# Patient Record
Sex: FEMALE | Race: WHITE | Employment: OTHER | URBAN - METROPOLITAN AREA
[De-identification: names, ages, dates, MRNs, and addresses within clinical notes are randomized per-mention and may not be internally consistent; named-entity substitution may affect disease eponyms.]

---

## 2020-11-06 ENCOUNTER — HOSPITAL ENCOUNTER (OUTPATIENT)
Dept: GENERAL RADIOLOGY | Age: 65
Discharge: HOME | End: 2020-11-08
Payer: MEDICARE

## 2020-11-06 PROCEDURE — 77063 BREAST TOMOSYNTHESIS BI: CPT

## 2021-01-01 ENCOUNTER — APPOINTMENT (OUTPATIENT)
Dept: GENERAL RADIOLOGY | Age: 66
DRG: 870 | End: 2021-01-01
Payer: MEDICARE

## 2021-01-01 ENCOUNTER — HOSPITAL ENCOUNTER (OUTPATIENT)
Dept: GENERAL RADIOLOGY | Age: 66
Discharge: HOME | End: 2021-11-10
Payer: MEDICARE

## 2021-01-01 ENCOUNTER — CLINICAL DOCUMENTATION (OUTPATIENT)
Dept: FAMILY MEDICINE CLINIC | Age: 66
End: 2021-01-01

## 2021-01-01 ENCOUNTER — HOSPITAL ENCOUNTER (EMERGENCY)
Age: 66
Discharge: HOME | End: 2021-02-20
Payer: MEDICARE

## 2021-01-01 ENCOUNTER — ANESTHESIA (OUTPATIENT)
Dept: ICU | Age: 66
DRG: 870 | End: 2021-01-01
Payer: MEDICARE

## 2021-01-01 ENCOUNTER — TELEPHONE (OUTPATIENT)
Dept: FAMILY MEDICINE CLINIC | Age: 66
End: 2021-01-01

## 2021-01-01 ENCOUNTER — OFFICE VISIT (OUTPATIENT)
Dept: FAMILY MEDICINE CLINIC | Age: 66
End: 2021-01-01
Payer: MEDICARE

## 2021-01-01 ENCOUNTER — APPOINTMENT (OUTPATIENT)
Dept: GENERAL RADIOLOGY | Age: 66
End: 2021-01-01
Payer: MEDICARE

## 2021-01-01 ENCOUNTER — ANESTHESIA EVENT (OUTPATIENT)
Dept: ICU | Age: 66
DRG: 870 | End: 2021-01-01
Payer: MEDICARE

## 2021-01-01 ENCOUNTER — HOSPITAL ENCOUNTER (OUTPATIENT)
Dept: ULTRASOUND IMAGING | Age: 66
Discharge: HOME | End: 2021-03-07
Payer: MEDICARE

## 2021-01-01 ENCOUNTER — CARE COORDINATION (OUTPATIENT)
Dept: CARE COORDINATION | Age: 66
End: 2021-01-01

## 2021-01-01 ENCOUNTER — HOSPITAL ENCOUNTER (EMERGENCY)
Age: 66
Discharge: HOME | End: 2021-09-23
Payer: MEDICARE

## 2021-01-01 ENCOUNTER — APPOINTMENT (OUTPATIENT)
Dept: CT IMAGING | Age: 66
DRG: 870 | End: 2021-01-01
Payer: MEDICARE

## 2021-01-01 ENCOUNTER — HOSPITAL ENCOUNTER (INPATIENT)
Age: 66
LOS: 34 days | DRG: 870 | End: 2021-12-20
Attending: STUDENT IN AN ORGANIZED HEALTH CARE EDUCATION/TRAINING PROGRAM | Admitting: FAMILY MEDICINE
Payer: MEDICARE

## 2021-01-01 ENCOUNTER — APPOINTMENT (OUTPATIENT)
Dept: ULTRASOUND IMAGING | Age: 66
DRG: 870 | End: 2021-01-01
Payer: MEDICARE

## 2021-01-01 ENCOUNTER — NURSE TRIAGE (OUTPATIENT)
Dept: OTHER | Facility: CLINIC | Age: 66
End: 2021-01-01

## 2021-01-01 VITALS
OXYGEN SATURATION: 96 % | WEIGHT: 152 LBS | HEART RATE: 66 BPM | RESPIRATION RATE: 18 BRPM | HEIGHT: 61 IN | TEMPERATURE: 97.3 F | SYSTOLIC BLOOD PRESSURE: 138 MMHG | DIASTOLIC BLOOD PRESSURE: 87 MMHG | BODY MASS INDEX: 28.7 KG/M2

## 2021-01-01 VITALS
DIASTOLIC BLOOD PRESSURE: 93 MMHG | TEMPERATURE: 96.3 F | OXYGEN SATURATION: 71 % | BODY MASS INDEX: 33.51 KG/M2 | SYSTOLIC BLOOD PRESSURE: 132 MMHG | RESPIRATION RATE: 54 BRPM | WEIGHT: 177.5 LBS | HEIGHT: 61 IN

## 2021-01-01 VITALS
DIASTOLIC BLOOD PRESSURE: 73 MMHG | WEIGHT: 161 LBS | HEART RATE: 61 BPM | RESPIRATION RATE: 16 BRPM | SYSTOLIC BLOOD PRESSURE: 112 MMHG | TEMPERATURE: 97.9 F | HEIGHT: 61 IN | BODY MASS INDEX: 30.4 KG/M2 | OXYGEN SATURATION: 98 %

## 2021-01-01 VITALS
BODY MASS INDEX: 28.13 KG/M2 | WEIGHT: 149 LBS | SYSTOLIC BLOOD PRESSURE: 120 MMHG | HEART RATE: 78 BPM | TEMPERATURE: 97.5 F | HEIGHT: 61 IN | OXYGEN SATURATION: 96 % | RESPIRATION RATE: 18 BRPM | DIASTOLIC BLOOD PRESSURE: 78 MMHG

## 2021-01-01 VITALS
DIASTOLIC BLOOD PRESSURE: 75 MMHG | SYSTOLIC BLOOD PRESSURE: 143 MMHG | HEART RATE: 69 BPM | OXYGEN SATURATION: 97 % | RESPIRATION RATE: 18 BRPM | TEMPERATURE: 97.5 F

## 2021-01-01 VITALS
DIASTOLIC BLOOD PRESSURE: 83 MMHG | RESPIRATION RATE: 18 BRPM | HEART RATE: 65 BPM | OXYGEN SATURATION: 96 % | SYSTOLIC BLOOD PRESSURE: 129 MMHG | HEIGHT: 61 IN | BODY MASS INDEX: 31.15 KG/M2 | TEMPERATURE: 97.4 F | WEIGHT: 165 LBS

## 2021-01-01 VITALS
SYSTOLIC BLOOD PRESSURE: 124 MMHG | HEIGHT: 61 IN | TEMPERATURE: 98 F | HEART RATE: 76 BPM | DIASTOLIC BLOOD PRESSURE: 80 MMHG | WEIGHT: 160 LBS | RESPIRATION RATE: 20 BRPM | OXYGEN SATURATION: 95 % | BODY MASS INDEX: 30.21 KG/M2

## 2021-01-01 DIAGNOSIS — Z12.31 ENCOUNTER FOR SCREENING MAMMOGRAM FOR MALIGNANT NEOPLASM OF BREAST: ICD-10-CM

## 2021-01-01 DIAGNOSIS — E78.5 HYPERLIPIDEMIA, UNSPECIFIED HYPERLIPIDEMIA TYPE: Primary | ICD-10-CM

## 2021-01-01 DIAGNOSIS — U07.1 COVID-19: ICD-10-CM

## 2021-01-01 DIAGNOSIS — E87.6 HYPOKALEMIA: ICD-10-CM

## 2021-01-01 DIAGNOSIS — E78.00 HIGH CHOLESTEROL: ICD-10-CM

## 2021-01-01 DIAGNOSIS — R53.83 FATIGUE, UNSPECIFIED TYPE: ICD-10-CM

## 2021-01-01 DIAGNOSIS — Z11.59 ENCOUNTER FOR SCREENING FOR OTHER VIRAL DISEASES: ICD-10-CM

## 2021-01-01 DIAGNOSIS — E78.5 HYPERLIPIDEMIA, UNSPECIFIED HYPERLIPIDEMIA TYPE: ICD-10-CM

## 2021-01-01 DIAGNOSIS — N30.01 ACUTE CYSTITIS WITH HEMATURIA: ICD-10-CM

## 2021-01-01 DIAGNOSIS — R74.8 ELEVATED LIVER ENZYMES: Primary | ICD-10-CM

## 2021-01-01 DIAGNOSIS — E07.89 THYROID FULLNESS: ICD-10-CM

## 2021-01-01 DIAGNOSIS — F25.9 SCHIZOAFFECTIVE DISORDER, UNSPECIFIED TYPE (HCC): ICD-10-CM

## 2021-01-01 DIAGNOSIS — M62.82 NON-TRAUMATIC RHABDOMYOLYSIS: ICD-10-CM

## 2021-01-01 DIAGNOSIS — T79.6XXA TRAUMATIC RHABDOMYOLYSIS, INITIAL ENCOUNTER (HCC): ICD-10-CM

## 2021-01-01 DIAGNOSIS — Z00.00 HEALTHCARE MAINTENANCE: ICD-10-CM

## 2021-01-01 DIAGNOSIS — R74.8 ELEVATED LIVER ENZYMES: ICD-10-CM

## 2021-01-01 DIAGNOSIS — S86.911A STRAIN OF RIGHT KNEE, INITIAL ENCOUNTER: Primary | ICD-10-CM

## 2021-01-01 DIAGNOSIS — J18.9 PNEUMONIA DUE TO INFECTIOUS ORGANISM, UNSPECIFIED LATERALITY, UNSPECIFIED PART OF LUNG: ICD-10-CM

## 2021-01-01 DIAGNOSIS — R53.83 FATIGUE, UNSPECIFIED TYPE: Primary | ICD-10-CM

## 2021-01-01 DIAGNOSIS — J96.01 ACUTE RESPIRATORY FAILURE WITH HYPOXIA (HCC): Primary | ICD-10-CM

## 2021-01-01 DIAGNOSIS — Z00.00 HEALTHCARE MAINTENANCE: Primary | ICD-10-CM

## 2021-01-01 DIAGNOSIS — R05.9 COUGH: Primary | ICD-10-CM

## 2021-01-01 LAB
(1,3)-BETA-D-GLUCAN (FUNGITELL) INTERPRETATION: NEGATIVE
(1,3)-BETA-D-GLUCAN (FUNGITELL) INTERPRETATION: NEGATIVE
(1,3)-BETA-D-GLUCAN (FUNGITELL): 31 PG/ML
(1,3)-BETA-D-GLUCAN (FUNGITELL): <31 PG/ML
AADO2: 222.5 MMHG
AADO2: 225 MMHG
AADO2: 234.7 MMHG
AADO2: 254.2 MMHG
AADO2: 261.1 MMHG
AADO2: 269.3 MMHG
AADO2: 282.8 MMHG
AADO2: 285.2 MMHG
AADO2: 287.5 MMHG
AADO2: 308.2 MMHG
AADO2: 312.1 MMHG
AADO2: 322.4 MMHG
AADO2: 322.8 MMHG
AADO2: 327.3 MMHG
AADO2: 333 MMHG
AADO2: 334.8 MMHG
AADO2: 337.2 MMHG
AADO2: 339.3 MMHG
AADO2: 361.2 MMHG
AADO2: 361.3 MMHG
AADO2: 363.2 MMHG
AADO2: 365.7 MMHG
AADO2: 377.6 MMHG
AADO2: 389.9 MMHG
AADO2: 406.3 MMHG
AADO2: 412.8 MMHG
AADO2: 415.9 MMHG
AADO2: 417.4 MMHG
AADO2: 418 MMHG
AADO2: 422.1 MMHG
AADO2: 429.4 MMHG
AADO2: 439.5 MMHG
AADO2: 441.2 MMHG
AADO2: 466.5 MMHG
AADO2: 470.4 MMHG
AADO2: 471.6 MMHG
AADO2: 471.8 MMHG
AADO2: 493.6 MMHG
AADO2: 498.6 MMHG
AADO2: 506.8 MMHG
AADO2: 520.9 MMHG
AADO2: 525.5 MMHG
AADO2: 530 MMHG
AADO2: 537.2 MMHG
AADO2: 556.9 MMHG
AADO2: 569.7 MMHG
AADO2: 578.2 MMHG
AADO2: 578.9 MMHG
AADO2: 579.8 MMHG
AADO2: 585.7 MMHG
AADO2: 589.3 MMHG
AADO2: 594.8 MMHG
AADO2: 603.2 MMHG
ABO/RH: NORMAL
ABO/RH: NORMAL
ACANTHOCYTES: ABNORMAL
ACANTHOCYTES: ABNORMAL
ADENOVIRUS BY PCR: NOT DETECTED
ALBUMIN SERPL-MCNC: 1.9 G/DL (ref 3.5–5.2)
ALBUMIN SERPL-MCNC: 2.1 G/DL (ref 3.5–5.2)
ALBUMIN SERPL-MCNC: 2.1 G/DL (ref 3.5–5.2)
ALBUMIN SERPL-MCNC: 2.2 G/DL (ref 3.5–5.2)
ALBUMIN SERPL-MCNC: 2.3 G/DL (ref 3.5–5.2)
ALBUMIN SERPL-MCNC: 2.4 G/DL (ref 3.5–5.2)
ALBUMIN SERPL-MCNC: 2.5 G/DL (ref 3.5–5.2)
ALBUMIN SERPL-MCNC: 2.5 G/DL (ref 3.5–5.2)
ALBUMIN SERPL-MCNC: 2.6 G/DL (ref 3.5–5.2)
ALBUMIN SERPL-MCNC: 2.7 G/DL (ref 3.5–5.2)
ALBUMIN SERPL-MCNC: 2.8 G/DL (ref 3.5–5.2)
ALBUMIN SERPL-MCNC: 2.8 G/DL (ref 3.5–5.2)
ALBUMIN SERPL-MCNC: 2.9 G/DL (ref 3.5–5.2)
ALBUMIN SERPL-MCNC: 2.9 G/DL (ref 3.5–5.2)
ALBUMIN SERPL-MCNC: 3.2 G/DL (ref 3.5–5.2)
ALBUMIN SERPL-MCNC: 3.5 G/DL (ref 3.5–5.2)
ALBUMIN SERPL-MCNC: 3.8 G/DL (ref 3.5–5.2)
ALBUMIN SERPL-MCNC: 4.7 G/DL (ref 3.5–5.2)
ALP BLD-CCNC: 103 U/L (ref 35–104)
ALP BLD-CCNC: 103 U/L (ref 35–104)
ALP BLD-CCNC: 104 U/L (ref 35–104)
ALP BLD-CCNC: 115 U/L (ref 35–104)
ALP BLD-CCNC: 116 U/L (ref 35–104)
ALP BLD-CCNC: 118 U/L (ref 35–104)
ALP BLD-CCNC: 131 U/L (ref 35–104)
ALP BLD-CCNC: 137 U/L (ref 35–104)
ALP BLD-CCNC: 146 U/L (ref 35–104)
ALP BLD-CCNC: 156 U/L (ref 35–104)
ALP BLD-CCNC: 176 U/L (ref 35–104)
ALP BLD-CCNC: 179 U/L (ref 35–104)
ALP BLD-CCNC: 74 U/L (ref 35–104)
ALP BLD-CCNC: 77 U/L (ref 35–104)
ALP BLD-CCNC: 78 U/L (ref 35–104)
ALP BLD-CCNC: 79 U/L (ref 35–104)
ALP BLD-CCNC: 80 U/L (ref 35–104)
ALP BLD-CCNC: 82 U/L (ref 35–104)
ALP BLD-CCNC: 82 U/L (ref 35–104)
ALP BLD-CCNC: 83 U/L (ref 35–104)
ALP BLD-CCNC: 84 U/L (ref 35–104)
ALP BLD-CCNC: 85 U/L (ref 35–104)
ALP BLD-CCNC: 88 U/L (ref 35–104)
ALP BLD-CCNC: 89 U/L (ref 35–104)
ALP BLD-CCNC: 90 U/L (ref 35–104)
ALP BLD-CCNC: 91 U/L (ref 35–104)
ALP BLD-CCNC: 91 U/L (ref 35–104)
ALP BLD-CCNC: 92 U/L (ref 35–104)
ALP BLD-CCNC: 93 U/L (ref 35–104)
ALP BLD-CCNC: 94 U/L (ref 35–104)
ALP BLD-CCNC: 94 U/L (ref 35–104)
ALP BLD-CCNC: 95 U/L (ref 35–104)
ALP BLD-CCNC: 95 U/L (ref 35–104)
ALP BLD-CCNC: 96 U/L (ref 35–104)
ALP BLD-CCNC: 97 U/L (ref 35–104)
ALP BLD-CCNC: 97 U/L (ref 35–104)
ALT SERPL-CCNC: 101 U/L (ref 0–32)
ALT SERPL-CCNC: 105 U/L (ref 0–32)
ALT SERPL-CCNC: 107 U/L (ref 0–32)
ALT SERPL-CCNC: 112 U/L (ref 0–32)
ALT SERPL-CCNC: 28 U/L (ref 0–32)
ALT SERPL-CCNC: 31 U/L (ref 0–32)
ALT SERPL-CCNC: 34 U/L (ref 0–32)
ALT SERPL-CCNC: 38 U/L (ref 0–32)
ALT SERPL-CCNC: 38 U/L (ref 0–32)
ALT SERPL-CCNC: 39 U/L (ref 0–32)
ALT SERPL-CCNC: 39 U/L (ref 0–32)
ALT SERPL-CCNC: 40 U/L (ref 0–32)
ALT SERPL-CCNC: 42 U/L (ref 0–32)
ALT SERPL-CCNC: 44 U/L (ref 0–32)
ALT SERPL-CCNC: 44 U/L (ref 0–32)
ALT SERPL-CCNC: 46 U/L (ref 0–32)
ALT SERPL-CCNC: 47 U/L (ref 0–32)
ALT SERPL-CCNC: 47 U/L (ref 0–32)
ALT SERPL-CCNC: 49 U/L (ref 0–32)
ALT SERPL-CCNC: 50 U/L (ref 0–32)
ALT SERPL-CCNC: 51 U/L (ref 0–32)
ALT SERPL-CCNC: 53 U/L (ref 0–32)
ALT SERPL-CCNC: 53 U/L (ref 0–32)
ALT SERPL-CCNC: 58 U/L (ref 0–32)
ALT SERPL-CCNC: 58 U/L (ref 0–32)
ALT SERPL-CCNC: 63 U/L (ref 0–32)
ALT SERPL-CCNC: 65 U/L (ref 0–32)
ALT SERPL-CCNC: 66 U/L (ref 0–32)
ALT SERPL-CCNC: 68 U/L (ref 0–32)
ALT SERPL-CCNC: 69 U/L (ref 0–32)
ALT SERPL-CCNC: 73 U/L (ref 0–32)
ALT SERPL-CCNC: 75 U/L (ref 0–32)
ALT SERPL-CCNC: 75 U/L (ref 0–32)
ALT SERPL-CCNC: 77 U/L (ref 0–32)
ALT SERPL-CCNC: 78 U/L (ref 0–32)
ALT SERPL-CCNC: 83 U/L (ref 0–32)
ALT SERPL-CCNC: 85 U/L (ref 0–32)
ALT SERPL-CCNC: 92 U/L (ref 0–32)
AMMONIA: 58 UMOL/L (ref 11–51)
ANION GAP SERPL CALCULATED.3IONS-SCNC: 10 MMOL/L (ref 7–16)
ANION GAP SERPL CALCULATED.3IONS-SCNC: 11 MMOL/L (ref 7–16)
ANION GAP SERPL CALCULATED.3IONS-SCNC: 12 MMOL/L (ref 7–16)
ANION GAP SERPL CALCULATED.3IONS-SCNC: 13 MMOL/L (ref 7–16)
ANION GAP SERPL CALCULATED.3IONS-SCNC: 14 MMOL/L (ref 7–16)
ANION GAP SERPL CALCULATED.3IONS-SCNC: 15 MMOL/L (ref 7–16)
ANION GAP SERPL CALCULATED.3IONS-SCNC: 16 MMOL/L (ref 7–16)
ANION GAP SERPL CALCULATED.3IONS-SCNC: 17 MMOL/L (ref 7–16)
ANION GAP SERPL CALCULATED.3IONS-SCNC: 6 MMOL/L (ref 7–16)
ANION GAP SERPL CALCULATED.3IONS-SCNC: 7 MMOL/L (ref 7–16)
ANION GAP SERPL CALCULATED.3IONS-SCNC: 8 MMOL/L (ref 7–16)
ANION GAP SERPL CALCULATED.3IONS-SCNC: 9 MMOL/L (ref 7–16)
ANISOCYTOSIS: ABNORMAL
ANTIBODY SCREEN: NORMAL
ANTIBODY SCREEN: NORMAL
APPEARANCE FLUID: NORMAL
APTT: 20.4 SEC (ref 24.5–35.1)
APTT: 21.3 SEC (ref 24.5–35.1)
APTT: 22.4 SEC (ref 24.5–35.1)
APTT: 23 SEC (ref 24.5–35.1)
APTT: 23.2 SEC (ref 24.5–35.1)
APTT: 23.3 SEC (ref 24.5–35.1)
APTT: 23.7 SEC (ref 24.5–35.1)
APTT: 24.1 SEC (ref 24.5–35.1)
APTT: 24.2 SEC (ref 24.5–35.1)
APTT: 24.5 SEC (ref 24.5–35.1)
APTT: 25 SEC (ref 24.5–35.1)
APTT: 25.5 SEC (ref 24.5–35.1)
APTT: 26.4 SEC (ref 24.5–35.1)
APTT: 26.6 SEC (ref 24.5–35.1)
APTT: 27.2 SEC (ref 24.5–35.1)
APTT: 27.2 SEC (ref 24.5–35.1)
APTT: 27.4 SEC (ref 24.5–35.1)
APTT: 29.2 SEC (ref 24.5–35.1)
APTT: 29.4 SEC (ref 24.5–35.1)
APTT: 29.6 SEC (ref 24.5–35.1)
APTT: 29.8 SEC (ref 24.5–35.1)
APTT: 31.9 SEC (ref 24.5–35.1)
APTT: 37.9 SEC (ref 24.5–35.1)
APTT: 39.5 SEC (ref 24.5–35.1)
APTT: 40 SEC (ref 24.5–35.1)
APTT: 40.7 SEC (ref 24.5–35.1)
APTT: 47.6 SEC (ref 24.5–35.1)
APTT: 50.6 SEC (ref 24.5–35.1)
APTT: 50.9 SEC (ref 24.5–35.1)
APTT: 51.9 SEC (ref 24.5–35.1)
APTT: 53 SEC (ref 24.5–35.1)
ASPERGILLUS GALACTO AG: NEGATIVE
ASPERGILLUS GALACTO INDEX: 0.09
AST SERPL-CCNC: 101 U/L (ref 0–31)
AST SERPL-CCNC: 110 U/L (ref 0–31)
AST SERPL-CCNC: 110 U/L (ref 0–31)
AST SERPL-CCNC: 135 U/L (ref 0–31)
AST SERPL-CCNC: 139 U/L (ref 0–31)
AST SERPL-CCNC: 17 U/L (ref 0–31)
AST SERPL-CCNC: 17 U/L (ref 0–31)
AST SERPL-CCNC: 18 U/L (ref 0–31)
AST SERPL-CCNC: 183 U/L (ref 0–31)
AST SERPL-CCNC: 19 U/L (ref 0–31)
AST SERPL-CCNC: 194 U/L (ref 0–31)
AST SERPL-CCNC: 20 U/L (ref 0–31)
AST SERPL-CCNC: 21 U/L (ref 0–31)
AST SERPL-CCNC: 21 U/L (ref 0–31)
AST SERPL-CCNC: 22 U/L (ref 0–31)
AST SERPL-CCNC: 23 U/L (ref 0–31)
AST SERPL-CCNC: 23 U/L (ref 0–31)
AST SERPL-CCNC: 24 U/L (ref 0–31)
AST SERPL-CCNC: 25 U/L (ref 0–31)
AST SERPL-CCNC: 28 U/L (ref 0–31)
AST SERPL-CCNC: 34 U/L (ref 0–31)
AST SERPL-CCNC: 36 U/L (ref 0–31)
AST SERPL-CCNC: 38 U/L (ref 0–31)
AST SERPL-CCNC: 38 U/L (ref 0–31)
AST SERPL-CCNC: 39 U/L (ref 0–31)
AST SERPL-CCNC: 42 U/L (ref 0–31)
AST SERPL-CCNC: 44 U/L (ref 0–31)
AST SERPL-CCNC: 45 U/L (ref 0–31)
AST SERPL-CCNC: 48 U/L (ref 0–31)
AST SERPL-CCNC: 58 U/L (ref 0–31)
AST SERPL-CCNC: 59 U/L (ref 0–31)
AST SERPL-CCNC: 66 U/L (ref 0–31)
AST SERPL-CCNC: 66 U/L (ref 0–31)
AST SERPL-CCNC: 67 U/L (ref 0–31)
AST SERPL-CCNC: 80 U/L (ref 0–31)
AST SERPL-CCNC: 85 U/L (ref 0–31)
AST SERPL-CCNC: 89 U/L (ref 0–31)
AST SERPL-CCNC: 93 U/L (ref 0–31)
B.E.: -0.5 MMOL/L (ref -3–3)
B.E.: -0.8 MMOL/L (ref -3–3)
B.E.: -1.5 MMOL/L (ref -3–3)
B.E.: -1.8 MMOL/L (ref -3–3)
B.E.: -1.9 MMOL/L (ref -3–0)
B.E.: -2 MMOL/L (ref -3–3)
B.E.: -2.1 MMOL/L (ref -3–3)
B.E.: -2.6 MMOL/L (ref -3–3)
B.E.: -3 MMOL/L (ref -3–3)
B.E.: -3.4 MMOL/L (ref -3–3)
B.E.: -4 MMOL/L (ref -3–3)
B.E.: -4 MMOL/L (ref -3–3)
B.E.: -4.2 MMOL/L (ref -3–3)
B.E.: -4.5 MMOL/L (ref -3–3)
B.E.: -4.9 MMOL/L (ref -3–3)
B.E.: -8.6 MMOL/L (ref -3–3)
B.E.: 0 MMOL/L (ref -3–3)
B.E.: 1.8 MMOL/L (ref -3–3)
B.E.: 1.9 MMOL/L (ref -3–3)
B.E.: 12.8 MMOL/L (ref -3–3)
B.E.: 2 MMOL/L (ref -3–3)
B.E.: 2 MMOL/L (ref -3–3)
B.E.: 2.1 MMOL/L (ref -3–3)
B.E.: 2.3 MMOL/L (ref -3–3)
B.E.: 2.4 MMOL/L (ref -3–3)
B.E.: 2.6 MMOL/L (ref -3–3)
B.E.: 3.2 MMOL/L (ref -3–3)
B.E.: 3.2 MMOL/L (ref -3–3)
B.E.: 3.3 MMOL/L (ref -3–3)
B.E.: 3.3 MMOL/L (ref -3–3)
B.E.: 3.4 MMOL/L (ref -3–3)
B.E.: 3.7 MMOL/L (ref -3–3)
B.E.: 3.9 MMOL/L (ref -3–3)
B.E.: 4.1 MMOL/L (ref -3–3)
B.E.: 4.2 MMOL/L (ref -3–3)
B.E.: 4.2 MMOL/L (ref -3–3)
B.E.: 4.8 MMOL/L (ref -3–3)
B.E.: 5.3 MMOL/L (ref -3–3)
B.E.: 5.4 MMOL/L (ref -3–3)
B.E.: 5.5 MMOL/L (ref -3–3)
B.E.: 5.5 MMOL/L (ref -3–3)
B.E.: 6.3 MMOL/L (ref -3–3)
B.E.: 6.9 MMOL/L (ref -3–3)
B.E.: 7.4 MMOL/L (ref -3–3)
B.E.: 8.7 MMOL/L (ref -3–3)
B.E.: 9.6 MMOL/L (ref -3–3)
B.E.: 9.9 MMOL/L (ref -3–3)
BACTERIA: ABNORMAL /HPF
BACTERIA: ABNORMAL /HPF
BASO FLUID: 0 %
BASOPHILIC STIPPLING: ABNORMAL
BASOPHILS ABSOLUTE: 0 E9/L (ref 0–0.2)
BASOPHILS ABSOLUTE: 0.01 E9/L (ref 0–0.2)
BASOPHILS ABSOLUTE: 0.02 E9/L (ref 0–0.2)
BASOPHILS ABSOLUTE: 0.03 E9/L (ref 0–0.2)
BASOPHILS ABSOLUTE: 0.03 E9/L (ref 0–0.2)
BASOPHILS ABSOLUTE: 0.04 E9/L (ref 0–0.2)
BASOPHILS ABSOLUTE: 0.04 E9/L (ref 0–0.2)
BASOPHILS ABSOLUTE: 0.05 E9/L (ref 0–0.2)
BASOPHILS ABSOLUTE: 0.11 E9/L (ref 0–0.2)
BASOPHILS RELATIVE PERCENT: 0 % (ref 0–2)
BASOPHILS RELATIVE PERCENT: 0.1 % (ref 0–2)
BASOPHILS RELATIVE PERCENT: 0.2 % (ref 0–2)
BASOPHILS RELATIVE PERCENT: 0.3 % (ref 0–2)
BASOPHILS RELATIVE PERCENT: 0.3 % (ref 0–2)
BASOPHILS RELATIVE PERCENT: 0.4 % (ref 0–2)
BASOPHILS RELATIVE PERCENT: 0.6 % (ref 0–2)
BASOPHILS RELATIVE PERCENT: 0.9 % (ref 0–2)
BILIRUB SERPL-MCNC: 0.2 MG/DL (ref 0–1.2)
BILIRUB SERPL-MCNC: 0.2 MG/DL (ref 0–1.2)
BILIRUB SERPL-MCNC: 0.3 MG/DL (ref 0–1.2)
BILIRUB SERPL-MCNC: 0.4 MG/DL (ref 0–1.2)
BILIRUB SERPL-MCNC: 0.5 MG/DL (ref 0–1.2)
BILIRUB SERPL-MCNC: 0.6 MG/DL (ref 0–1.2)
BILIRUB SERPL-MCNC: 0.8 MG/DL (ref 0–1.2)
BILIRUB SERPL-MCNC: <0.2 MG/DL (ref 0–1.2)
BILIRUBIN DIRECT: 0.2 MG/DL (ref 0–0.3)
BILIRUBIN DIRECT: 0.3 MG/DL (ref 0–0.3)
BILIRUBIN DIRECT: 0.4 MG/DL (ref 0–0.3)
BILIRUBIN DIRECT: <0.2 MG/DL (ref 0–0.3)
BILIRUBIN URINE: NEGATIVE
BILIRUBIN URINE: NEGATIVE
BILIRUBIN, INDIRECT: 0.1 MG/DL (ref 0–1)
BILIRUBIN, INDIRECT: 0.2 MG/DL (ref 0–1)
BILIRUBIN, INDIRECT: 0.6 MG/DL (ref 0–1)
BILIRUBIN, INDIRECT: ABNORMAL MG/DL (ref 0–1)
BLOOD BANK DISPENSE STATUS: NORMAL
BLOOD BANK DISPENSE STATUS: NORMAL
BLOOD BANK PRODUCT CODE: NORMAL
BLOOD BANK PRODUCT CODE: NORMAL
BLOOD CULTURE, ROUTINE: NORMAL
BLOOD, URINE: ABNORMAL
BLOOD, URINE: ABNORMAL
BORDETELLA PARAPERTUSSIS BY PCR: NOT DETECTED
BORDETELLA PERTUSSIS BY PCR: NOT DETECTED
BPU ID: NORMAL
BPU ID: NORMAL
BUN BLDV-MCNC: 10 MG/DL (ref 6–23)
BUN BLDV-MCNC: 11 MG/DL (ref 6–23)
BUN BLDV-MCNC: 11 MG/DL (ref 6–23)
BUN BLDV-MCNC: 11 MG/DL (ref 8–23)
BUN BLDV-MCNC: 12 MG/DL (ref 6–23)
BUN BLDV-MCNC: 13 MG/DL (ref 6–23)
BUN BLDV-MCNC: 15 MG/DL (ref 6–23)
BUN BLDV-MCNC: 15 MG/DL (ref 6–23)
BUN BLDV-MCNC: 16 MG/DL (ref 6–23)
BUN BLDV-MCNC: 17 MG/DL (ref 6–23)
BUN BLDV-MCNC: 17 MG/DL (ref 6–23)
BUN BLDV-MCNC: 18 MG/DL (ref 6–23)
BUN BLDV-MCNC: 20 MG/DL (ref 6–23)
BUN BLDV-MCNC: 22 MG/DL (ref 6–23)
BUN BLDV-MCNC: 23 MG/DL (ref 6–23)
BUN BLDV-MCNC: 24 MG/DL (ref 6–23)
BUN BLDV-MCNC: 27 MG/DL (ref 6–23)
BUN BLDV-MCNC: 27 MG/DL (ref 6–23)
BUN BLDV-MCNC: 28 MG/DL (ref 6–23)
BUN BLDV-MCNC: 29 MG/DL (ref 6–23)
BUN BLDV-MCNC: 30 MG/DL (ref 6–23)
BUN BLDV-MCNC: 30 MG/DL (ref 6–23)
BUN BLDV-MCNC: 31 MG/DL (ref 6–23)
BUN BLDV-MCNC: 31 MG/DL (ref 6–23)
BUN BLDV-MCNC: 32 MG/DL (ref 6–23)
BUN BLDV-MCNC: 33 MG/DL (ref 6–23)
BUN BLDV-MCNC: 33 MG/DL (ref 6–23)
BUN BLDV-MCNC: 34 MG/DL (ref 6–23)
BUN BLDV-MCNC: 34 MG/DL (ref 6–23)
BUN BLDV-MCNC: 36 MG/DL (ref 6–23)
BUN BLDV-MCNC: 38 MG/DL (ref 6–23)
BUN BLDV-MCNC: 38 MG/DL (ref 6–23)
BUN BLDV-MCNC: 39 MG/DL (ref 6–23)
BUN BLDV-MCNC: 42 MG/DL (ref 6–23)
BUN BLDV-MCNC: 44 MG/DL (ref 6–23)
BUN BLDV-MCNC: 45 MG/DL (ref 6–23)
BUN BLDV-MCNC: 45 MG/DL (ref 6–23)
BUN BLDV-MCNC: 48 MG/DL (ref 6–23)
BUN BLDV-MCNC: 48 MG/DL (ref 6–23)
BUN BLDV-MCNC: 49 MG/DL (ref 6–23)
BUN BLDV-MCNC: 50 MG/DL (ref 6–23)
BUN BLDV-MCNC: 52 MG/DL (ref 6–23)
BUN BLDV-MCNC: 54 MG/DL (ref 6–23)
BUN BLDV-MCNC: 54 MG/DL (ref 6–23)
BUN BLDV-MCNC: 55 MG/DL (ref 6–23)
BUN BLDV-MCNC: 7 MG/DL (ref 6–23)
BUN BLDV-MCNC: 7 MG/DL (ref 6–23)
BUN BLDV-MCNC: 9 MG/DL (ref 6–23)
BURR CELLS: ABNORMAL
BURR CELLS: ABNORMAL
C-REACTIVE PROTEIN: 0.4 MG/DL (ref 0–0.4)
C-REACTIVE PROTEIN: 0.6 MG/DL (ref 0–0.4)
C-REACTIVE PROTEIN: 1.3 MG/DL (ref 0–0.4)
C-REACTIVE PROTEIN: 10.4 MG/DL (ref 0–0.4)
C-REACTIVE PROTEIN: 19 MG/DL (ref 0–0.4)
C-REACTIVE PROTEIN: 2.4 MG/DL (ref 0–0.4)
C-REACTIVE PROTEIN: 2.9 MG/DL (ref 0–0.4)
C-REACTIVE PROTEIN: 21.1 MG/DL (ref 0–0.4)
C-REACTIVE PROTEIN: 22.9 MG/DL (ref 0–0.4)
C-REACTIVE PROTEIN: 23 MG/DL (ref 0–0.4)
C-REACTIVE PROTEIN: 3.4 MG/DL (ref 0–0.4)
C-REACTIVE PROTEIN: 32.5 MG/DL (ref 0–0.4)
C-REACTIVE PROTEIN: 4.8 MG/DL (ref 0–0.4)
C-REACTIVE PROTEIN: 46.9 MG/DL (ref 0–0.4)
C-REACTIVE PROTEIN: 5.1 MG/DL (ref 0–0.4)
C-REACTIVE PROTEIN: 5.4 MG/DL (ref 0–0.4)
C-REACTIVE PROTEIN: 6.5 MG/DL (ref 0–0.4)
C-REACTIVE PROTEIN: 7 MG/DL (ref 0–0.4)
C-REACTIVE PROTEIN: 7.1 MG/DL (ref 0–0.4)
C-REACTIVE PROTEIN: 7.2 MG/DL (ref 0–0.4)
CALCIUM IONIZED: 1.05 MMOL/L (ref 1.15–1.33)
CALCIUM IONIZED: 1.07 MMOL/L (ref 1.15–1.33)
CALCIUM IONIZED: 1.07 MMOL/L (ref 1.15–1.33)
CALCIUM IONIZED: 1.08 MMOL/L (ref 1.15–1.33)
CALCIUM IONIZED: 1.09 MMOL/L (ref 1.15–1.33)
CALCIUM IONIZED: 1.09 MMOL/L (ref 1.15–1.33)
CALCIUM IONIZED: 1.11 MMOL/L (ref 1.15–1.33)
CALCIUM IONIZED: 1.12 MMOL/L (ref 1.15–1.33)
CALCIUM IONIZED: 1.13 MMOL/L (ref 1.15–1.33)
CALCIUM IONIZED: 1.14 MMOL/L (ref 1.15–1.33)
CALCIUM IONIZED: 1.15 MMOL/L (ref 1.15–1.33)
CALCIUM IONIZED: 1.16 MMOL/L (ref 1.15–1.33)
CALCIUM IONIZED: 1.18 MMOL/L (ref 1.15–1.33)
CALCIUM IONIZED: 1.19 MMOL/L (ref 1.15–1.33)
CALCIUM IONIZED: 1.2 MMOL/L (ref 1.15–1.33)
CALCIUM IONIZED: 1.21 MMOL/L (ref 1.15–1.33)
CALCIUM IONIZED: 1.21 MMOL/L (ref 1.15–1.33)
CALCIUM IONIZED: 1.22 MMOL/L (ref 1.15–1.33)
CALCIUM IONIZED: 1.22 MMOL/L (ref 1.15–1.33)
CALCIUM IONIZED: 1.23 MMOL/L (ref 1.15–1.33)
CALCIUM SERPL-MCNC: 7 MG/DL (ref 8.6–10.2)
CALCIUM SERPL-MCNC: 7.1 MG/DL (ref 8.6–10.2)
CALCIUM SERPL-MCNC: 7.2 MG/DL (ref 8.6–10.2)
CALCIUM SERPL-MCNC: 7.2 MG/DL (ref 8.6–10.2)
CALCIUM SERPL-MCNC: 7.3 MG/DL (ref 8.6–10.2)
CALCIUM SERPL-MCNC: 7.4 MG/DL (ref 8.6–10.2)
CALCIUM SERPL-MCNC: 7.5 MG/DL (ref 8.6–10.2)
CALCIUM SERPL-MCNC: 7.6 MG/DL (ref 8.6–10.2)
CALCIUM SERPL-MCNC: 7.7 MG/DL (ref 8.6–10.2)
CALCIUM SERPL-MCNC: 7.8 MG/DL (ref 8.6–10.2)
CALCIUM SERPL-MCNC: 7.9 MG/DL (ref 8.6–10.2)
CALCIUM SERPL-MCNC: 8 MG/DL (ref 8.6–10.2)
CALCIUM SERPL-MCNC: 8.1 MG/DL (ref 8.6–10.2)
CALCIUM SERPL-MCNC: 8.2 MG/DL (ref 8.6–10.2)
CALCIUM SERPL-MCNC: 8.3 MG/DL (ref 8.6–10.2)
CALCIUM SERPL-MCNC: 8.3 MG/DL (ref 8.6–10.2)
CALCIUM SERPL-MCNC: 8.4 MG/DL (ref 8.6–10.2)
CALCIUM SERPL-MCNC: 8.5 MG/DL (ref 8.6–10.2)
CALCIUM SERPL-MCNC: 8.5 MG/DL (ref 8.6–10.2)
CALCIUM SERPL-MCNC: 9.2 MG/DL (ref 8.6–10.2)
CALCIUM SERPL-MCNC: 9.8 MG/DL (ref 8.6–10.2)
CELL COUNT FLUID TYPE: NORMAL
CELLULAR CASTS: ABNORMAL /LPF
CHLAMYDOPHILIA PNEUMONIAE BY PCR: NOT DETECTED
CHLORIDE BLD-SCNC: 100 MMOL/L (ref 98–107)
CHLORIDE BLD-SCNC: 101 MMOL/L (ref 98–107)
CHLORIDE BLD-SCNC: 102 MMOL/L (ref 98–107)
CHLORIDE BLD-SCNC: 103 MMOL/L (ref 98–107)
CHLORIDE BLD-SCNC: 104 MMOL/L (ref 98–107)
CHLORIDE BLD-SCNC: 104 MMOL/L (ref 98–107)
CHLORIDE BLD-SCNC: 105 MMOL/L (ref 98–107)
CHLORIDE BLD-SCNC: 107 MMOL/L (ref 98–107)
CHLORIDE BLD-SCNC: 107 MMOL/L (ref 98–107)
CHLORIDE BLD-SCNC: 108 MMOL/L (ref 98–107)
CHLORIDE BLD-SCNC: 109 MMOL/L (ref 98–107)
CHLORIDE BLD-SCNC: 110 MMOL/L (ref 98–107)
CHLORIDE BLD-SCNC: 111 MMOL/L (ref 98–107)
CHLORIDE BLD-SCNC: 111 MMOL/L (ref 98–107)
CHLORIDE BLD-SCNC: 112 MMOL/L (ref 98–107)
CHLORIDE BLD-SCNC: 113 MMOL/L (ref 98–107)
CHLORIDE BLD-SCNC: 113 MMOL/L (ref 98–107)
CHLORIDE BLD-SCNC: 90 MMOL/L (ref 98–107)
CHLORIDE BLD-SCNC: 91 MMOL/L (ref 98–107)
CHLORIDE BLD-SCNC: 92 MMOL/L (ref 98–107)
CHLORIDE BLD-SCNC: 92 MMOL/L (ref 98–107)
CHLORIDE BLD-SCNC: 93 MMOL/L (ref 98–107)
CHLORIDE BLD-SCNC: 94 MMOL/L (ref 98–107)
CHLORIDE BLD-SCNC: 95 MMOL/L (ref 98–107)
CHLORIDE BLD-SCNC: 96 MMOL/L (ref 98–107)
CHLORIDE BLD-SCNC: 97 MMOL/L (ref 98–107)
CHLORIDE BLD-SCNC: 98 MMOL/L (ref 98–107)
CHLORIDE BLD-SCNC: 98 MMOL/L (ref 98–107)
CHLORIDE BLD-SCNC: 99 MMOL/L (ref 98–107)
CHLORIDE URINE RANDOM: 26 MMOL/L
CHOLESTEROL, TOTAL: 238 MG/DL (ref 0–199)
CLARITY: ABNORMAL
CLARITY: CLEAR
CO2: 19 MMOL/L (ref 22–29)
CO2: 20 MMOL/L (ref 22–29)
CO2: 22 MMOL/L (ref 22–29)
CO2: 22 MMOL/L (ref 22–29)
CO2: 23 MMOL/L (ref 22–29)
CO2: 24 MMOL/L (ref 22–29)
CO2: 25 MMOL/L (ref 22–29)
CO2: 26 MMOL/L (ref 22–29)
CO2: 27 MMOL/L (ref 22–29)
CO2: 28 MMOL/L (ref 22–29)
CO2: 29 MMOL/L (ref 22–29)
CO2: 30 MMOL/L (ref 22–29)
CO2: 31 MMOL/L (ref 22–29)
CO2: 32 MMOL/L (ref 22–29)
CO2: 33 MMOL/L (ref 22–29)
CO2: 35 MMOL/L (ref 22–29)
COHB: 0.1 % (ref 0–1.5)
COHB: 0.3 % (ref 0–1.5)
COHB: 0.4 % (ref 0–1.5)
COHB: 0.5 % (ref 0–1.5)
COHB: 0.6 % (ref 0–1.5)
COHB: 0.7 % (ref 0–1.5)
COHB: 0.9 % (ref 0–1.5)
COHB: 1 % (ref 0–1.5)
COHB: 1.1 % (ref 0–1.5)
COHB: 1.1 % (ref 0–1.5)
COHB: 1.2 % (ref 0–1.5)
COHB: 1.3 % (ref 0–1.5)
COHB: 1.4 % (ref 0–1.5)
COHB: 1.5 % (ref 0–1.5)
COHB: 1.5 % (ref 0–1.5)
COHB: 1.6 % (ref 0–1.5)
COHB: 2 % (ref 0–1.5)
COLOR FLUID: NORMAL
COLOR: YELLOW
COLOR: YELLOW
COMMENT: ABNORMAL
COMMENT: ABNORMAL
CORONAVIRUS 229E BY PCR: NOT DETECTED
CORONAVIRUS HKU1 BY PCR: NOT DETECTED
CORONAVIRUS NL63 BY PCR: NOT DETECTED
CORONAVIRUS OC43 BY PCR: NOT DETECTED
CORTISOL TOTAL: 24.27 MCG/DL (ref 2.68–18.4)
CORTISOL TOTAL: 28.16 MCG/DL (ref 2.68–18.4)
CREAT SERPL-MCNC: 0.3 MG/DL (ref 0.5–1)
CREAT SERPL-MCNC: 0.4 MG/DL (ref 0.5–1)
CREAT SERPL-MCNC: 0.5 MG/DL (ref 0.5–1)
CREAT SERPL-MCNC: 0.6 MG/DL (ref 0.5–1)
CREAT SERPL-MCNC: 0.7 MG/DL (ref 0.5–1)
CREAT SERPL-MCNC: 0.8 MG/DL (ref 0.5–1)
CREAT SERPL-MCNC: 0.8 MG/DL (ref 0.5–1)
CREAT SERPL-MCNC: 0.9 MG/DL (ref 0.5–1)
CREAT SERPL-MCNC: 1 MG/DL (ref 0.5–1)
CREAT SERPL-MCNC: 1.1 MG/DL (ref 0.5–1)
CREAT SERPL-MCNC: 1.2 MG/DL (ref 0.5–1)
CREAT SERPL-MCNC: 1.2 MG/DL (ref 0.5–1)
CREAT SERPL-MCNC: 1.3 MG/DL (ref 0.5–1)
CREAT SERPL-MCNC: 1.4 MG/DL (ref 0.5–1)
CREAT SERPL-MCNC: 1.5 MG/DL (ref 0.5–1)
CREAT SERPL-MCNC: 1.6 MG/DL (ref 0.5–1)
CREATININE URINE: 104 MG/DL (ref 29–226)
CREATININE URINE: 105 MG/DL (ref 29–226)
CREATININE URINE: 105 MG/DL (ref 29–226)
CRITICAL: ABNORMAL
CULTURE, BLOOD 2: NORMAL
CULTURE, RESPIRATORY: ABNORMAL
CULTURE, RESPIRATORY: NORMAL
D DIMER: 1737 NG/ML DDU
D DIMER: 2085 NG/ML DDU
D DIMER: 2283 NG/ML DDU
D DIMER: 2306 NG/ML DDU
D DIMER: 3224 NG/ML DDU
D DIMER: 3689 NG/ML DDU
D DIMER: 4025 NG/ML DDU
D DIMER: 4154 NG/ML DDU
D DIMER: 4245 NG/ML DDU
D DIMER: 4285 NG/ML DDU
D DIMER: 4518 NG/ML DDU
D DIMER: 530 NG/ML DDU
D DIMER: 559 NG/ML DDU
D DIMER: 887 NG/ML DDU
D DIMER: 973 NG/ML DDU
D DIMER: 973 NG/ML DDU
D DIMER: >5250 NG/ML DDU
DATE ANALYZED: ABNORMAL
DATE OF COLLECTION: ABNORMAL
DELIVERY SYSTEMS: ABNORMAL
DESCRIPTION BLOOD BANK: NORMAL
DESCRIPTION BLOOD BANK: NORMAL
DEVICE: ABNORMAL
DEVICE: ABNORMAL
EKG ATRIAL RATE: 154 BPM
EKG ATRIAL RATE: 50 BPM
EKG ATRIAL RATE: 64 BPM
EKG ATRIAL RATE: 81 BPM
EKG ATRIAL RATE: 82 BPM
EKG P AXIS: -11 DEGREES
EKG P AXIS: 18 DEGREES
EKG P AXIS: 4 DEGREES
EKG P AXIS: 46 DEGREES
EKG P-R INTERVAL: 136 MS
EKG P-R INTERVAL: 140 MS
EKG P-R INTERVAL: 144 MS
EKG P-R INTERVAL: 156 MS
EKG P-R INTERVAL: 166 MS
EKG Q-T INTERVAL: 256 MS
EKG Q-T INTERVAL: 332 MS
EKG Q-T INTERVAL: 376 MS
EKG Q-T INTERVAL: 430 MS
EKG Q-T INTERVAL: 458 MS
EKG QRS DURATION: 54 MS
EKG QRS DURATION: 66 MS
EKG QRS DURATION: 72 MS
EKG QRS DURATION: 74 MS
EKG QRS DURATION: 74 MS
EKG QTC CALCULATION (BAZETT): 387 MS
EKG QTC CALCULATION (BAZETT): 392 MS
EKG QTC CALCULATION (BAZETT): 410 MS
EKG QTC CALCULATION (BAZETT): 436 MS
EKG QTC CALCULATION (BAZETT): 472 MS
EKG R AXIS: 118 DEGREES
EKG R AXIS: 13 DEGREES
EKG R AXIS: 20 DEGREES
EKG R AXIS: 32 DEGREES
EKG R AXIS: 76 DEGREES
EKG T AXIS: -29 DEGREES
EKG T AXIS: -62 DEGREES
EKG T AXIS: 0 DEGREES
EKG T AXIS: 18 DEGREES
EKG T AXIS: 33 DEGREES
EKG VENTRICULAR RATE: 154 BPM
EKG VENTRICULAR RATE: 50 BPM
EKG VENTRICULAR RATE: 64 BPM
EKG VENTRICULAR RATE: 81 BPM
EKG VENTRICULAR RATE: 82 BPM
EOSINOPHIL FLUID: 0 %
EOSINOPHILS ABSOLUTE: 0 E9/L (ref 0.05–0.5)
EOSINOPHILS ABSOLUTE: 0.01 E9/L (ref 0.05–0.5)
EOSINOPHILS ABSOLUTE: 0.03 E9/L (ref 0.05–0.5)
EOSINOPHILS ABSOLUTE: 0.06 E9/L (ref 0.05–0.5)
EOSINOPHILS ABSOLUTE: 0.07 E9/L (ref 0.05–0.5)
EOSINOPHILS ABSOLUTE: 0.08 E9/L (ref 0.05–0.5)
EOSINOPHILS ABSOLUTE: 0.09 E9/L (ref 0.05–0.5)
EOSINOPHILS ABSOLUTE: 0.1 E9/L (ref 0.05–0.5)
EOSINOPHILS ABSOLUTE: 0.11 E9/L (ref 0.05–0.5)
EOSINOPHILS ABSOLUTE: 0.18 E9/L (ref 0.05–0.5)
EOSINOPHILS ABSOLUTE: 0.21 E9/L (ref 0.05–0.5)
EOSINOPHILS ABSOLUTE: 0.27 E9/L (ref 0.05–0.5)
EOSINOPHILS ABSOLUTE: 0.27 E9/L (ref 0.05–0.5)
EOSINOPHILS ABSOLUTE: 0.3 E9/L (ref 0.05–0.5)
EOSINOPHILS ABSOLUTE: 0.32 E9/L (ref 0.05–0.5)
EOSINOPHILS ABSOLUTE: 0.36 E9/L (ref 0.05–0.5)
EOSINOPHILS ABSOLUTE: 0.65 E9/L (ref 0.05–0.5)
EOSINOPHILS ABSOLUTE: 1.12 E9/L (ref 0.05–0.5)
EOSINOPHILS RELATIVE PERCENT: 0 % (ref 0–6)
EOSINOPHILS RELATIVE PERCENT: 0.1 % (ref 0–6)
EOSINOPHILS RELATIVE PERCENT: 0.2 % (ref 0–6)
EOSINOPHILS RELATIVE PERCENT: 0.3 % (ref 0–6)
EOSINOPHILS RELATIVE PERCENT: 0.4 % (ref 0–6)
EOSINOPHILS RELATIVE PERCENT: 0.5 % (ref 0–6)
EOSINOPHILS RELATIVE PERCENT: 0.5 % (ref 0–6)
EOSINOPHILS RELATIVE PERCENT: 0.6 % (ref 0–6)
EOSINOPHILS RELATIVE PERCENT: 0.7 % (ref 0–6)
EOSINOPHILS RELATIVE PERCENT: 0.9 % (ref 0–6)
EOSINOPHILS RELATIVE PERCENT: 1 % (ref 0–6)
EOSINOPHILS RELATIVE PERCENT: 1.7 % (ref 0–6)
EOSINOPHILS RELATIVE PERCENT: 1.8 % (ref 0–6)
EOSINOPHILS RELATIVE PERCENT: 2.4 % (ref 0–6)
EOSINOPHILS RELATIVE PERCENT: 2.6 % (ref 0–6)
EOSINOPHILS RELATIVE PERCENT: 3.5 % (ref 0–6)
EOSINOPHILS RELATIVE PERCENT: 3.5 % (ref 0–6)
EOSINOPHILS RELATIVE PERCENT: 6.1 % (ref 0–6)
FERRITIN: 1138 NG/ML
FERRITIN: 1150 NG/ML
FERRITIN: 1464 NG/ML
FERRITIN: 197 NG/ML
FERRITIN: 198 NG/ML
FERRITIN: 215 NG/ML
FERRITIN: 220 NG/ML
FERRITIN: 222 NG/ML
FERRITIN: 234 NG/ML
FERRITIN: 238 NG/ML
FERRITIN: 247 NG/ML
FERRITIN: 270 NG/ML
FERRITIN: 274 NG/ML
FERRITIN: 297 NG/ML
FERRITIN: 300 NG/ML
FERRITIN: 306 NG/ML
FERRITIN: 333 NG/ML
FERRITIN: 335 NG/ML
FERRITIN: 348 NG/ML
FERRITIN: 379 NG/ML
FERRITIN: 387 NG/ML
FERRITIN: 392 NG/ML
FERRITIN: 396 NG/ML
FERRITIN: 402 NG/ML
FERRITIN: 419 NG/ML
FERRITIN: 448 NG/ML
FERRITIN: 475 NG/ML
FERRITIN: 483 NG/ML
FERRITIN: 507 NG/ML
FERRITIN: 602 NG/ML
FERRITIN: 648 NG/ML
FERRITIN: 749 NG/ML
FERRITIN: 758 NG/ML
FERRITIN: 766 NG/ML
FERRITIN: 830 NG/ML
FIBRINOGEN: 374 MG/DL (ref 225–540)
FIBRINOGEN: >700 MG/DL (ref 225–540)
FIBRINOGEN: >700 MG/DL (ref 225–540)
FIO2 ARTERIAL: 100
FIO2: 100 %
FIO2: 50 %
FIO2: 50 %
FIO2: 55 %
FIO2: 60 %
FIO2: 65 %
FIO2: 70 %
FIO2: 75 %
FIO2: 80 %
FIO2: 85 %
FIO2: 90 %
FIO2: 95 %
FOLATE: >20 NG/ML (ref 4.8–24.2)
GFR AFRICAN AMERICAN: 39
GFR AFRICAN AMERICAN: 42
GFR AFRICAN AMERICAN: 46
GFR AFRICAN AMERICAN: 50
GFR AFRICAN AMERICAN: 55
GFR AFRICAN AMERICAN: 55
GFR AFRICAN AMERICAN: >60
GFR NON-AFRICAN AMERICAN: 32 ML/MIN/1.73
GFR NON-AFRICAN AMERICAN: 35 ML/MIN/1.73
GFR NON-AFRICAN AMERICAN: 38 ML/MIN/1.73
GFR NON-AFRICAN AMERICAN: 41 ML/MIN/1.73
GFR NON-AFRICAN AMERICAN: 45 ML/MIN/1.73
GFR NON-AFRICAN AMERICAN: 45 ML/MIN/1.73
GFR NON-AFRICAN AMERICAN: 50 ML/MIN/1.73
GFR NON-AFRICAN AMERICAN: 56 ML/MIN/1.73
GFR NON-AFRICAN AMERICAN: >60 ML/MIN/1.73
GLUCOSE BLD-MCNC: 100 MG/DL (ref 74–99)
GLUCOSE BLD-MCNC: 102 MG/DL (ref 74–99)
GLUCOSE BLD-MCNC: 104 MG/DL (ref 74–99)
GLUCOSE BLD-MCNC: 104 MG/DL (ref 74–99)
GLUCOSE BLD-MCNC: 105 MG/DL (ref 74–99)
GLUCOSE BLD-MCNC: 106 MG/DL (ref 74–99)
GLUCOSE BLD-MCNC: 106 MG/DL (ref 74–99)
GLUCOSE BLD-MCNC: 107 MG/DL (ref 74–99)
GLUCOSE BLD-MCNC: 108 MG/DL (ref 74–99)
GLUCOSE BLD-MCNC: 109 MG/DL (ref 74–99)
GLUCOSE BLD-MCNC: 110 MG/DL (ref 74–99)
GLUCOSE BLD-MCNC: 113 MG/DL (ref 74–99)
GLUCOSE BLD-MCNC: 114 MG/DL (ref 74–99)
GLUCOSE BLD-MCNC: 114 MG/DL (ref 74–99)
GLUCOSE BLD-MCNC: 116 MG/DL (ref 74–99)
GLUCOSE BLD-MCNC: 119 MG/DL (ref 74–99)
GLUCOSE BLD-MCNC: 121 MG/DL (ref 74–99)
GLUCOSE BLD-MCNC: 121 MG/DL (ref 74–99)
GLUCOSE BLD-MCNC: 123 MG/DL (ref 74–99)
GLUCOSE BLD-MCNC: 124 MG/DL (ref 74–99)
GLUCOSE BLD-MCNC: 124 MG/DL (ref 74–99)
GLUCOSE BLD-MCNC: 126 MG/DL (ref 74–99)
GLUCOSE BLD-MCNC: 127 MG/DL (ref 74–99)
GLUCOSE BLD-MCNC: 129 MG/DL (ref 74–99)
GLUCOSE BLD-MCNC: 130 MG/DL (ref 74–99)
GLUCOSE BLD-MCNC: 130 MG/DL (ref 74–99)
GLUCOSE BLD-MCNC: 131 MG/DL (ref 74–99)
GLUCOSE BLD-MCNC: 132 MG/DL (ref 74–99)
GLUCOSE BLD-MCNC: 133 MG/DL (ref 74–99)
GLUCOSE BLD-MCNC: 134 MG/DL (ref 74–99)
GLUCOSE BLD-MCNC: 135 MG/DL (ref 74–99)
GLUCOSE BLD-MCNC: 135 MG/DL (ref 74–99)
GLUCOSE BLD-MCNC: 137 MG/DL (ref 74–99)
GLUCOSE BLD-MCNC: 138 MG/DL (ref 74–99)
GLUCOSE BLD-MCNC: 140 MG/DL (ref 74–99)
GLUCOSE BLD-MCNC: 141 MG/DL (ref 74–99)
GLUCOSE BLD-MCNC: 144 MG/DL (ref 74–99)
GLUCOSE BLD-MCNC: 144 MG/DL (ref 74–99)
GLUCOSE BLD-MCNC: 147 MG/DL (ref 74–99)
GLUCOSE BLD-MCNC: 148 MG/DL (ref 74–99)
GLUCOSE BLD-MCNC: 148 MG/DL (ref 74–99)
GLUCOSE BLD-MCNC: 153 MG/DL (ref 74–99)
GLUCOSE BLD-MCNC: 155 MG/DL (ref 74–99)
GLUCOSE BLD-MCNC: 158 MG/DL (ref 74–99)
GLUCOSE BLD-MCNC: 159 MG/DL (ref 74–99)
GLUCOSE BLD-MCNC: 163 MG/DL (ref 74–99)
GLUCOSE BLD-MCNC: 163 MG/DL (ref 74–99)
GLUCOSE BLD-MCNC: 165 MG/DL (ref 74–99)
GLUCOSE BLD-MCNC: 167 MG/DL (ref 74–99)
GLUCOSE BLD-MCNC: 170 MG/DL (ref 74–99)
GLUCOSE BLD-MCNC: 173 MG/DL (ref 74–99)
GLUCOSE BLD-MCNC: 174 MG/DL (ref 74–99)
GLUCOSE BLD-MCNC: 176 MG/DL (ref 74–99)
GLUCOSE BLD-MCNC: 181 MG/DL (ref 74–99)
GLUCOSE BLD-MCNC: 184 MG/DL (ref 74–99)
GLUCOSE BLD-MCNC: 191 MG/DL (ref 74–99)
GLUCOSE BLD-MCNC: 192 MG/DL (ref 74–99)
GLUCOSE BLD-MCNC: 195 MG/DL (ref 74–99)
GLUCOSE BLD-MCNC: 205 MG/DL (ref 74–99)
GLUCOSE BLD-MCNC: 331 MG/DL (ref 74–99)
GLUCOSE BLD-MCNC: 368 MG/DL (ref 74–99)
GLUCOSE BLD-MCNC: 84 MG/DL (ref 74–99)
GLUCOSE BLD-MCNC: 87 MG/DL (ref 74–99)
GLUCOSE BLD-MCNC: 98 MG/DL (ref 74–99)
GLUCOSE BLD-MCNC: 99 MG/DL (ref 74–99)
GLUCOSE BLD-MCNC: 99 MG/DL (ref 74–99)
GLUCOSE URINE: NEGATIVE MG/DL
GLUCOSE URINE: NEGATIVE MG/DL
GRAM STAIN ORDERABLE: NORMAL
HAV IGM SER IA-ACNC: NORMAL
HBA1C MFR BLD: 5.2 % (ref 4–5.6)
HBV SURFACE AB TITR SER: REACTIVE {TITER}
HCO3 ARTERIAL: 21.6 MMOL/L (ref 22–26)
HCO3 ARTERIAL: 28.4 MMOL/L (ref 22–26)
HCO3: 20.1 MMOL/L (ref 22–26)
HCO3: 20.1 MMOL/L (ref 22–26)
HCO3: 21.5 MMOL/L (ref 22–26)
HCO3: 21.6 MMOL/L (ref 22–26)
HCO3: 21.9 MMOL/L (ref 22–26)
HCO3: 22.4 MMOL/L (ref 22–26)
HCO3: 22.6 MMOL/L (ref 22–26)
HCO3: 22.7 MMOL/L (ref 22–26)
HCO3: 22.9 MMOL/L (ref 22–26)
HCO3: 23.1 MMOL/L (ref 22–26)
HCO3: 23.3 MMOL/L (ref 22–26)
HCO3: 23.4 MMOL/L (ref 22–26)
HCO3: 23.5 MMOL/L (ref 22–26)
HCO3: 23.6 MMOL/L (ref 22–26)
HCO3: 23.7 MMOL/L (ref 22–26)
HCO3: 24.1 MMOL/L (ref 22–26)
HCO3: 24.2 MMOL/L (ref 22–26)
HCO3: 25.3 MMOL/L (ref 22–26)
HCO3: 26.8 MMOL/L (ref 22–26)
HCO3: 26.9 MMOL/L (ref 22–26)
HCO3: 27.5 MMOL/L (ref 22–26)
HCO3: 27.7 MMOL/L (ref 22–26)
HCO3: 27.9 MMOL/L (ref 22–26)
HCO3: 28.3 MMOL/L (ref 22–26)
HCO3: 28.7 MMOL/L (ref 22–26)
HCO3: 28.7 MMOL/L (ref 22–26)
HCO3: 28.8 MMOL/L (ref 22–26)
HCO3: 29 MMOL/L (ref 22–26)
HCO3: 29.1 MMOL/L (ref 22–26)
HCO3: 29.3 MMOL/L (ref 22–26)
HCO3: 29.7 MMOL/L (ref 22–26)
HCO3: 30.1 MMOL/L (ref 22–26)
HCO3: 30.4 MMOL/L (ref 22–26)
HCO3: 30.9 MMOL/L (ref 22–26)
HCO3: 31.1 MMOL/L (ref 22–26)
HCO3: 31.1 MMOL/L (ref 22–26)
HCO3: 31.3 MMOL/L (ref 22–26)
HCO3: 31.4 MMOL/L (ref 22–26)
HCO3: 31.4 MMOL/L (ref 22–26)
HCO3: 31.5 MMOL/L (ref 22–26)
HCO3: 31.6 MMOL/L (ref 22–26)
HCO3: 32.2 MMOL/L (ref 22–26)
HCO3: 32.6 MMOL/L (ref 22–26)
HCO3: 33.5 MMOL/L (ref 22–26)
HCO3: 33.7 MMOL/L (ref 22–26)
HCO3: 33.8 MMOL/L (ref 22–26)
HCO3: 34.1 MMOL/L (ref 22–26)
HCO3: 34.1 MMOL/L (ref 22–26)
HCO3: 34.3 MMOL/L (ref 22–26)
HCO3: 37.1 MMOL/L (ref 22–26)
HCT VFR BLD CALC: 21.3 % (ref 34–48)
HCT VFR BLD CALC: 21.9 % (ref 34–48)
HCT VFR BLD CALC: 22 % (ref 34–48)
HCT VFR BLD CALC: 24 % (ref 34–48)
HCT VFR BLD CALC: 24.1 % (ref 34–48)
HCT VFR BLD CALC: 24.3 % (ref 34–48)
HCT VFR BLD CALC: 24.4 % (ref 34–48)
HCT VFR BLD CALC: 24.7 % (ref 34–48)
HCT VFR BLD CALC: 24.9 % (ref 34–48)
HCT VFR BLD CALC: 25.5 % (ref 34–48)
HCT VFR BLD CALC: 25.6 % (ref 34–48)
HCT VFR BLD CALC: 25.6 % (ref 34–48)
HCT VFR BLD CALC: 26.1 % (ref 34–48)
HCT VFR BLD CALC: 26.2 % (ref 34–48)
HCT VFR BLD CALC: 26.3 % (ref 34–48)
HCT VFR BLD CALC: 26.4 % (ref 34–48)
HCT VFR BLD CALC: 26.6 % (ref 34–48)
HCT VFR BLD CALC: 26.7 % (ref 34–48)
HCT VFR BLD CALC: 26.8 % (ref 34–48)
HCT VFR BLD CALC: 26.9 % (ref 34–48)
HCT VFR BLD CALC: 27.2 % (ref 34–48)
HCT VFR BLD CALC: 27.3 % (ref 34–48)
HCT VFR BLD CALC: 27.4 % (ref 34–48)
HCT VFR BLD CALC: 27.4 % (ref 34–48)
HCT VFR BLD CALC: 27.5 % (ref 34–48)
HCT VFR BLD CALC: 28 % (ref 34–48)
HCT VFR BLD CALC: 28 % (ref 34–48)
HCT VFR BLD CALC: 28.1 % (ref 34–48)
HCT VFR BLD CALC: 28.2 % (ref 34–48)
HCT VFR BLD CALC: 28.5 % (ref 34–48)
HCT VFR BLD CALC: 28.6 % (ref 34–48)
HCT VFR BLD CALC: 28.6 % (ref 34–48)
HCT VFR BLD CALC: 28.7 % (ref 34–48)
HCT VFR BLD CALC: 28.7 % (ref 34–48)
HCT VFR BLD CALC: 28.8 % (ref 34–48)
HCT VFR BLD CALC: 29 % (ref 34–48)
HCT VFR BLD CALC: 29.2 % (ref 34–48)
HCT VFR BLD CALC: 29.4 % (ref 34–48)
HCT VFR BLD CALC: 29.5 % (ref 34–48)
HCT VFR BLD CALC: 29.9 % (ref 34–48)
HCT VFR BLD CALC: 29.9 % (ref 34–48)
HCT VFR BLD CALC: 30 % (ref 34–48)
HCT VFR BLD CALC: 30 % (ref 34–48)
HCT VFR BLD CALC: 30.3 % (ref 34–48)
HCT VFR BLD CALC: 30.4 % (ref 34–48)
HCT VFR BLD CALC: 30.5 % (ref 34–48)
HCT VFR BLD CALC: 31.5 % (ref 34–48)
HCT VFR BLD CALC: 31.6 % (ref 34–48)
HCT VFR BLD CALC: 31.7 % (ref 34–48)
HCT VFR BLD CALC: 32.2 % (ref 34–48)
HCT VFR BLD CALC: 32.2 % (ref 34–48)
HCT VFR BLD CALC: 34.5 % (ref 34–48)
HCT VFR BLD CALC: 34.9 % (ref 34–48)
HCT VFR BLD CALC: 35.4 % (ref 34–48)
HCT VFR BLD CALC: 35.8 % (ref 34–48)
HCT VFR BLD CALC: 36 % (ref 34–48)
HCT VFR BLD CALC: 36.9 % (ref 34–48)
HCT VFR BLD CALC: 37.1 % (ref 34–48)
HCT VFR BLD CALC: 37.5 % (ref 34–48)
HCT VFR BLD CALC: 38.1 % (ref 34–48)
HCT VFR BLD CALC: 40.6 % (ref 34–48)
HCT VFR BLD CALC: 41.2 % (ref 34–48)
HCT VFR BLD CALC: 42.4 % (ref 34–48)
HCT VFR BLD CALC: 43.3 % (ref 34–48)
HDLC SERPL-MCNC: 68 MG/DL
HEMOCCULT STL QL: ABNORMAL
HEMOCCULT STL QL: ABNORMAL
HEMOCCULT STL QL: POSITIVE
HEMOGLOBIN: 10.1 G/DL (ref 11.5–15.5)
HEMOGLOBIN: 10.2 G/DL (ref 11.5–15.5)
HEMOGLOBIN: 10.9 G/DL (ref 11.5–15.5)
HEMOGLOBIN: 11.7 G/DL (ref 11.5–15.5)
HEMOGLOBIN: 11.7 G/DL (ref 11.5–15.5)
HEMOGLOBIN: 11.9 G/DL (ref 11.5–15.5)
HEMOGLOBIN: 12 G/DL (ref 11.5–15.5)
HEMOGLOBIN: 12.1 G/DL (ref 11.5–15.5)
HEMOGLOBIN: 12.3 G/DL (ref 11.5–15.5)
HEMOGLOBIN: 12.4 G/DL (ref 11.5–15.5)
HEMOGLOBIN: 12.5 G/DL (ref 11.5–15.5)
HEMOGLOBIN: 13.4 G/DL (ref 11.5–15.5)
HEMOGLOBIN: 14.2 G/DL (ref 11.5–15.5)
HEMOGLOBIN: 14.4 G/DL (ref 11.5–15.5)
HEMOGLOBIN: 14.6 G/DL (ref 11.5–15.5)
HEMOGLOBIN: 6.5 G/DL (ref 11.5–15.5)
HEMOGLOBIN: 6.9 G/DL (ref 11.5–15.5)
HEMOGLOBIN: 7.1 G/DL (ref 11.5–15.5)
HEMOGLOBIN: 7.2 G/DL (ref 11.5–15.5)
HEMOGLOBIN: 7.5 G/DL (ref 11.5–15.5)
HEMOGLOBIN: 7.6 G/DL (ref 11.5–15.5)
HEMOGLOBIN: 7.6 G/DL (ref 11.5–15.5)
HEMOGLOBIN: 7.7 G/DL (ref 11.5–15.5)
HEMOGLOBIN: 7.8 G/DL (ref 11.5–15.5)
HEMOGLOBIN: 7.9 G/DL (ref 11.5–15.5)
HEMOGLOBIN: 7.9 G/DL (ref 11.5–15.5)
HEMOGLOBIN: 8 G/DL (ref 11.5–15.5)
HEMOGLOBIN: 8.1 G/DL (ref 11.5–15.5)
HEMOGLOBIN: 8.1 G/DL (ref 11.5–15.5)
HEMOGLOBIN: 8.2 G/DL (ref 11.5–15.5)
HEMOGLOBIN: 8.3 G/DL (ref 11.5–15.5)
HEMOGLOBIN: 8.4 G/DL (ref 11.5–15.5)
HEMOGLOBIN: 8.5 G/DL (ref 11.5–15.5)
HEMOGLOBIN: 8.6 G/DL (ref 11.5–15.5)
HEMOGLOBIN: 8.7 G/DL (ref 11.5–15.5)
HEMOGLOBIN: 8.8 G/DL (ref 11.5–15.5)
HEMOGLOBIN: 8.8 G/DL (ref 11.5–15.5)
HEMOGLOBIN: 9 G/DL (ref 11.5–15.5)
HEMOGLOBIN: 9 G/DL (ref 11.5–15.5)
HEMOGLOBIN: 9.1 G/DL (ref 11.5–15.5)
HEMOGLOBIN: 9.1 G/DL (ref 11.5–15.5)
HEMOGLOBIN: 9.2 G/DL (ref 11.5–15.5)
HEMOGLOBIN: 9.2 G/DL (ref 11.5–15.5)
HEMOGLOBIN: 9.3 G/DL (ref 11.5–15.5)
HEMOGLOBIN: 9.3 G/DL (ref 11.5–15.5)
HEMOGLOBIN: 9.5 G/DL (ref 11.5–15.5)
HEMOGLOBIN: 9.6 G/DL (ref 11.5–15.5)
HEMOGLOBIN: 9.7 G/DL (ref 11.5–15.5)
HEMOGLOBIN: 9.8 G/DL (ref 11.5–15.5)
HEMOGLOBIN: 9.8 G/DL (ref 11.5–15.5)
HEPARIN PF4 ANTIBODY: 0.09 OD
HEPATITIS B CORE IGM ANTIBODY: NORMAL
HEPATITIS B SURFACE ANTIGEN INTERPRETATION: NORMAL
HEPATITIS C ANTIBODY INTERPRETATION: NORMAL
HEPATITIS C ANTIBODY INTERPRETATION: NORMAL
HHB: 1.8 % (ref 0–5)
HHB: 1.8 % (ref 0–5)
HHB: 1.9 % (ref 0–5)
HHB: 10 % (ref 0–5)
HHB: 10.2 % (ref 0–5)
HHB: 10.4 % (ref 0–5)
HHB: 11.6 % (ref 0–5)
HHB: 16 % (ref 0–5)
HHB: 2.1 % (ref 0–5)
HHB: 2.3 % (ref 0–5)
HHB: 2.9 % (ref 0–5)
HHB: 25.2 % (ref 0–5)
HHB: 26.1 % (ref 0–5)
HHB: 26.4 % (ref 0–5)
HHB: 3.1 % (ref 0–5)
HHB: 3.1 % (ref 0–5)
HHB: 3.2 % (ref 0–5)
HHB: 3.2 % (ref 0–5)
HHB: 3.8 % (ref 0–5)
HHB: 3.8 % (ref 0–5)
HHB: 3.9 % (ref 0–5)
HHB: 3.9 % (ref 0–5)
HHB: 4.3 % (ref 0–5)
HHB: 4.5 % (ref 0–5)
HHB: 4.6 % (ref 0–5)
HHB: 4.6 % (ref 0–5)
HHB: 4.8 % (ref 0–5)
HHB: 5 % (ref 0–5)
HHB: 5.2 % (ref 0–5)
HHB: 5.2 % (ref 0–5)
HHB: 5.3 % (ref 0–5)
HHB: 5.3 % (ref 0–5)
HHB: 5.6 % (ref 0–5)
HHB: 5.8 % (ref 0–5)
HHB: 6 % (ref 0–5)
HHB: 6 % (ref 0–5)
HHB: 6.3 % (ref 0–5)
HHB: 6.4 % (ref 0–5)
HHB: 6.7 % (ref 0–5)
HHB: 61.6 % (ref 0–5)
HHB: 7.1 % (ref 0–5)
HHB: 7.2 % (ref 0–5)
HHB: 7.2 % (ref 0–5)
HHB: 7.6 % (ref 0–5)
HHB: 7.6 % (ref 0–5)
HHB: 7.7 % (ref 0–5)
HHB: 7.7 % (ref 0–5)
HHB: 8.2 % (ref 0–5)
HHB: 8.2 % (ref 0–5)
HHB: 8.4 % (ref 0–5)
HHB: 8.5 % (ref 0–5)
HHB: 8.7 % (ref 0–5)
HHB: 9.1 % (ref 0–5)
HHB: 9.5 % (ref 0–5)
HIV-1 AND HIV-2 ANTIBODIES: NORMAL
HIV-1 AND HIV-2 ANTIBODIES: NORMAL
HUMAN METAPNEUMOVIRUS BY PCR: NOT DETECTED
HUMAN RHINOVIRUS/ENTEROVIRUS BY PCR: NOT DETECTED
HYPOCHROMIA: ABNORMAL
IMMATURE GRANULOCYTES #: 0 E9/L
IMMATURE GRANULOCYTES #: 0.04 E9/L
IMMATURE GRANULOCYTES #: 0.06 E9/L
IMMATURE GRANULOCYTES #: 0.09 E9/L
IMMATURE GRANULOCYTES #: 0.1 E9/L
IMMATURE GRANULOCYTES #: 0.17 E9/L
IMMATURE GRANULOCYTES #: 0.18 E9/L
IMMATURE GRANULOCYTES #: 0.2 E9/L
IMMATURE GRANULOCYTES #: 0.26 E9/L
IMMATURE GRANULOCYTES #: 0.28 E9/L
IMMATURE GRANULOCYTES #: 0.31 E9/L
IMMATURE GRANULOCYTES #: 0.42 E9/L
IMMATURE GRANULOCYTES #: 0.58 E9/L
IMMATURE GRANULOCYTES %: 0 % (ref 0–5)
IMMATURE GRANULOCYTES %: 0.4 % (ref 0–5)
IMMATURE GRANULOCYTES %: 0.6 % (ref 0–5)
IMMATURE GRANULOCYTES %: 0.8 % (ref 0–5)
IMMATURE GRANULOCYTES %: 0.9 % (ref 0–5)
IMMATURE GRANULOCYTES %: 1.2 % (ref 0–5)
IMMATURE GRANULOCYTES %: 1.2 % (ref 0–5)
IMMATURE GRANULOCYTES %: 1.4 % (ref 0–5)
IMMATURE GRANULOCYTES %: 1.4 % (ref 0–5)
IMMATURE GRANULOCYTES %: 1.6 % (ref 0–5)
IMMATURE GRANULOCYTES %: 1.7 % (ref 0–5)
IMMATURE GRANULOCYTES %: 2.1 % (ref 0–5)
IMMATURE GRANULOCYTES %: 2.6 % (ref 0–5)
INFLUENZA A BY PCR: NOT DETECTED
INFLUENZA B BY PCR: NOT DETECTED
INR BLD: 1.1
INR BLD: 1.2
INR BLD: 1.3
INR BLD: 1.4
INR BLD: 1.6
INR BLD: 1.7
INR BLD: 1.7
INR BLD: 1.9
INR BLD: 2
INR BLD: 2.1
INR BLD: 2.2
INR BLD: 3.7
INR BLD: 4.2
KETONES, URINE: 15 MG/DL
KETONES, URINE: NEGATIVE MG/DL
L. PNEUMOPHILA SEROGP 1 UR AG: NORMAL
LAB: ABNORMAL
LACTATE DEHYDROGENASE: 1095 U/L (ref 135–214)
LACTATE DEHYDROGENASE: 1216 U/L (ref 135–214)
LACTATE DEHYDROGENASE: 1448 U/L (ref 135–214)
LACTATE DEHYDROGENASE: 263 U/L (ref 135–214)
LACTATE DEHYDROGENASE: 340 U/L (ref 135–214)
LACTATE DEHYDROGENASE: 360 U/L (ref 135–214)
LACTATE DEHYDROGENASE: 401 U/L (ref 135–214)
LACTATE DEHYDROGENASE: 471 U/L (ref 135–214)
LACTATE DEHYDROGENASE: 520 U/L (ref 135–214)
LACTATE DEHYDROGENASE: 627 U/L (ref 135–214)
LACTATE DEHYDROGENASE: 704 U/L (ref 135–214)
LACTATE DEHYDROGENASE: 719 U/L (ref 135–214)
LACTATE DEHYDROGENASE: 730 U/L (ref 135–214)
LACTATE DEHYDROGENASE: 735 U/L (ref 135–214)
LACTATE DEHYDROGENASE: 742 U/L (ref 135–214)
LACTATE DEHYDROGENASE: 744 U/L (ref 135–214)
LACTATE DEHYDROGENASE: 777 U/L (ref 135–214)
LACTATE DEHYDROGENASE: 785 U/L (ref 135–214)
LACTATE DEHYDROGENASE: 837 U/L (ref 135–214)
LACTATE DEHYDROGENASE: 878 U/L (ref 135–214)
LACTATE DEHYDROGENASE: 925 U/L (ref 135–214)
LACTIC ACID, SEPSIS: 3.7 MMOL/L (ref 0.5–1.9)
LACTIC ACID: 2 MMOL/L (ref 0.5–2.2)
LACTIC ACID: 2.4 MMOL/L (ref 0.5–2.2)
LDL CHOLESTEROL CALCULATED: 131 MG/DL (ref 0–99)
LEUKOCYTE ESTERASE, URINE: ABNORMAL
LEUKOCYTE ESTERASE, URINE: NEGATIVE
LYMPHOCYTES ABSOLUTE: 0 E9/L (ref 1.5–4)
LYMPHOCYTES ABSOLUTE: 0.09 E9/L (ref 1.5–4)
LYMPHOCYTES ABSOLUTE: 0.12 E9/L (ref 1.5–4)
LYMPHOCYTES ABSOLUTE: 0.16 E9/L (ref 1.5–4)
LYMPHOCYTES ABSOLUTE: 0.18 E9/L (ref 1.5–4)
LYMPHOCYTES ABSOLUTE: 0.21 E9/L (ref 1.5–4)
LYMPHOCYTES ABSOLUTE: 0.22 E9/L (ref 1.5–4)
LYMPHOCYTES ABSOLUTE: 0.23 E9/L (ref 1.5–4)
LYMPHOCYTES ABSOLUTE: 0.25 E9/L (ref 1.5–4)
LYMPHOCYTES ABSOLUTE: 0.27 E9/L (ref 1.5–4)
LYMPHOCYTES ABSOLUTE: 0.28 E9/L (ref 1.5–4)
LYMPHOCYTES ABSOLUTE: 0.3 E9/L (ref 1.5–4)
LYMPHOCYTES ABSOLUTE: 0.34 E9/L (ref 1.5–4)
LYMPHOCYTES ABSOLUTE: 0.37 E9/L (ref 1.5–4)
LYMPHOCYTES ABSOLUTE: 0.39 E9/L (ref 1.5–4)
LYMPHOCYTES ABSOLUTE: 0.39 E9/L (ref 1.5–4)
LYMPHOCYTES ABSOLUTE: 0.41 E9/L (ref 1.5–4)
LYMPHOCYTES ABSOLUTE: 0.43 E9/L (ref 1.5–4)
LYMPHOCYTES ABSOLUTE: 0.44 E9/L (ref 1.5–4)
LYMPHOCYTES ABSOLUTE: 0.44 E9/L (ref 1.5–4)
LYMPHOCYTES ABSOLUTE: 0.45 E9/L (ref 1.5–4)
LYMPHOCYTES ABSOLUTE: 0.46 E9/L (ref 1.5–4)
LYMPHOCYTES ABSOLUTE: 0.56 E9/L (ref 1.5–4)
LYMPHOCYTES ABSOLUTE: 0.56 E9/L (ref 1.5–4)
LYMPHOCYTES ABSOLUTE: 0.58 E9/L (ref 1.5–4)
LYMPHOCYTES ABSOLUTE: 0.6 E9/L (ref 1.5–4)
LYMPHOCYTES ABSOLUTE: 0.64 E9/L (ref 1.5–4)
LYMPHOCYTES ABSOLUTE: 0.67 E9/L (ref 1.5–4)
LYMPHOCYTES ABSOLUTE: 0.7 E9/L (ref 1.5–4)
LYMPHOCYTES ABSOLUTE: 0.74 E9/L (ref 1.5–4)
LYMPHOCYTES ABSOLUTE: 0.74 E9/L (ref 1.5–4)
LYMPHOCYTES ABSOLUTE: 0.76 E9/L (ref 1.5–4)
LYMPHOCYTES ABSOLUTE: 1.36 E9/L (ref 1.5–4)
LYMPHOCYTES ABSOLUTE: 1.45 E9/L (ref 1.5–4)
LYMPHOCYTES ABSOLUTE: 1.48 E9/L (ref 1.5–4)
LYMPHOCYTES ABSOLUTE: 1.63 E9/L (ref 1.5–4)
LYMPHOCYTES RELATIVE PERCENT: 0.9 % (ref 20–42)
LYMPHOCYTES RELATIVE PERCENT: 1.1 % (ref 20–42)
LYMPHOCYTES RELATIVE PERCENT: 1.4 % (ref 20–42)
LYMPHOCYTES RELATIVE PERCENT: 1.7 % (ref 20–42)
LYMPHOCYTES RELATIVE PERCENT: 1.8 % (ref 20–42)
LYMPHOCYTES RELATIVE PERCENT: 2.1 % (ref 20–42)
LYMPHOCYTES RELATIVE PERCENT: 2.5 % (ref 20–42)
LYMPHOCYTES RELATIVE PERCENT: 2.6 % (ref 20–42)
LYMPHOCYTES RELATIVE PERCENT: 2.8 % (ref 20–42)
LYMPHOCYTES RELATIVE PERCENT: 28.5 % (ref 20–42)
LYMPHOCYTES RELATIVE PERCENT: 3.4 % (ref 20–42)
LYMPHOCYTES RELATIVE PERCENT: 3.5 % (ref 20–42)
LYMPHOCYTES RELATIVE PERCENT: 3.5 % (ref 20–42)
LYMPHOCYTES RELATIVE PERCENT: 3.8 % (ref 20–42)
LYMPHOCYTES RELATIVE PERCENT: 4 % (ref 20–42)
LYMPHOCYTES RELATIVE PERCENT: 4.4 % (ref 20–42)
LYMPHOCYTES RELATIVE PERCENT: 4.4 % (ref 20–42)
LYMPHOCYTES RELATIVE PERCENT: 4.8 % (ref 20–42)
LYMPHOCYTES RELATIVE PERCENT: 4.9 % (ref 20–42)
LYMPHOCYTES RELATIVE PERCENT: 5.2 % (ref 20–42)
LYMPHOCYTES RELATIVE PERCENT: 5.9 % (ref 20–42)
LYMPHOCYTES RELATIVE PERCENT: 6.1 % (ref 20–42)
LYMPHOCYTES RELATIVE PERCENT: 6.6 % (ref 20–42)
LYMPHOCYTES RELATIVE PERCENT: 7.5 % (ref 20–42)
LYMPHOCYTES RELATIVE PERCENT: 7.9 % (ref 20–42)
LYMPHOCYTES RELATIVE PERCENT: 8.7 % (ref 20–42)
LYMPHOCYTES, BODY FLUID: 16 %
Lab: ABNORMAL
Lab: NORMAL
MAGNESIUM: 1.5 MG/DL (ref 1.6–2.6)
MAGNESIUM: 1.6 MG/DL (ref 1.6–2.6)
MAGNESIUM: 1.7 MG/DL (ref 1.6–2.6)
MAGNESIUM: 1.8 MG/DL (ref 1.6–2.6)
MAGNESIUM: 1.9 MG/DL (ref 1.6–2.6)
MAGNESIUM: 2 MG/DL (ref 1.6–2.6)
MAGNESIUM: 2.1 MG/DL (ref 1.6–2.6)
MAGNESIUM: 2.1 MG/DL (ref 1.6–2.6)
MAGNESIUM: 2.2 MG/DL (ref 1.6–2.6)
MAGNESIUM: 2.3 MG/DL (ref 1.6–2.6)
MAGNESIUM: 2.3 MG/DL (ref 1.6–2.6)
MAGNESIUM: 2.4 MG/DL (ref 1.6–2.6)
MAGNESIUM: 2.4 MG/DL (ref 1.6–2.6)
MAGNESIUM: 2.6 MG/DL (ref 1.6–2.6)
MCH RBC QN AUTO: 30.5 PG (ref 26–35)
MCH RBC QN AUTO: 30.6 PG (ref 26–35)
MCH RBC QN AUTO: 30.8 PG (ref 26–35)
MCH RBC QN AUTO: 30.8 PG (ref 26–35)
MCH RBC QN AUTO: 30.9 PG (ref 26–35)
MCH RBC QN AUTO: 31 PG (ref 26–35)
MCH RBC QN AUTO: 31.1 PG (ref 26–35)
MCH RBC QN AUTO: 31.3 PG (ref 26–35)
MCH RBC QN AUTO: 31.4 PG (ref 26–35)
MCH RBC QN AUTO: 31.5 PG (ref 26–35)
MCH RBC QN AUTO: 31.6 PG (ref 26–35)
MCH RBC QN AUTO: 31.6 PG (ref 26–35)
MCH RBC QN AUTO: 31.8 PG (ref 26–35)
MCH RBC QN AUTO: 32 PG (ref 26–35)
MCH RBC QN AUTO: 32.1 PG (ref 26–35)
MCH RBC QN AUTO: 32.1 PG (ref 26–35)
MCH RBC QN AUTO: 32.2 PG (ref 26–35)
MCH RBC QN AUTO: 32.4 PG (ref 26–35)
MCH RBC QN AUTO: 32.4 PG (ref 26–35)
MCH RBC QN AUTO: 32.5 PG (ref 26–35)
MCH RBC QN AUTO: 32.6 PG (ref 26–35)
MCH RBC QN AUTO: 32.7 PG (ref 26–35)
MCH RBC QN AUTO: 32.8 PG (ref 26–35)
MCH RBC QN AUTO: 32.9 PG (ref 26–35)
MCH RBC QN AUTO: 33 PG (ref 26–35)
MCHC RBC AUTO-ENTMCNC: 29.5 % (ref 32–34.5)
MCHC RBC AUTO-ENTMCNC: 29.7 % (ref 32–34.5)
MCHC RBC AUTO-ENTMCNC: 29.9 % (ref 32–34.5)
MCHC RBC AUTO-ENTMCNC: 30 % (ref 32–34.5)
MCHC RBC AUTO-ENTMCNC: 30.1 % (ref 32–34.5)
MCHC RBC AUTO-ENTMCNC: 30.3 % (ref 32–34.5)
MCHC RBC AUTO-ENTMCNC: 30.4 % (ref 32–34.5)
MCHC RBC AUTO-ENTMCNC: 30.5 % (ref 32–34.5)
MCHC RBC AUTO-ENTMCNC: 30.6 % (ref 32–34.5)
MCHC RBC AUTO-ENTMCNC: 30.7 % (ref 32–34.5)
MCHC RBC AUTO-ENTMCNC: 30.9 % (ref 32–34.5)
MCHC RBC AUTO-ENTMCNC: 30.9 % (ref 32–34.5)
MCHC RBC AUTO-ENTMCNC: 31 % (ref 32–34.5)
MCHC RBC AUTO-ENTMCNC: 31.1 % (ref 32–34.5)
MCHC RBC AUTO-ENTMCNC: 31.1 % (ref 32–34.5)
MCHC RBC AUTO-ENTMCNC: 31.3 % (ref 32–34.5)
MCHC RBC AUTO-ENTMCNC: 31.4 % (ref 32–34.5)
MCHC RBC AUTO-ENTMCNC: 31.4 % (ref 32–34.5)
MCHC RBC AUTO-ENTMCNC: 31.5 % (ref 32–34.5)
MCHC RBC AUTO-ENTMCNC: 32 % (ref 32–34.5)
MCHC RBC AUTO-ENTMCNC: 32 % (ref 32–34.5)
MCHC RBC AUTO-ENTMCNC: 32.1 % (ref 32–34.5)
MCHC RBC AUTO-ENTMCNC: 32.1 % (ref 32–34.5)
MCHC RBC AUTO-ENTMCNC: 32.2 % (ref 32–34.5)
MCHC RBC AUTO-ENTMCNC: 32.3 % (ref 32–34.5)
MCHC RBC AUTO-ENTMCNC: 32.3 % (ref 32–34.5)
MCHC RBC AUTO-ENTMCNC: 32.4 % (ref 32–34.5)
MCHC RBC AUTO-ENTMCNC: 32.5 % (ref 32–34.5)
MCHC RBC AUTO-ENTMCNC: 32.5 % (ref 32–34.5)
MCHC RBC AUTO-ENTMCNC: 32.9 % (ref 32–34.5)
MCHC RBC AUTO-ENTMCNC: 33.1 % (ref 32–34.5)
MCHC RBC AUTO-ENTMCNC: 33.2 % (ref 32–34.5)
MCHC RBC AUTO-ENTMCNC: 33.3 % (ref 32–34.5)
MCHC RBC AUTO-ENTMCNC: 33.4 % (ref 32–34.5)
MCHC RBC AUTO-ENTMCNC: 33.5 % (ref 32–34.5)
MCHC RBC AUTO-ENTMCNC: 33.7 % (ref 32–34.5)
MCHC RBC AUTO-ENTMCNC: 33.9 % (ref 32–34.5)
MCHC RBC AUTO-ENTMCNC: 34.2 % (ref 32–34.5)
MCHC RBC AUTO-ENTMCNC: 35.4 % (ref 32–34.5)
MCV RBC AUTO: 100 FL (ref 80–99.9)
MCV RBC AUTO: 100.8 FL (ref 80–99.9)
MCV RBC AUTO: 101.3 FL (ref 80–99.9)
MCV RBC AUTO: 101.3 FL (ref 80–99.9)
MCV RBC AUTO: 101.6 FL (ref 80–99.9)
MCV RBC AUTO: 102.6 FL (ref 80–99.9)
MCV RBC AUTO: 102.7 FL (ref 80–99.9)
MCV RBC AUTO: 103 FL (ref 80–99.9)
MCV RBC AUTO: 103.3 FL (ref 80–99.9)
MCV RBC AUTO: 103.6 FL (ref 80–99.9)
MCV RBC AUTO: 103.8 FL (ref 80–99.9)
MCV RBC AUTO: 103.9 FL (ref 80–99.9)
MCV RBC AUTO: 104.5 FL (ref 80–99.9)
MCV RBC AUTO: 105.1 FL (ref 80–99.9)
MCV RBC AUTO: 105.2 FL (ref 80–99.9)
MCV RBC AUTO: 105.6 FL (ref 80–99.9)
MCV RBC AUTO: 106.3 FL (ref 80–99.9)
MCV RBC AUTO: 107.7 FL (ref 80–99.9)
MCV RBC AUTO: 108.6 FL (ref 80–99.9)
MCV RBC AUTO: 109.8 FL (ref 80–99.9)
MCV RBC AUTO: 110 FL (ref 80–99.9)
MCV RBC AUTO: 87.8 FL (ref 80–99.9)
MCV RBC AUTO: 90.9 FL (ref 80–99.9)
MCV RBC AUTO: 91.5 FL (ref 80–99.9)
MCV RBC AUTO: 91.9 FL (ref 80–99.9)
MCV RBC AUTO: 92.1 FL (ref 80–99.9)
MCV RBC AUTO: 93 FL (ref 80–99.9)
MCV RBC AUTO: 93.2 FL (ref 80–99.9)
MCV RBC AUTO: 93.9 FL (ref 80–99.9)
MCV RBC AUTO: 93.9 FL (ref 80–99.9)
MCV RBC AUTO: 94.7 FL (ref 80–99.9)
MCV RBC AUTO: 95.7 FL (ref 80–99.9)
MCV RBC AUTO: 97.3 FL (ref 80–99.9)
MCV RBC AUTO: 97.4 FL (ref 80–99.9)
MCV RBC AUTO: 97.7 FL (ref 80–99.9)
MCV RBC AUTO: 98.2 FL (ref 80–99.9)
MCV RBC AUTO: 98.4 FL (ref 80–99.9)
MCV RBC AUTO: 98.6 FL (ref 80–99.9)
MCV RBC AUTO: 99.6 FL (ref 80–99.9)
METAMYELOCYTES RELATIVE PERCENT: 0.9 % (ref 0–1)
METAMYELOCYTES RELATIVE PERCENT: 0.9 % (ref 0–1)
METER GLUCOSE: 103 MG/DL (ref 74–99)
METER GLUCOSE: 104 MG/DL (ref 74–99)
METER GLUCOSE: 108 MG/DL (ref 74–99)
METER GLUCOSE: 108 MG/DL (ref 74–99)
METER GLUCOSE: 110 MG/DL (ref 74–99)
METER GLUCOSE: 110 MG/DL (ref 74–99)
METER GLUCOSE: 111 MG/DL (ref 74–99)
METER GLUCOSE: 113 MG/DL (ref 74–99)
METER GLUCOSE: 114 MG/DL (ref 74–99)
METER GLUCOSE: 115 MG/DL (ref 74–99)
METER GLUCOSE: 115 MG/DL (ref 74–99)
METER GLUCOSE: 116 MG/DL (ref 74–99)
METER GLUCOSE: 116 MG/DL (ref 74–99)
METER GLUCOSE: 117 MG/DL (ref 74–99)
METER GLUCOSE: 118 MG/DL (ref 74–99)
METER GLUCOSE: 119 MG/DL (ref 74–99)
METER GLUCOSE: 120 MG/DL (ref 74–99)
METER GLUCOSE: 120 MG/DL (ref 74–99)
METER GLUCOSE: 122 MG/DL (ref 74–99)
METER GLUCOSE: 124 MG/DL (ref 74–99)
METER GLUCOSE: 124 MG/DL (ref 74–99)
METER GLUCOSE: 126 MG/DL (ref 74–99)
METER GLUCOSE: 126 MG/DL (ref 74–99)
METER GLUCOSE: 128 MG/DL (ref 74–99)
METER GLUCOSE: 128 MG/DL (ref 74–99)
METER GLUCOSE: 129 MG/DL (ref 74–99)
METER GLUCOSE: 129 MG/DL (ref 74–99)
METER GLUCOSE: 131 MG/DL (ref 74–99)
METER GLUCOSE: 132 MG/DL (ref 74–99)
METER GLUCOSE: 133 MG/DL (ref 74–99)
METER GLUCOSE: 134 MG/DL (ref 74–99)
METER GLUCOSE: 135 MG/DL (ref 74–99)
METER GLUCOSE: 138 MG/DL (ref 74–99)
METER GLUCOSE: 139 MG/DL (ref 74–99)
METER GLUCOSE: 141 MG/DL (ref 74–99)
METER GLUCOSE: 141 MG/DL (ref 74–99)
METER GLUCOSE: 142 MG/DL (ref 74–99)
METER GLUCOSE: 146 MG/DL (ref 74–99)
METER GLUCOSE: 147 MG/DL (ref 74–99)
METER GLUCOSE: 148 MG/DL (ref 74–99)
METER GLUCOSE: 151 MG/DL (ref 74–99)
METER GLUCOSE: 154 MG/DL (ref 74–99)
METER GLUCOSE: 154 MG/DL (ref 74–99)
METER GLUCOSE: 156 MG/DL (ref 74–99)
METER GLUCOSE: 158 MG/DL (ref 74–99)
METER GLUCOSE: 158 MG/DL (ref 74–99)
METER GLUCOSE: 164 MG/DL (ref 74–99)
METER GLUCOSE: 168 MG/DL (ref 74–99)
METER GLUCOSE: 169 MG/DL (ref 74–99)
METER GLUCOSE: 174 MG/DL (ref 74–99)
METER GLUCOSE: 174 MG/DL (ref 74–99)
METER GLUCOSE: 175 MG/DL (ref 74–99)
METER GLUCOSE: 187 MG/DL (ref 74–99)
METER GLUCOSE: 189 MG/DL (ref 74–99)
METER GLUCOSE: 194 MG/DL (ref 74–99)
METER GLUCOSE: 198 MG/DL (ref 74–99)
METER GLUCOSE: 63 MG/DL (ref 74–99)
METER GLUCOSE: 70 MG/DL (ref 74–99)
METER GLUCOSE: 75 MG/DL (ref 74–99)
METER GLUCOSE: 80 MG/DL (ref 74–99)
METER GLUCOSE: 82 MG/DL (ref 74–99)
METER GLUCOSE: 84 MG/DL (ref 74–99)
METER GLUCOSE: 89 MG/DL (ref 74–99)
METER GLUCOSE: 90 MG/DL (ref 74–99)
METER GLUCOSE: 91 MG/DL (ref 74–99)
METER GLUCOSE: 92 MG/DL (ref 74–99)
METER GLUCOSE: 95 MG/DL (ref 74–99)
METER GLUCOSE: 98 MG/DL (ref 74–99)
METHB: 0 % (ref 0–1.5)
METHB: 0.1 % (ref 0–1.5)
METHB: 0.2 % (ref 0–1.5)
METHB: 0.3 % (ref 0–1.5)
METHB: 0.4 % (ref 0–1.5)
METHB: 0.5 % (ref 0–1.5)
MICROALBUMIN UR-MCNC: 88.4 MG/L
MICROALBUMIN/CREAT UR-RTO: 84.2 (ref 0–30)
MODE: ABNORMAL
MODE: AC
MONOCYTE, FLUID: 15 %
MONOCYTES ABSOLUTE: 0 E9/L (ref 0.1–0.95)
MONOCYTES ABSOLUTE: 0.11 E9/L (ref 0.1–0.95)
MONOCYTES ABSOLUTE: 0.12 E9/L (ref 0.1–0.95)
MONOCYTES ABSOLUTE: 0.14 E9/L (ref 0.1–0.95)
MONOCYTES ABSOLUTE: 0.15 E9/L (ref 0.1–0.95)
MONOCYTES ABSOLUTE: 0.16 E9/L (ref 0.1–0.95)
MONOCYTES ABSOLUTE: 0.17 E9/L (ref 0.1–0.95)
MONOCYTES ABSOLUTE: 0.18 E9/L (ref 0.1–0.95)
MONOCYTES ABSOLUTE: 0.18 E9/L (ref 0.1–0.95)
MONOCYTES ABSOLUTE: 0.19 E9/L (ref 0.1–0.95)
MONOCYTES ABSOLUTE: 0.22 E9/L (ref 0.1–0.95)
MONOCYTES ABSOLUTE: 0.24 E9/L (ref 0.1–0.95)
MONOCYTES ABSOLUTE: 0.25 E9/L (ref 0.1–0.95)
MONOCYTES ABSOLUTE: 0.26 E9/L (ref 0.1–0.95)
MONOCYTES ABSOLUTE: 0.27 E9/L (ref 0.1–0.95)
MONOCYTES ABSOLUTE: 0.28 E9/L (ref 0.1–0.95)
MONOCYTES ABSOLUTE: 0.31 E9/L (ref 0.1–0.95)
MONOCYTES ABSOLUTE: 0.32 E9/L (ref 0.1–0.95)
MONOCYTES ABSOLUTE: 0.33 E9/L (ref 0.1–0.95)
MONOCYTES ABSOLUTE: 0.33 E9/L (ref 0.1–0.95)
MONOCYTES ABSOLUTE: 0.34 E9/L (ref 0.1–0.95)
MONOCYTES ABSOLUTE: 0.35 E9/L (ref 0.1–0.95)
MONOCYTES ABSOLUTE: 0.35 E9/L (ref 0.1–0.95)
MONOCYTES ABSOLUTE: 0.36 E9/L (ref 0.1–0.95)
MONOCYTES ABSOLUTE: 0.38 E9/L (ref 0.1–0.95)
MONOCYTES ABSOLUTE: 0.39 E9/L (ref 0.1–0.95)
MONOCYTES ABSOLUTE: 0.42 E9/L (ref 0.1–0.95)
MONOCYTES ABSOLUTE: 0.43 E9/L (ref 0.1–0.95)
MONOCYTES ABSOLUTE: 0.46 E9/L (ref 0.1–0.95)
MONOCYTES ABSOLUTE: 0.47 E9/L (ref 0.1–0.95)
MONOCYTES ABSOLUTE: 0.48 E9/L (ref 0.1–0.95)
MONOCYTES ABSOLUTE: 0.6 E9/L (ref 0.1–0.95)
MONOCYTES ABSOLUTE: 0.8 E9/L (ref 0.1–0.95)
MONOCYTES ABSOLUTE: 0.81 E9/L (ref 0.1–0.95)
MONOCYTES RELATIVE PERCENT: 0.9 % (ref 2–12)
MONOCYTES RELATIVE PERCENT: 1.4 % (ref 2–12)
MONOCYTES RELATIVE PERCENT: 1.4 % (ref 2–12)
MONOCYTES RELATIVE PERCENT: 1.7 % (ref 2–12)
MONOCYTES RELATIVE PERCENT: 2 % (ref 2–12)
MONOCYTES RELATIVE PERCENT: 2 % (ref 2–12)
MONOCYTES RELATIVE PERCENT: 2.1 % (ref 2–12)
MONOCYTES RELATIVE PERCENT: 2.6 % (ref 2–12)
MONOCYTES RELATIVE PERCENT: 2.9 % (ref 2–12)
MONOCYTES RELATIVE PERCENT: 3 % (ref 2–12)
MONOCYTES RELATIVE PERCENT: 3 % (ref 2–12)
MONOCYTES RELATIVE PERCENT: 3.3 % (ref 2–12)
MONOCYTES RELATIVE PERCENT: 3.5 % (ref 2–12)
MONOCYTES RELATIVE PERCENT: 3.7 % (ref 2–12)
MONOCYTES RELATIVE PERCENT: 3.8 % (ref 2–12)
MONOCYTES RELATIVE PERCENT: 4.1 % (ref 2–12)
MONOCYTES RELATIVE PERCENT: 4.3 % (ref 2–12)
MONOCYTES RELATIVE PERCENT: 5.5 % (ref 2–12)
MRSA CULTURE ONLY: NORMAL
MV: 9
MYCOPLASMA PNEUMONIAE BY PCR: NOT DETECTED
MYELOCYTE PERCENT: 0.9 % (ref 0–0)
MYELOCYTE PERCENT: 1.7 % (ref 0–0)
MYELOCYTE PERCENT: 1.7 % (ref 0–0)
NEUTROPHIL, FLUID: 69 %
NEUTROPHILS ABSOLUTE: 10.14 E9/L (ref 1.8–7.3)
NEUTROPHILS ABSOLUTE: 10.25 E9/L (ref 1.8–7.3)
NEUTROPHILS ABSOLUTE: 10.46 E9/L (ref 1.8–7.3)
NEUTROPHILS ABSOLUTE: 11.14 E9/L (ref 1.8–7.3)
NEUTROPHILS ABSOLUTE: 11.19 E9/L (ref 1.8–7.3)
NEUTROPHILS ABSOLUTE: 11.19 E9/L (ref 1.8–7.3)
NEUTROPHILS ABSOLUTE: 11.48 E9/L (ref 1.8–7.3)
NEUTROPHILS ABSOLUTE: 11.54 E9/L (ref 1.8–7.3)
NEUTROPHILS ABSOLUTE: 11.93 E9/L (ref 1.8–7.3)
NEUTROPHILS ABSOLUTE: 13.11 E9/L (ref 1.8–7.3)
NEUTROPHILS ABSOLUTE: 13.3 E9/L (ref 1.8–7.3)
NEUTROPHILS ABSOLUTE: 13.39 E9/L (ref 1.8–7.3)
NEUTROPHILS ABSOLUTE: 14.29 E9/L (ref 1.8–7.3)
NEUTROPHILS ABSOLUTE: 14.3 E9/L (ref 1.8–7.3)
NEUTROPHILS ABSOLUTE: 15.05 E9/L (ref 1.8–7.3)
NEUTROPHILS ABSOLUTE: 15.15 E9/L (ref 1.8–7.3)
NEUTROPHILS ABSOLUTE: 15.54 E9/L (ref 1.8–7.3)
NEUTROPHILS ABSOLUTE: 16.29 E9/L (ref 1.8–7.3)
NEUTROPHILS ABSOLUTE: 16.59 E9/L (ref 1.8–7.3)
NEUTROPHILS ABSOLUTE: 16.85 E9/L (ref 1.8–7.3)
NEUTROPHILS ABSOLUTE: 17.17 E9/L (ref 1.8–7.3)
NEUTROPHILS ABSOLUTE: 17.4 E9/L (ref 1.8–7.3)
NEUTROPHILS ABSOLUTE: 19.01 E9/L (ref 1.8–7.3)
NEUTROPHILS ABSOLUTE: 19.1 E9/L (ref 1.8–7.3)
NEUTROPHILS ABSOLUTE: 21.67 E9/L (ref 1.8–7.3)
NEUTROPHILS ABSOLUTE: 22.41 E9/L (ref 1.8–7.3)
NEUTROPHILS ABSOLUTE: 22.46 E9/L (ref 1.8–7.3)
NEUTROPHILS ABSOLUTE: 3.09 E9/L (ref 1.8–7.3)
NEUTROPHILS ABSOLUTE: 30.17 E9/L (ref 1.8–7.3)
NEUTROPHILS ABSOLUTE: 5.04 E9/L (ref 1.8–7.3)
NEUTROPHILS ABSOLUTE: 7.65 E9/L (ref 1.8–7.3)
NEUTROPHILS ABSOLUTE: 7.95 E9/L (ref 1.8–7.3)
NEUTROPHILS ABSOLUTE: 8.28 E9/L (ref 1.8–7.3)
NEUTROPHILS ABSOLUTE: 8.38 E9/L (ref 1.8–7.3)
NEUTROPHILS ABSOLUTE: 8.63 E9/L (ref 1.8–7.3)
NEUTROPHILS ABSOLUTE: 9.51 E9/L (ref 1.8–7.3)
NEUTROPHILS ABSOLUTE: 9.66 E9/L (ref 1.8–7.3)
NEUTROPHILS ABSOLUTE: 9.81 E9/L (ref 1.8–7.3)
NEUTROPHILS ABSOLUTE: 9.96 E9/L (ref 1.8–7.3)
NEUTROPHILS RELATIVE PERCENT: 64.8 % (ref 43–80)
NEUTROPHILS RELATIVE PERCENT: 85.5 % (ref 43–80)
NEUTROPHILS RELATIVE PERCENT: 86.5 % (ref 43–80)
NEUTROPHILS RELATIVE PERCENT: 88 % (ref 43–80)
NEUTROPHILS RELATIVE PERCENT: 88.7 % (ref 43–80)
NEUTROPHILS RELATIVE PERCENT: 89 % (ref 43–80)
NEUTROPHILS RELATIVE PERCENT: 89.4 % (ref 43–80)
NEUTROPHILS RELATIVE PERCENT: 89.6 % (ref 43–80)
NEUTROPHILS RELATIVE PERCENT: 90.4 % (ref 43–80)
NEUTROPHILS RELATIVE PERCENT: 91.3 % (ref 43–80)
NEUTROPHILS RELATIVE PERCENT: 91.9 % (ref 43–80)
NEUTROPHILS RELATIVE PERCENT: 92.2 % (ref 43–80)
NEUTROPHILS RELATIVE PERCENT: 92.5 % (ref 43–80)
NEUTROPHILS RELATIVE PERCENT: 92.6 % (ref 43–80)
NEUTROPHILS RELATIVE PERCENT: 92.7 % (ref 43–80)
NEUTROPHILS RELATIVE PERCENT: 92.9 % (ref 43–80)
NEUTROPHILS RELATIVE PERCENT: 93 % (ref 43–80)
NEUTROPHILS RELATIVE PERCENT: 93 % (ref 43–80)
NEUTROPHILS RELATIVE PERCENT: 93.9 % (ref 43–80)
NEUTROPHILS RELATIVE PERCENT: 93.9 % (ref 43–80)
NEUTROPHILS RELATIVE PERCENT: 94.4 % (ref 43–80)
NEUTROPHILS RELATIVE PERCENT: 94.8 % (ref 43–80)
NEUTROPHILS RELATIVE PERCENT: 95.4 % (ref 43–80)
NEUTROPHILS RELATIVE PERCENT: 95.6 % (ref 43–80)
NEUTROPHILS RELATIVE PERCENT: 95.7 % (ref 43–80)
NEUTROPHILS RELATIVE PERCENT: 95.7 % (ref 43–80)
NEUTROPHILS RELATIVE PERCENT: 96.5 % (ref 43–80)
NEUTROPHILS RELATIVE PERCENT: 96.6 % (ref 43–80)
NEUTROPHILS RELATIVE PERCENT: 97.4 % (ref 43–80)
NEUTROPHILS RELATIVE PERCENT: 97.4 % (ref 43–80)
NEUTROPHILS RELATIVE PERCENT: 98.2 % (ref 43–80)
NEUTROPHILS RELATIVE PERCENT: 98.3 % (ref 43–80)
NEUTROPHILS RELATIVE PERCENT: 99.1 % (ref 43–80)
NITRITE, URINE: NEGATIVE
NITRITE, URINE: POSITIVE
NUCLEATED CELLS FLUID: 341 /UL
NUCLEATED RED BLOOD CELLS: 0.9 /100 WBC
NUCLEATED RED BLOOD CELLS: 1.7 /100 WBC
NUCLEATED RED BLOOD CELLS: 2.6 /100 WBC
NUCLEATED RED BLOOD CELLS: 2.6 /100 WBC
O2 CONTENT: 10.1 ML/DL
O2 CONTENT: 10.9 ML/DL
O2 CONTENT: 11 ML/DL
O2 CONTENT: 11 ML/DL
O2 CONTENT: 11.1 ML/DL
O2 CONTENT: 11.5 ML/DL
O2 CONTENT: 11.5 ML/DL
O2 CONTENT: 11.6 ML/DL
O2 CONTENT: 11.6 ML/DL
O2 CONTENT: 11.9 ML/DL
O2 CONTENT: 12 ML/DL
O2 CONTENT: 12.1 ML/DL
O2 CONTENT: 12.1 ML/DL
O2 CONTENT: 12.2 ML/DL
O2 CONTENT: 12.3 ML/DL
O2 CONTENT: 12.4 ML/DL
O2 CONTENT: 12.5 ML/DL
O2 CONTENT: 12.6 ML/DL
O2 CONTENT: 12.7 ML/DL
O2 CONTENT: 13 ML/DL
O2 CONTENT: 13.1 ML/DL
O2 CONTENT: 13.2 ML/DL
O2 CONTENT: 13.3 ML/DL
O2 CONTENT: 13.5 ML/DL
O2 CONTENT: 13.5 ML/DL
O2 CONTENT: 13.6 ML/DL
O2 CONTENT: 13.8 ML/DL
O2 CONTENT: 13.9 ML/DL
O2 CONTENT: 14 ML/DL
O2 CONTENT: 14.2 ML/DL
O2 CONTENT: 14.5 ML/DL
O2 CONTENT: 14.6 ML/DL
O2 CONTENT: 15.6 ML/DL
O2 CONTENT: 15.6 ML/DL
O2 CONTENT: 15.9 ML/DL
O2 CONTENT: 16 ML/DL
O2 CONTENT: 16.2 ML/DL
O2 CONTENT: 16.3 ML/DL
O2 CONTENT: 16.4 ML/DL
O2 CONTENT: 16.4 ML/DL
O2 CONTENT: 17.1 ML/DL
O2 CONTENT: 17.1 ML/DL
O2 CONTENT: 17.3 ML/DL
O2 CONTENT: 17.9 ML/DL
O2 CONTENT: 18.3 ML/DL
O2 CONTENT: 18.6 ML/DL
O2 CONTENT: 19.8 ML/DL
O2 CONTENT: 4.9 ML/DL
O2 CONTENT: 9.9 ML/DL
O2 SATURATION: 37.8 % (ref 92–98.5)
O2 SATURATION: 73.1 % (ref 92–98.5)
O2 SATURATION: 73.3 % (ref 92–98.5)
O2 SATURATION: 74.4 % (ref 92–98.5)
O2 SATURATION: 83.9 % (ref 92–98.5)
O2 SATURATION: 88.2 % (ref 92–98.5)
O2 SATURATION: 88.6 % (ref 92–98.5)
O2 SATURATION: 89.4 % (ref 92–98.5)
O2 SATURATION: 89.7 % (ref 92–98.5)
O2 SATURATION: 89.7 % (ref 92–98.5)
O2 SATURATION: 89.8 % (ref 92–98.5)
O2 SATURATION: 90.4 % (ref 92–98.5)
O2 SATURATION: 90.8 % (ref 92–98.5)
O2 SATURATION: 91.2 % (ref 92–98.5)
O2 SATURATION: 91.4 % (ref 92–98.5)
O2 SATURATION: 91.5 % (ref 92–98.5)
O2 SATURATION: 91.6 % (ref 92–98.5)
O2 SATURATION: 91.7 % (ref 92–98.5)
O2 SATURATION: 92.2 % (ref 92–98.5)
O2 SATURATION: 92.2 % (ref 92–98.5)
O2 SATURATION: 92.3 % (ref 92–98.5)
O2 SATURATION: 92.4 % (ref 92–98.5)
O2 SATURATION: 92.7 % (ref 92–98.5)
O2 SATURATION: 92.7 % (ref 92–98.5)
O2 SATURATION: 92.9 % (ref 92–98.5)
O2 SATURATION: 93.3 % (ref 92–98.5)
O2 SATURATION: 93.6 % (ref 92–98.5)
O2 SATURATION: 93.6 % (ref 92–98.5)
O2 SATURATION: 93.9 % (ref 92–98.5)
O2 SATURATION: 93.9 % (ref 92–98.5)
O2 SATURATION: 94.2 % (ref 92–98.5)
O2 SATURATION: 94.3 % (ref 92–98.5)
O2 SATURATION: 94.6 % (ref 92–98.5)
O2 SATURATION: 94.6 % (ref 92–98.5)
O2 SATURATION: 94.7 % (ref 92–98.5)
O2 SATURATION: 94.8 % (ref 92–98.5)
O2 SATURATION: 94.9 % (ref 92–98.5)
O2 SATURATION: 95.2 % (ref 92–98.5)
O2 SATURATION: 95.3 % (ref 92–98.5)
O2 SATURATION: 95.4 % (ref 92–98.5)
O2 SATURATION: 95.4 % (ref 92–98.5)
O2 SATURATION: 95.7 % (ref 92–98.5)
O2 SATURATION: 96 % (ref 92–98.5)
O2 SATURATION: 96 % (ref 92–98.5)
O2 SATURATION: 96.1 % (ref 92–98.5)
O2 SATURATION: 96.2 % (ref 92–98.5)
O2 SATURATION: 96.7 % (ref 92–98.5)
O2 SATURATION: 96.7 % (ref 92–98.5)
O2 SATURATION: 96.9 % (ref 92–98.5)
O2 SATURATION: 96.9 % (ref 92–98.5)
O2 SATURATION: 97 % (ref 92–98.5)
O2 SATURATION: 97.7 % (ref 92–98.5)
O2 SATURATION: 97.9 % (ref 92–98.5)
O2 SATURATION: 98.1 % (ref 92–98.5)
O2 SATURATION: 98.2 % (ref 92–98.5)
O2 SATURATION: 98.2 % (ref 92–98.5)
O2HB: 37.5 % (ref 94–97)
O2HB: 71.8 % (ref 94–97)
O2HB: 71.8 % (ref 94–97)
O2HB: 73.2 % (ref 94–97)
O2HB: 83.2 % (ref 94–97)
O2HB: 86.7 % (ref 94–97)
O2HB: 88 % (ref 94–97)
O2HB: 88.5 % (ref 94–97)
O2HB: 88.7 % (ref 94–97)
O2HB: 89.5 % (ref 94–97)
O2HB: 89.7 % (ref 94–97)
O2HB: 89.8 % (ref 94–97)
O2HB: 90 % (ref 94–97)
O2HB: 90.3 % (ref 94–97)
O2HB: 90.5 % (ref 94–97)
O2HB: 90.9 % (ref 94–97)
O2HB: 90.9 % (ref 94–97)
O2HB: 91 % (ref 94–97)
O2HB: 91.2 % (ref 94–97)
O2HB: 91.2 % (ref 94–97)
O2HB: 91.6 % (ref 94–97)
O2HB: 91.9 % (ref 94–97)
O2HB: 91.9 % (ref 94–97)
O2HB: 92.4 % (ref 94–97)
O2HB: 92.5 % (ref 94–97)
O2HB: 92.8 % (ref 94–97)
O2HB: 92.8 % (ref 94–97)
O2HB: 92.9 % (ref 94–97)
O2HB: 92.9 % (ref 94–97)
O2HB: 93 % (ref 94–97)
O2HB: 93.3 % (ref 94–97)
O2HB: 93.5 % (ref 94–97)
O2HB: 93.7 % (ref 94–97)
O2HB: 93.8 % (ref 94–97)
O2HB: 93.8 % (ref 94–97)
O2HB: 93.9 % (ref 94–97)
O2HB: 94.5 % (ref 94–97)
O2HB: 94.5 % (ref 94–97)
O2HB: 94.7 % (ref 94–97)
O2HB: 94.8 % (ref 94–97)
O2HB: 95.1 % (ref 94–97)
O2HB: 95.2 % (ref 94–97)
O2HB: 95.2 % (ref 94–97)
O2HB: 95.3 % (ref 94–97)
O2HB: 95.5 % (ref 94–97)
O2HB: 95.6 % (ref 94–97)
O2HB: 96.4 % (ref 94–97)
O2HB: 96.6 % (ref 94–97)
O2HB: 96.7 % (ref 94–97)
O2HB: 96.8 % (ref 94–97)
O2HB: 97.4 % (ref 94–97)
O2HB: 97.5 % (ref 94–97)
OCCULT BLOOD, OTHER: NEGATIVE
OPERATOR ID: 1119
OPERATOR ID: 1119
OPERATOR ID: 1210
OPERATOR ID: 1210
OPERATOR ID: 1741
OPERATOR ID: 1768
OPERATOR ID: 1821
OPERATOR ID: 187
OPERATOR ID: 187
OPERATOR ID: 1874
OPERATOR ID: 1893
OPERATOR ID: 1921
OPERATOR ID: 2067
OPERATOR ID: 2067
OPERATOR ID: 2577
OPERATOR ID: 2593
OPERATOR ID: 2863
OPERATOR ID: 2962
OPERATOR ID: 3342
OPERATOR ID: 366
OPERATOR ID: 421
OPERATOR ID: 7221
OPERATOR ID: 7291
OPERATOR ID: 7490
OPERATOR ID: ABNORMAL
ORGANISM: ABNORMAL
OVALOCYTES: ABNORMAL
PARAINFLUENZA VIRUS 1 BY PCR: NOT DETECTED
PARAINFLUENZA VIRUS 2 BY PCR: NOT DETECTED
PARAINFLUENZA VIRUS 3 BY PCR: NOT DETECTED
PARAINFLUENZA VIRUS 4 BY PCR: NOT DETECTED
PATHOLOGIST REVIEW: NORMAL
PATIENT TEMP: 37 C
PCO2 ARTERIAL: 32.3 MMHG (ref 35–45)
PCO2 ARTERIAL: 51.5 MMHG (ref 35–45)
PCO2: 131.2 MMHG (ref 35–45)
PCO2: 27.1 MMHG (ref 35–45)
PCO2: 27.4 MMHG (ref 35–45)
PCO2: 31.9 MMHG (ref 35–45)
PCO2: 32 MMHG (ref 35–45)
PCO2: 33.4 MMHG (ref 35–45)
PCO2: 38.1 MMHG (ref 35–45)
PCO2: 38.8 MMHG (ref 35–45)
PCO2: 40.2 MMHG (ref 35–45)
PCO2: 40.4 MMHG (ref 35–45)
PCO2: 41.3 MMHG (ref 35–45)
PCO2: 41.5 MMHG (ref 35–45)
PCO2: 43 MMHG (ref 35–45)
PCO2: 43.5 MMHG (ref 35–45)
PCO2: 44.1 MMHG (ref 35–45)
PCO2: 44.7 MMHG (ref 35–45)
PCO2: 46 MMHG (ref 35–45)
PCO2: 46.5 MMHG (ref 35–45)
PCO2: 46.6 MMHG (ref 35–45)
PCO2: 46.8 MMHG (ref 35–45)
PCO2: 47.4 MMHG (ref 35–45)
PCO2: 47.7 MMHG (ref 35–45)
PCO2: 47.7 MMHG (ref 35–45)
PCO2: 48.8 MMHG (ref 35–45)
PCO2: 49.2 MMHG (ref 35–45)
PCO2: 49.3 MMHG (ref 35–45)
PCO2: 50.3 MMHG (ref 35–45)
PCO2: 52.3 MMHG (ref 35–45)
PCO2: 52.3 MMHG (ref 35–45)
PCO2: 52.5 MMHG (ref 35–45)
PCO2: 53.5 MMHG (ref 35–45)
PCO2: 53.7 MMHG (ref 35–45)
PCO2: 53.9 MMHG (ref 35–45)
PCO2: 55.1 MMHG (ref 35–45)
PCO2: 55.2 MMHG (ref 35–45)
PCO2: 55.8 MMHG (ref 35–45)
PCO2: 56 MMHG (ref 35–45)
PCO2: 56.1 MMHG (ref 35–45)
PCO2: 56.9 MMHG (ref 35–45)
PCO2: 56.9 MMHG (ref 35–45)
PCO2: 58.1 MMHG (ref 35–45)
PCO2: 58.2 MMHG (ref 35–45)
PCO2: 59.6 MMHG (ref 35–45)
PCO2: 62 MMHG (ref 35–45)
PCO2: 63.1 MMHG (ref 35–45)
PCO2: 63.1 MMHG (ref 35–45)
PCO2: 68.3 MMHG (ref 35–45)
PCO2: 69.2 MMHG (ref 35–45)
PCO2: 69.7 MMHG (ref 35–45)
PCO2: 70.2 MMHG (ref 35–45)
PCO2: 71.9 MMHG (ref 35–45)
PCO2: 86.3 MMHG (ref 35–45)
PDW BLD-RTO: 12.9 FL (ref 11.5–15)
PDW BLD-RTO: 13 FL (ref 11.5–15)
PDW BLD-RTO: 13.2 FL (ref 11.5–15)
PDW BLD-RTO: 13.7 FL (ref 11.5–15)
PDW BLD-RTO: 13.8 FL (ref 11.5–15)
PDW BLD-RTO: 14 FL (ref 11.5–15)
PDW BLD-RTO: 14.2 FL (ref 11.5–15)
PDW BLD-RTO: 14.8 FL (ref 11.5–15)
PDW BLD-RTO: 14.8 FL (ref 11.5–15)
PDW BLD-RTO: 14.9 FL (ref 11.5–15)
PDW BLD-RTO: 15 FL (ref 11.5–15)
PDW BLD-RTO: 15.2 FL (ref 11.5–15)
PDW BLD-RTO: 15.7 FL (ref 11.5–15)
PDW BLD-RTO: 16 FL (ref 11.5–15)
PDW BLD-RTO: 17 FL (ref 11.5–15)
PDW BLD-RTO: 17.8 FL (ref 11.5–15)
PDW BLD-RTO: 18.3 FL (ref 11.5–15)
PDW BLD-RTO: 18.9 FL (ref 11.5–15)
PDW BLD-RTO: 20.1 FL (ref 11.5–15)
PDW BLD-RTO: 20.2 FL (ref 11.5–15)
PDW BLD-RTO: 20.3 FL (ref 11.5–15)
PDW BLD-RTO: 20.7 FL (ref 11.5–15)
PDW BLD-RTO: 21.8 FL (ref 11.5–15)
PDW BLD-RTO: 22.5 FL (ref 11.5–15)
PDW BLD-RTO: 22.7 FL (ref 11.5–15)
PDW BLD-RTO: 22.7 FL (ref 11.5–15)
PDW BLD-RTO: 22.9 FL (ref 11.5–15)
PDW BLD-RTO: 22.9 FL (ref 11.5–15)
PDW BLD-RTO: 23.1 FL (ref 11.5–15)
PDW BLD-RTO: 23.1 FL (ref 11.5–15)
PDW BLD-RTO: 23.3 FL (ref 11.5–15)
PDW BLD-RTO: 23.4 FL (ref 11.5–15)
PDW BLD-RTO: 23.5 FL (ref 11.5–15)
PDW BLD-RTO: 23.5 FL (ref 11.5–15)
PDW BLD-RTO: 24.3 FL (ref 11.5–15)
PDW BLD-RTO: 24.6 FL (ref 11.5–15)
PDW BLD-RTO: 24.8 FL (ref 11.5–15)
PEEP/CPAP: 10 CMH2O
PEEP/CPAP: 12 CMH2O
PEEP/CPAP: 15 CMH2O
PEEP/CPAP: 8 CMH2O
PFO2: 0.31 MMHG/%
PFO2: 0.38 MMHG/%
PFO2: 0.41 MMHG/%
PFO2: 0.41 MMHG/%
PFO2: 0.57 MMHG/%
PFO2: 0.61 MMHG/%
PFO2: 0.63 MMHG/%
PFO2: 0.67 MMHG/%
PFO2: 0.68 MMHG/%
PFO2: 0.69 MMHG/%
PFO2: 0.71 MMHG/%
PFO2: 0.72 MMHG/%
PFO2: 0.76 MMHG/%
PFO2: 0.82 MMHG/%
PFO2: 0.82 MMHG/%
PFO2: 0.85 MMHG/%
PFO2: 0.86 MMHG/%
PFO2: 0.88 MMHG/%
PFO2: 0.89 MMHG/%
PFO2: 0.9 MMHG/%
PFO2: 0.95 MMHG/%
PFO2: 0.95 MMHG/%
PFO2: 0.96 MMHG/%
PFO2: 0.97 MMHG/%
PFO2: 0.97 MMHG/%
PFO2: 0.98 MMHG/%
PFO2: 1.03 MMHG/%
PFO2: 1.03 MMHG/%
PFO2: 1.04 MMHG/%
PFO2: 1.05 MMHG/%
PFO2: 1.07 MMHG/%
PFO2: 1.08 MMHG/%
PFO2: 1.12 MMHG/%
PFO2: 1.14 MMHG/%
PFO2: 1.17 MMHG/%
PFO2: 1.18 MMHG/%
PFO2: 1.2 MMHG/%
PFO2: 1.35 MMHG/%
PFO2: 1.36 MMHG/%
PFO2: 1.36 MMHG/%
PFO2: 1.37 MMHG/%
PFO2: 1.38 MMHG/%
PFO2: 1.38 MMHG/%
PFO2: 1.39 MMHG/%
PFO2: 1.47 MMHG/%
PFO2: 1.5 MMHG/%
PFO2: 1.55 MMHG/%
PFO2: 1.72 MMHG/%
PFO2: 2.11 MMHG/%
PH BLOOD GAS: 6.96 (ref 7.35–7.45)
PH BLOOD GAS: 7.18 (ref 7.35–7.45)
PH BLOOD GAS: 7.21 (ref 7.35–7.45)
PH BLOOD GAS: 7.23 (ref 7.35–7.45)
PH BLOOD GAS: 7.24 (ref 7.35–7.45)
PH BLOOD GAS: 7.25 (ref 7.35–7.45)
PH BLOOD GAS: 7.26 (ref 7.35–7.45)
PH BLOOD GAS: 7.26 (ref 7.35–7.45)
PH BLOOD GAS: 7.28 (ref 7.35–7.45)
PH BLOOD GAS: 7.28 (ref 7.35–7.45)
PH BLOOD GAS: 7.29 (ref 7.35–7.45)
PH BLOOD GAS: 7.3 (ref 7.35–7.45)
PH BLOOD GAS: 7.31 (ref 7.35–7.45)
PH BLOOD GAS: 7.32 (ref 7.35–7.45)
PH BLOOD GAS: 7.33 (ref 7.35–7.45)
PH BLOOD GAS: 7.33 (ref 7.35–7.45)
PH BLOOD GAS: 7.34 (ref 7.35–7.45)
PH BLOOD GAS: 7.34 (ref 7.35–7.45)
PH BLOOD GAS: 7.35 (ref 7.35–7.45)
PH BLOOD GAS: 7.36 (ref 7.35–7.45)
PH BLOOD GAS: 7.37 (ref 7.35–7.45)
PH BLOOD GAS: 7.37 (ref 7.35–7.45)
PH BLOOD GAS: 7.38 (ref 7.35–7.45)
PH BLOOD GAS: 7.39 (ref 7.35–7.45)
PH BLOOD GAS: 7.39 (ref 7.35–7.45)
PH BLOOD GAS: 7.4 (ref 7.35–7.45)
PH BLOOD GAS: 7.41 (ref 7.35–7.45)
PH BLOOD GAS: 7.41 (ref 7.35–7.45)
PH BLOOD GAS: 7.43 (ref 7.35–7.45)
PH BLOOD GAS: 7.44 (ref 7.35–7.45)
PH BLOOD GAS: 7.45 (ref 7.35–7.45)
PH BLOOD GAS: 7.46 (ref 7.35–7.45)
PH BLOOD GAS: 7.47 (ref 7.35–7.45)
PH BLOOD GAS: 7.48 (ref 7.35–7.45)
PH BLOOD GAS: 7.48 (ref 7.35–7.45)
PH BLOOD GAS: 7.5 (ref 7.35–7.45)
PH BLOOD GAS: 7.5 (ref 7.35–7.45)
PH BLOOD GAS: 7.51 (ref 7.35–7.45)
PH BLOOD GAS: 7.51 (ref 7.35–7.45)
PH BLOOD GAS: 7.55 (ref 7.35–7.45)
PH UA: 6 (ref 5–9)
PH UA: 6 (ref 5–9)
PHOSPHORUS: 2.2 MG/DL (ref 2.5–4.5)
PHOSPHORUS: 2.3 MG/DL (ref 2.5–4.5)
PHOSPHORUS: 2.4 MG/DL (ref 2.5–4.5)
PHOSPHORUS: 2.5 MG/DL (ref 2.5–4.5)
PHOSPHORUS: 2.6 MG/DL (ref 2.5–4.5)
PHOSPHORUS: 2.9 MG/DL (ref 2.5–4.5)
PHOSPHORUS: 2.9 MG/DL (ref 2.5–4.5)
PHOSPHORUS: 3.1 MG/DL (ref 2.5–4.5)
PHOSPHORUS: 3.2 MG/DL (ref 2.5–4.5)
PHOSPHORUS: 3.3 MG/DL (ref 2.5–4.5)
PHOSPHORUS: 3.4 MG/DL (ref 2.5–4.5)
PHOSPHORUS: 3.5 MG/DL (ref 2.5–4.5)
PHOSPHORUS: 3.5 MG/DL (ref 2.5–4.5)
PHOSPHORUS: 3.6 MG/DL (ref 2.5–4.5)
PHOSPHORUS: 4 MG/DL (ref 2.5–4.5)
PHOSPHORUS: 4 MG/DL (ref 2.5–4.5)
PHOSPHORUS: 4.2 MG/DL (ref 2.5–4.5)
PHOSPHORUS: 4.3 MG/DL (ref 2.5–4.5)
PHOSPHORUS: 4.9 MG/DL (ref 2.5–4.5)
PHOSPHORUS: 4.9 MG/DL (ref 2.5–4.5)
PHOSPHORUS: 5 MG/DL (ref 2.5–4.5)
PIP: 14 CMH2O
PIP: 16 CMH2O
PIP: 36 CMH2O
PLATELET # BLD: 100 E9/L (ref 130–450)
PLATELET # BLD: 102 E9/L (ref 130–450)
PLATELET # BLD: 108 E9/L (ref 130–450)
PLATELET # BLD: 122 E9/L (ref 130–450)
PLATELET # BLD: 123 E9/L (ref 130–450)
PLATELET # BLD: 136 E9/L (ref 130–450)
PLATELET # BLD: 143 E9/L (ref 130–450)
PLATELET # BLD: 145 E9/L (ref 130–450)
PLATELET # BLD: 155 E9/L (ref 130–450)
PLATELET # BLD: 170 E9/L (ref 130–450)
PLATELET # BLD: 171 E9/L (ref 130–450)
PLATELET # BLD: 172 E9/L (ref 130–450)
PLATELET # BLD: 177 E9/L (ref 130–450)
PLATELET # BLD: 180 E9/L (ref 130–450)
PLATELET # BLD: 184 E9/L (ref 130–450)
PLATELET # BLD: 186 E9/L (ref 130–450)
PLATELET # BLD: 187 E9/L (ref 130–450)
PLATELET # BLD: 192 E9/L (ref 130–450)
PLATELET # BLD: 196 E9/L (ref 130–450)
PLATELET # BLD: 198 E9/L (ref 130–450)
PLATELET # BLD: 205 E9/L (ref 130–450)
PLATELET # BLD: 214 E9/L (ref 130–450)
PLATELET # BLD: 228 E9/L (ref 130–450)
PLATELET # BLD: 230 E9/L (ref 130–450)
PLATELET # BLD: 243 E9/L (ref 130–450)
PLATELET # BLD: 255 E9/L (ref 130–450)
PLATELET # BLD: 259 E9/L (ref 130–450)
PLATELET # BLD: 265 E9/L (ref 130–450)
PLATELET # BLD: 266 E9/L (ref 130–450)
PLATELET # BLD: 269 E9/L (ref 130–450)
PLATELET # BLD: 284 E9/L (ref 130–450)
PLATELET # BLD: 291 E9/L (ref 130–450)
PLATELET # BLD: 292 E9/L (ref 130–450)
PLATELET # BLD: 297 E9/L (ref 130–450)
PLATELET # BLD: 311 E9/L (ref 130–450)
PLATELET # BLD: 321 E9/L (ref 130–450)
PLATELET # BLD: 326 E9/L (ref 130–450)
PLATELET # BLD: 366 E9/L (ref 130–450)
PLATELET # BLD: 96 E9/L (ref 130–450)
PLATELET CONFIRMATION: NORMAL
PMV BLD AUTO: 10 FL (ref 7–12)
PMV BLD AUTO: 10 FL (ref 7–12)
PMV BLD AUTO: 10.1 FL (ref 7–12)
PMV BLD AUTO: 10.1 FL (ref 7–12)
PMV BLD AUTO: 10.2 FL (ref 7–12)
PMV BLD AUTO: 10.4 FL (ref 7–12)
PMV BLD AUTO: 10.4 FL (ref 7–12)
PMV BLD AUTO: 10.5 FL (ref 7–12)
PMV BLD AUTO: 10.5 FL (ref 7–12)
PMV BLD AUTO: 10.6 FL (ref 7–12)
PMV BLD AUTO: 10.6 FL (ref 7–12)
PMV BLD AUTO: 10.7 FL (ref 7–12)
PMV BLD AUTO: 10.9 FL (ref 7–12)
PMV BLD AUTO: 11.1 FL (ref 7–12)
PMV BLD AUTO: 11.2 FL (ref 7–12)
PMV BLD AUTO: 11.3 FL (ref 7–12)
PMV BLD AUTO: 11.4 FL (ref 7–12)
PMV BLD AUTO: 11.4 FL (ref 7–12)
PMV BLD AUTO: 11.5 FL (ref 7–12)
PMV BLD AUTO: 11.6 FL (ref 7–12)
PMV BLD AUTO: 11.7 FL (ref 7–12)
PMV BLD AUTO: 11.8 FL (ref 7–12)
PMV BLD AUTO: 12.1 FL (ref 7–12)
PMV BLD AUTO: 12.2 FL (ref 7–12)
PMV BLD AUTO: 12.4 FL (ref 7–12)
PMV BLD AUTO: 12.5 FL (ref 7–12)
PMV BLD AUTO: 12.6 FL (ref 7–12)
PMV BLD AUTO: 9.4 FL (ref 7–12)
PMV BLD AUTO: 9.5 FL (ref 7–12)
PMV BLD AUTO: 9.6 FL (ref 7–12)
PMV BLD AUTO: 9.8 FL (ref 7–12)
PMV BLD AUTO: 9.8 FL (ref 7–12)
PMV BLD AUTO: 9.9 FL (ref 7–12)
PMV BLD AUTO: 9.9 FL (ref 7–12)
PO2 ARTERIAL: 55.2 MMHG (ref 60–80)
PO2 ARTERIAL: 59.6 MMHG (ref 60–80)
PO2: 101.9 MMHG (ref 75–100)
PO2: 104 MMHG (ref 75–100)
PO2: 115.2 MMHG (ref 75–100)
PO2: 126.6 MMHG (ref 75–100)
PO2: 132.3 MMHG (ref 75–100)
PO2: 137.3 MMHG (ref 75–100)
PO2: 137.5 MMHG (ref 75–100)
PO2: 31.3 MMHG (ref 75–100)
PO2: 38.5 MMHG (ref 75–100)
PO2: 40.6 MMHG (ref 75–100)
PO2: 40.8 MMHG (ref 75–100)
PO2: 55.5 MMHG (ref 75–100)
PO2: 57.4 MMHG (ref 75–100)
PO2: 57.5 MMHG (ref 75–100)
PO2: 60.8 MMHG (ref 75–100)
PO2: 61.2 MMHG (ref 75–100)
PO2: 63 MMHG (ref 75–100)
PO2: 63.1 MMHG (ref 75–100)
PO2: 63.6 MMHG (ref 75–100)
PO2: 64.3 MMHG (ref 75–100)
PO2: 64.8 MMHG (ref 75–100)
PO2: 66.5 MMHG (ref 75–100)
PO2: 66.5 MMHG (ref 75–100)
PO2: 66.8 MMHG (ref 75–100)
PO2: 67.5 MMHG (ref 75–100)
PO2: 67.9 MMHG (ref 75–100)
PO2: 68 MMHG (ref 75–100)
PO2: 68.3 MMHG (ref 75–100)
PO2: 68.6 MMHG (ref 75–100)
PO2: 68.9 MMHG (ref 75–100)
PO2: 70.3 MMHG (ref 75–100)
PO2: 70.4 MMHG (ref 75–100)
PO2: 72.4 MMHG (ref 75–100)
PO2: 73.6 MMHG (ref 75–100)
PO2: 73.7 MMHG (ref 75–100)
PO2: 74.4 MMHG (ref 75–100)
PO2: 74.8 MMHG (ref 75–100)
PO2: 75.1 MMHG (ref 75–100)
PO2: 77.5 MMHG (ref 75–100)
PO2: 78 MMHG (ref 75–100)
PO2: 81.1 MMHG (ref 75–100)
PO2: 81.8 MMHG (ref 75–100)
PO2: 82.2 MMHG (ref 75–100)
PO2: 82.2 MMHG (ref 75–100)
PO2: 82.3 MMHG (ref 75–100)
PO2: 82.3 MMHG (ref 75–100)
PO2: 82.9 MMHG (ref 75–100)
PO2: 88.3 MMHG (ref 75–100)
PO2: 89.3 MMHG (ref 75–100)
PO2: 91 MMHG (ref 75–100)
PO2: 92.2 MMHG (ref 75–100)
PO2: 92.6 MMHG (ref 75–100)
PO2: 92.9 MMHG (ref 75–100)
PO2: 94.8 MMHG (ref 75–100)
POIKILOCYTES: ABNORMAL
POLYCHROMASIA: ABNORMAL
POSITIVE END EXP PRESS: 10 CMH2O
POTASSIUM REFLEX MAGNESIUM: 2.7 MMOL/L (ref 3.5–5)
POTASSIUM REFLEX MAGNESIUM: 3 MMOL/L (ref 3.5–5)
POTASSIUM REFLEX MAGNESIUM: 3.1 MMOL/L (ref 3.5–5)
POTASSIUM REFLEX MAGNESIUM: 3.2 MMOL/L (ref 3.5–5)
POTASSIUM REFLEX MAGNESIUM: 3.2 MMOL/L (ref 3.5–5)
POTASSIUM REFLEX MAGNESIUM: 3.3 MMOL/L (ref 3.5–5)
POTASSIUM REFLEX MAGNESIUM: 3.4 MMOL/L (ref 3.5–5)
POTASSIUM REFLEX MAGNESIUM: 3.7 MMOL/L (ref 3.5–5)
POTASSIUM REFLEX MAGNESIUM: 3.8 MMOL/L (ref 3.5–5)
POTASSIUM REFLEX MAGNESIUM: 3.8 MMOL/L (ref 3.5–5)
POTASSIUM REFLEX MAGNESIUM: 3.9 MMOL/L (ref 3.5–5)
POTASSIUM REFLEX MAGNESIUM: 3.9 MMOL/L (ref 3.5–5)
POTASSIUM REFLEX MAGNESIUM: 4 MMOL/L (ref 3.5–5)
POTASSIUM REFLEX MAGNESIUM: 4 MMOL/L (ref 3.5–5)
POTASSIUM REFLEX MAGNESIUM: 4.1 MMOL/L (ref 3.5–5)
POTASSIUM REFLEX MAGNESIUM: 4.3 MMOL/L (ref 3.5–5)
POTASSIUM REFLEX MAGNESIUM: 4.8 MMOL/L (ref 3.5–5)
POTASSIUM REFLEX MAGNESIUM: 8.2 MMOL/L (ref 3.5–5)
POTASSIUM SERPL-SCNC: 2.7 MMOL/L (ref 3.5–5)
POTASSIUM SERPL-SCNC: 2.7 MMOL/L (ref 3.5–5)
POTASSIUM SERPL-SCNC: 2.8 MMOL/L (ref 3.5–5)
POTASSIUM SERPL-SCNC: 2.8 MMOL/L (ref 3.5–5)
POTASSIUM SERPL-SCNC: 3 MMOL/L (ref 3.5–5)
POTASSIUM SERPL-SCNC: 3 MMOL/L (ref 3.5–5)
POTASSIUM SERPL-SCNC: 3.2 MMOL/L (ref 3.5–5)
POTASSIUM SERPL-SCNC: 3.3 MMOL/L (ref 3.5–5)
POTASSIUM SERPL-SCNC: 3.4 MMOL/L (ref 3.5–5)
POTASSIUM SERPL-SCNC: 3.5 MMOL/L (ref 3.5–5)
POTASSIUM SERPL-SCNC: 3.5 MMOL/L (ref 3.5–5)
POTASSIUM SERPL-SCNC: 3.6 MMOL/L (ref 3.5–5)
POTASSIUM SERPL-SCNC: 3.7 MMOL/L (ref 3.5–5)
POTASSIUM SERPL-SCNC: 3.8 MMOL/L (ref 3.5–5)
POTASSIUM SERPL-SCNC: 3.9 MMOL/L (ref 3.5–5)
POTASSIUM SERPL-SCNC: 4 MMOL/L (ref 3.5–5)
POTASSIUM SERPL-SCNC: 4.1 MMOL/L (ref 3.5–5)
POTASSIUM SERPL-SCNC: 4.1 MMOL/L (ref 3.5–5)
POTASSIUM SERPL-SCNC: 4.2 MMOL/L (ref 3.5–5)
POTASSIUM SERPL-SCNC: 4.3 MMOL/L (ref 3.5–5)
POTASSIUM SERPL-SCNC: 4.3 MMOL/L (ref 3.5–5)
POTASSIUM SERPL-SCNC: 4.4 MMOL/L (ref 3.5–5)
POTASSIUM SERPL-SCNC: 4.5 MMOL/L (ref 3.5–5)
POTASSIUM SERPL-SCNC: 4.5 MMOL/L (ref 3.5–5)
POTASSIUM SERPL-SCNC: 4.7 MMOL/L (ref 3.5–5)
POTASSIUM SERPL-SCNC: 4.7 MMOL/L (ref 3.5–5)
POTASSIUM SERPL-SCNC: 5.46 MMOL/L (ref 3.5–5)
POTASSIUM SERPL-SCNC: 8.2 MMOL/L (ref 3.5–5)
POTASSIUM, UR: 39.5 MMOL/L
PROCALCITONIN: 0.07 NG/ML (ref 0–0.08)
PROCALCITONIN: 0.1 NG/ML (ref 0–0.08)
PROCALCITONIN: 0.11 NG/ML (ref 0–0.08)
PROCALCITONIN: 0.16 NG/ML (ref 0–0.08)
PROCALCITONIN: 0.25 NG/ML (ref 0–0.08)
PROCALCITONIN: 0.27 NG/ML (ref 0–0.08)
PROTEIN PROTEIN: 169 MG/DL (ref 0–12)
PROTEIN UA: 100 MG/DL
PROTEIN UA: NEGATIVE MG/DL
PROTEIN/CREAT RATIO: 1.6
PROTEIN/CREAT RATIO: 1.6 (ref 0–0.2)
PROTHROMBIN TIME: 11.6 SEC (ref 9.3–12.4)
PROTHROMBIN TIME: 11.6 SEC (ref 9.3–12.4)
PROTHROMBIN TIME: 12.1 SEC (ref 9.3–12.4)
PROTHROMBIN TIME: 12.2 SEC (ref 9.3–12.4)
PROTHROMBIN TIME: 12.3 SEC (ref 9.3–12.4)
PROTHROMBIN TIME: 12.5 SEC (ref 9.3–12.4)
PROTHROMBIN TIME: 12.5 SEC (ref 9.3–12.4)
PROTHROMBIN TIME: 12.6 SEC (ref 9.3–12.4)
PROTHROMBIN TIME: 12.7 SEC (ref 9.3–12.4)
PROTHROMBIN TIME: 12.7 SEC (ref 9.3–12.4)
PROTHROMBIN TIME: 12.8 SEC (ref 9.3–12.4)
PROTHROMBIN TIME: 12.8 SEC (ref 9.3–12.4)
PROTHROMBIN TIME: 12.9 SEC (ref 9.3–12.4)
PROTHROMBIN TIME: 13 SEC (ref 9.3–12.4)
PROTHROMBIN TIME: 13.3 SEC (ref 9.3–12.4)
PROTHROMBIN TIME: 13.4 SEC (ref 9.3–12.4)
PROTHROMBIN TIME: 13.7 SEC (ref 9.3–12.4)
PROTHROMBIN TIME: 13.7 SEC (ref 9.3–12.4)
PROTHROMBIN TIME: 14 SEC (ref 9.3–12.4)
PROTHROMBIN TIME: 14.2 SEC (ref 9.3–12.4)
PROTHROMBIN TIME: 15 SEC (ref 9.3–12.4)
PROTHROMBIN TIME: 17.9 SEC (ref 9.3–12.4)
PROTHROMBIN TIME: 18.7 SEC (ref 9.3–12.4)
PROTHROMBIN TIME: 18.8 SEC (ref 9.3–12.4)
PROTHROMBIN TIME: 21.2 SEC (ref 9.3–12.4)
PROTHROMBIN TIME: 22.1 SEC (ref 9.3–12.4)
PROTHROMBIN TIME: 23.1 SEC (ref 9.3–12.4)
PROTHROMBIN TIME: 23.5 SEC (ref 9.3–12.4)
PROTHROMBIN TIME: 41.9 SEC (ref 9.3–12.4)
PROTHROMBIN TIME: 46.2 SEC (ref 9.3–12.4)
PS: 14 CMH20
PS: 15 CMH20
PS: 18 CMH20
PS: 18 CMH20
RBC # BLD: 2.12 E12/L (ref 3.5–5.5)
RBC # BLD: 2.22 E12/L (ref 3.5–5.5)
RBC # BLD: 2.37 E12/L (ref 3.5–5.5)
RBC # BLD: 2.45 E12/L (ref 3.5–5.5)
RBC # BLD: 2.5 E12/L (ref 3.5–5.5)
RBC # BLD: 2.5 E12/L (ref 3.5–5.5)
RBC # BLD: 2.51 E12/L (ref 3.5–5.5)
RBC # BLD: 2.52 E12/L (ref 3.5–5.5)
RBC # BLD: 2.53 E12/L (ref 3.5–5.5)
RBC # BLD: 2.55 E12/L (ref 3.5–5.5)
RBC # BLD: 2.58 E12/L (ref 3.5–5.5)
RBC # BLD: 2.65 E12/L (ref 3.5–5.5)
RBC # BLD: 2.67 E12/L (ref 3.5–5.5)
RBC # BLD: 2.68 E12/L (ref 3.5–5.5)
RBC # BLD: 2.72 E12/L (ref 3.5–5.5)
RBC # BLD: 2.73 E12/L (ref 3.5–5.5)
RBC # BLD: 2.76 E12/L (ref 3.5–5.5)
RBC # BLD: 2.8 E12/L (ref 3.5–5.5)
RBC # BLD: 2.88 E12/L (ref 3.5–5.5)
RBC # BLD: 2.95 E12/L (ref 3.5–5.5)
RBC # BLD: 2.95 E12/L (ref 3.5–5.5)
RBC # BLD: 3.05 E12/L (ref 3.5–5.5)
RBC # BLD: 3.05 E12/L (ref 3.5–5.5)
RBC # BLD: 3.09 E12/L (ref 3.5–5.5)
RBC # BLD: 3.11 E12/L (ref 3.5–5.5)
RBC # BLD: 3.14 E12/L (ref 3.5–5.5)
RBC # BLD: 3.22 E12/L (ref 3.5–5.5)
RBC # BLD: 3.31 E12/L (ref 3.5–5.5)
RBC # BLD: 3.77 E12/L (ref 3.5–5.5)
RBC # BLD: 3.8 E12/L (ref 3.5–5.5)
RBC # BLD: 3.84 E12/L (ref 3.5–5.5)
RBC # BLD: 3.85 E12/L (ref 3.5–5.5)
RBC # BLD: 3.91 E12/L (ref 3.5–5.5)
RBC # BLD: 3.92 E12/L (ref 3.5–5.5)
RBC # BLD: 3.93 E12/L (ref 3.5–5.5)
RBC # BLD: 4.08 E12/L (ref 3.5–5.5)
RBC # BLD: 4.43 E12/L (ref 3.5–5.5)
RBC # BLD: 4.56 E12/L (ref 3.5–5.5)
RBC # BLD: 4.69 E12/L (ref 3.5–5.5)
RBC FLUID: <2000 /UL
RBC UA: ABNORMAL /HPF (ref 0–2)
RBC UA: ABNORMAL /HPF (ref 0–2)
REPORT: NORMAL
RESPIRATORY SYNCYTIAL VIRUS BY PCR: NOT DETECTED
RI(T): 1.85
RI(T): 10.51
RI(T): 14.19
RI(T): 14.24
RI(T): 15.06
RI(T): 16.79
RI(T): 2.63
RI(T): 2.81
RI(T): 2.97
RI(T): 3.06
RI(T): 3.25
RI(T): 3.39
RI(T): 3.44
RI(T): 3.47
RI(T): 3.49
RI(T): 3.54
RI(T): 3.56
RI(T): 3.59
RI(T): 3.73
RI(T): 4.12
RI(T): 4.12
RI(T): 4.13
RI(T): 4.34
RI(T): 4.44
RI(T): 4.62
RI(T): 4.81
RI(T): 4.89
RI(T): 4.99
RI(T): 5.01
RI(T): 5.09
RI(T): 5.24
RI(T): 5.38
RI(T): 5.39
RI(T): 5.5
RI(T): 5.5
RI(T): 5.67
RI(T): 5.77
RI(T): 5.86
RI(T): 5.99
RI(T): 6.03
RI(T): 6.09
RI(T): 6.15
RI(T): 6.15
RI(T): 6.4
RI(T): 6.77
RI(T): 7.23
RI(T): 7.25
RI(T): 7.67
RI(T): 8.18
RI(T): 8.82
RI(T): 850 %
RI(T): 9.03
RI(T): 9.72
RR MECHANICAL: 20 B/MIN
RR MECHANICAL: 24 B/MIN
RR MECHANICAL: 27 B/MIN
RR MECHANICAL: 28 B/MIN
RR MECHANICAL: 32 B/MIN
RR MECHANICAL: 34 B/MIN
SARS-COV-2, NAAT: DETECTED
SARS-COV-2, NAAT: DETECTED
SARS-COV-2, NAAT: NOT DETECTED
SARS-COV-2, PCR: DETECTED
SARS-COV-2, PCR: NOT DETECTED
SCHISTOCYTES: ABNORMAL
SMEAR, RESPIRATORY: ABNORMAL
SMEAR, RESPIRATORY: ABNORMAL
SMEAR, RESPIRATORY: NORMAL
SODIUM BLD-SCNC: 129 MMOL/L (ref 132–146)
SODIUM BLD-SCNC: 131 MMOL/L (ref 132–146)
SODIUM BLD-SCNC: 132 MMOL/L (ref 132–146)
SODIUM BLD-SCNC: 133 MMOL/L (ref 132–146)
SODIUM BLD-SCNC: 134 MMOL/L (ref 132–146)
SODIUM BLD-SCNC: 135 MMOL/L (ref 132–146)
SODIUM BLD-SCNC: 136 MMOL/L (ref 132–146)
SODIUM BLD-SCNC: 137 MMOL/L (ref 132–146)
SODIUM BLD-SCNC: 138 MMOL/L (ref 132–146)
SODIUM BLD-SCNC: 139 MMOL/L (ref 132–146)
SODIUM BLD-SCNC: 140 MMOL/L (ref 132–146)
SODIUM BLD-SCNC: 141 MMOL/L (ref 132–146)
SODIUM BLD-SCNC: 142 MMOL/L (ref 132–146)
SODIUM BLD-SCNC: 143 MMOL/L (ref 132–146)
SODIUM BLD-SCNC: 144 MMOL/L (ref 132–146)
SODIUM BLD-SCNC: 145 MMOL/L (ref 132–146)
SODIUM BLD-SCNC: 146 MMOL/L (ref 132–146)
SODIUM BLD-SCNC: 146 MMOL/L (ref 132–146)
SODIUM BLD-SCNC: 147 MMOL/L (ref 132–146)
SODIUM BLD-SCNC: 147 MMOL/L (ref 132–146)
SODIUM URINE: <20 MMOL/L
SOURCE, BLOOD GAS: ABNORMAL
SPECIFIC GRAVITY UA: 1.01 (ref 1–1.03)
SPECIFIC GRAVITY UA: 1.02 (ref 1–1.03)
SPHEROCYTES: ABNORMAL
STOMATOCYTES: ABNORMAL
STREP PNEUMONIAE ANTIGEN, URINE: NORMAL
TARGET CELLS: ABNORMAL
TEAR DROP CELLS: ABNORMAL
THB: 10 G/DL (ref 11.5–16.5)
THB: 10.1 G/DL (ref 11.5–16.5)
THB: 10.1 G/DL (ref 11.5–16.5)
THB: 10.2 G/DL (ref 11.5–16.5)
THB: 10.3 G/DL (ref 11.5–16.5)
THB: 10.4 G/DL (ref 11.5–16.5)
THB: 10.6 G/DL (ref 11.5–16.5)
THB: 10.7 G/DL (ref 11.5–16.5)
THB: 10.8 G/DL (ref 11.5–16.5)
THB: 10.9 G/DL (ref 11.5–16.5)
THB: 11 G/DL (ref 11.5–16.5)
THB: 11.2 G/DL (ref 11.5–16.5)
THB: 11.5 G/DL (ref 11.5–16.5)
THB: 11.9 G/DL (ref 11.5–16.5)
THB: 12.1 G/DL (ref 11.5–16.5)
THB: 12.2 G/DL (ref 11.5–16.5)
THB: 12.2 G/DL (ref 11.5–16.5)
THB: 12.7 G/DL (ref 11.5–16.5)
THB: 12.7 G/DL (ref 11.5–16.5)
THB: 12.8 G/DL (ref 11.5–16.5)
THB: 12.9 G/DL (ref 11.5–16.5)
THB: 13 G/DL (ref 11.5–16.5)
THB: 13.4 G/DL (ref 11.5–16.5)
THB: 13.5 G/DL (ref 11.5–16.5)
THB: 13.9 G/DL (ref 11.5–16.5)
THB: 13.9 G/DL (ref 11.5–16.5)
THB: 14.5 G/DL (ref 11.5–16.5)
THB: 7.5 G/DL (ref 11.5–16.5)
THB: 8.3 G/DL (ref 11.5–16.5)
THB: 8.5 G/DL (ref 11.5–16.5)
THB: 8.6 G/DL (ref 11.5–16.5)
THB: 8.6 G/DL (ref 11.5–16.5)
THB: 8.8 G/DL (ref 11.5–16.5)
THB: 8.8 G/DL (ref 11.5–16.5)
THB: 9 G/DL (ref 11.5–16.5)
THB: 9 G/DL (ref 11.5–16.5)
THB: 9.2 G/DL (ref 11.5–16.5)
THB: 9.3 G/DL (ref 11.5–16.5)
THB: 9.5 G/DL (ref 11.5–16.5)
THB: 9.6 G/DL (ref 11.5–16.5)
THB: 9.6 G/DL (ref 11.5–16.5)
THB: 9.7 G/DL (ref 11.5–16.5)
THB: 9.8 G/DL (ref 11.5–16.5)
THB: 9.9 G/DL (ref 11.5–16.5)
THIS TEST SENT TO: NORMAL
TIME ANALYZED: 1015
TIME ANALYZED: 1039
TIME ANALYZED: 1047
TIME ANALYZED: 1050
TIME ANALYZED: 108
TIME ANALYZED: 115
TIME ANALYZED: 1226
TIME ANALYZED: 1245
TIME ANALYZED: 1339
TIME ANALYZED: 1340
TIME ANALYZED: 1426
TIME ANALYZED: 1511
TIME ANALYZED: 1541
TIME ANALYZED: 1610
TIME ANALYZED: 1755
TIME ANALYZED: 1908
TIME ANALYZED: 1949
TIME ANALYZED: 2027
TIME ANALYZED: 2238
TIME ANALYZED: 2248
TIME ANALYZED: 2327
TIME ANALYZED: 2351
TIME ANALYZED: 350
TIME ANALYZED: 413
TIME ANALYZED: 415
TIME ANALYZED: 421
TIME ANALYZED: 421
TIME ANALYZED: 432
TIME ANALYZED: 440
TIME ANALYZED: 441
TIME ANALYZED: 441
TIME ANALYZED: 443
TIME ANALYZED: 444
TIME ANALYZED: 444
TIME ANALYZED: 446
TIME ANALYZED: 447
TIME ANALYZED: 447
TIME ANALYZED: 450
TIME ANALYZED: 454
TIME ANALYZED: 506
TIME ANALYZED: 508
TIME ANALYZED: 509
TIME ANALYZED: 512
TIME ANALYZED: 513
TIME ANALYZED: 513
TIME ANALYZED: 516
TIME ANALYZED: 519
TIME ANALYZED: 527
TIME ANALYZED: 528
TIME ANALYZED: 533
TIME ANALYZED: 535
TIME ANALYZED: 540
TIME ANALYZED: 603
TIME ANALYZED: 9
TOTAL CK: 1065 U/L (ref 20–180)
TOTAL CK: 135 U/L (ref 20–180)
TOTAL CK: 2575 U/L (ref 20–180)
TOTAL CK: 2885 U/L (ref 20–180)
TOTAL CK: 3913 U/L (ref 20–180)
TOTAL CK: 488 U/L (ref 20–180)
TOTAL CK: 585 U/L (ref 20–180)
TOTAL CK: 687 U/L (ref 20–180)
TOTAL CK: 701 U/L (ref 20–180)
TOTAL PROTEIN: 4.2 G/DL (ref 6.4–8.3)
TOTAL PROTEIN: 4.3 G/DL (ref 6.4–8.3)
TOTAL PROTEIN: 4.3 G/DL (ref 6.4–8.3)
TOTAL PROTEIN: 4.4 G/DL (ref 6.4–8.3)
TOTAL PROTEIN: 4.5 G/DL (ref 6.4–8.3)
TOTAL PROTEIN: 4.5 G/DL (ref 6.4–8.3)
TOTAL PROTEIN: 4.6 G/DL (ref 6.4–8.3)
TOTAL PROTEIN: 4.7 G/DL (ref 6.4–8.3)
TOTAL PROTEIN: 4.8 G/DL (ref 6.4–8.3)
TOTAL PROTEIN: 4.9 G/DL (ref 6.4–8.3)
TOTAL PROTEIN: 4.9 G/DL (ref 6.4–8.3)
TOTAL PROTEIN: 5 G/DL (ref 6.4–8.3)
TOTAL PROTEIN: 5.1 G/DL (ref 6.4–8.3)
TOTAL PROTEIN: 5.1 G/DL (ref 6.4–8.3)
TOTAL PROTEIN: 5.2 G/DL (ref 6.4–8.3)
TOTAL PROTEIN: 5.3 G/DL (ref 6.4–8.3)
TOTAL PROTEIN: 5.4 G/DL (ref 6.4–8.3)
TOTAL PROTEIN: 5.5 G/DL (ref 6.4–8.3)
TOTAL PROTEIN: 5.5 G/DL (ref 6.4–8.3)
TOTAL PROTEIN: 5.7 G/DL (ref 6.4–8.3)
TOTAL PROTEIN: 5.7 G/DL (ref 6.4–8.3)
TOTAL PROTEIN: 6 G/DL (ref 6.4–8.3)
TOTAL PROTEIN: 6.1 G/DL (ref 6.4–8.3)
TOTAL PROTEIN: 6.4 G/DL (ref 6.4–8.3)
TOTAL PROTEIN: 6.4 G/DL (ref 6.4–8.3)
TOTAL PROTEIN: 6.5 G/DL (ref 6.4–8.3)
TOTAL PROTEIN: 7.7 G/DL (ref 6.4–8.3)
TOTAL PROTEIN: 8.5 G/DL (ref 6.4–8.3)
TOXIC GRANULATION: ABNORMAL
TOXIC GRANULATION: ABNORMAL
TRIGL SERPL-MCNC: 195 MG/DL (ref 0–149)
TRIGL SERPL-MCNC: 197 MG/DL (ref 0–149)
TRIGL SERPL-MCNC: 220 MG/DL (ref 0–149)
TROPONIN, HIGH SENSITIVITY: 12 NG/L (ref 0–9)
TSH SERPL DL<=0.05 MIU/L-ACNC: 3.01 UIU/ML (ref 0.27–4.2)
TSH SERPL DL<=0.05 MIU/L-ACNC: 4.01 UIU/ML (ref 0.27–4.2)
UREA NITROGEN, UR: 836 MG/DL (ref 800–1666)
URINE CULTURE, ROUTINE: ABNORMAL
URINE CULTURE, ROUTINE: ABNORMAL
URINE CULTURE, ROUTINE: NORMAL
URINE CULTURE, ROUTINE: NORMAL
UROBILINOGEN, URINE: 0.2 E.U./DL
UROBILINOGEN, URINE: 0.2 E.U./DL
VACUOLATED NEUTROPHILS: ABNORMAL
VANCOMYCIN RANDOM: 15.3 MCG/ML (ref 5–40)
VANCOMYCIN RANDOM: 17 MCG/ML (ref 5–40)
VANCOMYCIN RANDOM: 22.3 MCG/ML (ref 5–40)
VITAMIN B-12: 771 PG/ML (ref 211–946)
VLDLC SERPL CALC-MCNC: 39 MG/DL
VT MECHANICAL: 320 ML
VT MECHANICAL: 350 ML
VT MECHANICAL: 350 ML
WBC # BLD: 10.3 E9/L (ref 4.5–11.5)
WBC # BLD: 10.6 E9/L (ref 4.5–11.5)
WBC # BLD: 10.9 E9/L (ref 4.5–11.5)
WBC # BLD: 11.1 E9/L (ref 4.5–11.5)
WBC # BLD: 11.3 E9/L (ref 4.5–11.5)
WBC # BLD: 11.6 E9/L (ref 4.5–11.5)
WBC # BLD: 11.6 E9/L (ref 4.5–11.5)
WBC # BLD: 12.2 E9/L (ref 4.5–11.5)
WBC # BLD: 12.3 E9/L (ref 4.5–11.5)
WBC # BLD: 12.3 E9/L (ref 4.5–11.5)
WBC # BLD: 13.8 E9/L (ref 4.5–11.5)
WBC # BLD: 13.8 E9/L (ref 4.5–11.5)
WBC # BLD: 14 E9/L (ref 4.5–11.5)
WBC # BLD: 14.9 E9/L (ref 4.5–11.5)
WBC # BLD: 15.2 E9/L (ref 4.5–11.5)
WBC # BLD: 15.5 E9/L (ref 4.5–11.5)
WBC # BLD: 16.4 E9/L (ref 4.5–11.5)
WBC # BLD: 16.8 E9/L (ref 4.5–11.5)
WBC # BLD: 17.1 E9/L (ref 4.5–11.5)
WBC # BLD: 17.7 E9/L (ref 4.5–11.5)
WBC # BLD: 18.1 E9/L (ref 4.5–11.5)
WBC # BLD: 18.8 E9/L (ref 4.5–11.5)
WBC # BLD: 19.7 E9/L (ref 4.5–11.5)
WBC # BLD: 20.1 E9/L (ref 4.5–11.5)
WBC # BLD: 22 E9/L (ref 4.5–11.5)
WBC # BLD: 22.7 E9/L (ref 4.5–11.5)
WBC # BLD: 23.1 E9/L (ref 4.5–11.5)
WBC # BLD: 23.4 E9/L (ref 4.5–11.5)
WBC # BLD: 32.1 E9/L (ref 4.5–11.5)
WBC # BLD: 4.8 E9/L (ref 4.5–11.5)
WBC # BLD: 5.3 E9/L (ref 4.5–11.5)
WBC # BLD: 8.2 E9/L (ref 4.5–11.5)
WBC # BLD: 8.5 E9/L (ref 4.5–11.5)
WBC # BLD: 8.9 E9/L (ref 4.5–11.5)
WBC # BLD: 9.1 E9/L (ref 4.5–11.5)
WBC # BLD: 9.4 E9/L (ref 4.5–11.5)
WBC UA: ABNORMAL /HPF (ref 0–5)
WBC UA: ABNORMAL /HPF (ref 0–5)

## 2021-01-01 PROCEDURE — 6360000002 HC RX W HCPCS: Performed by: INTERNAL MEDICINE

## 2021-01-01 PROCEDURE — 85014 HEMATOCRIT: CPT

## 2021-01-01 PROCEDURE — 2500000003 HC RX 250 WO HCPCS: Performed by: FAMILY MEDICINE

## 2021-01-01 PROCEDURE — 82805 BLOOD GASES W/O2 SATURATION: CPT

## 2021-01-01 PROCEDURE — 6360000002 HC RX W HCPCS: Performed by: STUDENT IN AN ORGANIZED HEALTH CARE EDUCATION/TRAINING PROGRAM

## 2021-01-01 PROCEDURE — 2500000003 HC RX 250 WO HCPCS: Performed by: INTERNAL MEDICINE

## 2021-01-01 PROCEDURE — 85025 COMPLETE CBC W/AUTO DIFF WBC: CPT

## 2021-01-01 PROCEDURE — 83735 ASSAY OF MAGNESIUM: CPT

## 2021-01-01 PROCEDURE — 6370000000 HC RX 637 (ALT 250 FOR IP): Performed by: INTERNAL MEDICINE

## 2021-01-01 PROCEDURE — 80076 HEPATIC FUNCTION PANEL: CPT

## 2021-01-01 PROCEDURE — 94003 VENT MGMT INPAT SUBQ DAY: CPT

## 2021-01-01 PROCEDURE — 6360000002 HC RX W HCPCS: Performed by: CHIROPRACTOR

## 2021-01-01 PROCEDURE — 71045 X-RAY EXAM CHEST 1 VIEW: CPT

## 2021-01-01 PROCEDURE — 2580000003 HC RX 258: Performed by: INTERNAL MEDICINE

## 2021-01-01 PROCEDURE — C9113 INJ PANTOPRAZOLE SODIUM, VIA: HCPCS | Performed by: INTERNAL MEDICINE

## 2021-01-01 PROCEDURE — 82330 ASSAY OF CALCIUM: CPT

## 2021-01-01 PROCEDURE — 6360000002 HC RX W HCPCS

## 2021-01-01 PROCEDURE — 99291 CRITICAL CARE FIRST HOUR: CPT | Performed by: INTERNAL MEDICINE

## 2021-01-01 PROCEDURE — 73562 X-RAY EXAM OF KNEE 3: CPT

## 2021-01-01 PROCEDURE — 99232 SBSQ HOSP IP/OBS MODERATE 35: CPT | Performed by: FAMILY MEDICINE

## 2021-01-01 PROCEDURE — 84145 PROCALCITONIN (PCT): CPT

## 2021-01-01 PROCEDURE — 2580000003 HC RX 258: Performed by: FAMILY MEDICINE

## 2021-01-01 PROCEDURE — 82962 GLUCOSE BLOOD TEST: CPT

## 2021-01-01 PROCEDURE — 2000000000 HC ICU R&B

## 2021-01-01 PROCEDURE — 2580000003 HC RX 258: Performed by: STUDENT IN AN ORGANIZED HEALTH CARE EDUCATION/TRAINING PROGRAM

## 2021-01-01 PROCEDURE — 6370000000 HC RX 637 (ALT 250 FOR IP): Performed by: STUDENT IN AN ORGANIZED HEALTH CARE EDUCATION/TRAINING PROGRAM

## 2021-01-01 PROCEDURE — 37799 UNLISTED PX VASCULAR SURGERY: CPT

## 2021-01-01 PROCEDURE — 84100 ASSAY OF PHOSPHORUS: CPT

## 2021-01-01 PROCEDURE — 82728 ASSAY OF FERRITIN: CPT

## 2021-01-01 PROCEDURE — 94640 AIRWAY INHALATION TREATMENT: CPT

## 2021-01-01 PROCEDURE — 6360000002 HC RX W HCPCS: Performed by: FAMILY MEDICINE

## 2021-01-01 PROCEDURE — 80048 BASIC METABOLIC PNL TOTAL CA: CPT

## 2021-01-01 PROCEDURE — 85018 HEMOGLOBIN: CPT

## 2021-01-01 PROCEDURE — 6370000000 HC RX 637 (ALT 250 FOR IP): Performed by: FAMILY MEDICINE

## 2021-01-01 PROCEDURE — 82248 BILIRUBIN DIRECT: CPT

## 2021-01-01 PROCEDURE — 85610 PROTHROMBIN TIME: CPT

## 2021-01-01 PROCEDURE — 36620 INSERTION CATHETER ARTERY: CPT

## 2021-01-01 PROCEDURE — 1036F TOBACCO NON-USER: CPT | Performed by: STUDENT IN AN ORGANIZED HEALTH CARE EDUCATION/TRAINING PROGRAM

## 2021-01-01 PROCEDURE — 84540 ASSAY OF URINE/UREA-N: CPT

## 2021-01-01 PROCEDURE — 89051 BODY FLUID CELL COUNT: CPT

## 2021-01-01 PROCEDURE — 85730 THROMBOPLASTIN TIME PARTIAL: CPT

## 2021-01-01 PROCEDURE — 85378 FIBRIN DEGRADE SEMIQUANT: CPT

## 2021-01-01 PROCEDURE — 31500 INSERT EMERGENCY AIRWAY: CPT

## 2021-01-01 PROCEDURE — 87206 SMEAR FLUORESCENT/ACID STAI: CPT

## 2021-01-01 PROCEDURE — 86140 C-REACTIVE PROTEIN: CPT

## 2021-01-01 PROCEDURE — 99223 1ST HOSP IP/OBS HIGH 75: CPT | Performed by: STUDENT IN AN ORGANIZED HEALTH CARE EDUCATION/TRAINING PROGRAM

## 2021-01-01 PROCEDURE — 86022 PLATELET ANTIBODIES: CPT

## 2021-01-01 PROCEDURE — 82550 ASSAY OF CK (CPK): CPT

## 2021-01-01 PROCEDURE — 80202 ASSAY OF VANCOMYCIN: CPT

## 2021-01-01 PROCEDURE — 3609010800 HC BRONCHOSCOPY ALVEOLAR LAVAGE: Performed by: INTERNAL MEDICINE

## 2021-01-01 PROCEDURE — 2580000003 HC RX 258

## 2021-01-01 PROCEDURE — 86703 HIV-1/HIV-2 1 RESULT ANTBDY: CPT

## 2021-01-01 PROCEDURE — 32551 INSERTION OF CHEST TUBE: CPT

## 2021-01-01 PROCEDURE — 31624 DX BRONCHOSCOPE/LAVAGE: CPT | Performed by: INTERNAL MEDICINE

## 2021-01-01 PROCEDURE — 36415 COLL VENOUS BLD VENIPUNCTURE: CPT | Performed by: STUDENT IN AN ORGANIZED HEALTH CARE EDUCATION/TRAINING PROGRAM

## 2021-01-01 PROCEDURE — 87116 MYCOBACTERIA CULTURE: CPT

## 2021-01-01 PROCEDURE — 99213 OFFICE O/P EST LOW 20 MIN: CPT | Performed by: STUDENT IN AN ORGANIZED HEALTH CARE EDUCATION/TRAINING PROGRAM

## 2021-01-01 PROCEDURE — G8427 DOCREV CUR MEDS BY ELIG CLIN: HCPCS | Performed by: STUDENT IN AN ORGANIZED HEALTH CARE EDUCATION/TRAINING PROGRAM

## 2021-01-01 PROCEDURE — 92950 HEART/LUNG RESUSCITATION CPR: CPT

## 2021-01-01 PROCEDURE — 83605 ASSAY OF LACTIC ACID: CPT

## 2021-01-01 PROCEDURE — 36415 COLL VENOUS BLD VENIPUNCTURE: CPT

## 2021-01-01 PROCEDURE — 82436 ASSAY OF URINE CHLORIDE: CPT

## 2021-01-01 PROCEDURE — 87305 ASPERGILLUS AG IA: CPT

## 2021-01-01 PROCEDURE — 99239 HOSP IP/OBS DSCHRG MGMT >30: CPT | Performed by: FAMILY MEDICINE

## 2021-01-01 PROCEDURE — 94660 CPAP INITIATION&MGMT: CPT

## 2021-01-01 PROCEDURE — 84478 ASSAY OF TRIGLYCERIDES: CPT

## 2021-01-01 PROCEDURE — 87088 URINE BACTERIA CULTURE: CPT

## 2021-01-01 PROCEDURE — P9047 ALBUMIN (HUMAN), 25%, 50ML: HCPCS | Performed by: STUDENT IN AN ORGANIZED HEALTH CARE EDUCATION/TRAINING PROGRAM

## 2021-01-01 PROCEDURE — 83615 LACTATE (LD) (LDH) ENZYME: CPT

## 2021-01-01 PROCEDURE — 87186 SC STD MICRODIL/AGAR DIL: CPT

## 2021-01-01 PROCEDURE — G8417 CALC BMI ABV UP PARAM F/U: HCPCS | Performed by: STUDENT IN AN ORGANIZED HEALTH CARE EDUCATION/TRAINING PROGRAM

## 2021-01-01 PROCEDURE — 80053 COMPREHEN METABOLIC PANEL: CPT

## 2021-01-01 PROCEDURE — 2500000003 HC RX 250 WO HCPCS

## 2021-01-01 PROCEDURE — 93005 ELECTROCARDIOGRAM TRACING: CPT | Performed by: STUDENT IN AN ORGANIZED HEALTH CARE EDUCATION/TRAINING PROGRAM

## 2021-01-01 PROCEDURE — 2580000003 HC RX 258: Performed by: CHIROPRACTOR

## 2021-01-01 PROCEDURE — C9113 INJ PANTOPRAZOLE SODIUM, VIA: HCPCS | Performed by: FAMILY MEDICINE

## 2021-01-01 PROCEDURE — 3017F COLORECTAL CA SCREEN DOC REV: CPT | Performed by: STUDENT IN AN ORGANIZED HEALTH CARE EDUCATION/TRAINING PROGRAM

## 2021-01-01 PROCEDURE — 86923 COMPATIBILITY TEST ELECTRIC: CPT

## 2021-01-01 PROCEDURE — 02HV33Z INSERTION OF INFUSION DEVICE INTO SUPERIOR VENA CAVA, PERCUTANEOUS APPROACH: ICD-10-PCS | Performed by: INTERNAL MEDICINE

## 2021-01-01 PROCEDURE — 74018 RADEX ABDOMEN 1 VIEW: CPT

## 2021-01-01 PROCEDURE — 76937 US GUIDE VASCULAR ACCESS: CPT

## 2021-01-01 PROCEDURE — 99212 OFFICE O/P EST SF 10 MIN: CPT | Performed by: STUDENT IN AN ORGANIZED HEALTH CARE EDUCATION/TRAINING PROGRAM

## 2021-01-01 PROCEDURE — 86850 RBC ANTIBODY SCREEN: CPT

## 2021-01-01 PROCEDURE — 99233 SBSQ HOSP IP/OBS HIGH 50: CPT | Performed by: INTERNAL MEDICINE

## 2021-01-01 PROCEDURE — 99233 SBSQ HOSP IP/OBS HIGH 50: CPT | Performed by: FAMILY MEDICINE

## 2021-01-01 PROCEDURE — 82803 BLOOD GASES ANY COMBINATION: CPT

## 2021-01-01 PROCEDURE — G8484 FLU IMMUNIZE NO ADMIN: HCPCS | Performed by: STUDENT IN AN ORGANIZED HEALTH CARE EDUCATION/TRAINING PROGRAM

## 2021-01-01 PROCEDURE — 86900 BLOOD TYPING SEROLOGIC ABO: CPT

## 2021-01-01 PROCEDURE — 87449 NOS EACH ORGANISM AG IA: CPT

## 2021-01-01 PROCEDURE — 84133 ASSAY OF URINE POTASSIUM: CPT

## 2021-01-01 PROCEDURE — 0W9B30Z DRAINAGE OF LEFT PLEURAL CAVITY WITH DRAINAGE DEVICE, PERCUTANEOUS APPROACH: ICD-10-PCS | Performed by: SURGERY

## 2021-01-01 PROCEDURE — 51702 INSERT TEMP BLADDER CATH: CPT

## 2021-01-01 PROCEDURE — 0BH18EZ INSERTION OF ENDOTRACHEAL AIRWAY INTO TRACHEA, VIA NATURAL OR ARTIFICIAL OPENING ENDOSCOPIC: ICD-10-PCS | Performed by: ANESTHESIOLOGY

## 2021-01-01 PROCEDURE — 87635 SARS-COV-2 COVID-19 AMP PRB: CPT

## 2021-01-01 PROCEDURE — 87070 CULTURE OTHR SPECIMN AEROBIC: CPT

## 2021-01-01 PROCEDURE — 0B9C8ZX DRAINAGE OF RIGHT UPPER LUNG LOBE, VIA NATURAL OR ARTIFICIAL OPENING ENDOSCOPIC, DIAGNOSTIC: ICD-10-PCS | Performed by: INTERNAL MEDICINE

## 2021-01-01 PROCEDURE — 0W9930Z DRAINAGE OF RIGHT PLEURAL CAVITY WITH DRAINAGE DEVICE, PERCUTANEOUS APPROACH: ICD-10-PCS | Performed by: INTERNAL MEDICINE

## 2021-01-01 PROCEDURE — 2140000000 HC CCU INTERMEDIATE R&B

## 2021-01-01 PROCEDURE — 81001 URINALYSIS AUTO W/SCOPE: CPT

## 2021-01-01 PROCEDURE — 93005 ELECTROCARDIOGRAM TRACING: CPT | Performed by: INTERNAL MEDICINE

## 2021-01-01 PROCEDURE — 31500 INSERT EMERGENCY AIRWAY: CPT | Performed by: NURSE ANESTHETIST, CERTIFIED REGISTERED

## 2021-01-01 PROCEDURE — U0005 INFEC AGEN DETEC AMPLI PROBE: HCPCS

## 2021-01-01 PROCEDURE — 99233 SBSQ HOSP IP/OBS HIGH 50: CPT | Performed by: NURSE PRACTITIONER

## 2021-01-01 PROCEDURE — 85384 FIBRINOGEN ACTIVITY: CPT

## 2021-01-01 PROCEDURE — 72125 CT NECK SPINE W/O DYE: CPT

## 2021-01-01 PROCEDURE — 87040 BLOOD CULTURE FOR BACTERIA: CPT

## 2021-01-01 PROCEDURE — 84443 ASSAY THYROID STIM HORMONE: CPT

## 2021-01-01 PROCEDURE — 84300 ASSAY OF URINE SODIUM: CPT

## 2021-01-01 PROCEDURE — 3E0333Z INTRODUCTION OF ANTI-INFLAMMATORY INTO PERIPHERAL VEIN, PERCUTANEOUS APPROACH: ICD-10-PCS | Performed by: FAMILY MEDICINE

## 2021-01-01 PROCEDURE — 36569 INSJ PICC 5 YR+ W/O IMAGING: CPT

## 2021-01-01 PROCEDURE — 76536 US EXAM OF HEAD AND NECK: CPT

## 2021-01-01 PROCEDURE — 6360000004 HC RX CONTRAST MEDICATION: Performed by: RADIOLOGY

## 2021-01-01 PROCEDURE — 87205 SMEAR GRAM STAIN: CPT

## 2021-01-01 PROCEDURE — 77063 BREAST TOMOSYNTHESIS BI: CPT

## 2021-01-01 PROCEDURE — 87077 CULTURE AEROBIC IDENTIFY: CPT

## 2021-01-01 PROCEDURE — 93971 EXTREMITY STUDY: CPT | Performed by: RADIOLOGY

## 2021-01-01 PROCEDURE — C1751 CATH, INF, PER/CENT/MIDLINE: HCPCS

## 2021-01-01 PROCEDURE — 36430 TRANSFUSION BLD/BLD COMPNT: CPT

## 2021-01-01 PROCEDURE — 36620 INSERTION CATHETER ARTERY: CPT | Performed by: INTERNAL MEDICINE

## 2021-01-01 PROCEDURE — 94644 CONT INHLJ TX 1ST HOUR: CPT

## 2021-01-01 PROCEDURE — 99223 1ST HOSP IP/OBS HIGH 75: CPT | Performed by: INTERNAL MEDICINE

## 2021-01-01 PROCEDURE — 36556 INSERT NON-TUNNEL CV CATH: CPT

## 2021-01-01 PROCEDURE — 93010 ELECTROCARDIOGRAM REPORT: CPT | Performed by: FAMILY MEDICINE

## 2021-01-01 PROCEDURE — 0202U NFCT DS 22 TRGT SARS-COV-2: CPT

## 2021-01-01 PROCEDURE — 82570 ASSAY OF URINE CREATININE: CPT

## 2021-01-01 PROCEDURE — 94645 CONT INHLJ TX EACH ADDL HOUR: CPT

## 2021-01-01 PROCEDURE — 93010 ELECTROCARDIOGRAM REPORT: CPT | Performed by: INTERNAL MEDICINE

## 2021-01-01 PROCEDURE — XW033H5 INTRODUCTION OF TOCILIZUMAB INTO PERIPHERAL VEIN, PERCUTANEOUS APPROACH, NEW TECHNOLOGY GROUP 5: ICD-10-PCS | Performed by: FAMILY MEDICINE

## 2021-01-01 PROCEDURE — 2500000003 HC RX 250 WO HCPCS: Performed by: STUDENT IN AN ORGANIZED HEALTH CARE EDUCATION/TRAINING PROGRAM

## 2021-01-01 PROCEDURE — P9047 ALBUMIN (HUMAN), 25%, 50ML: HCPCS | Performed by: INTERNAL MEDICINE

## 2021-01-01 PROCEDURE — 82140 ASSAY OF AMMONIA: CPT

## 2021-01-01 PROCEDURE — 96375 TX/PRO/DX INJ NEW DRUG ADDON: CPT

## 2021-01-01 PROCEDURE — U0003 INFECTIOUS AGENT DETECTION BY NUCLEIC ACID (DNA OR RNA); SEVERE ACUTE RESPIRATORY SYNDROME CORONAVIRUS 2 (SARS-COV-2) (CORONAVIRUS DISEASE [COVID-19]), AMPLIFIED PROBE TECHNIQUE, MAKING USE OF HIGH THROUGHPUT TECHNOLOGIES AS DESCRIBED BY CMS-2020-01-R: HCPCS

## 2021-01-01 PROCEDURE — 6370000000 HC RX 637 (ALT 250 FOR IP)

## 2021-01-01 PROCEDURE — M0249 HC ADM TOCILIZU COVID-19 1ST: HCPCS

## 2021-01-01 PROCEDURE — 87015 SPECIMEN INFECT AGNT CONCNTJ: CPT

## 2021-01-01 PROCEDURE — 94002 VENT MGMT INPAT INIT DAY: CPT

## 2021-01-01 PROCEDURE — P9016 RBC LEUKOCYTES REDUCED: HCPCS

## 2021-01-01 PROCEDURE — 84484 ASSAY OF TROPONIN QUANT: CPT

## 2021-01-01 PROCEDURE — 87081 CULTURE SCREEN ONLY: CPT

## 2021-01-01 PROCEDURE — 82533 TOTAL CORTISOL: CPT

## 2021-01-01 PROCEDURE — 32551 INSERTION OF CHEST TUBE: CPT | Performed by: SURGERY

## 2021-01-01 PROCEDURE — 02HV33Z INSERTION OF INFUSION DEVICE INTO SUPERIOR VENA CAVA, PERCUTANEOUS APPROACH: ICD-10-PCS | Performed by: FAMILY MEDICINE

## 2021-01-01 PROCEDURE — 88112 CYTOPATH CELL ENHANCE TECH: CPT

## 2021-01-01 PROCEDURE — 99285 EMERGENCY DEPT VISIT HI MDM: CPT

## 2021-01-01 PROCEDURE — 82044 UR ALBUMIN SEMIQUANTITATIVE: CPT

## 2021-01-01 PROCEDURE — 36592 COLLECT BLOOD FROM PICC: CPT

## 2021-01-01 PROCEDURE — 84132 ASSAY OF SERUM POTASSIUM: CPT

## 2021-01-01 PROCEDURE — 70450 CT HEAD/BRAIN W/O DYE: CPT

## 2021-01-01 PROCEDURE — 99222 1ST HOSP IP/OBS MODERATE 55: CPT | Performed by: FAMILY MEDICINE

## 2021-01-01 PROCEDURE — 86901 BLOOD TYPING SEROLOGIC RH(D): CPT

## 2021-01-01 PROCEDURE — 84156 ASSAY OF PROTEIN URINE: CPT

## 2021-01-01 PROCEDURE — 99283 EMERGENCY DEPT VISIT LOW MDM: CPT

## 2021-01-01 PROCEDURE — 71046 X-RAY EXAM CHEST 2 VIEWS: CPT

## 2021-01-01 PROCEDURE — 82947 ASSAY GLUCOSE BLOOD QUANT: CPT

## 2021-01-01 PROCEDURE — 93970 EXTREMITY STUDY: CPT

## 2021-01-01 PROCEDURE — 71275 CT ANGIOGRAPHY CHEST: CPT

## 2021-01-01 PROCEDURE — 5A1955Z RESPIRATORY VENTILATION, GREATER THAN 96 CONSECUTIVE HOURS: ICD-10-PCS | Performed by: ANESTHESIOLOGY

## 2021-01-01 PROCEDURE — 32551 INSERTION OF CHEST TUBE: CPT | Performed by: INTERNAL MEDICINE

## 2021-01-01 PROCEDURE — 36000 PLACE NEEDLE IN VEIN: CPT | Performed by: INTERNAL MEDICINE

## 2021-01-01 PROCEDURE — 2709999900 HC NON-CHARGEABLE SUPPLY: Performed by: INTERNAL MEDICINE

## 2021-01-01 PROCEDURE — 96374 THER/PROPH/DIAG INJ IV PUSH: CPT

## 2021-01-01 PROCEDURE — 80074 ACUTE HEPATITIS PANEL: CPT

## 2021-01-01 RX ORDER — SENNA AND DOCUSATE SODIUM 50; 8.6 MG/1; MG/1
2 TABLET, FILM COATED ORAL DAILY
Status: DISCONTINUED | OUTPATIENT
Start: 2021-01-01 | End: 2021-01-01 | Stop reason: HOSPADM

## 2021-01-01 RX ORDER — POTASSIUM CHLORIDE 29.8 MG/ML
40 INJECTION INTRAVENOUS ONCE
Status: COMPLETED | OUTPATIENT
Start: 2021-01-01 | End: 2021-01-01

## 2021-01-01 RX ORDER — DEXTROSE AND SODIUM CHLORIDE 5; .45 G/100ML; G/100ML
INJECTION, SOLUTION INTRAVENOUS CONTINUOUS
Status: DISCONTINUED | OUTPATIENT
Start: 2021-01-01 | End: 2021-01-01

## 2021-01-01 RX ORDER — PROPOFOL 10 MG/ML
5-50 INJECTION, EMULSION INTRAVENOUS
Status: DISCONTINUED | OUTPATIENT
Start: 2021-01-01 | End: 2021-01-01

## 2021-01-01 RX ORDER — MINERAL OIL AND WHITE PETROLATUM 150; 830 MG/G; MG/G
OINTMENT OPHTHALMIC EVERY 4 HOURS
Status: DISCONTINUED | OUTPATIENT
Start: 2021-01-01 | End: 2021-01-01

## 2021-01-01 RX ORDER — MIDODRINE HYDROCHLORIDE 10 MG/1
20 TABLET ORAL EVERY 8 HOURS
Status: DISCONTINUED | OUTPATIENT
Start: 2021-01-01 | End: 2021-01-01 | Stop reason: HOSPADM

## 2021-01-01 RX ORDER — SODIUM CHLORIDE 9 MG/ML
INJECTION, SOLUTION INTRAVENOUS CONTINUOUS
Status: DISCONTINUED | OUTPATIENT
Start: 2021-01-01 | End: 2021-01-01

## 2021-01-01 RX ORDER — SODIUM CHLORIDE 9 MG/ML
10 INJECTION INTRAVENOUS 2 TIMES DAILY
Status: DISCONTINUED | OUTPATIENT
Start: 2021-01-01 | End: 2021-01-01 | Stop reason: HOSPADM

## 2021-01-01 RX ORDER — ARGATROBAN 1 MG/ML
2 INJECTION INTRAVENOUS CONTINUOUS
Status: DISCONTINUED | OUTPATIENT
Start: 2021-01-01 | End: 2021-01-01 | Stop reason: DRUGHIGH

## 2021-01-01 RX ORDER — POTASSIUM CHLORIDE 29.8 MG/ML
20 INJECTION INTRAVENOUS ONCE
Status: COMPLETED | OUTPATIENT
Start: 2021-01-01 | End: 2021-01-01

## 2021-01-01 RX ORDER — TRAZODONE HYDROCHLORIDE 50 MG/1
100 TABLET ORAL NIGHTLY
Status: DISCONTINUED | OUTPATIENT
Start: 2021-01-01 | End: 2021-01-01 | Stop reason: HOSPADM

## 2021-01-01 RX ORDER — FLUCONAZOLE 2 MG/ML
400 INJECTION, SOLUTION INTRAVENOUS EVERY 24 HOURS
Status: DISCONTINUED | OUTPATIENT
Start: 2021-01-01 | End: 2021-01-01 | Stop reason: HOSPADM

## 2021-01-01 RX ORDER — MAGNESIUM SULFATE IN WATER 40 MG/ML
2000 INJECTION, SOLUTION INTRAVENOUS ONCE
Status: COMPLETED | OUTPATIENT
Start: 2021-01-01 | End: 2021-01-01

## 2021-01-01 RX ORDER — PANTOPRAZOLE SODIUM 40 MG/10ML
40 INJECTION, POWDER, LYOPHILIZED, FOR SOLUTION INTRAVENOUS 2 TIMES DAILY
Status: DISCONTINUED | OUTPATIENT
Start: 2021-01-01 | End: 2021-01-01

## 2021-01-01 RX ORDER — BUMETANIDE 0.25 MG/ML
1 INJECTION, SOLUTION INTRAMUSCULAR; INTRAVENOUS ONCE
Status: COMPLETED | OUTPATIENT
Start: 2021-01-01 | End: 2021-01-01

## 2021-01-01 RX ORDER — BUMETANIDE 0.25 MG/ML
2 INJECTION, SOLUTION INTRAMUSCULAR; INTRAVENOUS 2 TIMES DAILY
Status: DISPENSED | OUTPATIENT
Start: 2021-01-01 | End: 2021-01-01

## 2021-01-01 RX ORDER — POTASSIUM CHLORIDE 29.8 MG/ML
40 INJECTION INTRAVENOUS EVERY 4 HOURS
Status: COMPLETED | OUTPATIENT
Start: 2021-01-01 | End: 2021-01-01

## 2021-01-01 RX ORDER — POTASSIUM CHLORIDE 29.8 MG/ML
20 INJECTION INTRAVENOUS
Status: COMPLETED | OUTPATIENT
Start: 2021-01-01 | End: 2021-01-01

## 2021-01-01 RX ORDER — BUDESONIDE 0.5 MG/2ML
1 INHALANT ORAL 2 TIMES DAILY
Status: DISCONTINUED | OUTPATIENT
Start: 2021-01-01 | End: 2021-01-01 | Stop reason: HOSPADM

## 2021-01-01 RX ORDER — HYDROXYZINE PAMOATE 100 MG/1
CAPSULE ORAL
COMMUNITY
Start: 2021-01-01

## 2021-01-01 RX ORDER — PANTOPRAZOLE SODIUM 40 MG/10ML
INJECTION, POWDER, LYOPHILIZED, FOR SOLUTION INTRAVENOUS
Status: DISPENSED
Start: 2021-01-01 | End: 2021-01-01

## 2021-01-01 RX ORDER — MIDAZOLAM HYDROCHLORIDE 1 MG/ML
INJECTION INTRAMUSCULAR; INTRAVENOUS
Status: DISPENSED
Start: 2021-01-01 | End: 2021-01-01

## 2021-01-01 RX ORDER — MIDAZOLAM HYDROCHLORIDE 2 MG/2ML
4 INJECTION, SOLUTION INTRAMUSCULAR; INTRAVENOUS ONCE
Status: COMPLETED | OUTPATIENT
Start: 2021-01-01 | End: 2021-01-01

## 2021-01-01 RX ORDER — VECURONIUM BROMIDE 1 MG/ML
2 INJECTION, POWDER, LYOPHILIZED, FOR SOLUTION INTRAVENOUS ONCE
Status: COMPLETED | OUTPATIENT
Start: 2021-01-01 | End: 2021-01-01

## 2021-01-01 RX ORDER — ONDANSETRON 4 MG/1
4 TABLET, ORALLY DISINTEGRATING ORAL EVERY 8 HOURS PRN
Status: DISCONTINUED | OUTPATIENT
Start: 2021-01-01 | End: 2021-01-01

## 2021-01-01 RX ORDER — BUMETANIDE 0.25 MG/ML
2 INJECTION, SOLUTION INTRAMUSCULAR; INTRAVENOUS ONCE
Status: DISCONTINUED | OUTPATIENT
Start: 2021-01-01 | End: 2021-01-01

## 2021-01-01 RX ORDER — PANTOPRAZOLE SODIUM 40 MG/10ML
40 INJECTION, POWDER, LYOPHILIZED, FOR SOLUTION INTRAVENOUS DAILY
Status: DISCONTINUED | OUTPATIENT
Start: 2021-01-01 | End: 2021-01-01

## 2021-01-01 RX ORDER — TRAZODONE HYDROCHLORIDE 100 MG/1
TABLET ORAL
COMMUNITY
Start: 2021-01-01

## 2021-01-01 RX ORDER — PROPOFOL 10 MG/ML
100 INJECTION, EMULSION INTRAVENOUS ONCE
Status: DISCONTINUED | OUTPATIENT
Start: 2021-01-01 | End: 2021-01-01

## 2021-01-01 RX ORDER — BUMETANIDE 0.25 MG/ML
2 INJECTION, SOLUTION INTRAMUSCULAR; INTRAVENOUS EVERY 8 HOURS
Status: DISCONTINUED | OUTPATIENT
Start: 2021-01-01 | End: 2021-01-01

## 2021-01-01 RX ORDER — MINERAL OIL AND WHITE PETROLATUM 150; 830 MG/G; MG/G
OINTMENT OPHTHALMIC EVERY 6 HOURS
Status: DISCONTINUED | OUTPATIENT
Start: 2021-01-01 | End: 2021-01-01

## 2021-01-01 RX ORDER — POTASSIUM CHLORIDE 29.8 MG/ML
40 INJECTION INTRAVENOUS EVERY 4 HOURS
Status: DISCONTINUED | OUTPATIENT
Start: 2021-01-01 | End: 2021-01-01

## 2021-01-01 RX ORDER — HEPARIN SODIUM (PORCINE) LOCK FLUSH IV SOLN 100 UNIT/ML 100 UNIT/ML
3 SOLUTION INTRAVENOUS EVERY 12 HOURS SCHEDULED
Status: DISCONTINUED | OUTPATIENT
Start: 2021-01-01 | End: 2021-01-01 | Stop reason: HOSPADM

## 2021-01-01 RX ORDER — LANOLIN ALCOHOL/MO/W.PET/CERES
10 CREAM (GRAM) TOPICAL NIGHTLY PRN
COMMUNITY
End: 2021-01-01

## 2021-01-01 RX ORDER — LIDOCAINE HYDROCHLORIDE 10 MG/ML
5 INJECTION, SOLUTION EPIDURAL; INFILTRATION; INTRACAUDAL; PERINEURAL ONCE
Status: DISCONTINUED | OUTPATIENT
Start: 2021-01-01 | End: 2021-01-01

## 2021-01-01 RX ORDER — PROPOFOL 10 MG/ML
INJECTION, EMULSION INTRAVENOUS
Status: DISPENSED
Start: 2021-01-01 | End: 2021-01-01

## 2021-01-01 RX ORDER — SODIUM CHLORIDE 0.9 % (FLUSH) 0.9 %
5-40 SYRINGE (ML) INJECTION EVERY 12 HOURS SCHEDULED
Status: DISCONTINUED | OUTPATIENT
Start: 2021-01-01 | End: 2021-01-01

## 2021-01-01 RX ORDER — FUROSEMIDE 10 MG/ML
40 INJECTION INTRAMUSCULAR; INTRAVENOUS ONCE
Status: COMPLETED | OUTPATIENT
Start: 2021-01-01 | End: 2021-01-01

## 2021-01-01 RX ORDER — DEXTROSE MONOHYDRATE 100 MG/ML
INJECTION, SOLUTION INTRAVENOUS CONTINUOUS
Status: DISCONTINUED | OUTPATIENT
Start: 2021-01-01 | End: 2021-01-01 | Stop reason: HOSPADM

## 2021-01-01 RX ORDER — ALBUMIN (HUMAN) 12.5 G/50ML
25 SOLUTION INTRAVENOUS ONCE
Status: COMPLETED | OUTPATIENT
Start: 2021-01-01 | End: 2021-01-01

## 2021-01-01 RX ORDER — BUMETANIDE 0.25 MG/ML
1 INJECTION, SOLUTION INTRAMUSCULAR; INTRAVENOUS EVERY 8 HOURS
Status: DISCONTINUED | OUTPATIENT
Start: 2021-01-01 | End: 2021-01-01

## 2021-01-01 RX ORDER — DEXTROSE, SODIUM CHLORIDE, AND POTASSIUM CHLORIDE 5; .45; .15 G/100ML; G/100ML; G/100ML
INJECTION INTRAVENOUS CONTINUOUS
Status: DISCONTINUED | OUTPATIENT
Start: 2021-01-01 | End: 2021-01-01

## 2021-01-01 RX ORDER — VECURONIUM BROMIDE 1 MG/ML
INJECTION, POWDER, LYOPHILIZED, FOR SOLUTION INTRAVENOUS
Status: COMPLETED
Start: 2021-01-01 | End: 2021-01-01

## 2021-01-01 RX ORDER — BUMETANIDE 0.25 MG/ML
1 INJECTION, SOLUTION INTRAMUSCULAR; INTRAVENOUS
Status: DISCONTINUED | OUTPATIENT
Start: 2021-01-01 | End: 2021-01-01

## 2021-01-01 RX ORDER — ARGATROBAN 1 MG/ML
0.5 INJECTION INTRAVENOUS CONTINUOUS
Status: DISCONTINUED | OUTPATIENT
Start: 2021-01-01 | End: 2021-01-01

## 2021-01-01 RX ORDER — BUMETANIDE 0.25 MG/ML
2 INJECTION, SOLUTION INTRAMUSCULAR; INTRAVENOUS ONCE
Status: COMPLETED | OUTPATIENT
Start: 2021-01-01 | End: 2021-01-01

## 2021-01-01 RX ORDER — QUETIAPINE FUMARATE 200 MG/1
200 TABLET, FILM COATED ORAL DAILY
COMMUNITY

## 2021-01-01 RX ORDER — LIDOCAINE HYDROCHLORIDE 10 MG/ML
5 INJECTION, SOLUTION INFILTRATION; PERINEURAL ONCE
Status: DISCONTINUED | OUTPATIENT
Start: 2021-01-01 | End: 2021-01-01

## 2021-01-01 RX ORDER — ACETAMINOPHEN 325 MG/1
650 TABLET ORAL EVERY 6 HOURS PRN
Status: DISCONTINUED | OUTPATIENT
Start: 2021-01-01 | End: 2021-01-01 | Stop reason: HOSPADM

## 2021-01-01 RX ORDER — SODIUM CHLORIDE 9 MG/ML
10 INJECTION INTRAVENOUS DAILY
Status: DISCONTINUED | OUTPATIENT
Start: 2021-01-01 | End: 2021-01-01

## 2021-01-01 RX ORDER — SUCRALFATE 1 G/1
1 TABLET ORAL EVERY 6 HOURS SCHEDULED
Status: DISCONTINUED | OUTPATIENT
Start: 2021-01-01 | End: 2021-01-01 | Stop reason: HOSPADM

## 2021-01-01 RX ORDER — BUMETANIDE 0.25 MG/ML
2 INJECTION, SOLUTION INTRAMUSCULAR; INTRAVENOUS 2 TIMES DAILY
Status: DISCONTINUED | OUTPATIENT
Start: 2021-01-01 | End: 2021-01-01

## 2021-01-01 RX ORDER — POTASSIUM CHLORIDE 7.45 MG/ML
10 INJECTION INTRAVENOUS ONCE
Status: DISCONTINUED | OUTPATIENT
Start: 2021-01-01 | End: 2021-01-01

## 2021-01-01 RX ORDER — POTASSIUM CHLORIDE 29.8 MG/ML
20 INJECTION INTRAVENOUS
Status: DISCONTINUED | OUTPATIENT
Start: 2021-01-01 | End: 2021-01-01

## 2021-01-01 RX ORDER — POLYETHYLENE GLYCOL 3350 17 G/17G
17 POWDER, FOR SOLUTION ORAL DAILY
Status: DISCONTINUED | OUTPATIENT
Start: 2021-01-01 | End: 2021-01-01

## 2021-01-01 RX ORDER — BUMETANIDE 0.25 MG/ML
2 INJECTION, SOLUTION INTRAMUSCULAR; INTRAVENOUS EVERY 6 HOURS
Status: DISCONTINUED | OUTPATIENT
Start: 2021-01-01 | End: 2021-01-01

## 2021-01-01 RX ORDER — DEXTROSE MONOHYDRATE 25 G/50ML
12.5 INJECTION, SOLUTION INTRAVENOUS PRN
Status: DISCONTINUED | OUTPATIENT
Start: 2021-01-01 | End: 2021-01-01 | Stop reason: HOSPADM

## 2021-01-01 RX ORDER — ACETAMINOPHEN 650 MG/1
650 SUPPOSITORY RECTAL EVERY 6 HOURS PRN
Status: DISCONTINUED | OUTPATIENT
Start: 2021-01-01 | End: 2021-01-01 | Stop reason: HOSPADM

## 2021-01-01 RX ORDER — ONDANSETRON 2 MG/ML
4 INJECTION INTRAMUSCULAR; INTRAVENOUS EVERY 6 HOURS PRN
Status: DISCONTINUED | OUTPATIENT
Start: 2021-01-01 | End: 2021-01-01

## 2021-01-01 RX ORDER — LANSOPRAZOLE
30 KIT
Status: DISCONTINUED | OUTPATIENT
Start: 2021-01-01 | End: 2021-01-01

## 2021-01-01 RX ORDER — 0.9 % SODIUM CHLORIDE 0.9 %
30 INTRAVENOUS SOLUTION INTRAVENOUS PRN
Status: DISCONTINUED | OUTPATIENT
Start: 2021-01-01 | End: 2021-01-01

## 2021-01-01 RX ORDER — POTASSIUM CHLORIDE 29.8 MG/ML
40 INJECTION INTRAVENOUS ONCE
Status: DISCONTINUED | OUTPATIENT
Start: 2021-01-01 | End: 2021-01-01

## 2021-01-01 RX ORDER — ARFORMOTEROL TARTRATE 15 UG/2ML
15 SOLUTION RESPIRATORY (INHALATION) 2 TIMES DAILY
Status: DISCONTINUED | OUTPATIENT
Start: 2021-01-01 | End: 2021-01-01 | Stop reason: HOSPADM

## 2021-01-01 RX ORDER — POTASSIUM CHLORIDE 7.45 MG/ML
10 INJECTION INTRAVENOUS ONCE
Status: COMPLETED | OUTPATIENT
Start: 2021-01-01 | End: 2021-01-01

## 2021-01-01 RX ORDER — BUMETANIDE 0.25 MG/ML
1 INJECTION, SOLUTION INTRAMUSCULAR; INTRAVENOUS 3 TIMES DAILY
Status: DISCONTINUED | OUTPATIENT
Start: 2021-01-01 | End: 2021-01-01

## 2021-01-01 RX ORDER — 0.9 % SODIUM CHLORIDE 0.9 %
500 INTRAVENOUS SOLUTION INTRAVENOUS ONCE
Status: COMPLETED | OUTPATIENT
Start: 2021-01-01 | End: 2021-01-01

## 2021-01-01 RX ORDER — 0.9 % SODIUM CHLORIDE 0.9 %
1000 INTRAVENOUS SOLUTION INTRAVENOUS ONCE
Status: COMPLETED | OUTPATIENT
Start: 2021-01-01 | End: 2021-01-01

## 2021-01-01 RX ORDER — PANTOPRAZOLE SODIUM 40 MG/10ML
40 INJECTION, POWDER, LYOPHILIZED, FOR SOLUTION INTRAVENOUS 2 TIMES DAILY
Status: DISCONTINUED | OUTPATIENT
Start: 2021-01-01 | End: 2021-01-01 | Stop reason: HOSPADM

## 2021-01-01 RX ORDER — QUETIAPINE FUMARATE 200 MG/1
200 TABLET, FILM COATED ORAL DAILY
Status: DISCONTINUED | OUTPATIENT
Start: 2021-01-01 | End: 2021-01-01 | Stop reason: HOSPADM

## 2021-01-01 RX ORDER — POLYETHYLENE GLYCOL 3350 17 G/17G
17 POWDER, FOR SOLUTION ORAL DAILY PRN
Status: DISCONTINUED | OUTPATIENT
Start: 2021-01-01 | End: 2021-01-01

## 2021-01-01 RX ORDER — BUMETANIDE 0.25 MG/ML
1 INJECTION, SOLUTION INTRAMUSCULAR; INTRAVENOUS 2 TIMES DAILY
Status: DISCONTINUED | OUTPATIENT
Start: 2021-01-01 | End: 2021-01-01

## 2021-01-01 RX ORDER — NICOTINE POLACRILEX 4 MG
15 LOZENGE BUCCAL PRN
Status: DISCONTINUED | OUTPATIENT
Start: 2021-01-01 | End: 2021-01-01 | Stop reason: HOSPADM

## 2021-01-01 RX ORDER — DEXTROSE MONOHYDRATE 25 G/50ML
25 INJECTION, SOLUTION INTRAVENOUS PRN
Status: DISCONTINUED | OUTPATIENT
Start: 2021-01-01 | End: 2021-01-01

## 2021-01-01 RX ORDER — VECURONIUM BROMIDE 1 MG/ML
10 INJECTION, POWDER, LYOPHILIZED, FOR SOLUTION INTRAVENOUS ONCE
Status: COMPLETED | OUTPATIENT
Start: 2021-01-01 | End: 2021-01-01

## 2021-01-01 RX ORDER — POTASSIUM CHLORIDE 29.8 MG/ML
40 INJECTION INTRAVENOUS
Status: COMPLETED | OUTPATIENT
Start: 2021-01-01 | End: 2021-01-01

## 2021-01-01 RX ORDER — SODIUM CHLORIDE 9 MG/ML
INJECTION, SOLUTION INTRAVENOUS PRN
Status: DISCONTINUED | OUTPATIENT
Start: 2021-01-01 | End: 2021-01-01

## 2021-01-01 RX ORDER — MINERAL OIL AND WHITE PETROLATUM 150; 830 MG/G; MG/G
OINTMENT OPHTHALMIC EVERY 12 HOURS
Status: DISCONTINUED | OUTPATIENT
Start: 2021-01-01 | End: 2021-01-01 | Stop reason: HOSPADM

## 2021-01-01 RX ORDER — ALBUMIN (HUMAN) 12.5 G/50ML
25 SOLUTION INTRAVENOUS EVERY 8 HOURS
Status: COMPLETED | OUTPATIENT
Start: 2021-01-01 | End: 2021-01-01

## 2021-01-01 RX ORDER — ADENOSINE 3 MG/ML
INJECTION, SOLUTION INTRAVENOUS
Status: DISPENSED
Start: 2021-01-01 | End: 2021-01-01

## 2021-01-01 RX ORDER — DEXTROSE MONOHYDRATE 50 MG/ML
INJECTION, SOLUTION INTRAVENOUS CONTINUOUS
Status: DISCONTINUED | OUTPATIENT
Start: 2021-01-01 | End: 2021-01-01

## 2021-01-01 RX ORDER — DEXTROSE MONOHYDRATE 100 MG/ML
INJECTION, SOLUTION INTRAVENOUS CONTINUOUS
Status: DISCONTINUED | OUTPATIENT
Start: 2021-01-01 | End: 2021-01-01 | Stop reason: ALTCHOICE

## 2021-01-01 RX ORDER — EPINEPHRINE 0.1 MG/ML
SYRINGE (ML) INJECTION
Status: DISCONTINUED
Start: 2021-01-01 | End: 2021-01-01 | Stop reason: HOSPADM

## 2021-01-01 RX ORDER — MAGNESIUM SULFATE 1 G/100ML
1000 INJECTION INTRAVENOUS ONCE
Status: COMPLETED | OUTPATIENT
Start: 2021-01-01 | End: 2021-01-01

## 2021-01-01 RX ORDER — HYDRALAZINE HYDROCHLORIDE 20 MG/ML
INJECTION INTRAMUSCULAR; INTRAVENOUS
Status: DISPENSED
Start: 2021-01-01 | End: 2021-01-01

## 2021-01-01 RX ORDER — ACETAMINOPHEN 500 MG
1000 TABLET ORAL ONCE
Status: COMPLETED | OUTPATIENT
Start: 2021-01-01 | End: 2021-01-01

## 2021-01-01 RX ORDER — TRAZODONE HYDROCHLORIDE 50 MG/1
100 TABLET ORAL NIGHTLY
COMMUNITY
End: 2021-01-01

## 2021-01-01 RX ORDER — SODIUM CHLORIDE 0.9 % (FLUSH) 0.9 %
5-40 SYRINGE (ML) INJECTION EVERY 12 HOURS SCHEDULED
Status: DISCONTINUED | OUTPATIENT
Start: 2021-01-01 | End: 2021-01-01 | Stop reason: HOSPADM

## 2021-01-01 RX ORDER — BUDESONIDE AND FORMOTEROL FUMARATE DIHYDRATE 160; 4.5 UG/1; UG/1
2 AEROSOL RESPIRATORY (INHALATION) 2 TIMES DAILY
Status: DISCONTINUED | OUTPATIENT
Start: 2021-01-01 | End: 2021-01-01

## 2021-01-01 RX ORDER — CHOLECALCIFEROL (VITAMIN D3) 50 MCG
2000 TABLET ORAL DAILY
Status: DISCONTINUED | OUTPATIENT
Start: 2021-01-01 | End: 2021-01-01 | Stop reason: HOSPADM

## 2021-01-01 RX ORDER — SODIUM CHLORIDE 9 MG/ML
25 INJECTION, SOLUTION INTRAVENOUS PRN
Status: DISCONTINUED | OUTPATIENT
Start: 2021-01-01 | End: 2021-01-01

## 2021-01-01 RX ORDER — POTASSIUM CHLORIDE 7.45 MG/ML
10 INJECTION INTRAVENOUS
Status: COMPLETED | OUTPATIENT
Start: 2021-01-01 | End: 2021-01-01

## 2021-01-01 RX ORDER — HYDROXYZINE PAMOATE 50 MG/1
50 CAPSULE ORAL 2 TIMES DAILY PRN
COMMUNITY
End: 2021-01-01

## 2021-01-01 RX ORDER — PANTOPRAZOLE SODIUM 40 MG/1
40 TABLET, DELAYED RELEASE ORAL
Status: DISCONTINUED | OUTPATIENT
Start: 2021-01-01 | End: 2021-01-01

## 2021-01-01 RX ORDER — ACETAMINOPHEN 650 MG
TABLET, EXTENDED RELEASE ORAL
Status: COMPLETED
Start: 2021-01-01 | End: 2021-01-01

## 2021-01-01 RX ORDER — CHLORHEXIDINE GLUCONATE 0.12 MG/ML
15 RINSE ORAL 2 TIMES DAILY
Status: DISCONTINUED | OUTPATIENT
Start: 2021-01-01 | End: 2021-01-01 | Stop reason: HOSPADM

## 2021-01-01 RX ORDER — DEXAMETHASONE SODIUM PHOSPHATE 4 MG/ML
6 INJECTION, SOLUTION INTRA-ARTICULAR; INTRALESIONAL; INTRAMUSCULAR; INTRAVENOUS; SOFT TISSUE DAILY
Status: DISCONTINUED | OUTPATIENT
Start: 2021-01-01 | End: 2021-01-01

## 2021-01-01 RX ORDER — ACETAZOLAMIDE 250 MG/1
250 TABLET ORAL ONCE
Status: COMPLETED | OUTPATIENT
Start: 2021-01-01 | End: 2021-01-01

## 2021-01-01 RX ORDER — POLYETHYLENE GLYCOL 3350 17 G/17G
17 POWDER, FOR SOLUTION ORAL 2 TIMES DAILY
Status: DISCONTINUED | OUTPATIENT
Start: 2021-01-01 | End: 2021-01-01 | Stop reason: HOSPADM

## 2021-01-01 RX ORDER — DEXTROSE MONOHYDRATE 25 G/50ML
25 INJECTION, SOLUTION INTRAVENOUS ONCE
Status: COMPLETED | OUTPATIENT
Start: 2021-01-01 | End: 2021-01-01

## 2021-01-01 RX ORDER — BUMETANIDE 0.25 MG/ML
0.5 INJECTION, SOLUTION INTRAMUSCULAR; INTRAVENOUS ONCE
Status: COMPLETED | OUTPATIENT
Start: 2021-01-01 | End: 2021-01-01

## 2021-01-01 RX ORDER — BROMPHENIRAMINE MALEATE, PSEUDOEPHEDRINE HYDROCHLORIDE, AND DEXTROMETHORPHAN HYDROBROMIDE 2; 30; 10 MG/5ML; MG/5ML; MG/5ML
5 SYRUP ORAL 4 TIMES DAILY PRN
Qty: 140 ML | Refills: 0 | Status: SHIPPED | OUTPATIENT
Start: 2021-01-01 | End: 2021-01-01

## 2021-01-01 RX ORDER — HEPARIN SODIUM 10000 [USP'U]/ML
5000 INJECTION, SOLUTION INTRAVENOUS; SUBCUTANEOUS EVERY 8 HOURS
Status: DISCONTINUED | OUTPATIENT
Start: 2021-01-01 | End: 2021-01-01

## 2021-01-01 RX ORDER — SENNA AND DOCUSATE SODIUM 50; 8.6 MG/1; MG/1
2 TABLET, FILM COATED ORAL DAILY
Status: DISCONTINUED | OUTPATIENT
Start: 2021-01-01 | End: 2021-01-01

## 2021-01-01 RX ORDER — SODIUM CHLORIDE 9 MG/ML
10 INJECTION INTRAVENOUS 2 TIMES DAILY
Status: DISCONTINUED | OUTPATIENT
Start: 2021-01-01 | End: 2021-01-01

## 2021-01-01 RX ORDER — LORAZEPAM 1 MG/1
0.5 TABLET ORAL DAILY
COMMUNITY
End: 2021-01-01

## 2021-01-01 RX ORDER — SODIUM CHLORIDE 9 MG/ML
25 INJECTION, SOLUTION INTRAVENOUS PRN
Status: DISCONTINUED | OUTPATIENT
Start: 2021-01-01 | End: 2021-01-01 | Stop reason: HOSPADM

## 2021-01-01 RX ORDER — FUROSEMIDE 10 MG/ML
40 INJECTION INTRAMUSCULAR; INTRAVENOUS ONCE
Status: DISCONTINUED | OUTPATIENT
Start: 2021-01-01 | End: 2021-01-01

## 2021-01-01 RX ORDER — BUMETANIDE 0.25 MG/ML
1 INJECTION, SOLUTION INTRAMUSCULAR; INTRAVENOUS EVERY 6 HOURS
Status: DISCONTINUED | OUTPATIENT
Start: 2021-01-01 | End: 2021-01-01 | Stop reason: HOSPADM

## 2021-01-01 RX ORDER — ZINC SULFATE 50(220)MG
50 CAPSULE ORAL DAILY
Status: DISCONTINUED | OUTPATIENT
Start: 2021-01-01 | End: 2021-01-01

## 2021-01-01 RX ORDER — SERTRALINE HYDROCHLORIDE 100 MG/1
200 TABLET, FILM COATED ORAL DAILY
COMMUNITY

## 2021-01-01 RX ORDER — DEXAMETHASONE SODIUM PHOSPHATE 4 MG/ML
10 INJECTION, SOLUTION INTRA-ARTICULAR; INTRALESIONAL; INTRAMUSCULAR; INTRAVENOUS; SOFT TISSUE ONCE
Status: COMPLETED | OUTPATIENT
Start: 2021-01-01 | End: 2021-01-01

## 2021-01-01 RX ORDER — LORAZEPAM 0.5 MG/1
TABLET ORAL
COMMUNITY
Start: 2021-01-01

## 2021-01-01 RX ORDER — DEXTROSE MONOHYDRATE 50 MG/ML
100 INJECTION, SOLUTION INTRAVENOUS PRN
Status: DISCONTINUED | OUTPATIENT
Start: 2021-01-01 | End: 2021-01-01 | Stop reason: HOSPADM

## 2021-01-01 RX ORDER — SUCCINYLCHOLINE CHLORIDE 20 MG/ML
INJECTION INTRAMUSCULAR; INTRAVENOUS
Status: COMPLETED
Start: 2021-01-01 | End: 2021-01-01

## 2021-01-01 RX ORDER — HYDRALAZINE HYDROCHLORIDE 20 MG/ML
5 INJECTION INTRAMUSCULAR; INTRAVENOUS ONCE
Status: COMPLETED | OUTPATIENT
Start: 2021-01-01 | End: 2021-01-01

## 2021-01-01 RX ORDER — METOPROLOL TARTRATE 5 MG/5ML
5 INJECTION INTRAVENOUS EVERY 6 HOURS
Status: DISCONTINUED | OUTPATIENT
Start: 2021-01-01 | End: 2021-01-01

## 2021-01-01 RX ORDER — SODIUM CHLORIDE 0.9 % (FLUSH) 0.9 %
5-40 SYRINGE (ML) INJECTION PRN
Status: DISCONTINUED | OUTPATIENT
Start: 2021-01-01 | End: 2021-01-01

## 2021-01-01 RX ORDER — SERTRALINE HYDROCHLORIDE 100 MG/1
200 TABLET, FILM COATED ORAL DAILY
Status: DISCONTINUED | OUTPATIENT
Start: 2021-01-01 | End: 2021-01-01 | Stop reason: HOSPADM

## 2021-01-01 RX ORDER — ATORVASTATIN CALCIUM 10 MG/1
10 TABLET, FILM COATED ORAL DAILY
Status: DISCONTINUED | OUTPATIENT
Start: 2021-01-01 | End: 2021-01-01 | Stop reason: HOSPADM

## 2021-01-01 RX ORDER — ACETAZOLAMIDE 250 MG/1
250 TABLET ORAL DAILY
Status: DISCONTINUED | OUTPATIENT
Start: 2021-01-01 | End: 2021-01-01

## 2021-01-01 RX ORDER — ATORVASTATIN CALCIUM 10 MG/1
10 TABLET, FILM COATED ORAL DAILY
Qty: 30 TABLET | Refills: 5 | Status: SHIPPED | OUTPATIENT
Start: 2021-01-01

## 2021-01-01 RX ORDER — MIDODRINE HYDROCHLORIDE 10 MG/1
10 TABLET ORAL
Status: DISCONTINUED | OUTPATIENT
Start: 2021-01-01 | End: 2021-01-01

## 2021-01-01 RX ORDER — IPRATROPIUM BROMIDE AND ALBUTEROL SULFATE 2.5; .5 MG/3ML; MG/3ML
1 SOLUTION RESPIRATORY (INHALATION) EVERY 4 HOURS PRN
Status: DISCONTINUED | OUTPATIENT
Start: 2021-01-01 | End: 2021-01-01 | Stop reason: HOSPADM

## 2021-01-01 RX ORDER — MINERAL OIL AND WHITE PETROLATUM 150; 830 MG/G; MG/G
OINTMENT OPHTHALMIC EVERY 12 HOURS
Status: DISCONTINUED | OUTPATIENT
Start: 2021-01-01 | End: 2021-01-01

## 2021-01-01 RX ORDER — SODIUM CHLORIDE 0.9 % (FLUSH) 0.9 %
5-40 SYRINGE (ML) INJECTION PRN
Status: DISCONTINUED | OUTPATIENT
Start: 2021-01-01 | End: 2021-01-01 | Stop reason: HOSPADM

## 2021-01-01 RX ORDER — HEPARIN SODIUM (PORCINE) LOCK FLUSH IV SOLN 100 UNIT/ML 100 UNIT/ML
3 SOLUTION INTRAVENOUS PRN
Status: DISCONTINUED | OUTPATIENT
Start: 2021-01-01 | End: 2021-01-01 | Stop reason: HOSPADM

## 2021-01-01 RX ORDER — METOPROLOL TARTRATE 5 MG/5ML
INJECTION INTRAVENOUS
Status: COMPLETED
Start: 2021-01-01 | End: 2021-01-01

## 2021-01-01 RX ORDER — BUMETANIDE 0.25 MG/ML
2 INJECTION, SOLUTION INTRAMUSCULAR; INTRAVENOUS EVERY 12 HOURS
Status: DISCONTINUED | OUTPATIENT
Start: 2021-01-01 | End: 2021-01-01

## 2021-01-01 RX ORDER — 0.9 % SODIUM CHLORIDE 0.9 %
500 INTRAVENOUS SOLUTION INTRAVENOUS ONCE
Status: DISCONTINUED | OUTPATIENT
Start: 2021-01-01 | End: 2021-01-01

## 2021-01-01 RX ORDER — BUMETANIDE 0.25 MG/ML
2 INJECTION, SOLUTION INTRAMUSCULAR; INTRAVENOUS 2 TIMES DAILY
Status: COMPLETED | OUTPATIENT
Start: 2021-01-01 | End: 2021-01-01

## 2021-01-01 RX ORDER — SENNA AND DOCUSATE SODIUM 50; 8.6 MG/1; MG/1
2 TABLET, FILM COATED ORAL 2 TIMES DAILY
Status: DISCONTINUED | OUTPATIENT
Start: 2021-01-01 | End: 2021-01-01

## 2021-01-01 RX ORDER — ASCORBIC ACID 500 MG
1000 TABLET ORAL DAILY
Status: DISCONTINUED | OUTPATIENT
Start: 2021-01-01 | End: 2021-01-01

## 2021-01-01 RX ADMIN — PROPOFOL 100 MG: 10 INJECTION, EMULSION INTRAVENOUS at 08:00

## 2021-01-01 RX ADMIN — MIDAZOLAM HYDROCHLORIDE 10 MG/HR: 5 INJECTION, SOLUTION INTRAMUSCULAR; INTRAVENOUS at 03:00

## 2021-01-01 RX ADMIN — PANTOPRAZOLE SODIUM 40 MG: 40 INJECTION, POWDER, FOR SOLUTION INTRAVENOUS at 20:04

## 2021-01-01 RX ADMIN — PIPERACILLIN AND TAZOBACTAM 3375 MG: 3; .375 INJECTION, POWDER, LYOPHILIZED, FOR SOLUTION INTRAVENOUS at 01:40

## 2021-01-01 RX ADMIN — Medication 10 ML: at 09:27

## 2021-01-01 RX ADMIN — Medication 2000 UNITS: at 08:28

## 2021-01-01 RX ADMIN — Medication: at 20:11

## 2021-01-01 RX ADMIN — POTASSIUM CHLORIDE 40 MEQ: 29.8 INJECTION, SOLUTION INTRAVENOUS at 20:12

## 2021-01-01 RX ADMIN — MIDAZOLAM HYDROCHLORIDE 4 MG/HR: 5 INJECTION, SOLUTION INTRAMUSCULAR; INTRAVENOUS at 12:58

## 2021-01-01 RX ADMIN — Medication 5 MCG/MIN: at 01:24

## 2021-01-01 RX ADMIN — HYDROCORTISONE SODIUM SUCCINATE 100 MG: 100 INJECTION, POWDER, FOR SOLUTION INTRAMUSCULAR; INTRAVENOUS at 11:09

## 2021-01-01 RX ADMIN — VANCOMYCIN HYDROCHLORIDE 1000 MG: 1 INJECTION, POWDER, LYOPHILIZED, FOR SOLUTION INTRAVENOUS at 12:28

## 2021-01-01 RX ADMIN — OXYCODONE HYDROCHLORIDE AND ACETAMINOPHEN 1000 MG: 500 TABLET ORAL at 08:03

## 2021-01-01 RX ADMIN — Medication 200 MCG/HR: at 08:07

## 2021-01-01 RX ADMIN — CHLORHEXIDINE GLUCONATE 15 ML: 1.2 RINSE ORAL at 09:09

## 2021-01-01 RX ADMIN — CISATRACURIUM BESYLATE 3 MCG/KG/MIN: 10 INJECTION, SOLUTION INTRAVENOUS at 12:44

## 2021-01-01 RX ADMIN — Medication 2000 UNITS: at 07:38

## 2021-01-01 RX ADMIN — Medication 200 MCG/HR: at 11:38

## 2021-01-01 RX ADMIN — SODIUM CHLORIDE, PRESERVATIVE FREE 10 ML: 5 INJECTION INTRAVENOUS at 20:40

## 2021-01-01 RX ADMIN — MIDAZOLAM HYDROCHLORIDE 10 MG/HR: 5 INJECTION, SOLUTION INTRAMUSCULAR; INTRAVENOUS at 12:16

## 2021-01-01 RX ADMIN — CHLORHEXIDINE GLUCONATE 15 ML: 1.2 RINSE ORAL at 09:02

## 2021-01-01 RX ADMIN — DEXTROSE MONOHYDRATE: 50 INJECTION, SOLUTION INTRAVENOUS at 20:46

## 2021-01-01 RX ADMIN — EPOPROSTENOL 50 NG/KG/MIN: 1.5 INJECTION, POWDER, LYOPHILIZED, FOR SOLUTION INTRAVENOUS at 12:35

## 2021-01-01 RX ADMIN — PANTOPRAZOLE SODIUM 40 MG: 40 INJECTION, POWDER, FOR SOLUTION INTRAVENOUS at 08:20

## 2021-01-01 RX ADMIN — HYDROCORTISONE SODIUM SUCCINATE 100 MG: 100 INJECTION, POWDER, FOR SOLUTION INTRAMUSCULAR; INTRAVENOUS at 11:33

## 2021-01-01 RX ADMIN — Medication 10 ML: at 09:02

## 2021-01-01 RX ADMIN — OXYCODONE HYDROCHLORIDE AND ACETAMINOPHEN 1000 MG: 500 TABLET ORAL at 09:06

## 2021-01-01 RX ADMIN — MINERAL OIL AND WHITE PETROLATUM: 150; 830 OINTMENT OPHTHALMIC at 08:36

## 2021-01-01 RX ADMIN — ENOXAPARIN SODIUM 40 MG: 100 INJECTION SUBCUTANEOUS at 23:01

## 2021-01-01 RX ADMIN — ARFORMOTEROL TARTRATE 15 MCG: 15 SOLUTION RESPIRATORY (INHALATION) at 10:13

## 2021-01-01 RX ADMIN — TRAZODONE HYDROCHLORIDE 100 MG: 50 TABLET ORAL at 22:18

## 2021-01-01 RX ADMIN — Medication 150 MCG/HR: at 20:12

## 2021-01-01 RX ADMIN — PIPERACILLIN AND TAZOBACTAM 3375 MG: 3; .375 INJECTION, POWDER, LYOPHILIZED, FOR SOLUTION INTRAVENOUS at 02:58

## 2021-01-01 RX ADMIN — Medication 150 MCG/HR: at 05:15

## 2021-01-01 RX ADMIN — Medication 10 ML: at 20:45

## 2021-01-01 RX ADMIN — ENOXAPARIN SODIUM 40 MG: 100 INJECTION SUBCUTANEOUS at 08:38

## 2021-01-01 RX ADMIN — ARFORMOTEROL TARTRATE 15 MCG: 15 SOLUTION RESPIRATORY (INHALATION) at 21:38

## 2021-01-01 RX ADMIN — ARFORMOTEROL TARTRATE 15 MCG: 15 SOLUTION RESPIRATORY (INHALATION) at 10:31

## 2021-01-01 RX ADMIN — MINERAL OIL AND WHITE PETROLATUM: 150; 830 OINTMENT OPHTHALMIC at 02:33

## 2021-01-01 RX ADMIN — MINERAL OIL AND WHITE PETROLATUM: 150; 830 OINTMENT OPHTHALMIC at 20:38

## 2021-01-01 RX ADMIN — ZINC SULFATE 220 MG (50 MG) CAPSULE 50 MG: CAPSULE at 08:43

## 2021-01-01 RX ADMIN — Medication: at 20:05

## 2021-01-01 RX ADMIN — MIDAZOLAM HYDROCHLORIDE 10 MG/HR: 5 INJECTION, SOLUTION INTRAMUSCULAR; INTRAVENOUS at 12:45

## 2021-01-01 RX ADMIN — BUDESONIDE 1000 MCG: 0.5 SUSPENSION RESPIRATORY (INHALATION) at 19:42

## 2021-01-01 RX ADMIN — OXYCODONE HYDROCHLORIDE AND ACETAMINOPHEN 1000 MG: 500 TABLET ORAL at 11:02

## 2021-01-01 RX ADMIN — Medication 200 MCG/HR: at 06:12

## 2021-01-01 RX ADMIN — MIDODRINE HYDROCHLORIDE 20 MG: 10 TABLET ORAL at 19:01

## 2021-01-01 RX ADMIN — QUETIAPINE FUMARATE 200 MG: 200 TABLET ORAL at 11:26

## 2021-01-01 RX ADMIN — QUETIAPINE FUMARATE 200 MG: 200 TABLET ORAL at 08:40

## 2021-01-01 RX ADMIN — HEPARIN SODIUM 5000 UNITS: 10000 INJECTION INTRAVENOUS; SUBCUTANEOUS at 05:04

## 2021-01-01 RX ADMIN — BUMETANIDE 2 MG: 0.25 INJECTION INTRAMUSCULAR; INTRAVENOUS at 02:37

## 2021-01-01 RX ADMIN — CISATRACURIUM BESYLATE 4 MCG/KG/MIN: 10 INJECTION, SOLUTION INTRAVENOUS at 03:13

## 2021-01-01 RX ADMIN — QUETIAPINE FUMARATE 200 MG: 200 TABLET ORAL at 09:07

## 2021-01-01 RX ADMIN — CISATRACURIUM BESYLATE 3 MCG/KG/MIN: 10 INJECTION INTRAVENOUS at 16:42

## 2021-01-01 RX ADMIN — MIDAZOLAM HYDROCHLORIDE 10 MG/HR: 5 INJECTION, SOLUTION INTRAMUSCULAR; INTRAVENOUS at 20:11

## 2021-01-01 RX ADMIN — PANTOPRAZOLE SODIUM 40 MG: 40 INJECTION, POWDER, FOR SOLUTION INTRAVENOUS at 08:19

## 2021-01-01 RX ADMIN — ENOXAPARIN SODIUM 40 MG: 100 INJECTION SUBCUTANEOUS at 09:06

## 2021-01-01 RX ADMIN — SUCRALFATE 1 G: 1 TABLET ORAL at 15:28

## 2021-01-01 RX ADMIN — ARFORMOTEROL TARTRATE 15 MCG: 15 SOLUTION RESPIRATORY (INHALATION) at 09:04

## 2021-01-01 RX ADMIN — POTASSIUM CHLORIDE 20 MEQ: 400 INJECTION, SOLUTION INTRAVENOUS at 03:39

## 2021-01-01 RX ADMIN — SERTRALINE 200 MG: 100 TABLET, FILM COATED ORAL at 08:34

## 2021-01-01 RX ADMIN — HEPARIN SODIUM 5000 UNITS: 10000 INJECTION INTRAVENOUS; SUBCUTANEOUS at 06:00

## 2021-01-01 RX ADMIN — MIDAZOLAM HYDROCHLORIDE 4 MG/HR: 5 INJECTION, SOLUTION INTRAMUSCULAR; INTRAVENOUS at 23:19

## 2021-01-01 RX ADMIN — POTASSIUM CHLORIDE 10 MEQ: 10 INJECTION, SOLUTION INTRAVENOUS at 02:58

## 2021-01-01 RX ADMIN — SUCRALFATE 1 G: 1 TABLET ORAL at 13:56

## 2021-01-01 RX ADMIN — SERTRALINE 200 MG: 100 TABLET, FILM COATED ORAL at 09:58

## 2021-01-01 RX ADMIN — POTASSIUM CHLORIDE 20 MEQ: 29.8 INJECTION, SOLUTION INTRAVENOUS at 23:38

## 2021-01-01 RX ADMIN — QUETIAPINE FUMARATE 200 MG: 200 TABLET ORAL at 09:58

## 2021-01-01 RX ADMIN — Medication 6 MCG/MIN: at 22:31

## 2021-01-01 RX ADMIN — HEPARIN SODIUM 5000 UNITS: 10000 INJECTION INTRAVENOUS; SUBCUTANEOUS at 05:35

## 2021-01-01 RX ADMIN — PANTOPRAZOLE SODIUM 40 MG: 40 INJECTION, POWDER, FOR SOLUTION INTRAVENOUS at 10:28

## 2021-01-01 RX ADMIN — OXYCODONE HYDROCHLORIDE AND ACETAMINOPHEN 1000 MG: 500 TABLET ORAL at 08:33

## 2021-01-01 RX ADMIN — Medication 10 ML: at 20:06

## 2021-01-01 RX ADMIN — SUCRALFATE 1 G: 1 TABLET ORAL at 03:48

## 2021-01-01 RX ADMIN — MINERAL OIL AND WHITE PETROLATUM: 150; 830 OINTMENT OPHTHALMIC at 08:02

## 2021-01-01 RX ADMIN — POTASSIUM CHLORIDE 40 MEQ: 29.8 INJECTION, SOLUTION INTRAVENOUS at 06:49

## 2021-01-01 RX ADMIN — PIPERACILLIN AND TAZOBACTAM 3375 MG: 3; .375 INJECTION, POWDER, LYOPHILIZED, FOR SOLUTION INTRAVENOUS at 10:34

## 2021-01-01 RX ADMIN — ZINC SULFATE 220 MG (50 MG) CAPSULE 50 MG: CAPSULE at 08:07

## 2021-01-01 RX ADMIN — TRAZODONE HYDROCHLORIDE 100 MG: 50 TABLET ORAL at 20:36

## 2021-01-01 RX ADMIN — MAGNESIUM SULFATE HEPTAHYDRATE 2000 MG: 40 INJECTION, SOLUTION INTRAVENOUS at 12:17

## 2021-01-01 RX ADMIN — MIDODRINE HYDROCHLORIDE 20 MG: 10 TABLET ORAL at 22:44

## 2021-01-01 RX ADMIN — REMDESIVIR 100 MG: 100 INJECTION, POWDER, LYOPHILIZED, FOR SOLUTION INTRAVENOUS at 20:54

## 2021-01-01 RX ADMIN — CHLORHEXIDINE GLUCONATE 15 ML: 1.2 RINSE ORAL at 08:29

## 2021-01-01 RX ADMIN — HEPARIN SODIUM 5000 UNITS: 10000 INJECTION INTRAVENOUS; SUBCUTANEOUS at 05:39

## 2021-01-01 RX ADMIN — MINERAL OIL AND WHITE PETROLATUM: 150; 830 OINTMENT OPHTHALMIC at 22:16

## 2021-01-01 RX ADMIN — DEXAMETHASONE SODIUM PHOSPHATE 6 MG: 4 INJECTION, SOLUTION INTRAMUSCULAR; INTRAVENOUS at 11:02

## 2021-01-01 RX ADMIN — MINERAL OIL AND WHITE PETROLATUM: 150; 830 OINTMENT OPHTHALMIC at 14:30

## 2021-01-01 RX ADMIN — TRAZODONE HYDROCHLORIDE 100 MG: 50 TABLET ORAL at 20:24

## 2021-01-01 RX ADMIN — CHLORHEXIDINE GLUCONATE 15 ML: 1.2 RINSE ORAL at 20:02

## 2021-01-01 RX ADMIN — SUCRALFATE 1 G: 1 TABLET ORAL at 20:32

## 2021-01-01 RX ADMIN — QUETIAPINE FUMARATE 200 MG: 200 TABLET ORAL at 09:14

## 2021-01-01 RX ADMIN — ZINC SULFATE 220 MG (50 MG) CAPSULE 50 MG: CAPSULE at 08:42

## 2021-01-01 RX ADMIN — ALBUMIN (HUMAN) 25 G: 0.25 INJECTION, SOLUTION INTRAVENOUS at 09:35

## 2021-01-01 RX ADMIN — SERTRALINE 200 MG: 100 TABLET, FILM COATED ORAL at 09:35

## 2021-01-01 RX ADMIN — TRAZODONE HYDROCHLORIDE 100 MG: 50 TABLET ORAL at 20:16

## 2021-01-01 RX ADMIN — POTASSIUM CHLORIDE 20 MEQ: 29.8 INJECTION, SOLUTION INTRAVENOUS at 15:33

## 2021-01-01 RX ADMIN — SENNOSIDES AND DOCUSATE SODIUM 2 TABLET: 50; 8.6 TABLET ORAL at 10:10

## 2021-01-01 RX ADMIN — MAGNESIUM SULFATE 1000 MG: 1 INJECTION INTRAVENOUS at 08:34

## 2021-01-01 RX ADMIN — POTASSIUM CHLORIDE 10 MEQ: 10 INJECTION, SOLUTION INTRAVENOUS at 06:01

## 2021-01-01 RX ADMIN — Medication 125 MCG/HR: at 09:26

## 2021-01-01 RX ADMIN — Medication: at 08:24

## 2021-01-01 RX ADMIN — BUMETANIDE 2 MG: 0.25 INJECTION INTRAMUSCULAR; INTRAVENOUS at 08:11

## 2021-01-01 RX ADMIN — PIPERACILLIN AND TAZOBACTAM 3375 MG: 3; .375 INJECTION, POWDER, LYOPHILIZED, FOR SOLUTION INTRAVENOUS at 20:32

## 2021-01-01 RX ADMIN — ARFORMOTEROL TARTRATE 15 MCG: 15 SOLUTION RESPIRATORY (INHALATION) at 09:05

## 2021-01-01 RX ADMIN — HYDROCORTISONE SODIUM SUCCINATE 100 MG: 100 INJECTION, POWDER, FOR SOLUTION INTRAMUSCULAR; INTRAVENOUS at 16:53

## 2021-01-01 RX ADMIN — SUCRALFATE 1 G: 1 TABLET ORAL at 20:37

## 2021-01-01 RX ADMIN — ZINC SULFATE 220 MG (50 MG) CAPSULE 50 MG: CAPSULE at 09:59

## 2021-01-01 RX ADMIN — Medication 10 ML: at 20:54

## 2021-01-01 RX ADMIN — BUMETANIDE 2 MG: 0.25 INJECTION, SOLUTION INTRAMUSCULAR; INTRAVENOUS at 08:28

## 2021-01-01 RX ADMIN — QUETIAPINE FUMARATE 200 MG: 200 TABLET ORAL at 08:07

## 2021-01-01 RX ADMIN — QUETIAPINE FUMARATE 200 MG: 200 TABLET ORAL at 09:35

## 2021-01-01 RX ADMIN — BUDESONIDE 1000 MCG: 0.5 SUSPENSION RESPIRATORY (INHALATION) at 21:11

## 2021-01-01 RX ADMIN — MINERAL OIL AND WHITE PETROLATUM: 150; 830 OINTMENT OPHTHALMIC at 08:24

## 2021-01-01 RX ADMIN — QUETIAPINE FUMARATE 200 MG: 200 TABLET ORAL at 07:58

## 2021-01-01 RX ADMIN — POTASSIUM CHLORIDE 10 MEQ: 10 INJECTION, SOLUTION INTRAVENOUS at 03:57

## 2021-01-01 RX ADMIN — ENOXAPARIN SODIUM 40 MG: 100 INJECTION SUBCUTANEOUS at 07:58

## 2021-01-01 RX ADMIN — CHLORHEXIDINE GLUCONATE 15 ML: 1.2 RINSE ORAL at 22:17

## 2021-01-01 RX ADMIN — BUDESONIDE 1000 MCG: 0.5 SUSPENSION RESPIRATORY (INHALATION) at 21:52

## 2021-01-01 RX ADMIN — PANTOPRAZOLE SODIUM 40 MG: 40 INJECTION, POWDER, FOR SOLUTION INTRAVENOUS at 08:23

## 2021-01-01 RX ADMIN — SERTRALINE 200 MG: 100 TABLET, FILM COATED ORAL at 08:58

## 2021-01-01 RX ADMIN — Medication 10 ML: at 20:14

## 2021-01-01 RX ADMIN — ZINC SULFATE 220 MG (50 MG) CAPSULE 50 MG: CAPSULE at 08:28

## 2021-01-01 RX ADMIN — IPRATROPIUM BROMIDE AND ALBUTEROL 1 PUFF: 20; 100 SPRAY, METERED RESPIRATORY (INHALATION) at 19:03

## 2021-01-01 RX ADMIN — ENOXAPARIN SODIUM 40 MG: 100 INJECTION SUBCUTANEOUS at 09:01

## 2021-01-01 RX ADMIN — SENNOSIDES AND DOCUSATE SODIUM 2 TABLET: 50; 8.6 TABLET ORAL at 20:29

## 2021-01-01 RX ADMIN — Medication 175 MCG/HR: at 11:14

## 2021-01-01 RX ADMIN — BUDESONIDE 1000 MCG: 0.5 SUSPENSION RESPIRATORY (INHALATION) at 22:31

## 2021-01-01 RX ADMIN — MIDODRINE HYDROCHLORIDE 10 MG: 10 TABLET ORAL at 16:46

## 2021-01-01 RX ADMIN — PROPOFOL 10 MCG/KG/MIN: 10 INJECTION, EMULSION INTRAVENOUS at 08:00

## 2021-01-01 RX ADMIN — TRAZODONE HYDROCHLORIDE 100 MG: 50 TABLET ORAL at 20:32

## 2021-01-01 RX ADMIN — BUDESONIDE 1000 MCG: 0.5 SUSPENSION RESPIRATORY (INHALATION) at 09:26

## 2021-01-01 RX ADMIN — Medication 10 ML: at 20:26

## 2021-01-01 RX ADMIN — BUDESONIDE 1000 MCG: 0.5 SUSPENSION RESPIRATORY (INHALATION) at 19:59

## 2021-01-01 RX ADMIN — SERTRALINE 200 MG: 100 TABLET, FILM COATED ORAL at 09:20

## 2021-01-01 RX ADMIN — Medication 10 ML: at 09:10

## 2021-01-01 RX ADMIN — SODIUM CHLORIDE, PRESERVATIVE FREE 10 ML: 5 INJECTION INTRAVENOUS at 10:26

## 2021-01-01 RX ADMIN — SODIUM CHLORIDE: 9 INJECTION, SOLUTION INTRAVENOUS at 08:08

## 2021-01-01 RX ADMIN — MINERAL OIL AND WHITE PETROLATUM: 150; 830 OINTMENT OPHTHALMIC at 12:05

## 2021-01-01 RX ADMIN — PIPERACILLIN AND TAZOBACTAM 3375 MG: 3; .375 INJECTION, POWDER, LYOPHILIZED, FOR SOLUTION INTRAVENOUS at 03:40

## 2021-01-01 RX ADMIN — CISATRACURIUM BESYLATE 6.5 MCG/KG/MIN: 10 INJECTION INTRAVENOUS at 17:21

## 2021-01-01 RX ADMIN — TRAZODONE HYDROCHLORIDE 100 MG: 50 TABLET ORAL at 23:59

## 2021-01-01 RX ADMIN — Medication 10 ML: at 08:38

## 2021-01-01 RX ADMIN — BUDESONIDE 1000 MCG: 0.5 SUSPENSION RESPIRATORY (INHALATION) at 09:04

## 2021-01-01 RX ADMIN — PROPOFOL 10 MCG/KG/MIN: 10 INJECTION, EMULSION INTRAVENOUS at 08:03

## 2021-01-01 RX ADMIN — SUCRALFATE 1 G: 1 TABLET ORAL at 08:58

## 2021-01-01 RX ADMIN — BUDESONIDE 1000 MCG: 0.5 SUSPENSION RESPIRATORY (INHALATION) at 20:38

## 2021-01-01 RX ADMIN — TRAZODONE HYDROCHLORIDE 100 MG: 50 TABLET ORAL at 19:47

## 2021-01-01 RX ADMIN — QUETIAPINE FUMARATE 200 MG: 200 TABLET ORAL at 08:11

## 2021-01-01 RX ADMIN — POLYETHYLENE GLYCOL 3350 17 G: 17 POWDER, FOR SOLUTION ORAL at 20:10

## 2021-01-01 RX ADMIN — SUCRALFATE 1 G: 1 TABLET ORAL at 08:37

## 2021-01-01 RX ADMIN — Medication 10 ML: at 08:43

## 2021-01-01 RX ADMIN — SODIUM CHLORIDE, PRESERVATIVE FREE 10 ML: 5 INJECTION INTRAVENOUS at 20:37

## 2021-01-01 RX ADMIN — OXYCODONE HYDROCHLORIDE AND ACETAMINOPHEN 1000 MG: 500 TABLET ORAL at 08:23

## 2021-01-01 RX ADMIN — SODIUM PHOSPHATE, MONOBASIC, MONOHYDRATE AND SODIUM PHOSPHATE, DIBASIC, ANHYDROUS 15 MMOL: 276; 142 INJECTION, SOLUTION INTRAVENOUS at 08:34

## 2021-01-01 RX ADMIN — Medication 2000 UNITS: at 09:01

## 2021-01-01 RX ADMIN — BUDESONIDE 1000 MCG: 0.5 SUSPENSION RESPIRATORY (INHALATION) at 21:45

## 2021-01-01 RX ADMIN — ARFORMOTEROL TARTRATE 15 MCG: 15 SOLUTION RESPIRATORY (INHALATION) at 08:13

## 2021-01-01 RX ADMIN — Medication 200 MCG/HR: at 03:49

## 2021-01-01 RX ADMIN — MINERAL OIL AND WHITE PETROLATUM: 150; 830 OINTMENT OPHTHALMIC at 06:12

## 2021-01-01 RX ADMIN — MINERAL OIL AND WHITE PETROLATUM: 150; 830 OINTMENT OPHTHALMIC at 16:48

## 2021-01-01 RX ADMIN — HYDROCORTISONE SODIUM SUCCINATE 100 MG: 100 INJECTION, POWDER, FOR SOLUTION INTRAMUSCULAR; INTRAVENOUS at 20:13

## 2021-01-01 RX ADMIN — PANTOPRAZOLE SODIUM 40 MG: 40 INJECTION, POWDER, FOR SOLUTION INTRAVENOUS at 09:06

## 2021-01-01 RX ADMIN — QUETIAPINE FUMARATE 200 MG: 200 TABLET ORAL at 12:56

## 2021-01-01 RX ADMIN — SODIUM CHLORIDE, PRESERVATIVE FREE 10 ML: 5 INJECTION INTRAVENOUS at 09:57

## 2021-01-01 RX ADMIN — SODIUM CHLORIDE, PRESERVATIVE FREE 10 ML: 5 INJECTION INTRAVENOUS at 09:01

## 2021-01-01 RX ADMIN — BUDESONIDE 1000 MCG: 0.5 SUSPENSION RESPIRATORY (INHALATION) at 22:47

## 2021-01-01 RX ADMIN — CALCIUM GLUCONATE 1000 MG: 98 INJECTION, SOLUTION INTRAVENOUS at 08:05

## 2021-01-01 RX ADMIN — Medication 2000 UNITS: at 09:28

## 2021-01-01 RX ADMIN — CALCIUM GLUCONATE 1000 MG: 98 INJECTION, SOLUTION INTRAVENOUS at 09:10

## 2021-01-01 RX ADMIN — HYDROCORTISONE SODIUM SUCCINATE 100 MG: 100 INJECTION, POWDER, FOR SOLUTION INTRAMUSCULAR; INTRAVENOUS at 10:07

## 2021-01-01 RX ADMIN — Medication 10 ML: at 20:03

## 2021-01-01 RX ADMIN — DOXYCYCLINE 100 MG: 100 INJECTION, POWDER, LYOPHILIZED, FOR SOLUTION INTRAVENOUS at 20:45

## 2021-01-01 RX ADMIN — Medication 200 MCG/HR: at 16:44

## 2021-01-01 RX ADMIN — CHLORHEXIDINE GLUCONATE 15 ML: 1.2 RINSE ORAL at 20:15

## 2021-01-01 RX ADMIN — VANCOMYCIN HYDROCHLORIDE 750 MG: 10 INJECTION, POWDER, LYOPHILIZED, FOR SOLUTION INTRAVENOUS at 08:43

## 2021-01-01 RX ADMIN — SODIUM CHLORIDE: 9 INJECTION, SOLUTION INTRAVENOUS at 05:00

## 2021-01-01 RX ADMIN — TRAZODONE HYDROCHLORIDE 100 MG: 50 TABLET ORAL at 20:10

## 2021-01-01 RX ADMIN — SODIUM CHLORIDE 500 ML: 9 INJECTION, SOLUTION INTRAVENOUS at 16:00

## 2021-01-01 RX ADMIN — MINERAL OIL AND WHITE PETROLATUM: 150; 830 OINTMENT OPHTHALMIC at 03:19

## 2021-01-01 RX ADMIN — ARFORMOTEROL TARTRATE 15 MCG: 15 SOLUTION RESPIRATORY (INHALATION) at 22:16

## 2021-01-01 RX ADMIN — OXYCODONE HYDROCHLORIDE AND ACETAMINOPHEN 1000 MG: 500 TABLET ORAL at 09:34

## 2021-01-01 RX ADMIN — BUDESONIDE 1000 MCG: 0.5 SUSPENSION RESPIRATORY (INHALATION) at 08:29

## 2021-01-01 RX ADMIN — MINERAL OIL AND WHITE PETROLATUM: 150; 830 OINTMENT OPHTHALMIC at 08:32

## 2021-01-01 RX ADMIN — IPRATROPIUM BROMIDE AND ALBUTEROL 1 PUFF: 20; 100 SPRAY, METERED RESPIRATORY (INHALATION) at 11:07

## 2021-01-01 RX ADMIN — Medication 125 MCG/HR: at 13:17

## 2021-01-01 RX ADMIN — ATORVASTATIN CALCIUM 10 MG: 10 TABLET, FILM COATED ORAL at 10:04

## 2021-01-01 RX ADMIN — BUMETANIDE 2 MG: 0.25 INJECTION, SOLUTION INTRAMUSCULAR; INTRAVENOUS at 20:11

## 2021-01-01 RX ADMIN — BUMETANIDE 2 MG: 0.25 INJECTION INTRAMUSCULAR; INTRAVENOUS at 20:22

## 2021-01-01 RX ADMIN — ENOXAPARIN SODIUM 40 MG: 100 INJECTION SUBCUTANEOUS at 08:02

## 2021-01-01 RX ADMIN — ENOXAPARIN SODIUM 40 MG: 100 INJECTION SUBCUTANEOUS at 20:27

## 2021-01-01 RX ADMIN — POTASSIUM CHLORIDE 40 MEQ: 29.8 INJECTION, SOLUTION INTRAVENOUS at 08:25

## 2021-01-01 RX ADMIN — POTASSIUM CHLORIDE 20 MEQ: 400 INJECTION, SOLUTION INTRAVENOUS at 00:52

## 2021-01-01 RX ADMIN — Medication: at 02:07

## 2021-01-01 RX ADMIN — Medication 75 MCG/HR: at 01:30

## 2021-01-01 RX ADMIN — SUCRALFATE 1 G: 1 TABLET ORAL at 20:36

## 2021-01-01 RX ADMIN — Medication 175 MCG/HR: at 17:48

## 2021-01-01 RX ADMIN — DEXTROSE MONOHYDRATE: 50 INJECTION, SOLUTION INTRAVENOUS at 10:35

## 2021-01-01 RX ADMIN — Medication 200 MCG/HR: at 12:17

## 2021-01-01 RX ADMIN — QUETIAPINE FUMARATE 200 MG: 200 TABLET ORAL at 08:28

## 2021-01-01 RX ADMIN — SUCRALFATE 1 G: 1 TABLET ORAL at 21:03

## 2021-01-01 RX ADMIN — BUMETANIDE 2 MG: 0.25 INJECTION INTRAMUSCULAR; INTRAVENOUS at 22:18

## 2021-01-01 RX ADMIN — QUETIAPINE FUMARATE 200 MG: 200 TABLET ORAL at 11:02

## 2021-01-01 RX ADMIN — CALCIUM GLUCONATE 1000 MG: 98 INJECTION, SOLUTION INTRAVENOUS at 01:50

## 2021-01-01 RX ADMIN — SUCRALFATE 1 G: 1 TABLET ORAL at 20:13

## 2021-01-01 RX ADMIN — HEPARIN SODIUM 5000 UNITS: 10000 INJECTION INTRAVENOUS; SUBCUTANEOUS at 06:40

## 2021-01-01 RX ADMIN — SUCRALFATE 1 G: 1 TABLET ORAL at 17:50

## 2021-01-01 RX ADMIN — DEXMEDETOMIDINE 0.2 MCG/KG/HR: 100 INJECTION, SOLUTION, CONCENTRATE INTRAVENOUS at 22:15

## 2021-01-01 RX ADMIN — HYDROCORTISONE SODIUM SUCCINATE 100 MG: 100 INJECTION, POWDER, FOR SOLUTION INTRAMUSCULAR; INTRAVENOUS at 02:39

## 2021-01-01 RX ADMIN — SUCRALFATE 1 G: 1 TABLET ORAL at 02:07

## 2021-01-01 RX ADMIN — PANTOPRAZOLE SODIUM 40 MG: 40 INJECTION, POWDER, FOR SOLUTION INTRAVENOUS at 09:22

## 2021-01-01 RX ADMIN — Medication 10 ML: at 20:08

## 2021-01-01 RX ADMIN — SODIUM BICARBONATE 50 MEQ: 84 INJECTION INTRAVENOUS at 11:17

## 2021-01-01 RX ADMIN — BUMETANIDE 2 MG: 0.25 INJECTION INTRAMUSCULAR; INTRAVENOUS at 17:03

## 2021-01-01 RX ADMIN — POTASSIUM CHLORIDE 40 MEQ: 29.8 INJECTION, SOLUTION INTRAVENOUS at 20:52

## 2021-01-01 RX ADMIN — TRAZODONE HYDROCHLORIDE 100 MG: 50 TABLET ORAL at 20:09

## 2021-01-01 RX ADMIN — QUETIAPINE FUMARATE 200 MG: 200 TABLET ORAL at 08:05

## 2021-01-01 RX ADMIN — MINERAL OIL AND WHITE PETROLATUM: 150; 830 OINTMENT OPHTHALMIC at 09:19

## 2021-01-01 RX ADMIN — ARFORMOTEROL TARTRATE 15 MCG: 15 SOLUTION RESPIRATORY (INHALATION) at 19:59

## 2021-01-01 RX ADMIN — SERTRALINE 200 MG: 100 TABLET, FILM COATED ORAL at 12:44

## 2021-01-01 RX ADMIN — MIDODRINE HYDROCHLORIDE 20 MG: 10 TABLET ORAL at 18:21

## 2021-01-01 RX ADMIN — EPOPROSTENOL 50 NG/KG/MIN: 1.5 INJECTION, POWDER, LYOPHILIZED, FOR SOLUTION INTRAVENOUS at 06:14

## 2021-01-01 RX ADMIN — MIDAZOLAM HYDROCHLORIDE 1 MG/HR: 5 INJECTION, SOLUTION INTRAMUSCULAR; INTRAVENOUS at 06:33

## 2021-01-01 RX ADMIN — OXYCODONE HYDROCHLORIDE AND ACETAMINOPHEN 1000 MG: 500 TABLET ORAL at 12:36

## 2021-01-01 RX ADMIN — BUDESONIDE 1000 MCG: 0.5 SUSPENSION RESPIRATORY (INHALATION) at 22:16

## 2021-01-01 RX ADMIN — DEXTROSE MONOHYDRATE 12.5 G: 25 INJECTION, SOLUTION INTRAVENOUS at 09:49

## 2021-01-01 RX ADMIN — MAGNESIUM SULFATE HEPTAHYDRATE 2000 MG: 40 INJECTION, SOLUTION INTRAVENOUS at 11:27

## 2021-01-01 RX ADMIN — Medication 2000 UNITS: at 09:34

## 2021-01-01 RX ADMIN — REMDESIVIR 100 MG: 100 INJECTION, POWDER, LYOPHILIZED, FOR SOLUTION INTRAVENOUS at 20:45

## 2021-01-01 RX ADMIN — ARFORMOTEROL TARTRATE 15 MCG: 15 SOLUTION RESPIRATORY (INHALATION) at 21:40

## 2021-01-01 RX ADMIN — MIDODRINE HYDROCHLORIDE 10 MG: 10 TABLET ORAL at 09:06

## 2021-01-01 RX ADMIN — ZINC SULFATE 220 MG (50 MG) CAPSULE 50 MG: CAPSULE at 12:08

## 2021-01-01 RX ADMIN — DEXTROSE MONOHYDRATE 25 G: 25 INJECTION, SOLUTION INTRAVENOUS at 10:34

## 2021-01-01 RX ADMIN — IPRATROPIUM BROMIDE AND ALBUTEROL SULFATE 1 AMPULE: .5; 2.5 SOLUTION RESPIRATORY (INHALATION) at 08:09

## 2021-01-01 RX ADMIN — HYDROCORTISONE SODIUM SUCCINATE 50 MG: 100 INJECTION, POWDER, FOR SOLUTION INTRAMUSCULAR; INTRAVENOUS at 20:15

## 2021-01-01 RX ADMIN — POTASSIUM CHLORIDE 40 MEQ: 29.8 INJECTION, SOLUTION INTRAVENOUS at 20:32

## 2021-01-01 RX ADMIN — CEFEPIME HYDROCHLORIDE 2000 MG: 2 INJECTION, POWDER, FOR SOLUTION INTRAVENOUS at 11:21

## 2021-01-01 RX ADMIN — Medication 200 MCG/HR: at 16:24

## 2021-01-01 RX ADMIN — ACETAMINOPHEN 650 MG: 325 TABLET ORAL at 03:02

## 2021-01-01 RX ADMIN — POTASSIUM CHLORIDE 40 MEQ: 29.8 INJECTION, SOLUTION INTRAVENOUS at 18:29

## 2021-01-01 RX ADMIN — CISATRACURIUM BESYLATE 4 MCG/KG/MIN: 10 INJECTION INTRAVENOUS at 07:41

## 2021-01-01 RX ADMIN — SODIUM CHLORIDE: 9 INJECTION, SOLUTION INTRAVENOUS at 22:32

## 2021-01-01 RX ADMIN — CISATRACURIUM BESYLATE 3 MCG/KG/MIN: 10 INJECTION INTRAVENOUS at 08:48

## 2021-01-01 RX ADMIN — MINERAL OIL AND WHITE PETROLATUM: 150; 830 OINTMENT OPHTHALMIC at 14:47

## 2021-01-01 RX ADMIN — CHLORHEXIDINE GLUCONATE 15 ML: 1.2 RINSE ORAL at 20:09

## 2021-01-01 RX ADMIN — MIDAZOLAM HYDROCHLORIDE 10 MG/HR: 5 INJECTION, SOLUTION INTRAMUSCULAR; INTRAVENOUS at 21:03

## 2021-01-01 RX ADMIN — Medication 2000 UNITS: at 08:42

## 2021-01-01 RX ADMIN — SENNOSIDES AND DOCUSATE SODIUM 2 TABLET: 50; 8.6 TABLET ORAL at 08:25

## 2021-01-01 RX ADMIN — BUDESONIDE 1000 MCG: 0.5 SUSPENSION RESPIRATORY (INHALATION) at 21:05

## 2021-01-01 RX ADMIN — TOCILIZUMAB 648 MG: 180 INJECTION, SOLUTION SUBCUTANEOUS at 01:45

## 2021-01-01 RX ADMIN — WATER: 1 INJECTION INTRAMUSCULAR; INTRAVENOUS; SUBCUTANEOUS at 06:20

## 2021-01-01 RX ADMIN — ARFORMOTEROL TARTRATE 15 MCG: 15 SOLUTION RESPIRATORY (INHALATION) at 21:05

## 2021-01-01 RX ADMIN — ATORVASTATIN CALCIUM 10 MG: 10 TABLET, FILM COATED ORAL at 08:02

## 2021-01-01 RX ADMIN — ATORVASTATIN CALCIUM 10 MG: 10 TABLET, FILM COATED ORAL at 07:34

## 2021-01-01 RX ADMIN — PIPERACILLIN AND TAZOBACTAM 3375 MG: 3; .375 INJECTION, POWDER, LYOPHILIZED, FOR SOLUTION INTRAVENOUS at 17:51

## 2021-01-01 RX ADMIN — SUCRALFATE 1 G: 1 TABLET ORAL at 20:26

## 2021-01-01 RX ADMIN — POTASSIUM CHLORIDE 40 MEQ: 29.8 INJECTION, SOLUTION INTRAVENOUS at 08:34

## 2021-01-01 RX ADMIN — MIDAZOLAM HYDROCHLORIDE 10 MG/HR: 5 INJECTION, SOLUTION INTRAMUSCULAR; INTRAVENOUS at 10:27

## 2021-01-01 RX ADMIN — SODIUM CHLORIDE, PRESERVATIVE FREE 10 ML: 5 INJECTION INTRAVENOUS at 09:22

## 2021-01-01 RX ADMIN — HYDROCORTISONE SODIUM SUCCINATE 100 MG: 100 INJECTION, POWDER, FOR SOLUTION INTRAMUSCULAR; INTRAVENOUS at 06:02

## 2021-01-01 RX ADMIN — MINERAL OIL AND WHITE PETROLATUM: 150; 830 OINTMENT OPHTHALMIC at 18:32

## 2021-01-01 RX ADMIN — BUDESONIDE 1000 MCG: 0.5 SUSPENSION RESPIRATORY (INHALATION) at 21:01

## 2021-01-01 RX ADMIN — ATORVASTATIN CALCIUM 10 MG: 10 TABLET, FILM COATED ORAL at 08:01

## 2021-01-01 RX ADMIN — BUDESONIDE 1000 MCG: 0.5 SUSPENSION RESPIRATORY (INHALATION) at 21:00

## 2021-01-01 RX ADMIN — TRAZODONE HYDROCHLORIDE 100 MG: 50 TABLET ORAL at 20:03

## 2021-01-01 RX ADMIN — ATORVASTATIN CALCIUM 10 MG: 10 TABLET, FILM COATED ORAL at 08:40

## 2021-01-01 RX ADMIN — HEPARIN 300 UNITS: 100 SYRINGE at 09:07

## 2021-01-01 RX ADMIN — Medication 125 MCG/HR: at 18:42

## 2021-01-01 RX ADMIN — DEXTROSE MONOHYDRATE: 100 INJECTION, SOLUTION INTRAVENOUS at 17:00

## 2021-01-01 RX ADMIN — ATORVASTATIN CALCIUM 10 MG: 10 TABLET, FILM COATED ORAL at 09:15

## 2021-01-01 RX ADMIN — IPRATROPIUM BROMIDE AND ALBUTEROL 1 PUFF: 20; 100 SPRAY, METERED RESPIRATORY (INHALATION) at 00:05

## 2021-01-01 RX ADMIN — SERTRALINE 200 MG: 100 TABLET, FILM COATED ORAL at 08:22

## 2021-01-01 RX ADMIN — ALBUMIN (HUMAN) 25 G: 0.25 INJECTION, SOLUTION INTRAVENOUS at 02:07

## 2021-01-01 RX ADMIN — ZINC SULFATE 220 MG (50 MG) CAPSULE 50 MG: CAPSULE at 07:59

## 2021-01-01 RX ADMIN — FLUCONAZOLE IN SODIUM CHLORIDE 400 MG: 2 INJECTION, SOLUTION INTRAVENOUS at 10:17

## 2021-01-01 RX ADMIN — TRAZODONE HYDROCHLORIDE 100 MG: 50 TABLET ORAL at 20:57

## 2021-01-01 RX ADMIN — MIDODRINE HYDROCHLORIDE 20 MG: 10 TABLET ORAL at 20:09

## 2021-01-01 RX ADMIN — CISATRACURIUM BESYLATE 3.5 MCG/KG/MIN: 10 INJECTION INTRAVENOUS at 09:12

## 2021-01-01 RX ADMIN — MIDAZOLAM HYDROCHLORIDE 10 MG/HR: 5 INJECTION, SOLUTION INTRAMUSCULAR; INTRAVENOUS at 18:08

## 2021-01-01 RX ADMIN — CHLORHEXIDINE GLUCONATE 15 ML: 1.2 RINSE ORAL at 09:22

## 2021-01-01 RX ADMIN — SUCRALFATE 1 G: 1 TABLET ORAL at 20:53

## 2021-01-01 RX ADMIN — BUDESONIDE 1000 MCG: 0.5 SUSPENSION RESPIRATORY (INHALATION) at 08:38

## 2021-01-01 RX ADMIN — MINERAL OIL AND WHITE PETROLATUM: 150; 830 OINTMENT OPHTHALMIC at 20:32

## 2021-01-01 RX ADMIN — CISATRACURIUM BESYLATE 4 MCG/KG/MIN: 10 INJECTION INTRAVENOUS at 08:08

## 2021-01-01 RX ADMIN — BUMETANIDE 0.5 MG/HR: 0.25 INJECTION, SOLUTION INTRAMUSCULAR; INTRAVENOUS at 04:32

## 2021-01-01 RX ADMIN — ZINC SULFATE 220 MG (50 MG) CAPSULE 50 MG: CAPSULE at 08:02

## 2021-01-01 RX ADMIN — SUCCINYLCHOLINE CHLORIDE: 20 INJECTION, SOLUTION INTRAMUSCULAR; INTRAVENOUS at 08:08

## 2021-01-01 RX ADMIN — SODIUM CHLORIDE, PRESERVATIVE FREE 10 ML: 5 INJECTION INTRAVENOUS at 21:10

## 2021-01-01 RX ADMIN — SUCRALFATE 1 G: 1 TABLET ORAL at 07:37

## 2021-01-01 RX ADMIN — POTASSIUM CHLORIDE 40 MEQ: 29.8 INJECTION, SOLUTION INTRAVENOUS at 17:05

## 2021-01-01 RX ADMIN — SODIUM CHLORIDE: 9 INJECTION, SOLUTION INTRAVENOUS at 06:49

## 2021-01-01 RX ADMIN — HYDROCORTISONE SODIUM SUCCINATE 50 MG: 100 INJECTION, POWDER, FOR SOLUTION INTRAMUSCULAR; INTRAVENOUS at 10:26

## 2021-01-01 RX ADMIN — MINERAL OIL AND WHITE PETROLATUM: 150; 830 OINTMENT OPHTHALMIC at 08:08

## 2021-01-01 RX ADMIN — ARFORMOTEROL TARTRATE 15 MCG: 15 SOLUTION RESPIRATORY (INHALATION) at 22:10

## 2021-01-01 RX ADMIN — Medication: at 20:36

## 2021-01-01 RX ADMIN — SUCRALFATE 1 G: 1 TABLET ORAL at 14:51

## 2021-01-01 RX ADMIN — BUMETANIDE 1 MG: 0.25 INJECTION INTRAMUSCULAR; INTRAVENOUS at 08:35

## 2021-01-01 RX ADMIN — HEPARIN SODIUM 5000 UNITS: 10000 INJECTION INTRAVENOUS; SUBCUTANEOUS at 14:00

## 2021-01-01 RX ADMIN — SUCRALFATE 1 G: 1 TABLET ORAL at 15:38

## 2021-01-01 RX ADMIN — BUDESONIDE 1000 MCG: 0.5 SUSPENSION RESPIRATORY (INHALATION) at 09:20

## 2021-01-01 RX ADMIN — BUMETANIDE 1 MG: 0.25 INJECTION INTRAMUSCULAR; INTRAVENOUS at 05:44

## 2021-01-01 RX ADMIN — VANCOMYCIN HYDROCHLORIDE 1000 MG: 1 INJECTION, POWDER, LYOPHILIZED, FOR SOLUTION INTRAVENOUS at 08:46

## 2021-01-01 RX ADMIN — MINERAL OIL AND WHITE PETROLATUM: 150; 830 OINTMENT OPHTHALMIC at 18:00

## 2021-01-01 RX ADMIN — Medication 200 MCG/HR: at 09:12

## 2021-01-01 RX ADMIN — ACETAMINOPHEN 650 MG: 650 SUPPOSITORY RECTAL at 16:49

## 2021-01-01 RX ADMIN — ARFORMOTEROL TARTRATE 15 MCG: 15 SOLUTION RESPIRATORY (INHALATION) at 20:21

## 2021-01-01 RX ADMIN — SERTRALINE 200 MG: 100 TABLET, FILM COATED ORAL at 08:11

## 2021-01-01 RX ADMIN — FUROSEMIDE 40 MG: 10 INJECTION, SOLUTION INTRAMUSCULAR; INTRAVENOUS at 11:09

## 2021-01-01 RX ADMIN — MIDODRINE HYDROCHLORIDE 20 MG: 10 TABLET ORAL at 04:08

## 2021-01-01 RX ADMIN — BUMETANIDE 1 MG: 0.25 INJECTION INTRAMUSCULAR; INTRAVENOUS at 20:10

## 2021-01-01 RX ADMIN — CHLORHEXIDINE GLUCONATE 15 ML: 1.2 RINSE ORAL at 20:04

## 2021-01-01 RX ADMIN — BUDESONIDE 1000 MCG: 0.5 SUSPENSION RESPIRATORY (INHALATION) at 08:37

## 2021-01-01 RX ADMIN — POTASSIUM CHLORIDE 40 MEQ: 29.8 INJECTION, SOLUTION INTRAVENOUS at 05:19

## 2021-01-01 RX ADMIN — VECURONIUM BROMIDE 2 MG: 1 INJECTION, POWDER, LYOPHILIZED, FOR SOLUTION INTRAVENOUS at 09:45

## 2021-01-01 RX ADMIN — Medication 2000 UNITS: at 09:14

## 2021-01-01 RX ADMIN — POTASSIUM PHOSPHATE, MONOBASIC AND POTASSIUM PHOSPHATE, DIBASIC 20 MMOL: 224; 236 INJECTION, SOLUTION, CONCENTRATE INTRAVENOUS at 11:28

## 2021-01-01 RX ADMIN — NALOXEGOL OXALATE 12.5 MG: 12.5 TABLET, FILM COATED ORAL at 09:07

## 2021-01-01 RX ADMIN — ARFORMOTEROL TARTRATE 15 MCG: 15 SOLUTION RESPIRATORY (INHALATION) at 20:11

## 2021-01-01 RX ADMIN — Medication 100 MCG/HR: at 17:03

## 2021-01-01 RX ADMIN — SUCRALFATE 1 G: 1 TABLET ORAL at 20:40

## 2021-01-01 RX ADMIN — ARGATROBAN 0.5 MCG/KG/MIN: 50 INJECTION, SOLUTION INTRAVENOUS at 09:35

## 2021-01-01 RX ADMIN — PANTOPRAZOLE SODIUM 40 MG: 40 INJECTION, POWDER, FOR SOLUTION INTRAVENOUS at 07:59

## 2021-01-01 RX ADMIN — BUMETANIDE 1 MG: 0.25 INJECTION INTRAMUSCULAR; INTRAVENOUS at 02:06

## 2021-01-01 RX ADMIN — Medication 10 ML: at 08:04

## 2021-01-01 RX ADMIN — MAGNESIUM SULFATE HEPTAHYDRATE 2000 MG: 2 INJECTION, SOLUTION INTRAVENOUS at 15:51

## 2021-01-01 RX ADMIN — BUDESONIDE 1000 MCG: 0.5 SUSPENSION RESPIRATORY (INHALATION) at 21:22

## 2021-01-01 RX ADMIN — BUMETANIDE 2 MG: 0.25 INJECTION INTRAMUSCULAR; INTRAVENOUS at 17:37

## 2021-01-01 RX ADMIN — CHLORHEXIDINE GLUCONATE 15 ML: 1.2 RINSE ORAL at 08:05

## 2021-01-01 RX ADMIN — CISATRACURIUM BESYLATE 3 MCG/KG/MIN: 10 INJECTION INTRAVENOUS at 05:56

## 2021-01-01 RX ADMIN — MINERAL OIL AND WHITE PETROLATUM: 150; 830 OINTMENT OPHTHALMIC at 14:17

## 2021-01-01 RX ADMIN — Medication 200 MCG/HR: at 14:05

## 2021-01-01 RX ADMIN — PIPERACILLIN AND TAZOBACTAM 3375 MG: 3; .375 INJECTION, POWDER, LYOPHILIZED, FOR SOLUTION INTRAVENOUS at 18:19

## 2021-01-01 RX ADMIN — MINERAL OIL AND WHITE PETROLATUM: 150; 830 OINTMENT OPHTHALMIC at 15:52

## 2021-01-01 RX ADMIN — DOCUSATE SODIUM 50 MG AND SENNOSIDES 8.6 MG 2 TABLET: 8.6; 5 TABLET, FILM COATED ORAL at 09:24

## 2021-01-01 RX ADMIN — HEPARIN SODIUM 5000 UNITS: 10000 INJECTION INTRAVENOUS; SUBCUTANEOUS at 15:13

## 2021-01-01 RX ADMIN — PROPOFOL 15 MCG/KG/MIN: 10 INJECTION, EMULSION INTRAVENOUS at 07:43

## 2021-01-01 RX ADMIN — CISATRACURIUM BESYLATE 3 MCG/KG/MIN: 10 INJECTION INTRAVENOUS at 22:07

## 2021-01-01 RX ADMIN — SODIUM CHLORIDE, PRESERVATIVE FREE 10 ML: 5 INJECTION INTRAVENOUS at 21:07

## 2021-01-01 RX ADMIN — Medication 200 MCG/HR: at 12:46

## 2021-01-01 RX ADMIN — ACETAMINOPHEN 650 MG: 325 TABLET ORAL at 00:48

## 2021-01-01 RX ADMIN — SERTRALINE 200 MG: 100 TABLET, FILM COATED ORAL at 09:23

## 2021-01-01 RX ADMIN — SUCRALFATE 1 G: 1 TABLET ORAL at 02:06

## 2021-01-01 RX ADMIN — POLYETHYLENE GLYCOL 3350 17 G: 17 POWDER, FOR SOLUTION ORAL at 08:07

## 2021-01-01 RX ADMIN — OXYCODONE HYDROCHLORIDE AND ACETAMINOPHEN 1000 MG: 500 TABLET ORAL at 09:55

## 2021-01-01 RX ADMIN — CHLORHEXIDINE GLUCONATE 15 ML: 1.2 RINSE ORAL at 08:36

## 2021-01-01 RX ADMIN — SERTRALINE 200 MG: 100 TABLET, FILM COATED ORAL at 09:10

## 2021-01-01 RX ADMIN — CHLORHEXIDINE GLUCONATE 15 ML: 1.2 RINSE ORAL at 21:09

## 2021-01-01 RX ADMIN — METOPROLOL TARTRATE 5 MG: 1 INJECTION, SOLUTION INTRAVENOUS at 11:38

## 2021-01-01 RX ADMIN — POTASSIUM CHLORIDE 40 MEQ: 29.8 INJECTION, SOLUTION INTRAVENOUS at 01:42

## 2021-01-01 RX ADMIN — ARFORMOTEROL TARTRATE 15 MCG: 15 SOLUTION RESPIRATORY (INHALATION) at 20:38

## 2021-01-01 RX ADMIN — BUDESONIDE 1000 MCG: 0.5 SUSPENSION RESPIRATORY (INHALATION) at 21:49

## 2021-01-01 RX ADMIN — MINERAL OIL AND WHITE PETROLATUM: 150; 830 OINTMENT OPHTHALMIC at 21:09

## 2021-01-01 RX ADMIN — MINERAL OIL AND WHITE PETROLATUM: 150; 830 OINTMENT OPHTHALMIC at 22:46

## 2021-01-01 RX ADMIN — MINERAL OIL AND WHITE PETROLATUM: 150; 830 OINTMENT OPHTHALMIC at 21:40

## 2021-01-01 RX ADMIN — PIPERACILLIN AND TAZOBACTAM 3375 MG: 3; .375 INJECTION, POWDER, LYOPHILIZED, FOR SOLUTION INTRAVENOUS at 16:19

## 2021-01-01 RX ADMIN — Medication 200 MCG/HR: at 17:31

## 2021-01-01 RX ADMIN — CISATRACURIUM BESYLATE 3.5 MCG/KG/MIN: 10 INJECTION INTRAVENOUS at 18:08

## 2021-01-01 RX ADMIN — Medication 10 ML: at 20:27

## 2021-01-01 RX ADMIN — SODIUM CHLORIDE, PRESERVATIVE FREE 10 ML: 5 INJECTION INTRAVENOUS at 08:38

## 2021-01-01 RX ADMIN — TRAZODONE HYDROCHLORIDE 100 MG: 50 TABLET ORAL at 20:04

## 2021-01-01 RX ADMIN — DOCUSATE SODIUM 50 MG AND SENNOSIDES 8.6 MG 2 TABLET: 8.6; 5 TABLET, FILM COATED ORAL at 08:02

## 2021-01-01 RX ADMIN — ARFORMOTEROL TARTRATE 15 MCG: 15 SOLUTION RESPIRATORY (INHALATION) at 21:44

## 2021-01-01 RX ADMIN — MINERAL OIL AND WHITE PETROLATUM: 150; 830 OINTMENT OPHTHALMIC at 09:16

## 2021-01-01 RX ADMIN — SODIUM CHLORIDE, PRESERVATIVE FREE 10 ML: 5 INJECTION INTRAVENOUS at 20:25

## 2021-01-01 RX ADMIN — Medication 10 ML: at 08:28

## 2021-01-01 RX ADMIN — BUMETANIDE 2 MG: 0.25 INJECTION INTRAMUSCULAR; INTRAVENOUS at 08:04

## 2021-01-01 RX ADMIN — MIDODRINE HYDROCHLORIDE 20 MG: 10 TABLET ORAL at 12:19

## 2021-01-01 RX ADMIN — DOCUSATE SODIUM 50 MG AND SENNOSIDES 8.6 MG 2 TABLET: 8.6; 5 TABLET, FILM COATED ORAL at 08:56

## 2021-01-01 RX ADMIN — SODIUM CHLORIDE, PRESERVATIVE FREE 10 ML: 5 INJECTION INTRAVENOUS at 08:21

## 2021-01-01 RX ADMIN — ATORVASTATIN CALCIUM 10 MG: 10 TABLET, FILM COATED ORAL at 08:54

## 2021-01-01 RX ADMIN — ARFORMOTEROL TARTRATE 15 MCG: 15 SOLUTION RESPIRATORY (INHALATION) at 21:49

## 2021-01-01 RX ADMIN — HEPARIN 300 UNITS: 100 SYRINGE at 09:01

## 2021-01-01 RX ADMIN — Medication 2000 UNITS: at 08:37

## 2021-01-01 RX ADMIN — ARFORMOTEROL TARTRATE 15 MCG: 15 SOLUTION RESPIRATORY (INHALATION) at 09:18

## 2021-01-01 RX ADMIN — BUDESONIDE AND FORMOTEROL FUMARATE DIHYDRATE 2 PUFF: 160; 4.5 AEROSOL RESPIRATORY (INHALATION) at 19:27

## 2021-01-01 RX ADMIN — CHLORHEXIDINE GLUCONATE 15 ML: 1.2 RINSE ORAL at 21:40

## 2021-01-01 RX ADMIN — SUCRALFATE 1 G: 1 TABLET ORAL at 09:29

## 2021-01-01 RX ADMIN — POLYETHYLENE GLYCOL 3350 17 G: 17 POWDER, FOR SOLUTION ORAL at 08:34

## 2021-01-01 RX ADMIN — MIDODRINE HYDROCHLORIDE 20 MG: 10 TABLET ORAL at 20:15

## 2021-01-01 RX ADMIN — BUMETANIDE 1 MG: 0.25 INJECTION INTRAMUSCULAR; INTRAVENOUS at 15:28

## 2021-01-01 RX ADMIN — IPRATROPIUM BROMIDE AND ALBUTEROL 1 PUFF: 20; 100 SPRAY, METERED RESPIRATORY (INHALATION) at 06:11

## 2021-01-01 RX ADMIN — TRAZODONE HYDROCHLORIDE 100 MG: 50 TABLET ORAL at 21:03

## 2021-01-01 RX ADMIN — DOCUSATE SODIUM 50 MG AND SENNOSIDES 8.6 MG 2 TABLET: 8.6; 5 TABLET, FILM COATED ORAL at 09:06

## 2021-01-01 RX ADMIN — SUCRALFATE 1 G: 1 TABLET ORAL at 20:15

## 2021-01-01 RX ADMIN — HYDROCORTISONE SODIUM SUCCINATE 100 MG: 100 INJECTION, POWDER, FOR SOLUTION INTRAMUSCULAR; INTRAVENOUS at 11:58

## 2021-01-01 RX ADMIN — MINERAL OIL AND WHITE PETROLATUM: 150; 830 OINTMENT OPHTHALMIC at 14:50

## 2021-01-01 RX ADMIN — CHLORHEXIDINE GLUCONATE 15 ML: 1.2 RINSE ORAL at 20:51

## 2021-01-01 RX ADMIN — ZINC SULFATE 220 MG (50 MG) CAPSULE 50 MG: CAPSULE at 12:37

## 2021-01-01 RX ADMIN — SODIUM CHLORIDE, PRESERVATIVE FREE 10 ML: 5 INJECTION INTRAVENOUS at 20:28

## 2021-01-01 RX ADMIN — POTASSIUM CHLORIDE 40 MEQ: 29.8 INJECTION, SOLUTION INTRAVENOUS at 21:57

## 2021-01-01 RX ADMIN — ARGATROBAN 0.5 MCG/KG/MIN: 50 INJECTION, SOLUTION INTRAVENOUS at 01:33

## 2021-01-01 RX ADMIN — Medication 2000 UNITS: at 12:36

## 2021-01-01 RX ADMIN — Medication: at 09:28

## 2021-01-01 RX ADMIN — MINERAL OIL AND WHITE PETROLATUM: 150; 830 OINTMENT OPHTHALMIC at 17:17

## 2021-01-01 RX ADMIN — PANTOPRAZOLE SODIUM 40 MG: 40 INJECTION, POWDER, FOR SOLUTION INTRAVENOUS at 09:34

## 2021-01-01 RX ADMIN — MIDAZOLAM HYDROCHLORIDE 10 MG/HR: 5 INJECTION, SOLUTION INTRAMUSCULAR; INTRAVENOUS at 05:57

## 2021-01-01 RX ADMIN — HEPARIN SODIUM 5000 UNITS: 10000 INJECTION INTRAVENOUS; SUBCUTANEOUS at 14:07

## 2021-01-01 RX ADMIN — CHLORHEXIDINE GLUCONATE 15 ML: 1.2 RINSE ORAL at 08:40

## 2021-01-01 RX ADMIN — ATORVASTATIN CALCIUM 10 MG: 10 TABLET, FILM COATED ORAL at 09:56

## 2021-01-01 RX ADMIN — ACETAZOLAMIDE 250 MG: 250 TABLET ORAL at 12:11

## 2021-01-01 RX ADMIN — BUMETANIDE 0.5 MG/HR: 0.25 INJECTION INTRAMUSCULAR; INTRAVENOUS at 16:11

## 2021-01-01 RX ADMIN — SUCRALFATE 1 G: 1 TABLET ORAL at 20:09

## 2021-01-01 RX ADMIN — POTASSIUM CHLORIDE 20 MEQ: 400 INJECTION, SOLUTION INTRAVENOUS at 05:44

## 2021-01-01 RX ADMIN — SUCRALFATE 1 G: 1 TABLET ORAL at 08:41

## 2021-01-01 RX ADMIN — MINERAL OIL AND WHITE PETROLATUM: 150; 830 OINTMENT OPHTHALMIC at 02:48

## 2021-01-01 RX ADMIN — MIDODRINE HYDROCHLORIDE 10 MG: 10 TABLET ORAL at 11:45

## 2021-01-01 RX ADMIN — ARFORMOTEROL TARTRATE 15 MCG: 15 SOLUTION RESPIRATORY (INHALATION) at 08:49

## 2021-01-01 RX ADMIN — ARFORMOTEROL TARTRATE 15 MCG: 15 SOLUTION RESPIRATORY (INHALATION) at 21:28

## 2021-01-01 RX ADMIN — ARFORMOTEROL TARTRATE 15 MCG: 15 SOLUTION RESPIRATORY (INHALATION) at 09:21

## 2021-01-01 RX ADMIN — IPRATROPIUM BROMIDE AND ALBUTEROL SULFATE 1 AMPULE: .5; 2.5 SOLUTION RESPIRATORY (INHALATION) at 22:15

## 2021-01-01 RX ADMIN — DOCUSATE SODIUM 50 MG AND SENNOSIDES 8.6 MG 2 TABLET: 8.6; 5 TABLET, FILM COATED ORAL at 09:08

## 2021-01-01 RX ADMIN — MIDODRINE HYDROCHLORIDE 15 MG: 5 TABLET ORAL at 11:04

## 2021-01-01 RX ADMIN — ARFORMOTEROL TARTRATE 15 MCG: 15 SOLUTION RESPIRATORY (INHALATION) at 10:21

## 2021-01-01 RX ADMIN — QUETIAPINE FUMARATE 200 MG: 200 TABLET ORAL at 09:27

## 2021-01-01 RX ADMIN — MINERAL OIL AND WHITE PETROLATUM: 150; 830 OINTMENT OPHTHALMIC at 15:38

## 2021-01-01 RX ADMIN — CISATRACURIUM BESYLATE 3.5 MCG/KG/MIN: 10 INJECTION INTRAVENOUS at 19:14

## 2021-01-01 RX ADMIN — DEXTROSE MONOHYDRATE: 100 INJECTION, SOLUTION INTRAVENOUS at 10:34

## 2021-01-01 RX ADMIN — MINERAL OIL AND WHITE PETROLATUM: 150; 830 OINTMENT OPHTHALMIC at 22:23

## 2021-01-01 RX ADMIN — CALCIUM GLUCONATE 1000 MG: 98 INJECTION, SOLUTION INTRAVENOUS at 01:25

## 2021-01-01 RX ADMIN — SUCRALFATE 1 G: 1 TABLET ORAL at 20:11

## 2021-01-01 RX ADMIN — Medication 10 ML: at 21:01

## 2021-01-01 RX ADMIN — BUDESONIDE 1000 MCG: 0.5 SUSPENSION RESPIRATORY (INHALATION) at 20:10

## 2021-01-01 RX ADMIN — MIDODRINE HYDROCHLORIDE 15 MG: 5 TABLET ORAL at 03:14

## 2021-01-01 RX ADMIN — TRAZODONE HYDROCHLORIDE 100 MG: 50 TABLET ORAL at 21:23

## 2021-01-01 RX ADMIN — MIDODRINE HYDROCHLORIDE 20 MG: 10 TABLET ORAL at 02:55

## 2021-01-01 RX ADMIN — HEPARIN SODIUM 5000 UNITS: 10000 INJECTION INTRAVENOUS; SUBCUTANEOUS at 20:50

## 2021-01-01 RX ADMIN — ACETAMINOPHEN 650 MG: 650 SUPPOSITORY RECTAL at 22:14

## 2021-01-01 RX ADMIN — MIDODRINE HYDROCHLORIDE 20 MG: 10 TABLET ORAL at 11:38

## 2021-01-01 RX ADMIN — MIDAZOLAM HYDROCHLORIDE 10 MG/HR: 5 INJECTION, SOLUTION INTRAMUSCULAR; INTRAVENOUS at 22:21

## 2021-01-01 RX ADMIN — PROPOFOL 20 MCG/KG/MIN: 10 INJECTION, EMULSION INTRAVENOUS at 14:22

## 2021-01-01 RX ADMIN — ZINC SULFATE 220 MG (50 MG) CAPSULE 50 MG: CAPSULE at 09:07

## 2021-01-01 RX ADMIN — CISATRACURIUM BESYLATE 4 MCG/KG/MIN: 10 INJECTION INTRAVENOUS at 21:08

## 2021-01-01 RX ADMIN — POLYETHYLENE GLYCOL 3350 17 G: 17 POWDER, FOR SOLUTION ORAL at 08:41

## 2021-01-01 RX ADMIN — ZINC SULFATE 220 MG (50 MG) CAPSULE 50 MG: CAPSULE at 08:11

## 2021-01-01 RX ADMIN — ALBUMIN (HUMAN) 25 G: 0.25 INJECTION, SOLUTION INTRAVENOUS at 18:14

## 2021-01-01 RX ADMIN — Medication 10 ML: at 20:37

## 2021-01-01 RX ADMIN — Medication 200 MCG/HR: at 05:57

## 2021-01-01 RX ADMIN — BUMETANIDE 1 MG: 0.25 INJECTION INTRAMUSCULAR; INTRAVENOUS at 20:26

## 2021-01-01 RX ADMIN — ARFORMOTEROL TARTRATE 15 MCG: 15 SOLUTION RESPIRATORY (INHALATION) at 19:31

## 2021-01-01 RX ADMIN — CISATRACURIUM BESYLATE 4 MCG/KG/MIN: 10 INJECTION INTRAVENOUS at 06:40

## 2021-01-01 RX ADMIN — QUETIAPINE FUMARATE 200 MG: 200 TABLET ORAL at 09:00

## 2021-01-01 RX ADMIN — CISATRACURIUM BESYLATE 2 MCG/KG/MIN: 10 INJECTION INTRAVENOUS at 06:46

## 2021-01-01 RX ADMIN — MINERAL OIL AND WHITE PETROLATUM: 150; 830 OINTMENT OPHTHALMIC at 20:27

## 2021-01-01 RX ADMIN — SUCRALFATE 1 G: 1 TABLET ORAL at 06:30

## 2021-01-01 RX ADMIN — MIDODRINE HYDROCHLORIDE 10 MG: 10 TABLET ORAL at 12:11

## 2021-01-01 RX ADMIN — CHLORHEXIDINE GLUCONATE 15 ML: 1.2 RINSE ORAL at 19:48

## 2021-01-01 RX ADMIN — EPOPROSTENOL 50 NG/KG/MIN: 1.5 INJECTION, POWDER, LYOPHILIZED, FOR SOLUTION INTRAVENOUS at 08:43

## 2021-01-01 RX ADMIN — HYDROCORTISONE SODIUM SUCCINATE 50 MG: 100 INJECTION, POWDER, FOR SOLUTION INTRAMUSCULAR; INTRAVENOUS at 09:15

## 2021-01-01 RX ADMIN — PIPERACILLIN AND TAZOBACTAM 3375 MG: 3; .375 INJECTION, POWDER, LYOPHILIZED, FOR SOLUTION INTRAVENOUS at 20:03

## 2021-01-01 RX ADMIN — QUETIAPINE FUMARATE 200 MG: 200 TABLET ORAL at 07:36

## 2021-01-01 RX ADMIN — Medication 10 ML: at 08:58

## 2021-01-01 RX ADMIN — MINERAL OIL AND WHITE PETROLATUM: 150; 830 OINTMENT OPHTHALMIC at 13:59

## 2021-01-01 RX ADMIN — Medication 200 MCG/HR: at 10:31

## 2021-01-01 RX ADMIN — POTASSIUM CHLORIDE 40 MEQ: 29.8 INJECTION, SOLUTION INTRAVENOUS at 04:51

## 2021-01-01 RX ADMIN — HYDROCORTISONE SODIUM SUCCINATE 50 MG: 100 INJECTION, POWDER, FOR SOLUTION INTRAMUSCULAR; INTRAVENOUS at 11:22

## 2021-01-01 RX ADMIN — BUDESONIDE 1000 MCG: 0.5 SUSPENSION RESPIRATORY (INHALATION) at 22:12

## 2021-01-01 RX ADMIN — PANTOPRAZOLE SODIUM 40 MG: 40 INJECTION, POWDER, FOR SOLUTION INTRAVENOUS at 21:09

## 2021-01-01 RX ADMIN — CHLORHEXIDINE GLUCONATE 15 ML: 1.2 RINSE ORAL at 20:31

## 2021-01-01 RX ADMIN — BUMETANIDE 1 MG: 0.25 INJECTION INTRAMUSCULAR; INTRAVENOUS at 11:37

## 2021-01-01 RX ADMIN — DOCUSATE SODIUM 50 MG AND SENNOSIDES 8.6 MG 2 TABLET: 8.6; 5 TABLET, FILM COATED ORAL at 08:20

## 2021-01-01 RX ADMIN — ARFORMOTEROL TARTRATE 15 MCG: 15 SOLUTION RESPIRATORY (INHALATION) at 09:35

## 2021-01-01 RX ADMIN — MINERAL OIL AND WHITE PETROLATUM: 150; 830 OINTMENT OPHTHALMIC at 02:28

## 2021-01-01 RX ADMIN — MIDODRINE HYDROCHLORIDE 20 MG: 10 TABLET ORAL at 10:37

## 2021-01-01 RX ADMIN — MINERAL OIL AND WHITE PETROLATUM: 150; 830 OINTMENT OPHTHALMIC at 20:51

## 2021-01-01 RX ADMIN — Medication 2000 UNITS: at 08:22

## 2021-01-01 RX ADMIN — SODIUM CHLORIDE, PRESERVATIVE FREE 10 ML: 5 INJECTION INTRAVENOUS at 07:36

## 2021-01-01 RX ADMIN — POTASSIUM CHLORIDE 20 MEQ: 29.8 INJECTION, SOLUTION INTRAVENOUS at 16:43

## 2021-01-01 RX ADMIN — SUCRALFATE 1 G: 1 TABLET ORAL at 14:39

## 2021-01-01 RX ADMIN — POLYETHYLENE GLYCOL 3350 17 G: 17 POWDER, FOR SOLUTION ORAL at 09:14

## 2021-01-01 RX ADMIN — HYDROCORTISONE SODIUM SUCCINATE 100 MG: 100 INJECTION, POWDER, FOR SOLUTION INTRAMUSCULAR; INTRAVENOUS at 23:19

## 2021-01-01 RX ADMIN — SERTRALINE 200 MG: 100 TABLET, FILM COATED ORAL at 11:26

## 2021-01-01 RX ADMIN — DOCUSATE SODIUM 50 MG AND SENNOSIDES 8.6 MG 2 TABLET: 8.6; 5 TABLET, FILM COATED ORAL at 09:26

## 2021-01-01 RX ADMIN — QUETIAPINE FUMARATE 200 MG: 200 TABLET ORAL at 09:22

## 2021-01-01 RX ADMIN — CHLORHEXIDINE GLUCONATE 15 ML: 1.2 RINSE ORAL at 08:02

## 2021-01-01 RX ADMIN — PIPERACILLIN AND TAZOBACTAM 3375 MG: 3; .375 INJECTION, POWDER, LYOPHILIZED, FOR SOLUTION INTRAVENOUS at 16:49

## 2021-01-01 RX ADMIN — ARFORMOTEROL TARTRATE 15 MCG: 15 SOLUTION RESPIRATORY (INHALATION) at 21:11

## 2021-01-01 RX ADMIN — Medication 10 ML: at 20:24

## 2021-01-01 RX ADMIN — MINERAL OIL AND WHITE PETROLATUM: 150; 830 OINTMENT OPHTHALMIC at 08:55

## 2021-01-01 RX ADMIN — VANCOMYCIN HYDROCHLORIDE 1000 MG: 1 INJECTION, POWDER, LYOPHILIZED, FOR SOLUTION INTRAVENOUS at 12:10

## 2021-01-01 RX ADMIN — ARFORMOTEROL TARTRATE 15 MCG: 15 SOLUTION RESPIRATORY (INHALATION) at 22:48

## 2021-01-01 RX ADMIN — QUETIAPINE FUMARATE 200 MG: 200 TABLET ORAL at 08:34

## 2021-01-01 RX ADMIN — POTASSIUM CHLORIDE 20 MEQ: 400 INJECTION, SOLUTION INTRAVENOUS at 18:45

## 2021-01-01 RX ADMIN — Medication 10 ML: at 11:03

## 2021-01-01 RX ADMIN — Medication 200 MCG/HR: at 07:49

## 2021-01-01 RX ADMIN — CHLORHEXIDINE GLUCONATE 15 ML: 1.2 RINSE ORAL at 09:25

## 2021-01-01 RX ADMIN — TRAZODONE HYDROCHLORIDE 100 MG: 50 TABLET ORAL at 20:01

## 2021-01-01 RX ADMIN — DEXAMETHASONE SODIUM PHOSPHATE 6 MG: 4 INJECTION, SOLUTION INTRAMUSCULAR; INTRAVENOUS at 08:19

## 2021-01-01 RX ADMIN — MIDAZOLAM HYDROCHLORIDE 10 MG/HR: 5 INJECTION, SOLUTION INTRAMUSCULAR; INTRAVENOUS at 11:08

## 2021-01-01 RX ADMIN — PANTOPRAZOLE SODIUM 40 MG: 40 INJECTION, POWDER, FOR SOLUTION INTRAVENOUS at 08:34

## 2021-01-01 RX ADMIN — VECURONIUM BROMIDE 10 MG: 1 INJECTION, POWDER, LYOPHILIZED, FOR SOLUTION INTRAVENOUS at 03:59

## 2021-01-01 RX ADMIN — MIDAZOLAM HYDROCHLORIDE 10 MG/HR: 5 INJECTION, SOLUTION INTRAMUSCULAR; INTRAVENOUS at 05:20

## 2021-01-01 RX ADMIN — OXYCODONE HYDROCHLORIDE AND ACETAMINOPHEN 1000 MG: 500 TABLET ORAL at 08:34

## 2021-01-01 RX ADMIN — ZINC SULFATE 220 MG (50 MG) CAPSULE 50 MG: CAPSULE at 09:09

## 2021-01-01 RX ADMIN — HEPARIN SODIUM 5000 UNITS: 10000 INJECTION INTRAVENOUS; SUBCUTANEOUS at 06:01

## 2021-01-01 RX ADMIN — PROPOFOL 50 MCG/KG/MIN: 10 INJECTION, EMULSION INTRAVENOUS at 23:04

## 2021-01-01 RX ADMIN — BUMETANIDE 1 MG: 0.25 INJECTION INTRAMUSCULAR; INTRAVENOUS at 12:23

## 2021-01-01 RX ADMIN — SUCRALFATE 1 G: 1 TABLET ORAL at 08:02

## 2021-01-01 RX ADMIN — SERTRALINE 200 MG: 100 TABLET, FILM COATED ORAL at 09:25

## 2021-01-01 RX ADMIN — TRAZODONE HYDROCHLORIDE 100 MG: 50 TABLET ORAL at 20:26

## 2021-01-01 RX ADMIN — OXYCODONE HYDROCHLORIDE AND ACETAMINOPHEN 1000 MG: 500 TABLET ORAL at 08:36

## 2021-01-01 RX ADMIN — BUMETANIDE 1 MG: 0.25 INJECTION INTRAMUSCULAR; INTRAVENOUS at 20:37

## 2021-01-01 RX ADMIN — Medication 100 MCG/HR: at 07:21

## 2021-01-01 RX ADMIN — SODIUM CHLORIDE, PRESERVATIVE FREE 10 ML: 5 INJECTION INTRAVENOUS at 08:04

## 2021-01-01 RX ADMIN — QUETIAPINE FUMARATE 200 MG: 200 TABLET ORAL at 08:35

## 2021-01-01 RX ADMIN — MINERAL OIL AND WHITE PETROLATUM: 150; 830 OINTMENT OPHTHALMIC at 03:00

## 2021-01-01 RX ADMIN — BUDESONIDE 1000 MCG: 0.5 SUSPENSION RESPIRATORY (INHALATION) at 09:21

## 2021-01-01 RX ADMIN — CISATRACURIUM BESYLATE 4 MCG/KG/MIN: 10 INJECTION, SOLUTION INTRAVENOUS at 18:08

## 2021-01-01 RX ADMIN — DEXTROSE MONOHYDRATE: 100 INJECTION, SOLUTION INTRAVENOUS at 11:36

## 2021-01-01 RX ADMIN — PANTOPRAZOLE SODIUM 40 MG: 40 INJECTION, POWDER, FOR SOLUTION INTRAVENOUS at 08:05

## 2021-01-01 RX ADMIN — CHLORHEXIDINE GLUCONATE 15 ML: 1.2 RINSE ORAL at 08:13

## 2021-01-01 RX ADMIN — CHLORHEXIDINE GLUCONATE 15 ML: 1.2 RINSE ORAL at 20:06

## 2021-01-01 RX ADMIN — CHLORHEXIDINE GLUCONATE 15 ML: 1.2 RINSE ORAL at 08:41

## 2021-01-01 RX ADMIN — OXYCODONE HYDROCHLORIDE AND ACETAMINOPHEN 1000 MG: 500 TABLET ORAL at 09:09

## 2021-01-01 RX ADMIN — BUDESONIDE 1000 MCG: 0.5 SUSPENSION RESPIRATORY (INHALATION) at 21:39

## 2021-01-01 RX ADMIN — CALCIUM GLUCONATE 1000 MG: 98 INJECTION, SOLUTION INTRAVENOUS at 06:18

## 2021-01-01 RX ADMIN — MIDAZOLAM HYDROCHLORIDE 10 MG/HR: 5 INJECTION, SOLUTION INTRAMUSCULAR; INTRAVENOUS at 16:54

## 2021-01-01 RX ADMIN — PIPERACILLIN AND TAZOBACTAM 3375 MG: 3; .375 INJECTION, POWDER, LYOPHILIZED, FOR SOLUTION INTRAVENOUS at 10:26

## 2021-01-01 RX ADMIN — MINERAL OIL AND WHITE PETROLATUM: 150; 830 OINTMENT OPHTHALMIC at 09:09

## 2021-01-01 RX ADMIN — MIDODRINE HYDROCHLORIDE 10 MG: 10 TABLET ORAL at 08:03

## 2021-01-01 RX ADMIN — BUMETANIDE 0.5 MG/HR: 0.25 INJECTION INTRAMUSCULAR; INTRAVENOUS at 12:11

## 2021-01-01 RX ADMIN — IOPAMIDOL 60 ML: 755 INJECTION, SOLUTION INTRAVENOUS at 21:54

## 2021-01-01 RX ADMIN — LANSOPRAZOLE 30 MG: KIT at 06:48

## 2021-01-01 RX ADMIN — MINERAL OIL AND WHITE PETROLATUM: 150; 830 OINTMENT OPHTHALMIC at 14:57

## 2021-01-01 RX ADMIN — MINERAL OIL AND WHITE PETROLATUM: 150; 830 OINTMENT OPHTHALMIC at 08:41

## 2021-01-01 RX ADMIN — SERTRALINE 200 MG: 100 TABLET, FILM COATED ORAL at 07:58

## 2021-01-01 RX ADMIN — ALBUMIN (HUMAN) 25 G: 0.25 INJECTION, SOLUTION INTRAVENOUS at 11:01

## 2021-01-01 RX ADMIN — SENNOSIDES AND DOCUSATE SODIUM 2 TABLET: 50; 8.6 TABLET ORAL at 20:12

## 2021-01-01 RX ADMIN — PIPERACILLIN AND TAZOBACTAM 3375 MG: 3; .375 INJECTION, POWDER, LYOPHILIZED, FOR SOLUTION INTRAVENOUS at 02:00

## 2021-01-01 RX ADMIN — POTASSIUM CHLORIDE 20 MEQ: 29.8 INJECTION, SOLUTION INTRAVENOUS at 16:04

## 2021-01-01 RX ADMIN — Medication 100 MCG/HR: at 20:02

## 2021-01-01 RX ADMIN — ATORVASTATIN CALCIUM 10 MG: 10 TABLET, FILM COATED ORAL at 11:02

## 2021-01-01 RX ADMIN — ARFORMOTEROL TARTRATE 15 MCG: 15 SOLUTION RESPIRATORY (INHALATION) at 21:01

## 2021-01-01 RX ADMIN — BUDESONIDE 1000 MCG: 0.5 SUSPENSION RESPIRATORY (INHALATION) at 09:40

## 2021-01-01 RX ADMIN — SODIUM CHLORIDE: 9 INJECTION, SOLUTION INTRAVENOUS at 05:52

## 2021-01-01 RX ADMIN — MINERAL OIL AND WHITE PETROLATUM: 150; 830 OINTMENT OPHTHALMIC at 15:12

## 2021-01-01 RX ADMIN — Medication 2000 UNITS: at 08:33

## 2021-01-01 RX ADMIN — Medication 200 MCG/HR: at 20:24

## 2021-01-01 RX ADMIN — SUCRALFATE 1 G: 1 TABLET ORAL at 11:37

## 2021-01-01 RX ADMIN — TRAZODONE HYDROCHLORIDE 100 MG: 50 TABLET ORAL at 01:45

## 2021-01-01 RX ADMIN — OXYCODONE HYDROCHLORIDE AND ACETAMINOPHEN 1000 MG: 500 TABLET ORAL at 08:39

## 2021-01-01 RX ADMIN — SUCRALFATE 1 G: 1 TABLET ORAL at 02:42

## 2021-01-01 RX ADMIN — BUDESONIDE 1000 MCG: 0.5 SUSPENSION RESPIRATORY (INHALATION) at 10:17

## 2021-01-01 RX ADMIN — SODIUM CHLORIDE: 9 INJECTION, SOLUTION INTRAVENOUS at 05:33

## 2021-01-01 RX ADMIN — OXYCODONE HYDROCHLORIDE AND ACETAMINOPHEN 1000 MG: 500 TABLET ORAL at 09:16

## 2021-01-01 RX ADMIN — HEPARIN SODIUM 5000 UNITS: 10000 INJECTION INTRAVENOUS; SUBCUTANEOUS at 13:18

## 2021-01-01 RX ADMIN — ZINC SULFATE 220 MG (50 MG) CAPSULE 50 MG: CAPSULE at 08:25

## 2021-01-01 RX ADMIN — SODIUM CHLORIDE, PRESERVATIVE FREE 10 ML: 5 INJECTION INTRAVENOUS at 20:06

## 2021-01-01 RX ADMIN — QUETIAPINE FUMARATE 200 MG: 200 TABLET ORAL at 08:56

## 2021-01-01 RX ADMIN — SODIUM CHLORIDE: 9 INJECTION, SOLUTION INTRAVENOUS at 06:18

## 2021-01-01 RX ADMIN — ACETAMINOPHEN 650 MG: 650 SUPPOSITORY RECTAL at 03:20

## 2021-01-01 RX ADMIN — ARFORMOTEROL TARTRATE 15 MCG: 15 SOLUTION RESPIRATORY (INHALATION) at 10:10

## 2021-01-01 RX ADMIN — HYDROCORTISONE SODIUM SUCCINATE 100 MG: 100 INJECTION, POWDER, FOR SOLUTION INTRAMUSCULAR; INTRAVENOUS at 16:05

## 2021-01-01 RX ADMIN — SUCRALFATE 1 G: 1 TABLET ORAL at 03:39

## 2021-01-01 RX ADMIN — Medication 150 MCG/HR: at 11:58

## 2021-01-01 RX ADMIN — ENOXAPARIN SODIUM 40 MG: 100 INJECTION SUBCUTANEOUS at 20:53

## 2021-01-01 RX ADMIN — ACETAZOLAMIDE 250 MG: 250 TABLET ORAL at 10:23

## 2021-01-01 RX ADMIN — HYDROCORTISONE SODIUM SUCCINATE 100 MG: 100 INJECTION, POWDER, FOR SOLUTION INTRAMUSCULAR; INTRAVENOUS at 16:27

## 2021-01-01 RX ADMIN — SERTRALINE 200 MG: 100 TABLET, FILM COATED ORAL at 10:04

## 2021-01-01 RX ADMIN — Medication 200 MCG/HR: at 23:01

## 2021-01-01 RX ADMIN — MIDAZOLAM HYDROCHLORIDE 10 MG/HR: 5 INJECTION, SOLUTION INTRAMUSCULAR; INTRAVENOUS at 10:31

## 2021-01-01 RX ADMIN — BUDESONIDE 1000 MCG: 0.5 SUSPENSION RESPIRATORY (INHALATION) at 21:08

## 2021-01-01 RX ADMIN — MINERAL OIL AND WHITE PETROLATUM: 150; 830 OINTMENT OPHTHALMIC at 04:00

## 2021-01-01 RX ADMIN — VANCOMYCIN HYDROCHLORIDE 750 MG: 10 INJECTION, POWDER, LYOPHILIZED, FOR SOLUTION INTRAVENOUS at 09:00

## 2021-01-01 RX ADMIN — BUDESONIDE 1000 MCG: 0.5 SUSPENSION RESPIRATORY (INHALATION) at 21:17

## 2021-01-01 RX ADMIN — BUMETANIDE 1 MG: 0.25 INJECTION INTRAMUSCULAR; INTRAVENOUS at 08:03

## 2021-01-01 RX ADMIN — CISATRACURIUM BESYLATE 2.5 MCG/KG/MIN: 10 INJECTION INTRAVENOUS at 08:30

## 2021-01-01 RX ADMIN — Medication: at 20:59

## 2021-01-01 RX ADMIN — Medication 150 MCG/HR: at 09:35

## 2021-01-01 RX ADMIN — POTASSIUM CHLORIDE 20 MEQ: 29.8 INJECTION, SOLUTION INTRAVENOUS at 17:57

## 2021-01-01 RX ADMIN — MINERAL OIL AND WHITE PETROLATUM: 150; 830 OINTMENT OPHTHALMIC at 08:35

## 2021-01-01 RX ADMIN — SODIUM CHLORIDE, PRESERVATIVE FREE 10 ML: 5 INJECTION INTRAVENOUS at 08:02

## 2021-01-01 RX ADMIN — MIDODRINE HYDROCHLORIDE 20 MG: 10 TABLET ORAL at 18:27

## 2021-01-01 RX ADMIN — PIPERACILLIN AND TAZOBACTAM 3375 MG: 3; .375 INJECTION, POWDER, LYOPHILIZED, FOR SOLUTION INTRAVENOUS at 02:30

## 2021-01-01 RX ADMIN — CHLORHEXIDINE GLUCONATE 15 ML: 1.2 RINSE ORAL at 08:35

## 2021-01-01 RX ADMIN — Medication 10 ML: at 09:07

## 2021-01-01 RX ADMIN — HEPARIN 300 UNITS: 100 SYRINGE at 08:04

## 2021-01-01 RX ADMIN — CHLORHEXIDINE GLUCONATE 15 ML: 1.2 RINSE ORAL at 20:53

## 2021-01-01 RX ADMIN — Medication 200 MCG/HR: at 05:20

## 2021-01-01 RX ADMIN — ZINC SULFATE 220 MG (50 MG) CAPSULE 50 MG: CAPSULE at 08:59

## 2021-01-01 RX ADMIN — POTASSIUM CHLORIDE 40 MEQ: 29.8 INJECTION, SOLUTION INTRAVENOUS at 04:00

## 2021-01-01 RX ADMIN — DOCUSATE SODIUM 50 MG AND SENNOSIDES 8.6 MG 2 TABLET: 8.6; 5 TABLET, FILM COATED ORAL at 09:58

## 2021-01-01 RX ADMIN — MINERAL OIL AND WHITE PETROLATUM: 150; 830 OINTMENT OPHTHALMIC at 20:04

## 2021-01-01 RX ADMIN — MIDAZOLAM HYDROCHLORIDE 10 MG/HR: 5 INJECTION, SOLUTION INTRAMUSCULAR; INTRAVENOUS at 12:46

## 2021-01-01 RX ADMIN — SUCRALFATE 1 G: 1 TABLET ORAL at 08:35

## 2021-01-01 RX ADMIN — HYDROCORTISONE SODIUM SUCCINATE 100 MG: 100 INJECTION, POWDER, FOR SOLUTION INTRAMUSCULAR; INTRAVENOUS at 03:01

## 2021-01-01 RX ADMIN — MIDODRINE HYDROCHLORIDE 20 MG: 10 TABLET ORAL at 11:21

## 2021-01-01 RX ADMIN — ATORVASTATIN CALCIUM 10 MG: 10 TABLET, FILM COATED ORAL at 08:22

## 2021-01-01 RX ADMIN — SUCRALFATE 1 G: 1 TABLET ORAL at 15:32

## 2021-01-01 RX ADMIN — HEPARIN SODIUM 5000 UNITS: 10000 INJECTION INTRAVENOUS; SUBCUTANEOUS at 05:33

## 2021-01-01 RX ADMIN — CHLORHEXIDINE GLUCONATE 15 ML: 1.2 RINSE ORAL at 21:23

## 2021-01-01 RX ADMIN — BUDESONIDE 1000 MCG: 0.5 SUSPENSION RESPIRATORY (INHALATION) at 07:34

## 2021-01-01 RX ADMIN — DEXAMETHASONE SODIUM PHOSPHATE 6 MG: 4 INJECTION, SOLUTION INTRAMUSCULAR; INTRAVENOUS at 08:35

## 2021-01-01 RX ADMIN — ENOXAPARIN SODIUM 40 MG: 100 INJECTION SUBCUTANEOUS at 08:59

## 2021-01-01 RX ADMIN — TRAZODONE HYDROCHLORIDE 100 MG: 50 TABLET ORAL at 20:40

## 2021-01-01 RX ADMIN — SUCRALFATE 1 G: 1 TABLET ORAL at 03:05

## 2021-01-01 RX ADMIN — POLYETHYLENE GLYCOL 3350 17 G: 17 POWDER, FOR SOLUTION ORAL at 09:29

## 2021-01-01 RX ADMIN — HEPARIN SODIUM 5000 UNITS: 10000 INJECTION INTRAVENOUS; SUBCUTANEOUS at 15:52

## 2021-01-01 RX ADMIN — Medication 200 MCG/HR: at 03:42

## 2021-01-01 RX ADMIN — VANCOMYCIN HYDROCHLORIDE 1000 MG: 1 INJECTION, POWDER, LYOPHILIZED, FOR SOLUTION INTRAVENOUS at 08:34

## 2021-01-01 RX ADMIN — VECURONIUM BROMIDE 10 MG: 1 INJECTION, POWDER, LYOPHILIZED, FOR SOLUTION INTRAVENOUS at 09:29

## 2021-01-01 RX ADMIN — MIDODRINE HYDROCHLORIDE 10 MG: 10 TABLET ORAL at 17:08

## 2021-01-01 RX ADMIN — CISATRACURIUM BESYLATE 3.5 MCG/KG/MIN: 10 INJECTION INTRAVENOUS at 08:22

## 2021-01-01 RX ADMIN — POLYETHYLENE GLYCOL 3350 17 G: 17 POWDER, FOR SOLUTION ORAL at 09:24

## 2021-01-01 RX ADMIN — SODIUM CHLORIDE, PRESERVATIVE FREE 10 ML: 5 INJECTION INTRAVENOUS at 09:29

## 2021-01-01 RX ADMIN — PIPERACILLIN AND TAZOBACTAM 3375 MG: 3; .375 INJECTION, POWDER, LYOPHILIZED, FOR SOLUTION INTRAVENOUS at 18:18

## 2021-01-01 RX ADMIN — HEPARIN SODIUM 5000 UNITS: 10000 INJECTION INTRAVENOUS; SUBCUTANEOUS at 21:49

## 2021-01-01 RX ADMIN — SERTRALINE 200 MG: 100 TABLET, FILM COATED ORAL at 09:27

## 2021-01-01 RX ADMIN — ARFORMOTEROL TARTRATE 15 MCG: 15 SOLUTION RESPIRATORY (INHALATION) at 21:22

## 2021-01-01 RX ADMIN — MINERAL OIL AND WHITE PETROLATUM: 150; 830 OINTMENT OPHTHALMIC at 15:01

## 2021-01-01 RX ADMIN — POTASSIUM CHLORIDE 10 MEQ: 10 INJECTION, SOLUTION INTRAVENOUS at 05:16

## 2021-01-01 RX ADMIN — Medication 2000 UNITS: at 08:03

## 2021-01-01 RX ADMIN — Medication 25 MCG/MIN: at 15:10

## 2021-01-01 RX ADMIN — OXYCODONE HYDROCHLORIDE AND ACETAMINOPHEN 1000 MG: 500 TABLET ORAL at 08:21

## 2021-01-01 RX ADMIN — SODIUM CHLORIDE, PRESERVATIVE FREE 10 ML: 5 INJECTION INTRAVENOUS at 20:05

## 2021-01-01 RX ADMIN — PANTOPRAZOLE SODIUM 40 MG: 40 INJECTION, POWDER, FOR SOLUTION INTRAVENOUS at 09:57

## 2021-01-01 RX ADMIN — ZINC SULFATE 220 MG (50 MG) CAPSULE 50 MG: CAPSULE at 09:34

## 2021-01-01 RX ADMIN — PANTOPRAZOLE SODIUM 40 MG: 40 INJECTION, POWDER, FOR SOLUTION INTRAVENOUS at 20:03

## 2021-01-01 RX ADMIN — Medication 150 MCG/HR: at 04:05

## 2021-01-01 RX ADMIN — IPRATROPIUM BROMIDE AND ALBUTEROL SULFATE 1 AMPULE: .5; 2.5 SOLUTION RESPIRATORY (INHALATION) at 13:25

## 2021-01-01 RX ADMIN — MINERAL OIL AND WHITE PETROLATUM: 150; 830 OINTMENT OPHTHALMIC at 16:26

## 2021-01-01 RX ADMIN — Medication: at 09:23

## 2021-01-01 RX ADMIN — ARFORMOTEROL TARTRATE 15 MCG: 15 SOLUTION RESPIRATORY (INHALATION) at 09:20

## 2021-01-01 RX ADMIN — CHLORHEXIDINE GLUCONATE 15 ML: 1.2 RINSE ORAL at 09:21

## 2021-01-01 RX ADMIN — HYDROCORTISONE SODIUM SUCCINATE 100 MG: 100 INJECTION, POWDER, FOR SOLUTION INTRAMUSCULAR; INTRAVENOUS at 23:00

## 2021-01-01 RX ADMIN — SODIUM CHLORIDE, PRESERVATIVE FREE 10 ML: 5 INJECTION INTRAVENOUS at 22:46

## 2021-01-01 RX ADMIN — MINERAL OIL AND WHITE PETROLATUM: 150; 830 OINTMENT OPHTHALMIC at 03:25

## 2021-01-01 RX ADMIN — SERTRALINE 200 MG: 100 TABLET, FILM COATED ORAL at 07:37

## 2021-01-01 RX ADMIN — ATORVASTATIN CALCIUM 10 MG: 10 TABLET, FILM COATED ORAL at 09:02

## 2021-01-01 RX ADMIN — MIDODRINE HYDROCHLORIDE 20 MG: 10 TABLET ORAL at 02:07

## 2021-01-01 RX ADMIN — MINERAL OIL AND WHITE PETROLATUM: 150; 830 OINTMENT OPHTHALMIC at 03:30

## 2021-01-01 RX ADMIN — QUETIAPINE FUMARATE 200 MG: 200 TABLET ORAL at 08:01

## 2021-01-01 RX ADMIN — BUDESONIDE 1000 MCG: 0.5 SUSPENSION RESPIRATORY (INHALATION) at 09:18

## 2021-01-01 RX ADMIN — Medication 10 ML: at 09:09

## 2021-01-01 RX ADMIN — Medication 100 MCG/HR: at 22:52

## 2021-01-01 RX ADMIN — CISATRACURIUM BESYLATE 4 MCG/KG/MIN: 10 INJECTION INTRAVENOUS at 20:38

## 2021-01-01 RX ADMIN — BUDESONIDE 1000 MCG: 0.5 SUSPENSION RESPIRATORY (INHALATION) at 10:31

## 2021-01-01 RX ADMIN — QUETIAPINE FUMARATE 200 MG: 200 TABLET ORAL at 08:37

## 2021-01-01 RX ADMIN — HYDROCORTISONE SODIUM SUCCINATE 100 MG: 100 INJECTION, POWDER, FOR SOLUTION INTRAMUSCULAR; INTRAVENOUS at 07:59

## 2021-01-01 RX ADMIN — Medication 2000 UNITS: at 09:07

## 2021-01-01 RX ADMIN — Medication 150 MCG/HR: at 20:00

## 2021-01-01 RX ADMIN — POTASSIUM CHLORIDE 20 MEQ: 400 INJECTION, SOLUTION INTRAVENOUS at 20:10

## 2021-01-01 RX ADMIN — CHLORHEXIDINE GLUCONATE 15 ML: 1.2 RINSE ORAL at 09:34

## 2021-01-01 RX ADMIN — POTASSIUM CHLORIDE 40 MEQ: 29.8 INJECTION, SOLUTION INTRAVENOUS at 07:43

## 2021-01-01 RX ADMIN — PIPERACILLIN AND TAZOBACTAM 3375 MG: 3; .375 INJECTION, POWDER, LYOPHILIZED, FOR SOLUTION INTRAVENOUS at 04:31

## 2021-01-01 RX ADMIN — OXYCODONE HYDROCHLORIDE AND ACETAMINOPHEN 1000 MG: 500 TABLET ORAL at 08:05

## 2021-01-01 RX ADMIN — SODIUM CHLORIDE 10 ML: 9 INJECTION INTRAMUSCULAR; INTRAVENOUS; SUBCUTANEOUS at 12:03

## 2021-01-01 RX ADMIN — BUDESONIDE 1000 MCG: 0.5 SUSPENSION RESPIRATORY (INHALATION) at 22:10

## 2021-01-01 RX ADMIN — Medication 175 MCG/HR: at 21:32

## 2021-01-01 RX ADMIN — ACETAMINOPHEN 650 MG: 325 TABLET ORAL at 20:48

## 2021-01-01 RX ADMIN — SUCRALFATE 1 G: 1 TABLET ORAL at 16:28

## 2021-01-01 RX ADMIN — HYDROCORTISONE SODIUM SUCCINATE 50 MG: 100 INJECTION, POWDER, FOR SOLUTION INTRAMUSCULAR; INTRAVENOUS at 22:36

## 2021-01-01 RX ADMIN — SODIUM CHLORIDE 1000 ML: 9 INJECTION, SOLUTION INTRAVENOUS at 20:31

## 2021-01-01 RX ADMIN — SUCRALFATE 1 G: 1 TABLET ORAL at 16:26

## 2021-01-01 RX ADMIN — Medication 10 ML: at 07:36

## 2021-01-01 RX ADMIN — SUCRALFATE 1 G: 1 TABLET ORAL at 07:59

## 2021-01-01 RX ADMIN — HYDROCORTISONE SODIUM SUCCINATE 100 MG: 100 INJECTION, POWDER, FOR SOLUTION INTRAMUSCULAR; INTRAVENOUS at 09:07

## 2021-01-01 RX ADMIN — Medication 200 MCG/HR: at 23:03

## 2021-01-01 RX ADMIN — QUETIAPINE FUMARATE 200 MG: 200 TABLET ORAL at 09:24

## 2021-01-01 RX ADMIN — MIDODRINE HYDROCHLORIDE 20 MG: 10 TABLET ORAL at 03:05

## 2021-01-01 RX ADMIN — SUCRALFATE 1 G: 1 TABLET ORAL at 03:19

## 2021-01-01 RX ADMIN — SODIUM CHLORIDE: 9 INJECTION, SOLUTION INTRAVENOUS at 13:48

## 2021-01-01 RX ADMIN — MIDODRINE HYDROCHLORIDE 15 MG: 5 TABLET ORAL at 08:02

## 2021-01-01 RX ADMIN — SODIUM CHLORIDE, PRESERVATIVE FREE 10 ML: 5 INJECTION INTRAVENOUS at 23:58

## 2021-01-01 RX ADMIN — PIPERACILLIN AND TAZOBACTAM 3375 MG: 3; .375 INJECTION, POWDER, LYOPHILIZED, FOR SOLUTION INTRAVENOUS at 09:49

## 2021-01-01 RX ADMIN — ENOXAPARIN SODIUM 40 MG: 100 INJECTION SUBCUTANEOUS at 20:40

## 2021-01-01 RX ADMIN — ATORVASTATIN CALCIUM 10 MG: 10 TABLET, FILM COATED ORAL at 09:34

## 2021-01-01 RX ADMIN — SODIUM CHLORIDE: 9 INJECTION, SOLUTION INTRAVENOUS at 22:47

## 2021-01-01 RX ADMIN — Medication: at 09:16

## 2021-01-01 RX ADMIN — HYDROCORTISONE SODIUM SUCCINATE 50 MG: 100 INJECTION, POWDER, FOR SOLUTION INTRAMUSCULAR; INTRAVENOUS at 16:01

## 2021-01-01 RX ADMIN — HEPARIN SODIUM 5000 UNITS: 10000 INJECTION INTRAVENOUS; SUBCUTANEOUS at 21:09

## 2021-01-01 RX ADMIN — ZINC SULFATE 220 MG (50 MG) CAPSULE 50 MG: CAPSULE at 08:24

## 2021-01-01 RX ADMIN — DOCUSATE SODIUM 50 MG AND SENNOSIDES 8.6 MG 2 TABLET: 8.6; 5 TABLET, FILM COATED ORAL at 07:59

## 2021-01-01 RX ADMIN — QUETIAPINE FUMARATE 200 MG: 200 TABLET ORAL at 09:09

## 2021-01-01 RX ADMIN — ARFORMOTEROL TARTRATE 15 MCG: 15 SOLUTION RESPIRATORY (INHALATION) at 20:30

## 2021-01-01 RX ADMIN — ATORVASTATIN CALCIUM 10 MG: 10 TABLET, FILM COATED ORAL at 09:07

## 2021-01-01 RX ADMIN — ARFORMOTEROL TARTRATE 15 MCG: 15 SOLUTION RESPIRATORY (INHALATION) at 08:09

## 2021-01-01 RX ADMIN — MINERAL OIL AND WHITE PETROLATUM: 150; 830 OINTMENT OPHTHALMIC at 03:01

## 2021-01-01 RX ADMIN — MINERAL OIL AND WHITE PETROLATUM: 150; 830 OINTMENT OPHTHALMIC at 20:24

## 2021-01-01 RX ADMIN — POTASSIUM CHLORIDE 40 MEQ: 29.8 INJECTION, SOLUTION INTRAVENOUS at 04:37

## 2021-01-01 RX ADMIN — HEPARIN 300 UNITS: 100 SYRINGE at 20:09

## 2021-01-01 RX ADMIN — HYDROCORTISONE SODIUM SUCCINATE 100 MG: 100 INJECTION, POWDER, FOR SOLUTION INTRAMUSCULAR; INTRAVENOUS at 12:11

## 2021-01-01 RX ADMIN — POTASSIUM CHLORIDE 40 MEQ: 400 INJECTION, SOLUTION INTRAVENOUS at 02:20

## 2021-01-01 RX ADMIN — Medication 200 MCG/HR: at 04:28

## 2021-01-01 RX ADMIN — Medication: at 20:10

## 2021-01-01 RX ADMIN — HYDROCORTISONE SODIUM SUCCINATE 100 MG: 100 INJECTION, POWDER, FOR SOLUTION INTRAMUSCULAR; INTRAVENOUS at 08:02

## 2021-01-01 RX ADMIN — POTASSIUM CHLORIDE 40 MEQ: 29.8 INJECTION, SOLUTION INTRAVENOUS at 14:50

## 2021-01-01 RX ADMIN — POTASSIUM CHLORIDE 40 MEQ: 29.8 INJECTION, SOLUTION INTRAVENOUS at 06:16

## 2021-01-01 RX ADMIN — HYDROCORTISONE SODIUM SUCCINATE 100 MG: 100 INJECTION, POWDER, FOR SOLUTION INTRAMUSCULAR; INTRAVENOUS at 20:45

## 2021-01-01 RX ADMIN — PANTOPRAZOLE SODIUM 40 MG: 40 INJECTION, POWDER, FOR SOLUTION INTRAVENOUS at 20:25

## 2021-01-01 RX ADMIN — MINERAL OIL AND WHITE PETROLATUM: 150; 830 OINTMENT OPHTHALMIC at 03:18

## 2021-01-01 RX ADMIN — ENOXAPARIN SODIUM 40 MG: 100 INJECTION SUBCUTANEOUS at 21:01

## 2021-01-01 RX ADMIN — CISATRACURIUM BESYLATE 2 MCG/KG/MIN: 10 INJECTION INTRAVENOUS at 08:26

## 2021-01-01 RX ADMIN — SUCRALFATE 1 G: 1 TABLET ORAL at 09:26

## 2021-01-01 RX ADMIN — SUCRALFATE 1 G: 1 TABLET ORAL at 15:21

## 2021-01-01 RX ADMIN — POTASSIUM CHLORIDE 10 MEQ: 10 INJECTION, SOLUTION INTRAVENOUS at 08:33

## 2021-01-01 RX ADMIN — MIDAZOLAM HYDROCHLORIDE 4 MG/HR: 5 INJECTION, SOLUTION INTRAMUSCULAR; INTRAVENOUS at 13:45

## 2021-01-01 RX ADMIN — ARFORMOTEROL TARTRATE 15 MCG: 15 SOLUTION RESPIRATORY (INHALATION) at 11:47

## 2021-01-01 RX ADMIN — HEPARIN 300 UNITS: 100 SYRINGE at 20:54

## 2021-01-01 RX ADMIN — POTASSIUM CHLORIDE 40 MEQ: 400 INJECTION, SOLUTION INTRAVENOUS at 18:27

## 2021-01-01 RX ADMIN — MIDODRINE HYDROCHLORIDE 20 MG: 10 TABLET ORAL at 11:01

## 2021-01-01 RX ADMIN — MIDAZOLAM HYDROCHLORIDE 10 MG/HR: 5 INJECTION, SOLUTION INTRAMUSCULAR; INTRAVENOUS at 22:53

## 2021-01-01 RX ADMIN — ENOXAPARIN SODIUM 40 MG: 100 INJECTION SUBCUTANEOUS at 20:36

## 2021-01-01 RX ADMIN — TRAZODONE HYDROCHLORIDE 100 MG: 50 TABLET ORAL at 20:45

## 2021-01-01 RX ADMIN — BUMETANIDE 1 MG: 0.25 INJECTION INTRAMUSCULAR; INTRAVENOUS at 08:25

## 2021-01-01 RX ADMIN — Medication 200 MCG/HR: at 23:04

## 2021-01-01 RX ADMIN — MINERAL OIL AND WHITE PETROLATUM: 150; 830 OINTMENT OPHTHALMIC at 03:08

## 2021-01-01 RX ADMIN — MINERAL OIL AND WHITE PETROLATUM: 150; 830 OINTMENT OPHTHALMIC at 14:49

## 2021-01-01 RX ADMIN — ARGATROBAN 0.5 MCG/KG/MIN: 50 INJECTION, SOLUTION INTRAVENOUS at 05:28

## 2021-01-01 RX ADMIN — MINERAL OIL AND WHITE PETROLATUM: 150; 830 OINTMENT OPHTHALMIC at 02:53

## 2021-01-01 RX ADMIN — ALBUMIN (HUMAN) 25 G: 0.25 INJECTION, SOLUTION INTRAVENOUS at 00:42

## 2021-01-01 RX ADMIN — VANCOMYCIN HYDROCHLORIDE 1000 MG: 1 INJECTION, POWDER, LYOPHILIZED, FOR SOLUTION INTRAVENOUS at 21:13

## 2021-01-01 RX ADMIN — POTASSIUM CHLORIDE 10 MEQ: 10 INJECTION, SOLUTION INTRAVENOUS at 02:08

## 2021-01-01 RX ADMIN — SUCRALFATE 1 G: 1 TABLET ORAL at 09:23

## 2021-01-01 RX ADMIN — Medication 2 MCG/MIN: at 04:02

## 2021-01-01 RX ADMIN — SENNOSIDES AND DOCUSATE SODIUM 2 TABLET: 50; 8.6 TABLET ORAL at 22:16

## 2021-01-01 RX ADMIN — POLYETHYLENE GLYCOL 3350 17 G: 17 POWDER, FOR SOLUTION ORAL at 09:27

## 2021-01-01 RX ADMIN — OXYCODONE HYDROCHLORIDE AND ACETAMINOPHEN 1000 MG: 500 TABLET ORAL at 17:08

## 2021-01-01 RX ADMIN — EPOPROSTENOL 50 NG/KG/MIN: 1.5 INJECTION, POWDER, LYOPHILIZED, FOR SOLUTION INTRAVENOUS at 22:29

## 2021-01-01 RX ADMIN — BUDESONIDE 1000 MCG: 0.5 SUSPENSION RESPIRATORY (INHALATION) at 10:24

## 2021-01-01 RX ADMIN — SODIUM CHLORIDE, PRESERVATIVE FREE 10 ML: 5 INJECTION INTRAVENOUS at 21:22

## 2021-01-01 RX ADMIN — DOCUSATE SODIUM 50 MG AND SENNOSIDES 8.6 MG 2 TABLET: 8.6; 5 TABLET, FILM COATED ORAL at 09:20

## 2021-01-01 RX ADMIN — CHLORHEXIDINE GLUCONATE 15 ML: 1.2 RINSE ORAL at 09:15

## 2021-01-01 RX ADMIN — MIDAZOLAM HYDROCHLORIDE 6 MG/HR: 5 INJECTION, SOLUTION INTRAMUSCULAR; INTRAVENOUS at 10:21

## 2021-01-01 RX ADMIN — Medication 10 ML: at 20:07

## 2021-01-01 RX ADMIN — FLUCONAZOLE IN SODIUM CHLORIDE 400 MG: 2 INJECTION, SOLUTION INTRAVENOUS at 10:27

## 2021-01-01 RX ADMIN — SODIUM CHLORIDE: 9 INJECTION, SOLUTION INTRAVENOUS at 16:09

## 2021-01-01 RX ADMIN — Medication 175 MCG/HR: at 01:00

## 2021-01-01 RX ADMIN — MINERAL OIL AND WHITE PETROLATUM: 150; 830 OINTMENT OPHTHALMIC at 20:07

## 2021-01-01 RX ADMIN — CHLORHEXIDINE GLUCONATE 15 ML: 1.2 RINSE ORAL at 08:24

## 2021-01-01 RX ADMIN — CHLORHEXIDINE GLUCONATE 15 ML: 1.2 RINSE ORAL at 09:06

## 2021-01-01 RX ADMIN — Medication 6 MCG/MIN: at 05:40

## 2021-01-01 RX ADMIN — POTASSIUM CHLORIDE 40 MEQ: 29.8 INJECTION, SOLUTION INTRAVENOUS at 22:28

## 2021-01-01 RX ADMIN — ARFORMOTEROL TARTRATE 15 MCG: 15 SOLUTION RESPIRATORY (INHALATION) at 10:17

## 2021-01-01 RX ADMIN — DOCUSATE SODIUM 50 MG AND SENNOSIDES 8.6 MG 2 TABLET: 8.6; 5 TABLET, FILM COATED ORAL at 08:41

## 2021-01-01 RX ADMIN — HEPARIN SODIUM 5000 UNITS: 10000 INJECTION INTRAVENOUS; SUBCUTANEOUS at 13:51

## 2021-01-01 RX ADMIN — POTASSIUM CHLORIDE 40 MEQ: 29.8 INJECTION, SOLUTION INTRAVENOUS at 11:27

## 2021-01-01 RX ADMIN — SUCRALFATE 1 G: 1 TABLET ORAL at 15:01

## 2021-01-01 RX ADMIN — OXYCODONE HYDROCHLORIDE AND ACETAMINOPHEN 1000 MG: 500 TABLET ORAL at 08:07

## 2021-01-01 RX ADMIN — BUMETANIDE 2 MG: 0.25 INJECTION INTRAMUSCULAR; INTRAVENOUS at 05:53

## 2021-01-01 RX ADMIN — POLYETHYLENE GLYCOL 3350 17 G: 17 POWDER, FOR SOLUTION ORAL at 08:56

## 2021-01-01 RX ADMIN — Medication 10 ML: at 09:23

## 2021-01-01 RX ADMIN — BUMETANIDE 1 MG: 0.25 INJECTION INTRAMUSCULAR; INTRAVENOUS at 10:26

## 2021-01-01 RX ADMIN — PIPERACILLIN AND TAZOBACTAM 3375 MG: 3; .375 INJECTION, POWDER, LYOPHILIZED, FOR SOLUTION INTRAVENOUS at 11:26

## 2021-01-01 RX ADMIN — SERTRALINE 200 MG: 100 TABLET, FILM COATED ORAL at 08:37

## 2021-01-01 RX ADMIN — BUDESONIDE AND FORMOTEROL FUMARATE DIHYDRATE 2 PUFF: 160; 4.5 AEROSOL RESPIRATORY (INHALATION) at 00:03

## 2021-01-01 RX ADMIN — ACETAMINOPHEN 650 MG: 325 TABLET ORAL at 20:40

## 2021-01-01 RX ADMIN — Medication 200 MCG/HR: at 19:32

## 2021-01-01 RX ADMIN — SUCRALFATE 1 G: 1 TABLET ORAL at 22:47

## 2021-01-01 RX ADMIN — HEPARIN SODIUM 5000 UNITS: 10000 INJECTION INTRAVENOUS; SUBCUTANEOUS at 20:26

## 2021-01-01 RX ADMIN — Medication 200 MCG/HR: at 09:08

## 2021-01-01 RX ADMIN — ZINC SULFATE 220 MG (50 MG) CAPSULE 50 MG: CAPSULE at 08:34

## 2021-01-01 RX ADMIN — MINERAL OIL AND WHITE PETROLATUM: 150; 830 OINTMENT OPHTHALMIC at 13:47

## 2021-01-01 RX ADMIN — ACETAMINOPHEN 650 MG: 325 TABLET ORAL at 19:01

## 2021-01-01 RX ADMIN — Medication 125 MCG/HR: at 01:27

## 2021-01-01 RX ADMIN — MIDAZOLAM HYDROCHLORIDE 10 MG/HR: 5 INJECTION, SOLUTION INTRAMUSCULAR; INTRAVENOUS at 04:09

## 2021-01-01 RX ADMIN — ALBUMIN (HUMAN) 25 G: 0.25 INJECTION, SOLUTION INTRAVENOUS at 06:06

## 2021-01-01 RX ADMIN — SUCRALFATE 1 G: 1 TABLET ORAL at 14:57

## 2021-01-01 RX ADMIN — ATORVASTATIN CALCIUM 10 MG: 10 TABLET, FILM COATED ORAL at 09:22

## 2021-01-01 RX ADMIN — CISATRACURIUM BESYLATE 2.5 MCG/KG/MIN: 10 INJECTION INTRAVENOUS at 02:15

## 2021-01-01 RX ADMIN — Medication 200 MCG/HR: at 02:30

## 2021-01-01 RX ADMIN — MIDAZOLAM 4 MG: 1 INJECTION INTRAMUSCULAR; INTRAVENOUS at 08:47

## 2021-01-01 RX ADMIN — Medication 200 MCG/HR: at 12:03

## 2021-01-01 RX ADMIN — QUETIAPINE FUMARATE 200 MG: 200 TABLET ORAL at 12:44

## 2021-01-01 RX ADMIN — HEPARIN SODIUM 5000 UNITS: 10000 INJECTION INTRAVENOUS; SUBCUTANEOUS at 22:17

## 2021-01-01 RX ADMIN — MINERAL OIL AND WHITE PETROLATUM: 150; 830 OINTMENT OPHTHALMIC at 09:57

## 2021-01-01 RX ADMIN — POTASSIUM CHLORIDE 40 MEQ: 29.8 INJECTION, SOLUTION INTRAVENOUS at 08:26

## 2021-01-01 RX ADMIN — ENOXAPARIN SODIUM 40 MG: 100 INJECTION SUBCUTANEOUS at 20:13

## 2021-01-01 RX ADMIN — ATORVASTATIN CALCIUM 10 MG: 10 TABLET, FILM COATED ORAL at 09:14

## 2021-01-01 RX ADMIN — MINERAL OIL AND WHITE PETROLATUM: 150; 830 OINTMENT OPHTHALMIC at 08:40

## 2021-01-01 RX ADMIN — Medication 2000 UNITS: at 09:15

## 2021-01-01 RX ADMIN — SERTRALINE 200 MG: 100 TABLET, FILM COATED ORAL at 11:02

## 2021-01-01 RX ADMIN — MINERAL OIL AND WHITE PETROLATUM: 150; 830 OINTMENT OPHTHALMIC at 14:19

## 2021-01-01 RX ADMIN — Medication 200 MCG/HR: at 05:38

## 2021-01-01 RX ADMIN — Medication 2000 UNITS: at 08:58

## 2021-01-01 RX ADMIN — Medication 10 ML: at 10:52

## 2021-01-01 RX ADMIN — CISATRACURIUM BESYLATE 4 MCG/KG/MIN: 10 INJECTION, SOLUTION INTRAVENOUS at 17:22

## 2021-01-01 RX ADMIN — CHLORHEXIDINE GLUCONATE 15 ML: 1.2 RINSE ORAL at 20:07

## 2021-01-01 RX ADMIN — DOCUSATE SODIUM 50 MG AND SENNOSIDES 8.6 MG 2 TABLET: 8.6; 5 TABLET, FILM COATED ORAL at 09:22

## 2021-01-01 RX ADMIN — CHLORHEXIDINE GLUCONATE 15 ML: 1.2 RINSE ORAL at 20:49

## 2021-01-01 RX ADMIN — SUCRALFATE 1 G: 1 TABLET ORAL at 17:31

## 2021-01-01 RX ADMIN — ATORVASTATIN CALCIUM 10 MG: 10 TABLET, FILM COATED ORAL at 08:21

## 2021-01-01 RX ADMIN — SODIUM CHLORIDE, PRESERVATIVE FREE 10 ML: 5 INJECTION INTRAVENOUS at 20:13

## 2021-01-01 RX ADMIN — DEXTROSE MONOHYDRATE: 100 INJECTION, SOLUTION INTRAVENOUS at 13:42

## 2021-01-01 RX ADMIN — Medication 150 MCG/HR: at 09:49

## 2021-01-01 RX ADMIN — ENOXAPARIN SODIUM 40 MG: 100 INJECTION SUBCUTANEOUS at 09:34

## 2021-01-01 RX ADMIN — Medication 8 MCG/MIN: at 06:02

## 2021-01-01 RX ADMIN — PANTOPRAZOLE SODIUM 40 MG: 40 INJECTION, POWDER, FOR SOLUTION INTRAVENOUS at 20:24

## 2021-01-01 RX ADMIN — HYDROCORTISONE SODIUM SUCCINATE 100 MG: 100 INJECTION, POWDER, FOR SOLUTION INTRAMUSCULAR; INTRAVENOUS at 15:37

## 2021-01-01 RX ADMIN — ARFORMOTEROL TARTRATE 15 MCG: 15 SOLUTION RESPIRATORY (INHALATION) at 06:04

## 2021-01-01 RX ADMIN — MINERAL OIL AND WHITE PETROLATUM: 150; 830 OINTMENT OPHTHALMIC at 14:23

## 2021-01-01 RX ADMIN — BUMETANIDE 1 MG: 0.25 INJECTION INTRAMUSCULAR; INTRAVENOUS at 09:27

## 2021-01-01 RX ADMIN — ACETAMINOPHEN 650 MG: 650 SUPPOSITORY RECTAL at 06:02

## 2021-01-01 RX ADMIN — ATORVASTATIN CALCIUM 10 MG: 10 TABLET, FILM COATED ORAL at 08:05

## 2021-01-01 RX ADMIN — SUCRALFATE 1 G: 1 TABLET ORAL at 20:08

## 2021-01-01 RX ADMIN — CHLORHEXIDINE GLUCONATE 15 ML: 1.2 RINSE ORAL at 07:35

## 2021-01-01 RX ADMIN — MINERAL OIL AND WHITE PETROLATUM: 150; 830 OINTMENT OPHTHALMIC at 14:10

## 2021-01-01 RX ADMIN — HYDROCORTISONE SODIUM SUCCINATE 100 MG: 100 INJECTION, POWDER, FOR SOLUTION INTRAMUSCULAR; INTRAVENOUS at 19:34

## 2021-01-01 RX ADMIN — CISATRACURIUM BESYLATE 3.5 MCG/KG/MIN: 10 INJECTION INTRAVENOUS at 04:26

## 2021-01-01 RX ADMIN — Medication 10 ML: at 20:32

## 2021-01-01 RX ADMIN — OXYCODONE HYDROCHLORIDE AND ACETAMINOPHEN 1000 MG: 500 TABLET ORAL at 09:20

## 2021-01-01 RX ADMIN — ZINC SULFATE 220 MG (50 MG) CAPSULE 50 MG: CAPSULE at 09:24

## 2021-01-01 RX ADMIN — TRAZODONE HYDROCHLORIDE 100 MG: 50 TABLET ORAL at 20:25

## 2021-01-01 RX ADMIN — SUCRALFATE 1 G: 1 TABLET ORAL at 20:04

## 2021-01-01 RX ADMIN — Medication 2000 UNITS: at 09:18

## 2021-01-01 RX ADMIN — MIDAZOLAM HYDROCHLORIDE 10 MG/HR: 5 INJECTION, SOLUTION INTRAMUSCULAR; INTRAVENOUS at 12:34

## 2021-01-01 RX ADMIN — SUCRALFATE 1 G: 1 TABLET ORAL at 02:48

## 2021-01-01 RX ADMIN — MINERAL OIL AND WHITE PETROLATUM: 150; 830 OINTMENT OPHTHALMIC at 21:06

## 2021-01-01 RX ADMIN — Medication 2000 UNITS: at 09:29

## 2021-01-01 RX ADMIN — MINERAL OIL AND WHITE PETROLATUM: 150; 830 OINTMENT OPHTHALMIC at 15:33

## 2021-01-01 RX ADMIN — MIDAZOLAM HYDROCHLORIDE 10 MG/HR: 5 INJECTION, SOLUTION INTRAMUSCULAR; INTRAVENOUS at 07:44

## 2021-01-01 RX ADMIN — POTASSIUM CHLORIDE 40 MEQ: 29.8 INJECTION, SOLUTION INTRAVENOUS at 09:26

## 2021-01-01 RX ADMIN — MAGNESIUM SULFATE 1000 MG: 1 INJECTION INTRAVENOUS at 23:58

## 2021-01-01 RX ADMIN — SUCRALFATE 1 G: 1 TABLET ORAL at 14:19

## 2021-01-01 RX ADMIN — HYDROCORTISONE SODIUM SUCCINATE 100 MG: 100 INJECTION, POWDER, FOR SOLUTION INTRAMUSCULAR; INTRAVENOUS at 09:34

## 2021-01-01 RX ADMIN — VANCOMYCIN HYDROCHLORIDE 750 MG: 10 INJECTION, POWDER, LYOPHILIZED, FOR SOLUTION INTRAVENOUS at 08:39

## 2021-01-01 RX ADMIN — PIPERACILLIN AND TAZOBACTAM 3375 MG: 3; .375 INJECTION, POWDER, LYOPHILIZED, FOR SOLUTION INTRAVENOUS at 09:47

## 2021-01-01 RX ADMIN — Medication 10 ML: at 08:37

## 2021-01-01 RX ADMIN — MIDAZOLAM HYDROCHLORIDE 10 MG/HR: 5 INJECTION, SOLUTION INTRAMUSCULAR; INTRAVENOUS at 12:02

## 2021-01-01 RX ADMIN — BUDESONIDE 1000 MCG: 0.5 SUSPENSION RESPIRATORY (INHALATION) at 09:58

## 2021-01-01 RX ADMIN — Medication: at 22:15

## 2021-01-01 RX ADMIN — HYDROCORTISONE SODIUM SUCCINATE 100 MG: 100 INJECTION, POWDER, FOR SOLUTION INTRAMUSCULAR; INTRAVENOUS at 20:53

## 2021-01-01 RX ADMIN — ARFORMOTEROL TARTRATE 15 MCG: 15 SOLUTION RESPIRATORY (INHALATION) at 08:55

## 2021-01-01 RX ADMIN — Medication 200 MCG/HR: at 09:54

## 2021-01-01 RX ADMIN — Medication 10 ML: at 20:11

## 2021-01-01 RX ADMIN — SERTRALINE 200 MG: 100 TABLET, FILM COATED ORAL at 08:01

## 2021-01-01 RX ADMIN — QUETIAPINE FUMARATE 200 MG: 200 TABLET ORAL at 08:21

## 2021-01-01 RX ADMIN — SODIUM CHLORIDE 10 ML: 9 INJECTION INTRAMUSCULAR; INTRAVENOUS; SUBCUTANEOUS at 08:07

## 2021-01-01 RX ADMIN — POLYETHYLENE GLYCOL 3350 17 G: 17 POWDER, FOR SOLUTION ORAL at 08:22

## 2021-01-01 RX ADMIN — ARFORMOTEROL TARTRATE 15 MCG: 15 SOLUTION RESPIRATORY (INHALATION) at 09:25

## 2021-01-01 RX ADMIN — ARFORMOTEROL TARTRATE 15 MCG: 15 SOLUTION RESPIRATORY (INHALATION) at 22:12

## 2021-01-01 RX ADMIN — PANTOPRAZOLE SODIUM 40 MG: 40 INJECTION, POWDER, FOR SOLUTION INTRAVENOUS at 20:06

## 2021-01-01 RX ADMIN — QUETIAPINE FUMARATE 200 MG: 200 TABLET ORAL at 09:19

## 2021-01-01 RX ADMIN — PANTOPRAZOLE SODIUM 40 MG: 40 INJECTION, POWDER, FOR SOLUTION INTRAVENOUS at 20:37

## 2021-01-01 RX ADMIN — SODIUM CHLORIDE: 9 INJECTION, SOLUTION INTRAVENOUS at 14:10

## 2021-01-01 RX ADMIN — MIDAZOLAM HYDROCHLORIDE 10 MG/HR: 5 INJECTION, SOLUTION INTRAMUSCULAR; INTRAVENOUS at 05:38

## 2021-01-01 RX ADMIN — PANTOPRAZOLE SODIUM 40 MG: 40 INJECTION, POWDER, FOR SOLUTION INTRAVENOUS at 09:07

## 2021-01-01 RX ADMIN — MIDAZOLAM HYDROCHLORIDE 10 MG/HR: 5 INJECTION, SOLUTION INTRAMUSCULAR; INTRAVENOUS at 02:40

## 2021-01-01 RX ADMIN — DEXTROSE MONOHYDRATE: 100 INJECTION, SOLUTION INTRAVENOUS at 04:29

## 2021-01-01 RX ADMIN — SUCRALFATE 1 G: 1 TABLET ORAL at 09:16

## 2021-01-01 RX ADMIN — CISATRACURIUM BESYLATE 3 MCG/KG/MIN: 10 INJECTION INTRAVENOUS at 12:57

## 2021-01-01 RX ADMIN — SUCRALFATE 1 G: 1 TABLET ORAL at 08:04

## 2021-01-01 RX ADMIN — ARFORMOTEROL TARTRATE 15 MCG: 15 SOLUTION RESPIRATORY (INHALATION) at 21:52

## 2021-01-01 RX ADMIN — SUCRALFATE 1 G: 1 TABLET ORAL at 09:06

## 2021-01-01 RX ADMIN — QUETIAPINE FUMARATE 200 MG: 200 TABLET ORAL at 08:22

## 2021-01-01 RX ADMIN — TRAZODONE HYDROCHLORIDE 100 MG: 50 TABLET ORAL at 20:29

## 2021-01-01 RX ADMIN — ARFORMOTEROL TARTRATE 15 MCG: 15 SOLUTION RESPIRATORY (INHALATION) at 22:30

## 2021-01-01 RX ADMIN — MINERAL OIL AND WHITE PETROLATUM: 150; 830 OINTMENT OPHTHALMIC at 09:23

## 2021-01-01 RX ADMIN — Medication 150 MCG/HR: at 13:22

## 2021-01-01 RX ADMIN — POTASSIUM CHLORIDE 20 MEQ: 400 INJECTION, SOLUTION INTRAVENOUS at 03:37

## 2021-01-01 RX ADMIN — DOCUSATE SODIUM 50 MG AND SENNOSIDES 8.6 MG 2 TABLET: 8.6; 5 TABLET, FILM COATED ORAL at 08:05

## 2021-01-01 RX ADMIN — PANTOPRAZOLE SODIUM 40 MG: 40 INJECTION, POWDER, FOR SOLUTION INTRAVENOUS at 09:29

## 2021-01-01 RX ADMIN — MIDODRINE HYDROCHLORIDE 20 MG: 10 TABLET ORAL at 02:48

## 2021-01-01 RX ADMIN — MIDODRINE HYDROCHLORIDE 15 MG: 5 TABLET ORAL at 18:00

## 2021-01-01 RX ADMIN — MIDODRINE HYDROCHLORIDE 20 MG: 10 TABLET ORAL at 03:36

## 2021-01-01 RX ADMIN — Medication 200 MCG/HR: at 06:38

## 2021-01-01 RX ADMIN — HYDROCORTISONE SODIUM SUCCINATE 100 MG: 100 INJECTION, POWDER, FOR SOLUTION INTRAMUSCULAR; INTRAVENOUS at 02:06

## 2021-01-01 RX ADMIN — BUDESONIDE AND FORMOTEROL FUMARATE DIHYDRATE 2 PUFF: 160; 4.5 AEROSOL RESPIRATORY (INHALATION) at 11:06

## 2021-01-01 RX ADMIN — CALCIUM GLUCONATE 1000 MG: 98 INJECTION, SOLUTION INTRAVENOUS at 08:26

## 2021-01-01 RX ADMIN — SODIUM CHLORIDE 1000 ML: 9 INJECTION, SOLUTION INTRAVENOUS at 21:15

## 2021-01-01 RX ADMIN — SODIUM CHLORIDE: 9 INJECTION, SOLUTION INTRAVENOUS at 21:23

## 2021-01-01 RX ADMIN — Medication 10 MCG/MIN: at 09:16

## 2021-01-01 RX ADMIN — POLYETHYLENE GLYCOL 3350 17 G: 17 POWDER, FOR SOLUTION ORAL at 08:27

## 2021-01-01 RX ADMIN — DOCUSATE SODIUM 50 MG AND SENNOSIDES 8.6 MG 2 TABLET: 8.6; 5 TABLET, FILM COATED ORAL at 08:34

## 2021-01-01 RX ADMIN — ARFORMOTEROL TARTRATE 15 MCG: 15 SOLUTION RESPIRATORY (INHALATION) at 08:28

## 2021-01-01 RX ADMIN — SENNOSIDES AND DOCUSATE SODIUM 2 TABLET: 50; 8.6 TABLET ORAL at 08:07

## 2021-01-01 RX ADMIN — SERTRALINE 200 MG: 100 TABLET, FILM COATED ORAL at 08:40

## 2021-01-01 RX ADMIN — BUMETANIDE 1 MG: 0.25 INJECTION INTRAMUSCULAR; INTRAVENOUS at 09:00

## 2021-01-01 RX ADMIN — IPRATROPIUM BROMIDE AND ALBUTEROL SULFATE 1 AMPULE: .5; 2.5 SOLUTION RESPIRATORY (INHALATION) at 09:12

## 2021-01-01 RX ADMIN — ENOXAPARIN SODIUM 40 MG: 100 INJECTION SUBCUTANEOUS at 21:03

## 2021-01-01 RX ADMIN — SENNOSIDES AND DOCUSATE SODIUM 2 TABLET: 50; 8.6 TABLET ORAL at 08:37

## 2021-01-01 RX ADMIN — BUDESONIDE 1000 MCG: 0.5 SUSPENSION RESPIRATORY (INHALATION) at 10:14

## 2021-01-01 RX ADMIN — Medication 10 ML: at 08:08

## 2021-01-01 RX ADMIN — PANTOPRAZOLE SODIUM 40 MG: 40 INJECTION, POWDER, FOR SOLUTION INTRAVENOUS at 20:22

## 2021-01-01 RX ADMIN — SERTRALINE 200 MG: 100 TABLET, FILM COATED ORAL at 08:05

## 2021-01-01 RX ADMIN — CHLORHEXIDINE GLUCONATE 15 ML: 1.2 RINSE ORAL at 09:29

## 2021-01-01 RX ADMIN — MINERAL OIL AND WHITE PETROLATUM: 150; 830 OINTMENT OPHTHALMIC at 07:35

## 2021-01-01 RX ADMIN — Medication 200 MCG/HR: at 05:41

## 2021-01-01 RX ADMIN — ARGATROBAN 0.5 MCG/KG/MIN: 50 INJECTION, SOLUTION INTRAVENOUS at 06:35

## 2021-01-01 RX ADMIN — MINERAL OIL AND WHITE PETROLATUM: 150; 830 OINTMENT OPHTHALMIC at 15:37

## 2021-01-01 RX ADMIN — SODIUM CHLORIDE: 9 INJECTION, SOLUTION INTRAVENOUS at 21:21

## 2021-01-01 RX ADMIN — POLYETHYLENE GLYCOL 3350 17 G: 17 POWDER, FOR SOLUTION ORAL at 20:31

## 2021-01-01 RX ADMIN — BUMETANIDE 2 MG: 0.25 INJECTION INTRAMUSCULAR; INTRAVENOUS at 04:00

## 2021-01-01 RX ADMIN — ATORVASTATIN CALCIUM 10 MG: 10 TABLET, FILM COATED ORAL at 08:37

## 2021-01-01 RX ADMIN — Medication 175 MCG/HR: at 15:42

## 2021-01-01 RX ADMIN — CHLORHEXIDINE GLUCONATE 15 ML: 1.2 RINSE ORAL at 07:58

## 2021-01-01 RX ADMIN — POLYETHYLENE GLYCOL 3350 17 G: 17 POWDER, FOR SOLUTION ORAL at 08:58

## 2021-01-01 RX ADMIN — PANTOPRAZOLE SODIUM 40 MG: 40 INJECTION, POWDER, FOR SOLUTION INTRAVENOUS at 08:59

## 2021-01-01 RX ADMIN — HEPARIN SODIUM 5000 UNITS: 10000 INJECTION INTRAVENOUS; SUBCUTANEOUS at 15:01

## 2021-01-01 RX ADMIN — MINERAL OIL AND WHITE PETROLATUM: 150; 830 OINTMENT OPHTHALMIC at 20:11

## 2021-01-01 RX ADMIN — BUDESONIDE 1000 MCG: 0.5 SUSPENSION RESPIRATORY (INHALATION) at 11:47

## 2021-01-01 RX ADMIN — PIPERACILLIN AND TAZOBACTAM 3375 MG: 3; .375 INJECTION, POWDER, LYOPHILIZED, FOR SOLUTION INTRAVENOUS at 03:18

## 2021-01-01 RX ADMIN — ZINC SULFATE 220 MG (50 MG) CAPSULE 50 MG: CAPSULE at 08:39

## 2021-01-01 RX ADMIN — VANCOMYCIN HYDROCHLORIDE 750 MG: 10 INJECTION, POWDER, LYOPHILIZED, FOR SOLUTION INTRAVENOUS at 08:28

## 2021-01-01 RX ADMIN — HYDROCORTISONE SODIUM SUCCINATE 50 MG: 100 INJECTION, POWDER, FOR SOLUTION INTRAMUSCULAR; INTRAVENOUS at 08:07

## 2021-01-01 RX ADMIN — OXYCODONE HYDROCHLORIDE AND ACETAMINOPHEN 1000 MG: 500 TABLET ORAL at 07:34

## 2021-01-01 RX ADMIN — ZINC SULFATE 220 MG (50 MG) CAPSULE 50 MG: CAPSULE at 08:21

## 2021-01-01 RX ADMIN — Medication: at 08:36

## 2021-01-01 RX ADMIN — TRAZODONE HYDROCHLORIDE 100 MG: 50 TABLET ORAL at 20:15

## 2021-01-01 RX ADMIN — Medication 200 MCG/HR: at 18:53

## 2021-01-01 RX ADMIN — DEXTROSE MONOHYDRATE: 100 INJECTION, SOLUTION INTRAVENOUS at 02:54

## 2021-01-01 RX ADMIN — SUCRALFATE 1 G: 1 TABLET ORAL at 20:54

## 2021-01-01 RX ADMIN — CHLORHEXIDINE GLUCONATE 15 ML: 1.2 RINSE ORAL at 20:38

## 2021-01-01 RX ADMIN — CHLORHEXIDINE GLUCONATE 15 ML: 1.2 RINSE ORAL at 20:41

## 2021-01-01 RX ADMIN — PIPERACILLIN AND TAZOBACTAM 3375 MG: 3; .375 INJECTION, POWDER, LYOPHILIZED, FOR SOLUTION INTRAVENOUS at 18:04

## 2021-01-01 RX ADMIN — ATORVASTATIN CALCIUM 10 MG: 10 TABLET, FILM COATED ORAL at 09:09

## 2021-01-01 RX ADMIN — SUCRALFATE 1 G: 1 TABLET ORAL at 17:08

## 2021-01-01 RX ADMIN — Medication 150 MCG/HR: at 05:28

## 2021-01-01 RX ADMIN — ARFORMOTEROL TARTRATE 15 MCG: 15 SOLUTION RESPIRATORY (INHALATION) at 09:57

## 2021-01-01 RX ADMIN — HYDROCORTISONE SODIUM SUCCINATE 50 MG: 100 INJECTION, POWDER, FOR SOLUTION INTRAMUSCULAR; INTRAVENOUS at 23:58

## 2021-01-01 RX ADMIN — ARFORMOTEROL TARTRATE 15 MCG: 15 SOLUTION RESPIRATORY (INHALATION) at 08:38

## 2021-01-01 RX ADMIN — MAGNESIUM SULFATE HEPTAHYDRATE 1000 MG: 1 INJECTION, SOLUTION INTRAVENOUS at 12:56

## 2021-01-01 RX ADMIN — Medication 10 ML: at 20:50

## 2021-01-01 RX ADMIN — SUCRALFATE 1 G: 1 TABLET ORAL at 20:24

## 2021-01-01 RX ADMIN — SODIUM CHLORIDE: 9 INJECTION, SOLUTION INTRAVENOUS at 21:40

## 2021-01-01 RX ADMIN — SERTRALINE 200 MG: 100 TABLET, FILM COATED ORAL at 08:57

## 2021-01-01 RX ADMIN — CISATRACURIUM BESYLATE 4 MCG/KG/MIN: 10 INJECTION INTRAVENOUS at 08:41

## 2021-01-01 RX ADMIN — PANTOPRAZOLE SODIUM 40 MG: 40 TABLET, DELAYED RELEASE ORAL at 06:01

## 2021-01-01 RX ADMIN — PIPERACILLIN AND TAZOBACTAM 3375 MG: 3; .375 INJECTION, POWDER, LYOPHILIZED, FOR SOLUTION INTRAVENOUS at 18:01

## 2021-01-01 RX ADMIN — SERTRALINE 200 MG: 100 TABLET, FILM COATED ORAL at 08:42

## 2021-01-01 RX ADMIN — ATORVASTATIN CALCIUM 10 MG: 10 TABLET, FILM COATED ORAL at 07:58

## 2021-01-01 RX ADMIN — CHLORHEXIDINE GLUCONATE 15 ML: 1.2 RINSE ORAL at 08:03

## 2021-01-01 RX ADMIN — Medication 10 ML: at 09:59

## 2021-01-01 RX ADMIN — Medication: at 22:44

## 2021-01-01 RX ADMIN — SODIUM CHLORIDE 1000 ML: 9 INJECTION, SOLUTION INTRAVENOUS at 19:05

## 2021-01-01 RX ADMIN — Medication 10 ML: at 21:21

## 2021-01-01 RX ADMIN — ARFORMOTEROL TARTRATE 15 MCG: 15 SOLUTION RESPIRATORY (INHALATION) at 09:03

## 2021-01-01 RX ADMIN — POTASSIUM CHLORIDE 40 MEQ: 29.8 INJECTION, SOLUTION INTRAVENOUS at 16:41

## 2021-01-01 RX ADMIN — IPRATROPIUM BROMIDE AND ALBUTEROL SULFATE 1 AMPULE: .5; 2.5 SOLUTION RESPIRATORY (INHALATION) at 01:48

## 2021-01-01 RX ADMIN — MIDODRINE HYDROCHLORIDE 20 MG: 10 TABLET ORAL at 11:41

## 2021-01-01 RX ADMIN — Medication 175 MCG/HR: at 05:16

## 2021-01-01 RX ADMIN — MINERAL OIL AND WHITE PETROLATUM: 150; 830 OINTMENT OPHTHALMIC at 20:36

## 2021-01-01 RX ADMIN — SERTRALINE 200 MG: 100 TABLET, FILM COATED ORAL at 08:03

## 2021-01-01 RX ADMIN — PANTOPRAZOLE SODIUM 40 MG: 40 INJECTION, POWDER, FOR SOLUTION INTRAVENOUS at 08:02

## 2021-01-01 RX ADMIN — Medication 200 MCG/HR: at 20:37

## 2021-01-01 RX ADMIN — ARFORMOTEROL TARTRATE 15 MCG: 15 SOLUTION RESPIRATORY (INHALATION) at 07:35

## 2021-01-01 RX ADMIN — ARFORMOTEROL TARTRATE 15 MCG: 15 SOLUTION RESPIRATORY (INHALATION) at 21:59

## 2021-01-01 RX ADMIN — Medication 2 MCG/MIN: at 05:09

## 2021-01-01 RX ADMIN — BUDESONIDE 1000 MCG: 0.5 SUSPENSION RESPIRATORY (INHALATION) at 21:28

## 2021-01-01 RX ADMIN — SODIUM CHLORIDE, PRESERVATIVE FREE 10 ML: 5 INJECTION INTRAVENOUS at 20:54

## 2021-01-01 RX ADMIN — SODIUM CHLORIDE 30 ML: 9 INJECTION, SOLUTION INTRAVENOUS at 19:25

## 2021-01-01 RX ADMIN — HYDROCORTISONE SODIUM SUCCINATE 100 MG: 100 INJECTION, POWDER, FOR SOLUTION INTRAMUSCULAR; INTRAVENOUS at 17:08

## 2021-01-01 RX ADMIN — Medication 150 MCG/HR: at 15:56

## 2021-01-01 RX ADMIN — CHLORHEXIDINE GLUCONATE 15 ML: 1.2 RINSE ORAL at 08:14

## 2021-01-01 RX ADMIN — TRAZODONE HYDROCHLORIDE 100 MG: 50 TABLET ORAL at 20:12

## 2021-01-01 RX ADMIN — IPRATROPIUM BROMIDE AND ALBUTEROL SULFATE 1 AMPULE: .5; 2.5 SOLUTION RESPIRATORY (INHALATION) at 21:40

## 2021-01-01 RX ADMIN — MIDAZOLAM HYDROCHLORIDE 10 MG/HR: 5 INJECTION, SOLUTION INTRAMUSCULAR; INTRAVENOUS at 17:01

## 2021-01-01 RX ADMIN — OXYCODONE HYDROCHLORIDE AND ACETAMINOPHEN 1000 MG: 500 TABLET ORAL at 07:58

## 2021-01-01 RX ADMIN — SERTRALINE 200 MG: 100 TABLET, FILM COATED ORAL at 09:14

## 2021-01-01 RX ADMIN — ENOXAPARIN SODIUM 40 MG: 100 INJECTION SUBCUTANEOUS at 19:47

## 2021-01-01 RX ADMIN — CALCIUM GLUCONATE 1000 MG: 98 INJECTION, SOLUTION INTRAVENOUS at 12:56

## 2021-01-01 RX ADMIN — Medication 200 MCG/HR: at 22:53

## 2021-01-01 RX ADMIN — Medication: at 09:08

## 2021-01-01 RX ADMIN — Medication 2000 UNITS: at 09:59

## 2021-01-01 RX ADMIN — Medication 200 MCG/HR: at 14:02

## 2021-01-01 RX ADMIN — CISATRACURIUM BESYLATE 3 MCG/KG/MIN: 10 INJECTION INTRAVENOUS at 08:03

## 2021-01-01 RX ADMIN — Medication 200 MCG/HR: at 01:47

## 2021-01-01 RX ADMIN — Medication 10 ML: at 20:10

## 2021-01-01 RX ADMIN — ARGATROBAN 0.5 MCG/KG/MIN: 50 INJECTION, SOLUTION INTRAVENOUS at 08:26

## 2021-01-01 RX ADMIN — CHLORHEXIDINE GLUCONATE 15 ML: 1.2 RINSE ORAL at 20:13

## 2021-01-01 RX ADMIN — MINERAL OIL AND WHITE PETROLATUM: 150; 830 OINTMENT OPHTHALMIC at 02:06

## 2021-01-01 RX ADMIN — SUCRALFATE 1 G: 1 TABLET ORAL at 04:00

## 2021-01-01 RX ADMIN — HYDROCORTISONE SODIUM SUCCINATE 100 MG: 100 INJECTION, POWDER, FOR SOLUTION INTRAMUSCULAR; INTRAVENOUS at 20:00

## 2021-01-01 RX ADMIN — ARFORMOTEROL TARTRATE 15 MCG: 15 SOLUTION RESPIRATORY (INHALATION) at 08:37

## 2021-01-01 RX ADMIN — Medication 200 MCG/HR: at 09:22

## 2021-01-01 RX ADMIN — DEXAMETHASONE SODIUM PHOSPHATE 6 MG: 4 INJECTION, SOLUTION INTRAMUSCULAR; INTRAVENOUS at 12:06

## 2021-01-01 RX ADMIN — ARFORMOTEROL TARTRATE 15 MCG: 15 SOLUTION RESPIRATORY (INHALATION) at 21:08

## 2021-01-01 RX ADMIN — DEXTROSE MONOHYDRATE: 50 INJECTION, SOLUTION INTRAVENOUS at 01:45

## 2021-01-01 RX ADMIN — PANTOPRAZOLE SODIUM 40 MG: 40 INJECTION, POWDER, FOR SOLUTION INTRAVENOUS at 08:37

## 2021-01-01 RX ADMIN — CEFTRIAXONE 1000 MG: 1 INJECTION, POWDER, FOR SOLUTION INTRAMUSCULAR; INTRAVENOUS at 22:26

## 2021-01-01 RX ADMIN — DOCUSATE SODIUM 50 MG AND SENNOSIDES 8.6 MG 2 TABLET: 8.6; 5 TABLET, FILM COATED ORAL at 09:34

## 2021-01-01 RX ADMIN — CISATRACURIUM BESYLATE 4 MCG/KG/MIN: 10 INJECTION INTRAVENOUS at 21:12

## 2021-01-01 RX ADMIN — Medication 2000 UNITS: at 11:02

## 2021-01-01 RX ADMIN — BUDESONIDE 1000 MCG: 0.5 SUSPENSION RESPIRATORY (INHALATION) at 10:10

## 2021-01-01 RX ADMIN — MIDAZOLAM HYDROCHLORIDE 4 MG/HR: 5 INJECTION, SOLUTION INTRAMUSCULAR; INTRAVENOUS at 10:03

## 2021-01-01 RX ADMIN — METHYLNALTREXONE BROMIDE 12 MG: 12 INJECTION, SOLUTION SUBCUTANEOUS at 12:35

## 2021-01-01 RX ADMIN — HEPARIN SODIUM 5000 UNITS: 10000 INJECTION INTRAVENOUS; SUBCUTANEOUS at 22:10

## 2021-01-01 RX ADMIN — Medication 200 MCG/HR: at 03:36

## 2021-01-01 RX ADMIN — ARFORMOTEROL TARTRATE 15 MCG: 15 SOLUTION RESPIRATORY (INHALATION) at 09:49

## 2021-01-01 RX ADMIN — CHLORHEXIDINE GLUCONATE 15 ML: 1.2 RINSE ORAL at 20:26

## 2021-01-01 RX ADMIN — MIDODRINE HYDROCHLORIDE 15 MG: 5 TABLET ORAL at 17:14

## 2021-01-01 RX ADMIN — BUDESONIDE 1000 MCG: 0.5 SUSPENSION RESPIRATORY (INHALATION) at 10:20

## 2021-01-01 RX ADMIN — SUCRALFATE 1 G: 1 TABLET ORAL at 02:55

## 2021-01-01 RX ADMIN — Medication 2000 UNITS: at 08:39

## 2021-01-01 RX ADMIN — HEPARIN SODIUM 5000 UNITS: 10000 INJECTION INTRAVENOUS; SUBCUTANEOUS at 14:52

## 2021-01-01 RX ADMIN — CISATRACURIUM BESYLATE 2 MCG/KG/MIN: 10 INJECTION INTRAVENOUS at 10:16

## 2021-01-01 RX ADMIN — MIDAZOLAM HYDROCHLORIDE 4 MG/HR: 5 INJECTION, SOLUTION INTRAMUSCULAR; INTRAVENOUS at 17:21

## 2021-01-01 RX ADMIN — SERTRALINE 200 MG: 100 TABLET, FILM COATED ORAL at 12:56

## 2021-01-01 RX ADMIN — SENNOSIDES AND DOCUSATE SODIUM 2 TABLET: 50; 8.6 TABLET ORAL at 20:32

## 2021-01-01 RX ADMIN — CALCIUM GLUCONATE 1000 MG: 98 INJECTION, SOLUTION INTRAVENOUS at 08:04

## 2021-01-01 RX ADMIN — SUCRALFATE 1 G: 1 TABLET ORAL at 20:03

## 2021-01-01 RX ADMIN — PIPERACILLIN AND TAZOBACTAM 3375 MG: 3; .375 INJECTION, POWDER, LYOPHILIZED, FOR SOLUTION INTRAVENOUS at 01:26

## 2021-01-01 RX ADMIN — DOCUSATE SODIUM 50 MG AND SENNOSIDES 8.6 MG 2 TABLET: 8.6; 5 TABLET, FILM COATED ORAL at 08:23

## 2021-01-01 RX ADMIN — BUMETANIDE 2 MG: 0.25 INJECTION INTRAMUSCULAR; INTRAVENOUS at 14:29

## 2021-01-01 RX ADMIN — IPRATROPIUM BROMIDE AND ALBUTEROL SULFATE 1 AMPULE: .5; 2.5 SOLUTION RESPIRATORY (INHALATION) at 12:48

## 2021-01-01 RX ADMIN — SUCRALFATE 1 G: 1 TABLET ORAL at 16:00

## 2021-01-01 RX ADMIN — VECURONIUM BROMIDE 10 MG: 1 INJECTION, POWDER, LYOPHILIZED, FOR SOLUTION INTRAVENOUS at 23:19

## 2021-01-01 RX ADMIN — Medication 2000 UNITS: at 08:24

## 2021-01-01 RX ADMIN — POTASSIUM CHLORIDE 40 MEQ: 29.8 INJECTION, SOLUTION INTRAVENOUS at 14:59

## 2021-01-01 RX ADMIN — PANTOPRAZOLE SODIUM 40 MG: 40 INJECTION, POWDER, FOR SOLUTION INTRAVENOUS at 20:54

## 2021-01-01 RX ADMIN — MINERAL OIL AND WHITE PETROLATUM: 150; 830 OINTMENT OPHTHALMIC at 17:12

## 2021-01-01 RX ADMIN — SODIUM CHLORIDE 1000 ML: 9 INJECTION, SOLUTION INTRAVENOUS at 12:00

## 2021-01-01 RX ADMIN — HYDROCORTISONE SODIUM SUCCINATE 50 MG: 100 INJECTION, POWDER, FOR SOLUTION INTRAMUSCULAR; INTRAVENOUS at 08:35

## 2021-01-01 RX ADMIN — CISATRACURIUM BESYLATE 4 MCG/KG/MIN: 10 INJECTION INTRAVENOUS at 09:50

## 2021-01-01 RX ADMIN — Medication 2000 UNITS: at 08:11

## 2021-01-01 RX ADMIN — HEPARIN 300 UNITS: 100 SYRINGE at 09:10

## 2021-01-01 RX ADMIN — CHLORHEXIDINE GLUCONATE 15 ML: 1.2 RINSE ORAL at 09:56

## 2021-01-01 RX ADMIN — TRAZODONE HYDROCHLORIDE 100 MG: 50 TABLET ORAL at 21:00

## 2021-01-01 RX ADMIN — EPOPROSTENOL 50 NG/KG/MIN: 1.5 INJECTION, POWDER, LYOPHILIZED, FOR SOLUTION INTRAVENOUS at 02:59

## 2021-01-01 RX ADMIN — MINERAL OIL AND WHITE PETROLATUM: 150; 830 OINTMENT OPHTHALMIC at 11:11

## 2021-01-01 RX ADMIN — MIDAZOLAM HYDROCHLORIDE 4 MG/HR: 5 INJECTION, SOLUTION INTRAMUSCULAR; INTRAVENOUS at 17:08

## 2021-01-01 RX ADMIN — DEXTROSE, SODIUM CHLORIDE, AND POTASSIUM CHLORIDE: 5; .45; .15 INJECTION INTRAVENOUS at 19:02

## 2021-01-01 RX ADMIN — Medication 10 ML: at 08:00

## 2021-01-01 RX ADMIN — SUCRALFATE 1 G: 1 TABLET ORAL at 09:07

## 2021-01-01 RX ADMIN — BUMETANIDE 2 MG: 0.25 INJECTION INTRAMUSCULAR; INTRAVENOUS at 08:07

## 2021-01-01 RX ADMIN — ATORVASTATIN CALCIUM 10 MG: 10 TABLET, FILM COATED ORAL at 12:55

## 2021-01-01 RX ADMIN — Medication 2000 UNITS: at 08:25

## 2021-01-01 RX ADMIN — Medication 6 MCG/MIN: at 16:17

## 2021-01-01 RX ADMIN — Medication 200 MCG/HR: at 18:08

## 2021-01-01 RX ADMIN — HYDROCORTISONE SODIUM SUCCINATE 100 MG: 100 INJECTION, POWDER, FOR SOLUTION INTRAMUSCULAR; INTRAVENOUS at 00:37

## 2021-01-01 RX ADMIN — CHLORHEXIDINE GLUCONATE 15 ML: 1.2 RINSE ORAL at 20:12

## 2021-01-01 RX ADMIN — HYDROCORTISONE SODIUM SUCCINATE 100 MG: 100 INJECTION, POWDER, FOR SOLUTION INTRAMUSCULAR; INTRAVENOUS at 23:39

## 2021-01-01 RX ADMIN — Medication 50 MCG/HR: at 08:47

## 2021-01-01 RX ADMIN — Medication 200 MCG/HR: at 20:09

## 2021-01-01 RX ADMIN — PIPERACILLIN AND TAZOBACTAM 3375 MG: 3; .375 INJECTION, POWDER, LYOPHILIZED, FOR SOLUTION INTRAVENOUS at 20:10

## 2021-01-01 RX ADMIN — BUDESONIDE 1000 MCG: 0.5 SUSPENSION RESPIRATORY (INHALATION) at 20:21

## 2021-01-01 RX ADMIN — CHLORHEXIDINE GLUCONATE 15 ML: 1.2 RINSE ORAL at 20:20

## 2021-01-01 RX ADMIN — Medication 100 MCG/HR: at 16:53

## 2021-01-01 RX ADMIN — Medication: at 09:35

## 2021-01-01 RX ADMIN — MAGNESIUM SULFATE 2000 MG: 2 INJECTION INTRAVENOUS at 18:47

## 2021-01-01 RX ADMIN — LANSOPRAZOLE 30 MG: KIT at 05:22

## 2021-01-01 RX ADMIN — BUMETANIDE 1 MG: 0.25 INJECTION INTRAMUSCULAR; INTRAVENOUS at 20:55

## 2021-01-01 RX ADMIN — ZINC SULFATE 220 MG (50 MG) CAPSULE 50 MG: CAPSULE at 10:54

## 2021-01-01 RX ADMIN — VANCOMYCIN HYDROCHLORIDE 2000 MG: 10 INJECTION, POWDER, LYOPHILIZED, FOR SOLUTION INTRAVENOUS at 12:32

## 2021-01-01 RX ADMIN — SUCRALFATE 1 G: 1 TABLET ORAL at 09:59

## 2021-01-01 RX ADMIN — MINERAL OIL AND WHITE PETROLATUM: 150; 830 OINTMENT OPHTHALMIC at 09:25

## 2021-01-01 RX ADMIN — Medication 2000 UNITS: at 08:43

## 2021-01-01 RX ADMIN — MAGNESIUM SULFATE HEPTAHYDRATE 2000 MG: 2 INJECTION, SOLUTION INTRAVENOUS at 07:39

## 2021-01-01 RX ADMIN — PIPERACILLIN AND TAZOBACTAM 3375 MG: 3; .375 INJECTION, POWDER, LYOPHILIZED, FOR SOLUTION INTRAVENOUS at 11:41

## 2021-01-01 RX ADMIN — TRAZODONE HYDROCHLORIDE 100 MG: 50 TABLET ORAL at 22:16

## 2021-01-01 RX ADMIN — PIPERACILLIN AND TAZOBACTAM 3375 MG: 3; .375 INJECTION, POWDER, LYOPHILIZED, FOR SOLUTION INTRAVENOUS at 20:09

## 2021-01-01 RX ADMIN — HEPARIN SODIUM 5000 UNITS: 10000 INJECTION INTRAVENOUS; SUBCUTANEOUS at 14:24

## 2021-01-01 RX ADMIN — HEPARIN SODIUM 5000 UNITS: 10000 INJECTION INTRAVENOUS; SUBCUTANEOUS at 05:34

## 2021-01-01 RX ADMIN — Medication 2000 UNITS: at 08:38

## 2021-01-01 RX ADMIN — SUCRALFATE 1 G: 1 TABLET ORAL at 19:47

## 2021-01-01 RX ADMIN — TRAZODONE HYDROCHLORIDE 100 MG: 50 TABLET ORAL at 22:44

## 2021-01-01 RX ADMIN — BUMETANIDE 2 MG: 0.25 INJECTION INTRAMUSCULAR; INTRAVENOUS at 11:06

## 2021-01-01 RX ADMIN — Medication 2000 UNITS: at 08:05

## 2021-01-01 RX ADMIN — VALPROATE SODIUM 500 MG: 100 INJECTION, SOLUTION INTRAVENOUS at 05:47

## 2021-01-01 RX ADMIN — POTASSIUM CHLORIDE 20 MEQ: 29.8 INJECTION, SOLUTION INTRAVENOUS at 09:17

## 2021-01-01 RX ADMIN — BUDESONIDE 1000 MCG: 0.5 SUSPENSION RESPIRATORY (INHALATION) at 08:49

## 2021-01-01 RX ADMIN — Medication 10 ML: at 20:09

## 2021-01-01 RX ADMIN — HYDROCORTISONE SODIUM SUCCINATE 100 MG: 100 INJECTION, POWDER, FOR SOLUTION INTRAMUSCULAR; INTRAVENOUS at 02:53

## 2021-01-01 RX ADMIN — MINERAL OIL AND WHITE PETROLATUM: 150; 830 OINTMENT OPHTHALMIC at 09:35

## 2021-01-01 RX ADMIN — PANTOPRAZOLE SODIUM 40 MG: 40 INJECTION, POWDER, FOR SOLUTION INTRAVENOUS at 08:40

## 2021-01-01 RX ADMIN — HYDROCORTISONE SODIUM SUCCINATE 50 MG: 100 INJECTION, POWDER, FOR SOLUTION INTRAMUSCULAR; INTRAVENOUS at 20:54

## 2021-01-01 RX ADMIN — Medication 200 MCG/HR: at 13:08

## 2021-01-01 RX ADMIN — Medication 200 MCG/HR: at 00:34

## 2021-01-01 RX ADMIN — Medication 2000 UNITS: at 09:25

## 2021-01-01 RX ADMIN — Medication 175 MCG/HR: at 23:12

## 2021-01-01 RX ADMIN — CISATRACURIUM BESYLATE 4 MCG/KG/MIN: 10 INJECTION, SOLUTION INTRAVENOUS at 05:57

## 2021-01-01 RX ADMIN — Medication 10 ML: at 08:13

## 2021-01-01 RX ADMIN — CISATRACURIUM BESYLATE 1.5 MCG/KG/MIN: 10 INJECTION INTRAVENOUS at 20:01

## 2021-01-01 RX ADMIN — Medication 175 MCG/HR: at 08:27

## 2021-01-01 RX ADMIN — Medication 10 ML: at 21:24

## 2021-01-01 RX ADMIN — SUCRALFATE 1 G: 1 TABLET ORAL at 13:59

## 2021-01-01 RX ADMIN — ARGATROBAN 0.75 MCG/KG/MIN: 50 INJECTION, SOLUTION INTRAVENOUS at 11:21

## 2021-01-01 RX ADMIN — PIPERACILLIN AND TAZOBACTAM 3375 MG: 3; .375 INJECTION, POWDER, LYOPHILIZED, FOR SOLUTION INTRAVENOUS at 10:10

## 2021-01-01 RX ADMIN — SERTRALINE 200 MG: 100 TABLET, FILM COATED ORAL at 08:07

## 2021-01-01 RX ADMIN — BUDESONIDE 1000 MCG: 0.5 SUSPENSION RESPIRATORY (INHALATION) at 08:09

## 2021-01-01 RX ADMIN — POTASSIUM CHLORIDE 40 MEQ: 29.8 INJECTION, SOLUTION INTRAVENOUS at 12:20

## 2021-01-01 RX ADMIN — MIDAZOLAM HYDROCHLORIDE 10 MG/HR: 5 INJECTION, SOLUTION INTRAMUSCULAR; INTRAVENOUS at 08:00

## 2021-01-01 RX ADMIN — BUMETANIDE 1 MG: 0.25 INJECTION INTRAMUSCULAR; INTRAVENOUS at 10:55

## 2021-01-01 RX ADMIN — HEPARIN SODIUM 5000 UNITS: 10000 INJECTION INTRAVENOUS; SUBCUTANEOUS at 20:11

## 2021-01-01 RX ADMIN — POTASSIUM CHLORIDE 20 MEQ: 400 INJECTION, SOLUTION INTRAVENOUS at 20:02

## 2021-01-01 RX ADMIN — MINERAL OIL AND WHITE PETROLATUM: 150; 830 OINTMENT OPHTHALMIC at 20:20

## 2021-01-01 RX ADMIN — PROPOFOL 15 MCG/KG/MIN: 10 INJECTION, EMULSION INTRAVENOUS at 02:54

## 2021-01-01 RX ADMIN — OXYCODONE HYDROCHLORIDE AND ACETAMINOPHEN 1000 MG: 500 TABLET ORAL at 08:25

## 2021-01-01 RX ADMIN — MINERAL OIL AND WHITE PETROLATUM: 150; 830 OINTMENT OPHTHALMIC at 09:32

## 2021-01-01 RX ADMIN — ACETAMINOPHEN 650 MG: 650 SUPPOSITORY RECTAL at 08:29

## 2021-01-01 RX ADMIN — BUDESONIDE 1000 MCG: 0.5 SUSPENSION RESPIRATORY (INHALATION) at 20:20

## 2021-01-01 RX ADMIN — MINERAL OIL AND WHITE PETROLATUM: 150; 830 OINTMENT OPHTHALMIC at 20:15

## 2021-01-01 RX ADMIN — POTASSIUM CHLORIDE 10 MEQ: 7.46 INJECTION, SOLUTION INTRAVENOUS at 20:45

## 2021-01-01 RX ADMIN — ARFORMOTEROL TARTRATE 15 MCG: 15 SOLUTION RESPIRATORY (INHALATION) at 08:24

## 2021-01-01 RX ADMIN — EPOPROSTENOL 50 NG/KG/MIN: 1.5 INJECTION, POWDER, LYOPHILIZED, FOR SOLUTION INTRAVENOUS at 16:41

## 2021-01-01 RX ADMIN — BUMETANIDE 0.5 MG/HR: 0.25 INJECTION, SOLUTION INTRAMUSCULAR; INTRAVENOUS at 09:29

## 2021-01-01 RX ADMIN — MINERAL OIL AND WHITE PETROLATUM: 150; 830 OINTMENT OPHTHALMIC at 20:37

## 2021-01-01 RX ADMIN — Medication: at 08:56

## 2021-01-01 RX ADMIN — MIDODRINE HYDROCHLORIDE 20 MG: 10 TABLET ORAL at 02:06

## 2021-01-01 RX ADMIN — OXYCODONE HYDROCHLORIDE AND ACETAMINOPHEN 1000 MG: 500 TABLET ORAL at 08:28

## 2021-01-01 RX ADMIN — METOPROLOL TARTRATE 5 MG: 1 INJECTION, SOLUTION INTRAVENOUS at 17:59

## 2021-01-01 RX ADMIN — OXYCODONE HYDROCHLORIDE AND ACETAMINOPHEN 1000 MG: 500 TABLET ORAL at 08:59

## 2021-01-01 RX ADMIN — TRAZODONE HYDROCHLORIDE 100 MG: 50 TABLET ORAL at 20:53

## 2021-01-01 RX ADMIN — POLYETHYLENE GLYCOL 3350 17 G: 17 POWDER, FOR SOLUTION ORAL at 20:53

## 2021-01-01 RX ADMIN — CHLORHEXIDINE GLUCONATE 15 ML: 1.2 RINSE ORAL at 08:21

## 2021-01-01 RX ADMIN — ATORVASTATIN CALCIUM 10 MG: 10 TABLET, FILM COATED ORAL at 11:26

## 2021-01-01 RX ADMIN — BUDESONIDE 1000 MCG: 0.5 SUSPENSION RESPIRATORY (INHALATION) at 22:15

## 2021-01-01 RX ADMIN — ARFORMOTEROL TARTRATE 15 MCG: 15 SOLUTION RESPIRATORY (INHALATION) at 21:16

## 2021-01-01 RX ADMIN — MINERAL OIL AND WHITE PETROLATUM: 150; 830 OINTMENT OPHTHALMIC at 08:06

## 2021-01-01 RX ADMIN — BUDESONIDE 1000 MCG: 0.5 SUSPENSION RESPIRATORY (INHALATION) at 09:49

## 2021-01-01 RX ADMIN — MIDAZOLAM HYDROCHLORIDE 10 MG/HR: 5 INJECTION, SOLUTION INTRAMUSCULAR; INTRAVENOUS at 16:24

## 2021-01-01 RX ADMIN — Medication 150 MCG/HR: at 20:35

## 2021-01-01 RX ADMIN — MIDODRINE HYDROCHLORIDE 20 MG: 10 TABLET ORAL at 10:55

## 2021-01-01 RX ADMIN — BUMETANIDE 1 MG: 0.25 INJECTION INTRAMUSCULAR; INTRAVENOUS at 09:29

## 2021-01-01 RX ADMIN — Medication 200 MCG/HR: at 01:51

## 2021-01-01 RX ADMIN — NALOXEGOL OXALATE 12.5 MG: 12.5 TABLET, FILM COATED ORAL at 09:14

## 2021-01-01 RX ADMIN — Medication 175 MCG/HR: at 01:26

## 2021-01-01 RX ADMIN — Medication: at 20:15

## 2021-01-01 RX ADMIN — Medication 100 MCG/HR: at 08:29

## 2021-01-01 RX ADMIN — MINERAL OIL AND WHITE PETROLATUM: 150; 830 OINTMENT OPHTHALMIC at 08:21

## 2021-01-01 RX ADMIN — OXYCODONE HYDROCHLORIDE AND ACETAMINOPHEN 1000 MG: 500 TABLET ORAL at 08:11

## 2021-01-01 RX ADMIN — SUCRALFATE 1 G: 1 TABLET ORAL at 09:34

## 2021-01-01 RX ADMIN — MINERAL OIL AND WHITE PETROLATUM: 150; 830 OINTMENT OPHTHALMIC at 08:10

## 2021-01-01 RX ADMIN — ARGATROBAN 0.5 MCG/KG/MIN: 50 INJECTION, SOLUTION INTRAVENOUS at 01:28

## 2021-01-01 RX ADMIN — SODIUM CHLORIDE, PRESERVATIVE FREE 10 ML: 5 INJECTION INTRAVENOUS at 10:28

## 2021-01-01 RX ADMIN — HYDROCORTISONE SODIUM SUCCINATE 100 MG: 100 INJECTION, POWDER, FOR SOLUTION INTRAMUSCULAR; INTRAVENOUS at 12:25

## 2021-01-01 RX ADMIN — MINERAL OIL AND WHITE PETROLATUM: 150; 830 OINTMENT OPHTHALMIC at 08:29

## 2021-01-01 RX ADMIN — POTASSIUM CHLORIDE 20 MEQ: 400 INJECTION, SOLUTION INTRAVENOUS at 01:02

## 2021-01-01 RX ADMIN — SUCRALFATE 1 G: 1 TABLET ORAL at 03:02

## 2021-01-01 RX ADMIN — Medication 150 MCG/HR: at 06:33

## 2021-01-01 RX ADMIN — BUMETANIDE 2 MG: 0.25 INJECTION INTRAMUSCULAR; INTRAVENOUS at 05:08

## 2021-01-01 RX ADMIN — BUDESONIDE 1000 MCG: 0.5 SUSPENSION RESPIRATORY (INHALATION) at 20:30

## 2021-01-01 RX ADMIN — POLYETHYLENE GLYCOL 3350 17 G: 17 POWDER, FOR SOLUTION ORAL at 20:03

## 2021-01-01 RX ADMIN — Medication 150 MCG/HR: at 17:08

## 2021-01-01 RX ADMIN — CISATRACURIUM BESYLATE 4 MCG/KG/MIN: 10 INJECTION, SOLUTION INTRAVENOUS at 01:47

## 2021-01-01 RX ADMIN — Medication 200 MCG/HR: at 12:38

## 2021-01-01 RX ADMIN — DEXTROSE MONOHYDRATE: 100 INJECTION, SOLUTION INTRAVENOUS at 21:15

## 2021-01-01 RX ADMIN — SODIUM CHLORIDE, PRESERVATIVE FREE 10 ML: 5 INJECTION INTRAVENOUS at 08:06

## 2021-01-01 RX ADMIN — MIDAZOLAM HYDROCHLORIDE 10 MG/HR: 5 INJECTION, SOLUTION INTRAMUSCULAR; INTRAVENOUS at 05:15

## 2021-01-01 RX ADMIN — MAGNESIUM SULFATE 2000 MG: 2 INJECTION INTRAVENOUS at 18:26

## 2021-01-01 RX ADMIN — Medication 200 MCG/HR: at 12:43

## 2021-01-01 RX ADMIN — MINERAL OIL AND WHITE PETROLATUM: 150; 830 OINTMENT OPHTHALMIC at 03:02

## 2021-01-01 RX ADMIN — SUCRALFATE 1 G: 1 TABLET ORAL at 04:09

## 2021-01-01 RX ADMIN — Medication 2 MCG/MIN: at 09:26

## 2021-01-01 RX ADMIN — MINERAL OIL AND WHITE PETROLATUM: 150; 830 OINTMENT OPHTHALMIC at 23:19

## 2021-01-01 RX ADMIN — MINERAL OIL AND WHITE PETROLATUM: 150; 830 OINTMENT OPHTHALMIC at 09:21

## 2021-01-01 RX ADMIN — SUCRALFATE 1 G: 1 TABLET ORAL at 03:14

## 2021-01-01 RX ADMIN — SODIUM CHLORIDE, PRESERVATIVE FREE 10 ML: 5 INJECTION INTRAVENOUS at 09:07

## 2021-01-01 RX ADMIN — TRAZODONE HYDROCHLORIDE 100 MG: 50 TABLET ORAL at 21:09

## 2021-01-01 RX ADMIN — BUMETANIDE 0.5 MG/HR: 0.25 INJECTION, SOLUTION INTRAMUSCULAR; INTRAVENOUS at 12:45

## 2021-01-01 RX ADMIN — REMDESIVIR 100 MG: 100 INJECTION, POWDER, LYOPHILIZED, FOR SOLUTION INTRAVENOUS at 21:30

## 2021-01-01 RX ADMIN — MINERAL OIL AND WHITE PETROLATUM: 150; 830 OINTMENT OPHTHALMIC at 02:51

## 2021-01-01 RX ADMIN — MIDODRINE HYDROCHLORIDE 20 MG: 10 TABLET ORAL at 18:32

## 2021-01-01 RX ADMIN — ACETAMINOPHEN 650 MG: 325 TABLET ORAL at 08:33

## 2021-01-01 RX ADMIN — TRAZODONE HYDROCHLORIDE 100 MG: 50 TABLET ORAL at 20:08

## 2021-01-01 RX ADMIN — POLYETHYLENE GLYCOL 3350 17 G: 17 POWDER, FOR SOLUTION ORAL at 08:37

## 2021-01-01 RX ADMIN — MIDAZOLAM HYDROCHLORIDE 10 MG/HR: 5 INJECTION, SOLUTION INTRAMUSCULAR; INTRAVENOUS at 22:15

## 2021-01-01 RX ADMIN — NALOXEGOL OXALATE 12.5 MG: 12.5 TABLET, FILM COATED ORAL at 08:35

## 2021-01-01 RX ADMIN — SUCRALFATE 1 G: 1 TABLET ORAL at 09:25

## 2021-01-01 RX ADMIN — DEXAMETHASONE SODIUM PHOSPHATE 10 MG: 4 INJECTION, SOLUTION INTRAMUSCULAR; INTRAVENOUS at 20:44

## 2021-01-01 RX ADMIN — PANTOPRAZOLE SODIUM 40 MG: 40 INJECTION, POWDER, FOR SOLUTION INTRAVENOUS at 09:08

## 2021-01-01 RX ADMIN — LANSOPRAZOLE 30 MG: KIT at 08:37

## 2021-01-01 RX ADMIN — Medication 150 MCG/HR: at 18:26

## 2021-01-01 RX ADMIN — DOCUSATE SODIUM 50 MG AND SENNOSIDES 8.6 MG 2 TABLET: 8.6; 5 TABLET, FILM COATED ORAL at 08:04

## 2021-01-01 RX ADMIN — Medication 200 MCG/HR: at 12:47

## 2021-01-01 RX ADMIN — BUDESONIDE 1000 MCG: 0.5 SUSPENSION RESPIRATORY (INHALATION) at 06:04

## 2021-01-01 RX ADMIN — SENNOSIDES AND DOCUSATE SODIUM 2 TABLET: 50; 8.6 TABLET ORAL at 08:27

## 2021-01-01 RX ADMIN — MIDODRINE HYDROCHLORIDE 20 MG: 10 TABLET ORAL at 20:03

## 2021-01-01 RX ADMIN — ARFORMOTEROL TARTRATE 15 MCG: 15 SOLUTION RESPIRATORY (INHALATION) at 09:40

## 2021-01-01 RX ADMIN — ATORVASTATIN CALCIUM 10 MG: 10 TABLET, FILM COATED ORAL at 09:27

## 2021-01-01 RX ADMIN — PIPERACILLIN AND TAZOBACTAM 3375 MG: 3; .375 INJECTION, POWDER, LYOPHILIZED, FOR SOLUTION INTRAVENOUS at 03:59

## 2021-01-01 RX ADMIN — CISATRACURIUM BESYLATE 4 MCG/KG/MIN: 10 INJECTION INTRAVENOUS at 18:33

## 2021-01-01 RX ADMIN — METHYLNALTREXONE BROMIDE 6 MG: 12 INJECTION, SOLUTION SUBCUTANEOUS at 11:23

## 2021-01-01 RX ADMIN — ZINC SULFATE 220 MG (50 MG) CAPSULE 50 MG: CAPSULE at 09:28

## 2021-01-01 RX ADMIN — MIDODRINE HYDROCHLORIDE 10 MG: 10 TABLET ORAL at 09:03

## 2021-01-01 RX ADMIN — Medication 10 ML: at 20:56

## 2021-01-01 RX ADMIN — DOCUSATE SODIUM 50 MG AND SENNOSIDES 8.6 MG 2 TABLET: 8.6; 5 TABLET, FILM COATED ORAL at 09:16

## 2021-01-01 RX ADMIN — NALOXEGOL OXALATE 12.5 MG: 12.5 TABLET, FILM COATED ORAL at 09:29

## 2021-01-01 RX ADMIN — METOPROLOL TARTRATE 5 MG: 1 INJECTION, SOLUTION INTRAVENOUS at 11:43

## 2021-01-01 RX ADMIN — Medication 10 ML: at 22:26

## 2021-01-01 RX ADMIN — Medication 2000 UNITS: at 09:08

## 2021-01-01 RX ADMIN — Medication 125 MCG/HR: at 00:12

## 2021-01-01 RX ADMIN — BUDESONIDE 1000 MCG: 0.5 SUSPENSION RESPIRATORY (INHALATION) at 21:59

## 2021-01-01 RX ADMIN — LANSOPRAZOLE 30 MG: KIT at 05:38

## 2021-01-01 RX ADMIN — REMDESIVIR 100 MG: 100 INJECTION, POWDER, LYOPHILIZED, FOR SOLUTION INTRAVENOUS at 21:13

## 2021-01-01 RX ADMIN — CISATRACURIUM BESYLATE 2.5 MCG/KG/MIN: 10 INJECTION INTRAVENOUS at 20:33

## 2021-01-01 RX ADMIN — ARFORMOTEROL TARTRATE 15 MCG: 15 SOLUTION RESPIRATORY (INHALATION) at 10:24

## 2021-01-01 RX ADMIN — ZINC SULFATE 220 MG (50 MG) CAPSULE 50 MG: CAPSULE at 07:38

## 2021-01-01 RX ADMIN — LANSOPRAZOLE 30 MG: KIT at 06:09

## 2021-01-01 RX ADMIN — BUMETANIDE 2 MG: 0.25 INJECTION, SOLUTION INTRAMUSCULAR; INTRAVENOUS at 21:34

## 2021-01-01 RX ADMIN — ZINC SULFATE 220 MG (50 MG) CAPSULE 50 MG: CAPSULE at 08:03

## 2021-01-01 RX ADMIN — MINERAL OIL AND WHITE PETROLATUM: 150; 830 OINTMENT OPHTHALMIC at 21:23

## 2021-01-01 RX ADMIN — POLYETHYLENE GLYCOL 3350 17 G: 17 POWDER, FOR SOLUTION ORAL at 08:48

## 2021-01-01 RX ADMIN — WATER 2000 MG: 1 INJECTION INTRAMUSCULAR; INTRAVENOUS; SUBCUTANEOUS at 20:44

## 2021-01-01 RX ADMIN — ACETAMINOPHEN 1000 MG: 500 TABLET ORAL at 20:44

## 2021-01-01 RX ADMIN — ENOXAPARIN SODIUM 40 MG: 100 INJECTION SUBCUTANEOUS at 12:09

## 2021-01-01 RX ADMIN — HEPARIN 300 UNITS: 100 SYRINGE at 08:03

## 2021-01-01 RX ADMIN — Medication 5 MCG/MIN: at 22:05

## 2021-01-01 RX ADMIN — ATORVASTATIN CALCIUM 10 MG: 10 TABLET, FILM COATED ORAL at 09:21

## 2021-01-01 RX ADMIN — HEPARIN SODIUM 5000 UNITS: 10000 INJECTION INTRAVENOUS; SUBCUTANEOUS at 20:37

## 2021-01-01 RX ADMIN — MINERAL OIL AND WHITE PETROLATUM: 150; 830 OINTMENT OPHTHALMIC at 03:40

## 2021-01-01 RX ADMIN — BUMETANIDE 1 MG: 0.25 INJECTION INTRAMUSCULAR; INTRAVENOUS at 09:07

## 2021-01-01 RX ADMIN — CALCIUM GLUCONATE 1000 MG: 98 INJECTION, SOLUTION INTRAVENOUS at 12:17

## 2021-01-01 RX ADMIN — CHLORHEXIDINE GLUCONATE 15 ML: 1.2 RINSE ORAL at 20:24

## 2021-01-01 RX ADMIN — SODIUM CHLORIDE, PRESERVATIVE FREE 10 ML: 5 INJECTION INTRAVENOUS at 09:09

## 2021-01-01 RX ADMIN — MINERAL OIL AND WHITE PETROLATUM: 150; 830 OINTMENT OPHTHALMIC at 02:41

## 2021-01-01 RX ADMIN — Medication 2000 UNITS: at 08:34

## 2021-01-01 RX ADMIN — BUDESONIDE 1000 MCG: 0.5 SUSPENSION RESPIRATORY (INHALATION) at 09:36

## 2021-01-01 RX ADMIN — HYDROCORTISONE SODIUM SUCCINATE 100 MG: 100 INJECTION, POWDER, FOR SOLUTION INTRAMUSCULAR; INTRAVENOUS at 07:44

## 2021-01-01 RX ADMIN — SUCRALFATE 1 G: 1 TABLET ORAL at 15:37

## 2021-01-01 RX ADMIN — ACETAMINOPHEN 650 MG: 650 SUPPOSITORY RECTAL at 12:39

## 2021-01-01 RX ADMIN — IPRATROPIUM BROMIDE AND ALBUTEROL SULFATE 1 AMPULE: .5; 2.5 SOLUTION RESPIRATORY (INHALATION) at 19:42

## 2021-01-01 RX ADMIN — DOCUSATE SODIUM 50 MG AND SENNOSIDES 8.6 MG 2 TABLET: 8.6; 5 TABLET, FILM COATED ORAL at 09:28

## 2021-01-01 RX ADMIN — CHLORHEXIDINE GLUCONATE 15 ML: 1.2 RINSE ORAL at 21:04

## 2021-01-01 RX ADMIN — ARFORMOTEROL TARTRATE 15 MCG: 15 SOLUTION RESPIRATORY (INHALATION) at 19:42

## 2021-01-01 RX ADMIN — PANTOPRAZOLE SODIUM 40 MG: 40 INJECTION, POWDER, FOR SOLUTION INTRAVENOUS at 20:49

## 2021-01-01 RX ADMIN — SUCRALFATE 1 G: 1 TABLET ORAL at 02:34

## 2021-01-01 RX ADMIN — MINERAL OIL AND WHITE PETROLATUM: 150; 830 OINTMENT OPHTHALMIC at 04:07

## 2021-01-01 RX ADMIN — Medication 200 MCG/HR: at 07:42

## 2021-01-01 RX ADMIN — SERTRALINE 200 MG: 100 TABLET, FILM COATED ORAL at 08:25

## 2021-01-01 RX ADMIN — ENOXAPARIN SODIUM 40 MG: 100 INJECTION SUBCUTANEOUS at 08:23

## 2021-01-01 RX ADMIN — SUCRALFATE 1 G: 1 TABLET ORAL at 13:45

## 2021-01-01 RX ADMIN — ALBUMIN (HUMAN) 25 G: 0.25 INJECTION, SOLUTION INTRAVENOUS at 18:11

## 2021-01-01 RX ADMIN — Medication 9 MCG/MIN: at 17:50

## 2021-01-01 RX ADMIN — SUCRALFATE 1 G: 1 TABLET ORAL at 03:08

## 2021-01-01 RX ADMIN — Medication 10 ML: at 20:53

## 2021-01-01 RX ADMIN — FLUCONAZOLE IN SODIUM CHLORIDE 400 MG: 2 INJECTION, SOLUTION INTRAVENOUS at 11:26

## 2021-01-01 RX ADMIN — MIDAZOLAM HYDROCHLORIDE 10 MG/HR: 5 INJECTION, SOLUTION INTRAMUSCULAR; INTRAVENOUS at 20:26

## 2021-01-01 RX ADMIN — BUDESONIDE 1000 MCG: 0.5 SUSPENSION RESPIRATORY (INHALATION) at 08:13

## 2021-01-01 RX ADMIN — HEPARIN SODIUM 5000 UNITS: 10000 INJECTION INTRAVENOUS; SUBCUTANEOUS at 05:47

## 2021-01-01 RX ADMIN — BUDESONIDE 1000 MCG: 0.5 SUSPENSION RESPIRATORY (INHALATION) at 21:40

## 2021-01-01 RX ADMIN — OXYCODONE HYDROCHLORIDE AND ACETAMINOPHEN 1000 MG: 500 TABLET ORAL at 08:02

## 2021-01-01 RX ADMIN — PIPERACILLIN AND TAZOBACTAM 3375 MG: 3; .375 INJECTION, POWDER, LYOPHILIZED, FOR SOLUTION INTRAVENOUS at 12:34

## 2021-01-01 RX ADMIN — SUCRALFATE 1 G: 1 TABLET ORAL at 20:50

## 2021-01-01 RX ADMIN — SUCRALFATE 1 G: 1 TABLET ORAL at 08:34

## 2021-01-01 RX ADMIN — SUCRALFATE 1 G: 1 TABLET ORAL at 02:33

## 2021-01-01 RX ADMIN — ARFORMOTEROL TARTRATE 15 MCG: 15 SOLUTION RESPIRATORY (INHALATION) at 21:00

## 2021-01-01 RX ADMIN — QUETIAPINE FUMARATE 200 MG: 200 TABLET ORAL at 08:25

## 2021-01-01 RX ADMIN — DEXAMETHASONE SODIUM PHOSPHATE 6 MG: 4 INJECTION, SOLUTION INTRAMUSCULAR; INTRAVENOUS at 08:37

## 2021-01-01 RX ADMIN — ATORVASTATIN CALCIUM 10 MG: 10 TABLET, FILM COATED ORAL at 08:35

## 2021-01-01 RX ADMIN — MIDAZOLAM HYDROCHLORIDE 2 MG/HR: 5 INJECTION, SOLUTION INTRAMUSCULAR; INTRAVENOUS at 21:12

## 2021-01-01 RX ADMIN — MIDAZOLAM 4 MG: 1 INJECTION INTRAMUSCULAR; INTRAVENOUS at 09:50

## 2021-01-01 RX ADMIN — MIDODRINE HYDROCHLORIDE 10 MG: 10 TABLET ORAL at 16:41

## 2021-01-01 RX ADMIN — MIDAZOLAM HYDROCHLORIDE 4 MG/HR: 5 INJECTION, SOLUTION INTRAMUSCULAR; INTRAVENOUS at 08:48

## 2021-01-01 RX ADMIN — Medication 200 MCG/HR: at 01:23

## 2021-01-01 RX ADMIN — Medication 100 MCG/HR: at 21:55

## 2021-01-01 RX ADMIN — BUDESONIDE 1000 MCG: 0.5 SUSPENSION RESPIRATORY (INHALATION) at 09:12

## 2021-01-01 RX ADMIN — MIDAZOLAM HYDROCHLORIDE 10 MG/HR: 5 INJECTION, SOLUTION INTRAMUSCULAR; INTRAVENOUS at 23:19

## 2021-01-01 RX ADMIN — PROPOFOL 15 MCG/KG/MIN: 10 INJECTION, EMULSION INTRAVENOUS at 11:38

## 2021-01-01 RX ADMIN — ENOXAPARIN SODIUM 40 MG: 100 INJECTION SUBCUTANEOUS at 11:02

## 2021-01-01 RX ADMIN — Medication 2000 UNITS: at 08:01

## 2021-01-01 RX ADMIN — Medication 175 MCG/HR: at 09:12

## 2021-01-01 RX ADMIN — Medication 200 MCG/HR: at 19:29

## 2021-01-01 RX ADMIN — BUDESONIDE 1000 MCG: 0.5 SUSPENSION RESPIRATORY (INHALATION) at 08:55

## 2021-01-01 RX ADMIN — POLYETHYLENE GLYCOL 3350 17 G: 17 POWDER, FOR SOLUTION ORAL at 08:12

## 2021-01-01 RX ADMIN — MIDAZOLAM HYDROCHLORIDE 3 MG/HR: 5 INJECTION, SOLUTION INTRAMUSCULAR; INTRAVENOUS at 20:38

## 2021-01-01 RX ADMIN — CISATRACURIUM BESYLATE 2 MCG/KG/MIN: 10 INJECTION, SOLUTION INTRAVENOUS at 23:29

## 2021-01-01 RX ADMIN — QUETIAPINE FUMARATE 200 MG: 200 TABLET ORAL at 08:41

## 2021-01-01 RX ADMIN — BUMETANIDE 0.5 MG: 0.25 INJECTION INTRAMUSCULAR; INTRAVENOUS at 11:39

## 2021-01-01 RX ADMIN — BUDESONIDE 1000 MCG: 0.5 SUSPENSION RESPIRATORY (INHALATION) at 09:03

## 2021-01-01 RX ADMIN — ZINC SULFATE 220 MG (50 MG) CAPSULE 50 MG: CAPSULE at 09:15

## 2021-01-01 RX ADMIN — MINERAL OIL AND WHITE PETROLATUM: 150; 830 OINTMENT OPHTHALMIC at 21:04

## 2021-01-01 RX ADMIN — SODIUM CHLORIDE, PRESERVATIVE FREE 10 ML: 5 INJECTION INTRAVENOUS at 20:36

## 2021-01-01 RX ADMIN — SERTRALINE 200 MG: 100 TABLET, FILM COATED ORAL at 09:07

## 2021-01-01 RX ADMIN — PANTOPRAZOLE SODIUM 40 MG: 40 INJECTION, POWDER, FOR SOLUTION INTRAVENOUS at 08:04

## 2021-01-01 RX ADMIN — MINERAL OIL AND WHITE PETROLATUM: 150; 830 OINTMENT OPHTHALMIC at 07:49

## 2021-01-01 RX ADMIN — ARFORMOTEROL TARTRATE 15 MCG: 15 SOLUTION RESPIRATORY (INHALATION) at 20:20

## 2021-01-01 RX ADMIN — NALOXEGOL OXALATE 12.5 MG: 12.5 TABLET, FILM COATED ORAL at 11:09

## 2021-01-01 RX ADMIN — HYDROCORTISONE SODIUM SUCCINATE 100 MG: 100 INJECTION, POWDER, FOR SOLUTION INTRAMUSCULAR; INTRAVENOUS at 08:59

## 2021-01-01 RX ADMIN — DOCUSATE SODIUM 50 MG AND SENNOSIDES 8.6 MG 2 TABLET: 8.6; 5 TABLET, FILM COATED ORAL at 07:37

## 2021-01-01 RX ADMIN — Medication 125 MCG/HR: at 10:03

## 2021-01-01 RX ADMIN — Medication 2000 UNITS: at 09:24

## 2021-01-01 RX ADMIN — ARFORMOTEROL TARTRATE 15 MCG: 15 SOLUTION RESPIRATORY (INHALATION) at 09:12

## 2021-01-01 RX ADMIN — CHLORHEXIDINE GLUCONATE 15 ML: 1.2 RINSE ORAL at 08:54

## 2021-01-01 RX ADMIN — PIPERACILLIN AND TAZOBACTAM 3375 MG: 3; .375 INJECTION, POWDER, LYOPHILIZED, FOR SOLUTION INTRAVENOUS at 10:00

## 2021-01-01 RX ADMIN — ZINC SULFATE 220 MG (50 MG) CAPSULE 50 MG: CAPSULE at 08:33

## 2021-01-01 RX ADMIN — SODIUM CHLORIDE, PRESERVATIVE FREE 10 ML: 5 INJECTION INTRAVENOUS at 07:59

## 2021-01-01 RX ADMIN — MIDODRINE HYDROCHLORIDE 20 MG: 10 TABLET ORAL at 03:02

## 2021-01-01 RX ADMIN — SERTRALINE 200 MG: 100 TABLET, FILM COATED ORAL at 08:35

## 2021-01-01 RX ADMIN — SODIUM CHLORIDE, PRESERVATIVE FREE 10 ML: 5 INJECTION INTRAVENOUS at 08:16

## 2021-01-01 RX ADMIN — IPRATROPIUM BROMIDE AND ALBUTEROL SULFATE 1 AMPULE: .5; 2.5 SOLUTION RESPIRATORY (INHALATION) at 08:24

## 2021-01-01 RX ADMIN — POLYETHYLENE GLYCOL 3350 17 G: 17 POWDER, FOR SOLUTION ORAL at 09:16

## 2021-01-01 RX ADMIN — TRAZODONE HYDROCHLORIDE 100 MG: 50 TABLET ORAL at 22:26

## 2021-01-01 RX ADMIN — HEPARIN 300 UNITS: 100 SYRINGE at 20:38

## 2021-01-01 RX ADMIN — SUCRALFATE 1 G: 1 TABLET ORAL at 03:36

## 2021-01-01 RX ADMIN — Medication 200 MCG/HR: at 06:13

## 2021-01-01 RX ADMIN — SUCRALFATE 1 G: 1 TABLET ORAL at 09:18

## 2021-01-01 RX ADMIN — PANTOPRAZOLE SODIUM 40 MG: 40 INJECTION, POWDER, FOR SOLUTION INTRAVENOUS at 11:03

## 2021-01-01 RX ADMIN — CHLORHEXIDINE GLUCONATE 15 ML: 1.2 RINSE ORAL at 22:44

## 2021-01-01 RX ADMIN — FLUCONAZOLE IN SODIUM CHLORIDE 400 MG: 2 INJECTION, SOLUTION INTRAVENOUS at 10:25

## 2021-01-01 RX ADMIN — HYDRALAZINE HYDROCHLORIDE 5 MG: 20 INJECTION INTRAMUSCULAR; INTRAVENOUS at 09:18

## 2021-01-01 RX ADMIN — QUETIAPINE FUMARATE 200 MG: 200 TABLET ORAL at 08:03

## 2021-01-01 RX ADMIN — Medication 200 MCG/HR: at 16:19

## 2021-01-01 RX ADMIN — CALCIUM GLUCONATE 1000 MG: 98 INJECTION, SOLUTION INTRAVENOUS at 06:53

## 2021-01-01 RX ADMIN — OXYCODONE HYDROCHLORIDE AND ACETAMINOPHEN 1000 MG: 500 TABLET ORAL at 08:38

## 2021-01-01 RX ADMIN — CALCIUM GLUCONATE 1000 MG: 98 INJECTION, SOLUTION INTRAVENOUS at 08:02

## 2021-01-01 RX ADMIN — MIDAZOLAM HYDROCHLORIDE 7 MG/HR: 5 INJECTION, SOLUTION INTRAMUSCULAR; INTRAVENOUS at 01:06

## 2021-01-01 RX ADMIN — PIPERACILLIN AND TAZOBACTAM 3375 MG: 3; .375 INJECTION, POWDER, LYOPHILIZED, FOR SOLUTION INTRAVENOUS at 03:30

## 2021-01-01 RX ADMIN — BUDESONIDE 1000 MCG: 0.5 SUSPENSION RESPIRATORY (INHALATION) at 08:25

## 2021-01-01 RX ADMIN — SERTRALINE 200 MG: 100 TABLET, FILM COATED ORAL at 08:21

## 2021-01-01 RX ADMIN — Medication 2000 UNITS: at 07:59

## 2021-01-01 RX ADMIN — PIPERACILLIN AND TAZOBACTAM 3375 MG: 3; .375 INJECTION, POWDER, LYOPHILIZED, FOR SOLUTION INTRAVENOUS at 12:15

## 2021-01-01 RX ADMIN — ACETAMINOPHEN 650 MG: 325 TABLET ORAL at 09:14

## 2021-01-01 RX ADMIN — SERTRALINE 200 MG: 100 TABLET, FILM COATED ORAL at 08:28

## 2021-01-01 RX ADMIN — CHLORHEXIDINE GLUCONATE 15 ML: 1.2 RINSE ORAL at 20:36

## 2021-01-01 RX ADMIN — REMDESIVIR 200 MG: 100 INJECTION, POWDER, LYOPHILIZED, FOR SOLUTION INTRAVENOUS at 19:02

## 2021-01-01 RX ADMIN — Medication 200 MCG/HR: at 23:27

## 2021-01-01 RX ADMIN — MIDODRINE HYDROCHLORIDE 10 MG: 10 TABLET ORAL at 12:07

## 2021-01-01 RX ADMIN — HEPARIN SODIUM 5000 UNITS: 10000 INJECTION INTRAVENOUS; SUBCUTANEOUS at 22:02

## 2021-01-01 RX ADMIN — BUDESONIDE 1000 MCG: 0.5 SUSPENSION RESPIRATORY (INHALATION) at 19:31

## 2021-01-01 RX ADMIN — CHLORHEXIDINE GLUCONATE 15 ML: 1.2 RINSE ORAL at 08:07

## 2021-01-01 RX ADMIN — CHLORHEXIDINE GLUCONATE 15 ML: 1.2 RINSE ORAL at 20:10

## 2021-01-01 RX ADMIN — MIDODRINE HYDROCHLORIDE 20 MG: 10 TABLET ORAL at 11:09

## 2021-01-01 RX ADMIN — MINERAL OIL AND WHITE PETROLATUM: 150; 830 OINTMENT OPHTHALMIC at 20:02

## 2021-01-01 SDOH — ECONOMIC STABILITY: FOOD INSECURITY: WITHIN THE PAST 12 MONTHS, YOU WORRIED THAT YOUR FOOD WOULD RUN OUT BEFORE YOU GOT MONEY TO BUY MORE.: SOMETIMES TRUE

## 2021-01-01 SDOH — ECONOMIC STABILITY: FOOD INSECURITY: WITHIN THE PAST 12 MONTHS, THE FOOD YOU BOUGHT JUST DIDN'T LAST AND YOU DIDN'T HAVE MONEY TO GET MORE.: NEVER TRUE

## 2021-01-01 ASSESSMENT — PULMONARY FUNCTION TESTS
PIF_VALUE: 30
PIF_VALUE: 36
PIF_VALUE: 35
PIF_VALUE: 27
PIF_VALUE: 29
PIF_VALUE: 25
PIF_VALUE: 31
PIF_VALUE: 27
PIF_VALUE: 34
PIF_VALUE: 39
PIF_VALUE: 29
PIF_VALUE: 33
PIF_VALUE: 30
PIF_VALUE: 27
PIF_VALUE: 31
PIF_VALUE: 39
PIF_VALUE: 40
PIF_VALUE: 28
PIF_VALUE: 27
PIF_VALUE: 29
PIF_VALUE: 25
PIF_VALUE: 31
PIF_VALUE: 34
PIF_VALUE: 37
PIF_VALUE: 27
PIF_VALUE: 25
PIF_VALUE: 26
PIF_VALUE: 34
PIF_VALUE: 25
PIF_VALUE: 24
PIF_VALUE: 25
PIF_VALUE: 25
PIF_VALUE: 27
PIF_VALUE: 33
PIF_VALUE: 25
PIF_VALUE: 16
PIF_VALUE: 32
PIF_VALUE: 34
PIF_VALUE: 26
PIF_VALUE: 34
PIF_VALUE: 32
PIF_VALUE: 24
PIF_VALUE: 26
PIF_VALUE: 25
PIF_VALUE: 32
PIF_VALUE: 26
PIF_VALUE: 24
PIF_VALUE: 24
PIF_VALUE: 43
PIF_VALUE: 28
PIF_VALUE: 26
PIF_VALUE: 28
PIF_VALUE: 30
PIF_VALUE: 8
PIF_VALUE: 26
PIF_VALUE: 36
PIF_VALUE: 26
PIF_VALUE: 24
PIF_VALUE: 40
PIF_VALUE: 26
PIF_VALUE: 25
PIF_VALUE: 28
PIF_VALUE: 26
PIF_VALUE: 30
PIF_VALUE: 26
PIF_VALUE: 28
PIF_VALUE: 34
PIF_VALUE: 31
PIF_VALUE: 26
PIF_VALUE: 33
PIF_VALUE: 32
PIF_VALUE: 34
PIF_VALUE: 35
PIF_VALUE: 26
PIF_VALUE: 31
PIF_VALUE: 26
PIF_VALUE: 34
PIF_VALUE: 30
PIF_VALUE: 26
PIF_VALUE: 27
PIF_VALUE: 39
PIF_VALUE: 24
PIF_VALUE: 34
PIF_VALUE: 24
PIF_VALUE: 27
PIF_VALUE: 33
PIF_VALUE: 26
PIF_VALUE: 27
PIF_VALUE: 33
PIF_VALUE: 24
PIF_VALUE: 24
PIF_VALUE: 25
PIF_VALUE: 28
PIF_VALUE: 39
PIF_VALUE: 24
PIF_VALUE: 28
PIF_VALUE: 26
PIF_VALUE: 36
PIF_VALUE: 25
PIF_VALUE: 24
PIF_VALUE: 24
PIF_VALUE: 26
PIF_VALUE: 41
PIF_VALUE: 29
PIF_VALUE: 29
PIF_VALUE: 50
PIF_VALUE: 35
PIF_VALUE: 30
PIF_VALUE: 26
PIF_VALUE: 27
PIF_VALUE: 31
PIF_VALUE: 32
PIF_VALUE: 43
PIF_VALUE: 27
PIF_VALUE: 26
PIF_VALUE: 37
PIF_VALUE: 25
PIF_VALUE: 35
PIF_VALUE: 28
PIF_VALUE: 27
PIF_VALUE: 28
PIF_VALUE: 24
PIF_VALUE: 34
PIF_VALUE: 30
PIF_VALUE: 34
PIF_VALUE: 31
PIF_VALUE: 44
PIF_VALUE: 36
PIF_VALUE: 24
PIF_VALUE: 24
PIF_VALUE: 27
PIF_VALUE: 35
PIF_VALUE: 24
PIF_VALUE: 27
PIF_VALUE: 35
PIF_VALUE: 25
PIF_VALUE: 38
PIF_VALUE: 34
PIF_VALUE: 27
PIF_VALUE: 36
PIF_VALUE: 30
PIF_VALUE: 30
PIF_VALUE: 25
PIF_VALUE: 30
PIF_VALUE: 24
PIF_VALUE: 26
PIF_VALUE: 24
PIF_VALUE: 30
PIF_VALUE: 34
PIF_VALUE: 24
PIF_VALUE: 30
PIF_VALUE: 29
PIF_VALUE: 38
PIF_VALUE: 24
PIF_VALUE: 35
PIF_VALUE: 24
PIF_VALUE: 34
PIF_VALUE: 37
PIF_VALUE: 26
PIF_VALUE: 37
PIF_VALUE: 25
PIF_VALUE: 28
PIF_VALUE: 24
PIF_VALUE: 35
PIF_VALUE: 30
PIF_VALUE: 25
PIF_VALUE: 38
PIF_VALUE: 26
PIF_VALUE: 39
PIF_VALUE: 24
PIF_VALUE: 25
PIF_VALUE: 30
PIF_VALUE: 266
PIF_VALUE: 39
PIF_VALUE: 26
PIF_VALUE: 30
PIF_VALUE: 60
PIF_VALUE: 34
PIF_VALUE: 24
PIF_VALUE: 26
PIF_VALUE: 24
PIF_VALUE: 29
PIF_VALUE: 60
PIF_VALUE: 24
PIF_VALUE: 30
PIF_VALUE: 30
PIF_VALUE: 28
PIF_VALUE: 24
PIF_VALUE: 42
PIF_VALUE: 26
PIF_VALUE: 43
PIF_VALUE: 25
PIF_VALUE: 26
PIF_VALUE: 31
PIF_VALUE: 27
PIF_VALUE: 24
PIF_VALUE: 24
PIF_VALUE: 31
PIF_VALUE: 35
PIF_VALUE: 30
PIF_VALUE: 26
PIF_VALUE: 24
PIF_VALUE: 24
PIF_VALUE: 27
PIF_VALUE: 43
PIF_VALUE: 29
PIF_VALUE: 36
PIF_VALUE: 34
PIF_VALUE: 35
PIF_VALUE: 26
PIF_VALUE: 28
PIF_VALUE: 34
PIF_VALUE: 30
PIF_VALUE: 26
PIF_VALUE: 24
PIF_VALUE: 35
PIF_VALUE: 30
PIF_VALUE: 24
PIF_VALUE: 41
PIF_VALUE: 30
PIF_VALUE: 36
PIF_VALUE: 34
PIF_VALUE: 24
PIF_VALUE: 25
PIF_VALUE: 26
PIF_VALUE: 34
PIF_VALUE: 35
PIF_VALUE: 24
PIF_VALUE: 33
PIF_VALUE: 29
PIF_VALUE: 33
PIF_VALUE: 24
PIF_VALUE: 26
PIF_VALUE: 30
PIF_VALUE: 28
PIF_VALUE: 25
PIF_VALUE: 26
PIF_VALUE: 43
PIF_VALUE: 42
PIF_VALUE: 44
PIF_VALUE: 26
PIF_VALUE: 32
PIF_VALUE: 29
PIF_VALUE: 26
PIF_VALUE: 28
PIF_VALUE: 25
PIF_VALUE: 35
PIF_VALUE: 44
PIF_VALUE: 43
PIF_VALUE: 24
PIF_VALUE: 44
PIF_VALUE: 30
PIF_VALUE: 24
PIF_VALUE: 47
PIF_VALUE: 28
PIF_VALUE: 24
PIF_VALUE: 26
PIF_VALUE: 34
PIF_VALUE: 27
PIF_VALUE: 30
PIF_VALUE: 30
PIF_VALUE: 40
PIF_VALUE: 24
PIF_VALUE: 26
PIF_VALUE: 27
PIF_VALUE: 34
PIF_VALUE: 30
PIF_VALUE: 25
PIF_VALUE: 27
PIF_VALUE: 25
PIF_VALUE: 26
PIF_VALUE: 30
PIF_VALUE: 25
PIF_VALUE: 22
PIF_VALUE: 24
PIF_VALUE: 25
PIF_VALUE: 35
PIF_VALUE: 30
PIF_VALUE: 35
PIF_VALUE: 31
PIF_VALUE: 32
PIF_VALUE: 25
PIF_VALUE: 31
PIF_VALUE: 43
PIF_VALUE: 41
PIF_VALUE: 37
PIF_VALUE: 30
PIF_VALUE: 25
PIF_VALUE: 26
PIF_VALUE: 28
PIF_VALUE: 29
PIF_VALUE: 32
PIF_VALUE: 36
PIF_VALUE: 36
PIF_VALUE: 32
PIF_VALUE: 30
PIF_VALUE: 28
PIF_VALUE: 34
PIF_VALUE: 30
PIF_VALUE: 45
PIF_VALUE: 24
PIF_VALUE: 30
PIF_VALUE: 27
PIF_VALUE: 31
PIF_VALUE: 34
PIF_VALUE: 26
PIF_VALUE: 24
PIF_VALUE: 41
PIF_VALUE: 38
PIF_VALUE: 30
PIF_VALUE: 33
PIF_VALUE: 30
PIF_VALUE: 31
PIF_VALUE: 44
PIF_VALUE: 27
PIF_VALUE: 25
PIF_VALUE: 24
PIF_VALUE: 25
PIF_VALUE: 27
PIF_VALUE: 31
PIF_VALUE: 45
PIF_VALUE: 36
PIF_VALUE: 26
PIF_VALUE: 42
PIF_VALUE: 28
PIF_VALUE: 27
PIF_VALUE: 34
PIF_VALUE: 26
PIF_VALUE: 35
PIF_VALUE: 27
PIF_VALUE: 31
PIF_VALUE: 29
PIF_VALUE: 25
PIF_VALUE: 34
PIF_VALUE: 25
PIF_VALUE: 27
PIF_VALUE: 42
PIF_VALUE: 36
PIF_VALUE: 26
PIF_VALUE: 27
PIF_VALUE: 40
PIF_VALUE: 30
PIF_VALUE: 37
PIF_VALUE: 26
PIF_VALUE: 33
PIF_VALUE: 26
PIF_VALUE: 38
PIF_VALUE: 24
PIF_VALUE: 34
PIF_VALUE: 24
PIF_VALUE: 34
PIF_VALUE: 26
PIF_VALUE: 30
PIF_VALUE: 32
PIF_VALUE: 25
PIF_VALUE: 19
PIF_VALUE: 24
PIF_VALUE: 26
PIF_VALUE: 24
PIF_VALUE: 27
PIF_VALUE: 30
PIF_VALUE: 25
PIF_VALUE: 31
PIF_VALUE: 25
PIF_VALUE: 28
PIF_VALUE: 26
PIF_VALUE: 25
PIF_VALUE: 39
PIF_VALUE: 26
PIF_VALUE: 44
PIF_VALUE: 26
PIF_VALUE: 35
PIF_VALUE: 27
PIF_VALUE: 39
PIF_VALUE: 24
PIF_VALUE: 29
PIF_VALUE: 36
PIF_VALUE: 43
PIF_VALUE: 36
PIF_VALUE: 26
PIF_VALUE: 42
PIF_VALUE: 30
PIF_VALUE: 27
PIF_VALUE: 24
PIF_VALUE: 37
PIF_VALUE: 27
PIF_VALUE: 41
PIF_VALUE: 24
PIF_VALUE: 43
PIF_VALUE: 24
PIF_VALUE: 24
PIF_VALUE: 28
PIF_VALUE: 34
PIF_VALUE: 24
PIF_VALUE: 24
PIF_VALUE: 26
PIF_VALUE: 34
PIF_VALUE: 24
PIF_VALUE: 25
PIF_VALUE: 30
PIF_VALUE: 25
PIF_VALUE: 30
PIF_VALUE: 35
PIF_VALUE: 30
PIF_VALUE: 36
PIF_VALUE: 26
PIF_VALUE: 26
PIF_VALUE: 28
PIF_VALUE: 25
PIF_VALUE: 37
PIF_VALUE: 25
PIF_VALUE: 27
PIF_VALUE: 26
PIF_VALUE: 35
PIF_VALUE: 26
PIF_VALUE: 24
PIF_VALUE: 35
PIF_VALUE: 30
PIF_VALUE: 27
PIF_VALUE: 26
PIF_VALUE: 30
PIF_VALUE: 37
PIF_VALUE: 32
PIF_VALUE: 29
PIF_VALUE: 26
PIF_VALUE: 27
PIF_VALUE: 28
PIF_VALUE: 30
PIF_VALUE: 27
PIF_VALUE: 26
PIF_VALUE: 27
PIF_VALUE: 30
PIF_VALUE: 36
PIF_VALUE: 24
PIF_VALUE: 24
PIF_VALUE: 25
PIF_VALUE: 28
PIF_VALUE: 31
PIF_VALUE: 24
PIF_VALUE: 25
PIF_VALUE: 34
PIF_VALUE: 26
PIF_VALUE: 41
PIF_VALUE: 65
PIF_VALUE: 25
PIF_VALUE: 25
PIF_VALUE: 24
PIF_VALUE: 24
PIF_VALUE: 28
PIF_VALUE: 29
PIF_VALUE: 34
PIF_VALUE: 29
PIF_VALUE: 32
PIF_VALUE: 33
PIF_VALUE: 30
PIF_VALUE: 66
PIF_VALUE: 39
PIF_VALUE: 25
PIF_VALUE: 30
PIF_VALUE: 42
PIF_VALUE: 33
PIF_VALUE: 33
PIF_VALUE: 36
PIF_VALUE: 35
PIF_VALUE: 24
PIF_VALUE: 34
PIF_VALUE: 35
PIF_VALUE: 24
PIF_VALUE: 25
PIF_VALUE: 27
PIF_VALUE: 35
PIF_VALUE: 36
PIF_VALUE: 28
PIF_VALUE: 26
PIF_VALUE: 35
PIF_VALUE: 25
PIF_VALUE: 24
PIF_VALUE: 24
PIF_VALUE: 26
PIF_VALUE: 48
PIF_VALUE: 26
PIF_VALUE: 27
PIF_VALUE: 36
PIF_VALUE: 24
PIF_VALUE: 30
PIF_VALUE: 34
PIF_VALUE: 27
PIF_VALUE: 27
PIF_VALUE: 30
PIF_VALUE: 26
PIF_VALUE: 24
PIF_VALUE: 42
PIF_VALUE: 17
PIF_VALUE: 28
PIF_VALUE: 26
PIF_VALUE: 43
PIF_VALUE: 34
PIF_VALUE: 24
PIF_VALUE: 29
PIF_VALUE: 24
PIF_VALUE: 24
PIF_VALUE: 28
PIF_VALUE: 26
PIF_VALUE: 28
PIF_VALUE: 25
PIF_VALUE: 36
PIF_VALUE: 33
PIF_VALUE: 42
PIF_VALUE: 31
PIF_VALUE: 42
PIF_VALUE: 29
PIF_VALUE: 44
PIF_VALUE: 30
PIF_VALUE: 28
PIF_VALUE: 25
PIF_VALUE: 38
PIF_VALUE: 34
PIF_VALUE: 42
PIF_VALUE: 32
PIF_VALUE: 24
PIF_VALUE: 37
PIF_VALUE: 38
PIF_VALUE: 34
PIF_VALUE: 28
PIF_VALUE: 34
PIF_VALUE: 37
PIF_VALUE: 54
PIF_VALUE: 30
PIF_VALUE: 36
PIF_VALUE: 24
PIF_VALUE: 45
PIF_VALUE: 26
PIF_VALUE: 26
PIF_VALUE: 44
PIF_VALUE: 25
PIF_VALUE: 25
PIF_VALUE: 30
PIF_VALUE: 28
PIF_VALUE: 46
PIF_VALUE: 27
PIF_VALUE: 36
PIF_VALUE: 25
PIF_VALUE: 26
PIF_VALUE: 34
PIF_VALUE: 25
PIF_VALUE: 30
PIF_VALUE: 31
PIF_VALUE: 35
PIF_VALUE: 24
PIF_VALUE: 35
PIF_VALUE: 26
PIF_VALUE: 26
PIF_VALUE: 34
PIF_VALUE: 32
PIF_VALUE: 24
PIF_VALUE: 32
PIF_VALUE: 28
PIF_VALUE: 26
PIF_VALUE: 29
PIF_VALUE: 31
PIF_VALUE: 32
PIF_VALUE: 30
PIF_VALUE: 25
PIF_VALUE: 28
PIF_VALUE: 28
PIF_VALUE: 30
PIF_VALUE: 24
PIF_VALUE: 27
PIF_VALUE: 41
PIF_VALUE: 24
PIF_VALUE: 30
PIF_VALUE: 26
PIF_VALUE: 29
PIF_VALUE: 29
PIF_VALUE: 26
PIF_VALUE: 27
PIF_VALUE: 31
PIF_VALUE: 30
PIF_VALUE: 24
PIF_VALUE: 33
PIF_VALUE: 26
PIF_VALUE: 35
PIF_VALUE: 29
PIF_VALUE: 24
PIF_VALUE: 24
PIF_VALUE: 26
PIF_VALUE: 24
PIF_VALUE: 25
PIF_VALUE: 34
PIF_VALUE: 28
PIF_VALUE: 41
PIF_VALUE: 25
PIF_VALUE: 24
PIF_VALUE: 26
PIF_VALUE: 28
PIF_VALUE: 37
PIF_VALUE: 24
PIF_VALUE: 38
PIF_VALUE: 24
PIF_VALUE: 40
PIF_VALUE: 38
PIF_VALUE: 30
PIF_VALUE: 26
PIF_VALUE: 38
PIF_VALUE: 46
PIF_VALUE: 30
PIF_VALUE: 30
PIF_VALUE: 34
PIF_VALUE: 38
PIF_VALUE: 25
PIF_VALUE: 28
PIF_VALUE: 43
PIF_VALUE: 27
PIF_VALUE: 34
PIF_VALUE: 28
PIF_VALUE: 28
PIF_VALUE: 38
PIF_VALUE: 36
PIF_VALUE: 30
PIF_VALUE: 24
PIF_VALUE: 34
PIF_VALUE: 24
PIF_VALUE: 27
PIF_VALUE: 36
PIF_VALUE: 24
PIF_VALUE: 35
PIF_VALUE: 26
PIF_VALUE: 33
PIF_VALUE: 28
PIF_VALUE: 28
PIF_VALUE: 44
PIF_VALUE: 33
PIF_VALUE: 42
PIF_VALUE: 25
PIF_VALUE: 24
PIF_VALUE: 25
PIF_VALUE: 27
PIF_VALUE: 24
PIF_VALUE: 27
PIF_VALUE: 26
PIF_VALUE: 26
PIF_VALUE: 39
PIF_VALUE: 40
PIF_VALUE: 27
PIF_VALUE: 30
PIF_VALUE: 24
PIF_VALUE: 44
PIF_VALUE: 39
PIF_VALUE: 25
PIF_VALUE: 24
PIF_VALUE: 24
PIF_VALUE: 29
PIF_VALUE: 39
PIF_VALUE: 25
PIF_VALUE: 43
PIF_VALUE: 27
PIF_VALUE: 27
PIF_VALUE: 26
PIF_VALUE: 42
PIF_VALUE: 25
PIF_VALUE: 26
PIF_VALUE: 36
PIF_VALUE: 33
PIF_VALUE: 26
PIF_VALUE: 40
PIF_VALUE: 24
PIF_VALUE: 40
PIF_VALUE: 33
PIF_VALUE: 27
PIF_VALUE: 31
PIF_VALUE: 28
PIF_VALUE: 24
PIF_VALUE: 24
PIF_VALUE: 28
PIF_VALUE: 37
PIF_VALUE: 30
PIF_VALUE: 24
PIF_VALUE: 24
PIF_VALUE: 27
PIF_VALUE: 28
PIF_VALUE: 20
PIF_VALUE: 34
PIF_VALUE: 37
PIF_VALUE: 25
PIF_VALUE: 30
PIF_VALUE: 34
PIF_VALUE: 25
PIF_VALUE: 30
PIF_VALUE: 25
PIF_VALUE: 30
PIF_VALUE: 25
PIF_VALUE: 28
PIF_VALUE: 30
PIF_VALUE: 24
PIF_VALUE: 27
PIF_VALUE: 32
PIF_VALUE: 24
PIF_VALUE: 27
PIF_VALUE: 30
PIF_VALUE: 30
PIF_VALUE: 23
PIF_VALUE: 33
PIF_VALUE: 24
PIF_VALUE: 31
PIF_VALUE: 31
PIF_VALUE: 27
PIF_VALUE: 28
PIF_VALUE: 25
PIF_VALUE: 29
PIF_VALUE: 27
PIF_VALUE: 30
PIF_VALUE: 26
PIF_VALUE: 24
PIF_VALUE: 43
PIF_VALUE: 24
PIF_VALUE: 17
PIF_VALUE: 35
PIF_VALUE: 34
PIF_VALUE: 36
PIF_VALUE: 26
PIF_VALUE: 36
PIF_VALUE: 29
PIF_VALUE: 39
PIF_VALUE: 36
PIF_VALUE: 35
PIF_VALUE: 35
PIF_VALUE: 34
PIF_VALUE: 29
PIF_VALUE: 28
PIF_VALUE: 43
PIF_VALUE: 14
PIF_VALUE: 24
PIF_VALUE: 25
PIF_VALUE: 30
PIF_VALUE: 30
PIF_VALUE: 24
PIF_VALUE: 28
PIF_VALUE: 36
PIF_VALUE: 33
PIF_VALUE: 33
PIF_VALUE: 41
PIF_VALUE: 27
PIF_VALUE: 40
PIF_VALUE: 39
PIF_VALUE: 41
PIF_VALUE: 30
PIF_VALUE: 33
PIF_VALUE: 46
PIF_VALUE: 41
PIF_VALUE: 24
PIF_VALUE: 33
PIF_VALUE: 43
PIF_VALUE: 14
PIF_VALUE: 24
PIF_VALUE: 42
PIF_VALUE: 35
PIF_VALUE: 31
PIF_VALUE: 29
PIF_VALUE: 31
PIF_VALUE: 27
PIF_VALUE: 26
PIF_VALUE: 35
PIF_VALUE: 28
PIF_VALUE: 30
PIF_VALUE: 29
PIF_VALUE: 41
PIF_VALUE: 42
PIF_VALUE: 33
PIF_VALUE: 26
PIF_VALUE: 24
PIF_VALUE: 26
PIF_VALUE: 32
PIF_VALUE: 24
PIF_VALUE: 25
PIF_VALUE: 26
PIF_VALUE: 31
PIF_VALUE: 26
PIF_VALUE: 39
PIF_VALUE: 34
PIF_VALUE: 28
PIF_VALUE: 24
PIF_VALUE: 30
PIF_VALUE: 28
PIF_VALUE: 44
PIF_VALUE: 25
PIF_VALUE: 43
PIF_VALUE: 34
PIF_VALUE: 40
PIF_VALUE: 25
PIF_VALUE: 29
PIF_VALUE: 54
PIF_VALUE: 25
PIF_VALUE: 50
PIF_VALUE: 36
PIF_VALUE: 26
PIF_VALUE: 30
PIF_VALUE: 34
PIF_VALUE: 42
PIF_VALUE: 26
PIF_VALUE: 29
PIF_VALUE: 30
PIF_VALUE: 29
PIF_VALUE: 31
PIF_VALUE: 34
PIF_VALUE: 28
PIF_VALUE: 34
PIF_VALUE: 56

## 2021-01-01 ASSESSMENT — PAIN SCALES - GENERAL
PAINLEVEL_OUTOF10: 0
PAINLEVEL_OUTOF10: 8
PAINLEVEL_OUTOF10: 0

## 2021-01-01 ASSESSMENT — LIFESTYLE VARIABLES
HOW OFTEN DO YOU HAVE A DRINK CONTAINING ALCOHOL: NEVER
HOW OFTEN DO YOU HAVE A DRINK CONTAINING ALCOHOL: NEVER

## 2021-01-01 ASSESSMENT — ENCOUNTER SYMPTOMS
BLOOD IN STOOL: 0
COUGH: 0
NAUSEA: 0
VOMITING: 0
DIARRHEA: 0
CONSTIPATION: 0
DIARRHEA: 0
CONSTIPATION: 0

## 2021-01-01 ASSESSMENT — PATIENT HEALTH QUESTIONNAIRE - PHQ9
1. LITTLE INTEREST OR PLEASURE IN DOING THINGS: 1
SUM OF ALL RESPONSES TO PHQ QUESTIONS 1-9: 2
SUM OF ALL RESPONSES TO PHQ QUESTIONS 1-9: 2

## 2021-01-01 ASSESSMENT — PAIN DESCRIPTION - PAIN TYPE
TYPE: ACUTE PAIN;CHRONIC PAIN
TYPE: ACUTE PAIN
TYPE: ACUTE PAIN
TYPE: ACUTE PAIN;CHRONIC PAIN
TYPE: ACUTE PAIN
TYPE: ACUTE PAIN;CHRONIC PAIN
TYPE: ACUTE PAIN;CHRONIC PAIN

## 2021-01-01 ASSESSMENT — SOCIAL DETERMINANTS OF HEALTH (SDOH): HOW HARD IS IT FOR YOU TO PAY FOR THE VERY BASICS LIKE FOOD, HOUSING, MEDICAL CARE, AND HEATING?: NOT VERY HARD

## 2021-02-20 NOTE — ED PROVIDER NOTES
Independent MLP    HPI:  2/20/21,   Time: 10:50 AM HANNAH Cooley is a 60 y.o. female presenting to the ED for right knee pain, beginning yesterday ago. The complaint has been constant, moderate in severity, and worsened by nothing. Complaining of pain to the right knee which she states began yesterday. States that she slipped on the ice and twisted the right knee however did not fall directly onto the right knee. She denies any head or neck pain. She denies any other complaints of injuries. She was offered pain medication however declined. States the pain is worse when attempting to ambulate. ROS:   Pertinent positives and negatives are stated within HPI, all other systems reviewed and are negative.  --------------------------------------------- PAST HISTORY ---------------------------------------------  Past Medical History:  has no past medical history on file. Past Surgical History:  has no past surgical history on file. Social History:  reports that she has never smoked. She has never used smokeless tobacco. She reports previous alcohol use. She reports that she does not use drugs. Family History: family history is not on file. The patients home medications have been reviewed. Allergies: Ciprofloxacin    -------------------------------------------------- RESULTS -------------------------------------------------  All laboratory and radiology results have been personally reviewed by myself   LABS:  No results found for this visit on 02/20/21. RADIOLOGY:  Interpreted by Radiologist.  XR KNEE RIGHT (3 VIEWS)   Final Result   1. No fracture or joint dislocation is seen. 2. Degenerative changes, as described.          ------------------------- NURSING NOTES AND VITALS REVIEWED ---------------------------   The nursing notes within the ED encounter and vital signs as below have been reviewed.    BP (!) 143/75   Pulse 69   Temp 97.5 °F (36.4 °C)   Resp 18   SpO2 97% Oxygen Saturation Interpretation: Normal      ---------------------------------------------------PHYSICAL EXAM--------------------------------------      Constitutional/General: Alert and oriented x3, well appearing, non toxic in NAD  Head: NC/AT  Eyes: PERRL, EOMI  Mouth: Oropharynx clear, handling secretions, no trismus  Neck: Supple, full ROM, no meningeal signs  Pulmonary: Lungs clear to auscultation bilaterally, no wheezes, rales, or rhonchi. Not in respiratory distress  Cardiovascular:  Regular rate and rhythm, no murmurs, gallops, or rubs. 2+ distal pulses  Abdomen: Soft, non tender, non distended,   Extremities: Moves all extremities x 4. Warm and well perfused. She has mild tenderness on palpation to medial aspect of the left knee. Mild swelling is noted. No deformity noted. She has full range of motion with flexion extension no increase in pain with range of motion. She has no joint laxity on examination. Pedal pulse are palpable 2+ capillary refill less than 3 seconds. Negative anterior drawer. Skin: warm and dry without rash  Neurologic: GCS 15,  Psych: Normal Affect      ------------------------------ ED COURSE/MEDICAL DECISION MAKING----------------------  Medications - No data to display      Medical Decision Making:    Patient was informed of negative x-ray result Ace bandage applied encouraged use rest, ice, compression elevation if no improvement in symptoms to follow-up with primary care physician may need to have an outpatient nonemergent MRI. Counseling: The emergency provider has spoken with the patient and discussed todays results, in addition to providing specific details for the plan of care and counseling regarding the diagnosis and prognosis. Questions are answered at this time and they are agreeable with the plan.      --------------------------------- IMPRESSION AND DISPOSITION ---------------------------------    IMPRESSION  1.  Strain of right knee, initial encounter DISPOSITION  Disposition: Discharge to home  Patient condition is good                 Johnson Litten, APRN - CNP  02/20/21 [id number]

## 2021-03-02 NOTE — PROGRESS NOTES
Attending Physician Statement    S:   Chief Complaint   Patient presents with   Sallie Johns [facility]     patient brought lab and test  results     Knee Pain     right      Patient is a 60year old female with history of schizoaffective disorder, htn, chronic right knee pain. Had a fall recently that caused an acute worsening of her right knee. Pain and swelling is improved. Pain has largely gone away and been reduced to chronic pain in right knee. Worse with cold or rain. Is seeing ortho next week. Has been diagnosed with hld in the past but prefers not to be on meds. Sees [facility] for schizoaffective disorder. O: Blood pressure 129/83, pulse 65, temperature 97.4 °F (36.3 °C), temperature source Cerebral, resp. rate 18, height 5' 1\" (1.549 m), weight 165 lb (74.8 kg), SpO2 96 %. Exam:   Heart - RRR   Lungs - clear   Thyroid - fullness on the right side   A: vegetarian, schizoaffective disorder, knee pain   P:  Check labs    Continue follow up with [facility]   Thyroid US    Follow up with ortho    Follow-up as ordered    Attending Attestation   I have discussed the case, including pertinent history and exam findings with the resident. I agree with the documented assessment and plan.

## 2021-03-02 NOTE — PROGRESS NOTES
[facility] RESIDENCY PROGRAM  DATE OF VISIT : 3/4/2021    Patient : Blaine Kuhn   Age : 60 y.o.  : 1955   MRN : <[id number]>   ______________________________________________________________________    Chief Complaint :   Chief Complaint   Patient presents with   [facility]     patient brought lab and test  results     Knee Pain     right       HPI : Blaine Kuhn is 60 y.o. female who presented to the clinic today for establishing care and for knee pain. She is recently moved from [place], but states she lived in the area 20 years ago and was established here during that time. Medical history is significant for schizoaffective disorder, GERD with hx of esophageal bleeding s/p fundoplication. Labs results from previous practice show borderline elevated TSH, but other labs are within normal limits. She also reports she recently tripped in her house, did not fall, but twisted knee. She had initial swelling with pain. She reports acute pain from that has resolved, but still has chronic knee pain. She reports she is seeing orthopedics next week to have it looked at. She reports occasional crepitus but denies locking or instability. She denies fever, chills. Patient also reports dental concern but states she is going to the dentist soon. Patient diagnosed with schizoaffective disorder , and reports paranoia when things \"get really difficult. \" She is established with [facility] for medical managaement only. She is interested in group therapy if available, and possibly a referral for counseling. Patient reports a history of GERD w/ esophageal bleeding six years ago. She does not take PPI or H2 blocker currently, but takes probiotic. She reports she has been fine since having surgery where esophagus was cut and wrapped around stomach. No heart burn now, no cough at night. Reports EGD and colonoscopy at that time. Patient reports weight gain. She feels that her thyroid might be swollen on the left. She denies constipation, diarrhea, heat and cold intolerance. Reports fatigue. No drugs, EtOH, tobacco.  Does not want cholesterol medicine. No meat in diet past 20 years. Rarely eat fish. Occasional eggs. Take supplements, but not iron. Tries to avoid excess sugar. Past Medical History :  Past Medical History:   Diagnosis Date    GERD (gastroesophageal reflux disease)     Osteoarthritis of right knee     Schizoaffective disorder (HCC)     [facility]     Past Surgical History:   Procedure Laterality Date    GASTRIC FUNDOPLICATION         Allergies : Allergies   Allergen Reactions    Ciprofloxacin Nausea Only       Medication List :    Current Outpatient Medications   Medication Sig Dispense Refill    LORazepam (ATIVAN) 1 MG tablet Take 1 mg by mouth daily.  melatonin 3 MG TABS tablet Take 10 mg by mouth nightly as needed      QUEtiapine (SEROQUEL) 200 MG tablet Take 400 mg by mouth daily      sertraline (ZOLOFT) 100 MG tablet Take 200 mg by mouth daily      traZODone (DESYREL) 50 MG tablet Take 100 mg by mouth nightly      hydrOXYzine (VISTARIL) 50 MG capsule Take 50 mg by mouth 2 times daily as needed for Itching or Anxiety       No current facility-administered medications for this visit. Review of Systems :  Review of Systems   Constitutional: Positive for fatigue and unexpected weight change (increased). Negative for chills and fever. HENT: Positive for dental problem. Respiratory: Negative for cough. Gastrointestinal: Negative for constipation and diarrhea. Denies heart burn   Endocrine: Negative for cold intolerance and heat intolerance. Musculoskeletal:        Reports R knee pain   Psychiatric/Behavioral: Positive for dysphoric mood. Negative for self-injury. The patient is nervous/anxious.       ______________________________________________________________________ Physical Exam :    Vitals: /83 (Site: Right Upper Arm, Position: Sitting, Cuff Size: Medium Adult)   Pulse 65   Temp 97.4 °F (36.3 °C) (Cerebral)   Resp 18   Ht 5' 1\" (1.549 m)   Wt 165 lb (74.8 kg)   SpO2 96%   BMI 31.18 kg/m²   General Appearance: Well developed, awake, alert, oriented, and in NAD  HEENT: NCAT, MMM, no pallor or icterus. Neck: Supple, symmetrical, trachea midline. Some fullness on right appreciated of thyroid  Chest wall/Lung: CTAB, respirations unlabored. No ronchi/wheezing/rales   Heart: RRR, normal S1 and S2, no murmurs, rubs or gallops. Abdomen: SNTND  Extremities: Extremities normal, atraumatic, no cyanosis, clubbing or edema. R knee not tender to palpation. Negative thessaly test.  Skin: Skin color, texture, turgor normal, no rashes or lesions  Musculokeletal: ROM grossly normal in all joints of extremities, no obvious joint swelling. Neurologic: Alert&Oriented x3. No focal motor deficits detected. Psychiatric: Normal mood. Normal affect. Normal behavior. ______________________________________________________________________    Assessment & Plan :    1. Healthcare maintenance  - CBC WITH AUTO DIFFERENTIAL; Future  - COMPREHENSIVE METABOLIC PANEL; Future  - VITAMIN B12 & FOLATE; Future  - TSH; Future  - LIPID PANEL; Future  - HEMOGLOBIN A1C; Future    2. High cholesterol  - COMPREHENSIVE METABOLIC PANEL; Future  - LIPID PANEL; Future  - HEMOGLOBIN A1C; Future    3. Thyroid fullness  - TSH; Future  - US HEAD NECK SOFT TISSUE THYROID; Future    4. Fatigue, unspecified type  - CBC WITH AUTO DIFFERENTIAL; Future  - COMPREHENSIVE METABOLIC PANEL; Future  - VITAMIN B12 & FOLATE; Future  - TSH; Future  - HEMOGLOBIN A1C; Future      Additional plan and future considerations:         Return to Office: Return in about 4 weeks (around 3/30/2021) for labs, knee.     Jason Nunez DO   Case discussed with Dr. Ector Sterling

## 2021-03-12 NOTE — PROGRESS NOTES
The 10-year ASCVD risk score (Sara Chakraborty et al., 2013) is: 5.1%    Values used to calculate the score:      Age: 60 years      Sex: Female      Is Non- : No      Diabetic: No      Tobacco smoker: No      Systolic Blood Pressure: 097 mmHg      Is BP treated: No      HDL Cholesterol: 68 mg/dL      Total Cholesterol: 238 mg/dL    Low risk at this time, do not need to start statin.

## 2021-03-30 NOTE — PROGRESS NOTES
[facility] RESIDENCY PROGRAM  DATE OF VISIT : 3/30/2021    Patient : Elliott Auguste   Age : 60 y.o.  : 1955   MRN : <[id number]>   ______________________________________________________________________    Chief Complaint :   Chief Complaint   Patient presents with    Hyperlipidemia     lab follow up    Other     [address] [place], [place] [address] NO       HPI : Elliott Auguste is 60 y.o. female who presented to the clinic today for follow up of lab results, including lipid panel and TSH. Healthcare maintenance was also addressed today. Patient's lipid panel showed elevated cholesterol, triglycerides and LDL. ASCVD risk score is only 3.9%. Patient reports vegetarian diet for past 30 or so years. She does use butter and eats occasional eggs. Folate and Vit B12 were within normal limits. Liver enzymes were mildly elevated, all other labs were negative. Patient reports difficulty with falling asleep, states she takes 30 mg melatonin to fall asleep. No problem staying asleep. Patient also reports taking two tablets of zinc daily, vitamin C, vitamin D, probiotics, and selenium. There are other supplements she takes too. Patient had flundoplication surgery 191 for esophageal bleeding. She had colonoscopies in  and  due to GI bleeding. She denies any current heart burn, nausea, vomiting, melena, and hematochezia. She does report 4 lb weight loss since last visit. She reports her last pap was two years ago and negative per patient. She is not sure if HPV was done or not at that time. She does not plan to get COVID vaccine because she heard Dr. Rhonda Kuo recommending zinc and other vitamins to prevent covid infection. She also endorses wearing her mask in public and washing hands regularly, as well as being cautious in general to prevent against infection.       Past Medical History :  Past Medical History:   Diagnosis Date    GERD (gastroesophageal reflux disease)     Osteoarthritis of right knee     Schizoaffective disorder (HCC)     [facility]     Past Surgical History:   Procedure Laterality Date    GASTRIC FUNDOPLICATION         Allergies : Allergies   Allergen Reactions    Ciprofloxacin Nausea Only       Medication List :    Current Outpatient Medications   Medication Sig Dispense Refill    LORazepam (ATIVAN) 1 MG tablet Take 1 mg by mouth daily.  melatonin 3 MG TABS tablet Take 10 mg by mouth nightly as needed      QUEtiapine (SEROQUEL) 200 MG tablet Take 400 mg by mouth daily      sertraline (ZOLOFT) 100 MG tablet Take 200 mg by mouth daily      traZODone (DESYREL) 50 MG tablet Take 100 mg by mouth nightly      hydrOXYzine (VISTARIL) 50 MG capsule Take 50 mg by mouth 2 times daily as needed for Itching or Anxiety       No current facility-administered medications for this visit. Review of Systems :  Review of Systems   Constitutional: Positive for fatigue (on and off) and unexpected weight change. Negative for diaphoresis. Gastrointestinal: Negative for blood in stool, constipation, diarrhea, nausea and vomiting. Denies heart burn   Psychiatric/Behavioral: Positive for sleep disturbance (difficulty falling asleep). ______________________________________________________________________    Physical Exam :    Vitals: /73   Pulse 61   Temp 97.9 °F (36.6 °C) (Temporal)   Resp 16   Ht 5' 1\" (1.549 m)   Wt 161 lb (73 kg)   SpO2 98%   BMI 30.42 kg/m²   General Appearance: Well developed, awake, alert, oriented, and in NAD  HEENT: NCAT, MMM, no pallor or icterus. Neck: Supple, symmetrical, trachea midline. Chest wall/Lung: CTAB, respirations unlabored. No ronchi/wheezing/rales  Heart: RRR, normal S1 and S2, no murmurs, rubs or gallops. Abdomen: SNTND  Extremities: Extremities normal, atraumatic, no cyanosis, clubbing or edema.   Skin: Skin color, texture, turgor normal, no rashes or lesions  Musculokeletal: ROM grossly normal in all joints of extremities, no obvious joint swelling. Neurologic: Alert&Oriented x3. No focal motor deficits detected. Psychiatric: Normal mood. Normal affect. Normal behavior. ______________________________________________________________________    Assessment & Plan :    Healthcare maintenance/ Elevated liver enzymes/ Fatigue, unspecified type/ Encounter for screening for other viral diseases   - HEPATITIS B SURFACE ANTIBODY; Future  - HEPATITIS C ANTIBODY; Future  - HIV Screen; Future    Weight loss  · Mild weight loss in past month, will continue to monitor      Additional plan and future considerations:   Patient to bring records for us to make copies of    Return to Office: Return 1-3 months, for health maintenance .     Mary Del Castillo DO   Case discussed with Dr. Kathleen Gutierrez

## 2021-03-30 NOTE — PROGRESS NOTES
S: 60 y.o. female here for schizoaffective disorder and gerd. Worried about thyroid test levels. Takes many supplements. Taking up to 30 mg of melatonin. Zinc up to 200 mg/day     O: VS: /73   Pulse 61   Temp 97.9 °F (36.6 °C) (Temporal)   Resp 16   Ht 5' 1\" (1.549 m)   Wt 161 lb (73 kg)   SpO2 98%   BMI 30.42 kg/m²    General: NAD   CV:  RRR, no gallops, rubs, or murmurs   Resp: CTAB no R/R/W   Abd:  Soft, nontender, no masses    Ext:  no C/C/E   Mild rt thyroid enlargement  Impression/Plan:   1. Thyroid enlargement-u/s showed slight rt enlargement, labs normal, otherwise nl u/s  2. Hyperlipidemia-advise low cholesterol diet  3. Bring in pill bottles, cut back on zinc to 100 mg /day. 4. Borderline lfts-hepatitis panel, consider u/s  Sign release of records  Declined COVID vaccine    rto in 1-3 months      Attending Physician Statement  I have discussed the case, including pertinent history and exam findings with the resident. I agree with the documented assessment and plan.         Pasquale Montiel MD

## 2021-03-30 NOTE — PATIENT INSTRUCTIONS
Patient Education        High Cholesterol: Care Instructions  Your Care Instructions     Cholesterol is a type of fat in your blood. It is needed for many body functions, such as making new cells. Cholesterol is made by your body. It also comes from food you eat. High cholesterol means that you have too much of the fat in your blood. This raises your risk of a heart attack and stroke. LDL and HDL are part of your total cholesterol. LDL is the \"bad\" cholesterol. High LDL can raise your risk for heart disease, heart attack, and stroke. HDL is the \"good\" cholesterol. It helps clear bad cholesterol from the body. High HDL is linked with a lower risk of heart disease, heart attack, and stroke. Your cholesterol levels help your doctor find out your risk for having a heart attack or stroke. You and your doctor can talk about whether you need to lower your risk and what treatment is best for you. A heart-healthy lifestyle along with medicines can help lower your cholesterol and your risk. The way you choose to lower your risk will depend on how high your risk is for heart attack and stroke. It will also depend on how you feel about taking medicines. Follow-up care is a key part of your treatment and safety. Be sure to make and go to all appointments, and call your doctor if you are having problems. It's also a good idea to know your test results and keep a list of the medicines you take. How can you care for yourself at home? · Eat a variety of foods every day. Good choices include fruits, vegetables, whole grains (like oatmeal), dried beans and peas, nuts and seeds, soy products (like tofu), and fat-free or low-fat dairy products. · Replace butter, margarine, and hydrogenated or partially hydrogenated oils with olive and canola oils. (Canola oil margarine without trans fat is fine.)  · Replace red meat with fish, poultry, and soy protein (like tofu).   · Limit processed and packaged foods like chips, crackers, and cookies. · Bake, broil, or steam foods. Don't wilson them. · Be physically active. Get at least 30 minutes of exercise on most days of the week. Walking is a good choice. You also may want to do other activities, such as running, swimming, cycling, or playing tennis or team sports. · Stay at a healthy weight or lose weight by making the changes in eating and physical activity listed above. Losing just a small amount of weight, even 5 to 10 pounds, can reduce your risk for having a heart attack or stroke. · Do not smoke. When should you call for help? Watch closely for changes in your health, and be sure to contact your doctor if:    · You need help making lifestyle changes.     · You have questions about your medicine. Where can you learn more? Go to [email or web address] org and sign in to your Knowledge Factor account. Enter Q178 in the ClassWallet box to learn more about \"High Cholesterol: Care Instructions. \"     If you do not have an account, please click on the \"Sign Up Now\" link. Current as of: August 31, 2020               Content Version: 12.8  © 0049-1124 [employer], Incorporated. Care instructions adapted under license by [facility] ([facility]). If you have questions about a medical condition or this instruction, always ask your healthcare professional. [address] any warranty or liability for your use of this information.

## 2021-08-12 NOTE — TELEPHONE ENCOUNTER
Received call from [facility] with [employer]. Brief description of triage: SOB, dizziness, fatigue. Patient told me at the end of the call that she was in [facility] this morning. No triage needed. Triage indicates for patient to call PCP now    Care advice provided, patient verbalizes understanding; denies any other questions or concerns; instructed to call back for any new or worsening symptoms. Writer provided warm transfer to [facility] for appointment scheduling. Attention Provider: Thank you for allowing me to participate in the care of your patient. The patient was connected to triage in response to information provided to the [facility]. Please do not respond through this encounter as the response is not directed to a shared pool. Reason for Disposition   [1] HIGH RISK patient (e.g., age > 59 years, diabetes, heart or lung disease, weak immune system) AND [2] new or worsening symptoms   Caller has already spoken with the PCP and has no further questions. Additional Information   Negative: MILD difficulty breathing (e.g., minimal/no SOB at rest, SOB with walking, pulse <100)     At times when she does steps, gone now    Answer Assessment - Initial Assessment Questions  1. COVID-19 DIAGNOSIS: \"Who made your Coronavirus (COVID-19) diagnosis? \" \"Was it confirmed by a positive lab test?\" If not diagnosed by a HCP, ask \"Are there lots of cases (community spread) where you live? \" (See public health department website, if unsure)      No dx as of yet    2. COVID-19 EXPOSURE: \"Was there any known exposure to COVID before the symptoms began? \" CDC Definition of close contact: within 6 feet (2 meters) for a total of 15 minutes or more over a 24-hour period. Denies    3. ONSET: \"When did the COVID-19 symptoms start? \"       In the last week    4. WORST SYMPTOM: \"What is your worst symptom? \" (e.g., cough, fever, shortness of breath, muscle aches)

## 2021-09-23 NOTE — ED PROVIDER NOTES
[address]  Department of Emergency Medicine   ED  Encounter Note  Admit Date/RoomTime: No admission date for patient encounter. ED Room: Room/bed info not found    NAME: Clara Aguilar  : 1955  MRN: [id number]     Chief Complaint:  Concern For COVID-19 (dizziness/ headaches/ cough/ fatigue x 2 months)    History of Present Illness       Clara Aguilar is a 60 y.o. old female who presents to the emergency department by private vehicle, for concern for COVID-19 with, worsening productive cough, worsening shortness of breath, fatigue and headache, which began 2 month(s) prior to arrival.  Since onset the symptoms have been persistent and mild-moderate in severity. The symptoms are associated with dizziness. There has been no vomiting, diarrhea, wheezing, loss of taste or loss of smell. ROS   Pertinent positives and negatives are stated within HPI, all other systems reviewed and are negative. Past Medical History:  has a past medical history of GERD (gastroesophageal reflux disease), Osteoarthritis of right knee, and Schizoaffective disorder ([facility] [address] Surgical History:  has a past surgical history that includes Gastric fundoplication. Social History:  reports that she has never smoked. She has never used smokeless tobacco. She reports previous alcohol use. She reports that she does not use drugs. Family History: family history is not on file. Allergies: Ciprofloxacin    Physical Exam   Oxygen Saturation Interpretation: Normal on room air analysis. ED Triage Vitals   BP Temp Temp Source Pulse Resp SpO2 Height Weight   21 1107 21 1032 21 1032 21 1032 21 1107 21 1032 21 1107 21 1107   124/80 97.3 °F (36.3 °C) Temporal 83 20 96 % 5' 1\" (1.549 m) 160 lb (72.6 kg)         · Constitutional:  Alert, development consistent with age.   · Ears:  External Ears: Bilateral normal.               TM's & External Canals: normal TMs bilaterally and normal canals bilaterally. · Nose:   There is no discharge, swelling or lesions noted. · Sinuses: no Bilateral maxillary sinus tenderness. no Bilateral frontal sinus tenderness. · Mouth:  normal tongue and buccal mucosa. · Throat: no erythema or exudates noted. Teeth and gums normal..  Airway Patent. · Neck:  Supple. There is no  anterior cervical node tenderness. · Respiratory:   Breath sounds: Bilateral normal.  Lung sounds: normal.   · CV:  Regular rate and rhythm, normal heart sounds, without pathological murmurs, ectopy, gallops, or rubs. · GI:  Abdomen Soft, nontender, good bowel sounds. No firm or pulsatile mass. · Integument:  Normal turgor. Warm, dry, without visible rash. · Neurological:  Oriented. Motor functions intact. Lab / Imaging Results   (All laboratory and radiology results have been personally reviewed by myself)  Labs:  No results found for this visit on 09/23/21. Imaging: All Radiology results interpreted by Radiologist unless otherwise noted. XR CHEST (2 VW)   Final Result   Borderline cardiomegaly. Tortuous thoracic aorta. ED Course / Medical Decision Making   Medications - No data to display       Medical Decision Making:   Shasta Donato presented to ED with complaints of Concern For COVID-19 (dizziness/ headaches/ cough/ fatigue x 2 months)      With low suspicion for pneumonia as per history/physical findings, imaging was done and confirms the above findings. With low suspicion for COVID-19, testing was done and results are pending and    COVID-19 swab obtained and pending, will call with results once available.  Advised cautionary self-quarantine at home in the interim per CDC guidelines.  Advise to Increase fluids and rest symptomatic relief discussed including Tylenol and or motrin pain/fever control.  Schedule f/u with PCP in 2-3 days.      Reason to return to ED, Red flag symptoms were discussed. To Obtain Test Results or View Medical Records via [employer]    1. If you already have a [employer] account, visit [email or web address], t and click  Log-in to [employer] to view records and any test results. 2.   If you have not activated a [employer] account, you can do so by going to [email or web address] and clicking Sign up for [employer]. 3.   Results of outpatient COVID-19 testing may take up to 5 days. 4.  If your COVID-19 test is positive, you will receive a phone call from a member of our medical team, and your results will be available in [address], our secure patient portal.  5.  If your COVID-19 test is negative, your results will be available on [employer], our secure patient portal.  If your employer is requiring you to show proof of your negative test result, you will be able to download and print off the test result record. .     Based on history and examination findings of Angelica Gutierrez, the causative nature of illness is likely to be Viral in etiology. Therefore, symptomatic control with supportive meds is considered appropriate at this time. She is not hypoxic. Patient is well appearing, non toxic, meets criteria for and is appropriate for outpatient management. Normal progression of disease discussed. All questions answered. Instruction provided in the use of fluids, vaporizer, acetaminophen, and other OTC medication for symptom control. Extra fluids  Analgesics as needed, dosages and times reviewed. Follow up as needed should symptoms fail to improve. Plan of Care/Counseling:  Rosina Reid reviewed today's visit with the patient in addition to providing specific details for the plan of care and counseling regarding the diagnosis and prognosis. Questions are answered at this time and are agreeable with the plan. Assessment     1. Cough      Plan   Discharged home.   Patient condition is good    New Medications     New Prescriptions BROMPHENIRAMINE-PSEUDOEPHEDRINE-DM 2-30-10 MG/5ML SYRUP    Take 5 mLs by mouth 4 times daily as needed for Congestion or Cough     Electronically signed by JARRET Browning   DD: 9/23/21  **This report was transcribed using voice recognition software. Every effort was made to ensure accuracy; however, inadvertent computerized transcription errors may be present.   END OF ED PROVIDER NOTE         Nathen Ganser, [place]  09/23/21 2227

## 2021-09-24 NOTE — CARE COORDINATION
Patient contacted regarding [id number] r/o d/t symptoms. Discussed COVID-19 related testing which was pending at this time. Test results were pending. Patient informed of results, if available? No.      Ambulatory Care Manager contacted the patient by telephone to perform post discharge assessment. Call within 2 business days of discharge: Yes. Verified name and  with patient as identifiers. Provided introduction to self, and explanation of the CTN/ACM role, and reason for call due to risk factors for infection and/or exposure to COVID-19. Symptoms reviewed with patient who verbalized the following symptoms: no new symptoms, no worsening symptoms and nasal congestion. Due to no new or worsening symptoms encounter was not routed to provider for escalation. Discussed follow-up appointments. If no appointment was previously scheduled, appointment scheduling offered: Yes. [facility] follow up appointment(s): No future appointments. Non-[facility] follow up appointment(s):   Non-face-to-face services provided:  Obtained and reviewed discharge summary and/or continuity of care documents     Advance Care Planning:   Does patient have an Advance Directive:  reviewed and current. Educated patient about risk for severe COVID-19 due to risk factors according to CDC guidelines. ACM reviewed discharge instructions, medical action plan and red flag symptoms with the patient who verbalized understanding. Discussed COVID vaccination status: Yes. Education provided on COVID-19 vaccination as appropriate. Discussed exposure protocols and quarantine with CDC Guidelines. Patient was given an opportunity to verbalize any questions and concerns and agrees to contact ACM or health care provider for questions related to their healthcare. Reviewed and educated patient on any new and changed medications related to discharge diagnosis     Was patient discharged with a pulse oximeter?  No Discussed and confirmed pulse oximeter discharge instructions and when to notify provider or seek emergency care. ACM provided contact information. No further follow-up call identified based on severity of symptoms and risk factors.

## 2021-09-28 NOTE — TELEPHONE ENCOUNTER
----- Message from [place] sent at 9/28/2021 12:09 PM EDT -----  Subject: Results Request    QUESTIONS  Which lab or imaging result is the patient calling about? COVID test  Which provider ordered the test?   At what location was the test performed? Date the test was performed? 2021-09-24  Additional Information for Provider? wanting to know if you've received   COVID test results  ---------------------------------------------------------------------------  --------------  CALL BACK INFO  What is the best way for the office to contact you? OK to leave message on   voicemail  Preferred Call Back Phone Number?  [phone]

## 2021-10-12 NOTE — PROGRESS NOTES
Attending Physician Statement    S:   Chief Complaint   Patient presents with    Check-Up      Fatigue for several months. Recently seen in ER for fever and dizziness (negative COVID). Some weight loss as well  O: Blood pressure 120/78, pulse 78, temperature 97.5 °F (36.4 °C), temperature source Temporal, resp. rate 18, height 5' 1\" (1.549 m), weight 149 lb (67.6 kg), SpO2 96 %. Exam:   Heart - RRR   Lungs - clear   Ext - no edema  A: Fatigue, weight loss  P:  Possible medication related    Labs obtained   Follow-up as ordered    I have discussed the case, including pertinent history and exam findings with the resident. I agree with the documented assessment and plan.

## 2021-10-12 NOTE — PROGRESS NOTES
[facility] MEDICINE RESIDENCY PROGRAM  DATE OF VISIT : 10/12/2021    Patient : Neyda Healy   Age : 60 y.o.  : 1955   MRN : <[id number]>   ______________________________________________________________________    Chief Complaint :   Chief Complaint   Patient presents with    Check-Up    Fatigue    Dizziness     recent    Weight Loss       HPI : Neyda Healy is 60 y.o. female who presented to the clinic today for check up. She is concerned about fatigue for the past several months, but reports it is on and off, changing with the weather. Fatigue is worse with heat and humidity. She takes a lot of supplements, states melatonin is not working for her too much anymore. She also reports subjective fever a couple of weeks ago and when to the ED for it. She tested negative for Covid at that time. No sensation of fever since then. She was getting dizzy \"a lot\" at that time, but reports it has been improving. She states it is like feeling \"off balance,\" and that she could not walk with it. Also felt faint. She also had a \"minor\" cough, but denies stuffy or runny nose. She also reports 11 lb weight loss in past month without trying. Denies change in diet and exercise. She does report taking the stairs at home. Of note, 11 lb loss was not measured by same scale. She denies pain that wakes her from sleep, or night sweats. Does admit to waking up suddenly feeling frightened. Patient has strong family history breast cancer, in mother and two aunts. Last mammogram was normal about one year ago. She is not sexually active and declines STD testing. She denies blood in stool/dark stool, recent appetite changes. Patient due for lipid panel.     Past Medical History :  Past Medical History:   Diagnosis Date    GERD (gastroesophageal reflux disease)     Osteoarthritis of right knee     Schizoaffective disorder ([address]     [facility]     Past Surgical History:   Procedure Laterality Date    GASTRIC FUNDOPLICATION         Allergies : Allergies   Allergen Reactions    Ciprofloxacin Nausea Only       Medication List :    Current Outpatient Medications   Medication Sig Dispense Refill    melatonin 3 MG TABS tablet Take 10 mg by mouth nightly as needed      QUEtiapine (SEROQUEL) 200 MG tablet Take 400 mg by mouth daily      sertraline (ZOLOFT) 100 MG tablet Take 200 mg by mouth daily      LORazepam (ATIVAN) 0.5 MG tablet TAKE 1 TABLET BY MOUTH EVERY DAY IN THE EVENING      hydrOXYzine (VISTARIL) 100 MG capsule TAKE 1 CAPSULE BY MOUTH EVERY DAY AT BEDTIME      traZODone (DESYREL) 100 MG tablet TAKE 2 TABLETS BY MOUTH EVERY DAY AT BEDTIME       No current facility-administered medications for this visit.        ______________________________________________________________________    Physical Exam :    Vitals: /78 (Site: Right Upper Arm, Position: Sitting, Cuff Size: Medium Adult)   Pulse 78   Temp 97.5 °F (36.4 °C) (Temporal)   Resp 18   Ht 5' 1\" (1.549 m)   Wt 149 lb (67.6 kg)   SpO2 96%   BMI 28.15 kg/m²   General Appearance: Awake, alert, oriented, and in NAD. No obvious fatigue  HEENT: NCAT, no pallor or icterus  Neck: Symmetrical, trachea midline. Chest wall/Lung: CTAB, respirations unlabored. No ronchi/wheezing/rales  Heart: RRR, normal S1 and S2, no murmurs, rubs or gallops  Abdomen: SNTND  Extremities: Extremities normal, atraumatic, no cyanosis, clubbing or edema. Skin: Skin color, texture, turgor normal, no rashes or lesions  Neurologic: Alert&Oriented x3. No focal motor deficits detected   Psychiatric: Normal mood. Normal affect. Normal behavior  ______________________________________________________________________    Assessment & Plan :    1. Fatigue, unspecified type  - CBC WITH AUTO DIFFERENTIAL; Future  - COMPREHENSIVE METABOLIC PANEL; Future  - TSH; Future    2. Hyperlipidemia, unspecified hyperlipidemia type  - LIPID PANEL; Future    3.  Encounter for screening mammogram for malignant neoplasm of breast  - VINICIO DIGITAL SCREEN BILATERAL PER PROTOCOL; Future      Additional plan and future considerations:       Return to Office: Return in about 4 weeks (around 11/9/2021) for weight loss.     Evan Arredondo DO   Case discussed with Dr. Mary Bonner

## 2021-10-26 NOTE — PROGRESS NOTES
Attending Physician Statement    S:   Chief Complaint   Patient presents with    Check-Up     review lab results, declines flu shot      Lab results - elevated LDL   schizoaffective disorder - following with [facility], requesting  Referral to alernative options. Denies SI/HI. O: Blood pressure 138/87, pulse 66, temperature 97.3 °F (36.3 °C), temperature source Temporal, resp. rate 18, height 5' 1\" (1.549 m), weight 152 lb (68.9 kg), SpO2 96 %. Exam:   Heart - RRR   Lungs - clear   Psych - normal affect  A: HLD  P:  Start statin   Follow-up as ordered    I have discussed the case, including pertinent history and exam findings with the resident. I agree with the documented assessment and plan.

## 2021-10-26 NOTE — PROGRESS NOTES
[facility] RESIDENCY PROGRAM  DATE OF VISIT : 10/26/2021    Patient : Bess Estevez   Age : 60 y.o.  : 1955   MRN : <[id number]>   ______________________________________________________________________    Chief Complaint :   Chief Complaint   Patient presents with    Hyperlipidemia     review lab results    Other     wants referral to different psychiatrist       HPI : Bess Estevez is 60 y.o. female who presented to the clinic today for follow up of recent labs and for referral to different psychiatrist.    She reports her mood is currently stable, but feels that she is not getting the care she needs. Currently following with [facility]. Patient takes many vitamins/supplements including for cholesterol. Most recent lipid panel showed cholesterol of 322, triglycerides of 150, and LDL of 222. Patient is nervous about starting a statin but is agreeable. TSH mildly elevated at 4.720. She does report fatigue but also is on multiple medications that can be sedating. Past Medical History :  Past Medical History:   Diagnosis Date    GERD (gastroesophageal reflux disease)     Osteoarthritis of right knee     Schizoaffective disorder ([facility])     [facility]     Past Surgical History:   Procedure Laterality Date    GASTRIC FUNDOPLICATION         Allergies :    Allergies   Allergen Reactions    Ciprofloxacin Nausea Only       Medication List :    Current Outpatient Medications   Medication Sig Dispense Refill    atorvastatin (LIPITOR) 10 MG tablet Take 1 tablet by mouth daily 30 tablet 5    Nutritional Supplements (CHOLESTEROL MANAGEMENT PO) Take by mouth      MAGNESIUM GLYCINATE PO Take by mouth      LORazepam (ATIVAN) 0.5 MG tablet TAKE 1 TABLET BY MOUTH EVERY DAY IN THE EVENING      hydrOXYzine (VISTARIL) 100 MG capsule TAKE 1 CAPSULE BY MOUTH EVERY DAY AT BEDTIME      traZODone (DESYREL) 100 MG tablet TAKE 2 TABLETS BY MOUTH EVERY DAY AT BEDTIME      QUEtiapine (SEROQUEL) 200 MG tablet Take 400 mg by mouth daily      sertraline (ZOLOFT) 100 MG tablet Take 200 mg by mouth daily       No current facility-administered medications for this visit.        ______________________________________________________________________    Physical Exam :    Vitals: /87 (Site: Right Upper Arm, Position: Sitting, Cuff Size: Medium Adult)   Pulse 66   Temp 97.3 °F (36.3 °C) (Temporal)   Resp 18   Ht 5' 1\" (1.549 m)   Wt 152 lb (68.9 kg)   SpO2 96%   BMI 28.72 kg/m²   General Appearance: Awake, alert, oriented, and in NAD  HEENT: NCAT, no pallor or icterus  Neck: Symmetrical, trachea midline. Chest wall/Lung: CTAB, respirations unlabored. No ronchi/wheezing/rales   Heart: RRR, normal S1 and S2, no murmurs, rubs or gallops  Abdomen: SNTND  Extremities: Extremities normal, atraumatic, no cyanosis, clubbing or edema. Skin: Skin color, texture, turgor normal, no rashes or lesions  Musculokeletal: ROM grossly normal in all joints of extremities, no obvious joint swelling  Neurologic: Alert&Oriented x3. No focal motor deficits detected   Psychiatric: Normal mood. Normal affect. Normal behavior  ______________________________________________________________________    Assessment & Plan :    1. Hyperlipidemia, unspecified hyperlipidemia type  · Start lipitor  - atorvastatin (LIPITOR) 10 MG tablet; Take 1 tablet by mouth daily  Dispense: 30 tablet; Refill: 5    2. [facility] disorder, unspecified type ([facility]  · List of area psychiatrist given, patient encouraged to call and confirm they cover her insurance      Additional plan and future considerations:       Return to Office: Return in about 4 weeks (around 11/23/2021).     Madison Comer DO   Case discussed with Dr. Korina Andrews

## 2021-10-29 NOTE — TELEPHONE ENCOUNTER
----- Message from Diane Molina sent at 10/29/2021  2:56 PM EDT -----  Subject: Message to Provider    QUESTIONS  Information for Provider? was in earlier this week and wanted to discuss   some of her medications requested call back   ---------------------------------------------------------------------------  --------------  [address]  What is the best way for the office to contact you? OK to leave message on   voicemail  Preferred Call Back Phone Number? [phone]  ---------------------------------------------------------------------------  --------------  SCRIPT ANSWERS  Relationship to Patient?  Self

## 2021-10-30 PROBLEM — F25.9 SCHIZOAFFECTIVE DISORDER (HCC): Status: ACTIVE | Noted: 2021-01-01

## 2021-11-01 NOTE — TELEPHONE ENCOUNTER
----- Message from [address] sent at 10/29/2021  5:02 PM EDT -----  Subject: Message to Provider    QUESTIONS  Information for Provider? Pt was in this week and you discussed a possible   Cholesterol Med that she was not interested in taking at the time. But she   has Now thought it over and sees' her Cholesterol is 328 and is willing to   try the med or some other. Please contact her to let her know what to do   and call in Medication if needed. ---------------------------------------------------------------------------  --------------  [employer] INFO  What is the best way for the office to contact you? OK to leave message on   voicemail  Preferred Call Back Phone Number? [phone]  ---------------------------------------------------------------------------  --------------  SCRIPT ANSWERS  Relationship to Patient?  Self

## 2021-11-01 NOTE — TELEPHONE ENCOUNTER
Left message saying that cholesterol medicine should be at her pharmacy if she has not picked it up yet, [facility] [address], and to call with questions.

## 2021-11-03 NOTE — TELEPHONE ENCOUNTER
Loretta Clark called in and is very concerned about her most recent lab results. She is stating that she believes that there was a mistake since it jumped so high.   She is asking if she can retake the test.    Please advise    Thank you

## 2021-11-16 PROBLEM — J96.01 ACUTE RESPIRATORY FAILURE WITH HYPOXIA (HCC): Status: ACTIVE | Noted: 2021-01-01

## 2021-11-16 NOTE — ED NOTES
Bed: 10  Expected date:   Expected time:   Means of arrival:   Comments:  AIDEN Farr RN  11/16/21 [id number]

## 2021-11-16 NOTE — ED PROVIDER NOTES
Department of Emergency Medicine   ED  Provider Note  Admit Date/RoomTime: 11/16/2021  6:41 PM  ED Room: [facility]4/4414-A          History of Present Illness:  11/16/21, Time: 6:55 PM EST  Chief Complaint   Patient presents with   Elmer Jain     per ems patient found on floor by roommate, possibly down for a few days, patient does not recall event, denies complaint of pain, initial pulse ox 60% on room air    Fatigue          Debbie Roman is a 60 y.o. female presenting to the ED for fall and fatigue, beginning today. The complaint has been persistent, moderate in severity, and worsened by nothing. The patient is a 46-year-old female with a history of GERD, osteoarthritis, and schizoaffective disorder who presents to the emergency department via EMS from home for a fall and fatigue. The patient's symptoms were sudden in onset today just prior to arrival, persistent, moderate in severity, and nothing makes it better or worse. She didn't take anything for her symptoms prior to arrival. Apparently the pateint was found on the floor by a roommate and was down for a few days supposedly. She was found to be 60% on room air. She doesn't have any complaints but has had generalized fatigue. History and review of systems is otherwise unable to obtain due to the patient's respiratory distress on arrival.     Review of Systems:   A complete review of systems was performed and pertinent positives and negatives are stated within HPI, all other systems reviewed and are negative.        --------------------------------------------- PAST HISTORY ---------------------------------------------  Past Medical History:  has a past medical history of GERD (gastroesophageal reflux disease), Osteoarthritis of right knee, and Schizoaffective disorder ([facility] [address] Past Surgical History:  has a past surgical history that includes Gastric fundoplication. Social History:  reports that she has never smoked.  She has never used smokeless by me)  Results for orders placed or performed during the hospital encounter of 11/16/21   Culture, Urine    Specimen: Urine, clean catch   Result Value Ref Range    Urine Culture, Routine <10,000 CFU/mL  Gram negative rods      COVID-19, Rapid    Specimen: Nasopharyngeal Swab   Result Value Ref Range    SARS-CoV-2, NAAT DETECTED (A) Not Detected   Culture, Blood 2    Specimen: Blood   Result Value Ref Range    Culture, Blood 2 24 Hours no growth    Culture, Blood 1    Specimen: Blood   Result Value Ref Range    Blood Culture, Routine 24 Hours no growth    LEGIONELLA ANTIGEN, URINE    Specimen: Urine, clean catch   Result Value Ref Range    L. pneumophila Serogp 1 Ur Ag       Presumptive Negative -suggesting no recent or current infections  with Legionella pneumophila serogroup 1. Infection to Legionella cannot be ruled out since other serogroups  and species may cause infection, antigen may not be present in  early infection, or level of antigen may be below the  detection limit. Normal Range: Presumptive Negative     STREP PNEUMONIAE ANTIGEN    Specimen: Urine, clean catch   Result Value Ref Range    STREP PNEUMONIAE ANTIGEN, URINE       Presumptive negative- suggests no current or recent  pneumococcal infection.   Infection due to Strep pneumoniae cannot be  ruled out since the antigen present in the sample  may be below the detection limit of the test.  Normal Range:Presumptive Negative     Respiratory Panel, Molecular, with COVID-19 (Restricted: peds pts or suitable admitted adults)    Specimen: Nasopharyngeal   Result Value Ref Range    Adenovirus by PCR Not Detected Not Detected    Bordetella parapertussis by PCR Not Detected Not Detected    Bordetella pertussis by PCR Not Detected Not Detected    Chlamydophilia pneumoniae by PCR Not Detected Not Detected    Coronavirus 229E by PCR Not Detected Not Detected    Coronavirus HKU1 by PCR Not Detected Not Detected    Coronavirus NL63 by PCR Not Detected Not Detected    Coronavirus OC43 by PCR Not Detected Not Detected    SARS-CoV-2, PCR DETECTED (A) Not Detected    Human Metapneumovirus by PCR Not Detected Not Detected    Human Rhinovirus/Enterovirus by PCR Not Detected Not Detected    Influenza A by PCR Not Detected Not Detected    Influenza B by PCR Not Detected Not Detected    Mycoplasma pneumoniae by PCR Not Detected Not Detected    Parainfluenza Virus 1 by PCR Not Detected Not Detected    Parainfluenza Virus 2 by PCR Not Detected Not Detected    Parainfluenza Virus 3 by PCR Not Detected Not Detected    Parainfluenza Virus 4 by PCR Not Detected Not Detected    Respiratory Syncytial Virus by PCR Not Detected Not Detected   Culture, Blood 1    Specimen: Blood   Result Value Ref Range    Blood Culture, Routine 24 Hours no growth    Culture, Blood 2    Specimen: Blood   Result Value Ref Range    Culture, Blood 2 24 Hours no growth    Culture, Urine    Specimen: Urine, clean catch   Result Value Ref Range    Urine Culture, Routine Growth not present, incubation continues    CBC auto differential   Result Value Ref Range    WBC 8.5 4.5 - 11.5 E9/L    RBC 4.69 3.50 - 5.50 E12/L    Hemoglobin 14.6 11.5 - 15.5 g/dL    Hematocrit 41.2 34.0 - 48.0 %    MCV 87.8 80.0 - 99.9 fL    MCH 31.1 26.0 - 35.0 pg    MCHC 35.4 (H) 32.0 - 34.5 %    RDW 13.0 11.5 - 15.0 fL    Platelets 352 562 - 351 E9/L    MPV 9.5 7.0 - 12.0 fL    Neutrophils % 88.7 (H) 43.0 - 80.0 %    Lymphocytes % 5.2 (L) 20.0 - 42.0 %    Monocytes % 4.3 2.0 - 12.0 %    Eosinophils % 0.0 0.0 - 6.0 %    Basophils % 0.1 0.0 - 2.0 %    Neutrophils Absolute 7.65 (H) 1.80 - 7.30 E9/L    Lymphocytes Absolute 0.43 (L) 1.50 - 4.00 E9/L    Monocytes Absolute 0.34 0.10 - 0.95 E9/L    Eosinophils Absolute 0.00 (L) 0.05 - 0.50 E9/L    Basophils Absolute 0.00 0.00 - 0.20 E9/L    Myelocyte Percent 1.7 0 - 0 %    nRBC 0.9 /100 WBC   Comprehensive Metabolic Panel w/ Reflex to MG   Result Value Ref Range    Sodium 144 132 - 146 mmol/L Potassium reflex Magnesium 3.2 (L) 3.5 - 5.0 mmol/L    Chloride 101 98 - 107 mmol/L    CO2 26 22 - 29 mmol/L    Anion Gap 17 (H) 7 - 16 mmol/L    Glucose 140 (H) 74 - 99 mg/dL    BUN 17 6 - 23 mg/dL    CREATININE 0.6 0.5 - 1.0 mg/dL    GFR Non-African American >60 >=60 mL/min/1.73    GFR African American >60     Calcium 9.2 8.6 - 10.2 mg/dL    Total Protein 7.7 6.4 - 8.3 g/dL    Albumin 3.8 3.5 - 5.2 g/dL    Total Bilirubin 0.6 0.0 - 1.2 mg/dL    Alkaline Phosphatase 91 35 - 104 U/L     (H) 0 - 32 U/L     (H) 0 - 31 U/L   Troponin   Result Value Ref Range    Troponin, High Sensitivity 12 (H) 0 - 9 ng/L   Urinalysis with Microscopic   Result Value Ref Range    Color, UA Yellow Straw/Yellow    Clarity, UA Clear Clear    Glucose, Ur Negative Negative mg/dL    Bilirubin Urine Negative Negative    Ketones, Urine 15 (A) Negative mg/dL    Specific Gravity, UA 1.025 1.005 - 1.030    Blood, Urine LARGE (A) Negative    pH, UA 6.0 5.0 - 9.0    Protein,  (A) Negative mg/dL    Urobilinogen, Urine 0.2 <2.0 E.U./dL    Nitrite, Urine POSITIVE (A) Negative    Leukocyte Esterase, Urine Negative Negative    WBC, UA NONE 0 - 5 /HPF    RBC, UA 1-3 0 - 2 /HPF    Bacteria, UA MANY (A) None Seen /HPF   CK   Result Value Ref Range    Total CK 3,913 (H) 20 - 180 U/L   LACTIC ACID, PLASMA   Result Value Ref Range    Lactic Acid 2.4 (H) 0.5 - 2.2 mmol/L   Blood Gas, Arterial   Result Value Ref Range    Date Analyzed [id number]     Time Analyzed 1908     Source: Blood Arterial     pH, Blood Gas 7.548 (H) 7.350 - 7.450    PCO2 27.1 (L) 35.0 - 45.0 mmHg    PO2 91.0 75.0 - 100.0 mmHg    HCO3 23.1 22.0 - 26.0 mmol/L    B.E. 2.0 -3.0 - 3.0 mmol/L    O2 Sat 96.9 92.0 - 98.5 %    O2Hb 96.8 94.0 - 97.0 %    COHb 0.1 0.0 - 1.5 %    MetHb 0.0 0.0 - 1.5 %    O2 Content 19.8 mL/dL    HHb 3.1 0.0 - 5.0 %    tHb (est) 14.5 11.5 - 16.5 g/dL    Mode BIPAP     Date Of Collection      Time Collected      Pt Temp 37.0 C     ID 2192 Lab 10002     Critical(s) Notified .  No Critical Values    Magnesium   Result Value Ref Range    Magnesium 2.6 1.6 - 2.6 mg/dL   D-dimer, quantitative   Result Value Ref Range    D-Dimer, Quant 530 ng/mL DDU   LACTATE DEHYDROGENASE   Result Value Ref Range     (H) 135 - 214 U/L   FERRITIN   Result Value Ref Range    Ferritin 749 ng/mL   C-reactive protein   Result Value Ref Range    CRP 22.9 (H) 0.0 - 0.4 mg/dL   Comprehensive Metabolic Panel w/ Reflex to MG   Result Value Ref Range    Sodium 144 132 - 146 mmol/L    Potassium reflex Magnesium 3.8 3.5 - 5.0 mmol/L    Chloride 107 98 - 107 mmol/L    CO2 23 22 - 29 mmol/L    Anion Gap 14 7 - 16 mmol/L    Glucose 138 (H) 74 - 99 mg/dL    BUN 16 6 - 23 mg/dL    CREATININE 0.5 0.5 - 1.0 mg/dL    GFR Non-African American >60 >=60 mL/min/1.73    GFR African American >60     Calcium 7.8 (L) 8.6 - 10.2 mg/dL    Total Protein 6.5 6.4 - 8.3 g/dL    Albumin 3.2 (L) 3.5 - 5.2 g/dL    Total Bilirubin 0.3 0.0 - 1.2 mg/dL    Alkaline Phosphatase 74 35 - 104 U/L     (H) 0 - 32 U/L     (H) 0 - 31 U/L   CBC auto differential   Result Value Ref Range    WBC 5.3 4.5 - 11.5 E9/L    RBC 3.84 3.50 - 5.50 E12/L    Hemoglobin 11.9 11.5 - 15.5 g/dL    Hematocrit 35.8 34.0 - 48.0 %    MCV 93.2 80.0 - 99.9 fL    MCH 31.0 26.0 - 35.0 pg    MCHC 33.2 32.0 - 34.5 %    RDW 13.2 11.5 - 15.0 fL    Platelets 289 661 - 314 E9/L    MPV 10.0 7.0 - 12.0 fL    Neutrophils % 94.8 (H) 43.0 - 80.0 %    Lymphocytes % 3.5 (L) 20.0 - 42.0 %    Monocytes % 1.7 (L) 2.0 - 12.0 %    Eosinophils % 0.0 0.0 - 6.0 %    Basophils % 0.0 0.0 - 2.0 %    Neutrophils Absolute 5.04 1.80 - 7.30 E9/L    Lymphocytes Absolute 0.21 (L) 1.50 - 4.00 E9/L    Monocytes Absolute 0.11 0.10 - 0.95 E9/L    Eosinophils Absolute 0.00 (L) 0.05 - 0.50 E9/L    Basophils Absolute 0.00 0.00 - 0.20 E9/L    Anisocytosis 1+    CK   Result Value Ref Range    Total CK 2,575 (H) 20 - 180 U/L   CK   Result Value Ref Range    Total CK 2,885 (H) 20 - 180 U/L   Lactate dehydrogenase   Result Value Ref Range     (H) 135 - 214 U/L   FERRITIN   Result Value Ref Range    Ferritin 766 ng/mL   C-REACTIVE PROTEIN   Result Value Ref Range    CRP 23.0 (H) 0.0 - 0.4 mg/dL   D-dimer, quantitative   Result Value Ref Range    D-Dimer, Quant 559 ng/mL DDU   Blood Gas, Arterial   Result Value Ref Range    Date Analyzed [id number]     Time Analyzed 1050     Source: Blood Arterial     pH, Blood Gas 7.448 7.350 - 7.450    PCO2 33.4 (L) 35.0 - 45.0 mmHg    PO2 68.9 (L) 75.0 - 100.0 mmHg    HCO3 22.6 22.0 - 26.0 mmol/L    B.E. -0.8 -3.0 - 3.0 mmol/L    O2 Sat 92.9 92.0 - 98.5 %    PO2/FIO2 0.69 mmHg/%    AaDO2 585.7 mmHg    RI(T) 850 %    O2Hb 92.5 (L) 94.0 - 97.0 %    COHb 0.3 0.0 - 1.5 %    MetHb 0.1 0.0 - 1.5 %    O2 Content 15.9 mL/dL    HHb 7.1 (H) 0.0 - 5.0 %    tHb (est) 12.2 11.5 - 16.5 g/dL    Mode BIPAP     FIO2 100.0 %    Peep/Cpap 8.0 cmH2O    PS 14 cmH20    Date Of Collection      Time Collected      Pt Temp 37.0 C     ID 1768     Lab [id number]     Critical(s) Notified .  No Critical Values    C-REACTIVE PROTEIN   Result Value Ref Range    CRP 10.4 (H) 0.0 - 0.4 mg/dL   CK   Result Value Ref Range    Total CK 1,065 (H) 20 - 180 U/L   FERRITIN   Result Value Ref Range    Ferritin 1,150 ng/mL   LACTATE DEHYDROGENASE   Result Value Ref Range     (H) 135 - 214 U/L   D-dimer, quantitative   Result Value Ref Range    D-Dimer, Quant 973 ng/mL DDU   CBC auto differential   Result Value Ref Range    WBC 9.4 4.5 - 11.5 E9/L    RBC 3.77 3.50 - 5.50 E12/L    Hemoglobin 11.7 11.5 - 15.5 g/dL    Hematocrit 34.5 34.0 - 48.0 %    MCV 91.5 80.0 - 99.9 fL    MCH 31.0 26.0 - 35.0 pg    MCHC 33.9 32.0 - 34.5 %    RDW 13.8 11.5 - 15.0 fL    Platelets 367 097 - 661 E9/L    MPV 9.8 7.0 - 12.0 fL    Neutrophils % 89.4 (H) 43.0 - 80.0 %    Immature Granulocytes % 0.6 0.0 - 5.0 %    Lymphocytes % 7.9 (L) 20.0 - 42.0 %    Monocytes % 2.0 2.0 - 12.0 %    Eosinophils % 0.0 0.0 - 6.0 %    Basophils % 0.1 0.0 - 2.0 %    Neutrophils Absolute 8.38 (H) 1.80 - 7.30 E9/L    Immature Granulocytes # 0.06 E9/L    Lymphocytes Absolute 0.74 (L) 1.50 - 4.00 E9/L    Monocytes Absolute 0.19 0.10 - 0.95 E9/L    Eosinophils Absolute 0.00 (L) 0.05 - 0.50 E9/L    Basophils Absolute 0.01 0.00 - 0.20 E9/L   Comprehensive Metabolic Panel w/ Reflex to MG   Result Value Ref Range    Sodium 144 132 - 146 mmol/L    Potassium reflex Magnesium 4.0 3.5 - 5.0 mmol/L    Chloride 109 (H) 98 - 107 mmol/L    CO2 19 (L) 22 - 29 mmol/L    Anion Gap 16 7 - 16 mmol/L    Glucose 87 74 - 99 mg/dL    BUN 18 6 - 23 mg/dL    CREATININE 0.5 0.5 - 1.0 mg/dL    GFR Non-African American >60 >=60 mL/min/1.73    GFR African American >60     Calcium 8.4 (L) 8.6 - 10.2 mg/dL    Total Protein 6.1 (L) 6.4 - 8.3 g/dL    Albumin 2.9 (L) 3.5 - 5.2 g/dL    Total Bilirubin 0.3 0.0 - 1.2 mg/dL    Alkaline Phosphatase 83 35 - 104 U/L     (H) 0 - 32 U/L     (H) 0 - 31 U/L   CK   Result Value Ref Range    Total  (H) 20 - 180 U/L   C-REACTIVE PROTEIN   Result Value Ref Range    CRP 4.8 (H) 0.0 - 0.4 mg/dL   CK   Result Value Ref Range    Total  (H) 20 - 180 U/L   FERRITIN   Result Value Ref Range    Ferritin 1,138 ng/mL   LACTATE DEHYDROGENASE   Result Value Ref Range    LD 1,216 (H) 135 - 214 U/L   D-dimer, quantitative   Result Value Ref Range    D-Dimer, Quant >5250 ng/mL DDU   CBC auto differential   Result Value Ref Range    WBC 8.2 4.5 - 11.5 E9/L    RBC 4.56 3.50 - 5.50 E12/L    Hemoglobin 14.2 11.5 - 15.5 g/dL    Hematocrit 42.4 34.0 - 48.0 %    MCV 93.0 80.0 - 99.9 fL    MCH 31.1 26.0 - 35.0 pg    MCHC 33.5 32.0 - 34.5 %    RDW 14.0 11.5 - 15.0 fL    Platelets 336 400 - 921 E9/L    MPV 9.8 7.0 - 12.0 fL    Neutrophils % 96.6 (H) 43.0 - 80.0 %    Lymphocytes % 1.7 (L) 20.0 - 42.0 %    Monocytes % 1.7 (L) 2.0 - 12.0 %    Eosinophils % 0.0 0.0 - 6.0 %    Basophils % 0.2 0.0 - 2.0 %    Neutrophils Absolute 7.95 (H) 1.80 - 7.30 E9/L    Lymphocytes Absolute 0.16 (L) 1.50 - 4.00 E9/L    Monocytes Absolute 0.16 0.10 - 0.95 E9/L    Eosinophils Absolute 0.00 (L) 0.05 - 0.50 E9/L    Basophils Absolute 0.00 0.00 - 0.20 E9/L    nRBC 0.9 /100 WBC    Anisocytosis 1+     Polychromasia 1+     Poikilocytes 1+     Schistocytes 1+     Ovalocytes 1+    Comprehensive Metabolic Panel w/ Reflex to MG   Result Value Ref Range    Sodium 143 132 - 146 mmol/L    Potassium reflex Magnesium 4.8 3.5 - 5.0 mmol/L    Chloride 107 98 - 107 mmol/L    CO2 22 22 - 29 mmol/L    Anion Gap 14 7 - 16 mmol/L    Glucose 104 (H) 74 - 99 mg/dL    BUN 18 6 - 23 mg/dL    CREATININE 0.5 0.5 - 1.0 mg/dL    GFR Non-African American >60 >=60 mL/min/1.73    GFR African American >60     Calcium 8.0 (L) 8.6 - 10.2 mg/dL    Total Protein 6.4 6.4 - 8.3 g/dL    Albumin 3.2 (L) 3.5 - 5.2 g/dL    Total Bilirubin 0.6 0.0 - 1.2 mg/dL    Alkaline Phosphatase 97 35 - 104 U/L    ALT 92 (H) 0 - 32 U/L     (H) 0 - 31 U/L   Arterial Blood Gas, Respiratory Only   Result Value Ref Range    Source: Arterial     FIO2 Arterial 100.0     pH, Blood Gas 7.434 7.350 - 7.450    pCO2, Arterial 32.3 (L) 35.0 - 45.0 mmHg    pO2, Arterial 55.2 (L) 60.0 - 80.0 mmHg    HCO3, Arterial 21.6 (L) 22.0 - 26.0 mmol/L    B.E. -1.9 -3.0 - 0.0 mmol/L    O2 Sat 89.7 (L) 92.0 - 98.5 %          DEVICE 20,148,521,401,918     Mode BiLevel     Positive End EXP Press 10 cmH2O    Delivery Systems BiPAP    Fibrinogen   Result Value Ref Range    Fibrinogen 374 225 - 540 mg/dL   CBC Auto Differential   Result Value Ref Range    WBC 8.9 4.5 - 11.5 E9/L    RBC 3.85 3.50 - 5.50 E12/L    Hemoglobin 12.1 11.5 - 15.5 g/dL    Hematocrit 35.4 34.0 - 48.0 %    MCV 91.9 80.0 - 99.9 fL    MCH 31.4 26.0 - 35.0 pg    MCHC 34.2 32.0 - 34.5 %    RDW 13.7 11.5 - 15.0 fL    Platelets 051 134 - 512 E9/L    MPV 9.6 7.0 - 12.0 fL    Neutrophils % 96.5 (H) 43.0 - 80.0 %    Lymphocytes % 0.9 (L) 20.0 - 42.0 % Monocytes % 1.7 (L) 2.0 - 12.0 %    Eosinophils % 0.9 0.0 - 6.0 %    Basophils % 0.2 0.0 - 2.0 %    Neutrophils Absolute 8.63 (H) 1.80 - 7.30 E9/L    Lymphocytes Absolute 0.09 (L) 1.50 - 4.00 E9/L    Monocytes Absolute 0.18 0.10 - 0.95 E9/L    Eosinophils Absolute 0.08 0.05 - 0.50 E9/L    Basophils Absolute 0.00 0.00 - 0.20 E9/L    Anisocytosis 1+     Polychromasia 1+     Poikilocytes 2+     Schistocytes 2+     Ovalocytes 2+     Target Cells 1+    Basic metabolic panel   Result Value Ref Range    Sodium 145 132 - 146 mmol/L    Potassium 3.0 (L) 3.5 - 5.0 mmol/L    Chloride 111 (H) 98 - 107 mmol/L    CO2 20 (L) 22 - 29 mmol/L    Anion Gap 14 7 - 16 mmol/L    Glucose 127 (H) 74 - 99 mg/dL    BUN 15 6 - 23 mg/dL    CREATININE 0.4 (L) 0.5 - 1.0 mg/dL    GFR Non-African American >60 >=60 mL/min/1.73    GFR African American >60     Calcium 8.2 (L) 8.6 - 10.2 mg/dL   Magnesium   Result Value Ref Range    Magnesium 2.3 1.6 - 2.6 mg/dL   Blood Gas, Arterial   Result Value Ref Range    Date Analyzed [id number]     Time Analyzed 6288     Source: Blood Arterial     pH, Blood Gas 7.473 (H) 7.350 - 7.450    PCO2 32.0 (L) 35.0 - 45.0 mmHg    PO2 61.2 (L) 75.0 - 100.0 mmHg    HCO3 22.9 22.0 - 26.0 mmol/L    B.E. 0.0 -3.0 - 3.0 mmol/L    O2 Sat 92.4 92.0 - 98.5 %    PO2/FIO2 0.61 mmHg/%    AaDO2 594.8 mmHg    RI(T) 9.72     O2Hb 91.9 (L) 94.0 - 97.0 %    COHb 0.3 0.0 - 1.5 %    MetHb 0.2 0.0 - 1.5 %    O2 Content 16.4 mL/dL    HHb 7.6 (H) 0.0 - 5.0 %    tHb (est) 12.7 11.5 - 16.5 g/dL    Mode BIPAP     FIO2 100.0 %    Peep/Cpap 10.0 cmH2O    PS 18 cmH20    Date Of Collection      Time Collected      Pt Temp 37.0 C     ID 1210     Lab 35076     Critical(s) Notified .  No Critical Values    CK   Result Value Ref Range    Total  (H) 20 - 180 U/L   Procalcitonin   Result Value Ref Range    Procalcitonin 0.07 0.00 - 0.08 ng/mL   Blood Gas, Arterial   Result Value Ref Range    Date Analyzed [id number]     Time Analyzed 5416 Source: Blood Arterial     pH, Blood Gas 7.447 7.350 - 7.450    PCO2 31.9 (L) 35.0 - 45.0 mmHg    PO2 66.8 (L) 75.0 - 100.0 mmHg    HCO3 21.5 (L) 22.0 - 26.0 mmol/L    B.E. -1.8 -3.0 - 3.0 mmol/L    O2 Sat 93.3 92.0 - 98.5 %    PO2/FIO2 0.67 mmHg/%    AaDO2 589.3 mmHg    RI(T) 8.82     O2Hb 92.9 (L) 94.0 - 97.0 %    COHb 0.4 0.0 - 1.5 %    MetHb 0.0 0.0 - 1.5 %    O2 Content 15.6 mL/dL    HHb 6.7 (H) 0.0 - 5.0 %    tHb (est) 11.9 11.5 - 16.5 g/dL    Mode BIPAP     FIO2 100.0 %    Peep/Cpap 10.0 cmH2O    PS 18 cmH20    Date Of Collection      Time Collected      Pt Temp 37.0 C     ID [id number]     Lab 57070     Critical(s) Notified .  No Critical Values    CK   Result Value Ref Range    Total  (H) 20 - 180 U/L   FERRITIN   Result Value Ref Range    Ferritin 830 ng/mL   LACTATE DEHYDROGENASE   Result Value Ref Range    LD 1,448 (H) 135 - 214 U/L   CBC auto differential   Result Value Ref Range    WBC 13.8 (H) 4.5 - 11.5 E9/L    RBC 4.08 3.50 - 5.50 E12/L    Hemoglobin 12.5 11.5 - 15.5 g/dL    Hematocrit 37.1 34.0 - 48.0 %    MCV 90.9 80.0 - 99.9 fL    MCH 30.6 26.0 - 35.0 pg    MCHC 33.7 32.0 - 34.5 %    RDW 14.2 11.5 - 15.0 fL    Platelets 035 025 - 789 E9/L    MPV 10.1 7.0 - 12.0 fL    Neutrophils % 97.4 (H) 43.0 - 80.0 %    Lymphocytes % 1.7 (L) 20.0 - 42.0 %    Monocytes % 0.9 (L) 2.0 - 12.0 %    Eosinophils % 0.1 0.0 - 6.0 %    Basophils % 0.1 0.0 - 2.0 %    Neutrophils Absolute 13.39 (H) 1.80 - 7.30 E9/L    Lymphocytes Absolute 0.28 (L) 1.50 - 4.00 E9/L    Monocytes Absolute 0.14 0.10 - 0.95 E9/L    Eosinophils Absolute 0.00 (L) 0.05 - 0.50 E9/L    Basophils Absolute 0.00 0.00 - 0.20 E9/L    nRBC 1.7 /100 WBC    Anisocytosis 1+     Polychromasia 1+     Poikilocytes 1+     Ovalocytes 1+    Basic Metabolic Panel   Result Value Ref Range    Sodium 142 132 - 146 mmol/L    Potassium 4.0 3.5 - 5.0 mmol/L    Chloride 111 (H) 98 - 107 mmol/L    CO2 20 (L) 22 - 29 mmol/L    Anion Gap 11 7 - 16 mmol/L    Glucose 123 (H) 74 - 99 mg/dL    BUN 24 (H) 6 - 23 mg/dL    CREATININE 0.6 0.5 - 1.0 mg/dL    GFR Non-African American >60 >=60 mL/min/1.73    GFR African American >60     Calcium 7.6 (L) 8.6 - 10.2 mg/dL   Magnesium   Result Value Ref Range    Magnesium 2.2 1.6 - 2.6 mg/dL   Hepatic Function Panel   Result Value Ref Range    Total Protein 5.0 (L) 6.4 - 8.3 g/dL    Albumin 2.9 (L) 3.5 - 5.2 g/dL    Alkaline Phosphatase 91 35 - 104 U/L    ALT 68 (H) 0 - 32 U/L    AST 85 (H) 0 - 31 U/L    Total Bilirubin 0.8 0.0 - 1.2 mg/dL    Bilirubin, Direct 0.2 0.0 - 0.3 mg/dL    Bilirubin, Indirect 0.6 0.0 - 1.0 mg/dL   Calcium, Ionized   Result Value Ref Range    Calcium, Ion 1.15 1.15 - 1.33 mmol/L   Phosphorus   Result Value Ref Range    Phosphorus 2.6 2.5 - 4.5 mg/dL   C-Reactive Protein   Result Value Ref Range    CRP 2.9 (H) 0.0 - 0.4 mg/dL   Procalcitonin   Result Value Ref Range    Procalcitonin 0.10 (H) 0.00 - 0.08 ng/mL   Blood Gas, Arterial   Result Value Ref Range    Date Analyzed 20211120     Time Analyzed 0444     Source: Blood Arterial     pH, Blood Gas 7.484 (H) 7.350 - 7.450    PCO2 27.4 (L) 35.0 - 45.0 mmHg    PO2 57.4 (L) 75.0 - 100.0 mmHg    HCO3 20.1 (L) 22.0 - 26.0 mmol/L    B.E. -2.1 -3.0 - 3.0 mmol/L    O2 Sat 90.4 (L) 92.0 - 98.5 %    PO2/FIO2 0.57 mmHg/%    AaDO2 603.2 mmHg    RI(T) 10.51     O2Hb 89.5 (L) 94.0 - 97.0 %    COHb 0.9 0.0 - 1.5 %    MetHb 0.1 0.0 - 1.5 %    O2 Content 16.0 mL/dL    HHb 9.5 (H) 0.0 - 5.0 %    tHb (est) 12.7 11.5 - 16.5 g/dL    Mode BIPAP     FIO2 100.0 %    Peep/Cpap 10.0 cmH2O    PIP 16.0 cmH2O    Date Of Collection      Time Collected      Pt Temp 37.0 C     ID 2962     Lab 43571     Critical(s) Notified .  No Critical Values    Lactate, Sepsis   Result Value Ref Range    Lactic Acid, Sepsis 3.7 (HH) 0.5 - 1.9 mmol/L   Blood Gas, Arterial   Result Value Ref Range    Date Analyzed 20211120     Time Analyzed 1039     Source: Blood Arterial     pH, Blood Gas 7.180 (LL) 7.350 - 7.450    PCO2 55.2 (H) 35.0 - 45.0 mmHg    PO2 63.1 (L) 75.0 - 100.0 mmHg    HCO3 20.1 (L) 22.0 - 26.0 mmol/L    B.E. -8.6 (L) -3.0 - 3.0 mmol/L    O2 Sat 83.9 (L) 92.0 - 98.5 %    PO2/FIO2 0.63 mmHg/%    AaDO2 569.7 mmHg    RI(T) 9.03     O2Hb 83.2 (L) 94.0 - 97.0 %    COHb 0.6 0.0 - 1.5 %    MetHb 0.2 0.0 - 1.5 %    O2 Content 16.3 mL/dL    HHb 16.0 (H) 0.0 - 5.0 %    tHb (est) 13.9 11.5 - 16.5 g/dL    Mode PCV     FIO2 100.0 %    Rr Mechanical 20.0 b/min    Peep/Cpap 15.0 cmH2O    PIP 14.0 cmH2O    Date Of Collection      Time Collected      Pt Temp 37.0 C     ID [id number]     Lab [id number]     Critical(s) Notified Handed report to Dr/RN    Blood Gas, Arterial   Result Value Ref Range    Date Analyzed [id number]     Time Analyzed 1511     Source: Blood Arterial     pH, Blood Gas 7.231 (L) 7.350 - 7.450    PCO2 56.9 (H) 35.0 - 45.0 mmHg    PO2 137.5 (H) 75.0 - 100.0 mmHg    HCO3 23.3 22.0 - 26.0 mmol/L    B.E. -4.9 (L) -3.0 - 3.0 mmol/L    O2 Sat 97.7 92.0 - 98.5 %    PO2/FIO2 1.38 mmHg/%    AaDO2 493.6 mmHg    RI(T) 3.59     O2Hb 96.7 94.0 - 97.0 %    COHb 0.7 0.0 - 1.5 %    MetHb 0.3 0.0 - 1.5 %    O2 Content 18.6 mL/dL    HHb 2.3 0.0 - 5.0 %    tHb (est) 13.5 11.5 - 16.5 g/dL    Mode PCV     FIO2 100.0 %    Rr Mechanical 20.0 b/min    Peep/Cpap 15.0 cmH2O    PIP 14.0 cmH2O    Date Of Collection      Time Collected      Pt Temp 37.0 C     ID [id number]     Lab [id number]     Critical(s) Notified .  No Critical Values    Lactic acid, plasma   Result Value Ref Range    Lactic Acid 2.0 0.5 - 2.2 mmol/L   POCT Glucose   Result Value Ref Range    Meter Glucose 141 (H) 74 - 99 mg/dL   POCT Glucose   Result Value Ref Range    Meter Glucose 198 (H) 74 - 99 mg/dL   EKG 12 Lead   Result Value Ref Range    Ventricular Rate 64 BPM    Atrial Rate 64 BPM    P-R Interval 166 ms    QRS Duration 72 ms    Q-T Interval 458 ms    QTc Calculation (Bazett) 472 ms    R Axis 76 degrees    T Axis 33 degrees   EKG 12 Lead   Result Value Ref Range    Ventricular Rate 81 BPM    Atrial Rate 81 BPM    P-R Interval 140 ms    QRS Duration 74 ms    Q-T Interval 376 ms    QTc Calculation (Bazett) 436 ms    P Axis 46 degrees    R Axis 32 degrees    T Axis 18 degrees   ,       RADIOLOGY:  Interpreted by Radiologist unless otherwise specified  US DUP LOWER EXTREMITIES BILATERAL VENOUS   Final Result   Within the visualized vessels there is no evidence for deep venous   thrombosis               XR CHEST PORTABLE   Final Result   1. Lines okay. 2. Slightly worsened diffuse edema versus pneumonia. XR CHEST PORTABLE   Final Result   1. Lines okay   2. Improved aeration, mild improvement and diffuse pneumonia/edema. XR CHEST ABDOMEN NG PLACEMENT   Final Result   NG tube position satisfactory. XR CHEST PORTABLE   Final Result   Stable bilateral pneumonia or interstitial pulmonary edema. XR CHEST PORTABLE   Final Result   Increasing bilateral infiltrates. XR CHEST PORTABLE   Final Result   1. There is no interval change in the multifocal bilateral pneumonia. CT HEAD WO CONTRAST   Final Result   No acute intracranial abnormality. Cortical atrophy and periventricular white matter ischemic changes. CT CERVICAL SPINE WO CONTRAST   Final Result   No acute abnormality of the cervical spine. Moderate degenerative changes with disc space narrowing and marginal bony   spur formation C5 through C7. Ground-glass opacities in the visualized lung apices. Please refer to chest   CT for additional information. CTA CHEST W CONTRAST   Final Result   No evidence of pulmonary embolism. Extensive multifocal ground-glass opacities predominantly in the peripheral   lung zones bilaterally, highly concerning for atypical or COVID related   pneumonia. XR CHEST PORTABLE   Final Result   Bilateral patchy airspace opacities worrisome for pneumonia.          XR CHEST PORTABLE    (Results Pending)   XR CHEST PORTABLE    (Results Pending)         EKG Interpretation  Interpreted by emergency department physician, Dr. Lavon Bonilla    EKG: This EKG is signed and interpreted by me. Rate: 64  Rhythm: Sinus  Interpretation: Sinus rhythm, normal axis, no acute ST elevations or depressions, intervals are within normal limits, qtc is 472  Comparison: no previous EKG available     ------------------------- NURSING NOTES AND VITALS REVIEWED ---------------------------   The nursing notes within the ED encounter and vital signs as below have been reviewed by myself  /76   Pulse 85   Temp 97.9 °F (36.6 °C) (Axillary)   Resp 20   Ht 5' 1\" (1.549 m)   Wt 158 lb 1.6 oz (71.7 kg)   SpO2 97%   BMI 29.87 kg/m²     Oxygen Saturation Interpretation: Normal    The patients available past medical records and past encounters were reviewed.         ------------------------------ ED COURSE/MEDICAL DECISION MAKING----------------------  Medications   atorvastatin (LIPITOR) tablet 10 mg ( Oral Automatically Held 11/23/21 0900)   QUEtiapine (SEROQUEL) tablet 200 mg (200 mg Oral Given 11/20/21 1244)   sertraline (ZOLOFT) tablet 200 mg (200 mg Oral Given 11/20/21 1244)   traZODone (DESYREL) tablet 100 mg (100 mg Oral Given 11/20/21 2100)   sodium chloride flush 0.9 % injection 5-40 mL (10 mLs IntraVENous Given 11/20/21 2101)   sodium chloride flush 0.9 % injection 5-40 mL (has no administration in time range)   0.9 % sodium chloride infusion (has no administration in time range)   ondansetron (ZOFRAN-ODT) disintegrating tablet 4 mg (has no administration in time range)     Or   ondansetron (ZOFRAN) injection 4 mg (has no administration in time range)   polyethylene glycol (GLYCOLAX) packet 17 g (has no administration in time range)   acetaminophen (TYLENOL) tablet 650 mg ( Oral See Alternative 11/18/21 2214)     Or   acetaminophen (TYLENOL) suppository 650 mg (650 mg Rectal Given 11/18/21 2214)   ascorbic acid (VITAMIN C) tablet 1,000 mg (1,000 mg Oral Given 11/20/21 1236)   Vitamin D (CHOLECALCIFEROL) tablet 2,000 Units (2,000 Units Oral Given 11/20/21 1236)   zinc sulfate (ZINCATE) capsule 50 mg (50 mg Oral Given 11/20/21 1237)   dexamethasone (DECADRON) injection 6 mg (6 mg IntraVENous Given 11/20/21 1206)   glucose (GLUTOSE) 40 % oral gel 15 g (has no administration in time range)   dextrose 50 % IV solution (has no administration in time range)   glucagon (rDNA) injection 1 mg (has no administration in time range)   dextrose 5 % solution (has no administration in time range)   enoxaparin (LOVENOX) injection 40 mg (40 mg SubCUTAneous Given 11/20/21 2101)   remdesivir 200 mg in sodium chloride 0.9 % 250 mL IVPB (0 mg IntraVENous Stopped 11/18/21 1926)     Followed by   remdesivir 100 mg in sodium chloride 0.9 % 250 mL IVPB (0 mg IntraVENous Stopped 11/20/21 2232)   0.9 % sodium chloride bolus (0 mLs IntraVENous Stopped 11/18/21 1935)   dexmedetomidine (PRECEDEX) 1,000 mcg in sodium chloride 0.9 % 250 mL infusion (0 mcg/kg/hr × 68.5 kg IntraVENous Stopped 11/20/21 0847)   budesonide (PULMICORT) nebulizer suspension 1,000 mcg (1,000 mcg Nebulization Given 11/20/21 2152)   Arformoterol Tartrate (BROVANA) nebulizer solution 15 mcg (15 mcg Nebulization Given 11/20/21 2152)   pantoprazole (PROTONIX) tablet 40 mg (40 mg Oral Not Given 11/20/21 0624)   norepinephrine (LEVOPHED) 16 mg in dextrose 5% 250 mL infusion (0 mcg/min IntraVENous Stopped 11/20/21 2105)   ipratropium-albuterol (DUONEB) nebulizer solution 1 ampule (has no administration in time range)   propofol 1000 MG/100ML injection (  Canceled Entry 11/20/21 1230)   fentaNYL 5 mcg/ml in 0.9%  ml infusion (75 mcg/hr IntraVENous Rate/Dose Change 11/20/21 1000)   midazolam (VERSED) 2 MG/2ML injection (  Canceled Entry 11/20/21 1229)   piperacillin-tazobactam (ZOSYN) 3,375 mg in dextrose 5 % 100 mL IVPB extended infusion (mini-bag) (0 mg IntraVENous Stopped 11/20/21 2105) piperacillin/tazobactam NS flush ( IntraVENous New Bag 11/20/21 2232)   midazolam (VERSED) 2 MG/2ML injection (  Canceled Entry 11/20/21 1229)   vancomycin 1000 mg IVPB in 250 mL D5W addavial (0 mg IntraVENous Stopped 11/20/21 2232)   cisatracurium besylate (NIMBEX) 200 mg in sodium chloride 0.9 % 100 mL infusion (2 mcg/kg/min × 71.7 kg IntraVENous New Bag 11/20/21 1016)   0.9 % sodium chloride bolus (0 mLs IntraVENous Stopped 11/16/21 2029)   potassium chloride 10 mEq/100 mL IVPB (Peripheral Line) (0 mEq IntraVENous Stopped 11/16/21 2203)   dexamethasone (DECADRON) injection 10 mg (10 mg IntraVENous Given 11/16/21 2044)   doxycycline (VIBRAMYCIN) 100 mg in dextrose 5 % 100 mL IVPB (0 mg IntraVENous Stopped 11/16/21 2145)   cefTRIAXone (ROCEPHIN) 2,000 mg in sterile water 20 mL IV syringe (2,000 mg IntraVENous Given 11/16/21 2044)   acetaminophen (TYLENOL) tablet 1,000 mg (1,000 mg Oral Given 11/16/21 2044)   0.9 % sodium chloride bolus (0 mLs IntraVENous Stopped 11/16/21 2101)   iopamidol (ISOVUE-370) 76 % injection 60 mL (60 mLs IntraVENous Given 11/16/21 2154)   tocilizumab (ACTEMRA) 648 mg in sodium chloride 0.9 % 100 mL IVPB (0 mg IntraVENous Stopped 11/17/21 0245)   potassium chloride 10 mEq/100 mL IVPB (Peripheral Line) (10 mEq IntraVENous New Bag 11/19/21 0833)   cefepime (MAXIPIME) 2000 mg IVPB minibag (0 mg IntraVENous Stopped 11/19/21 1145)   vancomycin 1000 mg IVPB in 250 mL D5W addavial (0 mg IntraVENous Stopped 11/19/21 1330)   0.9 % sodium chloride bolus (0 mLs IntraVENous Stopped 11/19/21 1400)   0.9 % sodium chloride bolus (0 mLs IntraVENous Stopped 11/19/21 1740)   valproate (DEPACON) 500 mg in dextrose 5 % 100 mL IVPB (500 mg IntraVENous New Bag 11/20/21 4399)   succinylcholine (ANECTINE) 20 MG/ML injection (  Given by Other 11/20/21 0808)   midazolam PF (VERSED) injection 4 mg (4 mg IntraVENous Given 11/20/21 0847)   vancomycin 1000 mg IVPB in 250 mL D5W addavial (0 mg IntraVENous Stopped 11/20/21 1310)   vecuronium (NORCURON) injection 2 mg (2 mg IntraVENous Given 11/20/21 0945)   midazolam PF (VERSED) injection 4 mg (4 mg IntraVENous Given 11/20/21 0950)   vecuronium (NORCURON) 10 MG injection (10 mg  Given 11/20/21 0929)   sodium bicarbonate 8.4 % injection 50 mEq (50 mEq IntraVENous Given 11/20/21 1117)           The cardiac monitor revealed sinus with a heart rate in the 60s as interpreted by me. The cardiac monitor was ordered secondary to the patient's shortness of breath and to monitor the patient for dysrhythmia. CPT [id number]       I, Dr. Chen Cohn, am the primary provider of record    Medical Decision Making:   The patient is a 60year-old-female who presents to the Emergency Department complaining of a fall and fatigue. The patient was hypoxic and placed on bipap on arrival. She was alert and oriented and saturating 100% on bipap. She was hypokalemic which was repleted and found to be in rhabdomyolysis. She was given IVF. Her covid test was positive. Dimer was elevated and CTA pending. She had patchy airspace disease on xray and cultures obtained and she was treated for community acquired pneumonia. Consulted with hospitalist who accepted for admission. She was admitted stable on bipap. Oxygen Saturation Interpretation: 60 % on room air. Re-Evaluations:       She remained stable on bipap. This patient's ED course included: a personal history and physicial examination, re-evaluation prior to disposition, multiple bedside re-evaluations, IV medications, cardiac monitoring, continuous pulse oximetry and complex medical decision making and emergency management    This patient has remained hemodynamically stable during their ED course. Consultations:  Spoke with Dr. Kali Frazier (Medicine). Discussed case. They will admit this patient. Counseling:    The emergency provider has spoken with the patient and discussed todays results, in addition to providing specific details for the plan of care and counseling regarding the diagnosis and prognosis. Questions are answered at this time and they are agreeable with the plan.       --------------------------------- IMPRESSION AND DISPOSITION ---------------------------------    IMPRESSION  1. Acute respiratory failure with hypoxia ([facility] [address]    2. COVID-19    3. Hypokalemia    4. Acute cystitis with hematuria    5. Pneumonia due to infectious organism, unspecified laterality, unspecified part of lung    6. Traumatic rhabdomyolysis, initial encounter ([facility] [address]        DISPOSITION  Disposition: Admit to telemetry  Patient condition is serious        NOTE: This report was transcribed using voice recognition software. Every effort was made to ensure accuracy; however, inadvertent computerized transcription errors may be present    Please note that the withdrawal or failure to initiate urgent interventions for this patient would likely result in a life threatening deterioration or permanent disability. Systems at risk for deterioration include: respiratory    Accordingly this patient received 31 minutes of critical care time, excluding separately billable procedures.          Farhana Saini,   11/20/21 3387

## 2021-11-16 NOTE — PROGRESS NOTES
Date: 11/16/2021    Time: 6:57 PM    Patient Placed On BIPAP/CPAP/ Non-Invasive Ventilation? Yes    If no must comment. Facial area red/color change? No           If YES are Blister/Lesion present? No   If yes must notify nursing staff  BIPAP/CPAP skin barrier?   Yes    Skin barrier type:mepilexlite       Comments:        Jay Prasad RCP

## 2021-11-17 NOTE — ED NOTES
Attempted to place patient on high flow NC, patient's oxygen level maintained 85% from 6L up to 15L. Placed patient back on bi-pap.        Giuliana Fallon RN  11/17/21 9639

## 2021-11-17 NOTE — H&P
[address] [facility]  Department of Family Medicine  Family Medicine Residency Program  History and Physical    Patient:  Poornima Huang 60 y.o. female MRN: [id number]     Date of Service: 11/16/2021      Chief complaint:   Chief Complaint   Patient presents with   Sofiya Garcia     per ems patient found on floor by roommate, possibly down for a few days, patient does not recall event, denies complaint of pain, initial pulse ox 60% on room air    Fatigue        History of Present Illness   The patient is a 60 y.o. female with pmh of GERD, osteoarthritis and schizoaffective disorder who presented to ER s/p fall at home. Patient was found down by roommate after unknown period of time. When seen by EMS , initial O2 60%. She was brought to the ER and started on BiPAP , after which she became alert and oriented , saturating 100%. Patient states that she has been feeling unwell for the past few days. Notes that she was feeling increased fatigue, shortness of breath on exertion and some nausea. Did not realize she had a fever, has tried no medications at home. Patient states she is unvaccinated for covid 19. Does live with a roommate who is vaccinated. Denies any sick contacts at this time. She does not recall event of fall , not how long she was down. Code status discussed at bedside. Patient very confused about what she would like done initially. After much prompting, patient states she would like to remain full code. ED Course:   Initial O2 60% --> on Bipap improved to 100%  WBC grossly normal  K: 3.2 , Mag 2.6  High sensitivity troponin 12   CK: 3913  Covid 19- positive  Lactic Acid 2.4  D-Dimer 530, Ferritin, LDH, CRP pending  Urinalysis: positive for bacteria, nitrites and blood  Pertinent imaging:  CXR: Bilateral patchy airspace opacities worrisome for pneumonia.    CT cervical spine, CT head and CTA chest pending    She was given one dose rocephin 2g x 1 , vibramycin 100 mg x 1     IVF: Given 4L NS in the ER , potassium replacement  Blood cultures, urine cultures drawn    Patient admitted for Covid 19 Pneumonia, UTI and Rhabdomyolysis. Past Medical History:       Diagnosis Date    GERD (gastroesophageal reflux disease)     Osteoarthritis of right knee     Schizoaffective disorder (HCC)     [facility]care       Past Surgical History:        Procedure Laterality Date    GASTRIC FUNDOPLICATION         Medications Prior to Admission:    Prior to Admission medications    Medication Sig Start Date End Date Taking? Authorizing Provider   atorvastatin (LIPITOR) 10 MG tablet Take 1 tablet by mouth daily 10/29/21  Yes Jackie Varela, DO   Nutritional Supplements (CHOLESTEROL MANAGEMENT PO) Take by mouth   Yes Historical Provider, MD   MAGNESIUM GLYCINATE PO Take by mouth   Yes Historical Provider, MD   LORazepam (ATIVAN) 0.5 MG tablet TAKE 1 TABLET BY MOUTH EVERY DAY IN THE EVENING 9/27/21  Yes Historical Provider, MD   hydrOXYzine (VISTARIL) 100 MG capsule TAKE 1 CAPSULE BY MOUTH EVERY DAY AT BEDTIME 8/12/21  Yes Historical Provider, MD   traZODone (DESYREL) 100 MG tablet TAKE 2 TABLETS BY MOUTH EVERY DAY AT BEDTIME 8/12/21  Yes Historical Provider, MD   QUEtiapine (SEROQUEL) 200 MG tablet Take 200 mg by mouth daily    Yes Historical Provider, MD   sertraline (ZOLOFT) 100 MG tablet Take 200 mg by mouth daily   Yes Historical Provider, MD       Allergies:  Ciprofloxacin    Family History:       Problem Relation Age of Onset    Premenopausal breast cancer Mother 46        invasive    Postmenopausal breast cancer Maternal Grandmother 67    Postmenopausal breast cancer Maternal Aunt 79       Social History:   TOBACCO:   reports that she has never smoked. She has never used smokeless tobacco.  ETOH:   reports previous alcohol use.   OCCUPATION:      REVIEW OF SYSTEMS:  · Constitutional: +Fever, + fatigue, no chill or change in weight; good appetite  · HEENT: No blurred vision, no ear problems, no sore throat, no running nose.  · Respiratory: No cough, no sputum, no pleuritic chest pain, + shortness of breath  · Cardiology: No angina, + dyspnea on exertion, no paroxysmal nocturnal dyspnea, no orthopnea, no palpitation, no leg swelling. · Gastroenterology: No dysphagia, no reflux; no abdominal pain, + nausea or vomiting; no constipation or diarrhea. No blood in stool. · Genitourinary: No dysuria, no frequency, hesitancy; no hematuria  · Musculoskeletal: no joint pain, no myalgia, no change in range of movement  · Neurology: no focal weakness in extremities, no slurred speech, no double vision, no tingling or numbness sensation. · Endocrinology: no temperature intolerance, no polyphagia, polydipsia or polyuria  · Hematology: no increased bleeding, no bruising, no lymphadenopathy  · Skin: no skin change noticed by patient  · Psychology: no depressed mood, no suicidal ideation    Physical Exam   · Vitals: /80   Pulse 59   Temp 102 °F (38.9 °C)   Resp 17   Wt 152 lb (68.9 kg)   SpO2 98%   BMI 28.72 kg/m²   · General Appearance: Alert, cooperative, on Bipap. · HEENT: Normocephalic, atraumatic. PAL, conjunctiva/corneas clear, EOM's intact, no pallor  or icterus. · Neck: Supple, symmetrical, trachea midline, no cervical LAD. No thyroid enlargement/tenderness/nodules. No carotid bruit or JVD  · Chest wall: No tenderness or deformity, full & symmetric excursion  · Lung: Scattered coarse breath sounds noted, saturating 98% on Bipap  · Heart: Regular rate and rhythm, S1 and S2 normal, no murmur, rub or   gallop. no bruits (carotid/femoral/abd), pedal pulses 2+,  no edema  · Abdomen: Soft, non-tender, bowel sounds active all four quadrants, no masses, no organomegaly. No suprapubic tenderness  · Genital and rectal exam: deferred  · Extremities:  Extremities normal, atraumatic, no cyanosis or edema.  Pulses equal bilaterally  · Skin: Skin color, texture, turgor normal, no rashes or lesions  · Musculokeletal: No joint swelling, no muscle tenderness. ROM normal in all joints of extremities. No CVA tenderness. · Lymph nodes: no lymph node enlargement apprciated  · Neurologic:   Alert&Oriented. CNII-XII intact. Normal and symmetric  strength in UEs and LEs,   DTRs 2+ and symmetric, Babinski sign is absent (flexor)  normal gait, coordination with finger to nose, heel to shin and repetitive movement is intact.  Romberg negative  sensation intact     Labs and Imaging Studies   Basic Labs  Results for orders placed or performed during the hospital encounter of 11/16/21   COVID-19, Rapid    Specimen: Nasopharyngeal Swab   Result Value Ref Range    SARS-CoV-2, NAAT DETECTED (A) Not Detected   CBC auto differential   Result Value Ref Range    WBC 8.5 4.5 - 11.5 E9/L    RBC 4.69 3.50 - 5.50 E12/L    Hemoglobin 14.6 11.5 - 15.5 g/dL    Hematocrit 41.2 34.0 - 48.0 %    MCV 87.8 80.0 - 99.9 fL    MCH 31.1 26.0 - 35.0 pg    MCHC 35.4 (H) 32.0 - 34.5 %    RDW 13.0 11.5 - 15.0 fL    Platelets 102 021 - 285 E9/L    MPV 9.5 7.0 - 12.0 fL    Neutrophils % 88.7 (H) 43.0 - 80.0 %    Lymphocytes % 5.2 (L) 20.0 - 42.0 %    Monocytes % 4.3 2.0 - 12.0 %    Eosinophils % 0.0 0.0 - 6.0 %    Basophils % 0.1 0.0 - 2.0 %    Neutrophils Absolute 7.65 (H) 1.80 - 7.30 E9/L    Lymphocytes Absolute 0.43 (L) 1.50 - 4.00 E9/L    Monocytes Absolute 0.34 0.10 - 0.95 E9/L    Eosinophils Absolute 0.00 (L) 0.05 - 0.50 E9/L    Basophils Absolute 0.00 0.00 - 0.20 E9/L    Myelocyte Percent 1.7 0 - 0 %    nRBC 0.9 /100 WBC   Comprehensive Metabolic Panel w/ Reflex to MG   Result Value Ref Range    Sodium 144 132 - 146 mmol/L    Potassium reflex Magnesium 3.2 (L) 3.5 - 5.0 mmol/L    Chloride 101 98 - 107 mmol/L    CO2 26 22 - 29 mmol/L    Anion Gap 17 (H) 7 - 16 mmol/L    Glucose 140 (H) 74 - 99 mg/dL    BUN 17 6 - 23 mg/dL    CREATININE 0.6 0.5 - 1.0 mg/dL    GFR Non-African American >60 >=60 mL/min/1.73    GFR African American >60     Calcium 9.2 8.6 - 10.2 mg/dL Total Protein 7.7 6.4 - 8.3 g/dL    Albumin 3.8 3.5 - 5.2 g/dL    Total Bilirubin 0.6 0.0 - 1.2 mg/dL    Alkaline Phosphatase 91 35 - 104 U/L     (H) 0 - 32 U/L     (H) 0 - 31 U/L   Troponin   Result Value Ref Range    Troponin, High Sensitivity 12 (H) 0 - 9 ng/L   Urinalysis with Microscopic   Result Value Ref Range    Color, UA Yellow Straw/Yellow    Clarity, UA Clear Clear    Glucose, Ur Negative Negative mg/dL    Bilirubin Urine Negative Negative    Ketones, Urine 15 (A) Negative mg/dL    Specific Gravity, UA 1.025 1.005 - 1.030    Blood, Urine LARGE (A) Negative    pH, UA 6.0 5.0 - 9.0    Protein,  (A) Negative mg/dL    Urobilinogen, Urine 0.2 <2.0 E.U./dL    Nitrite, Urine POSITIVE (A) Negative    Leukocyte Esterase, Urine Negative Negative    WBC, UA NONE 0 - 5 /HPF    RBC, UA 1-3 0 - 2 /HPF    Bacteria, UA MANY (A) None Seen /HPF   CK   Result Value Ref Range    Total CK 3,913 (H) 20 - 180 U/L   LACTIC ACID, PLASMA   Result Value Ref Range    Lactic Acid 2.4 (H) 0.5 - 2.2 mmol/L   Blood Gas, Arterial   Result Value Ref Range    Date Analyzed [id number]     Time Analyzed 1908     Source: Blood Arterial     pH, Blood Gas 7.548 (H) 7.350 - 7.450    PCO2 27.1 (L) 35.0 - 45.0 mmHg    PO2 91.0 75.0 - 100.0 mmHg    HCO3 23.1 22.0 - 26.0 mmol/L    B.E. 2.0 -3.0 - 3.0 mmol/L    O2 Sat 96.9 92.0 - 98.5 %    O2Hb 96.8 94.0 - 97.0 %    COHb 0.1 0.0 - 1.5 %    MetHb 0.0 0.0 - 1.5 %    O2 Content 19.8 mL/dL    HHb 3.1 0.0 - 5.0 %    tHb (est) 14.5 11.5 - 16.5 g/dL    Mode BIPAP     Date Of Collection      Time Collected      Pt Temp 37.0 C     ID [id number]     Lab [id number]     Critical(s) Notified . No Critical Values    Magnesium   Result Value Ref Range    Magnesium 2.6 1.6 - 2.6 mg/dL       Imaging Studies:  Radiology:   XR CHEST PORTABLE   Final Result   Bilateral patchy airspace opacities worrisome for pneumonia.          CT HEAD WO CONTRAST    (Results Pending)   CT CERVICAL SPINE WO CONTRAST (Results Pending)   CTA CHEST W CONTRAST    (Results Pending)     EKG: Rhythm Strip: normal sinus rhythm, there are no previous tracings available for comparison. Resident's Assessment and PLan     1. Acute Hypoxic Respiratory Failure 2/2 Covid 19  Unvaccinated for Covid 19  Patient found with pulse ox of 60% --> improved on bipap in ER  CXR showing bilateral pulmonary infiltrates  Inflammatory markers: DDimer 530 , LDH, CRP, ferritin pending  CTA pulm pending  Start Vitamin C, Vitamin D, Zinc  Given one dose decadron 10 mg in the ER  Continue 6 mg IV decadron daily  Symbicort, Combivent , avoid inhalers  Prophylactic heparin dosing for DVT prophylaxis  Pharmacy consult to dose baricitinib vs tocilizumab  Droplet precautions  Try to wean off Bipap as able  Monitor on Telemetry     2. UTI  Likely simple cystitis ; patient with no CVA tenderness , presented initially with fever 102 F  Urinalysis with increased nitrites, bacteria, blood  Urine culture, blood cultures, gram stain ordered; pending  Given one dose doxy and rocephin in the ER  Continue 1g ceftriaxone IV daily    3. Rhabdomyolysis  2/2 to fall for unknown time period  Patient found down for unknown period of time  Initial CK > 3000  Received 4 L NS in ER  Repeat CK in 6 hours - ordered for midnight  Trend CK daily  Continue fluids as needed , avoid fluid overload. 4. Hypokalemia  Initial K 3.2 , Mag 2.6   Repleted in ER   CMP daily    5.  Hx Schizoaffective Disorder  Continue seroquel , zoloft, trazodone  Hold ativan, vistaril    DVT / GI prophylaxis: lovenox 40mg SC and Protonix    Electronically signed by Yana Tate MD on 11/16/2021 at 8:50 PM  This case was discussed with attending physician: Dr. Mami Caballero

## 2021-11-17 NOTE — ED NOTES
Patient AxOx4,  Patient stated she has not felt well over a week, patient also reports that her landlord tried to help her up out of bed was not able. When asked if patient remembers what happened she reported \"I fainted\" and \"was unable to get off the floor\".      [place] RN  11/17/21 3086

## 2021-11-17 NOTE — ED NOTES
This nurse at [facility] for monitor alarming. Pox at this time noted to be 65%. Pt was found with Bipap mask off and resting with eyes closed. Pt advised this nurse she was attempting \"to get a drink\". Pt updated on VS at this time and importance of nurse at [facility] for po intake. Advised pt this nurse would assist with any needs. Pt was able to resaturate to 95% s/p mask placement.      Radha Mclaughlin RN  11/17/21 6276

## 2021-11-17 NOTE — ED NOTES
Pt. Given sponge bath d/t incontinence of bowel and bladder, new linens and pads placed under pt.      Jerolyn Schaumann, RN  11/16/21 1925

## 2021-11-17 NOTE — CONSULTS
Pulmonary [facility] [facility]                             Pulmonary Consult/Progress Note :          Patient: Cassie Solis  MRN: [id number]  : 1955      Date of Admission: .2021  6:41 PM    Consulting Physician:,DR Abelardo Sosa         Reason for Consultation:COVID pneumonia   CC : SOB  HPI:   Cassie Solis is a 60y.o. year old who never smoke in the past presneted with worsening SOB and MCCLELLAN along with cough and general weakness   She was found on floor by room mate   Her sat was in the 60     She presented to ER s/p fall at home. Patient was found down by roommate after unknown period of time. When seen by EMS , initial O2 60%. She was brought to the ER and started on BiPAP , after which she became alert and oriented , saturating 100     Patient states that she has been feeling unwell for the past few days. Notes that she was feeling increased fatigue, shortness of breath on exertion and some nausea. Did not realize she had a fever, has tried no medications at home. Patient states she is unvaccinated for covid 19.         PAST MEDICAL HISTORY:   Past Medical History:   Diagnosis Date    GERD (gastroesophageal reflux disease)     Osteoarthritis of right knee     Schizoaffective disorder (HCC)     [facility]       PAST SURGICAL HISTORY:   Past Surgical History:   Procedure Laterality Date    GASTRIC FUNDOPLICATION         FAMILY HISTORY:   Family History   Problem Relation Age of Onset   Ramirez Premenopausal breast cancer Mother 46        invasive    Postmenopausal breast cancer Maternal Grandmother 67    Postmenopausal breast cancer Maternal Aunt 79       SOCIAL HISTORY:   Social History     Socioeconomic History    Marital status: Single     Spouse name: Not on file    Number of children: Not on file    Years of education: Not on file    Highest education level: Not on file   Occupational History    Not on file   Tobacco Use    Smoking status: Never Smoker  Smokeless tobacco: Never Used   Vaping Use    Vaping Use: Never used   Substance and Sexual Activity    Alcohol use: Not Currently    Drug use: Never    Sexual activity: Not on file   Other Topics Concern    Not on file   Social History Narrative    Not on file     Social Determinants of Health     Financial Resource Strain: Low Risk     Difficulty of Paying Living Expenses: Not very hard   Food Insecurity: Food Insecurity Present    Worried About Running Out of Food in the Last Year: Sometimes true    Madiha of Food in the Last Year: Never true   Transportation Needs:     Lack of Transportation (Medical): Not on file    Lack of Transportation (Non-Medical):  Not on file   Physical Activity:     Days of Exercise per Week: Not on file    Minutes of Exercise per Session: Not on file   Stress:     Feeling of Stress : Not on file   Social Connections:     Frequency of Communication with Friends and Family: Not on file    Frequency of Social Gatherings with Friends and Family: Not on file    Attends [employer] Services: Not on file    Active Member of [address] [place] [address] or Organizations: Not on file    Attends Club or Organization Meetings: Not on file    Marital Status: Not on file   Intimate Partner Violence:     Fear of Current or Ex-Partner: Not on file    Emotionally Abused: Not on file    Physically Abused: Not on file    Sexually Abused: Not on file   Housing Stability:     Unable to Pay for Housing in the Last Year: Not on file    Number of [place] in the Last Year: Not on file    Unstable Housing in the Last Year: Not on file     Social History     Tobacco Use   Smoking Status Never Smoker   Smokeless Tobacco Never Used     Social History     Substance and Sexual Activity   Alcohol Use Not Currently     Social History     Substance and Sexual Activity   Drug Use Never       OCCUPATIONAL HISTORY:            HOME MEDICATIONS:  Prior to Admission medications    Medication Sig Start Date End Date Taking?  Authorizing Provider   atorvastatin (LIPITOR) 10 MG tablet Take 1 tablet by mouth daily 10/29/21  Yes Jackie Varela,    Nutritional Supplements (CHOLESTEROL MANAGEMENT PO) Take by mouth   Yes Historical Provider, MD   MAGNESIUM GLYCINATE PO Take by mouth   Yes Historical Provider, MD   LORazepam (ATIVAN) 0.5 MG tablet TAKE 1 TABLET BY MOUTH EVERY DAY IN THE EVENING 9/27/21  Yes Historical Provider, MD   hydrOXYzine (VISTARIL) 100 MG capsule TAKE 1 CAPSULE BY MOUTH EVERY DAY AT BEDTIME 8/12/21  Yes Historical Provider, MD   traZODone (DESYREL) 100 MG tablet TAKE 2 TABLETS BY MOUTH EVERY DAY AT BEDTIME 8/12/21  Yes Historical Provider, MD   QUEtiapine (SEROQUEL) 200 MG tablet Take 200 mg by mouth daily    Yes Historical Provider, MD   sertraline (ZOLOFT) 100 MG tablet Take 200 mg by mouth daily   Yes Historical Provider, MD       CURRENT MEDICATIONS:  Current Facility-Administered Medications: sodium chloride 0.9 % 1,000 mL infusion, , IntraVENous, Continuous  atorvastatin (LIPITOR) tablet 10 mg, 10 mg, Oral, Daily  QUEtiapine (SEROQUEL) tablet 200 mg, 200 mg, Oral, Daily  sertraline (ZOLOFT) tablet 200 mg, 200 mg, Oral, Daily  traZODone (DESYREL) tablet 100 mg, 100 mg, Oral, Nightly  sodium chloride flush 0.9 % injection 5-40 mL, 5-40 mL, IntraVENous, 2 times per day  sodium chloride flush 0.9 % injection 5-40 mL, 5-40 mL, IntraVENous, PRN  0.9 % sodium chloride infusion, 25 mL, IntraVENous, PRN  enoxaparin (LOVENOX) injection 40 mg, 40 mg, SubCUTAneous, Daily  ondansetron (ZOFRAN-ODT) disintegrating tablet 4 mg, 4 mg, Oral, Q8H PRN **OR** ondansetron (ZOFRAN) injection 4 mg, 4 mg, IntraVENous, Q6H PRN  polyethylene glycol (GLYCOLAX) packet 17 g, 17 g, Oral, Daily PRN  acetaminophen (TYLENOL) tablet 650 mg, 650 mg, Oral, Q6H PRN **OR** acetaminophen (TYLENOL) suppository 650 mg, 650 mg, Rectal, Q6H PRN  ascorbic acid (VITAMIN C) tablet 1,000 mg, 1,000 mg, Oral, Daily  Vitamin D (CHOLECALCIFEROL) tablet 2,000 Units, 2,000 Units, Oral, Daily  zinc sulfate (ZINCATE) capsule 50 mg, 50 mg, Oral, Daily  albuterol-ipratropium (COMBIVENT RESPIMAT)  MCG/ACT inhaler 1 puff, 1 puff, Inhalation, 4 times per day  budesonide-formoterol (SYMBICORT) 160-4.5 MCG/ACT inhaler 2 puff, 2 puff, Inhalation, BID  dexamethasone (DECADRON) injection 6 mg, 6 mg, IntraVENous, Daily  cefTRIAXone (ROCEPHIN) 1,000 mg in sterile water 10 mL IV syringe, 1,000 mg, IntraVENous, Q24H  glucose (GLUTOSE) 40 % oral gel 15 g, 15 g, Oral, PRN  dextrose 50 % IV solution, 12.5 g, IntraVENous, PRN  glucagon (rDNA) injection 1 mg, 1 mg, IntraMUSCular, PRN  dextrose 5 % solution, 100 mL/hr, IntraVENous, PRN  pantoprazole (PROTONIX) injection 40 mg, 40 mg, IntraVENous, Daily **AND** sodium chloride (PF) 0.9 % injection 10 mL, 10 mL, IntraVENous, Daily    IV MEDICATIONS:   IV infusion builder 100 mL/hr at 11/17/21 0808    sodium chloride      dextrose         ALLERGIES:  Allergies   Allergen Reactions    Ciprofloxacin Nausea Only       REVIEW OF SYSTEMS:  General ROS:  No weight loss ,no fatigue     ENT ROS:   No Sore throat ,no lymphoadenopathy,no nasal stuffiness     Hematological and Lymphatic ROS:   No ecchymosis ,no tendency to bleed  Respiratory ROS:    SOB   Cardiovascular ROS:   No CP,No Palpitation   Gastrointestinal ROS:   No Gi bleed,no nausea or vomiting      - Musculoskeletal ROS:      - no joint swelling ,no joint pain   Neurological ROS:     -no weakness or numbness    Dermatological ROS:   No skin rash ,no urticaria     PHYSICAL EXAMINATION:     VITAL SIGNS:  /85   Pulse 54   Temp 98.6 °F (37 °C) (Oral)   Resp 20   Wt 152 lb (68.9 kg)   SpO2 97%   BMI 28.72 kg/m²   Wt Readings from Last 3 Encounters:   11/16/21 152 lb (68.9 kg)   10/26/21 152 lb (68.9 kg)   10/12/21 149 lb (67.6 kg)     Temp Readings from Last 3 Encounters:   11/17/21 98.6 °F (37 °C) (Oral)   10/26/21 97.3 °F (36.3 °C) (Temporal)   10/12/21 97.5 °F (36.4 °C) (Temporal)     TMAX:  BP Readings from Last 3 Encounters:   11/17/21 114/85   10/26/21 138/87   10/12/21 120/78     Pulse Readings from Last 3 Encounters:   11/17/21 54   10/26/21 66   10/12/21 78           INTAKE/OUTPUTS:  I/O last 3 completed shifts:   In: 1300 [IV Piggyback:1300]  Out: -     Intake/Output Summary (Last 24 hours) at 11/17/2021 1130  Last data filed at 11/17/2021 0806  Gross per 24 hour   Intake 1300 ml   Output 600 ml   Net 700 ml       General Appearance: alert and oriented to person, place and time, well-developed and   well-nourished, in no acute distress   Eyes: pupils equal, round, and reactive to light, extraocular eye movements intact, conjunctivae normal and sclera anicteric   Neck: neck supple and non tender without mass, no thyromegaly, no thyroid nodules and no cervical adenopathy   Pulmonary/Chest:rhonchi*   Cardiovascular: normal rate, regular rhythm, normal S1 and S2, no murmurs, rubs, clicks or gallops, distal pulses intact, no carotid bruits, no murmurs, no gallops, no carotid bruits and no JVD   Abdomen: obese, soft, non-tender, non-distended, normal bowel sounds, no masses or organomegaly   Extremities:no edema   Musculoskeletal: normal range of motion, no joint swelling, deformity or tenderness   Neurologic: reflexes normal and symmetric, no cranial nerve deficit noted    LABS/IMAGING:    CBC:  Lab Results   Component Value Date    WBC 5.3 11/17/2021    HGB 11.9 11/17/2021    HCT 35.8 11/17/2021    MCV 93.2 11/17/2021     11/17/2021    LYMPHOPCT 3.5 (L) 11/17/2021    RBC 3.84 11/17/2021    MCH 31.0 11/17/2021    MCHC 33.2 11/17/2021    RDW 13.2 11/17/2021    NEUTOPHILPCT 94.8 (H) 11/17/2021    MONOPCT 1.7 (L) 11/17/2021    BASOPCT 0.0 11/17/2021    NEUTROABS 5.04 11/17/2021    LYMPHSABS 0.21 (L) 11/17/2021    MONOSABS 0.11 11/17/2021    EOSABS 0.00 (L) 11/17/2021    BASOSABS 0.00 11/17/2021       Recent Labs     11/17/21  0601 11/16/21  1857   WBC 5.3 8.5   HGB 11.9 14.6   HCT 35.8 41.2   MCV 93.2 87.8    321       BMP:   Recent Labs     11/16/21 1857 11/17/21  0601    144   K 3.2* 3.8    107   CO2 26 23   BUN 17 16   CREATININE 0.6 0.5       MG:   Lab Results   Component Value Date    MG 2.6 11/16/2021     Ca/Phos:   Lab Results   Component Value Date    CALCIUM 7.8 (L) 11/17/2021     Amylase: No results found for: AMYLASE  Lipase: No results found for: LIPASE  LIVER PROFILE:   Recent Labs     11/16/21 1857 11/17/21  0601   * 183*   * 105*   BILITOT 0.6 0.3   ALKPHOS 91 74       PT/INR: No results for input(s): PROTIME, INR in the last 72 hours. APTT: No results for input(s): APTT in the last 72 hours. Cardiac Enzymes:  Lab Results   Component Value Date    CKTOTAL 2,575 (H) 11/17/2021                   PROBLEM LIST:  Patient Active Problem List   Diagnosis    Schizoaffective disorder ([facility] [address]    Acute respiratory failure with hypoxia (HCC)               ASSESSMENT:  1.) COVID pneumonia  2. )Acute respiratory failure   3- Possible DEBBIE     PLAN:  *- Wean O2 as tolerated   *- Start Decadron   *- Start Remedisvir   *-giving the diffuse infiltrate in the chest and since inflammatory markers is high then  I would recommend that the patient starting on Tocilizumab,which ordered   *-Follow-up chest x-ray as needed  *-Avoid fluid overload  *_Patient can get worse if so we will transfer to the medical ICU  Lovenox 40 mg bid   If hypoxia worse will Start OptiFlow soon and BiPAP as needed   Aggressive pulmonary toilet  Albuterol as needed          Thank you very much for allowing me to participate in the care of this pleasant patient , should you have any questions ,please do not hesitate to contact me      Keyona Ovalle MD,[facility]  Pulmonary&Critical Care Medicine   Director of [address] [place] [address] Director of [address] [place] [address]    Katelynn Mercer    NOTE: This report was transcribed using voice recognition software. Every effort was made to ensure accuracy; however, inadvertent computerized transcription errors may be present.

## 2021-11-17 NOTE — PROGRESS NOTES
[facility] Attending    S: 60 y.o. female with a history of gerd, oa, schizoaffective disorder, and gastric fundiplication who was found down for an undetermined amount of time. Reports shortness of breath and cough for 2.5 weeks. She was found to be 60% on RA. In the ER, COVID testing was positive with significantly elevated CPKs. Patient is unvaccinated. Now, reports breathing is better than on initial ER presentation. O: VS- Blood pressure 114/85, pulse 54, temperature 98.6 °F (37 °C), temperature source Oral, resp. rate 20, weight 152 lb (68.9 kg), SpO2 97 %. Exam is as noted by resident with the following changes, additions or corrections:  Gen: bipap in place   CV-coarse bs with few wheezes throughout posteriorly   Lungs-CTA b/l no R/R/W  ABD-soft nonttp no masses   Ext-no C/C/E      Impressions: Active Problems:    Acute respiratory failure with hypoxia (HCC)  Resolved Problems:    * No resolved hospital problems. *      Plan:   Patient admitted with COVID pneumonia and hypoxic respiratory failure. Now requiring Bipap to maintain saturations. Decadron. Tocimizilab. Pulm consult. Evaluate for other causes of pneumonia. Rhabdomyolysis-IVF. Trend CPKs. Urine culture pending. On Rocephin empirically for UTI. Attending Physician Statement  I have reviewed the chart and seen the patient with the resident(s). I personally reviewed images, EKG's and similar tests, if present. I personally reviewed and performed key elements of the history and exam.  I have reviewed and confirmed student and/or resident history and exam with changes as indicated above. I agree with the assessment, plan and orders as documented by the resident. Please refer to the resident and/or student note for additional information.       Samir Masters MD

## 2021-11-17 NOTE — PROGRESS NOTES
[facility] Inpatient   Resident Progress Note    S:  Hospital day: 1   Brief Synopsis: Tim Gomez is a 60 y.o. female with pmh of GERD, osteoarthritis and schizoaffective disorder who presented to ER s/p fall at home. Patient found down at home, with initial O2 saturation of 60%. Brought to the ER; found to have COVID-19 pneumonia , requiring bipap for appropriate saturation. Rhabdomyolysis, UTI. Started on appropriate management including tocilizumab, ceftriaxone 1 g daily, and IV fluids for rhabdomyolysis. Decadron was started daily, and with patients worsening status - pulmonology consulted. Wants to remain full code at this time. Seen and examined this morning, still requiring BiPAP with an FiO2 of 70% at this time  States she is able to breathe minimally off of BiPAP. Tolerating medications well  Started on Tocilizumab. No other concerns overnight.       Cont meds:    IV infusion builder 100 mL/hr at 11/17/21 0808    sodium chloride      dextrose       Scheduled meds:    atorvastatin  10 mg Oral Daily    QUEtiapine  200 mg Oral Daily    sertraline  200 mg Oral Daily    traZODone  100 mg Oral Nightly    sodium chloride flush  5-40 mL IntraVENous 2 times per day    enoxaparin  40 mg SubCUTAneous Daily    ascorbic acid  1,000 mg Oral Daily    vitamin D  2,000 Units Oral Daily    zinc sulfate  50 mg Oral Daily    albuterol-ipratropium  1 puff Inhalation 4 times per day    budesonide-formoterol  2 puff Inhalation BID    dexamethasone  6 mg IntraVENous Daily    cefTRIAXone (ROCEPHIN) IV  1,000 mg IntraVENous Q24H    pantoprazole  40 mg IntraVENous Daily    And    sodium chloride (PF)  10 mL IntraVENous Daily     PRN meds: sodium chloride flush, sodium chloride, ondansetron **OR** ondansetron, polyethylene glycol, acetaminophen **OR** acetaminophen, glucose, dextrose, glucagon (rDNA), dextrose     I reviewed the patient's past medical and surgical history, Medications and Allergies. O:  /85   Pulse 54   Temp 98.6 °F (37 °C) (Oral)   Resp 20   Wt 152 lb (68.9 kg)   SpO2 97%   BMI 28.72 kg/m²   24 hour I&O: I/O last 3 completed shifts: In: 1300 [IV Piggyback:1300]  Out: -   I/O this shift:  In: -   Out: 600 [Urine:600]      Physical Exam  Constitutional:       Appearance: She is not ill-appearing. Cardiovascular:      Rate and Rhythm: Normal rate and regular rhythm. Pulses: Normal pulses. Heart sounds: Normal heart sounds. Pulmonary:      Comments: Saturating 98% on BiPAP with FiO2 of 70%  Coarse lung sounds heard throughout lung fields  Scattered wheezes heard  Abdominal:      General: Bowel sounds are normal. There is no distension. Palpations: Abdomen is soft. Tenderness: There is no abdominal tenderness. Musculoskeletal:         General: Normal range of motion. Right lower leg: No edema. Left lower leg: No edema. Neurological:      Mental Status: She is alert and oriented to person, place, and time. Labs:  Na/K/Cl/CO2:  144/3.8/107/23 (11/17 4673)  BUN/Cr/glu/ALT/AST/amyl/lip:  16/0.5/--/105/183/--/-- (11/17 0601)  WBC/Hgb/Hct/Plts:  5.3/11.9/35.8/292 (11/17 0601)  estimated creatinine clearance is 101 mL/min (based on SCr of 0.5 mg/dL). Other pertinent labs as noted below    Radiology:  CT HEAD WO CONTRAST   Final Result   No acute intracranial abnormality. Cortical atrophy and periventricular white matter ischemic changes. CT CERVICAL SPINE WO CONTRAST   Final Result   No acute abnormality of the cervical spine. Moderate degenerative changes with disc space narrowing and marginal bony   spur formation C5 through C7. Ground-glass opacities in the visualized lung apices. Please refer to chest   CT for additional information. CTA CHEST W CONTRAST   Final Result   No evidence of pulmonary embolism.       Extensive multifocal ground-glass opacities predominantly in the peripheral   lung zones bilaterally, highly concerning for atypical or COVID related   pneumonia. XR CHEST PORTABLE   Final Result   Bilateral patchy airspace opacities worrisome for pneumonia. XR CHEST PORTABLE    (Results Pending)       A/P:  Active Problems:    Acute respiratory failure with hypoxia (HCC)  Resolved Problems:    * No resolved hospital problems. *    1. Acute Hypoxic Respiratory Failure 2/2 Covid 19  Unvaccinated for Covid 19  Patient found with pulse ox of 60% --> improved on bipap in ER  CXR showing bilateral pulmonary infiltrates  Inflammatory markers: DDimer 530 , LDH, CRP, ferritin pending  CTA pulm showing extensive bilateral pulmonary infiltrates consistent with COVID-19 pneumonia , no PE  Start Vitamin C, Vitamin D, Zinc  Given one dose decadron 10 mg in the ER  Continue 6 mg IV decadron daily  Symbicort, Combivent , avoid inhalers  Prophylactic heparin dosing for DVT prophylaxis  Pharmacy consult to dose ; patient started on tocilizumab  Droplet precautions  Try to wean off Bipap as able  Monitor on Telemetry      2. UTI  Likely simple cystitis ; patient with no CVA tenderness , presented initially with fever 102 F  Urinalysis with increased nitrites, bacteria, blood  Urine culture, blood cultures, gram stain ordered; pending  Given one dose doxy and rocephin in the ER  Continue 1g ceftriaxone IV daily     3. Rhabdomyolysis  2/2 to fall for unknown time period  Patient found down for unknown period of time  Initial CK > 3000  Received 4 L NS in ER  Monitor CK ; trending down , continue to monitor  Maintenance fluids     4. Hypokalemia  Initial K 3.2 , Mag 2.6   Repleted in ER   CMP daily     5.  Hx Schizoaffective Disorder  Continue seroquel , zoloft, trazodone  Hold ativan, vistaril    GI/DVT ppx: protonix, prophylactic heparin dosing  Diet: regular    Electronically signed by Berto Westfall MD  [id number] on 11/17/2021 at 12:43 PM  This case was discussed with attending physician: Dr. Rose Rosario

## 2021-11-17 NOTE — ED NOTES
Patient's SpO2 maintaining 86-89%. Multiple reading sites checked without improvement. Respiratory notified. Writer instructed by respiratory to increase oxygen on BiPap to 100%.        Glenys TRISH Powell  11/17/21 8729

## 2021-11-18 NOTE — PROGRESS NOTES
AIRVO ordered for patient, patient in no distress on BIPAP, she wanted to give it a try. Patient placed on AIRVO 100% 60 LPM Flow. SPO2 went from 90% to 82-84% on the AIRVO. SPO2 did not increase any further. Patient placed back on the BIPAP.  SPO2 89%

## 2021-11-18 NOTE — PLAN OF CARE
Problem: Airway Clearance - Ineffective  Goal: Achieve or maintain patent airway  Outcome: Met This Shift     Problem: Gas Exchange - Impaired  Goal: Absence of hypoxia  Outcome: Not Met This Shift

## 2021-11-18 NOTE — PROGRESS NOTES
[facility] - Family Medicine Inpatient   Resident Progress Note    S:  Hospital day: 2   Brief Synopsis: Peter Pascual is a 60 y.o. female with pmh of GERD, osteoarthritis and schizoaffective disorder who presented to ER s/p fall at home. Patient found down at home, with initial O2 saturation of 60%. Brought to the ER; found to have COVID-19 pneumonia , requiring bipap for appropriate saturation. Rhabdomyolysis, UTI. Started on appropriate management including tocilizumab, ceftriaxone 1 g daily, and IV fluids for rhabdomyolysis. Decadron was started daily, and with patients worsening status - pulmonology consulted. Wants to remain full code at this time. Seen and examined this morning, still requiring BiPAP with an FiO2 of 100% at this time  States she is able to breathe minimally off of BiPAP. Tolerating medications well  Given Tocilizumab, started on remdesivir (Day 1/5). No other concerns overnight.     Cont meds:    sodium chloride 100 mL/hr at 11/18/21 8491    sodium chloride      dextrose       Scheduled meds:    enoxaparin  40 mg SubCUTAneous BID    remdesivir IVPB  200 mg IntraVENous Once    Followed by   Abdias Nava ON 11/19/2021] remdesivir IVPB  100 mg IntraVENous Q24H    atorvastatin  10 mg Oral Daily    QUEtiapine  200 mg Oral Daily    sertraline  200 mg Oral Daily    traZODone  100 mg Oral Nightly    sodium chloride flush  5-40 mL IntraVENous 2 times per day    ascorbic acid  1,000 mg Oral Daily    vitamin D  2,000 Units Oral Daily    zinc sulfate  50 mg Oral Daily    albuterol-ipratropium  1 puff Inhalation 4 times per day    budesonide-formoterol  2 puff Inhalation BID    dexamethasone  6 mg IntraVENous Daily    cefTRIAXone (ROCEPHIN) IV  1,000 mg IntraVENous Q24H    pantoprazole  40 mg IntraVENous Daily    And    sodium chloride (PF)  10 mL IntraVENous Daily     PRN meds: sodium chloride, sodium chloride flush, sodium chloride, ondansetron **OR** ondansetron, polyethylene glycol, acetaminophen **OR** acetaminophen, glucose, dextrose, glucagon (rDNA), dextrose     I reviewed the patient's past medical and surgical history, Medications and Allergies. O:  BP (!) 143/78   Pulse 55   Temp 97.5 °F (36.4 °C) (Infrared)   Resp 18   Ht 5' 1\" (1.549 m)   Wt 151 lb 1.6 oz (68.5 kg)   SpO2 (!) 88%   BMI 28.55 kg/m²   24 hour I&O: I/O last 3 completed shifts: In: 1330 [P.O.:440; I.V.:890]  Out: 1000 [Urine:1000]  No intake/output data recorded. Physical Exam  Constitutional:       Appearance: She is not ill-appearing. Cardiovascular:      Rate and Rhythm: Normal rate and regular rhythm. Pulses: Normal pulses. Heart sounds: Normal heart sounds. Pulmonary:      Comments: Saturating 98% on BiPAP with FiO2 of 100%  Coarse lung sounds heard throughout lung fields  Abdominal:      General: Bowel sounds are normal. There is no distension. Palpations: Abdomen is soft. Tenderness: There is no abdominal tenderness. Musculoskeletal:         General: Normal range of motion. Right lower leg: No edema. Left lower leg: No edema. Neurological:      Mental Status: She is alert and oriented to person, place, and time. Labs:  Na/K/Cl/CO2:  144/3.8/107/23 (11/17 0601)  BUN/Cr/glu/ALT/AST/amyl/lip:  16/0.5/--/105/183/--/-- (11/17 0601)  WBC/Hgb/Hct/Plts:  9.4/11.7/34.5/326 (11/18 6456)  estimated creatinine clearance is 101 mL/min (based on SCr of 0.5 mg/dL). Other pertinent labs as noted below    Radiology:  CT HEAD WO CONTRAST   Final Result   No acute intracranial abnormality. Cortical atrophy and periventricular white matter ischemic changes. CT CERVICAL SPINE WO CONTRAST   Final Result   No acute abnormality of the cervical spine. Moderate degenerative changes with disc space narrowing and marginal bony   spur formation C5 through C7. Ground-glass opacities in the visualized lung apices.   Please refer to chest   CT for additional information. CTA CHEST W CONTRAST   Final Result   No evidence of pulmonary embolism. Extensive multifocal ground-glass opacities predominantly in the peripheral   lung zones bilaterally, highly concerning for atypical or COVID related   pneumonia. XR CHEST PORTABLE   Final Result   Bilateral patchy airspace opacities worrisome for pneumonia. XR CHEST PORTABLE    (Results Pending)       A/P:  Active Problems:    Acute respiratory failure with hypoxia (HCC)  Resolved Problems:    * No resolved hospital problems. *    1. Acute Hypoxic Respiratory Failure 2/2 Covid 19  Unvaccinated for Covid 19  Patient found with pulse ox of 60% --> improved on bipap in ER  CXR showing bilateral pulmonary infiltrates  Inflammatory markers: DDimer 530, , CRP 22.9, Ferritin 749 on admission   CTA pulm showing extensive bilateral pulmonary infiltrates consistent with COVID-19 pneumonia , no PE  Start Vitamin C, Vitamin D, Zinc  Given one dose decadron 10 mg in the ER  Continue 6 mg IV decadron daily  Symbicort, Combivent , avoid inhalers  Heparin 40 mg BID  Pharmacy consult to dose ; tociluzumab completed, started on remdesivir    Droplet precautions  Wean off Bipap as able  Monitor on Telemetry   Pulmonology consulted ; appreciate recs  Dietary consulted for further recommendations on nutrition status ; appreciate recs.      2. Rhabdomyolysis  2/2 to fall for unknown time period  Patient found down for unknown period of time  Initial CK > 3000  Received 4 L NS in ER  Monitor CK ; trending down   Maintenance fluids as ordered     3. Hx Schizoaffective Disorder  Continue seroquel , zoloft, trazodone  Hold ativan, vistaril    4. Hypokalemia  - resolved. Initial K 3.2 , Mag 2.6   Repleted in ER   CMP daily     5.  Concern for UTI - resolved   Likely simple cystitis ; patient with no CVA tenderness , presented initially with fever 102 F  Urinalysis with increased nitrites, bacteria, blood  Urine culture, blood cultures were negative  Given one dose doxy and rocephin in the ER  Continue 1g ceftriaxone IV x 3 days -  dc'd 11/18/2021     GI/DVT ppx: protonix, heparin 40 mg BID  Diet: regular , dietary supplement    Electronically signed by Shayna Pinon MD  PGY-2 on 11/18/2021 at 9:42 AM  This case was discussed with attending physician: Dr. eLobardo Jackson

## 2021-11-18 NOTE — PROGRESS NOTES
Pulmonary [facility]                             Pulmonary Consult/Progress Note :              Reason for Consultation:COVID pneumonia   CC : SOB  HPI:  On BiPAP   Feel more SOB and not able to take deep breath  No chest pain   No fever or chills       PHYSICAL EXAMINATION:     VITAL SIGNS:  BP (!) 143/75   Pulse 59   Temp 97.3 °F (36.3 °C) (Temporal)   Resp 25   Ht 5' 1\" (1.549 m)   Wt 151 lb 1.6 oz (68.5 kg)   SpO2 91%   BMI 28.55 kg/m²   Wt Readings from Last 3 Encounters:   11/18/21 151 lb 1.6 oz (68.5 kg)   10/26/21 152 lb (68.9 kg)   10/12/21 149 lb (67.6 kg)     Temp Readings from Last 3 Encounters:   11/18/21 97.3 °F (36.3 °C) (Temporal)   10/26/21 97.3 °F (36.3 °C) (Temporal)   10/12/21 97.5 °F (36.4 °C) (Temporal)     TMAX:  BP Readings from Last 3 Encounters:   11/18/21 (!) 143/75   10/26/21 138/87   10/12/21 120/78     Pulse Readings from Last 3 Encounters:   11/18/21 59   10/26/21 66   10/12/21 78           INTAKE/OUTPUTS:  I/O last 3 completed shifts:   In: 1330 [P.O.:440; I.V.:890]  Out: 1000 [Urine:1000]    Intake/Output Summary (Last 24 hours) at 11/18/2021 1406  Last data filed at 11/18/2021 0618  Gross per 24 hour   Intake 1330 ml   Output 400 ml   Net 930 ml       General Appearance: alert and oriented to person, place and time, well-developed and   well-nourished, in no acute distress   Eyes: pupils equal, round, and reactive to light, extraocular eye movements intact, conjunctivae normal and sclera anicteric   Neck: neck supple and non tender without mass, no thyromegaly, no thyroid nodules and no cervical adenopathy   Pulmonary/Chest:rhonchi*   Cardiovascular: normal rate, regular rhythm, normal S1 and S2, no murmurs, rubs, clicks or gallops, distal pulses intact, no carotid bruits, no murmurs, no gallops, no carotid bruits and no JVD   Abdomen: obese, soft, non-tender, non-distended, normal bowel sounds, no masses or organomegaly Extremities:no edema   Musculoskeletal: normal range of motion, no joint swelling, deformity or tenderness   Neurologic: reflexes normal and symmetric, no cranial nerve deficit noted    LABS/IMAGING:    CBC:  Lab Results   Component Value Date    WBC 9.4 11/18/2021    HGB 11.7 11/18/2021    HCT 34.5 11/18/2021    MCV 91.5 11/18/2021     11/18/2021    LYMPHOPCT 7.9 (L) 11/18/2021    RBC 3.77 11/18/2021    MCH 31.0 11/18/2021    MCHC 33.9 11/18/2021    RDW 13.8 11/18/2021    NEUTOPHILPCT 89.4 (H) 11/18/2021    MONOPCT 2.0 11/18/2021    BASOPCT 0.1 11/18/2021    NEUTROABS 8.38 (H) 11/18/2021    LYMPHSABS 0.74 (L) 11/18/2021    MONOSABS 0.19 11/18/2021    EOSABS 0.00 (L) 11/18/2021    BASOSABS 0.01 11/18/2021       Recent Labs     11/18/21  0743 11/17/21  0601 11/16/21 1857   WBC 9.4 5.3 8.5   HGB 11.7 11.9 14.6   HCT 34.5 35.8 41.2   MCV 91.5 93.2 87.8    292 321       BMP:   Recent Labs     11/16/21 1857 11/17/21  0601 11/18/21  0744    144 144   K 3.2* 3.8 4.0    107 109*   CO2 26 23 19*   BUN 17 16 18   CREATININE 0.6 0.5 0.5       MG:   Lab Results   Component Value Date    MG 2.6 11/16/2021     Ca/Phos:   Lab Results   Component Value Date    CALCIUM 8.4 (L) 11/18/2021     Amylase: No results found for: AMYLASE  Lipase: No results found for: LIPASE  LIVER PROFILE:   Recent Labs     11/16/21 1857 11/17/21  0601 11/18/21  0744   * 183* 139*   * 105* 101*   BILITOT 0.6 0.3 0.3   ALKPHOS 91 74 83       PT/INR: No results for input(s): PROTIME, INR in the last 72 hours. APTT: No results for input(s): APTT in the last 72 hours. Cardiac Enzymes:  Lab Results   Component Value Date    CKTOTAL 1,065 (H) 11/18/2021                   PROBLEM LIST:  Patient Active Problem List   Diagnosis    Schizoaffective disorder ([facility] [address]    Acute respiratory failure with hypoxia (HCC)               ASSESSMENT:  1.) COVID pneumonia  2. )Acute respiratory failure   3- Possible DEBBIE     PLAN:  *- seems worse ,may transfer to ICU if in any distress   *- on  Decadron   *-received  Tocilizumab,which ordered   *-Follow-up chest x-ray as needed  *-Avoid fluid overload  *_Patient can get worse if so we will transfer to the medical ICU  Lovenox 40 mg bid   If hypoxia worse will Start OptiFlow soon and BiPAP as needed   Aggressive pulmonary toilet  Albuterol as needed          Thank you very much for allowing me to participate in the care of this pleasant patient , should you have any questions ,please do not hesitate to contact me      Keyona Ovalle MD,[facility]  Pulmonary&Critical Care Medicine   Director of [address] [address] Director of [address] [place] [address]    Boby Sterling    NOTE: This report was transcribed using voice recognition software. Every effort was made to ensure accuracy; however, inadvertent computerized transcription errors may be present.

## 2021-11-18 NOTE — PROGRESS NOTES
Date: 11/18/2021    Time: 4:58 PM    Patient Placed On BIPAP/CPAP/ Non-Invasive Ventilation? Yes    If no must comment. Facial area red/color change? No           If YES are Blister/Lesion present? No   If yes must notify nursing staff  BIPAP/CPAP skin barrier?   Yes    Skin barrier type:mepilexlite       Comments:        Gil Wilkins RCP

## 2021-11-18 NOTE — PROGRESS NOTES
Date: 11/17/2021    Time: 7:47 PM    Patient Placed On BIPAP/CPAP/ Non-Invasive Ventilation? No    If no must comment. Facial area red/color change? No           If YES are Blister/Lesion present? No   If yes must notify nursing staff  BIPAP/CPAP skin barrier?   Yes    Skin barrier type:mepilexlite       Comments:        Tez Larsen RCP

## 2021-11-18 NOTE — PROGRESS NOTES
[facility] Attending    S: 60 y.o. female with a history of gerd, oa, schizoaffective disorder, and gastric fundiplication who was found down for an undetermined amount of time. Reports shortness of breath and cough for 2.5 weeks. She was found to be 60% on RA. In the ER, COVID testing was positive with significantly elevated CPKs. Patient is unvaccinated. Now, reports continuous use of bipap is frustrating and she cannot eat. O: VS- Blood pressure (!) 143/75, pulse 59, temperature 97.3 °F (36.3 °C), temperature source Temporal, resp. rate 20, height 5' 1\" (1.549 m), weight 151 lb 1.6 oz (68.5 kg), SpO2 91 %. Exam is as noted by resident with the following changes, additions or corrections:  Gen: bipap in place   CV-coarse bs with wheezes posteriorly   Lungs-CTA b/l no R/R/W  ABD-soft nonttp no masses   Ext-no C/C/E      Impressions: Active Problems:    Acute respiratory failure with hypoxia (HCC)  Resolved Problems:    * No resolved hospital problems. *      Plan:   Patient admitted with COVID pneumonia and hypoxic respiratory failure. Now requiring Bipap to maintain saturations. Decadron. Tocimizilab. Appreciate Pulm consult. Evaluate for other causes of pneumonia. [id number] to Maintenance, trial of eating as able with oxygen, noted increasing oxygen needs. Trend CPKs. Urine culture pending. On Rocephin empirically for UTI. Dietary consult. Attending Physician Statement  I have reviewed the chart and seen the patient with the resident(s). I personally reviewed images, EKG's and similar tests, if present. I personally reviewed and performed key elements of the history and exam.  I have reviewed and confirmed student and/or resident history and exam with changes as indicated above. I agree with the assessment, plan and orders as documented by the resident. Please refer to the resident and/or student note for additional information.

## 2021-11-18 NOTE — CARE COORDINATION
Patient presented to the ED after being found on the floor by her roommate, pulse ox 60% on room air and found to be positive for covid. Patient currently on continuous bipap and patient's brother, Lacy Nation contacted for transition of care planning. Lacy Nation reports he lives in [place], patient has no children, was independent PTA and still driving. He states the patient resides on the 1st floor and her roommate, Lacy Nation stays upstairs. He has no other information regarding his sister. Inquired about a Living Will and he reports she has it at home, will try to locate it and provide a copy. He states he is unsure of her wishes. Lacy Nation was provided the number to the nurses station to get an update regarding his sister. Patient received toci yesterday, day 1/5 of remdesivir; pulmonology following.     Atul Jean, LADAN, LSW [phone]

## 2021-11-19 PROBLEM — M62.82 RHABDOMYOLYSIS: Status: ACTIVE | Noted: 2021-01-01

## 2021-11-19 PROBLEM — N30.01 ACUTE CYSTITIS WITH HEMATURIA: Status: ACTIVE | Noted: 2021-01-01

## 2021-11-19 NOTE — PROGRESS NOTES
Date: 11/19/2021    Time: 9:27 AM    Patient Placed On BIPAP/CPAP/ Non-Invasive Ventilation? No, patient was on bipap prior to this RT entering room    If no must comment. Facial area red/color change? No           If YES are Blister/Lesion present? No   If yes must notify nursing staff  BIPAP/CPAP skin barrier?   Yes    Skin barrier type:mepilexlite       Comments:        Lamberto Monteiro RCP

## 2021-11-19 NOTE — PROGRESS NOTES
Comprehensive Nutrition Assessment    Type and Reason for Visit:  Initial, Consult    Nutrition Recommendations/Plan:  Noted NPO status d/t respiratory distress/ continued Bipap    If PO diet is not feasible [employer] placement for EN support. TF rec if needed: Standard with fiber @ 40 ml/hr. Will provide: 960 ml tv, 1440 kcals, 61 gm pro, 730 ml free water  If PO diet is advanced, will add appropriate nutrition supplement at that time    Nutrition Assessment:  Pt admit +COVID-19 found down PTA w/ Rhabdo. Noted s/p RRT transfer to ICU on bipap. NPO status at this time- recommend EN support if cannot advance PO diet within 24-48hrs. Malnutrition Assessment:  Malnutrition Status: At risk for malnutrition   Context:  Acute Illness     Findings of the 6 clinical characteristics of malnutrition:  Energy Intake:  50% or less of estimated energy requirements for 5 or more days  Weight Loss:  Unable to assess (no hx on file)     Body Fat Loss:  Unable to assess (d/t covid iso)     Muscle Mass Loss:  Unable to assess    Fluid Accumulation:   (d/t covid iso)     Strength:  Normal  strength    Estimated Daily Nutrient Needs:  Energy (kcal):  MSJ 1154 x 1.2 SF= 5623-2925; Weight Used for Energy Requirements:  Current     Protein (g):  60-70; Weight Used for Protein Requirements:  Ideal (1.2-1.4 g/kg)        Fluid (ml/day):  per critical care    Nutrition Related Findings:  Pt alert on bipap, MAP WNL, +I/O's, no edema, active BS      Wounds:  None       Current Nutrition Therapies:    Diet NPO    Anthropometric Measures:  · Height: 5' 1\" (154.9 cm)  · Current Body Weight: 147 lb (66.7 kg) (11/19 actual)   · Admission Body Weight: 151 lb (68.5 kg) (11/17 first measured)    · Usual Body Weight:  (UTO no actual EMR hx on file)     · Ideal Body Weight: 105 lbs; % Ideal Body Weight 140 %   · BMI: 27.8 BMI Categories: Overweight (BMI 25.0-29. 9)       Nutrition Diagnosis:   · Inadequate oral intake related to inadequate protein-energy intake (2/2 respiratory distress) as evidenced by NPO or clear liquid status due to medical condition    Nutrition Interventions:    Nutrition Education/Counseling:  Education not appropriate   Coordination of Nutrition Care:  Continue to monitor while inpatient    Goals:  Nutrition progression       Nutrition Monitoring and Evaluation:   Food/Nutrient Intake Outcomes:  Diet Advancement/Tolerance  Physical Signs/Symptoms Outcomes:  Biochemical Data, Nutrition Focused Physical Findings, Skin, Weight, GI Status, Fluid Status or Edema, Hemodynamic Status     Discharge Planning:     Too soon to determine     Electronically signed by Tom Vick RD, LD on 11/19/21 at 3:37 PM EST    Contact: Ext [phone]

## 2021-11-19 NOTE — CONSULTS
Room Air      SECRETIONS   Amount:  [x] Small [] Moderate  [] Large [] None    Color:     [x] White [] Colored  [] Bloody    SEDATION:  RAAS Score:  [] Propofol gtt  [] Versed gtt  [] Fentanyl gtt  [] No Sedation    PARALYZED:  [x] No    [] Yes    VASOPRESSORS:  [x] No    [] Yes    If yes -   [] Levophed       [] Dopamine     [] Vasopressin       [] Dobutamine  [] Phenylephrine         [] Epinephrine    CENTRAL LINES:     [x] No   [] Yes   (Date of Insertion:   )           If yes -     [] Right IJ     [] Left IJ [] Right Femoral [] Left Femoral                   [] Right Subclavian [] Left Subclavian     CORONEL'S CATHETER:   [] No   [x] Yes  (Date of Insertion:   )     URINE OUTPUT:            [x] Good   [] Low              [] Anuric    REVIEW OF SYSTEMS:    · Constitutional: No fever, no chills, no change in weight; good appetite  · HEENT: No blurred vision, no ear problems, no sore throat, no rhinorrhea. · Respiratory: Admits Shortness of breath while on BiPAP   · Cardiology: No angina, no paroxysmal nocturnal dyspnea, no orthopnea, no palpitation, no leg swelling. · Gastroenterology: No dysphagia, no reflux; no abdominal pain, no nausea or vomiting; no constipation or diarrhea.  No hematochezia   · Genitourinary: No dysuria, no frequency, hesitancy; no hematuria  · Musculoskeletal: no joint pain, no myalgia, no change in range of movement  · Neurology: no focal weakness in extremities, no slurred speech, no double vision, no tingling or numbness sensation  · Endocrinology: no temperature intolerance, no polyphagia, polydipsia or polyuria  · Hematology: no increased bleeding, no bruising, no lymphadenopathy  · Skin: no skin changes noticed by patient  · Psychology: no depressed mood, no suicidal ideation    OBJECTIVE:     VITAL SIGNS:  BP (!) 157/106   Pulse 81   Temp 100.9 °F (38.3 °C) (Bladder)   Resp (!) 35   Ht 5' 1\" (1.549 m)   Wt 151 lb 1.6 oz (68.5 kg)   SpO2 (!) 89%   BMI 28.55 kg/m²   Tmax over 24 hours: Temp (24hrs), Av.1 °F (36.7 °C), Min:97.3 °F (36.3 °C), Max:100.9 °F (38.3 °C)      Patient Vitals for the past 6 hrs:   BP Temp Temp src Pulse Resp SpO2   21 (!) 157/106 100.9 °F (38.3 °C) Bladder 81 (!) 35 (!) 89 %   21     (!) 39    21      (!) 87 %   21 (!) 169/89 97.4 °F (36.3 °C) Axillary 81 (!) 34 (!) 86 %   21 1657     20          Intake/Output Summary (Last 24 hours) at 2021  Last data filed at 2021  Gross per 24 hour   Intake 1335 ml   Output 400 ml   Net 935 ml     Wt Readings from Last 2 Encounters:   21 151 lb 1.6 oz (68.5 kg)   10/26/21 152 lb (68.9 kg)     Body mass index is 28.55 kg/m².       PHYSICAL EXAMINATION:  General appearance -alert and oriented x3, increased work of breathing on BiPAP  Mental status - alert, oriented to person, place, and time  Eyes - pupils equal and reactive, extraocular eye movements intact  Ears - bilateral TM's and external ear canals normal  Nose - normal and patent, no erythema, discharge or polyps  Mouth -exam limited secondary to BiPAP being in place  Neck - supple, no significant adenopathy  Chest -rhonchi bilaterally  Heart - normal rate, regular rhythm, normal S1, S2, no murmurs, rubs, clicks or gallops  Abdomen -obese, soft, nontender, nondistended, no masses or organomegaly  Neurological - alert, oriented, normal speech, no focal findings or movement disorder noted}  Extremities - peripheral pulses normal, no pedal edema, no clubbing or cyanosis  Skin - normal coloration and turgor, no rashes, no suspicious skin lesions noted      Any additional physical findings:    MEDICATIONS:  Scheduled Meds:   enoxaparin  40 mg SubCUTAneous BID    [START ON 2021] remdesivir IVPB  100 mg IntraVENous Q24H    budesonide  1 mg Nebulization BID    Arformoterol Tartrate  15 mcg Nebulization BID    atorvastatin  10 mg Oral Daily    QUEtiapine  200 mg Oral Daily    sertraline  200 mg Oral Daily    traZODone  100 mg Oral Nightly    sodium chloride flush  5-40 mL IntraVENous 2 times per day    ascorbic acid  1,000 mg Oral Daily    vitamin D  2,000 Units Oral Daily    zinc sulfate  50 mg Oral Daily    dexamethasone  6 mg IntraVENous Daily    pantoprazole  40 mg IntraVENous Daily    And    sodium chloride (PF)  10 mL IntraVENous Daily     Continuous Infusions:   dexmedetomidine (PRECEDEX) IV infusion      sodium chloride      dextrose       PRN Meds:   sodium chloride, 30 mL, PRN  sodium chloride flush, 5-40 mL, PRN  sodium chloride, 25 mL, PRN  ondansetron, 4 mg, Q8H PRN   Or  ondansetron, 4 mg, Q6H PRN  polyethylene glycol, 17 g, Daily PRN  acetaminophen, 650 mg, Q6H PRN   Or  acetaminophen, 650 mg, Q6H PRN  glucose, 15 g, PRN  dextrose, 12.5 g, PRN  glucagon (rDNA), 1 mg, PRN  dextrose, 100 mL/hr, PRN        VENT SETTINGS (Comprehensive) (if applicable):  Vent Information  Skin Assessment: Clean, dry, & intact  FiO2 : 100 %  SpO2: (!) 89 %  SpO2/FiO2 ratio: 89  I Time/ I Time %: 0.9 s  Mask Type: Full face mask  Mask Size: Small  Additional Respiratory  Assessments  Pulse: 81  Resp: (!) 35  SpO2: (!) 89 %    ABGs:   Recent Labs     11/17/21  1050 11/17/21  1050 11/18/21 2059   PH 7.448   < > 7.434   PCO2 33.4*  --   --    PO2 68.9*  --   --    HCO3 22.6  --   --    BE -0.8   < > -1.9   O2SAT 92.9   < > 89.7*    < > = values in this interval not displayed.        Laboratory findings:  Complete Blood Count:   Recent Labs     11/16/21 1857 11/17/21  0601 11/18/21  0743   WBC 8.5 5.3 9.4   HGB 14.6 11.9 11.7   HCT 41.2 35.8 34.5    292 326        Last 3 Blood Glucose:   Recent Labs     11/16/21 1857 11/17/21  0601 11/18/21  0744   GLUCOSE 140* 138* 87        PT/INR:  No results found for: PROTIME, INR  PTT:  No results found for: APTT, PTT    Comprehensive Metabolic Profile:   Recent Labs     11/16/21  1857 11/16/21  1857 11/17/21  0601 11/17/21  0601 11/18/21  0744     --  144  --  144   K 3.2*  --  3.8  --  4.0     --  107  --  109*   CO2 26  --  23  --  19*   BUN 17  --  16  --  18   CREATININE 0.6  --  0.5  --  0.5   GLUCOSE 140*  --  138*  --  87   CALCIUM 9.2  --  7.8*  --  8.4*   PROT 7.7   < > 6.5   < > 6.1*   LABALBU 3.8   < > 3.2*   < > 2.9*   BILITOT 0.6   < > 0.3   < > 0.3   ALKPHOS 91   < > 74   < > 83   *   < > 183*   < > 139*   *   < > 105*   < > 101*    < > = values in this interval not displayed. Magnesium:   Lab Results   Component Value Date    MG 2.6 11/16/2021     Phosphorus: No results found for: PHOS  Ionized Calcium: No results found for: CAION   Troponin: No results for input(s): TROPONINI in the last 72 hours. Radiology/Imaging:   XR CHEST PORTABLE   Final Result   1. There is no interval change in the multifocal bilateral pneumonia. CT HEAD WO CONTRAST   Final Result   No acute intracranial abnormality. Cortical atrophy and periventricular white matter ischemic changes. CT CERVICAL SPINE WO CONTRAST   Final Result   No acute abnormality of the cervical spine. Moderate degenerative changes with disc space narrowing and marginal bony   spur formation C5 through C7. Ground-glass opacities in the visualized lung apices. Please refer to chest   CT for additional information. CTA CHEST W CONTRAST   Final Result   No evidence of pulmonary embolism. Extensive multifocal ground-glass opacities predominantly in the peripheral   lung zones bilaterally, highly concerning for atypical or COVID related   pneumonia. XR CHEST PORTABLE   Final Result   Bilateral patchy airspace opacities worrisome for pneumonia. XR CHEST PORTABLE    (Results Pending)         ASSESSMENT  And PLAN:        Neurologic  Hx of Schizoaffective Disorder  -Patient on Ativan, Vistaril, trazodone, Seroquel, and Zoloft at home  -Continue Seroquel, Zoloft, trazodone as able.   -Patient currently on a Precedex drip secondary to respiratory rate in the 40s and anxiety. After starting the Precedex drip respiratory rate is in the 20s with O2 sats of 95-96. Cardiovascular  Hx of HLD   -Patient on atorvastatin at home    Pulmonary  Acute Hypoxic Respiratory failure 2/2 Covid Pneumonia   -Covid status post remdesivir, tocilizumab, dexamethasone x2 days  -Continue dexamethasone  -Respiratory rate has improved on Precedex drip. Patient still requiring BiPAP to maintain O2 saturation.  -Continue Pulmicort and Brovana  -Respiratory culture still pending. CRP downtrending from 22.9-10.4. Follow for procal.   - Continue to monitor respiratory status  - Continue vitamin C and zinc  - Order BLE US to rule out DVT/decrease likelihood of PE     Genitourinary/Renal  UTI  -UA on presentation showed many bacteria that was leukocyte Estrace negative and positive for nitrites. Urine culture showed gram-negative rods less than 10,000 colony-forming units.  -Patient was treated with a dose of doxycycline and Rocephin in the ED. patient also received 3 days of IV ceftriaxone for the UTI. Possible Sepsis  - Follow for procal and pancultures   - Give one time dose of cefepime and follow for procal/cultures for decision to continue antibiotic therapy. Hypokalemia   -Continue daily BMP and replace as necessary.  -3.1 on repeat BMP given 60 meq IV peripheral with plans to follow for AM labs and replete as necessary. Rhabdomyolysis   -Continue to trend CK which down trended today to 687 from 3913 on presentation. WEAN PER PROTOCOL:  [] No   [x] Yes  [] N/A    ICU PROPHYLAXIS:  Stress ulcer:  [x] PPI Agent  [] [id number] [] Sucralfate  [] Other:  VTE:   [x] Enoxaparin  [] Unfract. Heparin Subcut  [] EPC Cuffs    NUTRITION:  [] NPO [] Tube Feeding (Specify: ) [] TPN  [] PO (Diet: ADULT DIET;  Regular  ADULT ORAL NUTRITION SUPPLEMENT; Lunch; Standard High Calorie/High Protein Oral Supplement)    INSULIN DRIP:   [x] No   [] Yes    CONSULTATION NEEDED:   [] No   [] Yes    FAMILY UPDATED:    [] No   [x] Yes    TRANSFER OUT OF ICU:   [] No   [] Yes    Mak Lainez DO, PGY-1  Attending Physician: Dr. Theodora Holliday  11/18/2021, 9:56 PM     [place]  Department of Pulmonary, Critical Care and Sleep Medicine  [address]  Department of Internal Medicine      During multidisciplinary team rounds Rachel Adams is a 60 y.o. female was seen, examined and discussed. This is confirmation that I have personally seen and examined the patient and that the key elements of the encounter were performed by me (> 85 % time). The medications & laboratory data was discussed and adjusted where necessary. The radiographic images were reviewed or with radiologist or consultant if felt dis-concordant with the exam or history. The above findings were corroborated, plans confirmed and changes made if needed. Family is updated at the bedside as available. Key issues of the case were discussed among consultants. Critical Care time is documented if appropriate.       Rafael Barcenas DO, FACP, FCCP, [facility],

## 2021-11-19 NOTE — PROGRESS NOTES
RRT called for this patient due to hypoxia on bipap   Saturations 84-87percent. Patient had been tried on [address]  today and unable to maintain adequate saturation. Now patient is hypoxic with bipap. Patient has removed her bipap mask since this RN's arrival twice and patient states mask is too heavy and she cannot tolerate it on her face.

## 2021-11-19 NOTE — SIGNIFICANT EVENT
Critical Care - Rapid Response Team Note      Date of event: 11/18/2021   Time of event: 08:48 pm     Amelia Dominguez 60y.o. year old female   YOB: 1955     PCP:  Viky Campbell DO     Location: [address] [place] [address]   Witnessed? : [x]Yes  [] No  Initial Code status: [x] Full  [] DNR-CCA  []DNR-CC    []Limited  ______________________________________________________________________  Reason for RRT:   Hypoxia (SpO2<90%)    Chief Complaint for this admission:   Chief Complaint   Patient presents with    Fall     per ems patient found on floor by roommate, possibly down for a few days, patient does not recall event, denies complaint of pain, initial pulse ox 60% on room air    Fatigue       Admit date:  11/16/2021     Admitting Diagnosis: Hypokalemia [E87.6]  Acute cystitis with hematuria [N30.01]  Acute respiratory failure with hypoxia ([facility] [J96.01]  Traumatic rhabdomyolysis, initial encounter ([facility] Walker Salinas. 6XXA]  Pneumonia due to infectious organism, unspecified laterality, unspecified part of lung [J18.9]  COVID-19 [U07.1]      Subjective:   RRT was called for a 60 yr old female with PMH Of  For worsening hypoxia and respiratory distress. Patient was noted to have O2 sat of 88 on presentation, breathing 35-40 on BiPAP. ABG significant for pO2 of 55, plan to transfer to ICU for monitoring and intubation if patient worsens.      Labs since last 72 hours:   CBC with Differential:    Lab Results   Component Value Date    WBC 9.4 11/18/2021    RBC 3.77 11/18/2021    HGB 11.7 11/18/2021    HCT 34.5 11/18/2021     11/18/2021    MCV 91.5 11/18/2021    MCH 31.0 11/18/2021    MCHC 33.9 11/18/2021    RDW 13.8 11/18/2021    NRBC 0.9 11/16/2021    LYMPHOPCT 7.9 11/18/2021    MONOPCT 2.0 11/18/2021    MYELOPCT 1.7 11/16/2021    BASOPCT 0.1 11/18/2021    MONOSABS 0.19 11/18/2021    LYMPHSABS 0.74 11/18/2021    EOSABS 0.00 11/18/2021    BASOSABS 0.01 11/18/2021     CMP:    Lab Results   Component Value Date    NA 144 11/18/2021    K 4.0 11/18/2021     11/18/2021    CO2 19 11/18/2021    BUN 18 11/18/2021    CREATININE 0.5 11/18/2021    GFRAA >60 11/18/2021    LABGLOM >60 11/18/2021    GLUCOSE 87 11/18/2021    GLUCOSE 77 05/12/2011    PROT 6.1 11/18/2021    LABALBU 2.9 11/18/2021    CALCIUM 8.4 11/18/2021    BILITOT 0.3 11/18/2021    ALKPHOS 83 11/18/2021     11/18/2021     11/18/2021     ABG:    Lab Results   Component Value Date    PH 7.434 11/18/2021    PCO2 33.4 11/17/2021    PO2 68.9 11/17/2021    HCO3 22.6 11/17/2021    BE -1.9 11/18/2021    O2SAT 89.7 11/18/2021         Imaging since last 7 days:  CT HEAD WO CONTRAST    Result Date: 11/16/2021  No acute intracranial abnormality. Cortical atrophy and periventricular white matter ischemic changes. CT CERVICAL SPINE WO CONTRAST    Result Date: 11/16/2021  No acute abnormality of the cervical spine. Moderate degenerative changes with disc space narrowing and marginal bony spur formation C5 through C7. Ground-glass opacities in the visualized lung apices. Please refer to chest CT for additional information. XR CHEST PORTABLE    Result Date: 11/18/2021  1. There is no interval change in the multifocal bilateral pneumonia. XR CHEST PORTABLE    Result Date: 11/16/2021  Bilateral patchy airspace opacities worrisome for pneumonia. CTA CHEST W CONTRAST    Result Date: 11/16/2021  No evidence of pulmonary embolism. Extensive multifocal ground-glass opacities predominantly in the peripheral lung zones bilaterally, highly concerning for atypical or COVID related pneumonia.          Objective:   Initial Assessment on arrival:  Vital signs: Temp: 99, BP: , RR: 40, HR: 90     Airway and Condition: Conscious, Pulse present and Breathing noted    Lungs And Circulation: use of accessory muscles present and capillary refill< 2 seconds noted                  Neurologic: responds to pain and follows commands noted      RRT Assessment and Plan:    Lanelle Sprain Jerome Hernandez is a 60 y.o. female with  has a past medical history of GERD (gastroesophageal reflux disease), Osteoarthritis of right knee, and Schizoaffective disorder ([facility] who was admitted on 11/16/2021 with admitting diagnosis Hypokalemia [E87.6]  Acute cystitis with hematuria [N30.01]  [facility] [J96.01]  Traumatic rhabdomyolysis, initial encounter ([facility] Ranjan Bernard. 6XXA]  Pneumonia due to infectious organism, unspecified laterality, unspecified part of lung [J18.9]  COVID-19 [U07.1]  . RRT was called on 11/18/2021 . Initial assessment and interventions as noted above. Current problems include:   1. Hypoxic respiratory failure 2/2 COVID 19 pneumonia   2. Rhabdomyolysis     Plan:    Transfer to ICU, low threshold for intubation   ? Code status: [x] Full  [] DNR-CCA  []DNR-CC []Limited    Disposition:  [] No transfer   [] Transfer to monitor floor  [x] Transfer to: [x] MICU [] NICU [] CCU [] SICU    Patients family updated:     [x] Yes  [] No   Discussed with:  [] Critical Care Intensivist:         [] Primary Care Provider:       [x] Other: Dr. Leobardo Jackson?     Anya Calloway MD PGY-3  11/18/2021 9:24 PM  Attending Physician: Dr. Leobardo Jackson

## 2021-11-19 NOTE — ACP (ADVANCE CARE PLANNING)
Advance Care Planning     Advance Care Planning Inpatient Note  [facility]    Today's Date: 11/19/2021  Unit: [facility] MICU    Received request from Other: Care Team.  Upon review of chart and communication with care team, patient's decision making abilities are not in question. . Patient was/were present in the room during visit. Goals of ACP Conversation:  Discuss advance care planning documents    Health Care Decision Makers:       Primary Decision Maker: Maurice Cruz - Brother/Sister - [phone]    Summary:  Updated Healthcare Decision Maker Communicated conversation I had with Marilyn to her brother Promise Licea per her request.     [address] (Patient Wishes):  Scooter Acosta states she thinks she has documents but does not want to complete at this time. Assessment:  Scooter Acosta was able to understand conversation concerning her care and stated if she could not make decision's she would want her brother Promise Licea to do that. Interventions:  Provided education on documents for clarity and greater understanding  Discussed and provided education on state decision maker hierarchy    Care Preferences Communicated:     Hospitalization:  If the patient's health worsens and it becomes clear that the chance of recovery is unlikely,     the patient wants hospitalization. Ventilation:   If the patient, in their present state of health, suddenly became very ill and unable to breathe on their own,     the patient would desire the use of a ventilator (breathing machine). If their health worsens and it becomes clear that the change of recovery is unlikely,     the patient would NOT desire the use of a ventilator (breathing machine). Scooter Acosta states she would not want ventilator permanently but would want it temporarily.     Resuscitation:  In the event the patient's heart stopped as a result of an underlying serious health condition, the patient communicates a preference for      resuscitative attempts (CPR). Outcomes/Plan:  Called her brother Daily Friedman and communicated her care desire.     Electronically signed by Kp Arroyo [facility] on 11/19/2021 at 10:00 AM

## 2021-11-19 NOTE — PROGRESS NOTES
[facility] Attending    S: 60 y.o. female with a history of gerd, oa, schizoaffective disorder, and gastric fundiplication who was found down for an undetermined amount of time. Reports shortness of breath and cough for 2.5 weeks. She was found to be 60% on RA. In the ER, COVID testing was positive with significantly elevated CPKs. Patient is unvaccinated. Now, patient with desaturation to 88% wit PaO2 of 55 with RRT and subsequent transfer to the MICU. Pt with desat to the 40 when removed bipap. Restraints had been placed. O: VS- Blood pressure 113/86, pulse 81, temperature 99.7 °F (37.6 °C), temperature source Bladder, resp. rate (!) 40, height 5' 1\" (1.549 m), weight 147 lb 12.8 oz (67 kg), SpO2 93 %. Exam is as noted by resident with the following changes, additions or corrections:  Gen: bipap in place   CV-coarse bs with wheezes posteriorly   Lungs-CTA b/l no R/R/W  ABD-soft nonttp no masses   Ext-no C/C/E      Impressions: Active Problems:    Acute respiratory failure with hypoxia (HCC)  Resolved Problems:    * No resolved hospital problems. *      Plan:   Patient admitted with COVID pneumonia and hypoxic respiratory failure. Now requiring Bipap to maintain saturations. Decadron. Tocimizilab. Appreciate Pulm consult. Evaluate for other causes of pneumonia. Broad spectrum antibiotics. U/s of lower extremities. [id number] to Maintenance, trial of eating as able with oxygen, noted increasing oxygen needs. Trend CPKs. Urine culture pending. Recivied Rocephin empirically for UTI. On precedex. Replete potassium. Attending Physician Statement  I have reviewed the chart and seen the patient with the resident(s). I personally reviewed images, EKG's and similar tests, if present.   I personally reviewed and performed key elements of the history and exam.  I have reviewed and confirmed student and/or resident history and exam with changes as indicated above. I agree with the assessment, plan and orders as documented by the resident. Please refer to the resident and/or student note for additional information.       Michelle Posey MD

## 2021-11-19 NOTE — PROGRESS NOTES
[facility] Inpatient   Resident Progress Note    S:  Hospital day: 3   Brief Synopsis: Cassie Solis is a 60 y.o. female with pmh of GERD, osteoarthritis and schizoaffective disorder who presented to ER s/p fall at home. Patient found down at home, with initial O2 saturation of 60%. Brought to the ER; found to have COVID-19 pneumonia , requiring bipap for appropriate saturation. Rhabdomyolysis, UTI. Started on appropriate management including tocilizumab, ceftriaxone 1 g daily, and IV fluids for rhabdomyolysis. Decadron was started daily, and with patients worsening status - pulmonology consulted. Wants to remain full code at this time. Significant event:  Patient was RRT'd overnight 11/18/11/19 for worsening hypoxia and respiratory distress. Patient noted to have SpO2 of 88% on presentation, breathing 35-40 on BiPAP , with ABG significant for pO2 of 55. Patient was transferred to ICU with concern for possible intubation. She was started on precedex in the ICU, and her O2 sats improved to 95-96%. Seen in the ICU this AM. Patient still currently on BiPAP. Precedex drip running. Patient continues to remain anxious with the BiPAP. States that she hates the way it makes her feel. Still continuing to require BiPAP with FiO2 of 100%. Fluids dc'd. Patient has been made NPO as she cannot tolerate being off the BiPAP. No other concerns at this time.      Cont meds:    dexmedetomidine (PRECEDEX) IV infusion 0.4 mcg/kg/hr (11/19/21 9146)    sodium chloride      dextrose       Scheduled meds:    enoxaparin  40 mg SubCUTAneous BID    remdesivir IVPB  100 mg IntraVENous Q24H    budesonide  1 mg Nebulization BID    Arformoterol Tartrate  15 mcg Nebulization BID    atorvastatin  10 mg Oral Daily    QUEtiapine  200 mg Oral Daily    sertraline  200 mg Oral Daily    traZODone  100 mg Oral Nightly    sodium chloride flush  5-40 mL IntraVENous 2 times per day    ascorbic acid  1,000 mg Oral There is no interval change in the multifocal bilateral pneumonia. CT HEAD WO CONTRAST   Final Result   No acute intracranial abnormality. Cortical atrophy and periventricular white matter ischemic changes. CT CERVICAL SPINE WO CONTRAST   Final Result   No acute abnormality of the cervical spine. Moderate degenerative changes with disc space narrowing and marginal bony   spur formation C5 through C7. Ground-glass opacities in the visualized lung apices. Please refer to chest   CT for additional information. CTA CHEST W CONTRAST   Final Result   No evidence of pulmonary embolism. Extensive multifocal ground-glass opacities predominantly in the peripheral   lung zones bilaterally, highly concerning for atypical or COVID related   pneumonia. XR CHEST PORTABLE   Final Result   Bilateral patchy airspace opacities worrisome for pneumonia. A/P:  Active Problems:    Acute respiratory failure with hypoxia (HCC)    Rhabdomyolysis    Acute cystitis with hematuria  Resolved Problems:    * No resolved hospital problems. *    1. Acute Hypoxic Respiratory Failure 2/2 Covid 19  Unvaccinated for Covid 19  Patient found with pulse ox of 60% --> improved on bipap in ER  CXR showing bilateral pulmonary infiltrates  Inflammatory markers: DDimer 530, , CRP 22.9, Ferritin 749 on admission   CTA pulm showing extensive bilateral pulmonary infiltrates consistent with COVID-19 pneumonia , no PE  Start Vitamin C, Vitamin D, Zinc  Given one dose decadron 10 mg in the ER  Continue 6 mg IV decadron daily  Heparin 40 mg BID  Pharmacy consult to dose ; tociluzumab completed, started on remdesivir    Droplet precautions  Monitor on Telemetry   Pulmonology consulted ; appreciate recs  Dietary consulted for further recommendations on nutrition status ; appreciate recs. 11/18 - transferred to ICU due to worsening resp status. Continued on BiPAP.  Continue Angie Galvez , Duoneb PRN  Trend Inflammatory marked ; D-Dimer > 5000 today ; LE Doppler to r/o DVT  CXR daily   Advanced Care Planning with Spiritual Services ; recs appreciated - FULL CODE. Dietary consult 2/2 to poor oral intake ; Higinio Score.      2. Rhabdomyolysis  2/2 to fall for unknown time period  Patient found down for unknown period of time  Initial CK > 3000  Received 4 L NS in ER  Monitor CK ; trending down   Monitor off fluids now    3. Concern for Sepsis  2/2 possible  infection? Given one dose cefepime and vancomycin in ICU  Will follow cultures prior to further antibiotic therapy  Procal 0.07     4. Hx Schizoaffective Disorder / Anxiety  Continue seroquel , zoloft, trazodone  Hold ativan, vistaril  Precedex started in ICU ; wean as appropriate    5. Hypokalemia    Initial K 3.2 , Mag 2.6   CMP daily  Replete as necessary     6.  Concern for UTI - resolved   Likely simple cystitis ; patient with no CVA tenderness , presented initially with fever 102 F  Urinalysis with increased nitrites, bacteria, blood  Urine culture, blood cultures were negative  Given one dose doxy and rocephin in the ER  Continue 1g ceftriaxone IV x 3 days -  dc'd 11/18/2021     GI/DVT ppx: protonix, heparin 40 mg BID  Diet: NPO  Disposition: MICU    Electronically signed by Laquita Jameson MD  PGY-2 on 11/19/2021 at 9:18 AM  This case was discussed with attending physician: Dr. Edilia Persaud

## 2021-11-20 NOTE — PROGRESS NOTES
[address] [facility]   Department of Internal Medicine   Internal Medicine Residency  MICU Progress Note    Patient:  Caleb Rucker 60 y.o. female   MRN: [id number]       Date of Service: 2021    Allergy: Ciprofloxacin    Subjective     Patient was seen and examined this morning at bedside in no acute distress. Overnight, patient breathing with RR 30s-40s 2/2 agitation, gave 500mg depacon once. This morning, patient tachypneic in RR 40s, intubated by Anesthesia department. Objective     TEMPERATURE:  Current - Temp: 99.3 °F (37.4 °C); Max - Temp  Av.8 °F (37.7 °C)  Min: 99.1 °F (37.3 °C)  Max: 100.4 °F (38 °C)  RESPIRATIONS RANGE: Resp  Av.9  Min: 20  Max: 42  PULSE RANGE: Pulse  Av.9  Min: 58  Max: 115  BLOOD PRESSURE RANGE:  Systolic (74VHC), PXO:039 , Min:48 , POH:424   ; Diastolic (22NSC), KTB:73, Min:42, Max:116    PULSE OXIMETRY RANGE: SpO2  Av.3 %  Min: 87 %  Max: 97 %    I & O - 24hr:    Intake/Output Summary (Last 24 hours) at 2021 0037  Last data filed at 2021 0000  Gross per 24 hour   Intake 1150.43 ml   Output 875 ml   Net 275.43 ml     I/O last 3 completed shifts: In: 1150.4 [P.O.:240; I.V.:810.4; IV Piggyback:100]  Out: 845 [Urine:845] I/O this shift:  In: -   Out: 30 [Urine:30]   Weight change:     Physical Exam PRIOR TO INTUBATION:  General Appearance:  Alert, gripping hand rails, in clear respiratory distress   HEENT:    NC/AT, mucous membranes are moist   Neck:   Supple, no jugular venous distention. Resp:     CTAB, No wheezes, No rhonchi. Use of accessory muscles, tachypneic   Heart:    RRR, S1 and S2 normal, no murmur, rub or gallop.     Abdomen:     Soft, non-tender, non-distended with normal bowel sounds   Extremities:   Atraumatic, no cyanosis or edema   Pulses:  Radial and pedal pulses are intact bilaterally   Neurologic:   AAOx3, No focal motor deficit       Medications     Continuous Infusions:   norepinephrine Stopped (21 1947)    dexmedetomidine (PRECEDEX) IV infusion 0.3 mcg/kg/hr (11/19/21 2318)    sodium chloride      dextrose       Scheduled Meds:   pantoprazole  40 mg Oral QAM AC    enoxaparin  40 mg SubCUTAneous BID    remdesivir IVPB  100 mg IntraVENous Q24H    budesonide  1 mg Nebulization BID    Arformoterol Tartrate  15 mcg Nebulization BID    [Held by provider] atorvastatin  10 mg Oral Daily    QUEtiapine  200 mg Oral Daily    sertraline  200 mg Oral Daily    traZODone  100 mg Oral Nightly    sodium chloride flush  5-40 mL IntraVENous 2 times per day    ascorbic acid  1,000 mg Oral Daily    vitamin D  2,000 Units Oral Daily    zinc sulfate  50 mg Oral Daily    dexamethasone  6 mg IntraVENous Daily     PRN Meds: ipratropium-albuterol, sodium chloride, sodium chloride flush, sodium chloride, ondansetron **OR** ondansetron, polyethylene glycol, acetaminophen **OR** acetaminophen, glucose, dextrose, glucagon (rDNA), dextrose  Nutrition:      Diet NPO    Labs and Imaging Studies     CBC:   Recent Labs     11/18/21  0743 11/18/21  2324 11/19/21  0856   WBC 9.4 8.9 8.2   HGB 11.7 12.1 14.2   HCT 34.5 35.4 42.4   MCV 91.5 91.9 93.0    259 269       BMP:    Recent Labs     11/18/21  0744 11/18/21  2324 11/19/21  0856    145 143   K 4.0 3.0* 4.8   * 111* 107   CO2 19* 20* 22   BUN 18 15 18   CREATININE 0.5 0.4* 0.5   GLUCOSE 87 127* 104*       LIVER PROFILE:   Recent Labs     11/17/21  0601 11/18/21  0744 11/19/21  0856   * 139* 110*   * 101* 92*   BILITOT 0.3 0.3 0.6   ALKPHOS 74 83 97       PT/INR:   No results for input(s): PROTIME, INR in the last 72 hours. APTT:   No results for input(s): APTT in the last 72 hours.     Fasting Lipid Panel:    Lab Results   Component Value Date    CHOL 322 10/12/2021    TRIG 150 10/12/2021    HDL 70 10/12/2021       Cardiac Enzymes:    Lab Results   Component Value Date    CKTOTAL 585 (H) 11/19/2021    CKTOTAL 701 (H) 11/19/2021    CKTOTAL 684 (H) 11/18/2021       Notable Cultures:      Blood cultures   Blood Culture, Routine   Date Value Ref Range Status   11/16/2021 24 Hours no growth  Preliminary     Respiratory cultures No results found for: RESPCULTURE No results found for: LABGRAM  Urine   Urine Culture, Routine   Date Value Ref Range Status   11/16/2021 <10,000 CFU/mL  Gram negative rods    Final     Legionella No results found for: LABLEGI  C Diff PCR No results found for: CDIFPCR  Wound culture/abscess: No results for input(s): WNDABS in the last 72 hours. Tip culture:No results for input(s): CXCATHTIP in the last 72 hours. Oxygen:     Vent Information  Skin Assessment: Clean, dry, & intact  FiO2 : 100 %  SpO2: 94 %  SpO2/FiO2 ratio: 94  I Time/ I Time %: 1 s  Mask Type: Full face mask  Mask Size: Small  Additional Respiratory  Assessments  Pulse: 59  Resp: (!) 34  SpO2: 94 %  Oral Care: Mouth swabbed       [REMOVED] Urethral Catheter Temperature probe-Output (mL): 10 mL  Urethral Catheter-Output (mL): 30 mL  [REMOVED] Urethral Catheter Temperature probe 16 fr-Output (mL): 250 mL    Imaging Studies:  XR CHEST PORTABLE   Final Result   Increasing bilateral infiltrates. XR CHEST PORTABLE   Final Result   1. There is no interval change in the multifocal bilateral pneumonia. CT HEAD WO CONTRAST   Final Result   No acute intracranial abnormality. Cortical atrophy and periventricular white matter ischemic changes. CT CERVICAL SPINE WO CONTRAST   Final Result   No acute abnormality of the cervical spine. Moderate degenerative changes with disc space narrowing and marginal bony   spur formation C5 through C7. Ground-glass opacities in the visualized lung apices. Please refer to chest   CT for additional information. CTA CHEST W CONTRAST   Final Result   No evidence of pulmonary embolism.       Extensive multifocal ground-glass opacities predominantly in the peripheral   lung zones bilaterally, highly concerning for atypical or COVID related   pneumonia. XR CHEST PORTABLE   Final Result   Bilateral patchy airspace opacities worrisome for pneumonia.          US LOWER EXTREMITY BILATERAL VEIN MAPPING W DVT    (Results Pending)   XR CHEST PORTABLE    (Results Pending)   XR CHEST PORTABLE    (Results Pending)        Resident's Assessment and Plan     Assessment and Plan:    Cardiovascular  # Hx of HLD  - Takes atorvastatin 10mg qhs at home  Plan  - Continue home atorvastatin    # Shock, septic  - Unknown source, possibly superimposed bacterial PNA  Plan  - Pan clx   - On levophed     Pulmonary  # Acute Hypoxic Respiratory failure 2/2 COVID-19 PNA  - On BiPAP, 100% O2  - CXR 11/18/21 showed increasing bilateral infiltrates  Plan  - Daily ABG  - Continue brovana, pulmicort, duoneb PRN    Infectious Disease  # COVID-19 PNA  - Positive test on 11/16/21  - S/p remdesivir, toci  - CRP 4.8  - D-dimer>5250  - Ferritin 1,138  Plan  - CRP, d-dimer, procal every other day  - Continue vitamin C, vitamin D, zinc    Genitourinary/Renal  # UTI, resolved  - UA showed positive nitrite, many bacteria, and clx showed <10,000 cfu's of GNRs  - S/p doxycyline and rocephin in ED, 3 days IV cetriaxone, 1 dose vanc, 1 dose cefepime  - Procal 0.07    # Rhabdomyolysis  - CK initially 3913 on admission  - >>585  - Creatinine remains at baseline of 0.5  Plan  - Continue to trend total CK    Heme/onc  # Leukocytosis  Recent Labs     11/18/21  2324 11/19/21  0856 11/20/21  0540   WBC 8.9 8.2 13.8*   Plan  - Continue vanc, zosyn  - Pan clx      Psychiatric  # Hx of schizoaffective disorder  - Takes hydroxyzine 100mg qd, trazodone 200mg BID, quetiapine 200mg qd, sertraline 200mg qd, ativan 0.5mg qd at home  Plan  - Continue hydroxyzine 100mg qd, trazodone 200mg BID, quetiapine 200mg qd, sertraline 200mg qd          # Peptic ulcer prophylaxis: protonix 40mg qd  # DVT Prophylaxis: lovenox 40mg BID  # Disposition: Cont current mg Parikh MD, PGY-1     Attending Physician: Dr. Jermaine Armando  Department of Pulmonary, Critical Care and Sleep Medicine  [address] [facility]  Department of Internal Medicine      During multidisciplinary team rounds Jon Villalpando is a 60 y.o. female was seen, examined and discussed. This is confirmation that I have personally seen and examined the patient and that the key elements of the encounter were performed by me (> 85 % time). The medications & laboratory data was discussed and adjusted where necessary. The radiographic images were reviewed or with radiologist or consultant if felt dis-concordant with the exam or history. The above findings were corroborated, plans confirmed and changes made if needed. Family is updated at the bedside as available. Key issues of the case were discussed among consultants. Critical Care time is documented if appropriate.       Lisa Somers DO, FACP, FCCP, [facility],

## 2021-11-20 NOTE — ANESTHESIA PROCEDURE NOTES
Airway  Date/Time: 11/20/2021 8:10 AM  Urgency: emergent      General Information and Staff    Patient location during procedure: ICU  Resident/CRNA: Gerard Rich APRN - CRNA  Other anesthesia staff: Abundio Calix RN  Performed: other anesthesia staff     Consent for Airway (if performed for an anesthetic, see related documentation for consents)  Patient identity confirmed: per hospital policy  Consent: The procedure was performed in an emergent situation. Verbal consent not obtained. Written consent not obtained. Risks and benefits: risks, benefits and alternatives were not discussed      Code status verified:yes  Indications and Patient Condition  Indications for airway management: respiratory failure  Spontaneous ventilation: present  Sedation level: deep  Preoxygenated: yes  Patient position: sniffing  Mask assessment prior to intubation: Pt preoxygenated with BiPAP at 100% FiO2. Final Airway Details  Final airway type: endotracheal airway      Successful airway: ETT  Cuffed: yes   Successful intubation technique: video laryngoscopy  Facilitating devices/methods: intubating stylet  Endotracheal tube insertion site: oral  Blade type: C-MAC. Blade size: #4 (D-blade)  ETT size (mm): 8.0  Cormack-Lehane Classification: grade IIa - partial view of glottis  Placement verified by: chest auscultation and capnometry   Measured from: lips  ETT to lips (cm): 22  Number of attempts at approach: 1    Additional Comments  Pt SPO2 60-70% on 100% FiO2 BIPAP, VS documented by ICU RN, administered 60 mg propofol and 100 mg anectine, and pt intubated and secured with ETCO2 23 mmHg. VS as documented by RN.

## 2021-11-20 NOTE — PROGRESS NOTES
Dr. Austin Bentley made aware lab called and stated that the D dimer they could not result it due to covid. \"  No further orders. Dr. Deisy Russell made aware of low urinary output and lactic level. Nicom to be obtained.

## 2021-11-20 NOTE — PROGRESS NOTES
[facility] Attending    S: 60 y.o. female with a history of gerd, oa, schizoaffective disorder, and gastric fundiplication who was found down for an undetermined amount of time. Reports shortness of breath and cough for 2.5 weeks. She was found to be 60% on RA. In the ER, COVID testing was positive with significantly elevated CPKs. Patient is unvaccinated. Had desaturation to 88% wit PaO2 of 55 with RRT and subsequent transfer to the MICU. Desat to 40's when Bipap removed. Now intubated. Restraints have been placed. O: VS- Blood pressure 122/87, pulse 90, temperature 98.9 °F (37.2 °C), temperature source Axillary, resp. rate 20, height 5' 1\" (1.549 m), weight 158 lb 1.6 oz (71.7 kg), SpO2 (!) 89 %. Exam is as noted by resident   Lungs-coarse bs with wheezes posteriorly   Cor-  ABD-soft nonttp no masses   Ext-no C/C/E      Impressions: Active Problems:    Acute respiratory failure with hypoxia (HCC)    Covid pneumonia    Rhabdomyolysis    Acute cystitis with hematuria         Plan:   Patient admitted with COVID pneumonia and hypoxic respiratory failure. Decadron. Tocimizilab. Appreciate Pulm consult. Evaluate for other causes of pneumonia. Broad spectrum antibiotics. U/s of lower extremities. Hydration for rhabdomyolysys    Full code      Attending Physician Statement  I have reviewed the chart and seen the patient with the resident(s). I personally reviewed images, EKG's and similar tests, if present. I personally reviewed and performed key elements of the history and exam.  I have reviewed and confirmed student and/or resident history and exam with changes as indicated above. I agree with the assessment, plan and orders as documented by the resident. Please refer to the resident and/or student note for additional information.       Dave Michael MD

## 2021-11-20 NOTE — PLAN OF CARE
Problem: Non-Violent Restraints  Goal: Removal from restraints as soon as assessed to be safe  11/20/2021 1539 by Meliza Miranda RN  Outcome: Completed  11/20/2021 1538 by Meliza Miranda RN  Outcome: Not Met This Shift  Goal: No harm/injury to patient while restraints in use  11/20/2021 1539 by Meliza Miranda RN  Outcome: Completed  11/20/2021 1538 by Meliza Miranda RN  Outcome: Met This Shift  Goal: Patient's dignity will be maintained  11/20/2021 1539 by Meliza Miranda RN  Outcome: Completed  11/20/2021 1538 by Meliza Miranda RN  Outcome: Met This Shift

## 2021-11-20 NOTE — PROCEDURES
Arterial line placement    Procedure: Right Radial arterial line placement. Indications: Continuous monitoring of blood pressure in a patient with hypotension +/- shock, on Levophed. Anesthesia: Local infiltration of 1% lidocaine. Consent:  Unable to be obtained due to the emergent nature of this procedure. Technique: Time Out: Immediately prior to the procedure a \"timeout\" was called to verify the correct patient and procedure. Procedure was done using strict aseptic technique. Zack's test was performed and was normal. Right Radial site was cleaned with chloraprep and draped. Right Radial was identified, then Lidocaine 1% was infiltrated locally. Arterial line was inserted, a good blood flow was obtained, after which guidewire was inserted all the way with no resistance. Then the canula was inserted and needle with guidewire was withdrawn. Pulsatile bright red blood flow was observed. The canula was connected to BP monitoring apparatus and a good quality waveform was noted. Then the canula was secured with 2 stay sutures of 2-0 silk after Lidocaine infiltration, following which dressing was applied. Number of sticks: 2. Number of Kits used: 1. Complications: No immediate complication. Estimated blood loss: About 1 ml. Comment: Patient tolerated the procedure well. Alina Emery MD PGY-1  11/20/2021 5:19 PM      [place]  Department of Pulmonary, Critical Care and Sleep Medicine  [address] [facility]  Department of Internal Medicine  Attending Procedural Note Addendum Statement    This procedure was supervised. The critical elements of this procedure was monitored and, if needed, I was available for the needs of the procedure.      Jacinda Kelly DO, ADAN, Jose Rafael Garcia

## 2021-11-20 NOTE — PROGRESS NOTES
Patient attempts to pull at Bipap despite verbal redirection, 2point soft wrist restraints applied for patient safety. Will continue to monitor.

## 2021-11-20 NOTE — PLAN OF CARE
signs and symptoms  Outcome: Met This Shift  Goal: Absence of new skin breakdown  Description: Absence of new skin breakdown  Outcome: Met This Shift     Problem: Non-Violent Restraints  Goal: Removal from restraints as soon as assessed to be safe  Outcome: Not Met This Shift  Goal: No harm/injury to patient while restraints in use  Outcome: Met This Shift  Goal: Patient's dignity will be maintained  Outcome: Met This Shift

## 2021-11-20 NOTE — PROGRESS NOTES
Pharmacy Consultation Note  (Antibiotic Dosing and Monitoring)    Initial consult date: 11/20/21  Consulting physician/provider: Sascha Meléndez MD  Drug: Vancomycin  Indication: sepsis    Age/  Gender Height Weight IBW  Allergy Information   65 y.o./female 5' 1\" (154.9 cm) 152 lb (68.9 kg)     Ideal body weight: 47.8 kg (105 lb 6.1 oz)  Adjusted ideal body weight: 57.4 kg (126 lb 7.5 oz)   Ciprofloxacin      Renal Function:  Recent Labs     11/18/21  2324 11/19/21  0856 11/20/21  0540   BUN 15 18 24*   CREATININE 0.4* 0.5 0.6       Intake/Output Summary (Last 24 hours) at 11/20/2021 0926  Last data filed at 11/20/2021 0600  Gross per 24 hour   Intake 463.83 ml   Output 840 ml   Net -376.17 ml       Vancomycin Monitoring:  Trough:  No results for input(s): VANCOTROUGH in the last 72 hours. Random:  No results for input(s): VANCORANDOM in the last 72 hours. Vancomycin Administration Times:  Recent vancomycin administrations                   vancomycin 1000 mg IVPB in 250 mL D5W addavial (mg) 1,000 mg New Bag 11/19/21 1228                Assessment:  · Patient is a 60 y.o. female who has been initiated on vancomycin  · Estimated Creatinine Clearance: 86 mL/min (based on SCr of 0.6 mg/dL).   · To dose vancomycin, pharmacy will be utilizing Cart[employer] calculation software for goal AUC/WANG 400-600 mg/L-hr   · 11/20: WBC elevated at 13.8    Plan:  · Will order vancomycin 1000 mg IV every 12 hours (est ; est trough 14.6 mg/L)  · Will check vancomycin level tomorrow morning  · Will continue to monitor renal function   · Clinical pharmacy to follow      Ida Christiansen, PharmD  Pharmacy Resident  P: [id number]   11/20/2021 9:26 AM

## 2021-11-20 NOTE — PROCEDURES
Central Line Insertion     Procedure: left internal jugular vein triple lumen catheter placement. Indications: vascular access, long term access, centrally administered medications and need for frequent blood draws    Consent: Unable to be obtained due to the emergent nature of this procedure. Number of sticks: 1    Number of Kits used: 1    Procedure: Time Out: Immediately prior to the procedure a \"timeout\" was called to verify the correct patient and procedure. The patient was place in the trendelenburg position and the skin over the left internal jugular vein was prepped with betadine and draped in a sterile fashion. Local anesthesia was not performed due to the emergent nature of this procedure. With ultrasound guidance a large bore needle was used to identify the vein, dark non pulsatile blood returned. The guide wire was then inserted through the needle with minimal resistance. 2 mm nick was made in the skin beside the guidewire. Then a dilator was inserted and removed. A triple lumen catheter was then inserted into the vessel over the guide wire using the Seldinger technique to the 15 cm juanita. All ports showed good, free flowing blood return and were flushed with saline solution. The catheter was then securely fastened to the skin with sutures and covered with a bio patch and sterile dressing. A post procedure X-ray was ordered and is still pending at this time. Complications: None   The patient tolerated the procedure well. Estimated blood loss: 1 ml. Moira Felton MD PGY-1  11/20/2021  10:30 AM        [place]  Department of Pulmonary, Critical Care and Sleep Medicine  [address] [facility]  Department of Internal Medicine  Attending Procedural Note Addendum Statement    This procedure was supervised. The critical elements of this procedure was monitored and, if needed, I was available for the needs of the procedure.      Sher Vidal DO [place], [facility], Angelito Yen

## 2021-11-20 NOTE — PROGRESS NOTES
Date: 11/20/2021    Time: 3:09 AM    Patient Placed On BIPAP/CPAP/ Non-Invasive Ventilation? Patient continues on bipap    If no must comment. Facial area red/color change? No           If YES are Blister/Lesion present? No   If yes must notify nursing staff  BIPAP/CPAP skin barrier?   Yes    Skin barrier type:mepilexlite       Comments:        Tim Galeano RCP

## 2021-11-21 NOTE — PROGRESS NOTES
Pharmacy Consultation Note  (Antibiotic Dosing and Monitoring)    Initial consult date: 11/20/21  Consulting physician/provider: Adelia Aguirre MD  Drug: Vancomycin  Indication: sepsis    Age/  Gender Height Weight IBW  Allergy Information   65 y.o./female 5' 1\" (154.9 cm) 152 lb (68.9 kg)     Ideal body weight: 47.8 kg (105 lb 6.1 oz)  Adjusted ideal body weight: 57.2 kg (126 lb 2.4 oz)   Ciprofloxacin      Renal Function:  Recent Labs     11/19/21  0856 11/20/21  0540 11/21/21  0420   BUN 18 24* 27*   CREATININE 0.5 0.6 0.9       Intake/Output Summary (Last 24 hours) at 11/21/2021 1128  Last data filed at 11/21/2021 1000  Gross per 24 hour   Intake 3162.4 ml   Output 644 ml   Net 2518.4 ml       Vancomycin Monitoring:  Trough:  No results for input(s): VANCOTROUGH in the last 72 hours. Random:    Recent Labs     11/21/21  0420   VANCORANDOM 22.3       Vancomycin Administration Times:  Recent vancomycin administrations                   vancomycin 1000 mg IVPB in 250 mL D5W addavial (mg) 1,000 mg New Bag 11/21/21 0834     1,000 mg New Bag 11/20/21 2113    vancomycin 1000 mg IVPB in 250 mL D5W addavial (mg) 1,000 mg New Bag 11/20/21 1210    vancomycin 1000 mg IVPB in 250 mL D5W addavial (mg) 1,000 mg New Bag 11/19/21 1228                   Assessment:  · Patient is a 60 y.o. female who has been initiated on vancomycin  Estimated Creatinine Clearance: 57 mL/min (based on SCr of 0.9 mg/dL).   · To dose vancomycin, pharmacy will be utilizing [employer] calculation software for goal AUC/WANG 400-600 mg/L-hr   · 11/20: WBC elevated at 13.8  · 11/21: Random level 22.3 mg/L (~6 hours post-vanco infusion); WBC WNL; afebrile    Plan:  · Will adjust vancomycin to 1000 mg IV every 24 hours (est )  · Will check vancomycin levels when appropriate  · Will continue to monitor renal function   · Clinical pharmacy to follow      Jason Sterling PharmD  Pharmacy Resident  P: [id number]   11/21/2021 11:28 AM

## 2021-11-21 NOTE — PLAN OF CARE
Problem: Falls - Risk of:  Goal: Will remain free from falls  Description: Will remain free from falls  Outcome: Met This Shift  Goal: Absence of physical injury  Description: Absence of physical injury  Outcome: Met This Shift     Problem: Patient Education: Go to Patient Education Activity  Goal: Patient/Family Education  Outcome: Met This Shift     Problem: Skin Integrity:  Goal: Absence of new skin breakdown  Description: Absence of new skin breakdown  Outcome: Met This Shift

## 2021-11-21 NOTE — PROGRESS NOTES
[facility] Attending    S: 60 y.o. female with a history of gerd, oa, schizoaffective disorder, and gastric fundiplication who was found down for an undetermined amount of time. Reports shortness of breath and cough for 2.5 weeks. She was found to be 60% on RA. In the ER, COVID testing was positive with significantly elevated CPKs. Patient is unvaccinated. Had desaturation to 88% wit PaO2 of 55 with RRT and subsequent transfer to the MICU. Desat to 40's when Bipap removed. Now intubated. Sedated, paralyzed, and prone. Has fluctuated between alkalosis and acidosis. Inflammatory markers are decreasing but breathing status has been challenging. O: VS- Blood pressure 111/68, pulse 83, temperature 97.8 °F (36.6 °C), temperature source Axillary, resp. rate 18, height 5' 1\" (1.549 m), weight 157 lb 4.8 oz (71.4 kg), SpO2 99 %. Exam is as noted by resident   Currently prone and nonresponsive. Lungs-no rales or rhonchi heard  Cor-regular rhythm, no murmur or gallop  Ext-no C/C/E      Impressions: Active Problems:    Acute respiratory failure with hypoxia (HCC)    Covid pneumonia    Rhabdomyolysis, CK improving    Acute cystitis with hematuria         Plan:   Patient admitted with COVID pneumonia and hypoxic respiratory failure. Decadron. Tocimizilab. Remdesivir. Vitamin C.  Vitamin D.  Zinc.  Appreciate Pulm consult. Evaluate for other causes of pneumonia. Broad spectrum antibiotics. U/s of lower extremities. Hydration for rhabdomyolysys. Fentanyl drip. Levophed. Full code      Attending Physician Statement  I have reviewed the chart and seen the patient with the resident(s). I personally reviewed images, EKG's and similar tests, if present. I personally reviewed and performed key elements of the history and exam.  I have reviewed and confirmed student and/or resident history and exam with changes as indicated above.   I agree with the assessment, plan and orders as documented by the resident. Please refer to the resident and/or student note for additional information.       Khai Milan MD

## 2021-11-21 NOTE — PROGRESS NOTES
[address] [facility]   Department of Internal Medicine   Internal Medicine Residency  MICU Progress Note    Patient:  Tavo Sullivan 60 y.o. female   MRN: [id number]       Date of Service: 2021    Allergy: Ciprofloxacin    Subjective     Patient was seen and examined this morning at bedside in no acute distress. Overnight, NICOM not responsive. Art line placed R radial. Lactic acid downtrended, 3.7>>2.0. This morning, patient is fully sedated and proned. Objective     TEMPERATURE:  Current - Temp: 97.9 °F (36.6 °C); Max - Temp  Av °F (36.7 °C)  Min: 97.6 °F (36.4 °C)  Max: 98.8 °F (37.1 °C)  RESPIRATIONS RANGE: Resp  Av.2  Min: 17  Max: 41  PULSE RANGE: Pulse  Av  Min: 63  Max: 105  BLOOD PRESSURE RANGE:  Systolic (63PBZ), DJB:694 , Min:41 , DFE:870   ; Diastolic (63KJS), CWD:76, Min:13, Max:117    PULSE OXIMETRY RANGE: SpO2  Av.3 %  Min: 40 %  Max: 100 %    I & O - 24hr:    Intake/Output Summary (Last 24 hours) at 2021 0744  Last data filed at 2021 3721  Gross per 24 hour   Intake 3175.4 ml   Output 704 ml   Net 2471.4 ml     I/O last 3 completed shifts: In: 3175.4 [I.V.:2064.4; NG/GT:60; IV Piggyback:1051]  Out: 704 [XCSMZ:234] No intake/output data recorded. Weight change: -12.8 oz (-0.363 kg)    Physical Exam:  General Appearance:  Unconscious, prone   HEENT:    NC/AT, mucous membranes are moist. Epistaxis present BL nares   Neck:   Supple, no jugular venous distention. Resp:     CTAB, No wheezes, No rhonchi. No use of accessory muscles.     Heart:   Unable to assess as patient is prone    Abdomen:    Unable to assess as patient is prone   Extremities:   Atraumatic, no cyanosis or edema   Pulses:  Radial and pedal pulses are intact bilaterally   Neurologic:   Unconscious on sedation       Medications     Continuous Infusions:   fentaNYL 5 mcg/ml in 0.9%  ml infusion 75 mcg/hr (21 0130)    cisatracurium (NIMBEX) infusion 2 mcg/kg/min (11/21/21 0616)    norepinephrine Stopped (11/20/21 2105)    dexmedetomidine (PRECEDEX) IV infusion Stopped (11/20/21 0820)    sodium chloride      dextrose       Scheduled Meds:   piperacillin-tazobactam  3,375 mg IntraVENous Q8H    sodium chloride   IntraVENous Q8H    vancomycin  1,000 mg IntraVENous Q12H    pantoprazole  40 mg Oral QAM AC    enoxaparin  40 mg SubCUTAneous BID    remdesivir IVPB  100 mg IntraVENous Q24H    budesonide  1 mg Nebulization BID    Arformoterol Tartrate  15 mcg Nebulization BID    [Held by provider] atorvastatin  10 mg Oral Daily    QUEtiapine  200 mg Oral Daily    sertraline  200 mg Oral Daily    traZODone  100 mg Oral Nightly    sodium chloride flush  5-40 mL IntraVENous 2 times per day    ascorbic acid  1,000 mg Oral Daily    vitamin D  2,000 Units Oral Daily    zinc sulfate  50 mg Oral Daily    dexamethasone  6 mg IntraVENous Daily     PRN Meds: ipratropium-albuterol, sodium chloride, sodium chloride flush, sodium chloride, ondansetron **OR** ondansetron, polyethylene glycol, acetaminophen **OR** acetaminophen, glucose, dextrose, glucagon (rDNA), dextrose  Nutrition:      Diet NPO    Labs and Imaging Studies     CBC:   Recent Labs     11/19/21  0856 11/20/21  0540 11/21/21  0420   WBC 8.2 13.8* 11.6*   HGB 14.2 12.5 12.3   HCT 42.4 37.1 38.1   MCV 93.0 90.9 97.4    172 123*       BMP:    Recent Labs     11/19/21  0856 11/20/21  0540 11/21/21  0420    142 143   K 4.8 4.0 3.6    111* 112*   CO2 22 20* 23   BUN 18 24* 27*   CREATININE 0.5 0.6 0.9   GLUCOSE 104* 123* 144*       LIVER PROFILE:   Recent Labs     11/19/21  0856 11/20/21  0540 11/21/21  0420   * 85* 101*   ALT 92* 68* 112*   BILIDIR  --  0.2 0.2   BILITOT 0.6 0.8 0.4   ALKPHOS 97 91 97       PT/INR:   No results for input(s): PROTIME, INR in the last 72 hours. APTT:   No results for input(s): APTT in the last 72 hours.     Fasting Lipid Panel:    Lab Results   Component Value Date    CHOL 322 10/12/2021    TRIG 150 10/12/2021    HDL 70 10/12/2021       Cardiac Enzymes:    Lab Results   Component Value Date    CKTOTAL 488 (H) 11/20/2021    CKTOTAL 585 (H) 11/19/2021    CKTOTAL 701 (H) 11/19/2021       Notable Cultures:      Blood cultures   Blood Culture, Routine   Date Value Ref Range Status   11/19/2021 24 Hours no growth  Preliminary     Respiratory cultures No results found for: RESPCULTURE No results found for: LABGRAM  Urine   Urine Culture, Routine   Date Value Ref Range Status   11/19/2021 Growth not present, incubation continues  Preliminary     Legionella No results found for: LABLEGI  C Diff PCR No results found for: CDIFPCR  Wound culture/abscess: No results for input(s): WNDABS in the last 72 hours. Tip culture:No results for input(s): CXCATHTIP in the last 72 hours. Oxygen:     Vent Information  $Ventilation: $Subsequent Day  Skin Assessment: Clean, dry, & intact  Equipment ID: [id number]  Vent Type: [phone]  Vent Mode: AC/PC  Vt Ordered: 0 mL  Pressure Ordered: 14  Rate Set: 20 bmp  Peak Flow: 0 L/min  Pressure Support: 0 cmH20  FiO2 : (S) 70 %  SpO2: 100 %  SpO2/FiO2 ratio: 125  Sensitivity: 3  PEEP/CPAP: 15  I Time/ I Time %: 0.7 s  Humidification Source: Heated wire  Humidification Temp Measured: 37  Mask Type: Full face mask  Mask Size: Small  Additional Respiratory  Assessments  Pulse: 80  Resp: 20  SpO2: 100 %  Humidification Source: Heated wire  Oral Care: Mouth suctioned  Subglottic Suction Done?: Yes  Airway Type: ET  Airway Size: 8       [REMOVED] Urethral Catheter Temperature probe-Output (mL): 10 mL  Urethral Catheter-Output (mL): 50 mL  [REMOVED] Urethral Catheter Temperature probe 16 fr-Output (mL): 250 mL    Imaging Studies:  US DUP LOWER EXTREMITIES BILATERAL VENOUS   Final Result   Within the visualized vessels there is no evidence for deep venous   thrombosis               XR CHEST PORTABLE   Final Result   1. Lines okay.    2. Slightly worsened diffuse edema versus pneumonia. XR CHEST PORTABLE   Final Result   1. Lines okay   2. Improved aeration, mild improvement and diffuse pneumonia/edema. XR CHEST ABDOMEN NG PLACEMENT   Final Result   NG tube position satisfactory. XR CHEST PORTABLE   Final Result   Stable bilateral pneumonia or interstitial pulmonary edema. XR CHEST PORTABLE   Final Result   Increasing bilateral infiltrates. XR CHEST PORTABLE   Final Result   1. There is no interval change in the multifocal bilateral pneumonia. CT HEAD WO CONTRAST   Final Result   No acute intracranial abnormality. Cortical atrophy and periventricular white matter ischemic changes. CT CERVICAL SPINE WO CONTRAST   Final Result   No acute abnormality of the cervical spine. Moderate degenerative changes with disc space narrowing and marginal bony   spur formation C5 through C7. Ground-glass opacities in the visualized lung apices. Please refer to chest   CT for additional information. CTA CHEST W CONTRAST   Final Result   No evidence of pulmonary embolism. Extensive multifocal ground-glass opacities predominantly in the peripheral   lung zones bilaterally, highly concerning for atypical or COVID related   pneumonia. XR CHEST PORTABLE   Final Result   Bilateral patchy airspace opacities worrisome for pneumonia.          XR CHEST PORTABLE    (Results Pending)   XR CHEST PORTABLE    (Results Pending)   XR CHEST PORTABLE    (Results Pending)        Resident's Assessment and Plan     Assessment and Plan:    Cardiovascular  # Hx of HLD  - Takes atorvastatin 10mg qhs at home  Plan  - Continue home atorvastatin    # Shock, septic  - Unknown source, possibly superimposed bacterial PNA  - Off pressors overnight, but back on levo of 10 this morning    Pulmonary  # Acute Hypoxic Respiratory failure 2/2 COVID-19 PNA with likely superimposed bacterial pneumonia  Most recent ABG:   Recent Labs 11/21/21  0454   PH 7.300*   PCO2 49.3*   PO2 137.3*   HCO3 23.7   - Vent settings: AC/PC, RR 20, PIP 30, PEEP 15, FiO2 70%  - CXR 11/20/21 showed slightly worsened diffuse edema vs PNA  - Urine strep and legionella negative  - p/f 1.72  Plan  - Daily ABG  - Continue brovana, pulmicort, duoneb PRN  - MRSA nares pending  - Continue zosyn and vancomycin  - Hold nimbex  - Obtain beta D glucan, fungitell    Gastrointestinal  # Transaminitis  - See trend below  Recent Labs     11/19/21  0856 11/20/21  0540 11/21/21  0420   * 85* 101*   ALT 92* 68* 112*   Plan  - CT of abdo with and without contrast     # Hypoalbuminemia  - Albumin 2.6     Infectious Disease  # COVID-19 PNA  - Positive test on 11/16/21  - S/p toci  - CRP 4.8  - D-dimer >5250  - Ferritin 830  - LDH 1,095  Plan  - CRP, d-dimer, procal every other day  - Continue vitamin C, vitamin D, zinc  - Day 4 remdesivir    Genitourinary/Renal  # UTI, resolved  - UA showed positive nitrite, many bacteria, and clx showed <10,000 cfu's of GNRs  - S/p doxycyline and rocephin in ED, 3 days IV cetriaxone, 1 dose vanc, 1 dose cefepime  - Procal 0.07    # Rhabdomyolysis  - CK initially 3913 on admission  - >>488  - Creatinine remains at baseline of 0.5  Plan  - Continue to trend total CK    Heme/onc  # Leukocytosis  Recent Labs     11/19/21  0856 11/20/21  0540 11/21/21  0420   WBC 8.2 13.8* 11.6*   Plan  - Continue vanc, zosyn  - Pan clx    # Thrombocytopenia  - HIT score 3, HEP score 6  - Platelet 837>>414 in past 5 days  Plan  - Hold lovenox  - HIT antibody panel  - Start argatroban  - HIT scan  - follow fibrinogen    Psychiatric  # Hx of schizoaffective disorder  - Takes hydroxyzine 100mg qd, trazodone 200mg BID, quetiapine 200mg qd, sertraline 200mg qd, ativan 0.5mg qd at home  Plan  - Continue hydroxyzine 100mg qd, trazodone 200mg BID, quetiapine 200mg qd, sertraline 200mg qd          # Peptic ulcer prophylaxis: protonix 40mg qd  # DVT Prophylaxis: lovenox

## 2021-11-21 NOTE — PROGRESS NOTES
[facility] Inpatient   Resident Progress Note    S:  Hospital day: 5   Brief Synopsis: Haim Franco is a 60 y.o. female with pmh of GERD, osteoarthritis and schizoaffective disorder who presented to ER s/p fall at home. Patient found down at home, with initial O2 saturation of 60%. Brought to the ER; found to have COVID-19 pneumonia , requiring bipap for appropriate saturation. Rhabdomyolysis, UTI. Started on appropriate management including tocilizumab, ceftriaxone 1 g daily, and IV fluids for rhabdomyolysis. Decadron was started daily, and with patients worsening status - pulmonology consulted. Wants to remain full code at this time. Transferred to ICU on 11/18 for rapid breathing and hypoxia and pO2 of 55. Intubated 11/20 secondary to O2 sats consistently <80% with rapid breathing. Overnight: BEATRICE  Seen in the ICU this AM. Intubated, sedated, proned, paralyzed. Precedex drip on hold. Continued on fentanyl drip and levophed.      Cont meds:    fentaNYL 5 mcg/ml in 0.9%  ml infusion 75 mcg/hr (11/21/21 0130)    cisatracurium (NIMBEX) infusion 2 mcg/kg/min (11/21/21 0646)    norepinephrine Stopped (11/20/21 4447)    dexmedetomidine (PRECEDEX) IV infusion Stopped (11/20/21 7025)    sodium chloride      dextrose       Scheduled meds:    piperacillin-tazobactam  3,375 mg IntraVENous Q8H    sodium chloride   IntraVENous Q8H    vancomycin  1,000 mg IntraVENous Q12H    pantoprazole  40 mg Oral QAM AC    enoxaparin  40 mg SubCUTAneous BID    remdesivir IVPB  100 mg IntraVENous Q24H    budesonide  1 mg Nebulization BID    Arformoterol Tartrate  15 mcg Nebulization BID    [Held by provider] atorvastatin  10 mg Oral Daily    QUEtiapine  200 mg Oral Daily    sertraline  200 mg Oral Daily    traZODone  100 mg Oral Nightly    sodium chloride flush  5-40 mL IntraVENous 2 times per day    ascorbic acid  1,000 mg Oral Daily    vitamin D  2,000 Units Oral Daily    zinc sulfate 50 mg Oral Daily    dexamethasone  6 mg IntraVENous Daily     PRN meds: ipratropium-albuterol, sodium chloride, sodium chloride flush, sodium chloride, ondansetron **OR** ondansetron, polyethylene glycol, acetaminophen **OR** acetaminophen, glucose, dextrose, glucagon (rDNA), dextrose     I reviewed the patient's past medical and surgical history, Medications and Allergies. O:  /68   Pulse 80   Temp 97.9 °F (36.6 °C) (Axillary)   Resp 20   Ht 5' 1\" (1.549 m)   Wt 157 lb 4.8 oz (71.4 kg)   SpO2 100%   BMI 29.72 kg/m²   24 hour I&O: I/O last 3 completed shifts: In: 3175.4 [I.V.:2064.4; NG/GT:60; IV Piggyback:1051]  Out: 704 [MJQRA:513]  No intake/output data recorded. Physical Exam  Constitutional:       General: She is not in acute distress. Comments: Intubated, sedated, paralyzed, and proned. Cardiovascular:      Rate and Rhythm: Normal rate and regular rhythm. Pulses: Normal pulses. Heart sounds: Normal heart sounds. Pulmonary:      Comments: Saturating well with intubation, sedation, proned and paralyzed. Coarse lung sounds heard throughout lung fields  Abdominal:      General: Bowel sounds are normal. There is no distension. Palpations: Abdomen is soft. Tenderness: There is no abdominal tenderness. Musculoskeletal:         General: Normal range of motion. Right lower leg: No edema. Left lower leg: No edema. Neurological:      Mental Status: She is alert and oriented to person, place, and time. Labs:  Na/K/Cl/CO2:  143/3.6/112/23 (11/21 9183)  BUN/Cr/glu/ALT/AST/amyl/lip:  27/0.9/--/112/101/--/-- (11/21 4072)  WBC/Hgb/Hct/Plts:  11.6/12.3/38.1/123 (11/21 0420)  estimated creatinine clearance is 57 mL/min (based on SCr of 0.9 mg/dL).   Other pertinent labs as noted below    Radiology:  US DUP LOWER EXTREMITIES BILATERAL VENOUS   Final Result   Within the visualized vessels there is no evidence for deep venous   thrombosis               XR CHEST PORTABLE   Final Result   1. Lines okay. 2. Slightly worsened diffuse edema versus pneumonia. XR CHEST PORTABLE   Final Result   1. Lines okay   2. Improved aeration, mild improvement and diffuse pneumonia/edema. XR CHEST ABDOMEN NG PLACEMENT   Final Result   NG tube position satisfactory. XR CHEST PORTABLE   Final Result   Stable bilateral pneumonia or interstitial pulmonary edema. XR CHEST PORTABLE   Final Result   Increasing bilateral infiltrates. XR CHEST PORTABLE   Final Result   1. There is no interval change in the multifocal bilateral pneumonia. CT HEAD WO CONTRAST   Final Result   No acute intracranial abnormality. Cortical atrophy and periventricular white matter ischemic changes. CT CERVICAL SPINE WO CONTRAST   Final Result   No acute abnormality of the cervical spine. Moderate degenerative changes with disc space narrowing and marginal bony   spur formation C5 through C7. Ground-glass opacities in the visualized lung apices. Please refer to chest   CT for additional information. CTA CHEST W CONTRAST   Final Result   No evidence of pulmonary embolism. Extensive multifocal ground-glass opacities predominantly in the peripheral   lung zones bilaterally, highly concerning for atypical or COVID related   pneumonia. XR CHEST PORTABLE   Final Result   Bilateral patchy airspace opacities worrisome for pneumonia. XR CHEST PORTABLE    (Results Pending)   XR CHEST PORTABLE    (Results Pending)   XR CHEST PORTABLE    (Results Pending)       A/P:  Active Problems:    Acute respiratory failure with hypoxia (HCC)    Rhabdomyolysis    Acute cystitis with hematuria  Resolved Problems:    * No resolved hospital problems. *    1.  Acute Hypoxic Respiratory Failure 2/2 Covid 19  Unvaccinated for Covid 19  Patient found with pulse ox of 60% --> improved on bipap in ER  CXR showing bilateral pulmonary infiltrates  Inflammatory markers: DDimer 530, , CRP 22.9, Ferritin 749 on admission   CTA pulm showing extensive bilateral pulmonary infiltrates consistent with COVID-19 pneumonia , no PE  Start Vitamin C, Vitamin D, Zinc  Given one dose decadron 10 mg in the ER  Continue 6 mg IV decadron daily  Lovenox 40 mg BID  Pharmacy consult to dose ; tociluzumab completed, started on remdesivir    Droplet precautions  Monitor on Telemetry   Pulmonology consulted ; appreciate recs  Dietary consulted for further recommendations on nutrition status ; appreciate recs. 11/18 - transferred to ICU due to worsening resp status. 11/20: Intubated 2/2 hypoxia and increased work of breathing, proned and paralyzed  Continue Brovana, Pulmicort , Duoneb PRN  Trend Inflammatory marked ; D-Dimer > 5000 today ; LE Doppler to r/o DVT  CXR daily   Advanced Care Planning with Spiritual Services ; recs appreciated - FULL CODE. Dietary consult 2/2 to poor oral intake ; Higinio Score.      2. Rhabdomyolysis  2/2 to fall for unknown time period  Patient found down for unknown period of time  Initial CK > 3000  Received 4 L NS in ER  Monitor CK ; trending down   Monitor off fluids now    3. Concern for Sepsis  2/2 possible  infection? Given one dose cefepime and vancomycin in ICU  Will follow cultures prior to further antibiotic therapy  Procal 0.07     4. Hx Schizoaffective Disorder / Anxiety  Continue seroquel , zoloft, trazodone  Hold ativan, vistaril  Precedex started in ICU ; wean as appropriate    5. Hypokalemia    Initial K 3.2 , Mag 2.6   CMP daily  Replete as necessary     6.  Concern for UTI - resolved   Likely simple cystitis ; patient with no CVA tenderness , presented initially with fever 102 F  Urinalysis with increased nitrites, bacteria, blood  Urine culture, blood cultures were negative  Given one dose doxy and rocephin in the ER  Continue 1g ceftriaxone IV x 3 days -  dc'd 11/18/2021     GI/DVT ppx: protonix, heparin 40 mg BID  Diet: NPO  Disposition: MICU    Electronically signed by Yumiko Lux MD  PGY-2 on 11/21/2021 at 7:04 AM  This case was discussed with attending physician: Dr. Ty Foster

## 2021-11-22 NOTE — PROGRESS NOTES
Physician Progress Note      PATIENT:               Jamison Charlton  CSN #:                  [id number]  :                       1955  ADMIT DATE:       2021 6:41 PM  [address] [place] DATE:  RESPONDING  PROVIDER #:        Kourtney Cyr MD          QUERY TEXT:    Pt admitted with COVID-19 and noted to have Sepsis documented on . If   possible, please document in progress notes and discharge summary if you are   evaluating and/or treating: The medical record reflects the following:  Risk Factors: Covid-19, PNA  Clinical Indicators: Covid-19, febrile, lethargic, LA 2.7, CRP 22.9, WBC 8.5,   tachypneic, hypoxic  Treatment: ICU managed care, labs, cultures, CTA, CXR, IV ATX, Intubations,   Bipap, IV fluids, IV Remdesivir and Tocilizumab    Thank you,  Ross Collazo RN, BSN  Options provided:  -- Sepsis present on admission due to COVID-19 infection  -- Sepsis not present on admission due to COVID-19 infection  -- Sepsis present on admission due to COVID-19 pneumonia  -- Sepsis not present on admission due to COVID-19 pneumonia  -- Covid-19 infection without sepsis  -- Covid-19 pneumonia without sepsis  -- Other - I will add my own diagnosis  -- Disagree - Not applicable / Not valid  -- Disagree - Clinically unable to determine / Unknown  -- Refer to Clinical Documentation Reviewer    PROVIDER RESPONSE TEXT:    This patient has sepsis which was present on admission due to COVID-19   infection.     Query created by: Cornelio Triana on 2021 10:20 AM      Electronically signed by:  Kourtney Cyr MD 2021 10:28 AM

## 2021-11-22 NOTE — PROGRESS NOTES
[address] [facility] Attending    S: 60 y.o. female with a history of gerd, oa, schizoaffective disorder, and gastric fundiplication who was found down for an undetermined amount of time. Reports shortness of breath and cough for 2.5 weeks. She was found to be 60% on RA. In the ER, COVID testing was positive with significantly elevated CPKs. Patient is unvaccinated. Had desaturation to 88% wit PaO2 of 55 with RRT and subsequent transfer to the MICU. Desat to 40's when Bipap removed. Now intubated. Sedated, paralyzed, and prone. Today, intubated, sedated, proned; remains febrile, hypotensive, on Levophed drip. O: VS- Blood pressure 120/72, pulse 97, temperature 101.5 °F (38.6 °C), temperature source Esophageal, resp. rate 20, height 5' 1\" (1.549 m), weight 164 lb 8 oz (74.6 kg), SpO2 96 %. Exam is as noted by resident   Currently prone and nonresponsive. Lungs: coarse, vent   CV:  Mildly tachy but regular on monitor   Ext-no C/C/E      Impressions: Active Problems:    Acute respiratory failure with hypoxia (HCC)    Covid pneumonia    Rhabdomyolysis, CK improving    Acute cystitis with hematuria       Plan:   Patient admitted with COVID pneumonia and hypoxic respiratory failure. Decadron. Tocimizilab. Remdesivir. Vitamin C.  Vitamin D.  Zinc.  Appreciate Pulm management. Evaluate for other causes of pneumonia. Broad spectrum antibiotics, vanc and zosyn. U/s of lower extremities negative DVT 11/20. Fentanyl drip. Levophed. Full code at this time. Attending Physician Statement  I have reviewed the chart and seen the patient with the resident(s). I personally reviewed images, EKG's and similar tests, if present. I personally reviewed and performed key elements of the history and exam.  I have reviewed and confirmed student and/or resident history and exam with changes as indicated above. I agree with the assessment, plan and orders as documented by the resident. Please refer to the resident and/or student note for additional information.       Prince Fabry, DO

## 2021-11-22 NOTE — PROGRESS NOTES
[address] [facility]   Department of Internal Medicine   Internal Medicine Residency  MICU Progress Note    Patient:  Jody Pfeiffer 60 y.o. female   MRN: [id number]       Date of Service: 2021    Allergy: Ciprofloxacin  Cc follow UP COVID Pneumonia   Subjective     Patient was seen and examined this morning at bedside in no acute distress. Overnight, no acute events. This morning, patient is prone, fully sedated. Objective     TEMPERATURE:  Current - Temp: 102.2 °F (39 °C); Max - Temp  Av.3 °F (37.9 °C)  Min: 97.8 °F (36.6 °C)  Max: 102.7 °F (39.3 °C)  RESPIRATIONS RANGE: Resp  Av.8  Min: 18  Max: 20  PULSE RANGE: Pulse  Av.5  Min: 81  Max: 101  BLOOD PRESSURE RANGE:  Systolic (51KQW), SPC:534 , Min:112 , NXT:353   ; Diastolic (04UJB), MVI:82, Min:67, Max:72    PULSE OXIMETRY RANGE: SpO2  Av.1 %  Min: 89 %  Max: 99 %    I & O - 24hr:    Intake/Output Summary (Last 24 hours) at 2021 0166  Last data filed at 2021 9236  Gross per 24 hour   Intake 3025.2 ml   Output 565 ml   Net 2460.2 ml     I/O last 3 completed shifts: In: 3025.2 [I.V.:1838.9; NG/GT:835; IV Piggyback:351.3]  Out: 565 [Urine:565] No intake/output data recorded. Weight change: 7 lb 3.2 oz (3.266 kg)    Physical Exam:  General Appearance:  Unconscious, prone   HEENT:    NC/AT, mucous membranes are moist. Epistaxis present BL nares   Neck:   Supple, no jugular venous distention. Resp:     CTAB, No wheezes, No rhonchi. No use of accessory muscles.     Heart:   Unable to assess as patient is prone    Abdomen:    Unable to assess as patient is prone   Extremities:   Atraumatic, no cyanosis or edema   Pulses:  Radial and pedal pulses are intact bilaterally   Neurologic:   Unconscious on sedation       Medications     Continuous Infusions:   argatroban infusion 0.5 mcg/kg/min (21 0621)    fentaNYL 5 mcg/ml in 0.9%  ml infusion 175 mcg/hr (21 1307)    cisatracurium (NIMBEX) infusion 2 mcg/kg/min (11/21/21 1057)    norepinephrine 8 mcg/min (11/22/21 0602)    dexmedetomidine (PRECEDEX) IV infusion Stopped (11/20/21 0847)    sodium chloride      dextrose       Scheduled Meds:   potassium chloride  40 mEq IntraVENous Once    calcium gluconate IVPB  1,000 mg IntraVENous Once    polyethylene glycol  17 g Oral Daily    sennosides-docusate sodium  2 tablet Oral Daily    hydrocortisone sodium succinate PF  100 mg IntraVENous Q8H    vancomycin  1,000 mg IntraVENous Q24H    piperacillin-tazobactam  3,375 mg IntraVENous Q8H    sodium chloride   IntraVENous Q8H    pantoprazole  40 mg Oral QAM AC    [Held by provider] enoxaparin  40 mg SubCUTAneous BID    remdesivir IVPB  100 mg IntraVENous Q24H    budesonide  1 mg Nebulization BID    Arformoterol Tartrate  15 mcg Nebulization BID    [Held by provider] atorvastatin  10 mg Oral Daily    QUEtiapine  200 mg Oral Daily    sertraline  200 mg Oral Daily    traZODone  100 mg Oral Nightly    sodium chloride flush  5-40 mL IntraVENous 2 times per day    ascorbic acid  1,000 mg Oral Daily    vitamin D  2,000 Units Oral Daily    zinc sulfate  50 mg Oral Daily     PRN Meds: ipratropium-albuterol, sodium chloride, sodium chloride flush, sodium chloride, ondansetron **OR** ondansetron, acetaminophen **OR** acetaminophen, glucose, dextrose, glucagon (rDNA), dextrose  Nutrition:      Diet NPO    Labs and Imaging Studies     CBC:   Recent Labs     11/21/21  0420 11/21/21  0420 11/21/21  0823 11/21/21  1500 11/22/21  0516   WBC 11.6*  --  11.3  --  15.5*   HGB 12.3   < > 12.4 13.4 12.0   HCT 38.1   < > 37.5 40.6 36.9   MCV 97.4  --  95.7  --  93.9   *  --  108*  --  180    < > = values in this interval not displayed.        BMP:    Recent Labs     11/20/21  0540 11/21/21  0420 11/22/21  0516    143 143   K 4.0 3.6 3.4*   * 112* 109*   CO2 20* 23 20*   BUN 24* 27* 33*   CREATININE 0.6 0.9 1.5*   GLUCOSE 123* 144* 173* LIVER PROFILE:   Recent Labs     11/20/21  0540 11/21/21  0420 11/22/21  0516   AST 85* 101* 67*   ALT 68* 112* 85*   BILIDIR 0.2 0.2 0.2   BILITOT 0.8 0.4 0.3   ALKPHOS 91 97 94       PT/INR:   Recent Labs     11/21/21  0823   PROTIME 18.7*   INR 1.7       APTT:   Recent Labs     11/21/21  1221 11/21/21  1500 11/22/21  0516   APTT 47.6* 50.6* 51.9*       Fasting Lipid Panel:    Lab Results   Component Value Date    CHOL 322 10/12/2021    TRIG 150 10/12/2021    HDL 70 10/12/2021       Cardiac Enzymes:    Lab Results   Component Value Date    CKTOTAL 488 (H) 11/20/2021    CKTOTAL 585 (H) 11/19/2021    CKTOTAL 701 (H) 11/19/2021       Notable Cultures:      Blood cultures   Blood Culture, Routine   Date Value Ref Range Status   11/19/2021 24 Hours no growth  Preliminary     Respiratory cultures No results found for: RESPCULTURE No results found for: LABGRAM  Urine   Urine Culture, Routine   Date Value Ref Range Status   11/19/2021   Preliminary    Growth not present, incubation continues  24 Hours growth not present; incubation continues       Legionella No results found for: LABLEGI  C Diff PCR No results found for: CDIFPCR  Wound culture/abscess: No results for input(s): WNDABS in the last 72 hours. Tip culture:No results for input(s): CXCATHTIP in the last 72 hours.       Oxygen:     Vent Information  $Ventilation: $Subsequent Day  Skin Assessment: Clean, dry, & intact  Equipment ID: [id number]  Vent Type: [phone]  Vent Mode: AC/PC  Vt Ordered: 0 mL  Pressure Ordered: 14  Rate Set: 20 bmp  Peak Flow: 0 L/min  Pressure Support: 0 cmH20  FiO2 : 60 %  SpO2: 91 %  SpO2/FiO2 ratio: 151.67  Sensitivity: 3  PEEP/CPAP: 15  I Time/ I Time %: 0.7 s  Humidification Source: Heated wire  Humidification Temp: 37  Humidification Temp Measured: 37  Circuit Condensation: Drained  Mask Type: Full face mask  Mask Size: Small  Additional Respiratory  Assessments  Pulse: 93  Resp: 20  SpO2: 91 %  Position: Prone  Humidification Source: Heated wire  Humidification Temp: 37  Circuit Condensation: Drained  Oral Care Completed?: Yes  Oral Care: Mouth suctioned  Subglottic Suction Done?: Yes  Airway Type: ET  Airway Size: 8       [REMOVED] Urethral Catheter Temperature probe-Output (mL): 10 mL  Urethral Catheter-Output (mL): 20 mL  [REMOVED] Urethral Catheter Temperature probe 16 fr-Output (mL): 250 mL    Imaging Studies:  XR CHEST PORTABLE   Final Result   1. Multifocal bilateral pneumonia more prominent within the right upper lobe   2. Minimal partial interval clearing of the pneumonia within the left lung   3. Worsening of the pneumonia within the right upper lobe. US DUP LOWER EXTREMITIES BILATERAL VENOUS   Final Result   Within the visualized vessels there is no evidence for deep venous   thrombosis               XR CHEST PORTABLE   Final Result   1. Lines okay. 2. Slightly worsened diffuse edema versus pneumonia. XR CHEST PORTABLE   Final Result   1. Lines okay   2. Improved aeration, mild improvement and diffuse pneumonia/edema. XR CHEST ABDOMEN NG PLACEMENT   Final Result   NG tube position satisfactory. XR CHEST PORTABLE   Final Result   Stable bilateral pneumonia or interstitial pulmonary edema. XR CHEST PORTABLE   Final Result   Increasing bilateral infiltrates. XR CHEST PORTABLE   Final Result   1. There is no interval change in the multifocal bilateral pneumonia. CT HEAD WO CONTRAST   Final Result   No acute intracranial abnormality. Cortical atrophy and periventricular white matter ischemic changes. CT CERVICAL SPINE WO CONTRAST   Final Result   No acute abnormality of the cervical spine. Moderate degenerative changes with disc space narrowing and marginal bony   spur formation C5 through C7. Ground-glass opacities in the visualized lung apices. Please refer to chest   CT for additional information.          CTA CHEST W CONTRAST   Final Result   No evidence of pulmonary embolism. Extensive multifocal ground-glass opacities predominantly in the peripheral   lung zones bilaterally, highly concerning for atypical or COVID related   pneumonia. XR CHEST PORTABLE   Final Result   Bilateral patchy airspace opacities worrisome for pneumonia.          XR CHEST PORTABLE    (Results Pending)   CT ABDOMEN PELVIS W WO CONTRAST Additional Contrast? None    (Results Pending)   XR CHEST PORTABLE    (Results Pending)        Resident's Assessment and Plan     Assessment and Plan:    Cardiovascular  # Hx of HLD  - Takes atorvastatin 10mg qhs at home  Plan  - Continue home atorvastatin    # Shock, septic  - Unknown source, possibly superimposed bacterial PNA    Pulmonary  # Acute Hypoxic Respiratory failure 2/2 COVID-19 PNA with likely superimposed bacterial pneumonia  Most recent ABG:   Recent Labs     11/22/21  0535   PH 7.354   PCO2 40.2   PO2 81.1   HCO3 21.9*   - Vent settings: AC/PC, RR 20, PIP 30, PEEP 15, FiO2 60%  - CXR 11/21/21 showed multifocal BL pneumonia more prominent within RUL, minimal partial interval clearing of PNA within left lung, worsening of the PNA within RUL   - Urine strep and legionella negative  - p/f 1.35  - MRSA nares negative  Plan  - Daily ABG  - Continue brovana, pulmicort, duoneb PRN  - Continue zosyn and vancomycin  - Obtain beta D glucan, fungitell pending  - bumex 1mg    Gastrointestinal  # Transaminitis  - See trend below  Recent Labs     11/20/21  0540 11/21/21  0420 11/22/21  0516   AST 85* 101* 67*   ALT 68* 112* 85*   Plan  - CT of abdo with and without contrast not yet performed    # Hypoalbuminemia  - Albumin 2.4    Infectious Disease  # COVID-19 PNA  - Positive test on 11/16/21  - S/p toci  - CRP 2.9  - D-dimer >5250  - Ferritin 758  -   Plan  - CRP, d-dimer, procal every other day  - Continue vitamin C, vitamin D, zinc  - Day 4 remdesivir    # Febrile  - Tmax 102.2F, Taverage 100.3F     Genitourinary/Renal  # UTI, resolved    # Rhabdomyolysis  - CK initially 6813 on admission  - >>488  - Creatinine newly increased to 1.5  Plan  - Continue to trend total CK    # Hypokalemia  - See trend below  Recent Labs     11/20/21  0540 11/21/21  0420 11/22/21  0516   K 4.0 3.6 3.4*     # CHARAN stage III  - Baseline cr 0.5, current cr see trend below  Recent Labs     11/20/21  0540 11/21/21  0420 11/22/21  0516   CREATININE 0.6 0.9 1.5*   Plan  - Obtain urine electrolytes, urine creatinine  - Calcuate FENa, FEUrea    Gastrointestinal  # Constipation  - Last BM 11/19/21  Plan  - Increased bowel regimen     Heme/onc  # Leukocytosis  Recent Labs     11/21/21  0420 11/21/21  0823 11/22/21  0516   WBC 11.6* 11.3 15.5*   Plan  - Continue vanc, zosyn  - Pan clx    # Thrombocytopenia  - HIT score 3, HEP score 6  - Platelet 256>>343 in past 5 days  - Fibrinogen 374  Plan  - Hold lovenox  - HIT antibody panel  - Continue argatroban  - Peripheral blood smear pending    Psychiatric  # Hx of schizoaffective disorder  - Takes hydroxyzine 100mg qd, trazodone 200mg BID, quetiapine 200mg qd, sertraline 200mg qd, ativan 0.5mg qd at home  Plan  - Continue hydroxyzine 100mg qd, trazodone 200mg BID, quetiapine 200mg qd, sertraline 200mg qd          # Peptic ulcer prophylaxis: protonix 40mg qd  # DVT Prophylaxis: PCDs  # Disposition: Cont current care    Yareli Herron MD, PGY-1     Attending Physician: Dr. Rita Mckeon     I personally saw, examined and provided care for the patient. Radiographs, labs and medication list were reviewed by me independently. I spoke with bedside nursing, therapists and consultants. Critical care services and times documented are independent of procedures and multidisciplinary rounds with Residents. Additionally comprehensive, multidisciplinary rounds were conducted with the MICU team. The case was discussed in detail and plans for care were established.  Review of Residents documentation was conducted and revisions were made as appropriate. I agree with the above documented exam, problem list and plan of care. ARDS/COVID   Change to VC mode as with PCV ,her -500  ABG at 2 pm some acidosis ,will increase rate 28 and ABG around 9 pm   Keep Plt less than 30 and TV less than 340   PF ration seems improving   On Argotrapn ,pending HIT   plt improved  prognosis guarded   On IV abx  pending culture  Will address again in am abx wise    Care reviewed with nursing staff, medical and surgical specialty care, primary care and the patient's family as available. Chart review/lab review/X-ray viewing/documentation and had long Conversation with patient/family re: prognosis, care options and any end of life issues:      Critical care time spent reviewing labs/films, examining patient, collaborating with other physicians more than 35  Minutes  excluding procedures . Leon Gutierrez M.D.   11/22/2021  7:51 PM        Care reviewed with nursing staff, medical and surgical specialty care, primary care and the patient's family as available. Chart review/lab review/X-ray viewing/documentation and had long Conversation with patient/family re: prognosis, care options and any end of life issues:      Critical care time spent reviewing labs/films, examining patient, collaborating with other physicians more than 35  Minutes  excluding procedures . Leon Gutierrez M.D.   11/22/2021  7:48 PM

## 2021-11-22 NOTE — PLAN OF CARE
Spoke to patient's brother for general update on her diagnoses and our treatment thus far. Informed him prognosis is poor, but we will continue to wait and see how things change this week.     Electronically signed by Dahiana Bridges MD on 11/22/2021 at 6:05 PM

## 2021-11-22 NOTE — PROGRESS NOTES
Pharmacy Consultation Note  (Antibiotic Dosing and Monitoring)    Initial consult date: 11/20/21  Consulting physician/provider: Sammi Barry MD  Drug: Vancomycin  Indication: sepsis    Age/  Gender Height Weight IBW  Allergy Information   65 y.o./female 5' 1\" (154.9 cm) 152 lb (68.9 kg)     Ideal body weight: 47.8 kg (105 lb 6.1 oz)  Adjusted ideal body weight: 58.5 kg (129 lb 0.5 oz)   Ciprofloxacin      Renal Function:  Recent Labs     11/20/21  0540 11/21/21  0420 11/22/21  0516   BUN 24* 27* 33*   CREATININE 0.6 0.9 1.5*       Intake/Output Summary (Last 24 hours) at 11/22/2021 1238  Last data filed at 11/22/2021 1100  Gross per 24 hour   Intake 3025.2 ml   Output 855 ml   Net 2170.2 ml       Vancomycin Monitoring:  Trough:  No results for input(s): VANCOTROUGH in the last 72 hours. Random:    Recent Labs     11/21/21  0420   VANCORANDOM 22.3       Vancomycin Administration Times:  Recent vancomycin administrations                   vancomycin 1000 mg IVPB in 250 mL D5W addavial (mg) 1,000 mg New Bag 11/22/21 0846    vancomycin 1000 mg IVPB in 250 mL D5W addavial (mg) 1,000 mg New Bag 11/21/21 0834     1,000 mg New Bag 11/20/21 2113    vancomycin 1000 mg IVPB in 250 mL D5W addavial (mg) 1,000 mg New Bag 11/20/21 1210                      Assessment:  · Patient is a 60 y.o. female who has been initiated on vancomycin  Estimated Creatinine Clearance: 35 mL/min (A) (based on SCr of 1.5 mg/dL (H)).   · To dose vancomycin, pharmacy will be utilizing [employer] calculation software for goal AUC/WANG 400-600 mg/L-hr   · 11/20: WBC elevated at 13.8  · 11/21: Random level 22.3 mg/L (~6 hours post-vanco infusion); WBC WNL; afebrile  · 11/22:  Scr increased from 0.9 to 1.5 mg/dL today; WBC elevated at 15.5    Plan:  · Will adjust vancomycin to 750 mg IV every 24 hours starting on 11/23  · Will check vancomycin levels when appropriate  · Will continue to monitor renal function   · Clinical pharmacy to follow      [facility] Nasim, PharmD  Pharmacy Resident  P: [id number]   11/22/2021 12:38 PM

## 2021-11-22 NOTE — PROGRESS NOTES
[facility] - Family Medicine Inpatient   Resident Progress Note    S:  Hospital day: 6   Brief Synopsis: Sara Jain is a 60 y.o. female with pmh of GERD, osteoarthritis and schizoaffective disorder who presented to ER s/p fall at home. Patient found down at home, with initial O2 saturation of 60%. Brought to the ER; found to have COVID-19 pneumonia , requiring bipap for appropriate saturation. Rhabdomyolysis, UTI. Started on appropriate management including tocilizumab, ceftriaxone 1 g daily, and IV fluids for rhabdomyolysis. Decadron was started daily, and with patients worsening status - pulmonology consulted. FULL CODE. Transferred to ICU on 11/18 for rapid breathing and hypoxia and pO2 of 55. Intubated 11/20 secondary to O2 sats consistently <80% with rapid breathing. Seen in the ICU this AM. Intubated, sedated, proned, paralyzed. Continued on levophed, nimbex at this time.      Cont meds:    argatroban infusion 0.5 mcg/kg/min (11/22/21 0826)    fentaNYL 5 mcg/ml in 0.9%  ml infusion 175 mcg/hr (11/22/21 0827)    cisatracurium (NIMBEX) infusion 2 mcg/kg/min (11/22/21 0826)    norepinephrine 6 mcg/min (11/22/21 1030)    sodium chloride      dextrose       Scheduled meds:    potassium chloride  40 mEq IntraVENous Once    polyethylene glycol  17 g Oral Daily    sennosides-docusate sodium  2 tablet Oral Daily    [START ON 11/23/2021] vancomycin  750 mg IntraVENous Q24H    chlorhexidine  15 mL Mouth/Throat BID    artificial tears   Both Eyes Q6H    hydrocortisone sodium succinate PF  100 mg IntraVENous Q8H    piperacillin-tazobactam  3,375 mg IntraVENous Q8H    sodium chloride   IntraVENous Q8H    pantoprazole  40 mg Oral QAM AC    [Held by provider] enoxaparin  40 mg SubCUTAneous BID    remdesivir IVPB  100 mg IntraVENous Q24H    budesonide  1 mg Nebulization BID    Arformoterol Tartrate  15 mcg Nebulization BID    [Held by provider] atorvastatin  10 mg Oral Daily    QUEtiapine 200 mg Oral Daily    sertraline  200 mg Oral Daily    traZODone  100 mg Oral Nightly    sodium chloride flush  5-40 mL IntraVENous 2 times per day    ascorbic acid  1,000 mg Oral Daily    vitamin D  2,000 Units Oral Daily    zinc sulfate  50 mg Oral Daily     PRN meds: ipratropium-albuterol, sodium chloride flush, sodium chloride, ondansetron **OR** ondansetron, acetaminophen **OR** acetaminophen, glucose, dextrose, glucagon (rDNA), dextrose     I reviewed the patient's past medical and surgical history, Medications and Allergies. O:  /72   Pulse 97   Temp 101.5 °F (38.6 °C) (Esophageal)   Resp 20   Ht 5' 1\" (1.549 m)   Wt 164 lb 8 oz (74.6 kg)   SpO2 96%   BMI 31.08 kg/m²   24 hour I&O: I/O last 3 completed shifts: In: 3025.2 [I.V.:1838.9; NG/GT:835; IV Piggyback:351.3]  Out: 600 [Urine:600]  I/O this shift:  In: -   Out: 330 [Urine:330]      Physical Exam  Constitutional:       General: She is not in acute distress. Comments: Intubated, sedated, paralyzed, and proned. Cardiovascular:      Rate and Rhythm: Normal rate and regular rhythm. Pulses: Normal pulses. Heart sounds: Normal heart sounds. Pulmonary:      Comments: Saturating well with intubation, sedation, proned and paralyzed. Coarse lung sounds heard throughout lung fields  Abdominal:      General: Bowel sounds are normal. There is no distension. Palpations: Abdomen is soft. Tenderness: There is no abdominal tenderness. Musculoskeletal:         General: Normal range of motion. Right lower leg: No edema. Left lower leg: No edema. Neurological:      Mental Status: She is alert and oriented to person, place, and time. Labs:  Na/K/Cl/CO2:  143/3.4/109/20 (11/22 3506)  BUN/Cr/glu/ALT/AST/amyl/lip:  33/1.5/--/85/67/--/-- (11/22 6776)  WBC/Hgb/Hct/Plts:  15.5/12.0/36.9/180 (11/22 8597)  estimated creatinine clearance is 35 mL/min (A) (based on SCr of 1.5 mg/dL (H)).   Other pertinent labs as noted below    Radiology:  XR CHEST PORTABLE   Final Result   1. Worsening of the multifocal bilateral pneumonia when compared with the   prior study of 1 day earlier. XR CHEST PORTABLE   Final Result   1. Multifocal bilateral pneumonia more prominent within the right upper lobe   2. Minimal partial interval clearing of the pneumonia within the left lung   3. Worsening of the pneumonia within the right upper lobe. US DUP LOWER EXTREMITIES BILATERAL VENOUS   Final Result   Within the visualized vessels there is no evidence for deep venous   thrombosis               XR CHEST PORTABLE   Final Result   1. Lines okay. 2. Slightly worsened diffuse edema versus pneumonia. XR CHEST PORTABLE   Final Result   1. Lines okay   2. Improved aeration, mild improvement and diffuse pneumonia/edema. XR CHEST ABDOMEN NG PLACEMENT   Final Result   NG tube position satisfactory. XR CHEST PORTABLE   Final Result   Stable bilateral pneumonia or interstitial pulmonary edema. XR CHEST PORTABLE   Final Result   Increasing bilateral infiltrates. XR CHEST PORTABLE   Final Result   1. There is no interval change in the multifocal bilateral pneumonia. CT HEAD WO CONTRAST   Final Result   No acute intracranial abnormality. Cortical atrophy and periventricular white matter ischemic changes. CT CERVICAL SPINE WO CONTRAST   Final Result   No acute abnormality of the cervical spine. Moderate degenerative changes with disc space narrowing and marginal bony   spur formation C5 through C7. Ground-glass opacities in the visualized lung apices. Please refer to chest   CT for additional information. CTA CHEST W CONTRAST   Final Result   No evidence of pulmonary embolism. Extensive multifocal ground-glass opacities predominantly in the peripheral   lung zones bilaterally, highly concerning for atypical or COVID related   pneumonia.          XR CHEST PORTABLE   Final Result   Bilateral patchy airspace opacities worrisome for pneumonia. CT ABDOMEN PELVIS W WO CONTRAST Additional Contrast? None    (Results Pending)   XR CHEST PORTABLE    (Results Pending)       A/P:  Active Problems:    Acute respiratory failure with hypoxia (HCC)    Rhabdomyolysis    Acute cystitis with hematuria  Resolved Problems:    * No resolved hospital problems. *    1. Acute Hypoxic Respiratory Failure 2/2 Covid 19  Unvaccinated for Covid 19  Patient found with pulse ox of 60% --> improved on bipap in ER  CXR showing bilateral pulmonary infiltrates  Inflammatory markers: DDimer 530, , CRP 22.9, Ferritin 749 on admission   CTA pulm showing extensive bilateral pulmonary infiltrates consistent with COVID-19 pneumonia , no PE  Start Vitamin C, Vitamin D, Zinc  Given one dose decadron 10 mg in the ER  Continue 6 mg IV decadron daily  Lovenox 40 mg BID  Pharmacy consult to dose ; tociluzumab completed, started on remdesivir    Droplet precautions  Monitor on Telemetry   Pulmonology consulted ; appreciate recs  Dietary consulted for further recommendations on nutrition status ; appreciate recs. 11/18 - transferred to ICU due to worsening resp status. 11/20: Intubated 2/2 hypoxia and increased work of breathing, proned and paralyzed  Continue Brovana, Pulmicort , Duoneb PRN  Trend Inflammatory marked ; D-Dimer > 5000 today ; LE Doppler to r/o DVT  CXR daily    Advanced Care Planning with Spiritual Services ; recs appreciated - FULL CODE.       2. Rhabdomyolysis  2/2 to fall for unknown time period  Patient found down for unknown period of time  Initial CK > 3000  Received 4 L NS in ER    3. Concern for Sepsis  2/2 possible  infection? Given one dose cefepime and vancomycin in ICU  Procal 0.07, repeat 0.27 , blood cultures, urine culture negative. On Pip/Tazo, Vanocomycin    4.  Hx Schizoaffective Disorder / Anxiety  Continue seroquel , zoloft, trazodone  Hold ativan, vistaril  Precedex started in ICU ; wean as appropriate    5. Hypokalemia    Initial K 3.2 , Mag 2.6   CMP daily  Replete as necessary     6.  Concern for UTI - resolved   Likely simple cystitis ; patient with no CVA tenderness , presented initially with fever 102 F  Urinalysis with increased nitrites, bacteria, blood  Urine culture, blood cultures were negative  Given one dose doxy and rocephin in the ER  Continue 1g ceftriaxone IV x 3 days -  dc'd 11/18/2021     GI/DVT ppx: protonix, argatroban   Diet: NPO  Disposition: MICU    Electronically signed by Lisy Garcia MD  PGY-2 on 11/22/2021 at 11:31 AM  This case was discussed with attending physician: Dr. Arielle Kim

## 2021-11-22 NOTE — CARE COORDINATION
11/22 Care Coordination: Pt was a RRT 11/18, COVID, intubated admit tpo MICU. Pt remains on vent. fio2 60%, peep 12, AC 24. IV sedation. proned; remains febrile, hypotensive, on Levophed drip. CM/SW will continue to follow for discharge planning.    Cindy FOSTERN,RN-CV-BC  [phone]

## 2021-11-22 NOTE — PLAN OF CARE
Problem: Falls - Risk of:  Goal: Will remain free from falls  Description: Will remain free from falls  Outcome: Met This Shift  Goal: Absence of physical injury  Description: Absence of physical injury  Outcome: Met This Shift     Problem: Gas Exchange - Impaired  Goal: Absence of hypoxia  Outcome: Met This Shift     Problem: Risk for Fluid Volume Deficit  Goal: Maintain normal heart rhythm  Outcome: Met This Shift     Problem: Skin Integrity:  Goal: Absence of new skin breakdown  Description: Absence of new skin breakdown  Outcome: Met This Shift

## 2021-11-23 NOTE — PLAN OF CARE
Problem: Falls - Risk of:  Goal: Will remain free from falls  Description: Will remain free from falls  11/23/2021 0809 by Rochelle Collazo RN  Outcome: Met This Shift     Problem: Falls - Risk of:  Goal: Absence of physical injury  Description: Absence of physical injury  11/23/2021 0809 by Rochelle Collazo RN  Outcome: Met This Shift     Problem: Airway Clearance - Ineffective  Goal: Achieve or maintain patent airway  11/23/2021 0809 by Rochelle Collazo RN  Outcome: Met This Shift     Problem: Gas Exchange - Impaired  Goal: Absence of hypoxia  11/23/2021 0809 by Rochelle Collazo RN  Outcome: Met This Shift     Problem: Isolation Precautions - Risk of Spread of Infection  Goal: Prevent transmission of infection  11/23/2021 0809 by Rochelle Collazo RN  Outcome: Met This Shift     Problem: Skin Integrity:  Goal: Absence of new skin breakdown  Description: Absence of new skin breakdown  11/23/2021 0809 by Rochelle Collazo RN  Outcome: Met This Shift   Plan of care discussed with patient / family.

## 2021-11-23 NOTE — PROGRESS NOTES
[facility] Inpatient   Resident Progress Note    S:  Hospital day: 7   Brief Synopsis: Samia Harper is a 60 y.o. female with pmh of GERD, osteoarthritis and schizoaffective disorder who presented to ER s/p fall at home. Patient found down at home, with initial O2 saturation of 60%. Brought to the ER; found to have COVID-19 pneumonia , requiring bipap for appropriate saturation. Rhabdomyolysis, UTI. Started on appropriate management including tocilizumab, ceftriaxone 1 g daily, and IV fluids for rhabdomyolysis. Decadron was started daily, and with patients worsening status - pulmonology consulted. FULL CODE. Transferred to ICU on 11/18 for rapid breathing and hypoxia and pO2 of 55. Intubated 11/20 secondary to O2 sats consistently <80% with rapid breathing. Seen in the ICU this AM. Intubated, sedated, proned, paralyzed. Continued on levophed, nimbex at this time.    FiO2 50, PEEP 12    Cont meds:    argatroban infusion 0.5 mcg/kg/min (11/23/21 0635)    fentaNYL 5 mcg/ml in 0.9%  ml infusion 200 mcg/hr (11/23/21 0961)    cisatracurium (NIMBEX) infusion 3 mcg/kg/min (11/23/21 0259)    norepinephrine 2 mcg/min (11/22/21 2300)    sodium chloride 100 mL/hr at 11/1956    dextrose       Scheduled meds:    polyethylene glycol  17 g Oral Daily    sennosides-docusate sodium  2 tablet Oral Daily    vancomycin  750 mg IntraVENous Q24H    chlorhexidine  15 mL Mouth/Throat BID    artificial tears   Both Eyes Q6H    hydrocortisone sodium succinate PF  100 mg IntraVENous Q8H    piperacillin-tazobactam  3,375 mg IntraVENous Q8H    sodium chloride   IntraVENous Q8H    pantoprazole  40 mg Oral QAM AC    [Held by provider] enoxaparin  40 mg SubCUTAneous BID    budesonide  1 mg Nebulization BID    Arformoterol Tartrate  15 mcg Nebulization BID    [Held by provider] atorvastatin  10 mg Oral Daily    QUEtiapine  200 mg Oral Daily    sertraline  200 mg Oral Daily    traZODone  100 mg Oral Nightly    sodium chloride flush  5-40 mL IntraVENous 2 times per day    ascorbic acid  1,000 mg Oral Daily    vitamin D  2,000 Units Oral Daily    zinc sulfate  50 mg Oral Daily     PRN meds: ipratropium-albuterol, sodium chloride flush, sodium chloride, ondansetron **OR** ondansetron, acetaminophen **OR** acetaminophen, glucose, dextrose, glucagon (rDNA), dextrose     I reviewed the patient's past medical and surgical history, Medications and Allergies. O:  /72   Pulse 76   Temp 99.5 °F (37.5 °C) (Esophageal)   Resp (!) 34   Ht 5' 1\" (1.549 m)   Wt 164 lb 8 oz (74.6 kg)   SpO2 96%   BMI 31.08 kg/m²   24 hour I&O: I/O last 3 completed shifts: In: 3890.2 [I.V.:2703.9; NG/GT:835; IV Piggyback:351.3]  Out: 1120 [Urine:1120]  I/O this shift:  In: 380 [I.V.:380]  Out: 165 [Urine:165]      Physical Exam  Constitutional:       General: She is not in acute distress. Comments: Intubated, sedated, paralyzed, and proned. Cardiovascular:      Rate and Rhythm: Normal rate and regular rhythm. Pulses: Normal pulses. Heart sounds: Normal heart sounds. Pulmonary:      Comments: Saturating well with intubation, sedation, proned and paralyzed. Coarse lung sounds heard throughout lung fields  Abdominal:      General: Bowel sounds are normal. There is no distension. Palpations: Abdomen is soft. Tenderness: There is no abdominal tenderness. Musculoskeletal:         General: Normal range of motion. Right lower leg: No edema. Left lower leg: No edema. Labs:  Na/K/Cl/CO2:  140/3.7/109/22 (11/23 8008)  BUN/Cr/glu/ALT/AST/amyl/lip:  38/1.6/--/66/42/--/-- (11/23 0417)  WBC/Hgb/Hct/Plts:  16.8/10.2/30.5/171 (11/23 0417)  estimated creatinine clearance is 33 mL/min (A) (based on SCr of 1.6 mg/dL (H)). Other pertinent labs as noted below    Radiology:  XR CHEST PORTABLE   Final Result   1.  Worsening of the multifocal bilateral pneumonia when compared with the   prior study of 1 day earlier. XR CHEST PORTABLE   Final Result   1. Multifocal bilateral pneumonia more prominent within the right upper lobe   2. Minimal partial interval clearing of the pneumonia within the left lung   3. Worsening of the pneumonia within the right upper lobe. US DUP LOWER EXTREMITIES BILATERAL VENOUS   Final Result   Within the visualized vessels there is no evidence for deep venous   thrombosis               XR CHEST PORTABLE   Final Result   1. Lines okay. 2. Slightly worsened diffuse edema versus pneumonia. XR CHEST PORTABLE   Final Result   1. Lines okay   2. Improved aeration, mild improvement and diffuse pneumonia/edema. XR CHEST ABDOMEN NG PLACEMENT   Final Result   NG tube position satisfactory. XR CHEST PORTABLE   Final Result   Stable bilateral pneumonia or interstitial pulmonary edema. XR CHEST PORTABLE   Final Result   Increasing bilateral infiltrates. XR CHEST PORTABLE   Final Result   1. There is no interval change in the multifocal bilateral pneumonia. CT HEAD WO CONTRAST   Final Result   No acute intracranial abnormality. Cortical atrophy and periventricular white matter ischemic changes. CT CERVICAL SPINE WO CONTRAST   Final Result   No acute abnormality of the cervical spine. Moderate degenerative changes with disc space narrowing and marginal bony   spur formation C5 through C7. Ground-glass opacities in the visualized lung apices. Please refer to chest   CT for additional information. CTA CHEST W CONTRAST   Final Result   No evidence of pulmonary embolism. Extensive multifocal ground-glass opacities predominantly in the peripheral   lung zones bilaterally, highly concerning for atypical or COVID related   pneumonia. XR CHEST PORTABLE   Final Result   Bilateral patchy airspace opacities worrisome for pneumonia.          CT ABDOMEN PELVIS W WO CONTRAST Additional Contrast? None    (Results Pending)   XR CHEST PORTABLE    (Results Pending)   XR CHEST PORTABLE    (Results Pending)       A/P:  Active Problems:    Acute respiratory failure with hypoxia (HCC)    Rhabdomyolysis    Acute cystitis with hematuria  Resolved Problems:    * No resolved hospital problems. *    1. Intubated, Sedated, Paralyzed, Proned  11/20: Intubated 2/2 hypoxia and increased work of breathing, proned and paralyzed    2. Acute Hypoxic Respiratory Failure 2/2 Covid 19  Unvaccinated for Covid 19  Patient found with pulse ox of 60% --> improved on bipap in ER  CXR showing bilateral pulmonary infiltrates  Inflammatory markers: DDimer 530, , CRP 22.9, Ferritin 749 on admission   CTA pulm showing extensive bilateral pulmonary infiltrates consistent with COVID-19 pneumonia , no PE  Continue Vitamin C, Vitamin D, Zinc  Given one dose decadron 10 mg in the ER  Continue 6 mg IV decadron daily  Lovenox 40 mg BID  Tociluzumab completed, continue remdesivir    Droplet precautions  Monitor on Telemetry   Pulmonology consulted ; appreciate recs  Dietary consulted for further recommendations on nutrition status ; appreciate recs. 11/18 - transferred to ICU due to worsening resp status. Continue Brovana, Pulmicort , Duoneb PRN  Trend Inflammatory marked ; D-Dimer > 5000 today ; LE Doppler to r/o DVT  CXR daily    Advanced Care Planning with Spiritual Services ; recs appreciated - FULL CODE.     3. Rhabdomyolysis - improving  2/2 to fall for unknown time period  Patient found down for unknown period of time  Initial CK > 3000  Received 4 L NS in ER  Continue to trend CK    4. Concern for Sepsis  Likely superimposed bacterial pneumonia  Tmax 102.2 , Tavg 100.3F  Given one dose cefepime and vancomycin in ICU  Procal 0.07, repeat 0.27 , blood cultures, urine culture negative. Fungitell and B glucan pending  On Pip/Tazo, Vanocomycin    5.  CHARAN Stage III  Admission creatinine 0.6  Trending up --> 1.5  Obtain urine electrolytes, creatinine , urea  Calculate FeNa/ FeUra    6. Thrombocytopenia  Likely 2/2 to SALENA  Hold lovenox  Continue with argatroban  Follow up SALENA panel  F/u PBS    7. Hypokalemia  Initial K 3.2 , Mag 2.6   CMP daily  Replete as necessary    8. Hx Schizoaffective Disorder / Anxiety  Continue seroquel , zoloft, trazodone  Hold ativan, vistaril  Precedex started in ICU ; wean as appropriate    9.  Concern for UTI - resolved   Likely simple cystitis ; patient with no CVA tenderness , presented initially with fever 102 F  Urinalysis with increased nitrites, bacteria, blood  Urine culture, blood cultures were negative  Given one dose doxy and rocephin in the ER  Continue 1g ceftriaxone IV x 3 days -  dc'd 11/18/2021     GI/DVT ppx: protonix, argatroban   Diet: NPO , consider tube feeds  Disposition: MICU    Electronically signed by Fide Newton MD  PGY-[id number] on 11/23/2021 at 6:41 AM  This case was discussed with attending physician: Dr. Marisol Whitfield

## 2021-11-23 NOTE — PROGRESS NOTES
Pharmacy Consultation Note  (Antibiotic Dosing and Monitoring)    Initial consult date: 11/20/21  Consulting physician/provider: Billy Cox MD  Drug: Vancomycin  Indication: sepsis    Age/  Gender Height Weight IBW  Allergy Information   65 y.o./female 5' 1\" (154.9 cm) 152 lb (68.9 kg)     Ideal body weight: 47.8 kg (105 lb 6.1 oz)  Adjusted ideal body weight: 58.5 kg (129 lb 0.5 oz)   Ciprofloxacin      Renal Function:  Recent Labs     11/22/21  0516 11/22/21  1746 11/23/21  0417   BUN 33* 36* 38*   CREATININE 1.5* 1.6* 1.6*       Intake/Output Summary (Last 24 hours) at 11/23/2021 1703  Last data filed at 11/23/2021 1600  Gross per 24 hour   Intake 2038 ml   Output 755 ml   Net 1283 ml       Vancomycin Monitoring:  Trough:  No results for input(s): VANCOTROUGH in the last 72 hours. Random:    Recent Labs     11/21/21  0420   VANCORANDOM 22.3       Vancomycin Administration Times:  Recent vancomycin administrations                   vancomycin (VANCOCIN) 750 mg in dextrose 5 % 250 mL IVPB (mg) 750 mg New Bag 11/23/21 0839    vancomycin 1000 mg IVPB in 250 mL D5W addavial (mg) 1,000 mg New Bag 11/22/21 0846    vancomycin 1000 mg IVPB in 250 mL D5W addavial (mg) 1,000 mg New Bag 11/21/21 0834     1,000 mg New Bag 11/20/21 2113                         Assessment:  · Patient is a 60 y.o. female who has been initiated on vancomycin  Estimated Creatinine Clearance: 33 mL/min (A) (based on SCr of 1.6 mg/dL (H)).   · To dose vancomycin, pharmacy will be utilizing evolso calculation software for goal AUC/WANG 400-600 mg/L-hr   · 11/20: WBC elevated at 13.8  · 11/21: Random level 22.3 mg/L (~6 hours post-vanco infusion); WBC WNL; afebrile  · 11/22:  Scr increased from 0.9 to 1.5 mg/dL today; WBC elevated at 15.5  · 11/23: WBC remain elevated but decreasing    Plan:  · Will continue vancomycin 750 mg IV every 24 hours  · Will check vancomycin levels when appropriate  · Will continue to monitor renal function · Clinical pharmacy to follow      Franco Birmingham, PharmD  Pharmacy Resident  P: [phone]   11/23/2021 5:03 PM

## 2021-11-23 NOTE — PROGRESS NOTES
Comprehensive Nutrition Assessment    Type and Reason for Visit:  Reassess    Nutrition Recommendations/Plan: Continue NPO, Plans to start trickle feeds     Rec Peptide based (Vital AF 1.2) trickle feeds @ 20 ml/hr while prone. Provides: 480 ml tv, 576 kcals, 36 gm pro, 389 ml free water    Goal Rec when feasible 50 ml/hr  Provides: 1200 ml tv ,1440 kcals, 90 gm pro, 973 ml free water    *Renal formula not appropriate w/ CHARAN as K/Phos WNL. Nutrition Assessment:  Pt status declining now intubated/ proned  2/2 +COVID-19 PNA. Pt found down PTA w/ Rhabdo/CHARAN. Plans to initiated EN support- will provide updated TF rec for optimal tolerance. Malnutrition Assessment:  Malnutrition Status:   At risk for malnutrition     Context:  Acute Illness     Findings of the 6 clinical characteristics of malnutrition:  Energy Intake:   50% or less of estimated energy requirements for 5 or more days  Weight Loss:  Unable to assess (no hx on file)     Body Fat Loss:  Unable to assess (d/t covid iso)     Muscle Mass Loss:  Unable to assess    Fluid Accumulation:   (d/t covid iso)     Strength:  Normal  strength    Estimated Daily Nutrient Needs:  Energy (kcal):  PS3B 1548; 2263-4119; Weight Used for Energy Requirements:  Admission     Protein (g):  75-90; Weight Used for Protein Requirements:   (1.5-1.8 g/kg)        Fluid (ml/day):  per critical care    Nutrition Related Findings:  Pt intubated/sedated, paralyzed/prone, hypotension on pressor x 1, +I/O's 7L, facial edema, active BS, OGT clamped      Wounds:  None       Current Nutrition Therapies:    Current Tube Feeding (TF) Orders:  · Feeding Route: Orogastric (clamped, TF not started yet)  · Formula: Renal Formula  · Schedule: Continuous (20 ml/hr)  · Goal TF & Flush Orders Provides: 480 ml tv, 864 kcals, 39 gm pro, 348 ml free water    Anthropometric Measures:  · Height: 5' 1\" (154.9 cm)  · Current Body Weight: 151 lb (68.5 kg) (11/17 admit wt as CBW elevated) · Admission Body Weight: 151 lb (68.5 kg) (11/17 first measured)    · Usual Body Weight:  (UTO no actual EMR hx on file)     · Ideal Body Weight: 105 lbs; % Ideal Body Weight 140 %   · BMI: 28.5 BMI Categories: Overweight (BMI 25.0-29. 9)       Nutrition Diagnosis:   · Inadequate oral intake related to impaired respiratory function as evidenced by NPO or clear liquid status due to medical condition, intubation, nutrition support - enteral nutrition    Nutrition Interventions:   Nutrition Education/Counseling:  Education not appropriate   Coordination of Nutrition Care:  Continue to monitor while inpatient    Goals:  New Goal - Pt to tolerate TF at goal rate       Nutrition Monitoring and Evaluation:   Food/Nutrient Intake Outcomes:  Enteral Nutrition Intake/Tolerance  Physical Signs/Symptoms Outcomes:  Biochemical Data, Nutrition Focused Physical Findings, Weight, GI Status, Skin, Fluid Status or Edema, Hemodynamic Status     Discharge Planning:     Too soon to determine     Electronically signed by Nestor Albarran RD, LD on 11/23/21 at 2:35 PM EST    Contact: Ext [phone]

## 2021-11-23 NOTE — CONSULTS
Nephrology Consult Note  Patient's Name: Tavo Silva  12:22 PM  11/23/2021        Reason for Consult:  Acute kidney injury  Requesting Physician:  Yanelis Ortiz DO    Chief Complaint:  SOB  History Obtained From:  EHR, treatment Team    History of Present Ilness:    Tavo Silva is a 60 y.o. female with a history of GI GERD, osteoarthritis, and schizoaffective disorder. She initially presented to this hospital 5 days ago following a fall at home. She was found by her roommate following an unspecified duration. EMS was called. Upon their evaluation patient was noted to be hypoxic. She was subsequently brought to ED for evaluation. In the ED she was noted to be hypoxic with an oxygen saturation of 60% on room air. Also noted to to have a low-grade fever. Laboratory data was significant for WBC of 8.5, hemoglobin of 14.6, BUN 17 and a creatinine of 0.6. Rapid COVID-19 test was positive. Patient was admitted to telemetry. 2 days into her hospital course became increasingly more hypoxic and was transferred to MICU. Because of her persistent hypoxia patient was intubated with mechanical ventilation on 11/20;  . Over the past 48 hours she has been markedly hypotensive requiring fluid resuscitation and pressors. Creatinine has worsened to a current value of 1.6 associated with low urine output. Hence renal consult. Past Medical History:   Diagnosis Date    GERD (gastroesophageal reflux disease)     Osteoarthritis of right knee     Schizoaffective disorder (HCC)     [facility]care       Past Surgical History:   Procedure Laterality Date    GASTRIC FUNDOPLICATION         Family History   Problem Relation Age of Onset    Premenopausal breast cancer Mother 46        invasive    Postmenopausal breast cancer Maternal Grandmother 67    Postmenopausal breast cancer Maternal Aunt 70        reports that she has never smoked. She has never used smokeless tobacco. She reports previous alcohol use.  She reports that she does not use drugs.     Allergies:  Ciprofloxacin    Current Medications:    sterile water injection,   warfarin (COUMADIN) daily dosing (placeholder), RX Placeholder  bumetanide (BUMEX) injection 1 mg, Once  polyethylene glycol (GLYCOLAX) packet 17 g, Daily  sennosides-docusate sodium (SENOKOT-S) 8.6-50 MG tablet 2 tablet, Daily  vancomycin (VANCOCIN) 750 mg in dextrose 5 % 250 mL IVPB, Q24H  chlorhexidine (PERIDEX) 0.12 % solution 15 mL, BID  lubrifresh P.M. (artificial tears) ophthalmic ointment, Q6H  argatroban infusion 50 mg in 0.9% sodium chloride 50 mL (premix), Continuous  hydrocortisone sodium succinate PF (SOLU-CORTEF) injection 100 mg, Q8H  fentaNYL 5 mcg/ml in 0.9%  ml infusion, Continuous  piperacillin-tazobactam (ZOSYN) 3,375 mg in dextrose 5 % 100 mL IVPB extended infusion (mini-bag), Q8H  piperacillin/tazobactam NS flush, Q8H  cisatracurium besylate (NIMBEX) 200 mg in sodium chloride 0.9 % 100 mL infusion, Continuous  pantoprazole (PROTONIX) tablet 40 mg, QAM AC  norepinephrine (LEVOPHED) 16 mg in dextrose 5% 250 mL infusion, Continuous  ipratropium-albuterol (DUONEB) nebulizer solution 1 ampule, Q4H PRN  budesonide (PULMICORT) nebulizer suspension 1,000 mcg, BID  Arformoterol Tartrate (BROVANA) nebulizer solution 15 mcg, BID  [Held by provider] atorvastatin (LIPITOR) tablet 10 mg, Daily  QUEtiapine (SEROQUEL) tablet 200 mg, Daily  sertraline (ZOLOFT) tablet 200 mg, Daily  traZODone (DESYREL) tablet 100 mg, Nightly  sodium chloride flush 0.9 % injection 5-40 mL, 2 times per day  sodium chloride flush 0.9 % injection 5-40 mL, PRN  0.9 % sodium chloride infusion, PRN  ondansetron (ZOFRAN-ODT) disintegrating tablet 4 mg, Q8H PRN   Or  ondansetron (ZOFRAN) injection 4 mg, Q6H PRN  acetaminophen (TYLENOL) tablet 650 mg, Q6H PRN   Or  acetaminophen (TYLENOL) suppository 650 mg, Q6H PRN  ascorbic acid (VITAMIN C) tablet 1,000 mg, Daily  Vitamin D (CHOLECALCIFEROL) tablet 2,000 Units, Daily  zinc sulfate (ZINCATE) capsule 50 mg, Daily  glucose (GLUTOSE) 40 % oral gel 15 g, PRN  dextrose 50 % IV solution, PRN  glucagon (rDNA) injection 1 mg, PRN  dextrose 5 % solution, PRN        Review of Systems:   Review of systems not obtained due to patient factors. Physical exam:  Vitals:    11/23/21 1200   BP:    Pulse: 83   Resp: (!) 34   Temp: 99.5 °F (37.5 °C)   SpO2: 93%          General: intubated,  Sedated/paralyzed prone   Eyes: PERRL. No sclera icterus. No conjunctival injection. ENT: No discharge. Pharynx clear. Neck: Trachea midline. Normal thyroid. Lungs: coarse breath sounds   CV: Regular rate. Regular rhythm. No murmur or rub. .   Abd: Non-tender. Non-distended. No masses. No organmegaly. Normal bowel sounds. Skin: Warm and dry. No nodule on exposed extremities. No rash on exposed extremities.   Ext: No cyanosis, clubbing, 2+ pitting bilateral arms/legs edema  Neuro: sedated/paralyzed      Data:   Labs:  Lab Results   Component Value Date     11/23/2021     11/22/2021     11/22/2021    K 3.7 11/23/2021    K 4.0 11/22/2021    K 3.4 (L) 11/22/2021     (H) 11/23/2021    CO2 22 11/23/2021    CO2 24 11/22/2021    CO2 20 (L) 11/22/2021    CREATININE 1.6 (H) 11/23/2021    CREATININE 1.6 (H) 11/22/2021    CREATININE 1.5 (H) 11/22/2021    BUN 38 (H) 11/23/2021    BUN 36 (H) 11/22/2021    BUN 33 (H) 11/22/2021    GLUCOSE 158 (H) 11/23/2021    GLUCOSE 192 (H) 11/22/2021    GLUCOSE 173 (H) 11/22/2021    PHOS 3.5 11/23/2021    PHOS 3.6 11/22/2021    PHOS 4.0 11/21/2021    WBC 16.8 (H) 11/23/2021    WBC 17.7 (H) 11/22/2021    WBC 15.5 (H) 11/22/2021    HGB 10.2 (L) 11/23/2021    HGB 11.7 11/22/2021    HGB 12.0 11/22/2021    HCT 30.5 (L) 11/23/2021    HCT 36.0 11/22/2021    HCT 36.9 11/22/2021    MCV 92.1 11/23/2021     11/23/2021         Imaging:  CT HEAD WO CONTRAST    Result Date: 11/16/2021  EXAMINATION: CT OF THE HEAD WITHOUT CONTRAST  11/16/2021 9:54 pm TECHNIQUE: CT of the head was performed without the administration of intravenous contrast. Dose modulation, iterative reconstruction, and/or weight based adjustment of the mA/kV was utilized to reduce the radiation dose to as low as reasonably achievable. COMPARISON: None. HISTORY: ORDERING SYSTEM PROVIDED HISTORY: fall TECHNOLOGIST PROVIDED HISTORY: Reason for exam:->fall Has a \"code stroke\" or \"stroke alert\" been called? ->No Decision Support Exception - unselect if not a suspected or confirmed emergency medical condition->Emergency Medical Condition ([place]) What reading provider will be dictating this exam?->CRC FINDINGS: BRAIN/VENTRICLES: There are cortical atrophy and periventricular white matter ischemic changes. There is no acute intracranial hemorrhage, mass effect or midline shift. No abnormal extra-axial fluid collection. The gray-white differentiation is maintained without evidence of an acute infarct. There is no evidence of hydrocephalus. ORBITS: The visualized portion of the orbits demonstrate no acute abnormality. SINUSES: The visualized paranasal sinuses and mastoid air cells demonstrate no acute abnormality. SOFT TISSUES/SKULL:  No acute abnormality of the visualized skull or soft tissues. No acute intracranial abnormality. Cortical atrophy and periventricular white matter ischemic changes. CT CERVICAL SPINE WO CONTRAST    Result Date: 11/16/2021  EXAMINATION: CT OF THE CERVICAL SPINE WITHOUT CONTRAST 11/16/2021 9:54 pm TECHNIQUE: CT of the cervical spine was performed without the administration of intravenous contrast. Multiplanar reformatted images are provided for review. Dose modulation, iterative reconstruction, and/or weight based adjustment of the mA/kV was utilized to reduce the radiation dose to as low as reasonably achievable. COMPARISON: None.  HISTORY: ORDERING SYSTEM PROVIDED HISTORY: fall TECHNOLOGIST PROVIDED HISTORY: Reason for exam:->fall Decision Support Exception - unselect if not a suspected or confirmed emergency medical condition->Emergency Medical Condition (MA) What reading provider will be dictating this exam?->CRC FINDINGS: BONES/ALIGNMENT: There is no acute fracture or traumatic malalignment. DEGENERATIVE CHANGES: There are moderate multilevel degenerative changes C5 through C7. Multilevel bilateral facet arthrosis noted. SOFT TISSUES: There is no prevertebral soft tissue swelling. Lung apices: Patchy ground glass opacities. No acute abnormality of the cervical spine. Moderate degenerative changes with disc space narrowing and marginal bony spur formation C5 through C7. Ground-glass opacities in the visualized lung apices. Please refer to chest CT for additional information. XR CHEST PORTABLE    Result Date: 11/16/2021  EXAMINATION: ONE XRAY VIEW OF THE CHEST 11/16/2021 7:10 pm COMPARISON: 09/23/2021 HISTORY: ORDERING SYSTEM PROVIDED HISTORY: weakness TECHNOLOGIST PROVIDED HISTORY: Reason for exam:->weakness What reading provider will be dictating this exam?->CRC FINDINGS: Bilateral patchy airspace opacities. There is no effusion or pneumothorax. The cardiomediastinal silhouette is without acute process. The osseous structures are without acute process. Bilateral patchy airspace opacities worrisome for pneumonia. CTA CHEST W CONTRAST    Result Date: 11/16/2021  EXAMINATION: CTA OF THE CHEST 11/16/2021 9:54 pm TECHNIQUE: CTA of the chest was performed after the administration of intravenous contrast.  Multiplanar reformatted images are provided for review. MIP images are provided for review. Dose modulation, iterative reconstruction, and/or weight based adjustment of the mA/kV was utilized to reduce the radiation dose to as low as reasonably achievable. COMPARISON: None.  HISTORY: ORDERING SYSTEM PROVIDED HISTORY: r/o pe TECHNOLOGIST PROVIDED HISTORY: Reason for exam:->r/o pe Decision Support Exception - unselect if not a suspected or confirmed emergency medical condition->Emergency Medical Condition ([place]) What reading provider will be dictating this exam?->CRC FINDINGS: Pulmonary Arteries: Pulmonary arteries are adequately opacified for evaluation. No evidence of intraluminal filling defect to suggest pulmonary embolism. Main pulmonary artery is normal in caliber. Mediastinum: No evidence of mediastinal lymphadenopathy. The heart and pericardium demonstrate no acute abnormality. There is no acute abnormality of the thoracic aorta. Lungs/pleura: The lungs demonstrate extensive multifocal ground-glass opacities predominantly in the peripheral lung zones. No evidence of pleural effusion or pneumothorax. Upper Abdomen: Limited images of the upper abdomen are unremarkable. Soft Tissues/Bones: No acute bone or soft tissue abnormality. No evidence of pulmonary embolism. Extensive multifocal ground-glass opacities predominantly in the peripheral lung zones bilaterally, highly concerning for atypical or COVID related pneumonia. Assessment/Plans    1. Acute kidney injury stage I most likely ischemic ATN occurring in the setting of hypotension. Patient is nonoliguric although over the past 24 hours urine output has been decreasing.  - urine indices although initially prerenal, suspect there is progression to ATN  -Adjust meds for level of kidney function  -Continue to monitor labs and urine output  -Trial of one-time dose of Bumex to see if that improves urine output    2. Acute hypoxic respiratory failure due to COVID-19 pneumonia  -Intubation with mechanical ventilation      3 COVID-19 pneumonia  -Received Toci; and dexamethasone  -Currently on Solu-Cortef    4. Septic shock  -suspected possible superimposed bacterial pneumonia  -Still requiring pressors  -on Zosyn    5.   Volume overload  -Trial dose of Bumex to improve urine flow     D/w MICU Resident    Will follow   Thanks                   Priyank Becerra MD  12:22 PM  11/23/2021

## 2021-11-23 NOTE — PROGRESS NOTES
[address] [facility]   Department of Internal Medicine   Internal Medicine Residency  MICU Progress Note    Patient:  Siobhan Vargas 60 y.o. female   MRN: [id number]       Date of Service: 2021    Allergy: Ciprofloxacin  Cc follow UP COVID Pneumonia   Subjective     Patient was seen and examined this morning at bedside in no acute distress. Overnight, platelets improved to 170. This morning, Patient prone and sedated. Objective     TEMPERATURE:  Current - Temp: 99.7 °F (37.6 °C); Max - Temp  Av.7 °F (37.6 °C)  Min: 99.3 °F (37.4 °C)  Max: 100 °F (37.8 °C)  RESPIRATIONS RANGE: Resp  Av.3  Min: 20  Max: 34  PULSE RANGE: Pulse  Av.1  Min: 76  Max: 101  BLOOD PRESSURE RANGE:  No data recorded.  ; No data recorded. PULSE OXIMETRY RANGE: SpO2  Av.3 %  Min: 94 %  Max: 99 %    I & O - 24hr:    Intake/Output Summary (Last 24 hours) at 2021 0820  Last data filed at 2021 0800  Gross per 24 hour   Intake 2130 ml   Output 1055 ml   Net 1075 ml     I/O last 3 completed shifts: In: 2130 [I.V.:0; NG/GT:60]  Out: 1055 [Urine:1055] I/O this shift:  In: -   Out: 30 [Urine:30]   Weight change:     Physical Exam:  General Appearance:  Unconscious, prone   HEENT:    NC/AT, mucous membranes are moist. Epistaxis present BL nares   Neck:   Supple, no jugular venous distention. Resp:     CTAB, No wheezes, No rhonchi. No use of accessory muscles.     Heart:   Unable to assess as patient is prone    Abdomen:    Unable to assess as patient is prone   Extremities:   Atraumatic, no cyanosis or edema   Pulses:  Radial and pedal pulses are intact bilaterally   Neurologic:   Unconscious on sedation       Medications     Continuous Infusions:   argatroban infusion 0.5 mcg/kg/min (21 0567)    fentaNYL 5 mcg/ml in 0.9%  ml infusion 200 mcg/hr (21 4231)    cisatracurium (NIMBEX) infusion 3 mcg/kg/min (21 2478)    norepinephrine 2 mcg/min (21 7113)    sodium chloride 100 mL/hr at 11/1955    dextrose       Scheduled Meds:   polyethylene glycol  17 g Oral Daily    sennosides-docusate sodium  2 tablet Oral Daily    vancomycin  750 mg IntraVENous Q24H    chlorhexidine  15 mL Mouth/Throat BID    artificial tears   Both Eyes Q6H    hydrocortisone sodium succinate PF  100 mg IntraVENous Q8H    piperacillin-tazobactam  3,375 mg IntraVENous Q8H    sodium chloride   IntraVENous Q8H    pantoprazole  40 mg Oral QAM AC    [Held by provider] enoxaparin  40 mg SubCUTAneous BID    budesonide  1 mg Nebulization BID    Arformoterol Tartrate  15 mcg Nebulization BID    [Held by provider] atorvastatin  10 mg Oral Daily    QUEtiapine  200 mg Oral Daily    sertraline  200 mg Oral Daily    traZODone  100 mg Oral Nightly    sodium chloride flush  5-40 mL IntraVENous 2 times per day    ascorbic acid  1,000 mg Oral Daily    vitamin D  2,000 Units Oral Daily    zinc sulfate  50 mg Oral Daily     PRN Meds: ipratropium-albuterol, sodium chloride flush, sodium chloride, ondansetron **OR** ondansetron, acetaminophen **OR** acetaminophen, glucose, dextrose, glucagon (rDNA), dextrose  Nutrition:      Diet NPO    Labs and Imaging Studies     CBC:   Recent Labs     11/22/21  0516 11/22/21  1746 11/23/21 0417   WBC 15.5* 17.7* 16.8*   HGB 12.0 11.7 10.2*   HCT 36.9 36.0 30.5*   MCV 93.9 94.7 92.1    170 171       BMP:    Recent Labs     11/22/21  0516 11/22/21  1746 11/23/21 0417    146 140   K 3.4* 4.0 3.7   * 113* 109*   CO2 20* 24 22   BUN 33* 36* 38*   CREATININE 1.5* 1.6* 1.6*   GLUCOSE 173* 192* 158*       LIVER PROFILE:   Recent Labs     11/21/21  0420 11/22/21  0516 11/23/21  0417   * 67* 42*   * 85* 66*   BILIDIR 0.2 0.2 <0.2   BILITOT 0.4 0.3 0.3   ALKPHOS 97 94 74       PT/INR:   Recent Labs     11/21/21  0823   PROTIME 18.7*   INR 1.7       APTT:   Recent Labs     11/21/21  1500 11/22/21  0516 11/23/21  0417   APTT 50.6* 51.9* 53.0*       Fasting Lipid Panel:    Lab Results   Component Value Date    CHOL 322 10/12/2021    TRIG 150 10/12/2021    HDL 70 10/12/2021       Cardiac Enzymes:    Lab Results   Component Value Date    CKTOTAL 135 11/22/2021    CKTOTAL 488 (H) 11/20/2021    CKTOTAL 585 (H) 11/19/2021       Notable Cultures:      Blood cultures   Blood Culture, Routine   Date Value Ref Range Status   11/19/2021 24 Hours no growth  Preliminary     Respiratory cultures No results found for: RESPCULTURE No results found for: LABGRAM  Urine   Urine Culture, Routine   Date Value Ref Range Status   11/19/2021 Growth not present  Final     Legionella No results found for: LABLEGI  C Diff PCR No results found for: CDIFPCR  Wound culture/abscess: No results for input(s): WNDABS in the last 72 hours. Tip culture:No results for input(s): CXCATHTIP in the last 72 hours.       Oxygen:     Vent Information  $Ventilation: $Subsequent Day  Skin Assessment: Clean, dry, & intact  Equipment ID: [id number]  Vent Type: [phone]  Vent Mode: AC/VC  Vt Ordered: 320 mL  Pressure Ordered: 14  Rate Set: 34 bmp  Peak Flow: 60 L/min  Pressure Support: 0 cmH20  FiO2 : 50 %  SpO2: 96 %  SpO2/FiO2 ratio: 192  Sensitivity: 3  PEEP/CPAP: 12  I Time/ I Time %: 0 s  Humidification Source: Heated wire  Humidification Temp: 37  Humidification Temp Measured: 37  Circuit Condensation: Drained  Mask Type: Full face mask  Mask Size: Small  Additional Respiratory  Assessments  Pulse: 76  Resp: (!) 34  SpO2: 96 %  Position: Prone  Humidification Source: Heated wire  Humidification Temp: 37  Circuit Condensation: Drained  Oral Care Completed?: Yes  Oral Care: Mouth suctioned, Suction toothette, Lip moisturizer applied  Subglottic Suction Done?: Yes  Airway Type: ET  Airway Size: 8       [REMOVED] Urethral Catheter Temperature probe-Output (mL): 10 mL  Urethral Catheter-Output (mL): 30 mL  [REMOVED] Urethral Catheter Temperature probe 16 fr-Output (mL): 250 mL    Imaging Studies:  XR CHEST PORTABLE   Final Result   1. Worsening of the multifocal bilateral pneumonia when compared with the   prior study of 1 day earlier. XR CHEST PORTABLE   Final Result   1. Multifocal bilateral pneumonia more prominent within the right upper lobe   2. Minimal partial interval clearing of the pneumonia within the left lung   3. Worsening of the pneumonia within the right upper lobe. US DUP LOWER EXTREMITIES BILATERAL VENOUS   Final Result   Within the visualized vessels there is no evidence for deep venous   thrombosis               XR CHEST PORTABLE   Final Result   1. Lines okay. 2. Slightly worsened diffuse edema versus pneumonia. XR CHEST PORTABLE   Final Result   1. Lines okay   2. Improved aeration, mild improvement and diffuse pneumonia/edema. XR CHEST ABDOMEN NG PLACEMENT   Final Result   NG tube position satisfactory. XR CHEST PORTABLE   Final Result   Stable bilateral pneumonia or interstitial pulmonary edema. XR CHEST PORTABLE   Final Result   Increasing bilateral infiltrates. XR CHEST PORTABLE   Final Result   1. There is no interval change in the multifocal bilateral pneumonia. CT HEAD WO CONTRAST   Final Result   No acute intracranial abnormality. Cortical atrophy and periventricular white matter ischemic changes. CT CERVICAL SPINE WO CONTRAST   Final Result   No acute abnormality of the cervical spine. Moderate degenerative changes with disc space narrowing and marginal bony   spur formation C5 through C7. Ground-glass opacities in the visualized lung apices. Please refer to chest   CT for additional information. CTA CHEST W CONTRAST   Final Result   No evidence of pulmonary embolism. Extensive multifocal ground-glass opacities predominantly in the peripheral   lung zones bilaterally, highly concerning for atypical or COVID related   pneumonia.          XR CHEST PORTABLE   Final Result Bilateral patchy airspace opacities worrisome for pneumonia.          CT ABDOMEN PELVIS W WO CONTRAST Additional Contrast? None    (Results Pending)   XR CHEST PORTABLE    (Results Pending)   XR CHEST PORTABLE    (Results Pending)        Resident's Assessment and Plan     Assessment and Plan:    Cardiovascular  # Hx of HLD  - Takes atorvastatin 10mg qhs at home  Plan  - Continue home atorvastatin    # Shock, septic  - Unknown source, possibly superimposed bacterial PNA    Pulmonary  # Acute Hypoxic Respiratory failure 2/2 COVID-19 PNA with likely superimposed bacterial pneumonia  Most recent ABG:   Recent Labs     11/23/21  0441   PH 7.381   PCO2 40.4   PO2 73.6*   HCO3 23.4   - Vent settings: AC/VC+, RR 34, , PIP 24, PEEP 12, FiO2 50%  - CXR 11/21/21 showed multifocal BL pneumonia more prominent within RUL, minimal partial interval clearing of PNA within left lung, worsening of the PNA within RUL   - Urine strep and legionella negative  - p/f 1.47  - MRSA nares negative  Plan  - Daily ABG  - Continue brovana, pulmicort, duoneb PRN  - Continue zosyn and vancomycin  - Obtain beta D glucan, fungitell pending   - Keep paralyzed, try to supine tomorrow   - Bronch today    Gastrointestinal  # Transaminitis  - See trend below  Recent Labs     11/21/21  0420 11/22/21  0516 11/23/21  0417   * 67* 42*   * 85* 66*   Plan  - CT of abdo with and without contrast not yet performed    # Hypoalbuminemia  - Albumin 2.4    Infectious Disease  # COVID-19 PNA  - Positive test on 11/16/21  - S/p toci  - CRP 2.9>>3.4  - D-dimer >5250>> >5250  - Ferritin 758>>483  - >>735  Plan  - CRP, d-dimer, procal every other day  - Continue vitamin C, vitamin D, zinc  - Day 4 remdesivir    # Febrile  - Tmax 102.2F, Taverage 100.3F     Genitourinary/Renal  # UTI, resolved    # Rhabdomyolysis  - CK initially 3913 on admission  - >>488>>135  Plan  - Continue to trend total CK    # Hypokalemia, resolved  - See trend below  Recent Labs     11/22/21  0516 11/22/21  1746 11/23/21  0417   K 3.4* 4.0 3.7     # CHARAN stage III, intrinsic  - Baseline cr 0.5, current cr see trend below  Recent Labs     11/22/21  0516 11/22/21  1746 11/23/21  0417   CREATININE 1.5* 1.6* 1.6*   - Urine electrolytes: cloride 26, k 39.5, sodium <20, urea nitrogen 836, creatinine 104  - Protein creatinie ratio 1.6  - FEUrea 15.2%      Gastrointestinal  # Constipation  - Last BM 11/19/21  Plan  - Continue bowel regimen     Heme/onc  # Leukocytosis  Recent Labs     11/22/21  0516 11/22/21  1746 11/23/21 0417   WBC 15.5* 17.7* 16.8*   Plan  - Continue vanc, zosyn  - Pan clx    # Thrombocytopenia  - HIT score 3, HEP score 6  - Platelet 254>>946 in past 5 days  - Fibrinogen 374  - Peripheral blood smear showed platelets are present in decreased numbers and display normal morphology  Plan  - Hold lovenox  - HIT antibody panel  - Continue argatroban  - Check INR to see if we can bridge to warfarin      Psychiatric  # Hx of schizoaffective disorder  - Takes hydroxyzine 100mg qd, trazodone 200mg BID, quetiapine 200mg qd, sertraline 200mg qd, ativan 0.5mg qd at home  Plan  - Continue hydroxyzine 100mg qd, trazodone 200mg BID, quetiapine 200mg qd, sertraline 200mg qd          # Peptic ulcer prophylaxis: protonix 40mg qd  # DVT Prophylaxis: PCDs  # Disposition: Cont current care    Regina Pires MD, PGY-1     Attending Physician: Dr. Kristan Slaughter     I personally saw, examined and provided care for the patient. Radiographs, labs and medication list were reviewed by me independently. I spoke with bedside nursing, therapists and consultants. Critical care services and times documented are independent of procedures and multidisciplinary rounds with Residents. Additionally comprehensive, multidisciplinary rounds were conducted with the MICU team. The case was discussed in detail and plans for care were established.  Review of Residents documentation was conducted and revisions were made as appropriate. I agree with the above documented exam, problem list and plan of care. AC/VC   plt     Will attempt supine in am   PF ration seems improving   On Argotrapn ,pending HIT ,can change elquis   plt improved  prognosis guarded   On IV abx  pending culture,vancomycin ,Zosyn   Care reviewed with nursing staff, medical and surgical specialty care, primary care and the patient's family as available.    +7 L ,dose of Lasix   Chart review/lab review/X-ray viewing/documentation and had long Conversation with patient/family re: prognosis, care options and any end of life issues:     Care reviewed with nursing staff, medical and surgical specialty care, primary care and the patient's family as available. Chart review/lab review/X-ray viewing/documentation and had long Conversation with patient/family re: prognosis, care options and any end of life issues:      Critical care time spent reviewing labs/films, examining patient, collaborating with other physicians more than 35  Minutes  excluding procedures . Radha Richards M.D.   11/23/2021  9:08 PM      Keyona Campos MD,[facility]  Pulmonary&Critical Care Medicine   Director of [address] [place] [address] Director of [address] [place] [address]    Nita Patten

## 2021-11-23 NOTE — PLAN OF CARE
Problem: Falls - Risk of:  Goal: Will remain free from falls  Description: Will remain free from falls  11/22/2021 2337 by Smiley Nesbitt RN  Outcome: Met This Shift  11/22/2021 1816 by Bibiana Lynn RN  Outcome: Met This Shift  Goal: Absence of physical injury  Description: Absence of physical injury  11/22/2021 2337 by Smiley Nesbitt RN  Outcome: Met This Shift  11/22/2021 1816 by Bibiana Lynn RN  Outcome: Met This Shift     Problem: Airway Clearance - Ineffective  Goal: Achieve or maintain patent airway  Outcome: Met This Shift     Problem: Gas Exchange - Impaired  Goal: Absence of hypoxia  11/22/2021 2337 by Smiley Nesbitt RN  Outcome: Met This Shift  11/22/2021 1816 by Bibiana Lynn RN  Outcome: Met This Shift  Goal: Promote optimal lung function  Outcome: Met This Shift     Problem: Breathing Pattern - Ineffective  Goal: Ability to achieve and maintain a regular respiratory rate  Outcome: Met This Shift     Problem:  Body Temperature -  Risk of, Imbalanced  Goal: Ability to maintain a body temperature within defined limits  Outcome: Met This Shift  Goal: Will regain or maintain usual level of consciousness  Outcome: Met This Shift  Goal: Complications related to the disease process, condition or treatment will be avoided or minimized  Outcome: Met This Shift     Problem: Isolation Precautions - Risk of Spread of Infection  Goal: Prevent transmission of infection  Outcome: Met This Shift     Problem: Nutrition Deficits  Goal: Optimize nutritional status  Outcome: Met This Shift     Problem: Risk for Fluid Volume Deficit  Goal: Maintain normal heart rhythm  11/22/2021 2337 by Smiley Nesbitt RN  Outcome: Met This Shift  11/22/2021 1816 by Bibiana Lynn RN  Outcome: Met This Shift  Goal: Maintain absence of muscle cramping  Outcome: Met This Shift  Goal: Maintain normal serum potassium, sodium, calcium, phosphorus, and pH  Outcome: Met This Shift     Problem: Loneliness or Risk for Loneliness  Goal: Demonstrate positive use of time alone when socialization is not possible  Outcome: Met This Shift     Problem: Fatigue  Goal: Verbalize increase energy and improved vitality  Outcome: Met This Shift     Problem: Patient Education: Go to Patient Education Activity  Goal: Patient/Family Education  Outcome: Met This Shift     Problem: Skin Integrity:  Goal: Will show no infection signs and symptoms  Description: Will show no infection signs and symptoms  Outcome: Met This Shift  Goal: Absence of new skin breakdown  Description: Absence of new skin breakdown  11/22/2021 2337 by Kiet Guerrero RN  Outcome: Met This Shift  11/22/2021 1816 by Ciarra Mustafa RN  Outcome: Met This Shift

## 2021-11-24 NOTE — CARE COORDINATION
11/24Care Coordination: Pt Remains in MICU, COVID, intubated  . Pt remains on vent. fio2 65%, peep 12, AC 34. IV sedation, IV Paralytic proned. hypotensive, on Levophed drip. CM/SW will continue to follow for discharge planning.    Jenny FOSTERN,RN-CV-BC  [phone]

## 2021-11-24 NOTE — OP NOTE
Daksha Correa 284 NOTE    DATE OF PROCEDURE:11 / 23 / 2021    INDICATIONS & HISTORY:  Mucus plug ,upper lobe ,consolidation       PREOPERATIVE DIAGNOSIS:    RUL consolidation    POSTOPERATIVE DIAGNOSES:  Same       PROCEDURE PERFORMED:   1-Diagnotic, Fiberoptic Bronchoscopy  2-Bronchial alveolar lavage from RUL                SURGEON:      MD Dr John Paul Casarez MD     ASSISTANT:   Bronchoscopy Nursing/Technical Team/OR Team    SEDATION:  propofol   Regional Anesthesia,       ANESTHESIA:    Lidocaine 2% ,       ANESTHESIOLOGIST:  Per EPIC Note    SPECIMENS:  [x] Bronchial sample(s) for      Fungal smear & culture,   Acid-fast bacillus Smear and Culture,    Gram stain, C&S,    PCP               Cytology               BD glucan              Galactomanin      Description of Procedure: The patient was taken to the endoscopy suite, [place] [facility]  and the procedure verified as Flexible Fiberoptic Bronchoscopy with BAL         After the patient was controlled with sedation bronchoscope was introduced through ET  without difficulty. The scope was then passed into the trachea. Additional 2% lidocaine was used topically within the airway. Careful inspection of the tracheal lumen was accomplished. The scope was sequentially passed into all bronchial airways.            Endobronchial findings:      Trachea:  Normal mucosa, he the part seen outside the ET tube  Liza  Normal mucosa     Right Main Stem Bronchus  Normal mucosa  Right Upper Lobe Bronchi blocked with large mucus plug that was removed   Right Middle Lobe Bronchi  Normal mucosa  Right Lower Lobe Bronchi (including the Superior segment)  Normal mucosa     Left Main Stem Bronchus Normal mucosa  Left Upper Lobe Bronchus, Upper Division Normal mucosa  Left Upper Lobe Bronchus, Lingula  Normal mucosa,left lower lobe thick secretions   Left Lower Lobe Bronchus (including the Superior segment)             BAL :RUL     COMPLICATIONS:  Estimated blood loss less than 0 CC    IMPRESSIONS:   RUL mucus plug that was removed   BAL RUL   Fragile mucosa         RECOMMENDATIONS:   The Patient was taken to the recovery area in satisfactory condition. Await final test/sample results.         Skylar Arteaga MD

## 2021-11-24 NOTE — PLAN OF CARE
Problem: Falls - Risk of:  Goal: Will remain free from falls  Description: Will remain free from falls  11/24/2021 1847 by Sarita Lundberg RN  Outcome: Met This Shift  11/24/2021 0818 by Maxwell Schaefer RN  Outcome: Met This Shift  Goal: Absence of physical injury  Description: Absence of physical injury  11/24/2021 1847 by Sarita Lundberg RN  Outcome: Met This Shift  11/24/2021 0818 by Maxwell Schaefer RN  Outcome: Met This Shift     Problem: Airway Clearance - Ineffective  Goal: Achieve or maintain patent airway  11/24/2021 0818 by Maxwell Schaefer RN  Outcome: Met This Shift     Problem: Isolation Precautions - Risk of Spread of Infection  Goal: Prevent transmission of infection  11/24/2021 0818 by Maxwell Schaefer RN  Outcome: Met This Shift     Problem: Risk for Fluid Volume Deficit  Goal: Maintain normal heart rhythm  Outcome: Met This Shift     Problem: Skin Integrity:  Goal: Will show no infection signs and symptoms  Description: Will show no infection signs and symptoms  Outcome: Met This Shift  Goal: Absence of new skin breakdown  Description: Absence of new skin breakdown  11/24/2021 1847 by Sarita Lundberg RN  Outcome: Met This Shift  11/24/2021 0818 by Maxwell Schaefer RN  Outcome: Met This Shift     Problem:  Body Temperature -  Risk of, Imbalanced  Goal: Ability to maintain a body temperature within defined limits  Outcome: Not Met This Shift  Goal: Will regain or maintain usual level of consciousness  Outcome: Not Met This Shift  Goal: Complications related to the disease process, condition or treatment will be avoided or minimized  Outcome: Not Met This Shift     Problem: Risk for Fluid Volume Deficit  Goal: Maintain absence of muscle cramping  Outcome: Not Met This Shift  Goal: Maintain normal serum potassium, sodium, calcium, phosphorus, and pH  Outcome: Not Met This Shift

## 2021-11-24 NOTE — PLAN OF CARE
Problem: Airway Clearance - Ineffective  Goal: Achieve or maintain patent airway  Outcome: Met This Shift     Problem: Nutrition Deficits  Goal: Optimize nutritional status  Outcome: Ongoing

## 2021-11-24 NOTE — PLAN OF CARE
Problem: Falls - Risk of:  Goal: Will remain free from falls  Description: Will remain free from falls  Outcome: Met This Shift     Problem: Falls - Risk of:  Goal: Absence of physical injury  Description: Absence of physical injury  Outcome: Met This Shift     Problem: Airway Clearance - Ineffective  Goal: Achieve or maintain patent airway  11/24/2021 0818 by Placido Gonzales RN  Outcome: Met This Shift     Problem: Isolation Precautions - Risk of Spread of Infection  Goal: Prevent transmission of infection  Outcome: Met This Shift     Problem: Skin Integrity:  Goal: Absence of new skin breakdown  Description: Absence of new skin breakdown  Outcome: Met This Shift   Plan of care discussed with patient / family.

## 2021-11-24 NOTE — PROGRESS NOTES
[address] [facility]   Department of Internal Medicine   Internal Medicine Residency  MICU Progress Note    Patient:  Tim Gomez 60 y.o. female   MRN: [id number]       Date of Service: 2021    Allergy: Ciprofloxacin  Cc follow UP COVID Pneumonia   Subjective     Patient was seen and examined this morning at bedside in no acute distress. Overnight, no acute events. This morning, patient is sedated and paralyzed and prone. Objective     TEMPERATURE:  Current - Temp: 99 °F (37.2 °C); Max - Temp  Av.1 °F (37.3 °C)  Min: 98.4 °F (36.9 °C)  Max: 99.7 °F (37.6 °C)  RESPIRATIONS RANGE: Resp  Av  Min: 34  Max: 34  PULSE RANGE: Pulse  Av.7  Min: 76  Max: 89  BLOOD PRESSURE RANGE:  No data recorded.  ; No data recorded. PULSE OXIMETRY RANGE: SpO2  Av.1 %  Min: 90 %  Max: 100 %    I & O - 24hr:    Intake/Output Summary (Last 24 hours) at 2021 0729  Last data filed at 2021 0600  Gross per 24 hour   Intake 1829 ml   Output 1040 ml   Net 789 ml     I/O last 3 completed shifts: In: [address] [I.V.:1572; NG/GT:257]  Out: 1040 [Urine:1040] No intake/output data recorded. Weight change:     Physical Exam:  General Appearance:  Unconscious, prone   HEENT:    NC/AT, mucous membranes are moist. Epistaxis present BL nares   Neck:   Supple, no jugular venous distention. Resp:     CTAB, No wheezes, No rhonchi. No use of accessory muscles. Heart:   Unable to assess as patient is prone    Abdomen:    Unable to assess as patient is prone   Extremities:   Atraumatic, no cyanosis.  1+ pitting edema   Pulses:  Radial and pedal pulses are intact bilaterally   Neurologic:   Unconscious on sedation       Medications     Continuous Infusions:   argatroban infusion 0.5 mcg/kg/min (21)    fentaNYL 5 mcg/ml in 0.9%  ml infusion 175 mcg/hr (21 06)    cisatracurium (NIMBEX) infusion 2 mcg/kg/min (21 0551)    norepinephrine Stopped (21)    sodium chloride 100 mL/hr at 11/1955    dextrose       Scheduled Meds:   polyethylene glycol  17 g Oral Daily    sennosides-docusate sodium  2 tablet Oral Daily    vancomycin  750 mg IntraVENous Q24H    chlorhexidine  15 mL Mouth/Throat BID    artificial tears   Both Eyes Q6H    hydrocortisone sodium succinate PF  100 mg IntraVENous Q8H    piperacillin-tazobactam  3,375 mg IntraVENous Q8H    sodium chloride   IntraVENous Q8H    pantoprazole  40 mg Oral QAM AC    budesonide  1 mg Nebulization BID    Arformoterol Tartrate  15 mcg Nebulization BID    [Held by provider] atorvastatin  10 mg Oral Daily    QUEtiapine  200 mg Oral Daily    sertraline  200 mg Oral Daily    traZODone  100 mg Oral Nightly    sodium chloride flush  5-40 mL IntraVENous 2 times per day    ascorbic acid  1,000 mg Oral Daily    vitamin D  2,000 Units Oral Daily    zinc sulfate  50 mg Oral Daily     PRN Meds: ipratropium-albuterol, sodium chloride flush, sodium chloride, ondansetron **OR** ondansetron, acetaminophen **OR** acetaminophen, glucose, dextrose, glucagon (rDNA), dextrose  Nutrition:      Diet NPO  ADULT TUBE FEEDING; Orogastric; Peptide Based; Continuous; 10; Yes; 10; Q 24 hours; 20; 30; Q 4 hours    Labs and Imaging Studies     CBC:   Recent Labs     11/22/21  1746 11/23/21 0417 11/24/21 0416   WBC 17.7* 16.8* 16.4*   HGB 11.7 10.2* 9.7*   HCT 36.0 30.5* 29.5*   MCV 94.7 92.1 93.9    171 205       BMP:    Recent Labs     11/22/21  0516 11/22/21  1746 11/23/21 0417    146 140   K 3.4* 4.0 3.7   * 113* 109*   CO2 20* 24 22   BUN 33* 36* 38*   CREATININE 1.5* 1.6* 1.6*   GLUCOSE 173* 192* 158*       LIVER PROFILE:   Recent Labs     11/22/21  0516 11/23/21 0417 11/24/21 0416   AST 67* 42* 38*   ALT 85* 66* 58*   BILIDIR 0.2 <0.2 <0.2   BILITOT 0.3 0.3 0.3   ALKPHOS 94 74 74       PT/INR:   Recent Labs     11/21/21  0823 11/23/21  1201 11/24/21  0416   PROTIME 18.7* 46.2* 41.9*   INR 1.7 4.2 3. 7       APTT:   Recent Labs     11/21/21  1500 11/22/21  0516 11/23/21  0417   APTT 50.6* 51.9* 53.0*       Fasting Lipid Panel:    Lab Results   Component Value Date    CHOL 322 10/12/2021    TRIG 150 10/12/2021    HDL 70 10/12/2021       Cardiac Enzymes:    Lab Results   Component Value Date    CKTOTAL 135 11/22/2021    CKTOTAL 488 (H) 11/20/2021    CKTOTAL 585 (H) 11/19/2021       Notable Cultures:      Blood cultures   Blood Culture, Routine   Date Value Ref Range Status   11/22/2021 24 Hours no growth  Preliminary     Respiratory cultures No results found for: RESPCULTURE No results found for: LABGRAM  Urine   Urine Culture, Routine   Date Value Ref Range Status   11/19/2021 Growth not present  Final     Legionella No results found for: LABLEGI  C Diff PCR No results found for: CDIFPCR  Wound culture/abscess: No results for input(s): WNDABS in the last 72 hours. Tip culture:No results for input(s): CXCATHTIP in the last 72 hours.       Oxygen:     Vent Information  $Ventilation: $Subsequent Day  Skin Assessment: Clean, dry, & intact  Equipment ID: [id number]  Equipment Changed: (S) Humidification  Vent Type: [phone]  Vent Mode: AC/VC  Vt Ordered: 320 mL  Pressure Ordered: 14  Rate Set: 34 bmp  Peak Flow: 60 L/min  Pressure Support: 0 cmH20  FiO2 : (S) 65 % (FiO2 increased to 65% do the low sats )  SpO2: 91 %  SpO2/FiO2 ratio: 182  Sensitivity: 3  PEEP/CPAP: 12  I Time/ I Time %: 0 s  Humidification Source: Heated wire  Humidification Temp: 37  Humidification Temp Measured: 37  Circuit Condensation: Drained  Mask Type: Full face mask  Mask Size: Small  Additional Respiratory  Assessments  Pulse: 86  Resp: (!) 34  SpO2: 91 %  Position: Prone  Humidification Source: Heated wire  Humidification Temp: 37  Circuit Condensation: Drained  Oral Care Completed?: Yes  Oral Care: Mouth suctioned, Suction toothette, Lip moisturizer applied  Subglottic Suction Done?: Yes  Airway Type: ET  Airway Size: 8       [REMOVED] Urethral Catheter Temperature probe-Output (mL): 10 mL  Urethral Catheter-Output (mL): 25 mL  [REMOVED] Urethral Catheter Temperature probe 16 fr-Output (mL): 250 mL    Imaging Studies:  XR CHEST PORTABLE   Final Result   1. Endotracheal tube is 2.7 cm above the khai. 2. Stable interstitial pulmonary edema or pneumonia throughout both lungs. XR CHEST PORTABLE   Final Result   1. Worsening of the multifocal bilateral pneumonia when compared with the   prior study of 1 day earlier. XR CHEST PORTABLE   Final Result   1. Multifocal bilateral pneumonia more prominent within the right upper lobe   2. Minimal partial interval clearing of the pneumonia within the left lung   3. Worsening of the pneumonia within the right upper lobe. US DUP LOWER EXTREMITIES BILATERAL VENOUS   Final Result   Within the visualized vessels there is no evidence for deep venous   thrombosis               XR CHEST PORTABLE   Final Result   1. Lines okay. 2. Slightly worsened diffuse edema versus pneumonia. XR CHEST PORTABLE   Final Result   1. Lines okay   2. Improved aeration, mild improvement and diffuse pneumonia/edema. XR CHEST ABDOMEN NG PLACEMENT   Final Result   NG tube position satisfactory. XR CHEST PORTABLE   Final Result   Stable bilateral pneumonia or interstitial pulmonary edema. XR CHEST PORTABLE   Final Result   Increasing bilateral infiltrates. XR CHEST PORTABLE   Final Result   1. There is no interval change in the multifocal bilateral pneumonia. CT HEAD WO CONTRAST   Final Result   No acute intracranial abnormality. Cortical atrophy and periventricular white matter ischemic changes. CT CERVICAL SPINE WO CONTRAST   Final Result   No acute abnormality of the cervical spine. Moderate degenerative changes with disc space narrowing and marginal bony   spur formation C5 through C7. Ground-glass opacities in the visualized lung apices.   Please refer to chest   CT for additional information. CTA CHEST W CONTRAST   Final Result   No evidence of pulmonary embolism. Extensive multifocal ground-glass opacities predominantly in the peripheral   lung zones bilaterally, highly concerning for atypical or COVID related   pneumonia. XR CHEST PORTABLE   Final Result   Bilateral patchy airspace opacities worrisome for pneumonia. XR CHEST PORTABLE    (Results Pending)   XR CHEST PORTABLE    (Results Pending)        Resident's Assessment and Plan     Assessment and Plan:    Cardiovascular  # Hx of HLD  - Takes atorvastatin 10mg qhs at home  Plan  - Continue home atorvastatin    Pulmonary  # Acute Hypoxic Respiratory failure 2/2 COVID-19 PNA with likely superimposed bacterial pneumonia  Most recent ABG:   Recent Labs     11/24/21  0432   PH 7.356   PCO2 41.5   PO2 74.8*   HCO3 22.7   - Vent settings: AC/VC, , RR 34, PEEP 12, FiO2 65%, PIP 24  - CXR 11/24/21 showed BL airspace disease with interval progression on the right  - Urine strep and legionella negative  - p/f 1.50  - MRSA nares negative  - S/p bronchoscopy with BAL 11/23/21  Plan    - Daily ABG  - Continue brovana, pulmicort, duoneb PRN  - Continue zosyn and vancomycin  - Obtain beta D glucan, fungitell pending. If all cultures negative, change to unasyn  - Keep paralyzed.  Attempt to supine tomorrow with paralytic still on.   - Follow BAL results  - 2mg Bumex BID   - Wean steroids    Gastrointestinal  # Transaminitis  - See trend below  Recent Labs     11/22/21  0516 11/23/21  0417 11/24/21  0416   AST 67* 42* 38*   ALT 85* 66* 58*   Plan  - CTM    # Hypoalbuminemia  - Albumin 2.4    Infectious Disease  # COVID-19 PNA  - Positive test on 11/16/21  - S/p toci  - CRP 3.4>>1.3  - D-dimer >5250 >> 4154  - Ferritin 483>>402  - >>735  - S/p remdesivir  Plan  - CRP, d-dimer, procal every other day  - Continue vitamin C, vitamin D, zinc    # Febrile, resolved    Genitourinary/Renal  # UTI, resolved    # Rhabdomyolysis  - CK initially 3913 on admission  - >>135  Plan  - Continue to trend total CK    # Hypokalemia, resolved  - See trend below    # CHARAN stage III, intrinsic  - Baseline cr 0.5, current cr see trend below  Recent Labs     11/22/21  0516 11/22/21  1746 11/23/21 0417   CREATININE 1.5* 1.6* 1.6*   - Urine electrolytes: cloride 26, k 39.5, sodium <20, urea nitrogen 836, creatinine 104  - Protein creatinie ratio 1.6  - FEUrea 15.2%  Plan  - Follow nephro recs      Gastrointestinal  # Constipation  - Last charted BM 11/19/21  Plan  - Increase bowel regimen     Heme/onc  # Leukocytosis  Recent Labs     11/22/21  1746 11/23/21  0417 11/24/21  0416   WBC 17.7* 16.8* 16.4*   - Blood clx negative  Plan  - Continue vanc, zosyn  - Follow resp clx, gram stain    # Thrombocytopenia  - HIT score 3, HEP score 6  - Fibrinogen 374  - Peripheral blood smear showed platelets are present in decreased numbers and display normal morphology  Plan  - HIT antibody panel  - Hold agatroban for 4 hours. Get INR after 4 hours. If INR < 2, start coumadin    Psychiatric  # Hx of schizoaffective disorder  - Takes hydroxyzine 100mg qd, trazodone 200mg BID, quetiapine 200mg qd, sertraline 200mg qd, ativan 0.5mg qd at home  Plan  - Continue hydroxyzine 100mg qd, trazodone 200mg BID, quetiapine 200mg qd, sertraline 200mg qd          # Peptic ulcer prophylaxis: protonix 40mg qd  # DVT Prophylaxis: PCDs  # Disposition: Cont current care    Sasha Garland MD, PGY-1     Attending Physician: Dr. Yoana Hernandez personally saw, examined and provided care for the patient. Radiographs, labs and medication list were reviewed by me independently. I spoke with bedside nursing, therapists and consultants. Critical care services and times documented are independent of procedures and multidisciplinary rounds with Residents.  Additionally comprehensive, multidisciplinary rounds were conducted with the MICU team. The case was discussed in detail and plans for care were established. Review of Residents documentation was conducted and revisions were made as appropriate. I agree with the above documented exam, problem list and plan of care. ARDS   Pulmonary edema  Mucus plug 320/34/12/65    Follow up BAL  Wbc 16  Vancomycin   Bc negative . Hold argatroban,repeart INR ,if INR less than 2 start coumadin and keep argatroban . vanco/zosyn ,waiting culture  Bumex 2 mg bid ,goal 1.5 L      Care reviewed with nursing staff, medical and surgical specialty care, primary care and the patient's family as available. Chart review/lab review/X-ray viewing/documentation and had long Conversation with patient/family re: prognosis, care options and any end of life issues:      Critical care time spent reviewing labs/films, examining patient, collaborating with other physicians more than 35  Minutes  excluding procedures . Mustapha Park M.D.   11/24/2021  9:42 PM

## 2021-11-24 NOTE — PROGRESS NOTES
[facility] Attending    S: 60 y.o. female with a history of gerd, oa, schizoaffective disorder, and gastric fundiplication who was found down for an undetermined amount of time. Reports shortness of breath and cough for 2.5 weeks. She was found to be 60% on RA. In the ER, COVID testing was positive with significantly elevated CPKs. Patient is unvaccinated. Had desaturation to 88% wit PaO2 of 55 with RRT and subsequent transfer to the MICU. Desat to 40's when Bipap removed. Now intubated. Sedated, paralyzed, and prone. Today, remains intubated, sedated, proned. S/p bronch 11/23 with removal of mucus plugging. Temp improving. O: VS- Blood pressure 120/72, pulse 94, temperature 99 °F (37.2 °C), temperature source Esophageal, resp. rate (!) 34, height 5' 1\" (1.549 m), weight 167 lb (75.8 kg), SpO2 94 %. Exam is as noted by resident   Currently prone and nonresponsive. Lungs: coarse, vent   CV:  Rate controlled, regular   Ext-no C/C/E      Impressions: Active Problems:    Acute respiratory failure with hypoxia (HCC)    Covid pneumonia    Rhabdomyolysis, CK improving    Acute cystitis with hematuria       Plan:   Patient admitted with COVID pneumonia and hypoxic respiratory failure. Decadron. Tocimizilab. Remdesivir. Vitamin C.  Vitamin D.  Zinc.  Appreciate Pulm management. Broad spectrum antibiotics, vanc and zosyn. U/s of lower extremities negative DVT 11/20. Fentanyl drip. Levophed now off. Full code at this time. TF initiated. Attending Physician Statement  I have reviewed the chart and seen the patient with the resident(s). I personally reviewed images, EKG's and similar tests, if present. I personally reviewed and performed key elements of the history and exam.  I have reviewed and confirmed student and/or resident history and exam with changes as indicated above. I agree with the assessment, plan and orders as documented by the resident. Please refer to the resident and/or student note for additional information.       Kalia Michaud, DO

## 2021-11-24 NOTE — PROGRESS NOTES
Nephrology Progress Note  Patient's Name: Shania Guerra  10:06 AM  11/24/2021        Reason for Consult:  Acute kidney injury  Requesting Physician:  Stella Sanches DO    Chief Complaint:  SOB  History Obtained From:  EHR, treatment Team    History of Present Ilness:    Shania Guerra is a 60 y.o. female with a history of GI GERD, osteoarthritis, and schizoaffective disorder. She initially presented to this hospital 5 days ago following a fall at home. She was found by her roommate following an unspecified duration. EMS was called. Upon their evaluation patient was noted to be hypoxic. She was subsequently brought to ED for evaluation. In the ED she was noted to be hypoxic with an oxygen saturation of 60% on room air. Also noted to to have a low-grade fever. Laboratory data was significant for WBC of 8.5, hemoglobin of 14.6, BUN 17 and a creatinine of 0.6. Rapid COVID-19 test was positive. Patient was admitted to telemetry. 2 days into her hospital course became increasingly more hypoxic and was transferred to MICU. Because of her persistent hypoxia patient was intubated with mechanical ventilation on 11/20;  . Over the past 48 hours she has been markedly hypotensive requiring fluid resuscitation and pressors. Creatinine has worsened to a current value of 1.6 associated with low urine output. Hence renal consult.     Subjective    11/24: Bronchoscopy yesterday; low grade temp; now off pressors      Allergies:  Ciprofloxacin    Current Medications:    polyethylene glycol (GLYCOLAX) packet 17 g, Daily  sennosides-docusate sodium (SENOKOT-S) 8.6-50 MG tablet 2 tablet, Daily  vancomycin (VANCOCIN) 750 mg in dextrose 5 % 250 mL IVPB, Q24H  chlorhexidine (PERIDEX) 0.12 % solution 15 mL, BID  lubrifresh P.M. (artificial tears) ophthalmic ointment, Q6H  argatroban infusion 50 mg in 0.9% sodium chloride 50 mL (premix), Continuous  hydrocortisone sodium succinate PF (SOLU-CORTEF) injection 100 mg, Non-distended. No masses. No organmegaly. Normal bowel sounds. Skin: Warm and dry. No nodule on exposed extremities. No rash on exposed extremities. Ext: No cyanosis, clubbing, 2+ pitting bilateral arms/legs edema  Neuro: sedated/paralyzed      Data:   Labs:  Lab Results   Component Value Date     11/24/2021     11/23/2021     11/22/2021    K 3.7 11/24/2021    K 3.7 11/23/2021    K 4.0 11/22/2021     (H) 11/24/2021    CO2 26 11/24/2021    CO2 22 11/23/2021    CO2 24 11/22/2021    CREATININE 1.5 (H) 11/24/2021    CREATININE 1.6 (H) 11/23/2021    CREATININE 1.6 (H) 11/22/2021    BUN 48 (H) 11/24/2021    BUN 38 (H) 11/23/2021    BUN 36 (H) 11/22/2021    GLUCOSE 181 (H) 11/24/2021    GLUCOSE 158 (H) 11/23/2021    GLUCOSE 192 (H) 11/22/2021    PHOS 3.5 11/23/2021    PHOS 3.6 11/22/2021    PHOS 4.0 11/21/2021    WBC 16.4 (H) 11/24/2021    WBC 16.8 (H) 11/23/2021    WBC 17.7 (H) 11/22/2021    HGB 9.7 (L) 11/24/2021    HGB 10.2 (L) 11/23/2021    HGB 11.7 11/22/2021    HCT 29.5 (L) 11/24/2021    HCT 30.5 (L) 11/23/2021    HCT 36.0 11/22/2021    MCV 93.9 11/24/2021     11/24/2021         Imaging:  CT HEAD WO CONTRAST    Result Date: 11/16/2021  EXAMINATION: CT OF THE HEAD WITHOUT CONTRAST  11/16/2021 9:54 pm TECHNIQUE: CT of the head was performed without the administration of intravenous contrast. Dose modulation, iterative reconstruction, and/or weight based adjustment of the mA/kV was utilized to reduce the radiation dose to as low as reasonably achievable. COMPARISON: None. HISTORY: ORDERING SYSTEM PROVIDED HISTORY: fall TECHNOLOGIST PROVIDED HISTORY: Reason for exam:->fall Has a \"code stroke\" or \"stroke alert\" been called? ->No Decision Support Exception - unselect if not a suspected or confirmed emergency medical condition->Emergency Medical Condition (MA) What reading provider will be dictating this exam?->CRC FINDINGS: BRAIN/VENTRICLES: There are cortical atrophy and periventricular white matter ischemic changes. There is no acute intracranial hemorrhage, mass effect or midline shift. No abnormal extra-axial fluid collection. The gray-white differentiation is maintained without evidence of an acute infarct. There is no evidence of hydrocephalus. ORBITS: The visualized portion of the orbits demonstrate no acute abnormality. SINUSES: The visualized paranasal sinuses and mastoid air cells demonstrate no acute abnormality. SOFT TISSUES/SKULL:  No acute abnormality of the visualized skull or soft tissues. No acute intracranial abnormality. Cortical atrophy and periventricular white matter ischemic changes. XR CHEST PORTABLE    Result Date: 11/16/2021  EXAMINATION: ONE XRAY VIEW OF THE CHEST 11/16/2021 7:10 pm COMPARISON: 09/23/2021 HISTORY: ORDERING SYSTEM PROVIDED HISTORY: weakness TECHNOLOGIST PROVIDED HISTORY: Reason for exam:->weakness What reading provider will be dictating this exam?->CRC FINDINGS: Bilateral patchy airspace opacities. There is no effusion or pneumothorax. The cardiomediastinal silhouette is without acute process. The osseous structures are without acute process. Bilateral patchy airspace opacities worrisome for pneumonia. CTA CHEST W CONTRAST    Result Date: 11/16/2021  EXAMINATION: CTA OF THE CHEST 11/16/2021 9:54 pm TECHNIQUE: CTA of the chest was performed after the administration of intravenous contrast.  Multiplanar reformatted images are provided for review. MIP images are provided for review. Dose modulation, iterative reconstruction, and/or weight based adjustment of the mA/kV was utilized to reduce the radiation dose to as low as reasonably achievable. COMPARISON: None.  HISTORY: ORDERING SYSTEM PROVIDED HISTORY: r/o pe TECHNOLOGIST PROVIDED HISTORY: Reason for exam:->r/o pe Decision Support Exception - unselect if not a suspected or confirmed emergency medical condition->Emergency Medical Condition (MA) What reading provider will be dictating

## 2021-11-24 NOTE — PROGRESS NOTES
Pharmacy Consultation Note  (Antibiotic Dosing and Monitoring)    Initial consult date: 11/20/21  Consulting physician/provider: Janell Bennett MD  Drug: Vancomycin  Indication: sepsis    Age/  Gender Height Weight IBW  Allergy Information   65 y.o./female 5' 1\" (154.9 cm) 152 lb (68.9 kg)     Ideal body weight: 47.8 kg (105 lb 6.1 oz)  Adjusted ideal body weight: 59 kg (130 lb 0.5 oz)   Ciprofloxacin      Renal Function:  Recent Labs     11/22/21  1746 11/23/21  0417 11/24/21  0825   BUN 36* 38* 48*   CREATININE 1.6* 1.6* 1.5*       Intake/Output Summary (Last 24 hours) at 11/24/2021 1013  Last data filed at 11/24/2021 1000  Gross per 24 hour   Intake 1829 ml   Output 1090 ml   Net 739 ml       Vancomycin Monitoring:  Trough:  No results for input(s): VANCOTROUGH in the last 72 hours. Random:    No results for input(s): VANCORANDOM in the last 72 hours. Vancomycin Administration Times:  Recent vancomycin administrations                   vancomycin (VANCOCIN) 750 mg in dextrose 5 % 250 mL IVPB (mg) 750 mg New Bag 11/24/21 0843     750 mg New Bag 11/23/21 0839    vancomycin 1000 mg IVPB in 250 mL D5W addavial (mg) 1,000 mg New Bag 11/22/21 0846                              Assessment:  · Patient is a 60 y.o. female who has been initiated on vancomycin  Estimated Creatinine Clearance: 35 mL/min (A) (based on SCr of 1.5 mg/dL (H)). · To dose vancomycin, pharmacy will be utilizing [employer] (acquired by [employer]) calculation software for goal AUC/WANG 400-600 mg/L-hr   · 11/20: WBC elevated at 13.8  · 11/21: Random level 22.3 mg/L (~6 hours post-vanco infusion); WBC WNL; afebrile  · 11/22:  Scr increased from 0.9 to 1.5 mg/dL today; WBC elevated at 15.5  · 11/23: WBC remain elevated but decreasing  · 11/24:  WBC 16.4; Scr remains elevated at 1.5; patient afebrile x 2 days; day #6 of vancomycin; day #5 of Zosyn    Plan:  · Will continue vancomycin 750 mg IV every 24 hours (est ; est trough 16.2 mg/L)  · Will check vancomycin level tomorrow morning  · Will continue to monitor renal function   · Clinical pharmacy to follow      Charmaine Parker PharmD  Pharmacy Resident  P: [id number]   11/24/2021 10:13 AM

## 2021-11-25 NOTE — PROGRESS NOTES
Nephrology Progress Note  Patient's Name: Clara Aguilar    Reason for Consult:  Acute kidney injury    History of Present Radha from the 11/24/21 note:    Clara Aguilar is a 60 y.o. female with a history of GI GERD, osteoarthritis, and schizoaffective disorder. She initially presented to this hospital 5 days ago following a fall at home. She was found by her roommate following an unspecified duration. EMS was called. Upon their evaluation patient was noted to be hypoxic. She was subsequently brought to ED for evaluation. In the ED she was noted to be hypoxic with an oxygen saturation of 60% on room air. Also noted to to have a low-grade fever. Laboratory data was significant for WBC of 8.5, hemoglobin of 14.6, BUN 17 and a creatinine of 0.6. Rapid COVID-19 test was positive. Patient was admitted to telemetry. 2 days into her hospital course became increasingly more hypoxic and was transferred to MICU. Because of her persistent hypoxia patient was intubated with mechanical ventilation on 11/20;  . Over the past 48 hours she has been markedly hypotensive requiring fluid resuscitation and pressors. Creatinine has worsened to a current value of 1.6 associated with low urine output. Hence renal consult. Subjective    11/25: pt remains in COVID 19 Isolation.  She remains proned and on an FIO2 65% and PEEP12      Allergies:  Ciprofloxacin    Current Medications:    bumetanide (BUMEX) injection 2 mg, BID  hydrocortisone sodium succinate PF (SOLU-CORTEF) injection 100 mg, Q12H   Followed by  Albertina Richardson ON 11/25/2021] hydrocortisone sodium succinate PF (SOLU-CORTEF) injection 50 mg, Q12H   Followed by  Albertina Richardson ON 11/26/2021] hydrocortisone sodium succinate PF (SOLU-CORTEF) injection 50 mg, Daily  sennosides-docusate sodium (SENOKOT-S) 8.6-50 MG tablet 2 tablet, BID  polyethylene glycol (GLYCOLAX) packet 17 g, Daily  vancomycin (VANCOCIN) 750 mg in dextrose 5 % 250 mL IVPB, Q24H  chlorhexidine (PERIDEX) 0.12 % solution 15 mL, BID  lubrifresh P.M. (artificial tears) ophthalmic ointment, Q6H  argatroban infusion 50 mg in 0.9% sodium chloride 50 mL (premix), Continuous  fentaNYL 5 mcg/ml in 0.9%  ml infusion, Continuous  piperacillin-tazobactam (ZOSYN) 3,375 mg in dextrose 5 % 100 mL IVPB extended infusion (mini-bag), Q8H  piperacillin/tazobactam NS flush, Q8H  cisatracurium besylate (NIMBEX) 200 mg in sodium chloride 0.9 % 100 mL infusion, Continuous  pantoprazole (PROTONIX) tablet 40 mg, QAM AC  norepinephrine (LEVOPHED) 16 mg in dextrose 5% 250 mL infusion, Continuous  ipratropium-albuterol (DUONEB) nebulizer solution 1 ampule, Q4H PRN  budesonide (PULMICORT) nebulizer suspension 1,000 mcg, BID  Arformoterol Tartrate (BROVANA) nebulizer solution 15 mcg, BID  [Held by provider] atorvastatin (LIPITOR) tablet 10 mg, Daily  QUEtiapine (SEROQUEL) tablet 200 mg, Daily  sertraline (ZOLOFT) tablet 200 mg, Daily  traZODone (DESYREL) tablet 100 mg, Nightly  sodium chloride flush 0.9 % injection 5-40 mL, 2 times per day  sodium chloride flush 0.9 % injection 5-40 mL, PRN  0.9 % sodium chloride infusion, PRN  ondansetron (ZOFRAN-ODT) disintegrating tablet 4 mg, Q8H PRN   Or  ondansetron (ZOFRAN) injection 4 mg, Q6H PRN  acetaminophen (TYLENOL) tablet 650 mg, Q6H PRN   Or  acetaminophen (TYLENOL) suppository 650 mg, Q6H PRN  ascorbic acid (VITAMIN C) tablet 1,000 mg, Daily  Vitamin D (CHOLECALCIFEROL) tablet 2,000 Units, Daily  zinc sulfate (ZINCATE) capsule 50 mg, Daily  glucose (GLUTOSE) 40 % oral gel 15 g, PRN  dextrose 50 % IV solution, PRN  glucagon (rDNA) injection 1 mg, PRN  dextrose 5 % solution, PRN        Review of Systems:   Review of systems not obtained due to patient factors.     Physical exam:  Vitals:    11/24/21 1900   BP:    Pulse: 97   Resp: (!) 34   Temp:    SpO2: 95%          No PE as in COVID Isolation      Data:   Labs:  Lab Results   Component Value Date     11/24/2021     11/24/2021     11/23/2021    K 3.3 (L) 11/24/2021    K 3.7 11/24/2021    K 3.7 11/23/2021     (H) 11/24/2021    CO2 25 11/24/2021    CO2 26 11/24/2021    CO2 22 11/23/2021    CREATININE 1.6 (H) 11/24/2021    CREATININE 1.5 (H) 11/24/2021    CREATININE 1.6 (H) 11/23/2021    BUN 49 (H) 11/24/2021    BUN 48 (H) 11/24/2021    BUN 38 (H) 11/23/2021    GLUCOSE 191 (H) 11/24/2021    GLUCOSE 181 (H) 11/24/2021    GLUCOSE 158 (H) 11/23/2021    PHOS 3.5 11/23/2021    PHOS 3.6 11/22/2021    PHOS 4.0 11/21/2021    WBC 16.4 (H) 11/24/2021    WBC 16.8 (H) 11/23/2021    WBC 17.7 (H) 11/22/2021    HGB 9.7 (L) 11/24/2021    HGB 10.2 (L) 11/23/2021    HGB 11.7 11/22/2021    HCT 29.5 (L) 11/24/2021    HCT 30.5 (L) 11/23/2021    HCT 36.0 11/22/2021    MCV 93.9 11/24/2021     11/24/2021         Imaging:  CT HEAD WO CONTRAST    Result Date: 11/16/2021  EXAMINATION: CT OF THE HEAD WITHOUT CONTRAST  11/16/2021 9:54 pm TECHNIQUE: CT of the head was performed without the administration of intravenous contrast. Dose modulation, iterative reconstruction, and/or weight based adjustment of the mA/kV was utilized to reduce the radiation dose to as low as reasonably achievable. COMPARISON: None. HISTORY: ORDERING SYSTEM PROVIDED HISTORY: fall TECHNOLOGIST PROVIDED HISTORY: Reason for exam:->fall Has a \"code stroke\" or \"stroke alert\" been called? ->No Decision Support Exception - unselect if not a suspected or confirmed emergency medical condition->Emergency Medical Condition (MA) What reading provider will be dictating this exam?->CRC FINDINGS: BRAIN/VENTRICLES: There are cortical atrophy and periventricular white matter ischemic changes. There is no acute intracranial hemorrhage, mass effect or midline shift. No abnormal extra-axial fluid collection. The gray-white differentiation is maintained without evidence of an acute infarct. There is no evidence of hydrocephalus.  ORBITS: The visualized portion of the orbits demonstrate no acute abnormality. SINUSES: The visualized paranasal sinuses and mastoid air cells demonstrate no acute abnormality. SOFT TISSUES/SKULL:  No acute abnormality of the visualized skull or soft tissues. No acute intracranial abnormality. Cortical atrophy and periventricular white matter ischemic changes. XR CHEST PORTABLE    Result Date: 11/16/2021  EXAMINATION: ONE XRAY VIEW OF THE CHEST 11/16/2021 7:10 pm COMPARISON: 09/23/2021 HISTORY: ORDERING SYSTEM PROVIDED HISTORY: weakness TECHNOLOGIST PROVIDED HISTORY: Reason for exam:->weakness What reading provider will be dictating this exam?->CRC FINDINGS: Bilateral patchy airspace opacities. There is no effusion or pneumothorax. The cardiomediastinal silhouette is without acute process. The osseous structures are without acute process. Bilateral patchy airspace opacities worrisome for pneumonia. CTA CHEST W CONTRAST    Result Date: 11/16/2021  EXAMINATION: CTA OF THE CHEST 11/16/2021 9:54 pm TECHNIQUE: CTA of the chest was performed after the administration of intravenous contrast.  Multiplanar reformatted images are provided for review. MIP images are provided for review. Dose modulation, iterative reconstruction, and/or weight based adjustment of the mA/kV was utilized to reduce the radiation dose to as low as reasonably achievable. COMPARISON: None. HISTORY: ORDERING SYSTEM PROVIDED HISTORY: r/o pe TECHNOLOGIST PROVIDED HISTORY: Reason for exam:->r/o pe Decision Support Exception - unselect if not a suspected or confirmed emergency medical condition->Emergency Medical Condition (MA) What reading provider will be dictating this exam?->CRC FINDINGS: Pulmonary Arteries: Pulmonary arteries are adequately opacified for evaluation. No evidence of intraluminal filling defect to suggest pulmonary embolism. Main pulmonary artery is normal in caliber. Mediastinum: No evidence of mediastinal lymphadenopathy.   The heart and pericardium demonstrate no acute abnormality. There is no acute abnormality of the thoracic aorta. Lungs/pleura: The lungs demonstrate extensive multifocal ground-glass opacities predominantly in the peripheral lung zones. No evidence of pleural effusion or pneumothorax. Upper Abdomen: Limited images of the upper abdomen are unremarkable. Soft Tissues/Bones: No acute bone or soft tissue abnormality. No evidence of pulmonary embolism. Extensive multifocal ground-glass opacities predominantly in the peripheral lung zones bilaterally, highly concerning for atypical or COVID related pneumonia. Assessment/Plans    1. Acute kidney injury stage I most likely ischemic ATN occurring in the setting of hypotension. Patient is nonoliguric although over the past 24 hours urine output has been decreasing.  - urine indices although initially prerenal, suspect there is progression to ATN  Cr remains stable at 1.6mg/dl  PLAN:  1-Continue to monitor labs and urine output  2-continue bumex    2. Acute hypoxic respiratory failure due to COVID-19 pneumonia  -Intubation with mechanical ventilation; prone   PLAN:  1. As per Pulm/CCC      3 COVID-19 pneumonia  -Received Toci; and dexamethasone  -Currently on Solu-Cortef  PLAN:  1. Defer to Pulm/CCC    4. Septic shock  -suspected possible superimposed bacterial pneumonia  -now off pressors  -on Zosyn + Vanc    5. Volume overload  PLAN:  1.  Redose with the bumetanide 2mg IV bid        Will follow   Thanks         Matt Robbins MD

## 2021-11-25 NOTE — PLAN OF CARE
Problem: Falls - Risk of:  Goal: Will remain free from falls  Description: Will remain free from falls  11/25/2021 0011 by Ralph Feliz RN  Outcome: Met This Shift  11/24/2021 1847 by Kishore Benavides RN  Outcome: Met This Shift     Problem: Gas Exchange - Impaired  Goal: Promote optimal lung function  Outcome: Ongoing     Problem: Nutrition Deficits  Goal: Optimize nutritional status  Outcome: Ongoing

## 2021-11-25 NOTE — PROGRESS NOTES
[facility] Inpatient   Resident Progress Note    S:  Hospital day: 9   Brief Synopsis: Poornima Huang is a 60 y.o. female with pmh of GERD, osteoarthritis and schizoaffective disorder who presented to ER s/p fall at home. Patient found down at home, with initial O2 saturation of 60%. Brought to the ER; found to have COVID-19 pneumonia , requiring bipap for appropriate saturation. Rhabdomyolysis, UTI. Started on appropriate management including tocilizumab, ceftriaxone 1 g daily, and IV fluids for rhabdomyolysis. Decadron was started daily, and with patients worsening status - pulmonology consulted. FULL CODE. Transferred to ICU on 11/18 for rapid breathing and hypoxia and pO2 of 55. Intubated 11/20 secondary to O2 sats consistently <80% with rapid breathing. Seen in the ICU this AM. Intubated, sedated, proned, paralyzed. Continuing to follow for clinical improvement.      Cont meds:    argatroban infusion 0.5 mcg/kg/min (11/25/21 0133)    fentaNYL 5 mcg/ml in 0.9%  ml infusion 150 mcg/hr (11/25/21 0515)    cisatracurium (NIMBEX) infusion 2.5 mcg/kg/min (11/24/21 2033)    sodium chloride 100 mL/hr at 11/1956    dextrose       Scheduled meds:    [Held by provider] bumetanide  2 mg IntraVENous BID    hydrocortisone sodium succinate PF  50 mg IntraVENous Q12H    Followed by   Raul Ruvalcaba ON 11/26/2021] hydrocortisone sodium succinate PF  50 mg IntraVENous Daily    sennosides-docusate sodium  2 tablet Oral BID    polyethylene glycol  17 g Oral Daily    vancomycin  750 mg IntraVENous Q24H    chlorhexidine  15 mL Mouth/Throat BID    artificial tears   Both Eyes Q6H    piperacillin-tazobactam  3,375 mg IntraVENous Q8H    sodium chloride   IntraVENous Q8H    pantoprazole  40 mg Oral QAM AC    budesonide  1 mg Nebulization BID    Arformoterol Tartrate  15 mcg Nebulization BID    [Held by provider] atorvastatin  10 mg Oral Daily    QUEtiapine  200 mg Oral Daily    sertraline  200 mg Oral Daily    traZODone  100 mg Oral Nightly    sodium chloride flush  5-40 mL IntraVENous 2 times per day    ascorbic acid  1,000 mg Oral Daily    vitamin D  2,000 Units Oral Daily    zinc sulfate  50 mg Oral Daily     PRN meds: ipratropium-albuterol, sodium chloride flush, sodium chloride, ondansetron **OR** ondansetron, acetaminophen **OR** acetaminophen, glucose, dextrose, glucagon (rDNA), dextrose     I reviewed the patient's past medical and surgical history, Medications and Allergies. O:  /71   Pulse 95   Temp 99 °F (37.2 °C) (Esophageal)   Resp (!) 34   Ht 5' 1\" (1.549 m)   Wt 164 lb (74.4 kg)   SpO2 96%   BMI 30.99 kg/m²   24 hour I&O: I/O last 3 completed shifts: In: 2569 [I.V.:2079; NG/GT:490]  Out: 2680 [Urine:2680]  No intake/output data recorded. Physical Exam  Constitutional:       General: She is not in acute distress. Comments: Intubated, sedated, paralyzed, and proned. Cardiovascular:      Rate and Rhythm: Normal rate and regular rhythm. Pulses: Normal pulses. Heart sounds: Normal heart sounds. Pulmonary:      Comments: Saturating well with intubation, sedation, proned and paralyzed. Coarse lung sounds heard throughout lung fields  Abdominal:      General: Bowel sounds are normal. There is no distension. Palpations: Abdomen is soft. Tenderness: There is no abdominal tenderness. Musculoskeletal:         General: Normal range of motion. Right lower leg: No edema. Left lower leg: No edema. Labs:  Na/K/Cl/CO2:  142/4.4/110/24 (11/25 2741)  BUN/Cr/glu/ALT/AST/amyl/lip:  52/1.6/--/46/34/--/-- (11/25 4341)  WBC/Hgb/Hct/Plts:  15.2/9.3/28.7/192 (11/25 9762)  estimated creatinine clearance is 33 mL/min (A) (based on SCr of 1.6 mg/dL (H)).   Other pertinent labs as noted below    Radiology:  XR CHEST PORTABLE   Final Result   Bilateral airspace disease with interval progression on the right         XR CHEST PORTABLE Final Result   1. Endotracheal tube is 2.7 cm above the khai. 2. Stable interstitial pulmonary edema or pneumonia throughout both lungs. XR CHEST PORTABLE   Final Result   1. Worsening of the multifocal bilateral pneumonia when compared with the   prior study of 1 day earlier. XR CHEST PORTABLE   Final Result   1. Multifocal bilateral pneumonia more prominent within the right upper lobe   2. Minimal partial interval clearing of the pneumonia within the left lung   3. Worsening of the pneumonia within the right upper lobe. US DUP LOWER EXTREMITIES BILATERAL VENOUS   Final Result   Within the visualized vessels there is no evidence for deep venous   thrombosis               XR CHEST PORTABLE   Final Result   1. Lines okay. 2. Slightly worsened diffuse edema versus pneumonia. XR CHEST PORTABLE   Final Result   1. Lines okay   2. Improved aeration, mild improvement and diffuse pneumonia/edema. XR CHEST ABDOMEN NG PLACEMENT   Final Result   NG tube position satisfactory. XR CHEST PORTABLE   Final Result   Stable bilateral pneumonia or interstitial pulmonary edema. XR CHEST PORTABLE   Final Result   Increasing bilateral infiltrates. XR CHEST PORTABLE   Final Result   1. There is no interval change in the multifocal bilateral pneumonia. CT HEAD WO CONTRAST   Final Result   No acute intracranial abnormality. Cortical atrophy and periventricular white matter ischemic changes. CT CERVICAL SPINE WO CONTRAST   Final Result   No acute abnormality of the cervical spine. Moderate degenerative changes with disc space narrowing and marginal bony   spur formation C5 through C7. Ground-glass opacities in the visualized lung apices. Please refer to chest   CT for additional information. CTA CHEST W CONTRAST   Final Result   No evidence of pulmonary embolism.       Extensive multifocal ground-glass opacities predominantly in the peripheral   lung zones bilaterally, highly concerning for atypical or COVID related   pneumonia. XR CHEST PORTABLE   Final Result   Bilateral patchy airspace opacities worrisome for pneumonia. XR CHEST PORTABLE    (Results Pending)   XR CHEST PORTABLE    (Results Pending)   XR ABDOMEN (KUB) (SINGLE AP VIEW)    (Results Pending)       A/P:  Active Problems:    Acute respiratory failure with hypoxia (HCC)    Rhabdomyolysis    Acute cystitis with hematuria  Resolved Problems:    * No resolved hospital problems. *    1. Intubated, Sedated, Paralyzed, Proned  11/20: Intubated 2/2 hypoxia and increased work of breathing, proned and paralyzed    2. Acute Hypoxic Respiratory Failure 2/2 Covid 19  Unvaccinated for Covid 19  Patient found with pulse ox of 60% --> improved on bipap in ER  CXR showing bilateral pulmonary infiltrates  Inflammatory markers: DDimer 530, , CRP 22.9, Ferritin 749 on admission   CTA pulm showing extensive bilateral pulmonary infiltrates consistent with COVID-19 pneumonia , no PE  Continue Vitamin C, Vitamin D, Zinc  Given one dose decadron 10 mg in the ER  Continue 6 mg IV decadron daily  Lovenox 40 mg BID  Tociluzumab completed, continue remdesivir    Droplet precautions  Monitor on Telemetry   Pulmonology consulted ; appreciate recs  Dietary consulted for further recommendations on nutrition status ; appreciate recs. 11/18 - transferred to ICU due to worsening resp status. Continue Brovana, Pulmicort , Duoneb PRN  Trend Inflammatory marked ; D-Dimer > 5000 today ; LE Doppler to r/o DVT  CXR daily    Advanced Care Planning with Spiritual Services ; recs appreciated - FULL CODE.     3. Rhabdomyolysis - improving  2/2 to fall for unknown time period  Patient found down for unknown period of time  Initial CK > 3000  Received 4 L NS in ER  Continue to trend CK    4.  Concern for Sepsis  Likely superimposed bacterial pneumonia  Tmax 102.2 , Tavg 100.3F  Given one dose cefepime and vancomycin in ICU  Procal 0.07, repeat 0.27 , blood cultures, urine culture negative. Fungitell and B glucan pending  On Pip/Tazo, Vanocomycin    5. CHARAN Stage III  Admission creatinine 0.6  Trending up --> 1.5  Obtain urine electrolytes, creatinine , urea  Calculate FeNa/ FeUra    6. Thrombocytopenia  Likely 2/2 to SALENA  Hold lovenox  Continue with argatroban  Follow up SALENA panel  F/u PBS    7. Hypokalemia  Initial K 3.2 , Mag 2.6   CMP daily  Replete as necessary    8. Hx Schizoaffective Disorder / Anxiety  Continue seroquel , zoloft, trazodone  Hold ativan, vistaril  Precedex started in ICU ; wean as appropriate    9.  Concern for UTI - resolved   Likely simple cystitis ; patient with no CVA tenderness , presented initially with fever 102 F  Urinalysis with increased nitrites, bacteria, blood  Urine culture, blood cultures were negative  Given one dose doxy and rocephin in the ER  Continue 1g ceftriaxone IV x 3 days -  dc'd 11/18/2021     GI/DVT ppx: protonix, argatroban   Diet:   Disposition: MICU    Electronically signed by Sherri Lima MD  PGY-2 on 11/25/2021 at 8:31 AM  This case was discussed with attending physician: Dr. Joann Burgess

## 2021-11-25 NOTE — PROGRESS NOTES
[facility] Attending    S: 60 y.o. female with a history of gerd, oa, schizoaffective disorder, and gastric fundiplication who was found down for an undetermined amount of time. Reports shortness of breath and cough for 2.5 weeks. She was found to be 60% on RA. In the ER, COVID testing was positive with significantly elevated CPKs. Patient is unvaccinated. Had desaturation to 88% wit PaO2 of 55 with RRT and subsequent transfer to the MICU. Desat to 40's when Bipap removed. Now intubated. Sedated, paralyzed, and prone. Today, remains intubated, sedated, proned. S/p bronch 11/23 with removal of mucus plugging. Temp improving. Ventilator settings fluctuating     O: VS- Blood pressure (!) 176/86, pulse 102, temperature 99.3 °F (37.4 °C), temperature source Esophageal, resp. rate (!) 34, height 5' 1\" (1.549 m), weight 164 lb (74.4 kg), SpO2 96 %. Exam is as noted by resident   Currently prone and nonresponsive. Lungs: coarse, vent   CV:  Rate controlled, regular   Ext-no C/C/E      Impressions: Active Problems:    Acute respiratory failure with hypoxia (HCC)    Covid pneumonia    Rhabdomyolysis, CK improving    Concern for sepsis    Acute cystitis with hematuria, treated    CHARAN    Plan:   Patient admitted with COVID pneumonia and hypoxic respiratory failure. Decadron. Tocimizilab. Remdesivir. Vitamin C.  Vitamin D.  Zinc.  Appreciate Pulm management. Broad spectrum antibiotics, vanc and zosyn. U/s of lower extremities negative DVT 11/20. Slow wean of FiO2. Tube feeding. Argatroban,  SALENA panel  Full code at this time. Attending Physician Statement  I have reviewed the chart and seen the patient with the resident(s). I personally reviewed images, EKG's and similar tests, if present.   I personally reviewed and performed key elements of the history and exam.  I have reviewed and confirmed student and/or resident history and exam with changes as indicated above.  I agree with the assessment, plan and orders as documented by the resident. Please refer to the  resident and/or student note for additional information.       Madalyn Rosario MD

## 2021-11-25 NOTE — PROGRESS NOTES
[address] [facility]   Department of Internal Medicine   Internal Medicine Residency  MICU Progress Note    Patient:  Carl Nye 60 y.o. female   MRN: [id number]       Date of Service: 2021    Allergy: Ciprofloxacin  Cc follow up ARDS  Subjective     Patient was seen and examined this morning at bedside in no acute distress. Overnight, patient was continued in prone position secondary to a PF ratio of 1.36 this a.m. Patient was also restarted on argatroban after INR overnight was stable at 2.1. Hemoglobin is continue to downtrend to 9.3 from 12 3 days ago. Objective     TEMPERATURE:  Current - Temp: 99.3 °F (37.4 °C); Max - Temp  Av.1 °F (37.3 °C)  Min: 98.8 °F (37.1 °C)  Max: 99.3 °F (37.4 °C)  RESPIRATIONS RANGE: Resp  Av.6  Min: 23  Max: 34  PULSE RANGE: Pulse  Av.7  Min: 87  Max: 103  BLOOD PRESSURE RANGE:  Systolic (96BFM), QAS:099 , Min:127 , FGF:941   ; Diastolic (76VUN), YBT:39, Min:71, Max:86    PULSE OXIMETRY RANGE: SpO2  Av.9 %  Min: 94 %  Max: 96 %    I & O - 24hr:    Intake/Output Summary (Last 24 hours) at 2021 0937  Last data filed at 2021 0800  Gross per 24 hour   Intake 2629 ml   Output 2620 ml   Net 9 ml     I/O last 3 completed shifts: In: 2569 [I.V.:2079; NG/GT:490]  Out: 2680 [Urine:2680] I/O this shift:  In: 60 [NG/GT:60]  Out: -    Weight change: -3 lb (-1.361 kg)    Physical Exam:  General Appearance:   Prone, paralyzed, intubated, and sedated. GCS 3T 2/2 paralytic. HEENT:   Significant periorbital edema noted on exam today. NC/AT, mucous membranes are moist.    Neck:   Supple, no jugular venous distention. Resp:    AC /34/12/65. Intubated, sedated. CTAB, No wheezes, No rhonchi. Heart:    RRR, S1 and S2 normal, no murmur, rub or gallop. Abdomen:     Soft, non-tender, non-distended with normal bowel sounds   Extremities:  1+ pitting edema bilateral upper and lower extremities.    Pulses:  Radial and pedal pulses are intact bilaterally   Neurologic:  Prone paralyzed, intubated, and sedated GCS 3 T.        Medications     Continuous Infusions:   argatroban infusion 0.75 mcg/kg/min (11/25/21 0829)    fentaNYL 5 mcg/ml in 0.9%  ml infusion 150 mcg/hr (11/25/21 0515)    cisatracurium (NIMBEX) infusion 3 mcg/kg/min (11/25/21 0906)    sodium chloride 100 mL/hr at 11/1956    dextrose       Scheduled Meds:   bumetanide  2 mg IntraVENous BID    hydrocortisone sodium succinate PF  50 mg IntraVENous Q12H    Followed by   Sami Thomas ON 11/26/2021] hydrocortisone sodium succinate PF  50 mg IntraVENous Daily    sennosides-docusate sodium  2 tablet Oral BID    polyethylene glycol  17 g Oral Daily    vancomycin  750 mg IntraVENous Q24H    chlorhexidine  15 mL Mouth/Throat BID    artificial tears   Both Eyes Q6H    piperacillin-tazobactam  3,375 mg IntraVENous Q8H    sodium chloride   IntraVENous Q8H    pantoprazole  40 mg Oral QAM AC    budesonide  1 mg Nebulization BID    Arformoterol Tartrate  15 mcg Nebulization BID    [Held by provider] atorvastatin  10 mg Oral Daily    QUEtiapine  200 mg Oral Daily    sertraline  200 mg Oral Daily    traZODone  100 mg Oral Nightly    sodium chloride flush  5-40 mL IntraVENous 2 times per day    ascorbic acid  1,000 mg Oral Daily    vitamin D  2,000 Units Oral Daily    zinc sulfate  50 mg Oral Daily     PRN Meds: ipratropium-albuterol, sodium chloride flush, sodium chloride, ondansetron **OR** ondansetron, acetaminophen **OR** acetaminophen, glucose, dextrose, glucagon (rDNA), dextrose  Nutrition:   NG/OG tube TF At rate: 20ml/h    Labs and Imaging Studies     CBC:   Recent Labs     11/23/21 0417 11/24/21  0416 11/25/21  0456   WBC 16.8* 16.4* 15.2*   HGB 10.2* 9.7* 9.3*   HCT 30.5* 29.5* 28.7*   MCV 92.1 93.9 97.3    205 192       BMP:    Recent Labs     11/24/21  0825 11/24/21  1822 11/25/21  0456    143 142   K 3.7 3.3* 4.4   * 110* 110*   CO2 26 25 24   BUN 48* 49* 52*   CREATININE 1.5* 1.6* 1.6*   GLUCOSE 181* 191* 184*       LIVER PROFILE:   Recent Labs     11/23/21 0417 11/24/21 0416 11/25/21 0456   AST 42* 38* 34*   ALT 66* 58* 46*   BILIDIR <0.2 <0.2 <0.2   BILITOT 0.3 0.3 0.4   ALKPHOS 74 74 77       PT/INR:   Recent Labs     11/24/21 0416 11/24/21  1441 11/25/21  0456   PROTIME 41.9* 23.1* 22.1*   INR 3.7 2.1 2.0       APTT:   Recent Labs     11/23/21 0417 11/24/21  0713 11/25/21 0456   APTT 53.0* 50.9* 39.5*       Fasting Lipid Panel:    Lab Results   Component Value Date    CHOL 322 10/12/2021    TRIG 150 10/12/2021    HDL 70 10/12/2021       Cardiac Enzymes:    Lab Results   Component Value Date    CKTOTAL 135 11/22/2021    CKTOTAL 488 (H) 11/20/2021    CKTOTAL 585 (H) 11/19/2021       Notable Cultures:      Blood cultures   Blood Culture, Routine   Date Value Ref Range Status   11/22/2021 24 Hours no growth  Preliminary     Respiratory cultures No results found for: RESPCULTURE No results found for: LABGRAM  Urine   Urine Culture, Routine   Date Value Ref Range Status   11/19/2021 Growth not present  Final     Legionella No results found for: LABLEGI  C Diff PCR No results found for: CDIFPCR  Wound culture/abscess: No results for input(s): WNDABS in the last 72 hours. Tip culture:No results for input(s): CXCATHTIP in the last 72 hours.       Oxygen:     Vent Information  $Ventilation: $Subsequent Day  Skin Assessment: Clean, dry, & intact  Equipment ID: [id number]  Equipment Changed: (S) Humidification  Vent Type: 980  Vent Mode: AC/VC  Vt Ordered: 320 mL  Pressure Ordered: 14  Rate Set: 34 bmp  Peak Flow: 60 L/min  Pressure Support: 0 cmH20  FiO2 : 65 %  SpO2: 96 %  SpO2/FiO2 ratio: 147.69  Sensitivity: 3  PEEP/CPAP: 12  I Time/ I Time %: 0 s  Humidification Source: Heated wire  Humidification Temp: 37  Humidification Temp Measured: 37  Circuit Condensation: Drained  Mask Type: Full face mask  Mask Size: Small  Additional Respiratory Assessments  Pulse: 102  Resp: (!) 34  SpO2: 96 %  Position: Prone  Humidification Source: Heated wire  Humidification Temp: 37  Circuit Condensation: Drained  Oral Care Completed?: Yes  Oral Care: Lip moisturizer applied, Mouth swabbed, Mouth moisturizer, Mouth suctioned, Suction toothette  Subglottic Suction Done?: Yes  Airway Type: ET  Airway Size: 8       [REMOVED] Urethral Catheter Temperature probe-Output (mL): 10 mL  Urethral Catheter-Output (mL): 250 mL  [REMOVED] Urethral Catheter Temperature probe 16 fr-Output (mL): 250 mL    Imaging Studies:  XR ABDOMEN (KUB) (SINGLE AP VIEW)   Final Result   1. Satisfactory position of the NG tube within the stomach         XR CHEST PORTABLE   Final Result   1. There is no interval change in the multifocal bilateral pneumonia. XR CHEST PORTABLE   Final Result   Bilateral airspace disease with interval progression on the right         XR CHEST PORTABLE   Final Result   1. Endotracheal tube is 2.7 cm above the khai. 2. Stable interstitial pulmonary edema or pneumonia throughout both lungs. XR CHEST PORTABLE   Final Result   1. Worsening of the multifocal bilateral pneumonia when compared with the   prior study of 1 day earlier. XR CHEST PORTABLE   Final Result   1. Multifocal bilateral pneumonia more prominent within the right upper lobe   2. Minimal partial interval clearing of the pneumonia within the left lung   3. Worsening of the pneumonia within the right upper lobe. US DUP LOWER EXTREMITIES BILATERAL VENOUS   Final Result   Within the visualized vessels there is no evidence for deep venous   thrombosis               XR CHEST PORTABLE   Final Result   1. Lines okay. 2. Slightly worsened diffuse edema versus pneumonia. XR CHEST PORTABLE   Final Result   1. Lines okay   2. Improved aeration, mild improvement and diffuse pneumonia/edema. XR CHEST ABDOMEN NG PLACEMENT   Final Result   NG tube position satisfactory. XR CHEST PORTABLE   Final Result   Stable bilateral pneumonia or interstitial pulmonary edema. XR CHEST PORTABLE   Final Result   Increasing bilateral infiltrates. XR CHEST PORTABLE   Final Result   1. There is no interval change in the multifocal bilateral pneumonia. CT HEAD WO CONTRAST   Final Result   No acute intracranial abnormality. Cortical atrophy and periventricular white matter ischemic changes. CT CERVICAL SPINE WO CONTRAST   Final Result   No acute abnormality of the cervical spine. Moderate degenerative changes with disc space narrowing and marginal bony   spur formation C5 through C7. Ground-glass opacities in the visualized lung apices. Please refer to chest   CT for additional information. CTA CHEST W CONTRAST   Final Result   No evidence of pulmonary embolism. Extensive multifocal ground-glass opacities predominantly in the peripheral   lung zones bilaterally, highly concerning for atypical or COVID related   pneumonia. XR CHEST PORTABLE   Final Result   Bilateral patchy airspace opacities worrisome for pneumonia. XR CHEST PORTABLE    (Results Pending)        Resident's Assessment and Plan     Assessment and Plan:    Cardiovascular  Hx of HLD  - Takes atorvastatin 10mg qhs at home  Plan  - On hold 2/2 Transaminitis      Pulmonary  Acute Hypoxic Respiratory failure 2/2 COVID-19 PNA with likely superimposed bacterial pneumonia  Most recent AB.35/46.6/88.3/25.3 with p/f ratio of 1.36  - Vent settings: AC/VC, , RR 34, PEEP 12, FiO2 65%. - Urine strep and legionella negative  - p/f 1.36  - MRSA nares negative  - S/p bronchoscopy with BAL 21  Plan    - Daily ABG  - Continue brovana, pulmicort, duoneb PRN  - Continue zosyn and vancomycin  - Beta D glucan and fungitell still pending. Plan to change antibiotic tomorrow from Vanc/zosyn to unasyn pending AM procalcitonin.    - Keep paralyzed and prone 2/2 p/f ratio of 1.36  - Follow BAL results  - Continue Bumex per Nephrology   - continue weaning Steroids, Check pro calcitonin for tomorrow AM.      Gastrointestinal  Transaminitis  Plan  -  Continue to follow and atorvastatin on hold.      Hypoalbuminemia  - Albumin 2.4     Infectious Disease  COVID-19 PNA  - Positive test on 11/16/21  - S/p toci  - CRP 3.4>>1.3  - D-dimer >5250 >> 4154  - Ferritin 483>>402  - >>735  - S/p remdesivir  Plan  - CRP, d-dimer, procal every other day  - Continue vitamin C, vitamin D, zinc     Genitourinary/Renal  UTI, resolved     Rhabdomyolysis  - CK initially 3913 on admission  - >>135   Plan  - Continue to trend total CK     Hypokalemia, resolved  - See trend below     CHARAN stage III, intrinsic  - Baseline cr 0.5, current cr see trend below  - Urine electrolytes: cloride 26, k 39.5, sodium <20, urea nitrogen 836, creatinine 104  - Protein creatinie ratio 1.6  - FEUrea 15.2%  Plan  - Plan to continue bumex per nephrology      Gastrointestinal  # Constipation  - Last charted BM 11/19/21  Plan  - Continue bowel regimen     Heme/onc  Leukocytosis  - Blood clx negative  Plan  - Continue vanc, zosyn with plans to change to unasyn pending AM procal trend  - Follow resp clx, gram stain- still no growth     Thrombocytopenia  - Prior-HIT score 3, HEP score 6  - Fibrinogen 374  - Peripheral blood smear showed platelets are present in decreased numbers and display normal morphology  Plan  - HIT antibody panel- pending   - Continue argatroban until HIT Ab results.    - Platelet count stable 192.   - hemoglobin downtrend- Get FOBT to rule out GI bleed.      Psychiatric  # Hx of schizoaffective disorder  - Takes hydroxyzine 100mg qd, trazodone 200mg BID, quetiapine 200mg qd, sertraline 200mg qd, ativan 0.5mg qd at home  Plan  - Continue hydroxyzine 100mg qd, trazodone 200mg BID, quetiapine 200mg qd, sertraline 200mg qd             # Peptic ulcer prophylaxis: Protonix   # DVT Prophylaxis: Argatroban  # Disposition: Cont current care     Elio Oquendo DO, PGY-1    Attending Physician: Dr. Erum Li   I personally saw, examined and provided care for the patient. Radiographs, labs and medication list were reviewed by me independently. I spoke with bedside nursing, therapists and consultants. Critical care services and times documented are independent of procedures and multidisciplinary rounds with Residents. Additionally comprehensive, multidisciplinary rounds were conducted with the MICU team. The case was discussed in detail and plans for care were established. Review of Residents documentation was conducted and revisions were made as appropriate. I agree with the above documented exam, problem list and plan of care. ARDS covid  Post bronch ,no growth   On argatroban  HIT ab should be soon ,hold on coumadin   Need diuresis  Check stool hemocoult  Watch hb   [phone]  plt less than 30   ABG looks   Change to Unasyn ext 102 days ,check procka for tomorrow                 Care reviewed with nursing staff, medical and surgical specialty care, primary care and the patient's family as available. Chart review/lab review/X-ray viewing/documentation and had long Conversation with patient/family re: prognosis, care options and any end of life issues:      Critical care time spent reviewing labs/films, examining patient, collaborating with other physicians more than 35  Minutes  excluding procedures . Bibiana Cavanaugh M.D.   11/25/2021  7:35 PM

## 2021-11-25 NOTE — PROGRESS NOTES
Pharmacy Consultation Note  (Antibiotic Dosing and Monitoring)    Initial consult date: 11/20/21  Consulting physician/provider: Brenda Cespedes MD  Drug: Vancomycin  Indication: sepsis    Age/  Gender Height Weight IBW  Allergy Information   65 y.o./female 5' 1\" (154.9 cm) 152 lb (68.9 kg)     Ideal body weight: 47.8 kg (105 lb 6.1 oz)  Adjusted ideal body weight: 58.4 kg (128 lb 13.2 oz)   Ciprofloxacin      Renal Function:  Recent Labs     11/24/21  0825 11/24/21  1822 11/25/21  0456   BUN 48* 49* 52*   CREATININE 1.5* 1.6* 1.6*       Intake/Output Summary (Last 24 hours) at 11/25/2021 0911  Last data filed at 11/25/2021 0600  Gross per 24 hour   Intake 2569 ml   Output 2620 ml   Net -51 ml       Vancomycin Monitoring:  Trough:  No results for input(s): VANCOTROUGH in the last 72 hours. Random:    Recent Labs     11/25/21  0456   VANCORANDOM 17.0       Vancomycin Administration Times:  Recent vancomycin administrations                   vancomycin (VANCOCIN) 750 mg in dextrose 5 % 250 mL IVPB (mg) 750 mg New Bag 11/25/21 0828     750 mg New Bag 11/24/21 0843     750 mg New Bag 11/23/21 0839                  Assessment:  · Patient is a 60 y.o. female who has been initiated on vancomycin  Estimated Creatinine Clearance: 33 mL/min (A) (based on SCr of 1.6 mg/dL (H)). · To dose vancomycin, pharmacy will be utilizing [employer] calculation software for goal AUC/WANG 400-600 mg/L-hr   · 11/20: WBC elevated at 13.8  · 11/21: Random level 22.3 mg/L (~6 hours post-vanco infusion); WBC WNL; afebrile  · 11/22:  Scr increased from 0.9 to 1.5 mg/dL today; WBC elevated at 15.5  · 11/23: WBC remain elevated but decreasing  · 11/24: WBC 16.4; Scr remains elevated at 1.5; patient afebrile x 2 days; day #6 of vancomycin; day #5 of Zosyn  · 11/25: WBC 15.2, sCr still in CHARAN but stable at 1.6. Improving urine output. Day 7 of vanc and day 6 of Zosyn.   Random level 17 today, correlates with AUC/WANG of 480    Plan:  · Continue vancomycin 750 mg IV every 24 hours (est ; est trough 15.5 mg/L)  · Will check vancomycin level tomorrow morning   · Will continue to monitor renal function     Dyan Laughlin, PharmD, [facility] 11/25/2021 9:13 AM  Pharmacy Clinical Specialist, Emergency Medicine  [phone]

## 2021-11-26 NOTE — PROGRESS NOTES
as documented by the resident. Please refer to the  resident and/or student note for additional information.       Lucian Morales MD

## 2021-11-26 NOTE — PROGRESS NOTES
[facility] - Family Medicine Inpatient   Resident Progress Note    S:  Hospital day: 10   Brief Synopsis: Bear Dasilva is a 60 y.o. female with pmh of GERD, osteoarthritis and schizoaffective disorder who presented to ER s/p fall at home. Patient found down at home, with initial O2 saturation of 60%. Brought to the ER; found to have COVID-19 pneumonia , requiring bipap for appropriate saturation. Rhabdomyolysis, UTI. Started on appropriate management including tocilizumab, ceftriaxone 1 g daily, and IV fluids for rhabdomyolysis. Decadron was started daily, and with patients worsening status - pulmonology consulted. FULL CODE. Transferred to ICU on 11/18 for rapid breathing and hypoxia and pO2 of 55. Intubated 11/20 secondary to O2 sats consistently <80% with rapid breathing. Seen in the ICU this AM. Intubated, sedated, proned, paralyzed. Continuing to follow for clinical improvement.      Cont meds:    midazolam 1 mg/hr (11/26/21 8466)    fentaNYL 5 mcg/ml in 0.9%  ml infusion 150 mcg/hr (11/26/21 1378)    cisatracurium (NIMBEX) infusion 4 mcg/kg/min (11/26/21 0640)    sodium chloride 100 mL/hr at 11/1956    dextrose       Scheduled meds:    potassium chloride  40 mEq IntraVENous Once    bumetanide  2 mg IntraVENous BID    bumetanide  2 mg IntraVENous BID    heparin (porcine)  5,000 Units SubCUTAneous Q8H    hydrocortisone sodium succinate PF  50 mg IntraVENous Daily    sennosides-docusate sodium  2 tablet Oral BID    polyethylene glycol  17 g Oral Daily    vancomycin  750 mg IntraVENous Q24H    chlorhexidine  15 mL Mouth/Throat BID    artificial tears   Both Eyes Q6H    piperacillin-tazobactam  3,375 mg IntraVENous Q8H    sodium chloride   IntraVENous Q8H    pantoprazole  40 mg Oral QAM AC    budesonide  1 mg Nebulization BID    Arformoterol Tartrate  15 mcg Nebulization BID    [Held by provider] atorvastatin  10 mg Oral Daily    QUEtiapine  200 mg Oral Daily    sertraline  200 mg Oral Daily    traZODone  100 mg Oral Nightly    sodium chloride flush  5-40 mL IntraVENous 2 times per day    ascorbic acid  1,000 mg Oral Daily    vitamin D  2,000 Units Oral Daily    zinc sulfate  50 mg Oral Daily     PRN meds: ipratropium-albuterol, sodium chloride flush, sodium chloride, ondansetron **OR** ondansetron, acetaminophen **OR** acetaminophen, glucose, dextrose, glucagon (rDNA), dextrose     I reviewed the patient's past medical and surgical history, Medications and Allergies. O:  /69   Pulse 115   Temp 102 °F (38.9 °C) (Esophageal)   Resp (!) 34   Ht 5' 1\" (1.549 m)   Wt 164 lb (74.4 kg)   SpO2 95%   BMI 30.99 kg/m²   24 hour I&O: I/O last 3 completed shifts: In: 2452.2 [I.V.:1407.4; NG/GT:860; IV Piggyback:184.8]  Out: 4854 [Urine:2255]  No intake/output data recorded. Physical Exam  Constitutional:       General: She is not in acute distress. Comments: Intubated, sedated, paralyzed, and proned. Cardiovascular:      Rate and Rhythm: Normal rate and regular rhythm. Pulses: Normal pulses. Heart sounds: Normal heart sounds. Pulmonary:      Comments: Saturating well with intubation, sedation, proned and paralyzed. Coarse lung sounds heard throughout lung fields  Abdominal:      General: Bowel sounds are normal. There is no distension. Palpations: Abdomen is soft. Tenderness: There is no abdominal tenderness. Musculoskeletal:         General: Normal range of motion. Right lower leg: No edema. Left lower leg: No edema. Labs:  Na/K/Cl/CO2:  145/3.6/108/26 (11/26 7935)  BUN/Cr/glu/ALT/AST/amyl/lip:  52/1.5/--/44/45/--/-- (11/26 1822)  WBC/Hgb/Hct/Plts:  20.1/9.7/30.0/228 (11/26 5560)  estimated creatinine clearance is 35 mL/min (A) (based on SCr of 1.5 mg/dL (H)). Other pertinent labs as noted below    Radiology:  XR ABDOMEN (KUB) (SINGLE AP VIEW)   Final Result   1.  Satisfactory position of the NG tube within the stomach         XR CHEST PORTABLE   Final Result   1. There is no interval change in the multifocal bilateral pneumonia. XR CHEST PORTABLE   Final Result   Bilateral airspace disease with interval progression on the right         XR CHEST PORTABLE   Final Result   1. Endotracheal tube is 2.7 cm above the khai. 2. Stable interstitial pulmonary edema or pneumonia throughout both lungs. XR CHEST PORTABLE   Final Result   1. Worsening of the multifocal bilateral pneumonia when compared with the   prior study of 1 day earlier. XR CHEST PORTABLE   Final Result   1. Multifocal bilateral pneumonia more prominent within the right upper lobe   2. Minimal partial interval clearing of the pneumonia within the left lung   3. Worsening of the pneumonia within the right upper lobe. US DUP LOWER EXTREMITIES BILATERAL VENOUS   Final Result   Within the visualized vessels there is no evidence for deep venous   thrombosis               XR CHEST PORTABLE   Final Result   1. Lines okay. 2. Slightly worsened diffuse edema versus pneumonia. XR CHEST PORTABLE   Final Result   1. Lines okay   2. Improved aeration, mild improvement and diffuse pneumonia/edema. XR CHEST ABDOMEN NG PLACEMENT   Final Result   NG tube position satisfactory. XR CHEST PORTABLE   Final Result   Stable bilateral pneumonia or interstitial pulmonary edema. XR CHEST PORTABLE   Final Result   Increasing bilateral infiltrates. XR CHEST PORTABLE   Final Result   1. There is no interval change in the multifocal bilateral pneumonia. CT HEAD WO CONTRAST   Final Result   No acute intracranial abnormality. Cortical atrophy and periventricular white matter ischemic changes. CT CERVICAL SPINE WO CONTRAST   Final Result   No acute abnormality of the cervical spine. Moderate degenerative changes with disc space narrowing and marginal bony   spur formation C5 through C7. Ground-glass opacities in the visualized lung apices. Please refer to chest   CT for additional information. CTA CHEST W CONTRAST   Final Result   No evidence of pulmonary embolism. Extensive multifocal ground-glass opacities predominantly in the peripheral   lung zones bilaterally, highly concerning for atypical or COVID related   pneumonia. XR CHEST PORTABLE   Final Result   Bilateral patchy airspace opacities worrisome for pneumonia. XR CHEST PORTABLE    (Results Pending)   XR CHEST PORTABLE    (Results Pending)       A/P:  Active Problems:    Acute respiratory failure with hypoxia (HCC)    Rhabdomyolysis    Acute cystitis with hematuria  Resolved Problems:    * No resolved hospital problems. *    Monitoring for clinical improvement  Follow-up fecal occult  Argatroban, HIT antibodies pending. 1. Intubated, Sedated, Paralyzed, Proned  11/20: Intubated 2/2 hypoxia and increased work of breathing, proned and paralyzed    2. Acute Hypoxic Respiratory Failure 2/2 Covid 19  Unvaccinated for Covid 19  Patient found with pulse ox of 60% --> improved on bipap in ER  CXR showing bilateral pulmonary infiltrates  Inflammatory markers: DDimer 530, , CRP 22.9, Ferritin 749 on admission   CTA pulm showing extensive bilateral pulmonary infiltrates consistent with COVID-19 pneumonia , no PE  Continue Vitamin C, Vitamin D, Zinc  Given one dose decadron 10 mg in the ER  Continue 6 mg IV decadron daily  Heparin 5K every 8 hours  Tociluzumab completed, continue remdesivir    Droplet precautions  Monitor on Telemetry   Pulmonology consulted ; appreciate recs  Dietary consulted for further recommendations on nutrition status ; appreciate recs. 11/18 - transferred to ICU due to worsening resp status. Continue Angie Galvez Duoneb PRN  CXR daily    Advanced Care Planning with Spiritual Services ; recs appreciated - FULL CODE.     3.  Rhabdomyolysis - improving  2/2 to fall for unknown time period  Patient found down for unknown period of time  Initial CK > 3000  Received 4 L NS in ER  Continue to trend CK    4. Concern for Sepsis  Likely superimposed bacterial pneumonia  Tmax 102.2 , Tavg 100.3F  Given one dose cefepime and vancomycin in ICU  Procal 0.07, repeat 0.27 , blood cultures, urine culture negative. Fungitell and B glucan pending  On Pip/Tazo, Vanocomycin    5. CHARAN Stage III  Admission creatinine 0.6  Trending up --> 1.5  Obtain urine electrolytes, creatinine , urea  Calculate FeNa/ FeUra    6. Thrombocytopenia  Likely 2/2 to SALENA  Hold lovenox  Continue with argatroban  Follow up SALENA panel  F/u PBS    7. Hypokalemia  Initial K 3.2 , Mag 2.6   CMP daily  Replete as necessary    8. Hx Schizoaffective Disorder / Anxiety  Continue seroquel , zoloft, trazodone  Hold ativan, vistaril  Precedex started in ICU ; wean as appropriate    9.  Concern for UTI - resolved   Likely simple cystitis ; patient with no CVA tenderness , presented initially with fever 102 F  Urinalysis with increased nitrites, bacteria, blood  Urine culture, blood cultures were negative  Given one dose doxy and rocephin in the ER  Continue 1g ceftriaxone IV x 3 days -  dc'd 11/18/2021     GI/DVT ppx: protonix, argatroban   Diet:   Disposition: MICU    Electronically signed by Dwight Melendez MD  PGY-2 on 11/26/2021 at 7:53 AM  This case was discussed with attending physician: Dr. Mary Alice Green

## 2021-11-26 NOTE — PROGRESS NOTES
Continuous Infusions:   midazolam 1 mg/hr (11/26/21 9531)    fentaNYL 5 mcg/ml in 0.9%  ml infusion 125 mcg/hr (11/26/21 0915)    cisatracurium (NIMBEX) infusion 4 mcg/kg/min (11/26/21 0640)    sodium chloride 100 mL/hr at 11/1956    dextrose       Scheduled Meds:   bumetanide  2 mg IntraVENous BID    methylnaltrexone  12 mg SubCUTAneous Once    heparin (porcine)  5,000 Units SubCUTAneous Q8H    hydrocortisone sodium succinate PF  50 mg IntraVENous Daily    sennosides-docusate sodium  2 tablet Oral BID    polyethylene glycol  17 g Oral Daily    chlorhexidine  15 mL Mouth/Throat BID    artificial tears   Both Eyes Q6H    piperacillin-tazobactam  3,375 mg IntraVENous Q8H    sodium chloride   IntraVENous Q8H    pantoprazole  40 mg Oral QAM AC    budesonide  1 mg Nebulization BID    Arformoterol Tartrate  15 mcg Nebulization BID    [Held by provider] atorvastatin  10 mg Oral Daily    QUEtiapine  200 mg Oral Daily    sertraline  200 mg Oral Daily    traZODone  100 mg Oral Nightly    sodium chloride flush  5-40 mL IntraVENous 2 times per day    ascorbic acid  1,000 mg Oral Daily    vitamin D  2,000 Units Oral Daily    zinc sulfate  50 mg Oral Daily     PRN Meds: ipratropium-albuterol, sodium chloride flush, sodium chloride, ondansetron **OR** ondansetron, acetaminophen **OR** acetaminophen, glucose, dextrose, glucagon (rDNA), dextrose  Nutrition:   NG/OG tube TF type: Renal At rate: 30 ml/h    Labs and Imaging Studies     CBC:   Recent Labs     11/24/21  0416 11/25/21 0456 11/26/21  0420   WBC 16.4* 15.2* 20.1*   HGB 9.7* 9.3* 9.7*   HCT 29.5* 28.7* 30.0*   MCV 93.9 97.3 98.4    192 228       BMP:    Recent Labs     11/24/21  1822 11/25/21 0456 11/26/21  0420    142 145   K 3.3* 4.4 3.6   * 110* 108*   CO2 25 24 26   BUN 49* 52* 52*   CREATININE 1.6* 1.6* 1.5*   GLUCOSE 191* 184* 195*       LIVER PROFILE:   Recent Labs     11/24/21  0416 11/25/21  0453 11/26/21  0420   AST 38* 34* 45*   ALT 58* 46* 44*   BILIDIR <0.2 <0.2 <0.2   BILITOT 0.3 0.4 0.4   ALKPHOS 74 77 82       PT/INR:   Recent Labs     11/24/21  1441 11/25/21  0456 11/26/21  0420   PROTIME 23.1* 22.1* 13.7*   INR 2.1 2.0 1.3       APTT:   Recent Labs     11/25/21  0456 11/25/21  1123 11/26/21  0420   APTT 39.5* 40.0* 23.7*       Fasting Lipid Panel:    Lab Results   Component Value Date    CHOL 322 10/12/2021    TRIG 150 10/12/2021    HDL 70 10/12/2021       Cardiac Enzymes:    Lab Results   Component Value Date    CKTOTAL 135 11/22/2021    CKTOTAL 488 (H) 11/20/2021    CKTOTAL 585 (H) 11/19/2021       Notable Cultures:      Blood cultures   Blood Culture, Routine   Date Value Ref Range Status   11/22/2021 24 Hours no growth  Preliminary     Respiratory cultures No results found for: RESPCULTURE No results found for: LABGRAM  Urine   Urine Culture, Routine   Date Value Ref Range Status   11/19/2021 Growth not present  Final     Legionella No results found for: LABLEGI  C Diff PCR No results found for: CDIFPCR  Wound culture/abscess: No results for input(s): WNDABS in the last 72 hours. Tip culture:No results for input(s): CXCATHTIP in the last 72 hours.       Oxygen:     Vent Information  $Ventilation: $Subsequent Day  Skin Assessment: Clean, dry, & intact  Equipment ID: [id number]  Equipment Changed: Humidification  Vent Type: [phone]  Vent Mode: AC/VC  Vt Ordered: 320 mL  Pressure Ordered: 14  Rate Set: 34 bmp  Peak Flow: 60 L/min  Pressure Support: 0 cmH20  FiO2 : 55 %  SpO2: 93 %  SpO2/FiO2 ratio: 169.09  Sensitivity: 3  PEEP/CPAP: 12  I Time/ I Time %: 0 s  Humidification Source: Heated wire  Humidification Temp: 37  Humidification Temp Measured: 36.9  Circuit Condensation: Drained  Mask Type: Full face mask  Mask Size: Small  Additional Respiratory  Assessments  Pulse: 117  Resp: (!) 34  SpO2: 93 %  Position: Prone  Humidification Source: Heated wire  Humidification Temp: 37  Circuit Condensation: Drained  Oral Care Completed?: Yes  Oral Care: Mouth swabbed, Mouth moisturizer, Mouth suctioned  Subglottic Suction Done?: Yes  Airway Type: ET  Airway Size: 8  Cuff Pressure (cm H2O): 29 cm H2O       [REMOVED] Urethral Catheter Temperature probe-Output (mL): 10 mL  Urethral Catheter-Output (mL): 150 mL  [REMOVED] Urethral Catheter Temperature probe 16 fr-Output (mL): 250 mL    Imaging Studies:  XR CHEST PORTABLE   Final Result   1. Somewhat limited exam, grossly unchanged. Please see above comments. .      RECOMMENDATION:   1. Recommend follow-up thoracic imaging, as directed clinically. .         XR ABDOMEN (KUB) (SINGLE AP VIEW)   Final Result   1. Satisfactory position of the NG tube within the stomach         XR CHEST PORTABLE   Final Result   1. There is no interval change in the multifocal bilateral pneumonia. XR CHEST PORTABLE   Final Result   Bilateral airspace disease with interval progression on the right         XR CHEST PORTABLE   Final Result   1. Endotracheal tube is 2.7 cm above the khai. 2. Stable interstitial pulmonary edema or pneumonia throughout both lungs. XR CHEST PORTABLE   Final Result   1. Worsening of the multifocal bilateral pneumonia when compared with the   prior study of 1 day earlier. XR CHEST PORTABLE   Final Result   1. Multifocal bilateral pneumonia more prominent within the right upper lobe   2. Minimal partial interval clearing of the pneumonia within the left lung   3. Worsening of the pneumonia within the right upper lobe. US DUP LOWER EXTREMITIES BILATERAL VENOUS   Final Result   Within the visualized vessels there is no evidence for deep venous   thrombosis               XR CHEST PORTABLE   Final Result   1. Lines okay. 2. Slightly worsened diffuse edema versus pneumonia. XR CHEST PORTABLE   Final Result   1. Lines okay   2. Improved aeration, mild improvement and diffuse pneumonia/edema.          XR CHEST ABDOMEN NG PLACEMENT   Final Result   NG tube position satisfactory. XR CHEST PORTABLE   Final Result   Stable bilateral pneumonia or interstitial pulmonary edema. XR CHEST PORTABLE   Final Result   Increasing bilateral infiltrates. XR CHEST PORTABLE   Final Result   1. There is no interval change in the multifocal bilateral pneumonia. CT HEAD WO CONTRAST   Final Result   No acute intracranial abnormality. Cortical atrophy and periventricular white matter ischemic changes. CT CERVICAL SPINE WO CONTRAST   Final Result   No acute abnormality of the cervical spine. Moderate degenerative changes with disc space narrowing and marginal bony   spur formation C5 through C7. Ground-glass opacities in the visualized lung apices. Please refer to chest   CT for additional information. CTA CHEST W CONTRAST   Final Result   No evidence of pulmonary embolism. Extensive multifocal ground-glass opacities predominantly in the peripheral   lung zones bilaterally, highly concerning for atypical or COVID related   pneumonia. XR CHEST PORTABLE   Final Result   Bilateral patchy airspace opacities worrisome for pneumonia. XR CHEST PORTABLE    (Results Pending)        Resident's Assessment and Plan     Assessment and Plan:    Cardiovascular  Hx of HLD  - Takes atorvastatin 10mg qhs at home  Plan  - On hold 2/2 Transaminitis      Pulmonary  Acute Hypoxic Respiratory failure 2/2 COVID-19 PNA with likely superimposed bacterial pneumonia  Most recent AB.35/46.6/88.3/25.3 with p/f ratio of 1.36  - Vent settings: AC/VC, , RR 34, PEEP 12, FiO2 65%. - Urine strep and legionella negative  - p/f 1.37  - MRSA nares negative  - S/p bronchoscopy with BAL 21  Plan    - Daily ABG  - Continue brovana, pulmicort, duoneb PRN  - Continue zosyn and vancomycin  - Beta D glucan and fungitell still pending. Repeat procal is . 11  - Consult ID- Plans to discontinue antibiotics per ID (Already stopped Vancomycin and still on zosyn per ID)   - Keep paralyzed and prone 2/2 p/f ratio of 1.37  - Plans for continued diuresis with Bumex per nephrology. - continue weaning Steroids, Check pro calcitonin for tomorrow AM.      Gastrointestinal  Transaminitis  Plan  -  Continue to follow and atorvastatin on hold.      Hypoalbuminemia  - Albumin 2.4    Constipation  -KUB showed no ileus/obstruction   - Start dose of Relistor today     Infectious Disease  COVID-19 PNA  - Positive test on 11/16/21  - S/p toci  - CRP 3.4>>1.3>.6  - D-dimer >5250 >> 3713>9135  - Ferritin 483>>402>>448  - LDH 925>>735>>730  - S/p remdesivir  - Respiratory cultures positive for candida   Plan  - CRP, d-dimer, procal every other day  - Continue vitamin C, vitamin D, zinc     Genitourinary/Renal  UTI, resolved     Rhabdomyolysis  - CK initially 3913 on admission  - >>135   Plan  - Continue to trend total CK     Hypokalemia, resolved  - See trend below     CHARAN stage III, intrinsic  - Baseline cr 0.5, current cr see trend below  - Urine electrolytes: cloride 26, k 39.5, sodium <20, urea nitrogen 836, creatinine 104  - Protein creatinie ratio 1.6  - FEUrea 15.2%  Plan  - Plan to continue bumex per nephrology      Gastrointestinal  # Constipation  - Last charted BM 11/19/21  Plan  - Continue bowel regimen     Heme/onc  Leukocytosis  - Blood clx negative  Plan  - Continue vanc, zosyn with plans to change to unasyn pending AM procal trend  - Follow resp clx, gram stain- still no growth     Thrombocytopenia  - Prior-HIT score 3, HEP score 6  - Fibrinogen 374  - Peripheral blood smear showed platelets are present in decreased numbers and display normal morphology  Plan  - HIT antibody panel- Negative    - Platelet count up trend to 228 and stable.     - hemoglobin stable at 9.7- Get FOBT to rule out GI bleed.   -Start patient on Heparin and discontinue argatroban 2/2 negtive HIT antibody     Psychiatric  # Hx of schizoaffective disorder  - Takes hydroxyzine 100mg qd, trazodone 200mg BID, quetiapine 200mg qd, sertraline 200mg qd, ativan 0.5mg qd at home  Plan  - Continue hydroxyzine 100mg qd, trazodone 200mg BID, quetiapine 200mg qd, sertraline 200mg qd           # Peptic ulcer prophylaxis: Protonix  # DVT Prophylaxis: Heparin TID   # Disposition: Cont current care     Yue Post DO, PGY-1    Attending Physician: Dr. Yanely Gill    I personally saw, examined and provided care for the patient. Radiographs, labs and medication list were reviewed by me independently. I spoke with bedside nursing, therapists and consultants. Critical care services and times documented are independent of procedures and multidisciplinary rounds with Residents. Additionally comprehensive, multidisciplinary rounds were conducted with the MICU team. The case was discussed in detail and plans for care were established. Review of Residents documentation was conducted and revisions were made as appropriate. I agree with the above documented exam, problem list and plan of care. ARDS covid  Post bronch ,no growth   D/c vanco   I would think Dc Zosyn,but will get ID opinion   Stop argatroban  Bumex  Left lung up   Need diuresis  Check stool hemocoult/laxative   Watch hb   [phone]  plt less than 22  ABG looks    Prone and paralyzed       Care reviewed with nursing staff, medical and surgical specialty care, primary care and the patient's family as available. Chart review/lab review/X-ray viewing/documentation and had long Conversation with patient/family re: prognosis, care options and any end of life issues:      Critical care time spent reviewing labs/films, examining patient, collaborating with other physicians more than 35  Minutes  excluding procedures . Sharon Caban M.D.   11/26/2021  9:21 PM

## 2021-11-26 NOTE — CONSULTS
Department of Internal Medicine  Infectious Diseases   Consult Note      Reason for Consult: Leukocytosis     Requesting Physician:  Dr Erma Estrella:                Pt is intubated, sedated and paralyzed . Discussed with Dr Nakia Ramírez     This is a 60 yrs old female with hx of schizoaffective disorder, GERD presented to the ER on 11/16 after she fell ( down time unknown ) - she was hypoxic - oxygen saturation was 60 % as assessed by the EMA . Pt was placed on BiPAP - then intubated . SARS CoV 2 positive . She was treated with remdeisivir, hydrocortisone, and tocilizumab . She was also placed on vancomycin and zosyn for superimposed bacterial infection .   WBC 17-20 K , procalcitonin 0.11, CRP 0.6       Past Medical History:      Past Medical History:   Diagnosis Date    GERD (gastroesophageal reflux disease)     Osteoarthritis of right knee     Schizoaffective disorder ([facility])     [facility]       Past Surgical History:      Past Surgical History:   Procedure Laterality Date    BRONCHOSCOPY N/A 11/23/2021    BRONCHOSCOPY ALVEOLAR LAVAGE performed by Gary Park MD at [address] GASTRIC FUNDOPLICATION           Current Medications:      Current Facility-Administered Medications   Medication Dose Route Frequency Provider Last Rate Last Admin    midazolam (VERSED) 100 mg in dextrose 5 % 100 mL infusion  1-10 mg/hr IntraVENous Continuous Silvia Zhou MD 1 mL/hr at 11/26/21 0633 1 mg/hr at 11/26/21 0633    bumetanide (BUMEX) injection 2 mg  2 mg IntraVENous BID Machelle Fuentes MD        heparin (porcine) injection 5,000 Units  5,000 Units SubCUTAneous Q8H Nenita Lesli Hatch MD   5,000 Units at 11/26/21 0640    hydrocortisone sodium succinate PF (SOLU-CORTEF) injection 50 mg  50 mg IntraVENous Daily Rose Steinberg MD   50 mg at 11/26/21 1122    sennosides-docusate sodium (SENOKOT-S) 8.6-50 MG tablet 2 tablet  2 tablet Oral BID Rose Steinberg MD   2 tablet at 11/26/21 0093  polyethylene glycol (GLYCOLAX) packet 17 g  17 g Oral Daily Janell Bennett MD   17 g at 11/26/21 0812    chlorhexidine (PERIDEX) 0.12 % solution 15 mL  15 mL Mouth/Throat BID Keyona Li MD   15 mL at 11/26/21 0813    lubrifresh P.M. (artificial tears) ophthalmic ointment   Both Eyes Q6H Keyona Li MD   Given at 11/26/21 0810    fentaNYL 5 mcg/ml in 0.9%  ml infusion  12.5-200 mcg/hr IntraVENous Continuous Janell Bennett MD 25 mL/hr at 11/26/21 0915 125 mcg/hr at 11/26/21 0915    piperacillin-tazobactam (ZOSYN) 3,375 mg in dextrose 5 % 100 mL IVPB extended infusion (mini-bag)  3,375 mg IntraVENous Q8H Janell Bennett MD 25 mL/hr at 11/26/21 1000 3,375 mg at 11/26/21 1000    piperacillin/tazobactam NS flush   IntraVENous Q8H Janell Bennett MD   Stopped at 11/25/21 0717    cisatracurium besylate (NIMBEX) 200 mg in sodium chloride 0.9 % 100 mL infusion  0.5-10 mcg/kg/min IntraVENous Continuous Janell Bennett MD 8.6 mL/hr at 11/26/21 0640 4 mcg/kg/min at 11/26/21 0640    pantoprazole (PROTONIX) tablet 40 mg  40 mg Oral QAM AC Lucian Flaherty,         ipratropium-albuterol (DUONEB) nebulizer solution 1 ampule  1 ampule Inhalation Q4H PRN Janell Bennett MD   1 ampule at 11/24/21 1248    budesonide (PULMICORT) nebulizer suspension 1,000 mcg  1 mg Nebulization BID Sean Dee MD   1,000 mcg at 11/26/21 0849    Arformoterol Tartrate (BROVANA) nebulizer solution 15 mcg  15 mcg Nebulization BID Sean Dee MD   15 mcg at 11/26/21 0849    [Held by provider] atorvastatin (LIPITOR) tablet 10 mg  10 mg Oral Daily Emperatriz Nj MD   10 mg at 11/19/21 0837    QUEtiapine (SEROQUEL) tablet 200 mg  200 mg Oral Daily Emperatriz Nj MD   200 mg at 11/26/21 3115    sertraline (ZOLOFT) tablet 200 mg  200 mg Oral Daily Emperatriz Nj MD   200 mg at 11/26/21 5008    traZODone (DESYREL) tablet 100 mg  100 mg Oral Nightly Emperatriz Nj MD   100 mg at 11/25/21 2012    sodium chloride flush 0.9 % injection 5-40 mL  5-40 mL IntraVENous 2 times per day Martina Wilburn MD   10 mL at 11/26/21 0813    sodium chloride flush 0.9 % injection 5-40 mL  5-40 mL IntraVENous PRN Martina Wilburn MD   10 mL at 11/25/21 1026    0.9 % sodium chloride infusion  25 mL IntraVENous PRN Martina Wilburn  mL/hr at 11/1956 Rate Verify at 11/1956    ondansetron (ZOFRAN-ODT) disintegrating tablet 4 mg  4 mg Oral Q8H PRN Martina Wilburn MD        Or    ondansetron [facility]) injection 4 mg  4 mg IntraVENous Q6H PRN Martina Wilburn MD        acetaminophen (TYLENOL) tablet 650 mg  650 mg Oral Q6H PRN Martina Wilburn MD   650 mg at 11/26/21 1583    Or    acetaminophen (TYLENOL) suppository 650 mg  650 mg Rectal Q6H PRN Martina Wilburn MD   650 mg at 11/26/21 1239    ascorbic acid (VITAMIN C) tablet 1,000 mg  1,000 mg Oral Daily Martina Wilburn MD   1,000 mg at 11/26/21 7523    Vitamin D (CHOLECALCIFEROL) tablet 2,000 Units  2,000 Units Oral Daily Martina Wilburn MD   2,000 Units at 11/26/21 2479    zinc sulfate (ZINCATE) capsule 50 mg  50 mg Oral Daily Martina Wilburn MD   50 mg at 11/26/21 0811    glucose (GLUTOSE) 40 % oral gel 15 g  15 g Oral PRN Martina Wilburn MD        dextrose 50 % IV solution  12.5 g IntraVENous PRN Martina Wilburn MD        glucagon (rDNA) injection 1 mg  1 mg IntraMUSCular PRN Martina Wilburn MD        dextrose 5 % solution  100 mL/hr IntraVENous PRN Martina Wilburn MD           Allergies:  Ciprofloxacin    Social History:      Social History     Socioeconomic History    Marital status: Single     Spouse name: Not on file    Number of children: Not on file    Years of education: Not on file    Highest education level: Not on file   Occupational History    Not on file   Tobacco Use    Smoking status: Never Smoker    Smokeless tobacco: Never Used   Vaping Use    Vaping Use: Never used   Substance and Sexual Activity    Alcohol use: Not Currently    Drug use: Never    Sexual activity: Not on file Other Topics Concern    Not on file   Social History Narrative    Not on file     Social Determinants of Health     Financial Resource Strain: Low Risk     Difficulty of Paying Living Expenses: Not very hard   Food Insecurity: Food Insecurity Present    Worried About Running Out of Food in the Last Year: Sometimes true    Madiha of Food in the Last Year: Never true   Transportation Needs:     Lack of Transportation (Medical): Not on file    Lack of Transportation (Non-Medical):  Not on file   Physical Activity:     Days of Exercise per Week: Not on file    Minutes of Exercise per Session: Not on file   Stress:     Feeling of Stress : Not on file   Social Connections:     Frequency of Communication with Friends and Family: Not on file    Frequency of Social Gatherings with Friends and Family: Not on file    Attends Jew Services: Not on file    Active Member of [address] [place] [address] [employer] or Organizations: Not on file    Attends Club or Organization Meetings: Not on file    Marital Status: Not on file   Intimate Partner Violence:     Fear of Current or Ex-Partner: Not on file    Emotionally Abused: Not on file    Physically Abused: Not on file    Sexually Abused: Not on file   Housing Stability:     Unable to Pay for Housing in the Last Year: Not on file    Number of [place] in the Last Year: Not on file    Unstable Housing in the Last Year: Not on file         Family History:     Family History   Problem Relation Age of Onset    Premenopausal breast cancer Mother 46        invasive    Postmenopausal breast cancer Maternal Grandmother 67    Postmenopausal breast cancer Maternal Aunt 70       REVIEW OF SYSTEMS: could not be obtained       PHYSICAL EXAM:      Vitals:     /69   Pulse 114   Temp 101.8 °F (38.8 °C) (Esophageal)   Resp (!) 34   Ht 5' 1\" (1.549 m)   Wt 164 lb (74.4 kg)   SpO2 93%   BMI 30.99 kg/m²     General Appearance:    Intubated, sedated, prone, FiO2 55, PEEP 12    Head: Normocephalic, atraumatic   Eyes:       Ears:    No obvious deformity or drainage.    Nose:     Throat:    Neck:   Supple, no lymphadenopathy   Lungs:     Clear to auscultation    Heart:    Regular   Abdomen:     Soft, non-tender, bowel sounds present    Extremities:    No edema    Pulses:   Dorsalis pedis palpable    Skin:   no rashes             CBC with Differential:      Lab Results   Component Value Date    WBC 20.1 11/26/2021    RBC 3.05 11/26/2021    HGB 9.7 11/26/2021    HCT 30.0 11/26/2021     11/26/2021    MCV 98.4 11/26/2021    MCH 31.8 11/26/2021    MCHC 32.3 11/26/2021    RDW 17.0 11/26/2021    NRBC 0.9 11/26/2021    METASPCT 0.9 11/21/2021    LYMPHOPCT 2.6 11/26/2021    MONOPCT 2.6 11/26/2021    MYELOPCT 0.9 11/25/2021    BASOPCT 0.2 11/26/2021    MONOSABS 0.60 11/26/2021    LYMPHSABS 0.60 11/26/2021    EOSABS 0.00 11/26/2021    BASOSABS 0.00 11/26/2021       CMP     Lab Results   Component Value Date     11/26/2021    K 3.6 11/26/2021    K 4.8 11/19/2021     11/26/2021    CO2 26 11/26/2021    BUN 52 11/26/2021    CREATININE 1.5 11/26/2021    GFRAA 42 11/26/2021    LABGLOM 35 11/26/2021    GLUCOSE 195 11/26/2021    GLUCOSE 77 05/12/2011    PROT 4.9 11/26/2021    LABALBU 2.7 11/26/2021    CALCIUM 7.9 11/26/2021    BILITOT 0.4 11/26/2021    ALKPHOS 82 11/26/2021    AST 45 11/26/2021    ALT 44 11/26/2021         Hepatic Function Panel:    Lab Results   Component Value Date    ALKPHOS 82 11/26/2021    ALT 44 11/26/2021    AST 45 11/26/2021    PROT 4.9 11/26/2021    BILITOT 0.4 11/26/2021    BILIDIR <0.2 11/26/2021    IBILI see below 11/26/2021    LABALBU 2.7 11/26/2021       PT/INR:    Lab Results   Component Value Date    PROTIME 13.7 11/26/2021    INR 1.3 11/26/2021       TSH:    Lab Results   Component Value Date    TSH 4.720 10/12/2021       U/A:    Lab Results   Component Value Date    COLORU Yellow 11/16/2021    PHUR 6.0 11/16/2021    WBCUA NONE 11/16/2021    RBCUA 1-3 11/16/2021 BACTERIA MANY 11/16/2021    CLARITYU Clear 11/16/2021    SPECGRAV 1.025 11/16/2021    LEUKOCYTESUR Negative 11/16/2021    UROBILINOGEN 0.2 11/16/2021    BILIRUBINUR Negative 11/16/2021    BLOODU LARGE 11/16/2021    GLUCOSEU Negative 11/16/2021       ABG:    Lab Results   Component Value Date    KSV4AET 21.6 11/18/2021    PBZ9BLK 32.3 11/18/2021    PO2ART 55.2 11/18/2021       MICROBIOLOGY:    Blood culture -    Urine Culture -    Sputum Culture -  CULTURE, RESPIRATORY Oral Pharyngeal Kamille absent Abnormal      Smear, Respiratory --    Group 6: <25 PMN's/LPF and <25 Epithelial cells/LPF   Few Polymorphonuclear leukocytes   Rare Epithelial cells   No organisms seen     Organism Candida albicans Abnormal     CULTURE, RESPIRATORY One colony   Narrative:     Source: SP[facility]U       Site:                     Specimen: Nasopharyngeal Swab Updated: 11/1956    SARS-CoV-2, NAAT DETECTED Abnormal     Comment: Rapid NAAT:   Negative results should be treated as presumptive             Radiology :    Chest X ray : Extensive bilateral perihilar patchy infiltrates and ground-glass opacities      IMPRESSION:     1. Severe COVID 19 pneumonia s/p remdesivir and tocilizumab   2. Respiratory failure - intubated   3. Leukocytosis - reactive, procalcitonin 0.11  4. Candida colonization         RECOMMENDATIONS:      1. Supportive care, steroid   2.  Stop zosyn    Thank you Dr Sulema Chapa for the consult

## 2021-11-26 NOTE — ACP (ADVANCE CARE PLANNING)
Advance Care Planning   Healthcare Decision Maker:    Primary Decision Maker: Carmita Leader - Brother/Sister - [phone]    Click here to complete Healthcare Decision Makers including selection of the Healthcare Decision Maker Relationship (ie \"Primary\").

## 2021-11-26 NOTE — PROGRESS NOTES
Clinical Pharmacy Note    Vancomycin was stopped by ID (Dr. Christiana Brown) today. Clinical Pharmacy sign off.     Jeannie Petty PharmD  11/26/2021  2:19 PM

## 2021-11-26 NOTE — PROGRESS NOTES
Nephrology Progress Note  Patient's Name: Shasta Donato    Reason for Consult:  Acute kidney injury    History of Present Ilness from the 11/24/21 note:    Shasta Donato is a 60 y.o. female with a history of GI GERD, osteoarthritis, and schizoaffective disorder. She initially presented to this hospital 5 days ago following a fall at home. She was found by her roommate following an unspecified duration. EMS was called. Upon their evaluation patient was noted to be hypoxic. She was subsequently brought to ED for evaluation. In the ED she was noted to be hypoxic with an oxygen saturation of 60% on room air. Also noted to to have a low-grade fever. Laboratory data was significant for WBC of 8.5, hemoglobin of 14.6, BUN 17 and a creatinine of 0.6. Rapid COVID-19 test was positive. Patient was admitted to telemetry. 2 days into her hospital course became increasingly more hypoxic and was transferred to MICU. Because of her persistent hypoxia patient was intubated with mechanical ventilation on 11/20;  . Over the past 48 hours she has been markedly hypotensive requiring fluid resuscitation and pressors. Creatinine has worsened to a current value of 1.6 associated with low urine output. Hence renal consult. Subjective    11/26: pt remains in COVID 19 Isolation.  She remains proned and on an FIO2 55% and PEEP12 with an O2 sat 93%      Allergies:  Ciprofloxacin    Current Medications:    midazolam (VERSED) 100 mg in dextrose 5 % 100 mL infusion, Continuous  bumetanide (BUMEX) injection 2 mg, BID  heparin (porcine) injection 5,000 Units, Q8H  hydrocortisone sodium succinate PF (SOLU-CORTEF) injection 50 mg, Daily  sennosides-docusate sodium (SENOKOT-S) 8.6-50 MG tablet 2 tablet, BID  polyethylene glycol (GLYCOLAX) packet 17 g, Daily  vancomycin (VANCOCIN) 750 mg in dextrose 5 % 250 mL IVPB, Q24H  chlorhexidine (PERIDEX) 0.12 % solution 15 mL, BID  lubrifresh P.M. (artificial tears) ophthalmic ointment, Q6H  fentaNYL 5 mcg/ml in 0.9%  ml infusion, Continuous  piperacillin-tazobactam (ZOSYN) 3,375 mg in dextrose 5 % 100 mL IVPB extended infusion (mini-bag), Q8H  piperacillin/tazobactam NS flush, Q8H  cisatracurium besylate (NIMBEX) 200 mg in sodium chloride 0.9 % 100 mL infusion, Continuous  pantoprazole (PROTONIX) tablet 40 mg, QAM AC  ipratropium-albuterol (DUONEB) nebulizer solution 1 ampule, Q4H PRN  budesonide (PULMICORT) nebulizer suspension 1,000 mcg, BID  Arformoterol Tartrate (BROVANA) nebulizer solution 15 mcg, BID  [Held by provider] atorvastatin (LIPITOR) tablet 10 mg, Daily  QUEtiapine (SEROQUEL) tablet 200 mg, Daily  sertraline (ZOLOFT) tablet 200 mg, Daily  traZODone (DESYREL) tablet 100 mg, Nightly  sodium chloride flush 0.9 % injection 5-40 mL, 2 times per day  sodium chloride flush 0.9 % injection 5-40 mL, PRN  0.9 % sodium chloride infusion, PRN  ondansetron (ZOFRAN-ODT) disintegrating tablet 4 mg, Q8H PRN   Or  ondansetron (ZOFRAN) injection 4 mg, Q6H PRN  acetaminophen (TYLENOL) tablet 650 mg, Q6H PRN   Or  acetaminophen (TYLENOL) suppository 650 mg, Q6H PRN  ascorbic acid (VITAMIN C) tablet 1,000 mg, Daily  Vitamin D (CHOLECALCIFEROL) tablet 2,000 Units, Daily  zinc sulfate (ZINCATE) capsule 50 mg, Daily  glucose (GLUTOSE) 40 % oral gel 15 g, PRN  dextrose 50 % IV solution, PRN  glucagon (rDNA) injection 1 mg, PRN  dextrose 5 % solution, PRN        Review of Systems:   Review of systems not obtained due to patient factors.     Physical exam:  Vitals:    11/26/21 0900   BP:    Pulse: 116   Resp: (!) 34   Temp:    SpO2: 93%          No PE as in COVID Isolation      Data:   Labs:  Lab Results   Component Value Date     11/26/2021     11/25/2021     11/24/2021    K 3.6 11/26/2021    K 4.4 11/25/2021    K 3.3 (L) 11/24/2021     (H) 11/26/2021    CO2 26 11/26/2021    CO2 24 11/25/2021    CO2 25 11/24/2021    CREATININE 1.5 (H) 11/26/2021    CREATININE 1.6 (H) 11/25/2021    CREATININE 1.6 (H) 11/24/2021    BUN 52 (H) 11/26/2021    BUN 52 (H) 11/25/2021    BUN 49 (H) 11/24/2021    GLUCOSE 195 (H) 11/26/2021    GLUCOSE 184 (H) 11/25/2021    GLUCOSE 191 (H) 11/24/2021    PHOS 3.4 11/25/2021    PHOS 3.5 11/23/2021    PHOS 3.6 11/22/2021    WBC 20.1 (H) 11/26/2021    WBC 15.2 (H) 11/25/2021    WBC 16.4 (H) 11/24/2021    HGB 9.7 (L) 11/26/2021    HGB 9.3 (L) 11/25/2021    HGB 9.7 (L) 11/24/2021    HCT 30.0 (L) 11/26/2021    HCT 28.7 (L) 11/25/2021    HCT 29.5 (L) 11/24/2021    MCV 98.4 11/26/2021     11/26/2021         Imaging:  CT HEAD WO CONTRAST    Result Date: 11/16/2021  EXAMINATION: CT OF THE HEAD WITHOUT CONTRAST  11/16/2021 9:54 pm TECHNIQUE: CT of the head was performed without the administration of intravenous contrast. Dose modulation, iterative reconstruction, and/or weight based adjustment of the mA/kV was utilized to reduce the radiation dose to as low as reasonably achievable. COMPARISON: None. HISTORY: ORDERING SYSTEM PROVIDED HISTORY: fall TECHNOLOGIST PROVIDED HISTORY: Reason for exam:->fall Has a \"code stroke\" or \"stroke alert\" been called? ->No Decision Support Exception - unselect if not a suspected or confirmed emergency medical condition->Emergency Medical Condition (MA) What reading provider will be dictating this exam?->CRC FINDINGS: BRAIN/VENTRICLES: There are cortical atrophy and periventricular white matter ischemic changes. There is no acute intracranial hemorrhage, mass effect or midline shift. No abnormal extra-axial fluid collection. The gray-white differentiation is maintained without evidence of an acute infarct. There is no evidence of hydrocephalus. ORBITS: The visualized portion of the orbits demonstrate no acute abnormality. SINUSES: The visualized paranasal sinuses and mastoid air cells demonstrate no acute abnormality. SOFT TISSUES/SKULL:  No acute abnormality of the visualized skull or soft tissues.      No acute intracranial abnormality. Cortical atrophy and periventricular white matter ischemic changes. XR CHEST PORTABLE    Result Date: 11/16/2021  EXAMINATION: ONE XRAY VIEW OF THE CHEST 11/16/2021 7:10 pm COMPARISON: 09/23/2021 HISTORY: ORDERING SYSTEM PROVIDED HISTORY: weakness TECHNOLOGIST PROVIDED HISTORY: Reason for exam:->weakness What reading provider will be dictating this exam?->CRC FINDINGS: Bilateral patchy airspace opacities. There is no effusion or pneumothorax. The cardiomediastinal silhouette is without acute process. The osseous structures are without acute process. Bilateral patchy airspace opacities worrisome for pneumonia. CTA CHEST W CONTRAST    Result Date: 11/16/2021  EXAMINATION: CTA OF THE CHEST 11/16/2021 9:54 pm TECHNIQUE: CTA of the chest was performed after the administration of intravenous contrast.  Multiplanar reformatted images are provided for review. MIP images are provided for review. Dose modulation, iterative reconstruction, and/or weight based adjustment of the mA/kV was utilized to reduce the radiation dose to as low as reasonably achievable. COMPARISON: None. HISTORY: ORDERING SYSTEM PROVIDED HISTORY: r/o pe TECHNOLOGIST PROVIDED HISTORY: Reason for exam:->r/o pe Decision Support Exception - unselect if not a suspected or confirmed emergency medical condition->Emergency Medical Condition (MA) What reading provider will be dictating this exam?->CRC FINDINGS: Pulmonary Arteries: Pulmonary arteries are adequately opacified for evaluation. No evidence of intraluminal filling defect to suggest pulmonary embolism. Main pulmonary artery is normal in caliber. Mediastinum: No evidence of mediastinal lymphadenopathy. The heart and pericardium demonstrate no acute abnormality. There is no acute abnormality of the thoracic aorta. Lungs/pleura: The lungs demonstrate extensive multifocal ground-glass opacities predominantly in the peripheral lung zones.   No evidence of pleural effusion or

## 2021-11-27 NOTE — PROGRESS NOTES
[facility] - [facility] Medicine Inpatient   Resident Progress Note    S:  Hospital day: 6   Brief Synopsis: Cassie Solis is a 60 y.o. female with pmh of GERD, osteoarthritis and schizoaffective disorder who presented to ER s/p fall at home. Patient found down at home, with initial O2 saturation of 60%. Brought to the ER; found to have COVID-19 pneumonia , requiring bipap for appropriate saturation. Rhabdomyolysis, UTI. Started on appropriate management including tocilizumab, ceftriaxone 1 g daily, and IV fluids for rhabdomyolysis. Decadron was started daily, and with patients worsening status - pulmonology consulted. FULL CODE. Transferred to ICU on 11/18 for rapid breathing and hypoxia and pO2 of 55. Intubated 11/20 secondary to O2 sats consistently <80% with rapid breathing. Seen in the ICU this AM. Intubated, sedated, paralyzed. Continuing to follow for clinical improvement.      Cont meds:    midazolam Stopped (11/27/21 0800)    fentaNYL 5 mcg/ml in 0.9%  ml infusion 100 mcg/hr (11/26/21 1653)    cisatracurium (NIMBEX) infusion 4 mcg/kg/min (11/26/21 1833)    sodium chloride 100 mL/hr at 11/1956    dextrose       Scheduled meds:    calcium gluconate IVPB  1,000 mg IntraVENous Once    bumetanide  2 mg IntraVENous BID    heparin (porcine)  5,000 Units SubCUTAneous Q8H    sennosides-docusate sodium  2 tablet Oral BID    polyethylene glycol  17 g Oral Daily    chlorhexidine  15 mL Mouth/Throat BID    artificial tears   Both Eyes Q6H    sodium chloride   IntraVENous Q8H    pantoprazole  40 mg Oral QAM AC    budesonide  1 mg Nebulization BID    Arformoterol Tartrate  15 mcg Nebulization BID    [Held by provider] atorvastatin  10 mg Oral Daily    QUEtiapine  200 mg Oral Daily    sertraline  200 mg Oral Daily    traZODone  100 mg Oral Nightly    sodium chloride flush  5-40 mL IntraVENous 2 times per day    ascorbic acid  1,000 mg Oral Daily    vitamin D  2,000 Units Oral Daily  zinc sulfate  50 mg Oral Daily     PRN meds: ipratropium-albuterol, sodium chloride flush, sodium chloride, ondansetron **OR** ondansetron, acetaminophen **OR** acetaminophen, glucose, dextrose, glucagon (rDNA), dextrose     I reviewed the patient's past medical and surgical history, Medications and Allergies. O:  BP (!) 146/83   Pulse 102   Temp 100 °F (37.8 °C) (Esophageal)   Resp (!) 34   Ht 5' 1\" (1.549 m)   Wt 164 lb (74.4 kg)   SpO2 93%   BMI 30.99 kg/m²   24 hour I&O: I/O last 3 completed shifts: In: 2345.3 [I.V.:1427.3; NG/GT:918]  Out: 2290 [Urine:2290]  No intake/output data recorded. Physical Exam  Constitutional:       General: She is not in acute distress. Comments: Intubated, sedated, paralyzed   Cardiovascular:      Rate and Rhythm: Normal rate and regular rhythm. Pulses: Normal pulses. Heart sounds: Normal heart sounds. Pulmonary:      Comments: Coarse lung sounds heard throughout lung fields  Abdominal:      General: Bowel sounds are normal. There is no distension. Palpations: Abdomen is soft. Tenderness: There is no abdominal tenderness. Musculoskeletal:         General: Normal range of motion. Right lower leg: No edema. Left lower leg: No edema. Labs:  Na/K/Cl/CO2:  146/3.8/108/28 (11/27 3281)  BUN/Cr/glu/ALT/AST/amyl/lip:  55/1.5/--/39/48/--/-- (11/27 3275)  WBC/Hgb/Hct/Plts:  22.0/8.8/27.5/186 (11/27 7914)  estimated creatinine clearance is 35 mL/min (A) (based on SCr of 1.5 mg/dL (H)). Other pertinent labs as noted below    Radiology:  XR CHEST PORTABLE   Final Result   1. Somewhat limited exam, grossly unchanged. Please see above comments. .      RECOMMENDATION:   1. Recommend follow-up thoracic imaging, as directed clinically. .         XR ABDOMEN (KUB) (SINGLE AP VIEW)   Final Result   1. Satisfactory position of the NG tube within the stomach         XR CHEST PORTABLE   Final Result   1.  There is no interval change in the multifocal bilateral pneumonia. XR CHEST PORTABLE   Final Result   Bilateral airspace disease with interval progression on the right         XR CHEST PORTABLE   Final Result   1. Endotracheal tube is 2.7 cm above the khai. 2. Stable interstitial pulmonary edema or pneumonia throughout both lungs. XR CHEST PORTABLE   Final Result   1. Worsening of the multifocal bilateral pneumonia when compared with the   prior study of 1 day earlier. XR CHEST PORTABLE   Final Result   1. Multifocal bilateral pneumonia more prominent within the right upper lobe   2. Minimal partial interval clearing of the pneumonia within the left lung   3. Worsening of the pneumonia within the right upper lobe. US DUP LOWER EXTREMITIES BILATERAL VENOUS   Final Result   Within the visualized vessels there is no evidence for deep venous   thrombosis               XR CHEST PORTABLE   Final Result   1. Lines okay. 2. Slightly worsened diffuse edema versus pneumonia. XR CHEST PORTABLE   Final Result   1. Lines okay   2. Improved aeration, mild improvement and diffuse pneumonia/edema. XR CHEST ABDOMEN NG PLACEMENT   Final Result   NG tube position satisfactory. XR CHEST PORTABLE   Final Result   Stable bilateral pneumonia or interstitial pulmonary edema. XR CHEST PORTABLE   Final Result   Increasing bilateral infiltrates. XR CHEST PORTABLE   Final Result   1. There is no interval change in the multifocal bilateral pneumonia. CT HEAD WO CONTRAST   Final Result   No acute intracranial abnormality. Cortical atrophy and periventricular white matter ischemic changes. CT CERVICAL SPINE WO CONTRAST   Final Result   No acute abnormality of the cervical spine. Moderate degenerative changes with disc space narrowing and marginal bony   spur formation C5 through C7. Ground-glass opacities in the visualized lung apices.   Please refer to chest CT for additional information. CTA CHEST W CONTRAST   Final Result   No evidence of pulmonary embolism. Extensive multifocal ground-glass opacities predominantly in the peripheral   lung zones bilaterally, highly concerning for atypical or COVID related   pneumonia. XR CHEST PORTABLE   Final Result   Bilateral patchy airspace opacities worrisome for pneumonia. XR CHEST PORTABLE    (Results Pending)   XR CHEST PORTABLE    (Results Pending)       A/P:  Active Problems:    Acute respiratory failure with hypoxia (HCC)    Rhabdomyolysis    Acute cystitis with hematuria  Resolved Problems:    * No resolved hospital problems. *    Monitoring for clinical improvement  Follow-up fecal occult  Argatroban, HIT antibodies pending. 1. Intubated, Sedated, Paralyzed, Proned  11/20: Intubated 2/2 hypoxia and increased work of breathing, proned and paralyzed    2. Acute Hypoxic Respiratory Failure 2/2 Covid 19  Unvaccinated for Covid 19  Patient found with pulse ox of 60% --> currently intubated and sedated  CXR showing bilateral pulmonary infiltrates  Inflammatory markers: DDimer 530, , CRP 22.9, Ferritin 749 on admission   CTA pulm showing extensive bilateral pulmonary infiltrates consistent with COVID-19 pneumonia , no PE  Heparin 5K every 8 hours  Tociluzumab completed, continue remdesivir   Continue Monitor on Telemetry   Pulmonology consulted ; appreciate recs; daily ABG, Vitamin C, Vitamin D, Dacadron  Dietary consulted for further recommendations on nutrition status ; appreciate recs. 11/18 - transferred to ICU due to worsening resp status. Continue Angie Galvez , Duoneb PRN  CXR daily    Advanced Care Planning with Spiritual Services ; recs appreciated - FULL CODE.     3. Rhabdomyolysis - improving  2/2 to fall for unknown time period  Patient found down for unknown period of time  Initial CK > 3000  Received 4 L NS in ER  Continue to trend CK    4.  Concern for Sepsis  Likely superimposed bacterial pneumonia  Tmax 102.2 , Tavg 100.3F  Given one dose cefepime and vancomycin in ICU  Procal 0.07, repeat 0.27 , blood cultures, urine culture negative. Fungitell and B glucan pending  On Pip/Tazo, Vanocomycin    5. CHARAN Stage III  Admission creatinine 0.6  Currently  1.5  Obtain urine electrolytes, creatinine , urea  Calculate FeNa: 0.2- Pre- Renal / Sukhdev Sterling    6. Thrombocytopenia  Likely 2/2 to SALENA  Hold lovenox  Hold argatroban  SALENA panel- negative  F/u PBS    7. Hypokalemia - improve  Initial K 3.2 , Mag 2.6   CMP daily    8. Hx Schizoaffective Disorder / Anxiety  Continue seroquel , zoloft, trazodone  Hold ativan, vistaril  Precedex started in ICU ; wean as appropriate    9.  Concern for UTI - resolved   Likely simple cystitis ; patient with no CVA tenderness , presented initially with fever 102 F  Urinalysis with increased nitrites, bacteria, blood  Urine culture, blood cultures were negative  Given one dose doxy and rocephin in the ER  Continue 1g ceftriaxone IV x 3 days -  dc'd 11/18/2021     GI/DVT ppx: protonix, heparin  Diet:   Disposition: MICU    Electronically signed by Carlos Manuel Curry MD  PGY-2 on 11/27/2021 at 8:15 AM  This case was discussed with attending physician: Dr. Leslie Hayden

## 2021-11-27 NOTE — PROGRESS NOTES
[address] [facility]   Department of Internal Medicine   Internal Medicine Residency  MICU Progress Note    Patient:  Cassie Solis 60 y.o. female   MRN: [id number]       Date of Service: 2021    Allergy: Ciprofloxacin  Cc follow UP COVID Pneumonia   Subjective     Patient was seen and examined this morning at bedside in no acute distress. Overnight, sodium was 147 so free water flushes were started, 400 q4. Ionized ca low, replaced with 1g calcium gluconate. This morning, patient is prone, unconscious. Some clear secretion seen from B/L nares. Some reg NGT suctioned fluid present in canister. Hb stable at 8.8. Objective     TEMPERATURE:  Current - Temp: 100.4 °F (38 °C); Max - Temp  Av.7 °F (38.7 °C)  Min: 100.4 °F (38 °C)  Max: 102.7 °F (39.3 °C)  RESPIRATIONS RANGE: Resp  Av  Min: 34  Max: 34  PULSE RANGE: Pulse  Av.2  Min: 105  Max: 118  BLOOD PRESSURE RANGE:  No data recorded.  ; No data recorded. PULSE OXIMETRY RANGE: SpO2  Av.9 %  Min: 83 %  Max: 95 %    I & O - 24hr:    Intake/Output Summary (Last 24 hours) at 2021 0405  Last data filed at 2021 0200  Gross per 24 hour   Intake 2208.29 ml   Output 2120 ml   Net 88.29 ml     I/O last 3 completed shifts: In: 2208.3 [I.V.:1456. 3; NG/GT:652; IV Piggyback:100]  Out: 2150 [Urine:2150] I/O this shift:  In: -   Out: 300 [Urine:300]   Weight change:     Physical Exam:   General Appearance:  Unconscious, prone   HEENT:    NC/AT, mucous membranes are moist. Clear secretions from nares   Neck:   Supple, no jugular venous distention. Resp:     CTAB, No wheezes, No rhonchi. No use of accessory muscles. Heart:   Unable to assess as patient is prone    Abdomen:    Unable to assess as patient is prone   Extremities:   Atraumatic, no cyanosis.  2+ pitting edema on dorsal feet B/L   Pulses:  Radial and pedal pulses are intact bilaterally   Neurologic:   Unconscious on sedation       Medications     Continuous Infusions:   midazolam 1 mg/hr (11/26/21 4570)    fentaNYL 5 mcg/ml in 0.9%  ml infusion 100 mcg/hr (11/26/21 1653)    cisatracurium (NIMBEX) infusion 4 mcg/kg/min (11/26/21 1833)    sodium chloride 100 mL/hr at 11/1956    dextrose       Scheduled Meds:   bumetanide  2 mg IntraVENous BID    heparin (porcine)  5,000 Units SubCUTAneous Q8H    hydrocortisone sodium succinate PF  50 mg IntraVENous Daily    sennosides-docusate sodium  2 tablet Oral BID    polyethylene glycol  17 g Oral Daily    chlorhexidine  15 mL Mouth/Throat BID    artificial tears   Both Eyes Q6H    sodium chloride   IntraVENous Q8H    pantoprazole  40 mg Oral QAM AC    budesonide  1 mg Nebulization BID    Arformoterol Tartrate  15 mcg Nebulization BID    [Held by provider] atorvastatin  10 mg Oral Daily    QUEtiapine  200 mg Oral Daily    sertraline  200 mg Oral Daily    traZODone  100 mg Oral Nightly    sodium chloride flush  5-40 mL IntraVENous 2 times per day    ascorbic acid  1,000 mg Oral Daily    vitamin D  2,000 Units Oral Daily    zinc sulfate  50 mg Oral Daily     PRN Meds: ipratropium-albuterol, sodium chloride flush, sodium chloride, ondansetron **OR** ondansetron, acetaminophen **OR** acetaminophen, glucose, dextrose, glucagon (rDNA), dextrose  Nutrition:      Diet NPO  ADULT TUBE FEEDING; Orogastric; Peptide Based; Continuous; 10; Yes; 10; Q 24 hours; 20; 400; Q 4 hours    Labs and Imaging Studies     CBC:   Recent Labs     11/24/21  0416 11/25/21 0456 11/26/21 0420   WBC 16.4* 15.2* 20.1*   HGB 9.7* 9.3* 9.7*   HCT 29.5* 28.7* 30.0*   MCV 93.9 97.3 98.4    192 228       BMP:    Recent Labs     11/25/21  0456 11/26/21 0420 11/26/21  1749    145 147*   K 4.4 3.6 4.2   * 108* 109*   CO2 24 26 27   BUN 52* 52* 54*   CREATININE 1.6* 1.5* 1.5*   GLUCOSE 184* 195* 205*       LIVER PROFILE:   Recent Labs     11/24/21  0416 11/25/21  0456 11/26/21  0420   AST 38* 34* 45*   ALT 58* 46* 44*   BILIDIR <0.2 <0.2 <0.2   BILITOT 0.3 0.4 0.4   ALKPHOS 74 77 82       PT/INR:   Recent Labs     11/24/21  1441 11/25/21  0456 11/26/21  0420   PROTIME 23.1* 22.1* 13.7*   INR 2.1 2.0 1.3       APTT:   Recent Labs     11/25/21  0456 11/25/21  1123 11/26/21  0420   APTT 39.5* 40.0* 23.7*       Fasting Lipid Panel:    Lab Results   Component Value Date    CHOL 322 10/12/2021    TRIG 150 10/12/2021    HDL 70 10/12/2021       Cardiac Enzymes:    Lab Results   Component Value Date    CKTOTAL 135 11/22/2021    CKTOTAL 488 (H) 11/20/2021    CKTOTAL 585 (H) 11/19/2021       Notable Cultures:      Blood cultures   Blood Culture, Routine   Date Value Ref Range Status   11/22/2021 24 Hours no growth  Preliminary     Respiratory cultures No results found for: RESPCULTURE No results found for: LABGRAM  Urine   Urine Culture, Routine   Date Value Ref Range Status   11/19/2021 Growth not present  Final     Legionella No results found for: LABLEGI  C Diff PCR No results found for: CDIFPCR  Wound culture/abscess: No results for input(s): WNDABS in the last 72 hours. Tip culture:No results for input(s): CXCATHTIP in the last 72 hours.       Oxygen:     Vent Information  $Ventilation: $Subsequent Day  Skin Assessment: Clean, dry, & intact  Equipment ID: [id number]  Equipment Changed: Humidification  Vent Type: [phone]  Vent Mode: AC/VC  Vt Ordered: 320 mL  Pressure Ordered: 14  Rate Set: 34 bmp  Peak Flow: 60 L/min  Pressure Support: 0 cmH20  FiO2 : 65 %  SpO2: 94 %  SpO2/FiO2 ratio: 144.62  Sensitivity: 3  PEEP/CPAP: 12  I Time/ I Time %: 0 s  Humidification Source: Heated wire  Humidification Temp: 37  Humidification Temp Measured: 37.1  Circuit Condensation: Drained  Mask Type: Full face mask  Mask Size: Small  Additional Respiratory  Assessments  Pulse: 105  Resp: (!) 34  SpO2: 94 %  Position: Prone  Humidification Source: Heated wire  Humidification Temp: 37  Circuit Condensation: Drained  Oral Care Completed?: Yes  Oral Care: Mouth swabbed, Mouth suctioned, Mouthwash with chlorhexidine  Subglottic Suction Done?: Yes  Airway Type: ET  Airway Size: 8  Cuff Pressure (cm H2O): 29 cm H2O       [REMOVED] Urethral Catheter Temperature probe-Output (mL): 10 mL  Urethral Catheter-Output (mL): 150 mL  [REMOVED] Urethral Catheter Temperature probe 16 fr-Output (mL): 250 mL    Imaging Studies:  XR CHEST PORTABLE   Final Result   1. Somewhat limited exam, grossly unchanged. Please see above comments. .      RECOMMENDATION:   1. Recommend follow-up thoracic imaging, as directed clinically. .         XR ABDOMEN (KUB) (SINGLE AP VIEW)   Final Result   1. Satisfactory position of the NG tube within the stomach         XR CHEST PORTABLE   Final Result   1. There is no interval change in the multifocal bilateral pneumonia. XR CHEST PORTABLE   Final Result   Bilateral airspace disease with interval progression on the right         XR CHEST PORTABLE   Final Result   1. Endotracheal tube is 2.7 cm above the khai. 2. Stable interstitial pulmonary edema or pneumonia throughout both lungs. XR CHEST PORTABLE   Final Result   1. Worsening of the multifocal bilateral pneumonia when compared with the   prior study of 1 day earlier. XR CHEST PORTABLE   Final Result   1. Multifocal bilateral pneumonia more prominent within the right upper lobe   2. Minimal partial interval clearing of the pneumonia within the left lung   3. Worsening of the pneumonia within the right upper lobe. US DUP LOWER EXTREMITIES BILATERAL VENOUS   Final Result   Within the visualized vessels there is no evidence for deep venous   thrombosis               XR CHEST PORTABLE   Final Result   1. Lines okay. 2. Slightly worsened diffuse edema versus pneumonia. XR CHEST PORTABLE   Final Result   1. Lines okay   2. Improved aeration, mild improvement and diffuse pneumonia/edema.          XR CHEST ABDOMEN NG PLACEMENT   Final Result   NG tube position satisfactory. XR CHEST PORTABLE   Final Result   Stable bilateral pneumonia or interstitial pulmonary edema. XR CHEST PORTABLE   Final Result   Increasing bilateral infiltrates. XR CHEST PORTABLE   Final Result   1. There is no interval change in the multifocal bilateral pneumonia. CT HEAD WO CONTRAST   Final Result   No acute intracranial abnormality. Cortical atrophy and periventricular white matter ischemic changes. CT CERVICAL SPINE WO CONTRAST   Final Result   No acute abnormality of the cervical spine. Moderate degenerative changes with disc space narrowing and marginal bony   spur formation C5 through C7. Ground-glass opacities in the visualized lung apices. Please refer to chest   CT for additional information. CTA CHEST W CONTRAST   Final Result   No evidence of pulmonary embolism. Extensive multifocal ground-glass opacities predominantly in the peripheral   lung zones bilaterally, highly concerning for atypical or COVID related   pneumonia. XR CHEST PORTABLE   Final Result   Bilateral patchy airspace opacities worrisome for pneumonia. XR CHEST PORTABLE    (Results Pending)   XR CHEST PORTABLE    (Results Pending)        Resident's Assessment and Plan     Assessment and Plan:    Cardiovascular  # Hx of HLD  - Takes atorvastatin 10mg qhs at home  Plan  - Resume atorvastatin    Pulmonary  # Acute Hypoxic Respiratory failure 2/2 COVID-19 PNA with likely superimposed bacterial pneumonia  Most recent ABG:   Recent Labs     11/26/21  0446   PH 7.403   PCO2 44.1   PO2 75.1   HCO3 26.9*   - Vent settings: AC/VC, , RR 34, PEEP 12, FiO2 65%, PIP 27, PL 25  - Urine strep and legionella negative  - p/f 1.14  - MRSA nares negative  - S/p bronchoscopy with BAL 11/23/21  - Aspirgillus galacto negative  - Beta D glucan/fungitel negative  - BAL negative  - Sedated on fentanyl 100, paralyzed with nimbex today.  BIS 47  Plan - Daily ABG  - Continue brovana, pulmicort, duoneb PRN  - Continue 2mg Bumex BID     Gastrointestinal  # Transaminitis  - See trend below  Recent Labs     11/24/21 0416 11/25/21 0456 11/26/21 0420   AST 38* 34* 45*   ALT 58* 46* 44*   Plan  - CTM    # Hypoalbuminemia  - Albumin 2.7    Infectious Disease  # COVID-19 PNA  - Positive test on 11/16/21  - S/p toci  - CRP 1.3>>0.6  - D-dimer 4154>> 4245  - Ferritin 387>>448  - >>730  - S/p remdesivir  - Procal 0.11   Plan  - CRP, d-dimer, procal every other day  - Continue vitamin C, vitamin D, zinc    # Febrile  - Febrile again since 11/25/21    Genitourinary/Renal  # UTI, resolved    # Rhabdomyolysis  - CK initially 3913 on admission  - >>135    # Hypokalemia, resolved    # CHARAN stage III, intrinsic  - Baseline cr 0.5, current cr see trend below  Recent Labs     11/25/21 0456 11/26/21 0420 11/26/21  1749   CREATININE 1.6* 1.5* 1.5*   - Urine electrolytes: cloride 26, k 39.5, sodium <20, urea nitrogen 836, creatinine 104  - Protein creatinie ratio 1.6  - FEUrea 15.2%  Plan  - Follow nephro recs    Gastrointestinal  # Constipation  - Last charted BM 11/19/21  - S/p relistor once on 11/26/21  - KUB 11/25/21 negative for bowel obstruction  Plan  - Continue glycolax, senokot    # Reddish suctioned secretions in NGT cannister  - Hb 9.7>>8.8  Plan  - Obtain FOBT    # Transaminitis  - See trend below  Recent Labs     11/24/21 0416 11/25/21 0456 11/26/21 0420   AST 38* 34* 45*   ALT 58* 46* 44*   Plan  - CTM  - Hold atorvastatin     Heme/onc  # Leukocytosis, reactive in the setting of steroid administration  Recent Labs     11/24/21 0416 11/25/21 0456 11/26/21 0420   WBC 16.4* 15.2* 20.1*   - Blood clx negative  - BAL clx negative  Plan  - Follow ID recs: supportive care, leukocytosis is reactive as procal is low, stop zosyn    # Thrombocytopenia, resolved  - Peripheral blood smear showed platelets are present in decreased numbers and display normal morphology  - HIT antibody panel negative    Psychiatric  # Hx of schizoaffective disorder  - Takes hydroxyzine 100mg qd, trazodone 200mg BID, quetiapine 200mg qd, sertraline 200mg qd, ativan 0.5mg qd at home  Plan  - Continue hydroxyzine 100mg qd, trazodone 200mg BID, quetiapine 200mg qd, sertraline 200mg qd          # Peptic ulcer prophylaxis: protonix 40mg BID   # DVT Prophylaxis: heparin 5,000 un  # Disposition: Cont current care    Lior Rosales MD, PGY-1     Attending Physician: Dr. Shai Haynes personally saw, examined and provided care for the patient. Radiographs, labs and medication list were reviewed by me independently. I spoke with bedside nursing, therapists and consultants. Critical care services and times documented are independent of procedures and multidisciplinary rounds with Residents. Additionally comprehensive, multidisciplinary rounds were conducted with the MICU team. The case was discussed in detail and plans for care were established. Review of Residents documentation was conducted and revisions were made as appropriate. I agree with the above documented exam, problem list and plan of care. ARDS covid  Post bronch ,no growth   D/c vanco /Zosyn   Stop argatroban  Bumex  Left lung up   Need diuresis,bumex 2 mg bid   Check stool hemocoult/laxative   Hb stable   320-34-12-65  plt less than 22  ABG looks    Prone and paralyzed   Stool hemococult   Increase PPI bid  Try to decrease amount of fluid     Care reviewed with nursing staff, medical and surgical specialty care, primary care and the patient's family as available. Chart review/lab review/X-ray viewing/documentation and had long Conversation with patient/family re: prognosis, care options and any end of life issues:      Critical care time spent reviewing labs/films, examining patient, collaborating with other physicians more than 35  Minutes  excluding procedures . Edita Esposito M.D.   11/27/2021  7:30 PM

## 2021-11-27 NOTE — PROGRESS NOTES
[facility] Attending    S: 60 y.o. female with a history of gerd, oa, schizoaffective disorder, and gastric fundiplication who was found down for an undetermined amount of time. Reports shortness of breath and cough for 2.5 weeks. She was found to be 60% on RA. In the ER, COVID testing was positive with significantly elevated CPKs. Patient is unvaccinated. Had desaturation to 88% wit PaO2 of 55 with RRT and subsequent transfer to the MICU. Desat to 40's when Bipap removed. Now intubated. Sedated, paralyzed, and prone. Today, remains intubated, sedated, proned. S/p bronch 11/23 with removal of mucus plugging. Temp improving. Ventilator settings fluctuating     O: VS- Blood pressure (!) 146/83, pulse 106, temperature 100 °F (37.8 °C), temperature source Esophageal, resp. rate (!) 34, height 5' 1\" (1.549 m), weight 164 lb (74.4 kg), SpO2 93 %. Exam is as noted by resident   Currently prone and nonresponsive. Lungs: coarse, vent   CV:  Rate controlled, regular   Ext-no C/C/E      Impressions: Active Problems:    Acute respiratory failure with hypoxia (HCC)    Covid pneumonia    Rhabdomyolysis, CK improving    Concern for sepsis    Acute cystitis with hematuria, treated    CHARAN    Thrombocytopenia     Plan:      Acute hypoxic resp failure 2/2 covid - Decadron. Completed Tocimizilab. Getting Remdesivir. Vitamin C.  Vitamin D.  Zinc.  Appreciate Pulm management. Sepsis 2/2 PNA - done with vanc. Now only zosyn. rhabdo - CK improving. CTM. No longer IVFs. U/s of lower extremities negative DVT 11/20. Slow wean of FiO2. Tube feeding. SALENA panel neg PF4 Ab  Schizoaffective - seroquel  Full code at this time. Attending Physician Statement  I have reviewed the chart and seen the patient with the resident(s). I personally reviewed images, EKG's and similar tests, if present.   I personally reviewed and performed key elements of the history and exam.  I have reviewed and confirmed student and/or resident history and exam with changes as indicated above. I agree with the assessment, plan and orders as documented by the resident. Please refer to the  resident and/or student note for additional information.       Ashanti Tucker MD

## 2021-11-27 NOTE — PROGRESS NOTES
Department of Internal Medicine  Infectious Diseases  Progress Note      C/C : Leukocytosis, COVID 19     Pt is intubated, sedated, paralyzed   Fever 102 F    Current Facility-Administered Medications   Medication Dose Route Frequency Provider Last Rate Last Admin    midazolam (VERSED) 100 mg in dextrose 5 % 100 mL infusion  1-10 mg/hr IntraVENous Continuous Cally Doshi MD   Stopped at 11/27/21 0800    bumetanide (BUMEX) injection 2 mg  2 mg IntraVENous BID Sherolyn Olszewski, MD   2 mg at 11/27/21 0807    heparin (porcine) injection 5,000 Units  5,000 Units SubCUTAneous Q8H Nenita Thy Tiffanie Bailey MD   5,000 Units at 11/27/21 0601    sennosides-docusate sodium (SENOKOT-S) 8.6-50 MG tablet 2 tablet  2 tablet Oral BID Earlis Duverney, MD   2 tablet at 11/27/21 0807    polyethylene glycol (GLYCOLAX) packet 17 g  17 g Oral Daily Earlis Duverney, MD   17 g at 11/27/21 0807    chlorhexidine (PERIDEX) 0.12 % solution 15 mL  15 mL Mouth/Throat BID Keyona Hernandez MD   15 mL at 11/27/21 0807    lubrifresh P.M. (artificial tears) ophthalmic ointment   Both Eyes Q6H Keyona Hernandez MD   Given at 11/27/21 0808    fentaNYL 5 mcg/ml in 0.9%  ml infusion  12.5-200 mcg/hr IntraVENous Continuous Earlis Duverney, MD 20 mL/hr at 11/27/21 0829 100 mcg/hr at 11/27/21 0829    piperacillin/tazobactam NS flush   IntraVENous Q8H Earlis Duverney, MD   Stopped at 11/25/21 0717    cisatracurium besylate (NIMBEX) 200 mg in sodium chloride 0.9 % 100 mL infusion  0.5-10 mcg/kg/min IntraVENous Continuous Earlis Duverney, MD 8.6 mL/hr at 11/27/21 0841 4 mcg/kg/min at 11/27/21 0841    pantoprazole (PROTONIX) tablet 40 mg  40 mg Oral QAM AC Lucian Flaherty DO   40 mg at 11/27/21 0601    ipratropium-albuterol (DUONEB) nebulizer solution 1 ampule  1 ampule Inhalation Q4H PRN Earlis Duverney, MD   1 ampule at 11/24/21 1248    budesonide (PULMICORT) nebulizer suspension 1,000 mcg  1 mg Nebulization BID Maurice Cernshaw MD   1,000 mcg at 11/26/21 2216    Arformoterol Tartrate (BROVANA) nebulizer solution 15 mcg  15 mcg Nebulization BID Sofia Solano MD   15 mcg at 11/26/21 2216    [Held by provider] atorvastatin (LIPITOR) tablet 10 mg  10 mg Oral Daily Brandyn Song MD   10 mg at 11/19/21 0837    QUEtiapine (SEROQUEL) tablet 200 mg  200 mg Oral Daily Brandyn Song MD   200 mg at 11/27/21 8378    sertraline (ZOLOFT) tablet 200 mg  200 mg Oral Daily Brandyn Song MD   200 mg at 11/27/21 0807    traZODone (DESYREL) tablet 100 mg  100 mg Oral Nightly Brandyn Song MD   100 mg at 11/26/21 2216    sodium chloride flush 0.9 % injection 5-40 mL  5-40 mL IntraVENous 2 times per day Brandyn Song MD   10 mL at 11/27/21 0808    sodium chloride flush 0.9 % injection 5-40 mL  5-40 mL IntraVENous PRN Brandyn Song MD   10 mL at 11/27/21 0816    0.9 % sodium chloride infusion  25 mL IntraVENous PRN Brandyn Song  mL/hr at 11/1956 Rate Verify at 11/1956    ondansetron (ZOFRAN-ODT) disintegrating tablet 4 mg  4 mg Oral Q8H PRN Brandyn Song MD        Or    ondansetron [facility]) injection 4 mg  4 mg IntraVENous Q6H PRN Brandyn Song MD        acetaminophen (TYLENOL) tablet 650 mg  650 mg Oral Q6H PRN Brandyn Song MD   650 mg at 11/26/21 7992    Or    acetaminophen (TYLENOL) suppository 650 mg  650 mg Rectal Q6H PRN Brandyn Song MD   650 mg at 11/27/21 0829    ascorbic acid (VITAMIN C) tablet 1,000 mg  1,000 mg Oral Daily Brandyn Song MD   1,000 mg at 11/27/21 4043    Vitamin D (CHOLECALCIFEROL) tablet 2,000 Units  2,000 Units Oral Daily Brandyn Song MD   2,000 Units at 11/27/21 0828    zinc sulfate (ZINCATE) capsule 50 mg  50 mg Oral Daily Brandyn Song MD   50 mg at 11/27/21 0807    glucose (GLUTOSE) 40 % oral gel 15 g  15 g Oral PRN Brandyn Song MD        dextrose 50 % IV solution  12.5 g IntraVENous PRN Brandyn Song MD        glucagon (rDNA) injection 1 mg  1 mg IntraMUSCular PRN Brandyn Song MD        dextrose 5 % solution  100 mL/hr IntraVENous PRN Martina Wilburn MD             REVIEW OF SYSTEMS: could not be obtained       PHYSICAL EXAM:      Vitals:     BP (!) 146/83   Pulse 102   Temp 100 °F (37.8 °C) (Esophageal)   Resp (!) 34   Ht 5' 1\" (1.549 m)   Wt 164 lb (74.4 kg)   SpO2 93%   BMI 30.99 kg/m²     General Appearance:    Intubated, sedated, prone, FiO2 65, PEEP 12    Head:    Normocephalic, atraumatic   Eyes:       Ears:    No obvious deformity or drainage.    Nose:     Throat:    Neck:   Supple, no lymphadenopathy   Lungs:     Clear to auscultation ( posterior )    Heart:    Regular   Abdomen:     Soft, non-tender, bowel sounds present    Extremities:    No edema    Pulses:   Dorsalis pedis palpable    Skin:   no rashes             CBC with Differential:      Lab Results   Component Value Date    WBC 22.0 11/27/2021    RBC 2.80 11/27/2021    HGB 8.8 11/27/2021    HCT 27.5 11/27/2021     11/27/2021    MCV 98.2 11/27/2021    MCH 31.4 11/27/2021    MCHC 32.0 11/27/2021    RDW 17.8 11/27/2021    NRBC 0.9 11/26/2021    METASPCT 0.9 11/21/2021    LYMPHOPCT 2.6 11/26/2021    MONOPCT 2.6 11/26/2021    MYELOPCT 0.9 11/25/2021    BASOPCT 0.2 11/26/2021    MONOSABS 0.60 11/26/2021    LYMPHSABS 0.60 11/26/2021    EOSABS 0.00 11/26/2021    BASOSABS 0.00 11/26/2021       CMP     Lab Results   Component Value Date     11/27/2021    K 3.8 11/27/2021    K 4.8 11/19/2021     11/27/2021    CO2 28 11/27/2021    BUN 55 11/27/2021    CREATININE 1.5 11/27/2021    GFRAA 42 11/27/2021    LABGLOM 35 11/27/2021    GLUCOSE 170 11/27/2021    GLUCOSE 77 05/12/2011    PROT 4.8 11/27/2021    LABALBU 2.7 11/27/2021    CALCIUM 7.8 11/27/2021    BILITOT 0.3 11/27/2021    ALKPHOS 78 11/27/2021    AST 48 11/27/2021    ALT 39 11/27/2021         Hepatic Function Panel:    Lab Results   Component Value Date    ALKPHOS 78 11/27/2021    ALT 39 11/27/2021    AST 48 11/27/2021    PROT 4.8 11/27/2021    BILITOT 0.3 11/27/2021    BILIDIR <0.2 11/27/2021    IBILI see below 11/27/2021    LABALBU 2.7 11/27/2021       PT/INR:    Lab Results   Component Value Date    PROTIME 14.2 11/27/2021    INR 1.3 11/27/2021       TSH:    Lab Results   Component Value Date    TSH 4.720 10/12/2021       U/A:    Lab Results   Component Value Date    COLORU Yellow 11/16/2021    PHUR 6.0 11/16/2021    WBCUA NONE 11/16/2021    RBCUA 1-3 11/16/2021    BACTERIA MANY 11/16/2021    CLARITYU Clear 11/16/2021    SPECGRAV 1.025 11/16/2021    LEUKOCYTESUR Negative 11/16/2021    UROBILINOGEN 0.2 11/16/2021    BILIRUBINUR Negative 11/16/2021    BLOODU LARGE 11/16/2021    GLUCOSEU Negative 11/16/2021       ABG:    Lab Results   Component Value Date    QAQ0RHA 21.6 11/18/2021    VWK1BGD 32.3 11/18/2021    PO2ART 55.2 11/18/2021       MICROBIOLOGY:    Blood culture - neg on 11/25     Urine Culture -    Sputum Culture -  CULTURE, RESPIRATORY Oral Pharyngeal Kamille absent Abnormal      Smear, Respiratory --    Group 6: <25 PMN's/LPF and <25 Epithelial cells/LPF   Few Polymorphonuclear leukocytes   Rare Epithelial cells   No organisms seen     Organism Candida albicans Abnormal     CULTURE, RESPIRATORY One colony   Narrative:     Source: SPUSU       Site:                     Specimen: Nasopharyngeal Swab Updated: 11/1956    SARS-CoV-2, NAAT DETECTED Abnormal     Comment: Rapid NAAT:   Negative results should be treated as presumptive             Radiology :    Chest X ray : Extensive bilateral perihilar patchy infiltrates and ground-glass opacities      IMPRESSION:     1. Severe COVID 19 pneumonia s/p remdesivir and tocilizumab   2. Respiratory failure - intubated   3. Leukocytosis - reactive, procalcitonin 0.11  4. Candida colonization         RECOMMENDATIONS:      1.   Diflucan 400 mg IV q 24 hrs

## 2021-11-27 NOTE — PROGRESS NOTES
ARDS covid  Post bronch ,no growth   D/c vanco /Zosyn   Stop argatroban  Bumex  Left lung up   Need diuresis,bumex 2 mg bid   Check stool hemocoult/laxative   Hb stable   320-34-12-65  plt less than 22  ABG looks    Prone and paralyzed   Stool hemococult   Increase PPI bid  Try to decrease amount of fluid

## 2021-11-27 NOTE — PROGRESS NOTES
Nephrology Progress Note  Patient's Name: Carl Nye    Reason for Consult:  Acute kidney injury    History of Present Radha from the 11/24/21 note:    Carl Nye is a 60 y.o. female with a history of GI GERD, osteoarthritis, and schizoaffective disorder. She initially presented to this hospital 5 days ago following a fall at home. She was found by her roommate following an unspecified duration. EMS was called. Upon their evaluation patient was noted to be hypoxic. She was subsequently brought to ED for evaluation. In the ED she was noted to be hypoxic with an oxygen saturation of 60% on room air. Also noted to to have a low-grade fever. Laboratory data was significant for WBC of 8.5, hemoglobin of 14.6, BUN 17 and a creatinine of 0.6. Rapid COVID-19 test was positive. Patient was admitted to telemetry. 2 days into her hospital course became increasingly more hypoxic and was transferred to MICU. Because of her persistent hypoxia patient was intubated with mechanical ventilation on 11/20;  . Over the past 48 hours she has been markedly hypotensive requiring fluid resuscitation and pressors. Creatinine has worsened to a current value of 1.6 associated with low urine output. Hence renal consult. Subjective    11/27: pt remains in COVID 19 Isolation.  She remains proned and on an FIO2 65% and PEEP12 with an O2 sat 93-94%      Allergies:  Ciprofloxacin    Current Medications:    midazolam (VERSED) 100 mg in dextrose 5 % 100 mL infusion, Continuous  bumetanide (BUMEX) injection 2 mg, BID  heparin (porcine) injection 5,000 Units, Q8H  sennosides-docusate sodium (SENOKOT-S) 8.6-50 MG tablet 2 tablet, BID  polyethylene glycol (GLYCOLAX) packet 17 g, Daily  chlorhexidine (PERIDEX) 0.12 % solution 15 mL, BID  lubrifresh P.M. (artificial tears) ophthalmic ointment, Q6H  fentaNYL 5 mcg/ml in 0.9%  ml infusion, Continuous  piperacillin/tazobactam NS flush, Q8H  cisatracurium besylate (NIMBEX) 200 mg in sodium chloride 0.9 % 100 mL infusion, Continuous  pantoprazole (PROTONIX) tablet 40 mg, QAM AC  ipratropium-albuterol (DUONEB) nebulizer solution 1 ampule, Q4H PRN  budesonide (PULMICORT) nebulizer suspension 1,000 mcg, BID  Arformoterol Tartrate (BROVANA) nebulizer solution 15 mcg, BID  [Held by provider] atorvastatin (LIPITOR) tablet 10 mg, Daily  QUEtiapine (SEROQUEL) tablet 200 mg, Daily  sertraline (ZOLOFT) tablet 200 mg, Daily  traZODone (DESYREL) tablet 100 mg, Nightly  sodium chloride flush 0.9 % injection 5-40 mL, 2 times per day  sodium chloride flush 0.9 % injection 5-40 mL, PRN  0.9 % sodium chloride infusion, PRN  ondansetron (ZOFRAN-ODT) disintegrating tablet 4 mg, Q8H PRN   Or  ondansetron (ZOFRAN) injection 4 mg, Q6H PRN  acetaminophen (TYLENOL) tablet 650 mg, Q6H PRN   Or  acetaminophen (TYLENOL) suppository 650 mg, Q6H PRN  ascorbic acid (VITAMIN C) tablet 1,000 mg, Daily  Vitamin D (CHOLECALCIFEROL) tablet 2,000 Units, Daily  zinc sulfate (ZINCATE) capsule 50 mg, Daily  glucose (GLUTOSE) 40 % oral gel 15 g, PRN  dextrose 50 % IV solution, PRN  glucagon (rDNA) injection 1 mg, PRN  dextrose 5 % solution, PRN        Review of Systems:   Review of systems not obtained due to patient factors.     Physical exam:  Vitals:    11/27/21 0800   BP: (!) 146/83   Pulse: 102   Resp: (!) 34   Temp: 100 °F (37.8 °C)   SpO2: 93%          No PE as in COVID Isolation      Data:   Labs:  Lab Results   Component Value Date     11/27/2021     (H) 11/26/2021     11/26/2021    K 3.8 11/27/2021    K 4.2 11/26/2021    K 3.6 11/26/2021     (H) 11/27/2021    CO2 28 11/27/2021    CO2 27 11/26/2021    CO2 26 11/26/2021    CREATININE 1.5 (H) 11/27/2021    CREATININE 1.5 (H) 11/26/2021    CREATININE 1.5 (H) 11/26/2021    BUN 55 (H) 11/27/2021    BUN 54 (H) 11/26/2021    BUN 52 (H) 11/26/2021    GLUCOSE 170 (H) 11/27/2021    GLUCOSE 205 (H) 11/26/2021    GLUCOSE 195 (H) 11/26/2021 PHOS 4.2 11/27/2021    PHOS 3.4 11/25/2021    PHOS 3.5 11/23/2021    WBC 22.0 (H) 11/27/2021    WBC 20.1 (H) 11/26/2021    WBC 15.2 (H) 11/25/2021    HGB 8.8 (L) 11/27/2021    HGB 9.7 (L) 11/26/2021    HGB 9.3 (L) 11/25/2021    HCT 27.5 (L) 11/27/2021    HCT 30.0 (L) 11/26/2021    HCT 28.7 (L) 11/25/2021    MCV 98.2 11/27/2021     11/27/2021         Imaging:  CT HEAD WO CONTRAST    Result Date: 11/16/2021  EXAMINATION: CT OF THE HEAD WITHOUT CONTRAST  11/16/2021 9:54 pm TECHNIQUE: CT of the head was performed without the administration of intravenous contrast. Dose modulation, iterative reconstruction, and/or weight based adjustment of the mA/kV was utilized to reduce the radiation dose to as low as reasonably achievable. COMPARISON: None. HISTORY: ORDERING SYSTEM PROVIDED HISTORY: fall TECHNOLOGIST PROVIDED HISTORY: Reason for exam:->fall Has a \"code stroke\" or \"stroke alert\" been called? ->No Decision Support Exception - unselect if not a suspected or confirmed emergency medical condition->Emergency Medical Condition (MA) What reading provider will be dictating this exam?->CRC FINDINGS: BRAIN/VENTRICLES: There are cortical atrophy and periventricular white matter ischemic changes. There is no acute intracranial hemorrhage, mass effect or midline shift. No abnormal extra-axial fluid collection. The gray-white differentiation is maintained without evidence of an acute infarct. There is no evidence of hydrocephalus. ORBITS: The visualized portion of the orbits demonstrate no acute abnormality. SINUSES: The visualized paranasal sinuses and mastoid air cells demonstrate no acute abnormality. SOFT TISSUES/SKULL:  No acute abnormality of the visualized skull or soft tissues. No acute intracranial abnormality. Cortical atrophy and periventricular white matter ischemic changes.        XR CHEST PORTABLE    Result Date: 11/16/2021  EXAMINATION: ONE XRAY VIEW OF THE CHEST 11/16/2021 7:10 pm COMPARISON: 09/23/2021 HISTORY: ORDERING SYSTEM PROVIDED HISTORY: weakness TECHNOLOGIST PROVIDED HISTORY: Reason for exam:->weakness What reading provider will be dictating this exam?->CRC FINDINGS: Bilateral patchy airspace opacities. There is no effusion or pneumothorax. The cardiomediastinal silhouette is without acute process. The osseous structures are without acute process. Bilateral patchy airspace opacities worrisome for pneumonia. CTA CHEST W CONTRAST    Result Date: 11/16/2021  EXAMINATION: CTA OF THE CHEST 11/16/2021 9:54 pm TECHNIQUE: CTA of the chest was performed after the administration of intravenous contrast.  Multiplanar reformatted images are provided for review. MIP images are provided for review. Dose modulation, iterative reconstruction, and/or weight based adjustment of the mA/kV was utilized to reduce the radiation dose to as low as reasonably achievable. COMPARISON: None. HISTORY: ORDERING SYSTEM PROVIDED HISTORY: r/o pe TECHNOLOGIST PROVIDED HISTORY: Reason for exam:->r/o pe Decision Support Exception - unselect if not a suspected or confirmed emergency medical condition->Emergency Medical Condition (MA) What reading provider will be dictating this exam?->CRC FINDINGS: Pulmonary Arteries: Pulmonary arteries are adequately opacified for evaluation. No evidence of intraluminal filling defect to suggest pulmonary embolism. Main pulmonary artery is normal in caliber. Mediastinum: No evidence of mediastinal lymphadenopathy. The heart and pericardium demonstrate no acute abnormality. There is no acute abnormality of the thoracic aorta. Lungs/pleura: The lungs demonstrate extensive multifocal ground-glass opacities predominantly in the peripheral lung zones. No evidence of pleural effusion or pneumothorax. Upper Abdomen: Limited images of the upper abdomen are unremarkable. Soft Tissues/Bones: No acute bone or soft tissue abnormality. No evidence of pulmonary embolism.  Extensive multifocal ground-glass opacities predominantly in the peripheral lung zones bilaterally, highly concerning for atypical or COVID related pneumonia. Assessment/Plans    1. Acute kidney injury stage I most likely ischemic ATN occurring in the setting of hypotension. Patient is nonoliguric  over the past 24 hours   - urine indices although initially prerenal, suspect there is progression to ATN  Cr remains stable at 1.6mg/dl  PLAN:  1-Continue to monitor labs and urine output  2-continue bumex    2. Acute hypoxic respiratory failure due to COVID-19 pneumonia  -Intubation with mechanical ventilation; prone   PLAN:  1. As per Pulm/CCC      3 COVID-19 pneumonia  -Received Toci; and dexamethasone  -Currently on Solu-Cortef  PLAN:  1. Defer to Pulm/CCC    4. Septic shock  -suspected possible superimposed bacterial pneumonia  -now off pressors  -on Zosyn + Vanc    5. Volume overload  UO 2290ml yesterday  PLAN:  1.  On the bumetanide 2mg IV bid        Will follow   Thanks         Liam Ceballos MD

## 2021-11-27 NOTE — PLAN OF CARE
Problem: Falls - Risk of:  Goal: Will remain free from falls  Description: Will remain free from falls  Outcome: Met This Shift  Goal: Absence of physical injury  Description: Absence of physical injury  Outcome: Met This Shift     Problem: Airway Clearance - Ineffective  Goal: Achieve or maintain patent airway  Outcome: Met This Shift     Problem: Gas Exchange - Impaired  Goal: Absence of hypoxia  Outcome: Met This Shift  Goal: Promote optimal lung function  Outcome: Met This Shift     Problem: Breathing Pattern - Ineffective  Goal: Ability to achieve and maintain a regular respiratory rate  Outcome: Met This Shift     Problem:  Body Temperature -  Risk of, Imbalanced  Goal: Complications related to the disease process, condition or treatment will be avoided or minimized  Outcome: Met This Shift     Problem: Isolation Precautions - Risk of Spread of Infection  Goal: Prevent transmission of infection  Outcome: Met This Shift     Problem: Risk for Fluid Volume Deficit  Goal: Maintain normal heart rhythm  Outcome: Met This Shift  Goal: Maintain absence of muscle cramping  Outcome: Met This Shift  Goal: Maintain normal serum potassium, sodium, calcium, phosphorus, and pH  Outcome: Met This Shift     Problem: Loneliness or Risk for Loneliness  Goal: Demonstrate positive use of time alone when socialization is not possible  Outcome: Met This Shift     Problem: Skin Integrity:  Goal: Will show no infection signs and symptoms  Description: Will show no infection signs and symptoms  Outcome: Met This Shift  Goal: Absence of new skin breakdown  Description: Absence of new skin breakdown  Outcome: Met This Shift

## 2021-11-28 NOTE — PROGRESS NOTES
[facility] - Family Medicine Inpatient   Resident Progress Note    S:  Hospital day: 12   Brief Synopsis: Bear Dasilva is a 60 y.o. female with pmh of GERD, osteoarthritis and schizoaffective disorder who presented to ER s/p fall at home. Patient found down at home, with initial O2 saturation of 60%. Brought to the ER; found to have COVID-19 pneumonia , requiring bipap for appropriate saturation. Rhabdomyolysis, UTI. Started on appropriate management including tocilizumab, ceftriaxone 1 g daily, and IV fluids for rhabdomyolysis. Decadron was started daily, and with patients worsening status - pulmonology consulted. FULL CODE. Transferred to ICU on 11/18 for rapid breathing and hypoxia and pO2 of 55. Intubated 11/20 secondary to O2 sats consistently <80% with rapid breathing. Seen in the ICU this AM. Intubated, sedated, paralyzed. Continuing to follow for clinical improvement.      Cont meds:    midazolam Stopped (11/27/21 0800)    fentaNYL 5 mcg/ml in 0.9%  ml infusion 100 mcg/hr (11/27/21 2252)    cisatracurium (NIMBEX) infusion 3.5 mcg/kg/min (11/28/21 0426)    sodium chloride 100 mL/hr at 11/1955    dextrose       Scheduled meds:    calcium gluconate IVPB  1,000 mg IntraVENous Once    pantoprazole  40 mg IntraVENous BID    And    sodium chloride (PF)  10 mL IntraVENous BID    bumetanide  2 mg IntraVENous Q6H    heparin (porcine)  5,000 Units SubCUTAneous Q8H    sennosides-docusate sodium  2 tablet Oral BID    polyethylene glycol  17 g Oral Daily    chlorhexidine  15 mL Mouth/Throat BID    artificial tears   Both Eyes Q6H    budesonide  1 mg Nebulization BID    Arformoterol Tartrate  15 mcg Nebulization BID    atorvastatin  10 mg Oral Daily    QUEtiapine  200 mg Oral Daily    sertraline  200 mg Oral Daily    traZODone  100 mg Oral Nightly    sodium chloride flush  5-40 mL IntraVENous 2 times per day    ascorbic acid  1,000 mg Oral Daily    vitamin D  2,000 Units Oral Daily    zinc sulfate  50 mg Oral Daily     PRN meds: ipratropium-albuterol, sodium chloride flush, sodium chloride, ondansetron **OR** ondansetron, acetaminophen **OR** acetaminophen, glucose, dextrose, glucagon (rDNA), dextrose     I reviewed the patient's past medical and surgical history, Medications and Allergies. O:  /62   Pulse 96   Temp 99.5 °F (37.5 °C) (Esophageal)   Resp (!) 34   Ht 5' 1\" (1.549 m)   Wt 164 lb (74.4 kg)   SpO2 96%   BMI 30.99 kg/m²   24 hour I&O: I/O last 3 completed shifts: In: 3271.4 [I.V.:1653.4; NG/GT:1618]  Out: 6312 [Urine:2640]  No intake/output data recorded. Physical Exam  Constitutional:       General: She is not in acute distress. Comments: Intubated, sedated, paralyzed   Cardiovascular:      Rate and Rhythm: Normal rate and regular rhythm. Pulses: Normal pulses. Heart sounds: Normal heart sounds. Pulmonary:      Comments: Coarse lung sounds heard throughout lung fields  Abdominal:      General: Bowel sounds are normal. There is no distension. Palpations: Abdomen is soft. Tenderness: There is no abdominal tenderness. Musculoskeletal:         General: Normal range of motion. Right lower leg: No edema. Left lower leg: No edema. Labs:  Na/K/Cl/CO2:  144/3.8/105/25 (11/28 7792)  BUN/Cr/glu/ALT/AST/amyl/lip:  52/1.3/--/40/58/--/-- (11/28 4985)  WBC/Hgb/Hct/Plts:  23.4/8.7/27.2/196 (11/28 8533)  estimated creatinine clearance is 40 mL/min (A) (based on SCr of 1.3 mg/dL (H)). Other pertinent labs as noted below    Radiology:  XR CHEST PORTABLE   Final Result   1. Endotracheal tube is 3 cm above the khai. 2. Stable interstitial pulmonary edema or pneumonia throughout both lungs. XR CHEST PORTABLE   Final Result   1. Somewhat limited exam, grossly unchanged. Please see above comments. .      RECOMMENDATION:   1. Recommend follow-up thoracic imaging, as directed clinically.       .         XR ABDOMEN (KUB) (SINGLE AP VIEW)   Final Result   1. Satisfactory position of the NG tube within the stomach         XR CHEST PORTABLE   Final Result   1. There is no interval change in the multifocal bilateral pneumonia. XR CHEST PORTABLE   Final Result   Bilateral airspace disease with interval progression on the right         XR CHEST PORTABLE   Final Result   1. Endotracheal tube is 2.7 cm above the khai. 2. Stable interstitial pulmonary edema or pneumonia throughout both lungs. XR CHEST PORTABLE   Final Result   1. Worsening of the multifocal bilateral pneumonia when compared with the   prior study of 1 day earlier. XR CHEST PORTABLE   Final Result   1. Multifocal bilateral pneumonia more prominent within the right upper lobe   2. Minimal partial interval clearing of the pneumonia within the left lung   3. Worsening of the pneumonia within the right upper lobe. US DUP LOWER EXTREMITIES BILATERAL VENOUS   Final Result   Within the visualized vessels there is no evidence for deep venous   thrombosis               XR CHEST PORTABLE   Final Result   1. Lines okay. 2. Slightly worsened diffuse edema versus pneumonia. XR CHEST PORTABLE   Final Result   1. Lines okay   2. Improved aeration, mild improvement and diffuse pneumonia/edema. XR CHEST ABDOMEN NG PLACEMENT   Final Result   NG tube position satisfactory. XR CHEST PORTABLE   Final Result   Stable bilateral pneumonia or interstitial pulmonary edema. XR CHEST PORTABLE   Final Result   Increasing bilateral infiltrates. XR CHEST PORTABLE   Final Result   1. There is no interval change in the multifocal bilateral pneumonia. CT HEAD WO CONTRAST   Final Result   No acute intracranial abnormality. Cortical atrophy and periventricular white matter ischemic changes. CT CERVICAL SPINE WO CONTRAST   Final Result   No acute abnormality of the cervical spine.       Moderate degenerative changes with disc space narrowing and marginal bony   spur formation C5 through C7. Ground-glass opacities in the visualized lung apices. Please refer to chest   CT for additional information. CTA CHEST W CONTRAST   Final Result   No evidence of pulmonary embolism. Extensive multifocal ground-glass opacities predominantly in the peripheral   lung zones bilaterally, highly concerning for atypical or COVID related   pneumonia. XR CHEST PORTABLE   Final Result   Bilateral patchy airspace opacities worrisome for pneumonia. XR CHEST PORTABLE    (Results Pending)   XR CHEST PORTABLE    (Results Pending)       A/P:  Active Problems:    Acute respiratory failure with hypoxia (HCC)    Rhabdomyolysis    Acute cystitis with hematuria  Resolved Problems:    * No resolved hospital problems. *    Monitoring for clinical improvement  Follow-up fecal occult  Argatroban, HIT antibodies pending. 1. Intubated, Sedated, Paralyzed, Proned  11/20: Intubated 2/2 hypoxia and increased work of breathing, proned and paralyzed    2. Acute Hypoxic Respiratory Failure 2/2 Covid 19  Unvaccinated for Covid 19  Patient found with pulse ox of 60% --> currently intubated and sedated  CXR showing bilateral pulmonary infiltrates  Inflammatory markers: DDimer 530, , CRP 22.9, Ferritin 749 on admission   CTA pulm showing extensive bilateral pulmonary infiltrates consistent with COVID-19 pneumonia , no PE  Heparin 5K every 8 hours  Tociluzumab completed, continue remdesivir   Continue Monitor on Telemetry   Pulmonology consulted ; appreciate recs; daily ABG, Vitamin C, Vitamin D, Dacadron  Dietary consulted for further recommendations on nutrition status ; appreciate recs. 11/18 - transferred to ICU due to worsening resp status. Continue Angie Galvez Duoneb PRN  CXR daily    Advanced Care Planning with Spiritual Services ; recs appreciated - FULL CODE.     3.  Rhabdomyolysis - improving  2/2 to fall for unknown time period  Patient found down for unknown period of time  Initial CK > 3000  Received 4 L NS in ER  Continue to trend CK    4. Concern for Sepsis  Likely superimposed bacterial pneumonia  Tmax 102.2 , Tavg 100.3F  Given one dose cefepime and vancomycin in ICU  Procal 0.07, repeat 0.27 , blood cultures, urine culture negative. Fungitell and B glucan pending  On Pip/Tazo, Vanocomycin    5. CHARAN Stage III  Admission creatinine 0.6  Currently  1.5  Obtain urine electrolytes, creatinine , urea  Calculate FeNa: 0.2- Pre- Renal / Nathanel Best    6. Thrombocytopenia  Likely 2/2 to SALENA  Hold lovenox  Hold argatroban  SALENA panel- negative  F/u PBS    7. Hypokalemia - improve  Initial K 3.2 , Mag 2.6   CMP daily    8. Hx Schizoaffective Disorder / Anxiety  Continue seroquel , zoloft, trazodone  Hold ativan, vistaril  Precedex started in ICU ; wean as appropriate    9.  Concern for UTI - resolved   Likely simple cystitis ; patient with no CVA tenderness , presented initially with fever 102 F  Urinalysis with increased nitrites, bacteria, blood  Urine culture, blood cultures were negative  Given one dose doxy and rocephin in the ER  Continue 1g ceftriaxone IV x 3 days -  dc'd 11/18/2021     GI/DVT ppx: protonix, heparin  Diet:   Disposition: MICU    Electronically signed by Antonia Leon MD  PGY-2 on 11/28/2021 at 7:20 AM  This case was discussed with attending physician: Dr. Hai Edmondson

## 2021-11-28 NOTE — PROGRESS NOTES
Department of Internal Medicine  Infectious Diseases  Progress Note      C/C : Leukocytosis, COVID 19     Pt is intubated, sedated, paralyzed   Fever - temp .6 F    Current Facility-Administered Medications   Medication Dose Route Frequency Provider Last Rate Last Admin    calcium gluconate 1,000 mg in dextrose 5 % 100 mL IVPB  1,000 mg IntraVENous Once Azar Coughlin MD        sennosides-docusate sodium (SENOKOT-S) 8.6-50 MG tablet 2 tablet  2 tablet Oral Daily Cristo Parish MD        polyethylene glycol (GLYCOLAX) packet 17 g  17 g Oral Daily PRN Cristo Parish MD        pantoprazole (PROTONIX) injection 40 mg  40 mg IntraVENous BID Patricia Spivey MD   40 mg at 11/27/21 2022    And    sodium chloride (PF) 0.9 % injection 10 mL  10 mL IntraVENous BID Patricia Spivey MD   10 mL at 11/27/21 2028    bumetanide (BUMEX) injection 2 mg  2 mg IntraVENous Q6H Patricia Spivey MD   2 mg at 11/28/21 0237    midazolam (VERSED) 100 mg in dextrose 5 % 100 mL infusion  1-10 mg/hr IntraVENous Continuous Ranjan Green MD   Stopped at 11/27/21 0800    heparin (porcine) injection 5,000 Units  5,000 Units SubCUTAneous Q8H Nenita Thy Joshua Ponce MD   5,000 Units at 11/28/21 0534    chlorhexidine (PERIDEX) 0.12 % solution 15 mL  15 mL Mouth/Throat BID Keyona Chacon MD   15 mL at 11/27/21 2020    lubrifresh P.M. (artificial tears) ophthalmic ointment   Both Eyes Q6H Keyona Chacon MD   Given at 11/28/21 0241    fentaNYL 5 mcg/ml in 0.9%  ml infusion  12.5-200 mcg/hr IntraVENous Continuous Cristo Parish MD 20 mL/hr at 11/27/21 2252 100 mcg/hr at 11/27/21 2252    cisatracurium besylate (NIMBEX) 200 mg in sodium chloride 0.9 % 100 mL infusion  0.5-10 mcg/kg/min IntraVENous Continuous Cristo Parish MD 7.5 mL/hr at 11/28/21 0426 3.5 mcg/kg/min at 11/28/21 1125    ipratropium-albuterol (DUONEB) nebulizer solution 1 ampule  1 ampule Inhalation Q4H PRN Cristo Parish MD   1 ampule at 11/24/21 7560    budesonide (PULMICORT) nebulizer suspension 1,000 mcg  1 mg Nebulization BID Dayton Irving MD   1,000 mcg at 11/27/21 2101    Arformoterol Tartrate (BROVANA) nebulizer solution 15 mcg  15 mcg Nebulization BID Dayton Irving MD   15 mcg at 11/27/21 2101    atorvastatin (LIPITOR) tablet 10 mg  10 mg Oral Daily Evon Hernandez MD   10 mg at 11/19/21 0837    QUEtiapine (SEROQUEL) tablet 200 mg  200 mg Oral Daily Evon Hernandez MD   200 mg at 11/27/21 8747    sertraline (ZOLOFT) tablet 200 mg  200 mg Oral Daily Evon Hernandez MD   200 mg at 11/27/21 6060    traZODone (DESYREL) tablet 100 mg  100 mg Oral Nightly Evon Hernandez MD   100 mg at 11/27/21 2029    sodium chloride flush 0.9 % injection 5-40 mL  5-40 mL IntraVENous 2 times per day Evno Hernandez MD   10 mL at 11/27/21 2027    sodium chloride flush 0.9 % injection 5-40 mL  5-40 mL IntraVENous PRN Evon Hernandez MD   10 mL at 11/27/21 0816    0.9 % sodium chloride infusion  25 mL IntraVENous PRN Evon Hernandez  mL/hr at 11/1956 Rate Verify at 11/1956    ondansetron (ZOFRAN-ODT) disintegrating tablet 4 mg  4 mg Oral Q8H PRN Evon Hernandez MD        Or    ondansetron [facility]) injection 4 mg  4 mg IntraVENous Q6H PRN Evon Hernandez MD        acetaminophen (TYLENOL) tablet 650 mg  650 mg Oral Q6H PRN Evon Hernandez MD   650 mg at 11/26/21 4590    Or    acetaminophen (TYLENOL) suppository 650 mg  650 mg Rectal Q6H PRN Evon Hernandez MD   650 mg at 11/27/21 0829    ascorbic acid (VITAMIN C) tablet 1,000 mg  1,000 mg Oral Daily Evon Hernandez MD   1,000 mg at 11/27/21 8337    Vitamin D (CHOLECALCIFEROL) tablet 2,000 Units  2,000 Units Oral Daily Evon Hernandez MD   2,000 Units at 11/27/21 0828    zinc sulfate (ZINCATE) capsule 50 mg  50 mg Oral Daily Evon Hernandez MD   50 mg at 11/27/21 0807    glucose (GLUTOSE) 40 % oral gel 15 g  15 g Oral PRN Evon Hernandez MD        dextrose 50 % IV solution  12.5 g IntraVENous PRN MD Nav Duque glucagon (rDNA) injection 1 mg  1 mg IntraMUSCular PRN Alison Sullivan MD        dextrose 5 % solution  100 mL/hr IntraVENous PRN Alison Sullivan MD             REVIEW OF SYSTEMS: could not be obtained       PHYSICAL EXAM:      Vitals:     /62   Pulse 96   Temp 99.5 °F (37.5 °C) (Esophageal)   Resp (!) 34   Ht 5' 1\" (1.549 m)   Wt 164 lb (74.4 kg)   SpO2 96%   BMI 30.99 kg/m²     General Appearance:    Intubated, sedated, prone, FiO2 65, PEEP 12    Head:    Normocephalic, atraumatic   Eyes:       Ears:    No obvious deformity or drainage.    Nose:     Throat:    Neck:   Supple, no lymphadenopathy   Lungs:     Clear to auscultation ( posterior )    Heart:    Regular   Abdomen:     Soft, non-tender, bowel sounds present    Extremities:    No edema    Pulses:   Dorsalis pedis palpable    Skin:   no rashes             CBC with Differential:      Lab Results   Component Value Date    WBC 23.4 11/28/2021    RBC 2.76 11/28/2021    HGB 8.7 11/28/2021    HCT 27.2 11/28/2021     11/28/2021    MCV 98.6 11/28/2021    MCH 31.5 11/28/2021    MCHC 32.0 11/28/2021    RDW 18.3 11/28/2021    NRBC 2.6 11/28/2021    METASPCT 0.9 11/21/2021    LYMPHOPCT 2.6 11/28/2021    MONOPCT 1.7 11/28/2021    MYELOPCT 0.9 11/25/2021    BASOPCT 0.2 11/28/2021    MONOSABS 0.47 11/28/2021    LYMPHSABS 0.70 11/28/2021    EOSABS 0.00 11/28/2021    BASOSABS 0.00 11/28/2021       CMP     Lab Results   Component Value Date     11/28/2021    K 3.8 11/28/2021    K 4.8 11/19/2021     11/28/2021    CO2 25 11/28/2021    BUN 52 11/28/2021    CREATININE 1.3 11/28/2021    GFRAA 50 11/28/2021    LABGLOM 41 11/28/2021    GLUCOSE 165 11/28/2021    GLUCOSE 77 05/12/2011    PROT 5.0 11/28/2021    LABALBU 2.8 11/28/2021    CALCIUM 7.5 11/28/2021    BILITOT 0.3 11/28/2021    ALKPHOS 80 11/28/2021    AST 58 11/28/2021    ALT 40 11/28/2021         Hepatic Function Panel:    Lab Results   Component Value Date    ALKPHOS 80 11/28/2021    ALT 40 11/28/2021    AST 58 11/28/2021    PROT 5.0 11/28/2021    BILITOT 0.3 11/28/2021    BILIDIR <0.2 11/28/2021    IBILI see below 11/28/2021    LABALBU 2.8 11/28/2021       PT/INR:    Lab Results   Component Value Date    PROTIME 12.2 11/28/2021    INR 1.1 11/28/2021       TSH:    Lab Results   Component Value Date    TSH 4.720 10/12/2021       U/A:    Lab Results   Component Value Date    COLORU Yellow 11/16/2021    PHUR 6.0 11/16/2021    WBCUA NONE 11/16/2021    RBCUA 1-3 11/16/2021    BACTERIA MANY 11/16/2021    CLARITYU Clear 11/16/2021    SPECGRAV 1.025 11/16/2021    LEUKOCYTESUR Negative 11/16/2021    UROBILINOGEN 0.2 11/16/2021    BILIRUBINUR Negative 11/16/2021    BLOODU LARGE 11/16/2021    GLUCOSEU Negative 11/16/2021       ABG:    Lab Results   Component Value Date    FQZ8KEH 21.6 11/18/2021    LMZ3URE 32.3 11/18/2021    PO2ART 55.2 11/18/2021       MICROBIOLOGY:    Blood culture - neg on 11/25     Urine Culture -    Sputum Culture -  CULTURE, RESPIRATORY Oral Pharyngeal Kamille absent Abnormal      Smear, Respiratory --    Group 6: <25 PMN's/LPF and <25 Epithelial cells/LPF   Few Polymorphonuclear leukocytes   Rare Epithelial cells   No organisms seen     Organism Candida albicans Abnormal     CULTURE, RESPIRATORY One colony   Narrative:     Source: SPUSU       Site:                     Specimen: Nasopharyngeal Swab Updated: 11/1956    SARS-CoV-2, NAAT DETECTED Abnormal     Comment: Rapid NAAT:   Negative results should be treated as presumptive             Radiology :    Chest X ray :  Bilateral infiltrates     IMPRESSION:     1. Severe COVID 19 pneumonia s/p remdesivir and tocilizumab   2. Respiratory failure - intubated   3. Leukocytosis - reactive, procalcitonin 0.11  4. Candida colonization         RECOMMENDATIONS:      1.   Diflucan 400 mg IV q 24 hrs ( fungitell pending )

## 2021-11-28 NOTE — PROGRESS NOTES
Nephrology Progress Note  Patient's Name: Clara Aguilar    Reason for Consult:  Acute kidney injury    History of Present Ilness from the 11/24/21 note:    Clara Aguilar is a 60 y.o. female with a history of GI GERD, osteoarthritis, and schizoaffective disorder. She initially presented to this hospital 5 days ago following a fall at home. She was found by her roommate following an unspecified duration. EMS was called. Upon their evaluation patient was noted to be hypoxic. She was subsequently brought to ED for evaluation. In the ED she was noted to be hypoxic with an oxygen saturation of 60% on room air. Also noted to to have a low-grade fever. Laboratory data was significant for WBC of 8.5, hemoglobin of 14.6, BUN 17 and a creatinine of 0.6. Rapid COVID-19 test was positive. Patient was admitted to telemetry. 2 days into her hospital course became increasingly more hypoxic and was transferred to MICU. Because of her persistent hypoxia patient was intubated with mechanical ventilation on 11/20;  . Over the past 48 hours she has been markedly hypotensive requiring fluid resuscitation and pressors. Creatinine has worsened to a current value of 1.6 associated with low urine output. Hence renal consult. Subjective    11/28: pt remains in COVID 19 Isolation.  She remains proned and on an FIO2 65% and PEEP12 with an O2 sat 95-96%      Allergies:  Ciprofloxacin    Current Medications:    sennosides-docusate sodium (SENOKOT-S) 8.6-50 MG tablet 2 tablet, Daily  polyethylene glycol (GLYCOLAX) packet 17 g, Daily PRN  pantoprazole (PROTONIX) injection 40 mg, BID   And  sodium chloride (PF) 0.9 % injection 10 mL, BID  bumetanide (BUMEX) injection 2 mg, Q6H  midazolam (VERSED) 100 mg in dextrose 5 % 100 mL infusion, Continuous  heparin (porcine) injection 5,000 Units, Q8H  chlorhexidine (PERIDEX) 0.12 % solution 15 mL, BID  lubrifresh P.M. (artificial tears) ophthalmic ointment, Q6H  fentaNYL 5 mcg/ml in 0.9%  ml infusion, Continuous  cisatracurium besylate (NIMBEX) 200 mg in sodium chloride 0.9 % 100 mL infusion, Continuous  ipratropium-albuterol (DUONEB) nebulizer solution 1 ampule, Q4H PRN  budesonide (PULMICORT) nebulizer suspension 1,000 mcg, BID  Arformoterol Tartrate (BROVANA) nebulizer solution 15 mcg, BID  atorvastatin (LIPITOR) tablet 10 mg, Daily  QUEtiapine (SEROQUEL) tablet 200 mg, Daily  sertraline (ZOLOFT) tablet 200 mg, Daily  traZODone (DESYREL) tablet 100 mg, Nightly  sodium chloride flush 0.9 % injection 5-40 mL, 2 times per day  sodium chloride flush 0.9 % injection 5-40 mL, PRN  0.9 % sodium chloride infusion, PRN  ondansetron (ZOFRAN-ODT) disintegrating tablet 4 mg, Q8H PRN   Or  ondansetron (ZOFRAN) injection 4 mg, Q6H PRN  acetaminophen (TYLENOL) tablet 650 mg, Q6H PRN   Or  acetaminophen (TYLENOL) suppository 650 mg, Q6H PRN  ascorbic acid (VITAMIN C) tablet 1,000 mg, Daily  Vitamin D (CHOLECALCIFEROL) tablet 2,000 Units, Daily  zinc sulfate (ZINCATE) capsule 50 mg, Daily  glucose (GLUTOSE) 40 % oral gel 15 g, PRN  dextrose 50 % IV solution, PRN  glucagon (rDNA) injection 1 mg, PRN  dextrose 5 % solution, PRN        Review of Systems:   Review of systems not obtained due to patient factors.     Physical exam:  Vitals:    11/28/21 0800   BP:    Pulse: 96   Resp: (!) 34   Temp: 98.8 °F (37.1 °C)   SpO2: 96%          No PE as in COVID Isolation      Data:   Labs:  Lab Results   Component Value Date     11/28/2021     (H) 11/27/2021     11/27/2021    K 3.8 11/28/2021    K 3.7 11/27/2021    K 3.8 11/27/2021     11/28/2021    CO2 25 11/28/2021    CO2 27 11/27/2021    CO2 28 11/27/2021    CREATININE 1.3 (H) 11/28/2021    CREATININE 1.4 (H) 11/27/2021    CREATININE 1.5 (H) 11/27/2021    BUN 52 (H) 11/28/2021    BUN 54 (H) 11/27/2021    BUN 55 (H) 11/27/2021    GLUCOSE 165 (H) 11/28/2021    GLUCOSE 159 (H) 11/27/2021    GLUCOSE 170 (H) 11/27/2021    PHOS 4.2 11/27/2021    PHOS 3.4 11/25/2021    PHOS 3.5 11/23/2021    WBC 23.4 (H) 11/28/2021    WBC 22.0 (H) 11/27/2021    WBC 20.1 (H) 11/26/2021    HGB 8.7 (L) 11/28/2021    HGB 8.8 (L) 11/27/2021    HGB 9.7 (L) 11/26/2021    HCT 27.2 (L) 11/28/2021    HCT 27.5 (L) 11/27/2021    HCT 30.0 (L) 11/26/2021    MCV 98.6 11/28/2021     11/28/2021         Imaging:  CT HEAD WO CONTRAST    Result Date: 11/16/2021  EXAMINATION: CT OF THE HEAD WITHOUT CONTRAST  11/16/2021 9:54 pm TECHNIQUE: CT of the head was performed without the administration of intravenous contrast. Dose modulation, iterative reconstruction, and/or weight based adjustment of the mA/kV was utilized to reduce the radiation dose to as low as reasonably achievable. COMPARISON: None. HISTORY: ORDERING SYSTEM PROVIDED HISTORY: fall TECHNOLOGIST PROVIDED HISTORY: Reason for exam:->fall Has a \"code stroke\" or \"stroke alert\" been called? ->No Decision Support Exception - unselect if not a suspected or confirmed emergency medical condition->Emergency Medical Condition (MA) What reading provider will be dictating this exam?->CRC FINDINGS: BRAIN/VENTRICLES: There are cortical atrophy and periventricular white matter ischemic changes. There is no acute intracranial hemorrhage, mass effect or midline shift. No abnormal extra-axial fluid collection. The gray-white differentiation is maintained without evidence of an acute infarct. There is no evidence of hydrocephalus. ORBITS: The visualized portion of the orbits demonstrate no acute abnormality. SINUSES: The visualized paranasal sinuses and mastoid air cells demonstrate no acute abnormality. SOFT TISSUES/SKULL:  No acute abnormality of the visualized skull or soft tissues. No acute intracranial abnormality. Cortical atrophy and periventricular white matter ischemic changes.        XR CHEST PORTABLE    Result Date: 11/16/2021  EXAMINATION: ONE XRAY VIEW OF THE CHEST 11/16/2021 7:10 pm COMPARISON: 09/23/2021 HISTORY: ORDERING SYSTEM PROVIDED HISTORY: weakness TECHNOLOGIST PROVIDED HISTORY: Reason for exam:->weakness What reading provider will be dictating this exam?->CRC FINDINGS: Bilateral patchy airspace opacities. There is no effusion or pneumothorax. The cardiomediastinal silhouette is without acute process. The osseous structures are without acute process. Bilateral patchy airspace opacities worrisome for pneumonia. CTA CHEST W CONTRAST    Result Date: 11/16/2021  EXAMINATION: CTA OF THE CHEST 11/16/2021 9:54 pm TECHNIQUE: CTA of the chest was performed after the administration of intravenous contrast.  Multiplanar reformatted images are provided for review. MIP images are provided for review. Dose modulation, iterative reconstruction, and/or weight based adjustment of the mA/kV was utilized to reduce the radiation dose to as low as reasonably achievable. COMPARISON: None. HISTORY: ORDERING SYSTEM PROVIDED HISTORY: r/o pe TECHNOLOGIST PROVIDED HISTORY: Reason for exam:->r/o pe Decision Support Exception - unselect if not a suspected or confirmed emergency medical condition->Emergency Medical Condition (MA) What reading provider will be dictating this exam?->CRC FINDINGS: Pulmonary Arteries: Pulmonary arteries are adequately opacified for evaluation. No evidence of intraluminal filling defect to suggest pulmonary embolism. Main pulmonary artery is normal in caliber. Mediastinum: No evidence of mediastinal lymphadenopathy. The heart and pericardium demonstrate no acute abnormality. There is no acute abnormality of the thoracic aorta. Lungs/pleura: The lungs demonstrate extensive multifocal ground-glass opacities predominantly in the peripheral lung zones. No evidence of pleural effusion or pneumothorax. Upper Abdomen: Limited images of the upper abdomen are unremarkable. Soft Tissues/Bones: No acute bone or soft tissue abnormality. No evidence of pulmonary embolism.  Extensive multifocal ground-glass opacities predominantly in the peripheral lung zones bilaterally, highly concerning for atypical or COVID related pneumonia. Assessment/Plans    1. Acute kidney injury stage I most likely ischemic ATN occurring in the setting of hypotension. Patient is nonoliguric  over the past 24 hours   - urine indices although initially prerenal, suspect there is progression to ATN  Cr slow decrease 1.6-->1.5-->1.4-->1.3mg/dl  PLAN:  1-Continue to monitor labs and urine output  2-continue bumex    2. Acute hypoxic respiratory failure due to COVID-19 pneumonia  -Intubation with mechanical ventilation; prone   PLAN:  1. As per Pulm/CCC      3 COVID-19 pneumonia  -Received Toci; and dexamethasone  -Currently on Solu-Cortef  PLAN:  1. Defer to Pulm/CCC    4. Septic shock  -suspected possible superimposed bacterial pneumonia  -now off pressors  -on Zosyn + Vanc    5. Volume overload  UO 2640ml yesterday  PLAN:  1.  On the bumetanide 2mg IV Q6hrs        Will follow   Thanks         Do Carreon MD

## 2021-11-28 NOTE — PROGRESS NOTES
[facility] Attending    S: 60 y.o. female with a history of gerd, oa, schizoaffective disorder, and gastric fundiplication who was found down for an undetermined amount of time. Reports shortness of breath and cough for 2.5 weeks. She was found to be 60% on RA. In the ER, COVID testing was positive with significantly elevated CPKs. Patient is unvaccinated. Had desaturation to 88% wit PaO2 of 55 with RRT and subsequent transfer to the MICU. Desat to 40's when Bipap removed. Now intubated. Sedated, paralyzed, and prone. Today, remains intubated, sedated, proned. S/p bronch 11/23 with removal of mucus plugging. Temp improving. Ventilator settings fluctuating     O: VS- Blood pressure 104/62, pulse 96, temperature 99.5 °F (37.5 °C), temperature source Esophageal, resp. rate (!) 34, height 5' 1\" (1.549 m), weight 164 lb (74.4 kg), SpO2 96 %. Exam is as noted by resident   Currently prone and nonresponsive. Lungs: coarse, vent   CV:  Rate controlled, regular   Ext-no C/C/E  Scattered bruises, ? 2/2 covid      Impressions: Active Problems:    Acute respiratory failure with hypoxia (HCC)    Covid pneumonia    Rhabdomyolysis, CK improving    Concern for sepsis    Acute cystitis with hematuria, treated    CHARAN    Thrombocytopenia     Plan:      Acute hypoxic resp failure 2/2 covid - Decadron. Completed Tocimizilab. Getting Remdesivir. Vitamin C.  Vitamin D.  Zinc.  Appreciate Pulm management. Sepsis 2/2 PNA - done with vanc and zosyn  rhabdo - CK improving. CTM. No longer IVFs. U/s of lower extremities negative DVT 11/20. Slow wean of FiO2. Tube feeding. SALENA panel neg PF4 Ab  Schizoaffective - seroquel  Full code at this time. Attending Physician Statement  I have reviewed the chart and seen the patient with the resident(s). I personally reviewed images, EKG's and similar tests, if present.   I personally reviewed and performed key elements of the history and exam.  I have reviewed and confirmed student and/or resident history and exam with changes as indicated above. I agree with the assessment, plan and orders as documented by the resident. Please refer to the  resident and/or student note for additional information.       Jennifer Ontiveros MD

## 2021-11-28 NOTE — PROGRESS NOTES
[address] [facility]   Department of Internal Medicine   Internal Medicine Residency  MICU Progress Note    Patient:  Jody Pfeiffer 60 y.o. female   MRN: [id number]       Date of Service: 2021    Allergy: Ciprofloxacin  Cc follow UP COVID Pneumonia   Subjective     Patient was seen and examined this morning at bedside in no acute distress. Overnight, became hypotensive which improved after 500cc NS bolus. At 3:30am, patient had a large watery BM so FMS was ordered. She went into SVT and converted on her own in two separate episodes while being cleaned. This morning, patient is prone, sedated. Objective     TEMPERATURE:  Current - Temp: 99.9 °F (37.7 °C); Max - Temp  Av.2 °F (37.9 °C)  Min: 99.9 °F (37.7 °C)  Max: 100.6 °F (38.1 °C)  RESPIRATIONS RANGE: Resp  Av.8  Min: 25  Max: 36  PULSE RANGE: Pulse  Av.5  Min: 90  Max: 146  BLOOD PRESSURE RANGE:  Systolic (31AMZ), UFT:113 , Min:79 , GRX:670   ; Diastolic (08DGC), NUE:24, Min:49, Max:83    PULSE OXIMETRY RANGE: SpO2  Av.3 %  Min: 88 %  Max: 96 %    I & O - 24hr:    Intake/Output Summary (Last 24 hours) at 2021  Last data filed at 2021 2200  Gross per 24 hour   Intake 4376.75 ml   Output 2290 ml   Net 2086.75 ml     I/O last 3 completed shifts: In: 2629.8 [I.V.:1245.8; NG/GT:1384]  Out: 2135 [Urine:2135] I/O this shift:  In: 1964 [I.V.:1029; NG/GT:935]  Out: 565 [Urine:565]   Weight change:     Physical Exam:   General Appearance:  Unconscious, prone   HEENT:    NC/AT, mucous membranes are moist. Clear secretions from nares   Neck:   Supple, no jugular venous distention. Resp:     CTAB, No wheezes, No rhonchi. No use of accessory muscles. Heart:   Unable to assess as patient is prone    Abdomen:    Unable to assess as patient is prone   Extremities:   Atraumatic, no cyanosis.  3+ pitting edema on dorsal feet B/L   Pulses:  Radial and pedal pulses are intact bilaterally   Neurologic:   Unconscious on sedation       Medications     Continuous Infusions:   midazolam Stopped (11/27/21 0800)    fentaNYL 5 mcg/ml in 0.9%  ml infusion 100 mcg/hr (11/27/21 0829)    cisatracurium (NIMBEX) infusion 6.5 mcg/kg/min (11/27/21 1721)    sodium chloride 100 mL/hr at 11/1956    dextrose       Scheduled Meds:   pantoprazole  40 mg IntraVENous BID    And    sodium chloride (PF)  10 mL IntraVENous BID    bumetanide  2 mg IntraVENous Q6H    adenosine        potassium chloride  40 mEq IntraVENous Once    heparin (porcine)  5,000 Units SubCUTAneous Q8H    sennosides-docusate sodium  2 tablet Oral BID    polyethylene glycol  17 g Oral Daily    chlorhexidine  15 mL Mouth/Throat BID    artificial tears   Both Eyes Q6H    budesonide  1 mg Nebulization BID    Arformoterol Tartrate  15 mcg Nebulization BID    atorvastatin  10 mg Oral Daily    QUEtiapine  200 mg Oral Daily    sertraline  200 mg Oral Daily    traZODone  100 mg Oral Nightly    sodium chloride flush  5-40 mL IntraVENous 2 times per day    ascorbic acid  1,000 mg Oral Daily    vitamin D  2,000 Units Oral Daily    zinc sulfate  50 mg Oral Daily     PRN Meds: ipratropium-albuterol, sodium chloride flush, sodium chloride, ondansetron **OR** ondansetron, acetaminophen **OR** acetaminophen, glucose, dextrose, glucagon (rDNA), dextrose  Nutrition:      Diet NPO  ADULT TUBE FEEDING; Orogastric; Peptide Based; Continuous; 10; Yes; 10; Q 24 hours; 20; 400; Q 4 hours    Labs and Imaging Studies     CBC:   Recent Labs     11/25/21  0456 11/26/21  0420 11/27/21  0504   WBC 15.2* 20.1* 22.0*   HGB 9.3* 9.7* 8.8*   HCT 28.7* 30.0* 27.5*   MCV 97.3 98.4 98.2    228 186       BMP:    Recent Labs     11/26/21  1749 11/27/21  0504 11/27/21  1756   * 146 147*   K 4.2 3.8 3.7   * 108* 108*   CO2 27 28 27   BUN 54* 55* 54*   CREATININE 1.5* 1.5* 1.4*   GLUCOSE 205* 170* 159*       LIVER PROFILE:   Recent Labs     11/25/21  5030 11/26/21  0420 11/27/21  0504   AST 34* 45* 48*   ALT 46* 44* 39*   BILIDIR <0.2 <0.2 <0.2   BILITOT 0.4 0.4 0.3   ALKPHOS 77 82 78       PT/INR:   Recent Labs     11/25/21  0456 11/26/21  0420 11/27/21  0504   PROTIME 22.1* 13.7* 14.2*   INR 2.0 1.3 1.3       APTT:   Recent Labs     11/25/21  1123 11/26/21  0420 11/27/21  0504   APTT 40.0* 23.7* 24.1*       Fasting Lipid Panel:    Lab Results   Component Value Date    CHOL 322 10/12/2021    TRIG 150 10/12/2021    HDL 70 10/12/2021       Cardiac Enzymes:    Lab Results   Component Value Date    CKTOTAL 135 11/22/2021    CKTOTAL 488 (H) 11/20/2021    CKTOTAL 585 (H) 11/19/2021       Notable Cultures:      Blood cultures   Blood Culture, Routine   Date Value Ref Range Status   11/25/2021 24 Hours no growth  Preliminary     Respiratory cultures No results found for: RESPCULTURE No results found for: LABGRAM  Urine   Urine Culture, Routine   Date Value Ref Range Status   11/19/2021 Growth not present  Final     Legionella No results found for: LABLEGI  C Diff PCR No results found for: CDIFPCR  Wound culture/abscess: No results for input(s): WNDABS in the last 72 hours. Tip culture:No results for input(s): CXCATHTIP in the last 72 hours.       Oxygen:     Vent Information  $Ventilation: $Subsequent Day  Skin Assessment: Clean, dry, & intact  Equipment ID: [id number]  Equipment Changed: Humidification  Vent Type: [phone]  Vent Mode: AC/VC  Vt Ordered: 320 mL  Pressure Ordered: 14  Rate Set: 34 bmp  Peak Flow: 60 L/min  Pressure Support: 0 cmH20  FiO2 : 65 %  SpO2: 95 %  SpO2/FiO2 ratio: 146.15  Sensitivity: 3  PEEP/CPAP: 12  I Time/ I Time %: 0 s  Humidification Source: Heated wire  Humidification Temp: 37  Humidification Temp Measured: 37  Circuit Condensation: Drained  Mask Type: Full face mask  Mask Size: Small  Additional Respiratory  Assessments  Pulse: 94  Resp: 25  SpO2: 95 %  Position: Prone  Humidification Source: Heated wire  Humidification Temp: 37  Circuit Condensation: Drained  Oral Care Completed?: Yes  Oral Care: Mouthwash with chlorhexidine, Lip moisturizer applied, Mouth swabbed, Mouth moisturizer, Mouth suctioned, Suction toothette  Subglottic Suction Done?: Yes  Airway Type: ET  Airway Size: 8  Cuff Pressure (cm H2O):  (mlt)       [REMOVED] Urethral Catheter Temperature probe-Output (mL): 10 mL  Urethral Catheter-Output (mL): 215 mL  [REMOVED] Urethral Catheter Temperature probe 16 fr-Output (mL): 250 mL    Imaging Studies:  XR CHEST PORTABLE   Final Result   1. Endotracheal tube is 3 cm above the khai. 2. Stable interstitial pulmonary edema or pneumonia throughout both lungs. XR CHEST PORTABLE   Final Result   1. Somewhat limited exam, grossly unchanged. Please see above comments. .      RECOMMENDATION:   1. Recommend follow-up thoracic imaging, as directed clinically. .         XR ABDOMEN (KUB) (SINGLE AP VIEW)   Final Result   1. Satisfactory position of the NG tube within the stomach         XR CHEST PORTABLE   Final Result   1. There is no interval change in the multifocal bilateral pneumonia. XR CHEST PORTABLE   Final Result   Bilateral airspace disease with interval progression on the right         XR CHEST PORTABLE   Final Result   1. Endotracheal tube is 2.7 cm above the khai. 2. Stable interstitial pulmonary edema or pneumonia throughout both lungs. XR CHEST PORTABLE   Final Result   1. Worsening of the multifocal bilateral pneumonia when compared with the   prior study of 1 day earlier. XR CHEST PORTABLE   Final Result   1. Multifocal bilateral pneumonia more prominent within the right upper lobe   2. Minimal partial interval clearing of the pneumonia within the left lung   3. Worsening of the pneumonia within the right upper lobe.          US DUP LOWER EXTREMITIES BILATERAL VENOUS   Final Result   Within the visualized vessels there is no evidence for deep venous   thrombosis               XR CHEST PORTABLE Final Result   1. Lines okay. 2. Slightly worsened diffuse edema versus pneumonia. XR CHEST PORTABLE   Final Result   1. Lines okay   2. Improved aeration, mild improvement and diffuse pneumonia/edema. XR CHEST ABDOMEN NG PLACEMENT   Final Result   NG tube position satisfactory. XR CHEST PORTABLE   Final Result   Stable bilateral pneumonia or interstitial pulmonary edema. XR CHEST PORTABLE   Final Result   Increasing bilateral infiltrates. XR CHEST PORTABLE   Final Result   1. There is no interval change in the multifocal bilateral pneumonia. CT HEAD WO CONTRAST   Final Result   No acute intracranial abnormality. Cortical atrophy and periventricular white matter ischemic changes. CT CERVICAL SPINE WO CONTRAST   Final Result   No acute abnormality of the cervical spine. Moderate degenerative changes with disc space narrowing and marginal bony   spur formation C5 through C7. Ground-glass opacities in the visualized lung apices. Please refer to chest   CT for additional information. CTA CHEST W CONTRAST   Final Result   No evidence of pulmonary embolism. Extensive multifocal ground-glass opacities predominantly in the peripheral   lung zones bilaterally, highly concerning for atypical or COVID related   pneumonia. XR CHEST PORTABLE   Final Result   Bilateral patchy airspace opacities worrisome for pneumonia.          XR CHEST PORTABLE    (Results Pending)        Resident's Assessment and Plan     Assessment and Plan:    Cardiovascular  # Hx of HLD  - Takes atorvastatin 10mg qhs at home  Plan  - Continue atorvastatin    Pulmonary  # Acute Hypoxic Respiratory failure 2/2 COVID-19 PNA with likely superimposed bacterial pneumonia  Most recent ABG:   Recent Labs     11/27/21  0603   PH 7.407   PCO2 46.0*   PO2 74.4*   HCO3 28.3*   - Vent settings: AC/VC, , RR 34, PEEP 12, FiO2 65%, PIP 25, PL 25  - Urine strep and legionella negative  - p/f 1.05  - MRSA nares negative  - S/p bronchoscopy with BAL 11/23/21  - Aspirgillus galacto negative  - Beta D glucan/fungitel negative   - BAL negative  - CXR today no change in B/L patchy infiltrates  - Sedated on fentanyl 100, paralyzed with nimbex today  Plan    - Daily ABG  - Continue brovana, pulmicort, duoneb PRN  - Start bumex drip     Gastrointestinal  # Transaminitis  - See trend below  Recent Labs     11/25/21  0456 11/26/21  0420 11/27/21  0504   AST 34* 45* 48*   ALT 46* 44* 39*   Plan  - CTM    # Hypoalbuminemia  - Albumin 2.8    Infectious Disease  # COVID-19 PNA  - Positive test on 11/16/21  - S/p toci  - CRP 0.6>>0.4  - D-dimer 4245>>4285  - Ferritin 475>>392  - >>785  - S/p remdesivir  - Procal 0.11   Plan  - CRP, d-dimer, procal every other day  - Continue vitamin C, vitamin D, zinc    # Febrile, resolved    Genitourinary/Renal  # Hypernatremia  - See trend below  Recent Labs     11/26/21  1749 11/27/21  0504 11/27/21  1756   * 146 147*   Plan  - FWF increased on 11/26/21 to 400cc q4h    # CHARAN stage III, intrinsic  - Baseline cr 0.5, current cr see trend below  Recent Labs     11/26/21  1749 11/27/21  0504 11/27/21  1756   CREATININE 1.5* 1.5* 1.4*   - Urine electrolytes: cloride 26, k 39.5, sodium <20, urea nitrogen 836, creatinine 104  - Protein creatinie ratio 1.6  - FEUrea 15.2%  Plan  - Follow nephro recs    # UTI, resolved  # Rhabdomyolysis, resolved  # Hypokalemia, resolved    Gastrointestinal  # Constipation, resolved  - Last charted BM 11/19/21  - S/p relistor once on 11/26/21  - KUB 11/25/21 negative for bowel obstruction  Plan  - FMS in place for watery BM 11/28/21    # Reddish suctioned secretions in NGT cannister on 11/26/21  - Hb 9.7>>8.8  Plan  - Obtain FOBT    # Transaminitis, mild, improving  - See trend below  Recent Labs     11/25/21  0456 11/26/21  0420 11/27/21  0504   AST 34* 45* 48*   ALT 46* 44* 39*   Plan  - CTM     Heme/onc  # Leukocytosis, reactive in the setting of steroid administration  Recent Labs     11/25/21  0456 11/26/21  0420 11/27/21  0504   WBC 15.2* 20.1* 22.0*   - Blood clx negative  - BAL clx negative  Plan  - Follow ID recs: supportive care, leukocytosis is reactive as procal is low    # Thrombocytopenia, resolved    Psychiatric  # Hx of schizoaffective disorder  - Takes hydroxyzine 100mg qd, trazodone 200mg BID, quetiapine 200mg qd, sertraline 200mg qd, ativan 0.5mg qd at home  Plan  - Continue hydroxyzine 100mg qd, trazodone 200mg BID, quetiapine 200mg qd, sertraline 200mg qd          # Peptic ulcer prophylaxis: protonix 40mg BID   # DVT Prophylaxis: heparin 5,000 un  # Disposition: Cont current care    Sasha Garland MD, PGY-1     Attending Physician: Dr. Surinder Kim     I personally saw, examined and provided care for the patient. Radiographs, labs and medication list were reviewed by me independently. I spoke with bedside nursing, therapists and consultants. Critical care services and times documented are independent of procedures and multidisciplinary rounds with Residents. Additionally comprehensive, multidisciplinary rounds were conducted with the MICU team. The case was discussed in detail and plans for care were established. Review of Residents documentation was conducted and revisions were made as appropriate. I agree with the above documented exam, problem list and plan of care. ARDS /COVID  Fluid overload . start bumex 1 mg /h   [phone]   plt less than 22  ABG looks    plt 25   Prone and paralyzed   Stool hemococult ,to be check ,  Increase PPI bid  Try to decrease amount of fluid   abx stopped ,wbc increasing will discuss with ID  To change to     Care reviewed with nursing staff, medical and surgical specialty care, primary care and the patient's family as available.      Chart review/lab review/X-ray viewing/documentation and had long Conversation with patient/family re: prognosis, care options and any end of life issues:      Critical care time spent reviewing labs/films, examining patient, collaborating with other physicians more than 35  Minutes  excluding procedures . Mariela Avelar M.D.   11/28/2021  10:28 PM      Keyona Campos MD,[facility]  Pulmonary&Critical Care Medicine   Director of [address] [place] [address] Director of [address] [place] [address]    Rajan Mendoza

## 2021-11-29 NOTE — PROGRESS NOTES
[address] [facility]   Department of Internal Medicine   Internal Medicine Residency  MICU Progress Note    Patient:  Mikel Ortega 60 y.o. female   MRN: [id number]       Date of Service: 2021    Allergy: Ciprofloxacin  Cc follow up ARDS  Subjective     Patient was seen and examined this morning at bedside in no acute distress. Overnight, patient desatted down to the low 80s, stopped x-ray was obtained showing pneumothorax of the left lung nearly 30%. General surgery was consulted and chest tube was placed. Repeat chest x-ray showed reinflation of the left lung however severe infiltrates still present. No other acute issues noted overnight when seen this morning patient was sedated and proned. Continue to monitor patient status    Objective     TEMPERATURE:  Current - Temp: 98.8 °F (37.1 °C); Max - Temp  Av.9 °F (37.2 °C)  Min: 98.1 °F (36.7 °C)  Max: 99.5 °F (37.5 °C)  RESPIRATIONS RANGE: Resp  Av.6  Min: 24  Max: 43  PULSE RANGE: Pulse  Av.6  Min: 82  Max: 103  BLOOD PRESSURE RANGE:  Systolic (30KCE), XTQ:861 , Min:112 , OHI:870   ; Diastolic (98IIL), XWB:49, Min:67, Max:72    PULSE OXIMETRY RANGE: SpO2  Av.5 %  Min: 40 %  Max: 100 %    I & O - 24hr:    Intake/Output Summary (Last 24 hours) at 2021 1635  Last data filed at 2021 1600  Gross per 24 hour   Intake 3295 ml   Output 1506 ml   Net 1789 ml     I/O last 3 completed shifts: In: 5137 [I.V.:1819; NG/GT:1476]  Out: 5957 [Urine:1320; Stool:100; Chest Tube:136] I/O this shift:  In: -   Out: 75 [Urine:75]   Weight change:     Physical Exam  Constitutional:       Appearance: Normal appearance. Interventions: She is sedated and intubated. HENT:      Head: Normocephalic and atraumatic. Nose: Nose normal.   Eyes:      Pupils: Pupils are equal, round, and reactive to light. Cardiovascular:      Rate and Rhythm: Normal rate and regular rhythm. Pulses: Normal pulses.       Heart sounds: Normal ipratropium-albuterol, sodium chloride flush, sodium chloride, ondansetron **OR** ondansetron, acetaminophen **OR** acetaminophen, glucose, dextrose, glucagon (rDNA), dextrose  Nutrition:   NG/OG tube TF type: Pulmocare/Nephro/Glucerna/Jevity        At rate: ml/h    Labs and Imaging Studies     CBC:   Recent Labs     11/27/21  0504 11/28/21  0453 11/29/21  0520   WBC 22.0* 23.4* 19.7*   HGB 8.8* 8.7* 8.4*   HCT 27.5* 27.2* 26.8*   MCV 98.2 98.6 103.9*    196 198       BMP:    Recent Labs     11/28/21 2125 11/29/21  0520 11/29/21  1327    141 140   K 3.8 4.1 4.4   * 102 104   CO2 26 24 27   BUN 45* 50* 49*   CREATININE 1.1* 1.2* 1.1*   GLUCOSE 137* 144* 148*       LIVER PROFILE:   Recent Labs     11/27/21  0504 11/28/21  0453 11/29/21  0520   AST 48* 58* 89*   ALT 39* 40* 63*   BILIDIR <0.2 <0.2 <0.2   BILITOT 0.3 0.3 0.4   ALKPHOS 78 80 96       PT/INR:   Recent Labs     11/27/21  0504 11/28/21  0453 11/29/21  0520   PROTIME 14.2* 12.2 11.6   INR 1.3 1.1 1.1       APTT:   Recent Labs     11/27/21  0504 11/28/21  0453 11/29/21  0520   APTT 24.1* 21.3* 20.4*       Fasting Lipid Panel:    Lab Results   Component Value Date    CHOL 322 10/12/2021    TRIG 150 10/12/2021    HDL 70 10/12/2021       Cardiac Enzymes:    Lab Results   Component Value Date    CKTOTAL 135 11/22/2021    CKTOTAL 488 (H) 11/20/2021    CKTOTAL 585 (H) 11/19/2021       Notable Cultures:      Blood cultures   Blood Culture, Routine   Date Value Ref Range Status   11/25/2021 24 Hours no growth  Preliminary     Respiratory cultures No results found for: RESPCULTURE No results found for: LABGRAM  Urine   Urine Culture, Routine   Date Value Ref Range Status   11/19/2021 Growth not present  Final     Legionella No results found for: LABLEGI  C Diff PCR No results found for: CDIFPCR  Wound culture/abscess: No results for input(s): WNDABS in the last 72 hours.   Tip culture:No results for input(s): CXCATHTIP in the last 72 hours.      Oxygen:     Vent Information  $Ventilation: $Subsequent Day  Skin Assessment: Clean, dry, & intact  Equipment ID: [id number]  Equipment Changed: Humidification  Vent Type: 980  Vent Mode: AC/VC+  Vt Ordered: 320 mL  Pressure Ordered: 14  Rate Set: 34 bmp  Peak Flow: 0 L/min  Pressure Support: 0 cmH20  FiO2 : 95 %  SpO2: 91 %  SpO2/FiO2 ratio: 95.79  Sensitivity: 3  PEEP/CPAP: 12  I Time/ I Time %: 0 s  Humidification Source: Heated wire  Humidification Temp: 37  Humidification Temp Measured: 35.6  Circuit Condensation: Drained  Mask Type: Full face mask  Mask Size: Small  Additional Respiratory  Assessments  Pulse: 96  Resp: (!) 43  SpO2: 91 %  Position: Supine  Humidification Source: Heated wire  Humidification Temp: 37  Circuit Condensation: Drained  Oral Care Completed?: Yes  Oral Care: Mouth suctioned, Mouth swabbed, Mouth moisturizer, Mouthwash with chlorhexidine  Subglottic Suction Done?: Yes  Airway Type: ET  Airway Size: 8  Cuff Pressure (cm H2O):  (mlt)       [REMOVED] Urethral Catheter Temperature probe-Output (mL): 10 mL  Urethral Catheter-Output (mL): 75 mL  [REMOVED] Urethral Catheter Temperature probe 16 fr-Output (mL): 250 mL    Imaging Studies:  XR CHEST PORTABLE   Final Result   1. Lines okay. 2. Persistent edema/ARDS/pneumonia. 3. No sign of pneumothorax. XR CHEST PORTABLE   Final Result   Interval placement of left-sided chest tube with questionable residual   pneumothorax versus artifact. Extensive bilateral infiltrates. Continued follow-up recommended. XR CHEST PORTABLE   Final Result   Interval development of large left-sided tension pneumothorax. Extensive bilateral parenchymal infiltrates. Findings were discussed with Dr. Viviana Max at approximately 0010 hours [place]   time on 11/28/2021. XR CHEST PORTABLE   Final Result   Severe bilateral pulmonary opacification. XR CHEST PORTABLE   Final Result   1.  Endotracheal tube is 3 cm above the khai. 2. Stable interstitial pulmonary edema or pneumonia throughout both lungs. XR CHEST PORTABLE   Final Result   1. Somewhat limited exam, grossly unchanged. Please see above comments. .      RECOMMENDATION:   1. Recommend follow-up thoracic imaging, as directed clinically. .         XR ABDOMEN (KUB) (SINGLE AP VIEW)   Final Result   1. Satisfactory position of the NG tube within the stomach         XR CHEST PORTABLE   Final Result   1. There is no interval change in the multifocal bilateral pneumonia. XR CHEST PORTABLE   Final Result   Bilateral airspace disease with interval progression on the right         XR CHEST PORTABLE   Final Result   1. Endotracheal tube is 2.7 cm above the khai. 2. Stable interstitial pulmonary edema or pneumonia throughout both lungs. XR CHEST PORTABLE   Final Result   1. Worsening of the multifocal bilateral pneumonia when compared with the   prior study of 1 day earlier. XR CHEST PORTABLE   Final Result   1. Multifocal bilateral pneumonia more prominent within the right upper lobe   2. Minimal partial interval clearing of the pneumonia within the left lung   3. Worsening of the pneumonia within the right upper lobe. US DUP LOWER EXTREMITIES BILATERAL VENOUS   Final Result   Within the visualized vessels there is no evidence for deep venous   thrombosis               XR CHEST PORTABLE   Final Result   1. Lines okay. 2. Slightly worsened diffuse edema versus pneumonia. XR CHEST PORTABLE   Final Result   1. Lines okay   2. Improved aeration, mild improvement and diffuse pneumonia/edema. XR CHEST ABDOMEN NG PLACEMENT   Final Result   NG tube position satisfactory. XR CHEST PORTABLE   Final Result   Stable bilateral pneumonia or interstitial pulmonary edema. XR CHEST PORTABLE   Final Result   Increasing bilateral infiltrates. XR CHEST PORTABLE   Final Result   1.  There is no interval change in the multifocal bilateral pneumonia. CT HEAD WO CONTRAST   Final Result   No acute intracranial abnormality. Cortical atrophy and periventricular white matter ischemic changes. CT CERVICAL SPINE WO CONTRAST   Final Result   No acute abnormality of the cervical spine. Moderate degenerative changes with disc space narrowing and marginal bony   spur formation C5 through C7. Ground-glass opacities in the visualized lung apices. Please refer to chest   CT for additional information. CTA CHEST W CONTRAST   Final Result   No evidence of pulmonary embolism. Extensive multifocal ground-glass opacities predominantly in the peripheral   lung zones bilaterally, highly concerning for atypical or COVID related   pneumonia. XR CHEST PORTABLE   Final Result   Bilateral patchy airspace opacities worrisome for pneumonia.          XR CHEST PORTABLE    (Results Pending)   XR CHEST PORTABLE    (Results Pending)        Resident's Assessment and Plan     Assessment and Plan:    Cardiovascular   History of hyperlipidemia  -Continue home atorvastatin 10 mg    Pulmonary  Acute hypoxic respiratory failure 2/2 Covid pneumonia  -Patient is intubated  -Last vent settings of respiratory rate 34, tidal volume of 320, PEEP of 12, plateau of 25 and compliance of 5.2 with FiO2 of 65%  -PF ratio is worsening to 1.07  -Currently sedated with fentanyl  -Completed Courses of Decadron and Toci  -Respiratory cultures have been negative, fungal cultures and BLE are also negative  -Continue ventilation with daily ABGs  -Completed remdesivir   -Continue breathing treatments  -Per ID continue Diflucan on with Fungitell pending  -Continue Bumex drip    Left lung pneumothorax  -Overnight patient desatted down into the eighties  -Chest x-ray showed left pneumothorax  -General surgery was consulted and chest tube was placed  -Chest tube had drainage of 136 mL of blood-tinged fluid  -Repeat chest x-ray showed reinflation of the left lung  -Continue with chest tube and continue to monitor    Gastrointestinal  Transaminitis 2/2 Covid infection  -Liver enzymes trending up  -Continue to monitor    Constipation (resolved)    Concern for GI bleed  -Patient has had regular secretions  -FOBT and Gastroccult were obtained  -Hemoglobin stable at 8.4  -FOBT was positive, Gastroccult was negative  -With hemoglobin stable no intervention at this time we will continue to monitor  -Monitor H&H every 8 hours    Infectious Disease   COVID-19 pneumonia  -See above  -Continue to monitor CRP D-dimer and pro-Carl  -Continue vitamin C, vitamin D and zinc    Renal   Hypernatremia (resolved)    Stage III intrinsic CHARAN (resolving)   -Baseline creatinine of 0.5  -Urea 15.2  -Nephro is following  -Creatinine is trending down, to 1.2 today  -Continue to follow nephro recommendations  -Continue Bumex per nephro  -Continue to monitor    Heme/Onc  Reactive leukocytosis 2/2 steroids and Covid infection  -WBC trending down to 19.7 today  -Cultures have been negative  -Continue to follow ID recommendations    Psychiatric   History of schizoaffective disorder  -Continue home hydroxyzine, trazodone, quetiapine and sertraline    # Peptic ulcer prophylaxis:Protonix   # DVT Prophylaxis: Heparin   # Disposition: Cont current care     Rhona Lopez MD, PGY-1    Attending Physician: Dr. Enrique Ferraro  I personally saw, examined and provided care for the patient. Radiographs, labs and medication list were reviewed by me independently. I spoke with bedside nursing, therapists and consultants. Critical care services and times documented are independent of procedures and multidisciplinary rounds with Residents. Additionally comprehensive, multidisciplinary rounds were conducted with the MICU team. The case was discussed in detail and plans for care were established. Review of Residents documentation was conducted and revisions were made as appropriate.  I agree with the above documented exam, problem list and plan of care. ARDS /COVID  Developed pneumothorax . s/p Chest tube . .lung expnaded   Fluid overload . start bumex 1 mg /h   [id number]   plt less than 22  ABG looks    plt 25   Prone and paralyzed   Stool hemococult ,to be check ,to readdress with staff today ,  Increase PPI bid  10 L   Try to decrease amount of fluid   abx stopped ,wbc increasing will discuss with ID  To change to   Will update family   prognosis poor     Care reviewed with nursing staff, medical and surgical specialty care, primary care and the patient's family as available. Chart review/lab review/X-ray viewing/documentation and had long Conversation with patient/family re: prognosis, care options and any end of life issues:      Critical care time spent reviewing labs/films, examining patient, collaborating with other physicians more than 35  Minutes  excluding procedures . Celia Gómez M.D.

## 2021-11-29 NOTE — PROCEDURES
Debbie Roman is a 60 y.o. female patient. 1. Acute respiratory failure with hypoxia ([address]    2. COVID-19    3. Hypokalemia    4. Acute cystitis with hematuria    5. Pneumonia due to infectious organism, unspecified laterality, unspecified part of lung    6. Traumatic rhabdomyolysis, initial encounter [facility])      Past Medical History:   Diagnosis Date    GERD (gastroesophageal reflux disease)     Osteoarthritis of right knee     Schizoaffective disorder (HCC)     [facility]     Blood pressure 129/72, pulse 97, temperature 99.3 °F (37.4 °C), temperature source Bladder, resp. rate (!) 35, height 5' 1\" (1.549 m), weight 164 lb (74.4 kg), SpO2 98 %. Chest Tube Insertion    Date/Time: 11/28/2021 10:43 PM  Performed by: Abdullahi Walker DO  Authorized by: Abdullahi Walker DO   Consent: The procedure was performed in an emergent situation.   Required items: required blood products, implants, devices, and special equipment available  Patient identity confirmed: hospital-assigned identification number  Indications: pneumothorax  Preparation: skin prepped with Betadine  Placement location: left lateral  Scalpel size: 11  Tube size: 20 [place]  Dissection instrument: finger and Loren clamp  Tube connected to: suction  Drainage characteristics: serosanguinous  Drainage amount: 300 ml  Suture material: 0 silk  Dressing: 4x4 sterile gauze and petrolatum-impregnated gauze  Post-insertion x-ray findings: tube in good position  Patient tolerance: patient tolerated the procedure well with no immediate complications  Comments: Dr. Sherren Gibney was present for the entire procedure           Abdullahi Walker DO  11/28/2021

## 2021-11-29 NOTE — PROGRESS NOTES
Comprehensive Nutrition Assessment    Type and Reason for Visit:  Reassess    Nutrition Recommendations/Plan: Advance TF as tolerated  Recommend Peptide-based formula @ 50 ml/hr to provide 1200 ml tv, 1440 kcal, 90 gm pro, 973 ml free water    Pt would benefit from ongoing peptide-based formula for optimal GI tolerance in prone position. Noted current hyperphosphatemia, if remains elevated please consult for appropriate recommendations for transition to renal formula    Nutrition Assessment:  Pt w/ ongoing intubation/proning 2/2 +COVID19 PNA. Noted rhabdo/CHARAN, found down PTA. Noted PTX now s/p CT placement. Will provide updated TF recs and monitor    Malnutrition Assessment:  Malnutrition Status:   At risk for malnutrition (Comment)    Context:  Acute Illness     Findings of the 6 clinical characteristics of malnutrition:  Energy Intake:  7 - 50% or less of estimated energy requirements for 5 or more days  Weight Loss:  Unable to assess (no hx on file)     Body Fat Loss:  Unable to assess (d/t covid iso)     Muscle Mass Loss:  Unable to assess    Fluid Accumulation:   (d/t covid iso)     Strength:  Normal  strength    Estimated Daily Nutrient Needs:  Energy (kcal):  PS3B 1520; ; Weight Used for Energy Requirements:  Admission     Protein (g):  75-90; Weight Used for Protein Requirements:  Ideal (1.5-1.8)        Fluid (ml/day):  per critical care    Nutrition Related Findings:  sedated/paralyzed on vent, prone, MAP stable on pressor, soft abd, hypoactive BS, diarrhea w/ FMS, CT x1, OGT w/ trickle feeding, +1 edema, +I/Os 10.5L, hyperphosphatemia      Wounds:  None       Current Nutrition Therapies:    Current Tube Feeding (TF) Orders:  · Feeding Route: Orogastric  · Formula: Peptide Based  · Schedule: Continuous (20 ml/hr, 480 ml tv)  · Water Flushes: 400 ml q4 hr = 2400 ml H2O  · Current TF & Flush Orders Provides: 576 kcal, 36 gm pro, 389 ml feroz water, 2789 ml total water    Anthropometric Measures:  · Height: 5' 1\" (154.9 cm)  · Current Body Weight: 151 lb (68.5 kg) (adm wt, CBW elevated 2/2 fluids)   · Admission Body Weight: 151 lb (68.5 kg) (11/17 first measured)    · Usual Body Weight:  (UTO no actual EMR hx on file)     · Ideal Body Weight: 105 lbs; % Ideal Body Weight 143.8 %   · BMI: 28.5  · BMI Categories: Overweight (BMI 25.0-29. 9)       Nutrition Diagnosis:   · Inadequate oral intake related to impaired respiratory function as evidenced by NPO or clear liquid status due to medical condition, intubation, nutrition support - enteral nutrition      Nutrition Interventions:   Food and/or Nutrient Delivery:  Modify Tube Feeding (increase EN as tolerated, recommend Peptide-based formula @ 50 ml/hr)  Nutrition Education/Counseling:  Education not appropriate   Coordination of Nutrition Care:  Continue to monitor while inpatient    Goals:  New Goal - Pt to tolerate TF at goal rate       Nutrition Monitoring and Evaluation:   Food/Nutrient Intake Outcomes:  Enteral Nutrition Intake/Tolerance  Physical Signs/Symptoms Outcomes:  Biochemical Data, GI Status, Fluid Status or Edema, Hemodynamic Status, Nutrition Focused Physical Findings, Skin, Weight     Discharge Planning:     Too soon to determine     Electronically signed by Janice Hernandez, MS, RD, LD on 11/29/21 at 3:38 PM EST    Contact: [phone]

## 2021-11-29 NOTE — PROGRESS NOTES
Nephrology Progress Note  Patient's Name: Kvng Olivas    Reason for Consult:  Acute kidney injury    History of Present Ilness from the 11/24/21 note:    Kvng Olivsa is a 60 y.o. female with a history of GI GERD, osteoarthritis, and schizoaffective disorder. She initially presented to this hospital 5 days ago following a fall at home. She was found by her roommate following an unspecified duration. EMS was called. Upon their evaluation patient was noted to be hypoxic. She was subsequently brought to ED for evaluation. In the ED she was noted to be hypoxic with an oxygen saturation of 60% on room air. Also noted to to have a low-grade fever. Laboratory data was significant for WBC of 8.5, hemoglobin of 14.6, BUN 17 and a creatinine of 0.6. Rapid COVID-19 test was positive. Patient was admitted to telemetry. 2 days into her hospital course became increasingly more hypoxic and was transferred to MICU. Because of her persistent hypoxia patient was intubated with mechanical ventilation on 11/20;  . Over the past 48 hours she has been markedly hypotensive requiring fluid resuscitation and pressors. Creatinine has worsened to a current value of 1.6 associated with low urine output. Hence renal consult. Subjective    11/28: pt remains in COVID 19 Isolation.  She remains proned and on an FIO2 65% and PEEP12 with an O2 sat 95-96%    11/29: pt seen through window of icu due to covid, chart reviewed      Allergies:  Ciprofloxacin    Current Medications:    sennosides-docusate sodium (SENOKOT-S) 8.6-50 MG tablet 2 tablet, Daily  polyethylene glycol (GLYCOLAX) packet 17 g, Daily PRN  sodium chloride flush 0.9 % injection 5-40 mL, 2 times per day  sodium chloride flush 0.9 % injection 5-40 mL, PRN  heparin flush 100 UNIT/ML injection 300 Units, 2 times per day  heparin flush 100 UNIT/ML injection 300 Units, PRN  norepinephrine (LEVOPHED) 16 mg in dextrose 5% 250 mL infusion, Continuous  pantoprazole (PROTONIX) injection 40 mg, BID   And  sodium chloride (PF) 0.9 % injection 10 mL, BID  midazolam (VERSED) 100 mg in dextrose 5 % 100 mL infusion, Continuous  heparin (porcine) injection 5,000 Units, Q8H  chlorhexidine (PERIDEX) 0.12 % solution 15 mL, BID  lubrifresh P.M. (artificial tears) ophthalmic ointment, Q6H  fentaNYL 5 mcg/ml in 0.9%  ml infusion, Continuous  cisatracurium besylate (NIMBEX) 200 mg in sodium chloride 0.9 % 100 mL infusion, Continuous  ipratropium-albuterol (DUONEB) nebulizer solution 1 ampule, Q4H PRN  budesonide (PULMICORT) nebulizer suspension 1,000 mcg, BID  Arformoterol Tartrate (BROVANA) nebulizer solution 15 mcg, BID  atorvastatin (LIPITOR) tablet 10 mg, Daily  QUEtiapine (SEROQUEL) tablet 200 mg, Daily  sertraline (ZOLOFT) tablet 200 mg, Daily  traZODone (DESYREL) tablet 100 mg, Nightly  sodium chloride flush 0.9 % injection 5-40 mL, 2 times per day  sodium chloride flush 0.9 % injection 5-40 mL, PRN  0.9 % sodium chloride infusion, PRN  ondansetron (ZOFRAN-ODT) disintegrating tablet 4 mg, Q8H PRN   Or  ondansetron (ZOFRAN) injection 4 mg, Q6H PRN  acetaminophen (TYLENOL) tablet 650 mg, Q6H PRN   Or  acetaminophen (TYLENOL) suppository 650 mg, Q6H PRN  ascorbic acid (VITAMIN C) tablet 1,000 mg, Daily  Vitamin D (CHOLECALCIFEROL) tablet 2,000 Units, Daily  zinc sulfate (ZINCATE) capsule 50 mg, Daily  glucose (GLUTOSE) 40 % oral gel 15 g, PRN  dextrose 50 % IV solution, PRN  glucagon (rDNA) injection 1 mg, PRN  dextrose 5 % solution, PRN        Review of Systems:   Review of systems not obtained due to patient factors.     Physical exam:  Vitals:    11/29/21 1100   BP:    Pulse: 92   Resp: (!) 34   Temp:    SpO2: 92%          No PE as in COVID Isolation      Data:   Labs:  Lab Results   Component Value Date     11/29/2021     11/28/2021     11/28/2021    K 4.1 11/29/2021    K 3.8 11/28/2021    K 3.5 11/28/2021     11/29/2021    CO2 24 11/29/2021    CO2 26 11/28/2021    CO2 28 11/28/2021    CREATININE 1.2 (H) 11/29/2021    CREATININE 1.1 (H) 11/28/2021    CREATININE 1.2 (H) 11/28/2021    BUN 50 (H) 11/29/2021    BUN 45 (H) 11/28/2021    BUN 49 (H) 11/28/2021    GLUCOSE 144 (H) 11/29/2021    GLUCOSE 137 (H) 11/28/2021    GLUCOSE 155 (H) 11/28/2021    PHOS 4.9 (H) 11/29/2021    PHOS 4.2 11/27/2021    PHOS 3.4 11/25/2021    WBC 19.7 (H) 11/29/2021    WBC 23.4 (H) 11/28/2021    WBC 22.0 (H) 11/27/2021    HGB 8.4 (L) 11/29/2021    HGB 8.7 (L) 11/28/2021    HGB 8.8 (L) 11/27/2021    HCT 26.8 (L) 11/29/2021    HCT 27.2 (L) 11/28/2021    HCT 27.5 (L) 11/27/2021    .9 (H) 11/29/2021     11/29/2021         Imaging:  CT HEAD WO CONTRAST    Result Date: 11/16/2021  EXAMINATION: CT OF THE HEAD WITHOUT CONTRAST  11/16/2021 9:54 pm TECHNIQUE: CT of the head was performed without the administration of intravenous contrast. Dose modulation, iterative reconstruction, and/or weight based adjustment of the mA/kV was utilized to reduce the radiation dose to as low as reasonably achievable. COMPARISON: None. HISTORY: ORDERING SYSTEM PROVIDED HISTORY: fall TECHNOLOGIST PROVIDED HISTORY: Reason for exam:->fall Has a \"code stroke\" or \"stroke alert\" been called? ->No Decision Support Exception - unselect if not a suspected or confirmed emergency medical condition->Emergency Medical Condition (MA) What reading provider will be dictating this exam?->CRC FINDINGS: BRAIN/VENTRICLES: There are cortical atrophy and periventricular white matter ischemic changes. There is no acute intracranial hemorrhage, mass effect or midline shift. No abnormal extra-axial fluid collection. The gray-white differentiation is maintained without evidence of an acute infarct. There is no evidence of hydrocephalus. ORBITS: The visualized portion of the orbits demonstrate no acute abnormality.  SINUSES: The visualized paranasal sinuses and mastoid air cells demonstrate no acute abnormality. SOFT TISSUES/SKULL:  No acute abnormality of the visualized skull or soft tissues. No acute intracranial abnormality. Cortical atrophy and periventricular white matter ischemic changes. XR CHEST PORTABLE    Result Date: 11/16/2021  EXAMINATION: ONE XRAY VIEW OF THE CHEST 11/16/2021 7:10 pm COMPARISON: 09/23/2021 HISTORY: ORDERING SYSTEM PROVIDED HISTORY: weakness TECHNOLOGIST PROVIDED HISTORY: Reason for exam:->weakness What reading provider will be dictating this exam?->CRC FINDINGS: Bilateral patchy airspace opacities. There is no effusion or pneumothorax. The cardiomediastinal silhouette is without acute process. The osseous structures are without acute process. Bilateral patchy airspace opacities worrisome for pneumonia. CTA CHEST W CONTRAST    Result Date: 11/16/2021  EXAMINATION: CTA OF THE CHEST 11/16/2021 9:54 pm TECHNIQUE: CTA of the chest was performed after the administration of intravenous contrast.  Multiplanar reformatted images are provided for review. MIP images are provided for review. Dose modulation, iterative reconstruction, and/or weight based adjustment of the mA/kV was utilized to reduce the radiation dose to as low as reasonably achievable. COMPARISON: None. HISTORY: ORDERING SYSTEM PROVIDED HISTORY: r/o pe TECHNOLOGIST PROVIDED HISTORY: Reason for exam:->r/o pe Decision Support Exception - unselect if not a suspected or confirmed emergency medical condition->Emergency Medical Condition (MA) What reading provider will be dictating this exam?->CRC FINDINGS: Pulmonary Arteries: Pulmonary arteries are adequately opacified for evaluation. No evidence of intraluminal filling defect to suggest pulmonary embolism. Main pulmonary artery is normal in caliber. Mediastinum: No evidence of mediastinal lymphadenopathy. The heart and pericardium demonstrate no acute abnormality. There is no acute abnormality of the thoracic aorta. Lungs/pleura:  The lungs demonstrate extensive multifocal ground-glass opacities predominantly in the peripheral lung zones. No evidence of pleural effusion or pneumothorax. Upper Abdomen: Limited images of the upper abdomen are unremarkable. Soft Tissues/Bones: No acute bone or soft tissue abnormality. No evidence of pulmonary embolism. Extensive multifocal ground-glass opacities predominantly in the peripheral lung zones bilaterally, highly concerning for atypical or COVID related pneumonia. Assessment/Plans    1. Acute kidney injury stage I  ischemic ATN occurring in the setting of hypotension  nonoliguric    Cr slow decrease 1.6-->1.5-->1.4-->1.3>1.2  Continue to monitor labs and urine output  continue bumex    2. Acute hypoxic respiratory failure  due to COVID-19 pneumonia  Intubation with mechanical ventilation; prone   As per Pulm/CCC    3 COVID-19 pneumonia  Received Toci; and dexamethasone  Currently on Solu-Cortef  Defer to Pulm/CCC    4. Septic shock  suspected possible superimposed bacterial pneumonia  now off pressors  on Zosyn + Vanc    5.   Volume overload  Trial diuretic if bp improves      Pearl Maguire MD

## 2021-11-29 NOTE — PLAN OF CARE
Spoke with brother Chai Schreiber, informed him of his sisters condition and informed him that she is very critical. Answered questions appropriately and and discussed code status, he would like to keep his sister a full code at this time.

## 2021-11-29 NOTE — PROGRESS NOTES
Vascular Access Procedure Note    Procedure Date:   11/29/2021    Pre-procedure Verification/Time-Out:  The proposed risks versus benefits of this procedure were discussed in detail by the physician with family. written consent was obtained from the patient's  Constantin Esposito. Relevant documentation was reviewed prior to procedure including signed consent form and medications. All necessary equipment for procedure is available at time of procedure yes. An audible time out was done at 1500PM by team member, correctly identifying patients name, medical record number, correct side, correct site, and correct procedure to be performed with registered nurse members of the procedure team all in agreement.         Indication for Procedure:   Reason for Insertion: intravenous access    ASA Assessment (Required for Moderate & Deep Sedation):      Procedure:   Reason for Consultation: power PICC line    Clinician Performing Procedure:   LAYTON braga rn    Assistant:  none    Sedation:   Analgesia Used: lidocaine 1%    Procedure Details/Findings:  Catheter Bah[place] Size: 5.5  Lot Number: [id number]  Product #: [id number]  Expiration Date: 12/31/2022  Maximum Barrier Precautions: cap, eye shield, full body drape, gloves, gown, handwashing and mask  Skin Prep: chlorhexidine  Technique: modified seldinger and ultrasound guided with VPS  Attempts: 2  Exposed (cm): 5  Total (cm): 43  Placement Site: left axillary  Vessel Size: 0.42  Dressing: securement device, transparent dressing and biopatch  Blood Return: Yes   Ultrasound Guidance: Yes   Arm Circumference Mid-Bicep (cm): 37  Chest X-Ray Ordered: VasoNova VPS  End Placement: svc    Complications:   none     Post-operative Condition:  stable  Patient Tolerated Procedure: well     Comments/Post-operative Education:   Post Procedure Interventions: no blood pressure sign placed above bed    Alireza Saxena RN  11/29/21  3:31 PM    eber

## 2021-11-29 NOTE — PLAN OF CARE
Problem: Falls - Risk of:  Goal: Absence of physical injury  Description: Absence of physical injury  Outcome: Met This Shift     Problem: Airway Clearance - Ineffective  Goal: Achieve or maintain patent airway  Outcome: Met This Shift     Problem: Breathing Pattern - Ineffective  Goal: Ability to achieve and maintain a regular respiratory rate  Outcome: Met This Shift     Problem:  Body Temperature -  Risk of, Imbalanced  Goal: Ability to maintain a body temperature within defined limits  Outcome: Met This Shift

## 2021-11-29 NOTE — PROGRESS NOTES
[facility] - Family Medicine Inpatient   Resident Progress Note    S:  Hospital day: 15   Brief Synopsis: Peter Pascual is a 60 y.o. female with pmh of GERD, osteoarthritis and schizoaffective disorder who presented to ER s/p fall at home. Patient found down at home, with initial O2 saturation of 60%. Brought to the ER; found to have COVID-19 pneumonia , requiring bipap for appropriate saturation. Rhabdomyolysis, UTI. Started on appropriate management including tocilizumab, ceftriaxone 1 g daily, and IV fluids for rhabdomyolysis. Decadron was started daily, and with patients worsening status - pulmonology consulted. FULL CODE. Transferred to ICU on 11/18 for rapid breathing and hypoxia and pO2 of 55. Intubated 11/20 secondary to O2 sats consistently <80% with rapid breathing. 11/29 , patient developed left sided tension pneumothorax and underwent chest tube placement. Seen in the ICU this AM. Intubated, sedated, paralyzed, proned. Left sided CT noted. Vasopressors dc'd    Acute overnight changes:  - developed tension pneumothorax , left sided  - left sided chest tube inserted , draining serosanguinous fluid  - requiring vasopressors    No other changes noted.     Cont meds:    sodium chloride      norepinephrine 3 mcg/min (11/29/21 0900)    midazolam Stopped (11/27/21 0800)    fentaNYL 5 mcg/ml in 0.9%  ml infusion 125 mcg/hr (11/29/21 0127)    cisatracurium (NIMBEX) infusion 3.5 mcg/kg/min (11/28/21 1914)    sodium chloride 100 mL/hr at 11/1956    dextrose       Scheduled meds:    sennosides-docusate sodium  2 tablet Oral Daily    lidocaine PF  5 mL IntraDERmal Once    sodium chloride flush  5-40 mL IntraVENous 2 times per day    heparin flush  3 mL IntraVENous 2 times per day    pantoprazole  40 mg IntraVENous BID    And    sodium chloride (PF)  10 mL IntraVENous BID    heparin (porcine)  5,000 Units SubCUTAneous Q8H    chlorhexidine  15 mL Mouth/Throat BID    artificial tears creatinine clearance is 45 mL/min (A) (based on SCr of 1.2 mg/dL (H)). Other pertinent labs as noted below    Radiology:  XR CHEST PORTABLE   Final Result   1. Lines okay. 2. Persistent edema/ARDS/pneumonia. 3. No sign of pneumothorax. XR CHEST PORTABLE   Final Result   Interval placement of left-sided chest tube with questionable residual   pneumothorax versus artifact. Extensive bilateral infiltrates. Continued follow-up recommended. XR CHEST PORTABLE   Final Result   Interval development of large left-sided tension pneumothorax. Extensive bilateral parenchymal infiltrates. Findings were discussed with Dr. Deyanira Ramsey at approximately 0010 hours [place]   time on 11/28/2021. XR CHEST PORTABLE   Final Result   Severe bilateral pulmonary opacification. XR CHEST PORTABLE   Final Result   1. Endotracheal tube is 3 cm above the khai. 2. Stable interstitial pulmonary edema or pneumonia throughout both lungs. XR CHEST PORTABLE   Final Result   1. Somewhat limited exam, grossly unchanged. Please see above comments. .      RECOMMENDATION:   1. Recommend follow-up thoracic imaging, as directed clinically. .         XR ABDOMEN (KUB) (SINGLE AP VIEW)   Final Result   1. Satisfactory position of the NG tube within the stomach         XR CHEST PORTABLE   Final Result   1. There is no interval change in the multifocal bilateral pneumonia. XR CHEST PORTABLE   Final Result   Bilateral airspace disease with interval progression on the right         XR CHEST PORTABLE   Final Result   1. Endotracheal tube is 2.7 cm above the khai. 2. Stable interstitial pulmonary edema or pneumonia throughout both lungs. XR CHEST PORTABLE   Final Result   1. Worsening of the multifocal bilateral pneumonia when compared with the   prior study of 1 day earlier. XR CHEST PORTABLE   Final Result   1.  Multifocal bilateral pneumonia more prominent within the right upper lobe   2. Minimal partial interval clearing of the pneumonia within the left lung   3. Worsening of the pneumonia within the right upper lobe. US DUP LOWER EXTREMITIES BILATERAL VENOUS   Final Result   Within the visualized vessels there is no evidence for deep venous   thrombosis               XR CHEST PORTABLE   Final Result   1. Lines okay. 2. Slightly worsened diffuse edema versus pneumonia. XR CHEST PORTABLE   Final Result   1. Lines okay   2. Improved aeration, mild improvement and diffuse pneumonia/edema. XR CHEST ABDOMEN NG PLACEMENT   Final Result   NG tube position satisfactory. XR CHEST PORTABLE   Final Result   Stable bilateral pneumonia or interstitial pulmonary edema. XR CHEST PORTABLE   Final Result   Increasing bilateral infiltrates. XR CHEST PORTABLE   Final Result   1. There is no interval change in the multifocal bilateral pneumonia. CT HEAD WO CONTRAST   Final Result   No acute intracranial abnormality. Cortical atrophy and periventricular white matter ischemic changes. CT CERVICAL SPINE WO CONTRAST   Final Result   No acute abnormality of the cervical spine. Moderate degenerative changes with disc space narrowing and marginal bony   spur formation C5 through C7. Ground-glass opacities in the visualized lung apices. Please refer to chest   CT for additional information. CTA CHEST W CONTRAST   Final Result   No evidence of pulmonary embolism. Extensive multifocal ground-glass opacities predominantly in the peripheral   lung zones bilaterally, highly concerning for atypical or COVID related   pneumonia. XR CHEST PORTABLE   Final Result   Bilateral patchy airspace opacities worrisome for pneumonia.          XR CHEST PORTABLE    (Results Pending)   XR CHEST PORTABLE    (Results Pending)       A/P:  Active Problems:    Acute respiratory failure with hypoxia (HCC)    Rhabdomyolysis Acute cystitis with hematuria    Primary spontaneous pneumothorax  Resolved Problems:    * No resolved hospital problems. *    1. Intubated, Sedated, Paralyzed, Proned  11/20: Intubated 2/2 hypoxia and increased work of breathing, proned and paralyzed    2. Acute Hypoxic Respiratory Failure 2/2 Covid 19  Unvaccinated for Covid 19  Patient found with pulse ox of 60% --> currently intubated and sedated  CXR showing bilateral pulmonary infiltrates  Inflammatory markers: DDimer 530, , CRP 22.9, Ferritin 749 on admission   CTA pulm showing extensive bilateral pulmonary infiltrates consistent with COVID-19 pneumonia , no PE  Heparin 5K every 8 hours  Completed remdesivir and tocilizumab treatment  Decadron and SoluCortef course completed  Pulmonology consulted ; appreciate recs; daily ABG, Vitamin C, Vitamin D  Dietary consulted for further recommendations on nutrition status ; appreciate recs. 11/18 - transferred to ICU due to worsening resp status. CXR daily    Advanced Care Planning with Spiritual Services ; recs appreciated - FULL CODE. 3. Left Sided Tension Pneumothorax   S/p left sided chest tube  11/29 ,  Tension Pneumothorax on Xray  General surgery consulted to place chest tube  draining 140-150 cc serosanguinous fluid  Continue to monitor    4. Concern for Sepsis  Likely 2/2 superimposed bacterial pneumonia , candidal colonization  Tmax 102.2 , Tavg 100.3F  Given one dose cefepime and vancomycin in ICU  Procal 0.07, repeat 0.27 , blood cultures, urine culture negative. Recieved Pip/Tazo, Vancomycin  Fungitell and B glucan pending  Diflucan 400 mg IV q 24 hours    5. CHARAN Stage III  Admission creatinine 0.6  Currently  1.2  Obtain urine electrolytes, creatinine , urea  Calculate FeNa: 0.2- Pre- Renal / FeUrea    6. Thrombocytopenia  Likely 2/2 to SALENA  Heparin 5000 units q8h  SALENA panel- negative  F/U PBS     7.  Hx Schizoaffective Disorder / Anxiety  Continue seroquel , zoloft, trazodone  Hold ativan, vistaril     8. Hypokalemia - improved  Initial K 3.2 , Mag 2.6   CMP daily    9. Rhabdomyolysis - resolved  2/2 to fall for unknown time period  Patient found down for unknown period of time  Initial CK > 3000  Received 4 L NS in ER    10.  Concern for UTI - resolved   Likely simple cystitis ; patient with no CVA tenderness , presented initially with fever 102 F  Urinalysis with increased nitrites, bacteria, blood  Urine culture, blood cultures were negative  Given one dose doxy and rocephin in the ER  Continue 1g ceftriaxone IV x 3 days -  dc'd 11/18/2021     GI/DVT ppx: protonix, heparin  Diet:   Disposition: MICU    Electronically signed by Chani Guzman MD  PGY-2 on 11/29/2021 at 9:25 AM  This case was discussed with attending physician: Dr. Dorothy Ward

## 2021-11-29 NOTE — PROGRESS NOTES
[facility] Attending    S: 60 y.o. female with a history of gerd, oa, schizoaffective disorder, and gastric fundiplication who was found down for an undetermined amount of time. Reports shortness of breath and cough for 2.5 weeks. She was found to be 60% on RA. In the ER, COVID testing was positive with significantly elevated CPKs. Patient is unvaccinated. Had desaturation to 88% wit PaO2 of 55 with RRT and subsequent transfer to the MICU. Desat to 40's when Bipap removed. Now intubated. Sedated, paralyzed, and prone. Noted to have pneumothorax and chest tube placed. Today, prone. Intubated, sedated. Chest tube in place    O: VS- Blood pressure 112/67, pulse 91, temperature 98.1 °F (36.7 °C), temperature source Bladder, resp. rate (!) 34, height 5' 1\" (1.549 m), weight 172 lb 3.2 oz (78.1 kg), SpO2 99 %. Exam is as noted by resident   Currently prone and nonresponsive. Lungs: coarse, vent   CV:  Rate controlled, regular   Ext-no C/C/E  Chest tube noted  CXR- resolution of pneumothorax      Impressions: Active Problems:    Acute respiratory failure with hypoxia (HCC)    Covid pneumonia    Rhabdomyolysis, CK improving    Concern for sepsis    Acute cystitis with hematuria, treated    CHARAN    Thrombocytopenia     Plan:      Acute hypoxic resp failure 2/2 covid - Decadron. Completed Tocimizilab. Getting Remdesivir. Vitamin C.  Vitamin D.  Zinc.  Appreciate Pulm management. Sepsis 2/2 PNA - done with vanc and zosyn  rhabdo - CK improving. CTM. No longer IVFs. U/s of lower extremities negative DVT 11/20. Slow wean of FiO2. Tube feeding. Schizoaffective - seroquel  Full code at this time. Attending Physician Statement  I have reviewed the chart and seen the patient with the resident(s). I personally reviewed images, EKG's and similar tests, if present.   I personally reviewed and performed key elements of the history and exam.  I have reviewed and confirmed student and/or resident history and exam with changes as indicated above. I agree with the assessment, plan and orders as documented by the resident. Please refer to the  resident and/or student note for additional information.       Jeremy Waldron MD

## 2021-11-29 NOTE — PROGRESS NOTES
Department of Internal Medicine  Infectious Diseases  Progress Note      C/C : Leukocytosis, COVID 19     Pt is intubated, sedated, paralyzed   Supine, no fever     Current Facility-Administered Medications   Medication Dose Route Frequency Provider Last Rate Last Admin    sennosides-docusate sodium (SENOKOT-S) 8.6-50 MG tablet 2 tablet  2 tablet Oral Daily Amy Bowers MD   2 tablet at 11/29/21 0820    polyethylene glycol (GLYCOLAX) packet 17 g  17 g Oral Daily PRN Amy Bowers MD        sodium chloride flush 0.9 % injection 5-40 mL  5-40 mL IntraVENous 2 times per day Amy Bowers MD   10 mL at 11/28/21 2010    sodium chloride flush 0.9 % injection 5-40 mL  5-40 mL IntraVENous PRN Amy Bowers MD   10 mL at 11/28/21 2122    heparin flush 100 UNIT/ML injection 300 Units  3 mL IntraVENous 2 times per day Amy Bowers MD        heparin flush 100 UNIT/ML injection 300 Units  3 mL IntraCATHeter PRN Amy Bowers MD        norepinephrine (LEVOPHED) 16 mg in dextrose 5% 250 mL infusion  2-100 mcg/min IntraVENous Continuous Blaine Waggoner MD 2.8 mL/hr at 11/29/21 0900 3 mcg/min at 11/29/21 0900    pantoprazole (PROTONIX) injection 40 mg  40 mg IntraVENous BID Micah Perez MD   40 mg at 11/29/21 0820    And    sodium chloride (PF) 0.9 % injection 10 mL  10 mL IntraVENous BID Micah Perez MD   10 mL at 11/29/21 0821    midazolam (VERSED) 100 mg in dextrose 5 % 100 mL infusion  1-10 mg/hr IntraVENous Continuous Sonja Esquivel MD   Stopped at 11/27/21 0800    heparin (porcine) injection 5,000 Units  5,000 Units SubCUTAneous Q8H Nenita Thy Chris Baker MD   5,000 Units at 11/29/21 0533    chlorhexidine (PERIDEX) 0.12 % solution 15 mL  15 mL Mouth/Throat BID Keyona Perez MD   15 mL at 11/29/21 0821    lubrifresh P.M. (artificial tears) ophthalmic ointment   Both Eyes Q6H Keyona Perez MD   Given at 11/29/21 0821    fentaNYL 5 mcg/ml in 0.9%  ml infusion  12.5-200 mcg/hr IntraVENous Continuous Stefano Mata MD 30 mL/hr at 11/29/21 0949 150 mcg/hr at 11/29/21 0949    cisatracurium besylate (NIMBEX) 200 mg in sodium chloride 0.9 % 100 mL infusion  0.5-10 mcg/kg/min IntraVENous Continuous Stefano Mata MD 8.6 mL/hr at 11/29/21 0950 4 mcg/kg/min at 11/29/21 0950    ipratropium-albuterol (DUONEB) nebulizer solution 1 ampule  1 ampule Inhalation Q4H PRN Stefano Mata MD   1 ampule at 11/24/21 1248    budesonide (PULMICORT) nebulizer suspension 1,000 mcg  1 mg Nebulization BID Amilcar Moctezuma MD   1,000 mcg at 11/29/21 0813    Arformoterol Tartrate (BROVANA) nebulizer solution 15 mcg  15 mcg Nebulization BID Amilcar Moctezuma MD   15 mcg at 11/29/21 0813    atorvastatin (LIPITOR) tablet 10 mg  10 mg Oral Daily Naida Ross MD   10 mg at 11/29/21 5336    QUEtiapine (SEROQUEL) tablet 200 mg  200 mg Oral Daily Naida Ross MD   200 mg at 11/29/21 5074    sertraline (ZOLOFT) tablet 200 mg  200 mg Oral Daily Naida Ross MD   200 mg at 11/29/21 8051    traZODone (DESYREL) tablet 100 mg  100 mg Oral Nightly Naida Ross MD   100 mg at 11/28/21 2001    sodium chloride flush 0.9 % injection 5-40 mL  5-40 mL IntraVENous 2 times per day Naida Ross MD   10 mL at 11/28/21 2008    sodium chloride flush 0.9 % injection 5-40 mL  5-40 mL IntraVENous PRN Naida Ross MD   10 mL at 11/27/21 0816    0.9 % sodium chloride infusion  25 mL IntraVENous PRN Naida Ross  mL/hr at 11/1956 Rate Verify at 11/1956    ondansetron (ZOFRAN-ODT) disintegrating tablet 4 mg  4 mg Oral Q8H PRN Naida Ross MD        Or    ondansetron [facility]) injection 4 mg  4 mg IntraVENous Q6H PRN Naida Ross MD        acetaminophen (TYLENOL) tablet 650 mg  650 mg Oral Q6H PRN Naida Ross MD   650 mg at 11/26/21 9369    Or    acetaminophen (TYLENOL) suppository 650 mg  650 mg Rectal Q6H PRN Naida Ross MD   650 mg at 11/27/21 0829    ascorbic acid (VITAMIN C) tablet 1,000 mg  1,000 mg Oral Daily Mane Helton MD   1,000 mg at 11/29/21 4128    Vitamin D (CHOLECALCIFEROL) tablet 2,000 Units  2,000 Units Oral Daily Mane Helton MD   2,000 Units at 11/29/21 2770    zinc sulfate (ZINCATE) capsule 50 mg  50 mg Oral Daily Mane Helton MD   50 mg at 11/29/21 0821    glucose (GLUTOSE) 40 % oral gel 15 g  15 g Oral PRN Mane Helton MD        dextrose 50 % IV solution  12.5 g IntraVENous PRN Mane Helton MD        glucagon (rDNA) injection 1 mg  1 mg IntraMUSCular PRN Mane Helton MD        dextrose 5 % solution  100 mL/hr IntraVENous PRN Mane Helton MD             REVIEW OF SYSTEMS: could not be obtained       PHYSICAL EXAM:      Vitals:     /67   Pulse 93   Temp 98.8 °F (37.1 °C) (Bladder)   Resp (!) 34   Ht 5' 1\" (1.549 m)   Wt 172 lb 3.2 oz (78.1 kg)   SpO2 (!) 89%   BMI 32.54 kg/m²     General Appearance:    Intubated, sedated, prone, FiO2 85, PEEP 12    Head:    Normocephalic, atraumatic   Eyes:    + pallor,   Ears:    No obvious deformity or drainage.    Nose:   No drainage    Throat:   ET tube in place, dry mucosa    Neck:   Supple, no lymphadenopathy   Lungs:     Clear to auscultation ,left chest tube    Heart:    Regular   Abdomen:     Soft, non-tender, bowel sounds present    Extremities:    No edema    Pulses:   Dorsalis pedis palpable    Skin:   no rashes             CBC with Differential:      Lab Results   Component Value Date    WBC 19.7 11/29/2021    RBC 2.58 11/29/2021    HGB 8.4 11/29/2021    HCT 26.8 11/29/2021     11/29/2021    .9 11/29/2021    MCH 32.6 11/29/2021    MCHC 31.3 11/29/2021    RDW 18.9 11/29/2021    NRBC 2.6 11/28/2021    METASPCT 0.9 11/21/2021    LYMPHOPCT 7.5 11/29/2021    MONOPCT 4.1 11/29/2021    MYELOPCT 0.9 11/25/2021    BASOPCT 0.2 11/29/2021    MONOSABS 0.80 11/29/2021    LYMPHSABS 1.48 11/29/2021    EOSABS 0.11 11/29/2021    BASOSABS 0.04 11/29/2021       CMP     Lab Results   Component Value Date     11/29/2021    K 4.1 11/29/2021    K 4.8 11/19/2021     11/29/2021    CO2 24 11/29/2021    BUN 50 11/29/2021    CREATININE 1.2 11/29/2021    GFRAA 55 11/29/2021    LABGLOM 45 11/29/2021    GLUCOSE 144 11/29/2021    GLUCOSE 77 05/12/2011    PROT 5.0 11/29/2021    LABALBU 2.8 11/29/2021    CALCIUM 7.8 11/29/2021    BILITOT 0.4 11/29/2021    ALKPHOS 96 11/29/2021    AST 89 11/29/2021    ALT 63 11/29/2021         Hepatic Function Panel:    Lab Results   Component Value Date    ALKPHOS 96 11/29/2021    ALT 63 11/29/2021    AST 89 11/29/2021    PROT 5.0 11/29/2021    BILITOT 0.4 11/29/2021    BILIDIR <0.2 11/29/2021    IBILI see below 11/29/2021    LABALBU 2.8 11/29/2021       PT/INR:    Lab Results   Component Value Date    PROTIME 11.6 11/29/2021    INR 1.1 11/29/2021       TSH:    Lab Results   Component Value Date    TSH 4.720 10/12/2021       U/A:    Lab Results   Component Value Date    COLORU Yellow 11/16/2021    PHUR 6.0 11/16/2021    WBCUA NONE 11/16/2021    RBCUA 1-3 11/16/2021    BACTERIA MANY 11/16/2021    CLARITYU Clear 11/16/2021    SPECGRAV 1.025 11/16/2021    LEUKOCYTESUR Negative 11/16/2021    UROBILINOGEN 0.2 11/16/2021    BILIRUBINUR Negative 11/16/2021    BLOODU LARGE 11/16/2021    GLUCOSEU Negative 11/16/2021       ABG:    Lab Results   Component Value Date    ZYU0KLF 21.6 11/18/2021    JUW2NZR 32.3 11/18/2021    PO2ART 55.2 11/18/2021       MICROBIOLOGY:    Blood culture - neg on 11/25     Urine Culture -    Sputum Culture -  CULTURE, RESPIRATORY Oral Pharyngeal Kamille absent Abnormal      Smear, Respiratory --    Group 6: <25 PMN's/LPF and <25 Epithelial cells/LPF   Few Polymorphonuclear leukocytes   Rare Epithelial cells   No organisms seen     Organism Candida albicans Abnormal     CULTURE, RESPIRATORY One colony   Narrative:     Source: SP[facility]U       Site:                     Specimen: Nasopharyngeal Swab Updated: 11/1956    SARS-CoV-2, NAAT DETECTED Abnormal     Comment: Rapid NAAT:   Negative results should be treated as presumptive             Radiology :    Chest X ray : bilateral infiltrates    IMPRESSION:     1. Severe COVID 19 pneumonia s/p remdesivir and tocilizumab   2. Respiratory failure - intubated   3. Leukocytosis - reactive  4. Candida colonization   5. S/P Chest tube insertion         RECOMMENDATIONS:      1. Diflucan 400 mg IV q 24 hrs ( fungitell pending )   2. CBC with diff

## 2021-11-29 NOTE — PROGRESS NOTES
11/29/21 0813   Vent Information   Equipment -45   Vent Type 980   Vent Mode AC/VC+   Vt Ordered 320 mL   Rate Set 34 bmp   Peak Flow 0 L/min   Pressure Support 0 cmH20   FiO2  90 %   SpO2 99 %   SpO2/FiO2 ratio 110   Sensitivity 3   PEEP/CPAP 12   I Time/ I Time % 0 s   Humidification Source Heated wire   Humidification Temp 37   Humidification Temp Measured 36.8   Circuit Condensation Drained   Vent Patient Data   High Peep/I Pressure 0   Peak Inspiratory Pressure 30 cmH2O   Mean Airway Pressure 19 cmH20   Rate Measured 34 br/min   Vt Exhaled 287 mL   Minute Volume 9.78 Liters   I:E Ratio 1:1.70   Plateau Pressure 15 MEV06   Static Compliance 5.2 mL/cmH2O

## 2021-11-29 NOTE — PROGRESS NOTES
11/29/21 1548   Vent Information   Equipment -45   Vent Type 980   Vent Mode AC/VC+   Vt Ordered 320 mL   Rate Set 34 bmp   Peak Flow 0 L/min   Pressure Support 0 cmH20   FiO2  95 %   SpO2 91 %   SpO2/FiO2 ratio 95.79   Sensitivity 3   PEEP/CPAP 12   I Time/ I Time % 0 s   Humidification Source Heated wire   Humidification Temp 37   Humidification Temp Measured 35.6   Circuit Condensation Drained   Vent Patient Data   High Peep/I Pressure 0   Peak Inspiratory Pressure 29 cmH2O   Mean Airway Pressure 19 cmH20   Rate Measured 34 br/min   Vt Exhaled 325 mL   Minute Volume 11.1 Liters   I:E Ratio 1:1.70   Plateau Pressure 26 ZGC01   Static Compliance 25 mL/cmH2O

## 2021-11-30 NOTE — PROGRESS NOTES
Nephrology Progress Note  Patient's Name: Barbara Briseno    Reason for Consult:  Acute kidney injury    History of Present Radha from the 11/24/21 note:    Barbara Briseno is a 60 y.o. female with a history of GI GERD, osteoarthritis, and schizoaffective disorder. She initially presented to this hospital 5 days ago following a fall at home. She was found by her roommate following an unspecified duration. EMS was called. Upon their evaluation patient was noted to be hypoxic. She was subsequently brought to ED for evaluation. In the ED she was noted to be hypoxic with an oxygen saturation of 60% on room air. Also noted to to have a low-grade fever. Laboratory data was significant for WBC of 8.5, hemoglobin of 14.6, BUN 17 and a creatinine of 0.6. Rapid COVID-19 test was positive. Patient was admitted to telemetry. 2 days into her hospital course became increasingly more hypoxic and was transferred to MICU. Because of her persistent hypoxia patient was intubated with mechanical ventilation on 11/20;  . Over the past 48 hours she has been markedly hypotensive requiring fluid resuscitation and pressors. Creatinine has worsened to a current value of 1.6 associated with low urine output. Hence renal consult. Subjective    11/28: pt remains in COVID 19 Isolation. She remains proned and on an FIO2 65% and PEEP12 with an O2 sat 95-96%    11/29: pt seen through window of icu due to covid, chart reviewed    11/30: pt seen through window of icu due to covid, chart reviewed.      Allergies:  Ciprofloxacin    Current Medications:    0.9 % sodium chloride infusion, PRN  sucralfate (CARAFATE) tablet 1 g, 4 times per day  lidocaine 1 % injection 5 mL, Once  bumetanide (BUMEX) injection 1 mg, Q8H  sennosides-docusate sodium (SENOKOT-S) 8.6-50 MG tablet 2 tablet, Daily  polyethylene glycol (GLYCOLAX) packet 17 g, Daily PRN  sodium chloride flush 0.9 % injection 5-40 mL, 2 times per day  sodium chloride flush 0.9 % injection 5-40 mL, PRN  [Held by provider] heparin flush 100 UNIT/ML injection 300 Units, 2 times per day  [Held by provider] heparin flush 100 UNIT/ML injection 300 Units, PRN  norepinephrine (LEVOPHED) 16 mg in dextrose 5% 250 mL infusion, Continuous  pantoprazole (PROTONIX) injection 40 mg, BID   And  sodium chloride (PF) 0.9 % injection 10 mL, BID  midazolam (VERSED) 100 mg in dextrose 5 % 100 mL infusion, Continuous  [Held by provider] heparin (porcine) injection 5,000 Units, Q8H  chlorhexidine (PERIDEX) 0.12 % solution 15 mL, BID  lubrifresh P.M. (artificial tears) ophthalmic ointment, Q6H  fentaNYL 5 mcg/ml in 0.9%  ml infusion, Continuous  cisatracurium besylate (NIMBEX) 200 mg in sodium chloride 0.9 % 100 mL infusion, Continuous  ipratropium-albuterol (DUONEB) nebulizer solution 1 ampule, Q4H PRN  budesonide (PULMICORT) nebulizer suspension 1,000 mcg, BID  Arformoterol Tartrate (BROVANA) nebulizer solution 15 mcg, BID  atorvastatin (LIPITOR) tablet 10 mg, Daily  QUEtiapine (SEROQUEL) tablet 200 mg, Daily  sertraline (ZOLOFT) tablet 200 mg, Daily  traZODone (DESYREL) tablet 100 mg, Nightly  0.9 % sodium chloride infusion, PRN  ondansetron (ZOFRAN-ODT) disintegrating tablet 4 mg, Q8H PRN   Or  ondansetron (ZOFRAN) injection 4 mg, Q6H PRN  acetaminophen (TYLENOL) tablet 650 mg, Q6H PRN   Or  acetaminophen (TYLENOL) suppository 650 mg, Q6H PRN  ascorbic acid (VITAMIN C) tablet 1,000 mg, Daily  Vitamin D (CHOLECALCIFEROL) tablet 2,000 Units, Daily  zinc sulfate (ZINCATE) capsule 50 mg, Daily  glucose (GLUTOSE) 40 % oral gel 15 g, PRN  dextrose 50 % IV solution, PRN  glucagon (rDNA) injection 1 mg, PRN  dextrose 5 % solution, PRN        Review of Systems:   Review of systems not obtained due to patient factors.     Physical exam:  Vitals:    11/30/21 1022   BP:    Pulse: 93   Resp:    Temp:    SpO2: 96%          No PE as in COVID Isolation      Data:   Labs:  Lab Results   Component Value Date  11/30/2021     11/29/2021     11/29/2021    K 4.3 11/30/2021    K 4.7 11/29/2021    K 4.4 11/29/2021     11/30/2021    CO2 25 11/30/2021    CO2 27 11/29/2021    CO2 27 11/29/2021    CREATININE 1.1 (H) 11/30/2021    CREATININE 1.1 (H) 11/29/2021    CREATININE 1.1 (H) 11/29/2021    BUN 48 (H) 11/30/2021    BUN 49 (H) 11/29/2021    BUN 49 (H) 11/29/2021    GLUCOSE 126 (H) 11/30/2021    GLUCOSE 129 (H) 11/29/2021    GLUCOSE 148 (H) 11/29/2021    PHOS 4.9 (H) 11/29/2021    PHOS 4.2 11/27/2021    PHOS 3.4 11/25/2021    WBC 18.1 (H) 11/30/2021    WBC 19.7 (H) 11/29/2021    WBC 23.4 (H) 11/28/2021    HGB 6.9 (LL) 11/30/2021    HGB 7.5 (L) 11/29/2021    HGB 8.4 (L) 11/29/2021    HCT 21.9 (L) 11/30/2021    HCT 24.3 (L) 11/29/2021    HCT 26.8 (L) 11/29/2021    .3 (H) 11/30/2021     11/30/2021         Imaging:  CT HEAD WO CONTRAST    Result Date: 11/16/2021  EXAMINATION: CT OF THE HEAD WITHOUT CONTRAST  11/16/2021 9:54 pm TECHNIQUE: CT of the head was performed without the administration of intravenous contrast. Dose modulation, iterative reconstruction, and/or weight based adjustment of the mA/kV was utilized to reduce the radiation dose to as low as reasonably achievable. COMPARISON: None. HISTORY: ORDERING SYSTEM PROVIDED HISTORY: fall TECHNOLOGIST PROVIDED HISTORY: Reason for exam:->fall Has a \"code stroke\" or \"stroke alert\" been called? ->No Decision Support Exception - unselect if not a suspected or confirmed emergency medical condition->Emergency Medical Condition (MA) What reading provider will be dictating this exam?->CRC FINDINGS: BRAIN/VENTRICLES: There are cortical atrophy and periventricular white matter ischemic changes. There is no acute intracranial hemorrhage, mass effect or midline shift. No abnormal extra-axial fluid collection. The gray-white differentiation is maintained without evidence of an acute infarct. There is no evidence of hydrocephalus.  ORBITS: The visualized portion of the orbits demonstrate no acute abnormality. SINUSES: The visualized paranasal sinuses and mastoid air cells demonstrate no acute abnormality. SOFT TISSUES/SKULL:  No acute abnormality of the visualized skull or soft tissues. No acute intracranial abnormality. Cortical atrophy and periventricular white matter ischemic changes. XR CHEST PORTABLE    Result Date: 11/16/2021  EXAMINATION: ONE XRAY VIEW OF THE CHEST 11/16/2021 7:10 pm COMPARISON: 09/23/2021 HISTORY: ORDERING SYSTEM PROVIDED HISTORY: weakness TECHNOLOGIST PROVIDED HISTORY: Reason for exam:->weakness What reading provider will be dictating this exam?->CRC FINDINGS: Bilateral patchy airspace opacities. There is no effusion or pneumothorax. The cardiomediastinal silhouette is without acute process. The osseous structures are without acute process. Bilateral patchy airspace opacities worrisome for pneumonia. CTA CHEST W CONTRAST    Result Date: 11/16/2021  EXAMINATION: CTA OF THE CHEST 11/16/2021 9:54 pm TECHNIQUE: CTA of the chest was performed after the administration of intravenous contrast.  Multiplanar reformatted images are provided for review. MIP images are provided for review. Dose modulation, iterative reconstruction, and/or weight based adjustment of the mA/kV was utilized to reduce the radiation dose to as low as reasonably achievable. COMPARISON: None. HISTORY: ORDERING SYSTEM PROVIDED HISTORY: r/o pe TECHNOLOGIST PROVIDED HISTORY: Reason for exam:->r/o pe Decision Support Exception - unselect if not a suspected or confirmed emergency medical condition->Emergency Medical Condition (MA) What reading provider will be dictating this exam?->CRC FINDINGS: Pulmonary Arteries: Pulmonary arteries are adequately opacified for evaluation. No evidence of intraluminal filling defect to suggest pulmonary embolism. Main pulmonary artery is normal in caliber. Mediastinum: No evidence of mediastinal lymphadenopathy. The heart and pericardium demonstrate no acute abnormality. There is no acute abnormality of the thoracic aorta. Lungs/pleura: The lungs demonstrate extensive multifocal ground-glass opacities predominantly in the peripheral lung zones. No evidence of pleural effusion or pneumothorax. Upper Abdomen: Limited images of the upper abdomen are unremarkable. Soft Tissues/Bones: No acute bone or soft tissue abnormality. No evidence of pulmonary embolism. Extensive multifocal ground-glass opacities predominantly in the peripheral lung zones bilaterally, highly concerning for atypical or COVID related pneumonia. Assessment/Plans    1. Acute kidney injury stage I  ischemic ATN occurring in the setting of hypotension  nonoliguric    Cr slow decrease 1.6-->1.5-->1.4-->1.3>1.2>1.1  Continue to monitor labs and urine output  continue bumex    2. Acute hypoxic respiratory failure  due to COVID-19 pneumonia  Intubation with mechanical ventilation; prone   As per Pulm/CCC    3 COVID-19 pneumonia  Received Toci; and dexamethasone  Currently on Solu-Cortef  Defer to Pulm/CCC    4. Septic shock  suspected possible superimposed bacterial pneumonia  now off pressors  on Zosyn + Vanc    5.   Volume overload  Trial diuretic   uo 1.4L      Karoline Carreon MD

## 2021-11-30 NOTE — PLAN OF CARE
Problem: Falls - Risk of:  Goal: Will remain free from falls  Description: Will remain free from falls  11/29/2021 2040 by William Locke RN  Outcome: Met This Shift  11/29/2021 2010 by William Locke RN  Outcome: Met This Shift  Goal: Absence of physical injury  Description: Absence of physical injury  11/29/2021 2040 by William Locke RN  Outcome: Met This Shift  11/29/2021 2010 by William Locke RN  Outcome: Met This Shift  11/29/2021 1802 by Sheng Shay RN  Outcome: Met This Shift     Problem: Airway Clearance - Ineffective  Goal: Achieve or maintain patent airway  11/29/2021 2040 by William Locke RN  Outcome: Met This Shift  11/29/2021 2010 by William Locke RN  Outcome: Met This Shift  11/29/2021 1802 by Sheng Shay RN  Outcome: Met This Shift     Problem: Gas Exchange - Impaired  Goal: Absence of hypoxia  11/29/2021 2040 by William Locke RN  Outcome: Met This Shift  11/29/2021 2010 by William Locke RN  Outcome: Met This Shift  Goal: Promote optimal lung function  11/29/2021 2040 by William Locke RN  Outcome: Met This Shift  11/29/2021 2010 by William Locke RN  Outcome: Met This Shift     Problem: Breathing Pattern - Ineffective  Goal: Ability to achieve and maintain a regular respiratory rate  11/29/2021 2040 by William Locke RN  Outcome: Met This Shift  11/29/2021 2010 by William Locke RN  Outcome: Met This Shift  11/29/2021 1802 by Sheng Shay RN  Outcome: Met This Shift     Problem:  Body Temperature -  Risk of, Imbalanced  Goal: Ability to maintain a body temperature within defined limits  11/29/2021 2040 by William Locke RN  Outcome: Met This Shift  11/29/2021 2010 by William Locke RN  Outcome: Met This Shift  11/29/2021 1802 by Sheng Shay RN  Outcome: Met This Shift  Goal: Will regain or maintain usual level of consciousness  11/29/2021 2040 by William Locke RN  Outcome: Met This Shift  11/29/2021 2010 by William Locke RN  Outcome: Met This Shift  Goal: Complications related to the disease process, condition or treatment will be avoided or minimized  11/29/2021 2040 by Trent Ramesh RN  Outcome: Met This Shift  11/29/2021 2010 by Trent Ramesh RN  Outcome: Met This Shift     Problem: Isolation Precautions - Risk of Spread of Infection  Goal: Prevent transmission of infection  11/29/2021 2040 by Trent Ramesh RN  Outcome: Met This Shift  11/29/2021 2010 by Trent Ramesh RN  Outcome: Met This Shift     Problem: Nutrition Deficits  Goal: Optimize nutritional status  11/29/2021 2040 by Trent Ramesh RN  Outcome: Met This Shift  11/29/2021 2010 by Trent Ramesh RN  Outcome: Met This Shift     Problem: Risk for Fluid Volume Deficit  Goal: Maintain normal heart rhythm  11/29/2021 2040 by Trent Ramesh RN  Outcome: Met This Shift  11/29/2021 2010 by Trent Ramesh RN  Outcome: Met This Shift  Goal: Maintain absence of muscle cramping  11/29/2021 2040 by Trent Ramesh RN  Outcome: Met This Shift  11/29/2021 2010 by Trent Ramesh RN  Outcome: Met This Shift  Goal: Maintain normal serum potassium, sodium, calcium, phosphorus, and pH  11/29/2021 2040 by Trent Ramesh RN  Outcome: Met This Shift  11/29/2021 2010 by Trent Ramesh RN  Outcome: Met This Shift     Problem: Loneliness or Risk for Loneliness  Goal: Demonstrate positive use of time alone when socialization is not possible  11/29/2021 2040 by Trent Ramesh RN  Outcome: Met This Shift  11/29/2021 2010 by Trent Ramesh RN  Outcome: Met This Shift     Problem: Fatigue  Goal: Verbalize increase energy and improved vitality  11/29/2021 2040 by Trent Ramesh RN  Outcome: Met This Shift  11/29/2021 2010 by Trent Ramesh RN  Outcome: Met This Shift     Problem: Patient Education: Go to Patient Education Activity  Goal: Patient/Family Education  11/29/2021 2040 by Trent Ramesh RN  Outcome: Met This Shift  11/29/2021 2010 by Trent Ramesh RN  Outcome: Met This Shift     Problem: Skin Integrity:  Goal: Will show no infection signs and symptoms  Description: Will show no infection signs and symptoms  11/29/2021 2040 by Sedrick Pallas, RN  Outcome: Met This Shift  11/29/2021 2010 by Sedrick Pallas, RN  Outcome: Met This Shift  Goal: Absence of new skin breakdown  Description: Absence of new skin breakdown  11/29/2021 2040 by Sedrick Pallas, RN  Outcome: Met This Shift  11/29/2021 2010 by Sedrick Pallas, RN  Outcome: Met This Shift     Problem: Inadequate oral food/beverage intake (NI-2.1)  Goal: Food and/or Nutrient Delivery  Description: Individualized approach for food/nutrient provision.   11/29/2021 1538 by Ubaldo Ortega, MS, RD, LD  Outcome: Met This Shift     Problem: Infection, Septic Shock:  Goal: Will show no infection signs and symptoms  Description: Will show no infection signs and symptoms  11/29/2021 2040 by Sedrick Pallas, RN  Outcome: Met This Shift  11/29/2021 2038 by Sedrick Pallas, RN  Outcome: Met This Shift

## 2021-11-30 NOTE — PROCEDURES
Poornima Huang is a 60 y.o. female patient. 1. Acute respiratory failure with hypoxia ([facility] [address]    2. COVID-19    3. Hypokalemia    4. Acute cystitis with hematuria    5. Pneumonia due to infectious organism, unspecified laterality, unspecified part of lung    6. Traumatic rhabdomyolysis, initial encounter ([facility] [address]    7. Non-traumatic rhabdomyolysis      Past Medical History:   Diagnosis Date    GERD (gastroesophageal reflux disease)     Osteoarthritis of right knee     Schizoaffective disorder (HCC)     psycare     Blood pressure 137/77, pulse 92, temperature 99.7 °F (37.6 °C), temperature source Esophageal, resp. rate (!) 34, height 5' 1\" (1.549 m), weight 172 lb 3.2 oz (78.1 kg), SpO2 97 %. Chest Tube Insertion    Date/Time: 11/30/2021 1:07 PM  Performed by: Mercy Andrea DO  Authorized by: Yany Cummings MD   Consent: The procedure was performed in an emergent situation. Verbal consent obtained.   Risks and benefits: risks, benefits and alternatives were discussed  Patient identity confirmed: arm band, provided demographic data and anonymous protocol, patient vented/unresponsive  Indications: pneumothorax    Sedation:  Patient sedated: yes    Placement location: left lateral  Scalpel size: 15  Tube size: 32 [place]  Dissection instrument: Loren clamp  Ultrasound guidance: no  Tension pneumothorax heard: no  Tube connected to: suction  Drainage characteristics: yellow  Suture material: 0 silk  Dressing: 4x4 sterile gauze  Post-insertion x-ray findings: tube in good position  Patient tolerance: patient tolerated the procedure well with no immediate complications  Comments: Dr Jerrod Chase was immediately available for procedure          Mercy Andrea DO  11/30/2021

## 2021-11-30 NOTE — PROGRESS NOTES
[address] [facility] Attending    S: 60 y.o. female with a history of gerd, oa, schizoaffective disorder, and gastric fundiplication who was found down for an undetermined amount of time. Reports shortness of breath and cough for 2.5 weeks. She was found to be 60% on RA. In the ER, COVID testing was positive with significantly elevated CPKs. Patient is unvaccinated. Had desaturation to 88% wit PaO2 of 55 with RRT and subsequent transfer to the MICU. Desat to 40's when Bipap removed. Now intubated. Sedated, paralyzed, and prone. Noted to have pneumothorax and chest tube placed. Today, prone. Intubated, sedated. Chest tube in place    O: VS- Blood pressure 137/77, pulse 92, temperature 99.7 °F (37.6 °C), temperature source Esophageal, resp. rate (!) 34, height 5' 1\" (1.549 m), weight 172 lb 3.2 oz (78.1 kg), SpO2 96 %. Exam is as noted by resident   Currently prone and nonresponsive. Lungs: coarse, vent   CV:  Rate controlled, regular   Ext-no C/C/E  Chest tube noted  CXR- resolution of pneumothorax      Impressions: Active Problems:    Acute respiratory failure with hypoxia (HCC)    Covid pneumonia    Rhabdomyolysis, CK improving    Concern for sepsis    Acute cystitis with hematuria, treated    CHARAN    Thrombocytopenia     Plan:      Acute hypoxic resp failure 2/2 covid - Decadron. Completed Tocimizilab. Getting Remdesivir. Vitamin C.  Vitamin D.  Zinc.  Appreciate Pulm management. Sepsis 2/2 PNA - done with vanc and zosyn  rhabdo - CK improving. CTM. No longer IVFs. U/s of lower extremities negative DVT 11/20. Slow wean of FiO2. Tube feeding. Fluid overloaded - on bumex  Schizoaffective - seroquel  Full code at this time. Attending Physician Statement  I have reviewed the chart and seen the patient with the resident(s). I personally reviewed images, EKG's and similar tests, if present.   I personally reviewed and performed key elements of the history and exam.  I have reviewed and confirmed student and/or resident history and exam with changes as indicated above. I agree with the assessment, plan and orders as documented by the resident. Please refer to the  resident and/or student note for additional information.       Mohit Mccain MD

## 2021-11-30 NOTE — PROGRESS NOTES
[facility] - [facility] Medicine Inpatient   Resident Progress Note    S:  Hospital day: 14   Brief Synopsis: Lindy Clinton is a 60 y.o. female with pmh of GERD, osteoarthritis and schizoaffective disorder who presented to ER s/p fall at home. Patient found down at home, with initial O2 saturation of 60%. Brought to the ER; found to have COVID-19 pneumonia , requiring bipap for appropriate saturation. Rhabdomyolysis, UTI. Started on appropriate management including tocilizumab, ceftriaxone 1 g daily, and IV fluids for rhabdomyolysis. Decadron was started daily, and with patients worsening status - pulmonology consulted. FULL CODE. Transferred to ICU on 11/18 for rapid breathing and hypoxia and pO2 of 55. Intubated 11/20 secondary to O2 sats consistently <80% with rapid breathing. 11/29 , patient developed left sided tension pneumothorax and underwent chest tube placement. Seen in the ICU this AM. Intubated, sedated, paralyzed, proned. Left sided CT noted. Requiring vasopressors this morning. H/H 6.9 this AM ; required one unit PRBC     No other changes noted.     Cont meds:    sodium chloride      norepinephrine 7 mcg/min (11/30/21 0000)    midazolam 3 mg/hr (11/30/21 0130)    fentaNYL 5 mcg/ml in 0.9%  ml infusion 200 mcg/hr (11/30/21 0123)    cisatracurium (NIMBEX) infusion 4 mcg/kg/min (11/29/21 2112)    sodium chloride 100 mL/hr at 11/1956    dextrose       Scheduled meds:    sucralfate  1 g Oral 4 times per day    sennosides-docusate sodium  2 tablet Oral Daily    sodium chloride flush  5-40 mL IntraVENous 2 times per day    heparin flush  3 mL IntraVENous 2 times per day    pantoprazole  40 mg IntraVENous BID    And    sodium chloride (PF)  10 mL IntraVENous BID    heparin (porcine)  5,000 Units SubCUTAneous Q8H    chlorhexidine  15 mL Mouth/Throat BID    artificial tears   Both Eyes Q6H    budesonide  1 mg Nebulization BID    Arformoterol Tartrate  15 mcg Nebulization BID  atorvastatin  10 mg Oral Daily    QUEtiapine  200 mg Oral Daily    sertraline  200 mg Oral Daily    traZODone  100 mg Oral Nightly    sodium chloride flush  5-40 mL IntraVENous 2 times per day    ascorbic acid  1,000 mg Oral Daily    vitamin D  2,000 Units Oral Daily    zinc sulfate  50 mg Oral Daily     PRN meds: sodium chloride, polyethylene glycol, sodium chloride flush, heparin flush, ipratropium-albuterol, sodium chloride flush, sodium chloride, ondansetron **OR** ondansetron, acetaminophen **OR** acetaminophen, glucose, dextrose, glucagon (rDNA), dextrose     I reviewed the patient's past medical and surgical history, Medications and Allergies. O:  /67   Pulse 90   Temp (P) 99.5 °F (37.5 °C) (Esophageal)   Resp (!) 34   Ht 5' 1\" (1.549 m)   Wt 172 lb 3.2 oz (78.1 kg)   SpO2 98%   BMI 32.54 kg/m²   24 hour I&O: I/O last 3 completed shifts: In: 3988 [I.V.:1769; NG/GT:2219]  Out: 2007 [Urine:1365; Stool:400; Chest Tube:242]  I/O this shift:  In: 2444 [I.V.:1003; NG/GT:1441]  Out: 479 [Urine:435; Chest Tube:44]      Physical Exam  Constitutional:       Comments: Intubated, sedated, paralyzed, proned   Cardiovascular:      Rate and Rhythm: Normal rate and regular rhythm. Pulses: Normal pulses. Heart sounds: Normal heart sounds. Pulmonary:      Breath sounds: No wheezing, rhonchi or rales. Comments: Left sided Chest tube noted , draining 300 cc serosanguinous fluid  Abdominal:      General: Bowel sounds are normal. There is no distension. Palpations: Abdomen is soft. Tenderness: There is no abdominal tenderness. Musculoskeletal:         General: Normal range of motion. Right lower leg: No edema. Left lower leg: No edema.        Labs:  Na/K/Cl/CO2:  135/4.3/100/25 (11/30 0444)  BUN/Cr/glu/ALT/AST/amyl/lip:  48/1.1/--/53/80/--/-- (11/30 5660)  WBC/Hgb/Hct/Plts:  18.1/6.9/21.9/143 (11/30 0444)  estimated creatinine clearance is 49 mL/min (A) (based on SCr of 1.1 mg/dL (H)). Other pertinent labs as noted below    Radiology:  XR CHEST PORTABLE   Final Result   1. Lines okay. 2. Persistent edema/ARDS/pneumonia. 3. No sign of pneumothorax. XR CHEST PORTABLE   Final Result   Interval placement of left-sided chest tube with questionable residual   pneumothorax versus artifact. Extensive bilateral infiltrates. Continued follow-up recommended. XR CHEST PORTABLE   Final Result   Interval development of large left-sided tension pneumothorax. Extensive bilateral parenchymal infiltrates. Findings were discussed with Dr. Daniel Griffith at approximately 0010 hours [place]   time on 11/28/2021. XR CHEST PORTABLE   Final Result   Severe bilateral pulmonary opacification. XR CHEST PORTABLE   Final Result   1. Endotracheal tube is 3 cm above the khai. 2. Stable interstitial pulmonary edema or pneumonia throughout both lungs. XR CHEST PORTABLE   Final Result   1. Somewhat limited exam, grossly unchanged. Please see above comments. .      RECOMMENDATION:   1. Recommend follow-up thoracic imaging, as directed clinically. .         XR ABDOMEN (KUB) (SINGLE AP VIEW)   Final Result   1. Satisfactory position of the NG tube within the stomach         XR CHEST PORTABLE   Final Result   1. There is no interval change in the multifocal bilateral pneumonia. XR CHEST PORTABLE   Final Result   Bilateral airspace disease with interval progression on the right         XR CHEST PORTABLE   Final Result   1. Endotracheal tube is 2.7 cm above the khai. 2. Stable interstitial pulmonary edema or pneumonia throughout both lungs. XR CHEST PORTABLE   Final Result   1. Worsening of the multifocal bilateral pneumonia when compared with the   prior study of 1 day earlier. XR CHEST PORTABLE   Final Result   1. Multifocal bilateral pneumonia more prominent within the right upper lobe   2.  Minimal partial interval clearing of the pneumonia within the left lung   3. Worsening of the pneumonia within the right upper lobe. US DUP LOWER EXTREMITIES BILATERAL VENOUS   Final Result   Within the visualized vessels there is no evidence for deep venous   thrombosis               XR CHEST PORTABLE   Final Result   1. Lines okay. 2. Slightly worsened diffuse edema versus pneumonia. XR CHEST PORTABLE   Final Result   1. Lines okay   2. Improved aeration, mild improvement and diffuse pneumonia/edema. XR CHEST ABDOMEN NG PLACEMENT   Final Result   NG tube position satisfactory. XR CHEST PORTABLE   Final Result   Stable bilateral pneumonia or interstitial pulmonary edema. XR CHEST PORTABLE   Final Result   Increasing bilateral infiltrates. XR CHEST PORTABLE   Final Result   1. There is no interval change in the multifocal bilateral pneumonia. CT HEAD WO CONTRAST   Final Result   No acute intracranial abnormality. Cortical atrophy and periventricular white matter ischemic changes. CT CERVICAL SPINE WO CONTRAST   Final Result   No acute abnormality of the cervical spine. Moderate degenerative changes with disc space narrowing and marginal bony   spur formation C5 through C7. Ground-glass opacities in the visualized lung apices. Please refer to chest   CT for additional information. CTA CHEST W CONTRAST   Final Result   No evidence of pulmonary embolism. Extensive multifocal ground-glass opacities predominantly in the peripheral   lung zones bilaterally, highly concerning for atypical or COVID related   pneumonia. XR CHEST PORTABLE   Final Result   Bilateral patchy airspace opacities worrisome for pneumonia.          XR CHEST PORTABLE    (Results Pending)   XR CHEST PORTABLE    (Results Pending)   XR CHEST PORTABLE    (Results Pending)       A/P:  Active Problems:    Acute respiratory failure with hypoxia (HCC)    Rhabdomyolysis    Acute cystitis with hematuria    Primary spontaneous pneumothorax  Resolved Problems:    * No resolved hospital problems. *    1. Intubated, Sedated, Paralyzed, Proned  11/20: Intubated 2/2 hypoxia and increased work of breathing, proned and paralyzed  S/p Left Chest Tube 11/29 for tension pneumothorax    2. Acute Hypoxic Respiratory Failure 2/2 Covid 19  Unvaccinated for Covid 19  Patient found with pulse ox of 60% --> currently intubated and sedated  CXR showing bilateral pulmonary infiltrates  Inflammatory markers: DDimer 530, , CRP 22.9, Ferritin 749 on admission   CTA pulm showing extensive bilateral pulmonary infiltrates consistent with COVID-19 pneumonia , no PE  Completed remdesivir and tocilizumab treatment  Decadron and SoluCortef course completed  Pulmonology consulted ; appreciate recs; daily ABG, Vitamin C, Vitamin D  Dietary consulted for further recommendations on nutrition status ; appreciate recs. 11/18 - transferred to ICU due to worsening resp status. CXR daily    Advanced Care Planning with Spiritual Services ; recs appreciated - FULL CODE. 3. Left Sided Tension Pneumothorax - improving  S/p left sided chest tube  11/29 , Tension Pneumothorax on Xray  Left lung re inflated on repeat CXR  Draining 300cc serosanguinous fluid  Whistle like sound noted Left Upper Lung Field 2/2 likely to Chest tube   Continue to monitor    4. Concern for Sepsis  Likely 2/2 superimposed bacterial pneumonia , candidal colonization  Tmax 102.2 , Tavg 100.3F  Given one dose cefepime and vancomycin in ICU  Procal 0.07, repeat 0.27 , blood cultures, urine culture negative. Completed Pip/Tazo, Vancomycin  Fungitell and B glucan pending  Diflucan 400 mg IV q 24 hours  Continue to monitor ; has been having low grade temps     5.  CHARAN Stage I  Admission creatinine 0.6  Likely 2/2 to Ischemic ATN in setting of Hypotension  Creatinine increased to 1.6 --> trended down to 1.1  Nephrology following , appreciate recs FeNa: 0.2- Pre- Renal     6. Anemia  2/2 to inflammation/ response to Covid 19  .3, MCHC 31.5 RDW 20.1  Vitamin B12/Folate normal March 2021  FOBT + 11/29  H/H < 7 11/30/2021 , s/p 1 unit PRBC  Maintain H/H > 7    6. Thrombocytopenia  Likely 2/2 to SALENA vs inflammation from Covid 19  Hold all anticoagulation , patient trialed on heparin , lovenox and argatroban but platelets continue to decrease  SALENA panel- negative  F/U PBS     7. Hx Schizoaffective Disorder / Anxiety  Continue seroquel , zoloft, trazodone  Hold ativan, vistaril     8. Hypokalemia - improved  Initial K 3.2 , Mag 2.6   CMP daily    9. Rhabdomyolysis - resolved  2/2 to fall for unknown time period  Patient found down for unknown period of time  Initial CK > 3000  Received 4 L NS in ER    10.  Concern for UTI - resolved   Likely simple cystitis ; patient with no CVA tenderness , presented initially with fever 102 F  Urinalysis with increased nitrites, bacteria, blood  Urine culture, blood cultures were negative  Given one dose doxy and rocephin in the ER  Continue 1g ceftriaxone IV x 3 days -  dc'd 11/18/2021     GI/DVT ppx: protonix  Diet: NPO, tube feeds  Disposition: MICU    Electronically signed by Sivan Davila MD  PGY-2 on 11/30/2021 at 6:47 AM  This case was discussed with attending physician: Dr. America Magaña

## 2021-11-30 NOTE — PROGRESS NOTES
[address] [facility]   Department of Internal Medicine   Internal Medicine Residency  MICU Progress Note    Patient:  Clara Aguilar 60 y.o. female   MRN: [id number]       Date of Service: 2021    Allergy: Ciprofloxacin  Cc follow up ARDS  Subjective     Patient was seen and examined this morning at bedside in no acute distress. Overnight, patient had drop in HgB to 6.7. 1 unit PRBC, and HgB ulysses to 8.4. Cxr showed possible repeat pneumothorax on the left. Gen Surg reconsulted and they place a 2nd chest tube. Heparin products held. No other acute issues at this time, patient remains intubated and sedated. Objective     TEMPERATURE:  Current - Temp: 99.7 °F (37.6 °C); Max - Temp  Av.5 °F (37.5 °C)  Min: 99 °F (37.2 °C)  Max: 99.7 °F (37.6 °C)  RESPIRATIONS RANGE: Resp  Av.8  Min: 30  Max: 35  PULSE RANGE: Pulse  Av.4  Min: 89  Max: 106  BLOOD PRESSURE RANGE:  Systolic (41CJT), LTB:978 , Min:137 , LME:735   ; Diastolic (63AXV), SHILPA:43, Min:77, Max:77    PULSE OXIMETRY RANGE: SpO2  Av.3 %  Min: 91 %  Max: 100 %    I & O - 24hr:    Intake/Output Summary (Last 24 hours) at 2021 1926  Last data filed at 2021 1800  Gross per 24 hour   Intake 4640.5 ml   Output 2419 ml   Net 2221.5 ml     I/O last 3 completed shifts: In: 5338.5 [I.V.:1852; Blood:357.5; NG/GT:3129]  Out: 3288 [Urine:2125; Stool:300; Chest Tube:150] I/O this shift:  In: -   Out: 520 [Urine:250; Chest Tube:270]   Weight change:     Physical Exam  Constitutional:       Appearance: Normal appearance. Interventions: She is sedated and intubated. HENT:      Head: Normocephalic and atraumatic. Nose: Nose normal.   Eyes:      Pupils: Pupils are equal, round, and reactive to light. Cardiovascular:      Rate and Rhythm: Normal rate and regular rhythm. Pulses: Normal pulses. Heart sounds: Normal heart sounds. Pulmonary:      Effort: Pulmonary effort is normal. She is intubated.       Breath sounds: Examination of the left-upper field reveals decreased breath sounds. Examination of the left-middle field reveals decreased breath sounds. Examination of the left-lower field reveals decreased breath sounds. Decreased breath sounds and wheezing present. No rhonchi or rales. Abdominal:      General: Bowel sounds are normal. There is no distension. Palpations: Abdomen is soft. Musculoskeletal:      Cervical back: Normal range of motion. Skin:     General: Skin is warm and moist.      Capillary Refill: Capillary refill takes less than 2 seconds. Neurological:      General: No focal deficit present.          Medications     Continuous Infusions:   sodium chloride      norepinephrine 9 mcg/min (11/30/21 1750)    midazolam 3 mg/hr (11/30/21 0130)    fentaNYL 5 mcg/ml in 0.9%  ml infusion 200 mcg/hr (11/30/21 1402)    cisatracurium (NIMBEX) infusion 4 mcg/kg/min (11/30/21 9847)    sodium chloride 100 mL/hr at 11/1956    dextrose       Scheduled Meds:   sucralfate  1 g Oral 4 times per day    lidocaine  5 mL IntraDERmal Once    bumetanide  1 mg IntraVENous Q8H    sennosides-docusate sodium  2 tablet Oral Daily    sodium chloride flush  5-40 mL IntraVENous 2 times per day    [Held by provider] heparin flush  3 mL IntraVENous 2 times per day    pantoprazole  40 mg IntraVENous BID    And    sodium chloride (PF)  10 mL IntraVENous BID    [Held by provider] heparin (porcine)  5,000 Units SubCUTAneous Q8H    chlorhexidine  15 mL Mouth/Throat BID    artificial tears   Both Eyes Q6H    budesonide  1 mg Nebulization BID    Arformoterol Tartrate  15 mcg Nebulization BID    atorvastatin  10 mg Oral Daily    QUEtiapine  200 mg Oral Daily    sertraline  200 mg Oral Daily    traZODone  100 mg Oral Nightly    ascorbic acid  1,000 mg Oral Daily    vitamin D  2,000 Units Oral Daily    zinc sulfate  50 mg Oral Daily     PRN Meds: sodium chloride, polyethylene glycol, sodium chloride flush, [Held by provider] heparin flush, ipratropium-albuterol, sodium chloride, ondansetron **OR** ondansetron, acetaminophen **OR** acetaminophen, glucose, dextrose, glucagon (rDNA), dextrose  Nutrition:   NG/OG tube TF type: Pulmocare/Nephro/Glucerna/Jevity        At rate: ml/h    Labs and Imaging Studies     CBC:   Recent Labs     11/28/21 0453 11/28/21 0453 11/29/21 0520 11/29/21 0520 11/29/21  1927 11/30/21 0444 11/30/21  1124   WBC 23.4*  --  19.7*  --   --  18.1*  --    HGB 8.7*   < > 8.4*   < > 7.5* 6.9* 8.4*   HCT 27.2*   < > 26.8*   < > 24.3* 21.9* 26.2*   MCV 98.6  --  103.9*  --   --  103.3*  --      --  198  --   --  143  --     < > = values in this interval not displayed.        BMP:    Recent Labs     11/29/21 2206 11/30/21 0444 11/30/21  1334    135 135   K 4.7 4.3 4.2    100 98   CO2 27 25 25   BUN 49* 48* 44*   CREATININE 1.1* 1.1* 1.0   GLUCOSE 129* 126* 135*       LIVER PROFILE:   Recent Labs     11/28/21 0453 11/29/21 0520 11/30/21 0444   AST 58* 89* 80*   ALT 40* 63* 53*   BILIDIR <0.2 <0.2 <0.2   BILITOT 0.3 0.4 0.4   ALKPHOS 80 96 84       PT/INR:   Recent Labs     11/28/21 0453 11/29/21 0520 11/30/21 0444   PROTIME 12.2 11.6 12.8*   INR 1.1 1.1 1.2       APTT:   Recent Labs     11/28/21 0453 11/29/21 0520 11/30/21 0444   APTT 21.3* 20.4* 22.4*       Fasting Lipid Panel:    Lab Results   Component Value Date    CHOL 322 10/12/2021    TRIG 150 10/12/2021    HDL 70 10/12/2021       Cardiac Enzymes:    Lab Results   Component Value Date    CKTOTAL 135 11/22/2021    CKTOTAL 488 (H) 11/20/2021    CKTOTAL 585 (H) 11/19/2021       Notable Cultures:      Blood cultures   Blood Culture, Routine   Date Value Ref Range Status   11/25/2021 24 Hours no growth  Preliminary     Respiratory cultures No results found for: RESPCULTURE No results found for: LABGRAM  Urine   Urine Culture, Routine   Date Value Ref Range Status   11/19/2021 Growth not present  Final Legionella No results found for: LABLEGI  C Diff PCR No results found for: CDIFPCR  Wound culture/abscess: No results for input(s): WNDABS in the last 72 hours. Tip culture:No results for input(s): CXCATHTIP in the last 72 hours. Oxygen:     Vent Information  $Ventilation: $Subsequent Day  Skin Assessment: Clean, dry, & intact  Equipment ID: [id number]  Equipment Changed: Humidification  Vent Type: [phone]  Vent Mode: AC/PC  Vt Ordered: 320 mL  Pressure Ordered: 34  Rate Set: 34 bmp  Peak Flow: 0 L/min  Pressure Support: 0 cmH20  FiO2 : 75 %  SpO2: 98 %  SpO2/FiO2 ratio: 130.67  Sensitivity: 3  PEEP/CPAP: 10  I Time/ I Time %: 0.65 s  Humidification Source: Heated wire  Humidification Temp: 37  Humidification Temp Measured: 37  Circuit Condensation: Drained  Mask Type: Full face mask  Mask Size: Small  Additional Respiratory  Assessments  Pulse: 90  Resp: (!) 34  SpO2: 98 %  Position: Prone  Humidification Source: Heated wire  Humidification Temp: 37  Circuit Condensation: Drained  Oral Care Completed?: Yes  Oral Care: Mouth suctioned, Mouth swabbed, Mouth moisturizer  Subglottic Suction Done?: No  Airway Type: ET  Airway Size: 8  Cuff Pressure (cm H2O):  (mlt)       [REMOVED] Urethral Catheter Temperature probe-Output (mL): 10 mL  Urethral Catheter-Output (mL): 50 mL  [REMOVED] Urethral Catheter Temperature probe 16 fr-Output (mL): 250 mL    Imaging Studies:  XR CHEST PORTABLE   Final Result   Significantly decreased size of left pneumothorax. There is likely trace   left pneumothorax remaining. Stable extensive bilateral pulmonary airspace opacities. XR CHEST PORTABLE   Final Result   Addendum 1 of 1   ADDENDUM:   Verbal report was given to Price Espinoza RN at 8:15 a.m. central standard   time on 11/30/2021. Final   New moderate sized left-sided pneumothorax. Stable extensive bilateral pulmonary airspace opacities            XR CHEST PORTABLE   Final Result   1. Lines okay.    2. Persistent edema/ARDS/pneumonia. 3. No sign of pneumothorax. XR CHEST PORTABLE   Final Result   Interval placement of left-sided chest tube with questionable residual   pneumothorax versus artifact. Extensive bilateral infiltrates. Continued follow-up recommended. XR CHEST PORTABLE   Final Result   Interval development of large left-sided tension pneumothorax. Extensive bilateral parenchymal infiltrates. Findings were discussed with Dr. Flakito Vargas at approximately 0010 hours [place]   time on 11/28/2021. XR CHEST PORTABLE   Final Result   Severe bilateral pulmonary opacification. XR CHEST PORTABLE   Final Result   1. Endotracheal tube is 3 cm above the khai. 2. Stable interstitial pulmonary edema or pneumonia throughout both lungs. XR CHEST PORTABLE   Final Result   1. Somewhat limited exam, grossly unchanged. Please see above comments. .      RECOMMENDATION:   1. Recommend follow-up thoracic imaging, as directed clinically. .         XR ABDOMEN (KUB) (SINGLE AP VIEW)   Final Result   1. Satisfactory position of the NG tube within the stomach         XR CHEST PORTABLE   Final Result   1. There is no interval change in the multifocal bilateral pneumonia. XR CHEST PORTABLE   Final Result   Bilateral airspace disease with interval progression on the right         XR CHEST PORTABLE   Final Result   1. Endotracheal tube is 2.7 cm above the khai. 2. Stable interstitial pulmonary edema or pneumonia throughout both lungs. XR CHEST PORTABLE   Final Result   1. Worsening of the multifocal bilateral pneumonia when compared with the   prior study of 1 day earlier. XR CHEST PORTABLE   Final Result   1. Multifocal bilateral pneumonia more prominent within the right upper lobe   2. Minimal partial interval clearing of the pneumonia within the left lung   3. Worsening of the pneumonia within the right upper lobe.          US DUP LOWER EXTREMITIES BILATERAL VENOUS   Final Result   Within the visualized vessels there is no evidence for deep venous   thrombosis               XR CHEST PORTABLE   Final Result   1. Lines okay. 2. Slightly worsened diffuse edema versus pneumonia. XR CHEST PORTABLE   Final Result   1. Lines okay   2. Improved aeration, mild improvement and diffuse pneumonia/edema. XR CHEST ABDOMEN NG PLACEMENT   Final Result   NG tube position satisfactory. XR CHEST PORTABLE   Final Result   Stable bilateral pneumonia or interstitial pulmonary edema. XR CHEST PORTABLE   Final Result   Increasing bilateral infiltrates. XR CHEST PORTABLE   Final Result   1. There is no interval change in the multifocal bilateral pneumonia. CT HEAD WO CONTRAST   Final Result   No acute intracranial abnormality. Cortical atrophy and periventricular white matter ischemic changes. CT CERVICAL SPINE WO CONTRAST   Final Result   No acute abnormality of the cervical spine. Moderate degenerative changes with disc space narrowing and marginal bony   spur formation C5 through C7. Ground-glass opacities in the visualized lung apices. Please refer to chest   CT for additional information. CTA CHEST W CONTRAST   Final Result   No evidence of pulmonary embolism. Extensive multifocal ground-glass opacities predominantly in the peripheral   lung zones bilaterally, highly concerning for atypical or COVID related   pneumonia. XR CHEST PORTABLE   Final Result   Bilateral patchy airspace opacities worrisome for pneumonia.          XR CHEST PORTABLE    (Results Pending)   XR CHEST PORTABLE    (Results Pending)        Resident's Assessment and Plan     Assessment and Plan:    Cardiovascular   History of hyperlipidemia  -Continue home atorvastatin 10 mg    Pulmonary  Acute hypoxic respiratory failure 2/2 Covid pneumonia  -Patient is intubated  -Last vent settings of respiratory rate 34, tidal volume of 320, PEEP of 12, plateau of 25 and compliance of 5.2 with FiO2 of 65%  -PF ratio of 1.12 today   -Currently sedated with fentanyl  -Completed Courses of Decadron and Toci  -Respiratory cultures have been negative, fungal cultures and BLE are also negative  -Continue ventilation with daily ABGs  -Completed remdesivir   -Continue breathing treatments  -Per ID continue Diflucan on with Fungitell pending  -Bumex 1 mg q8H     Left lung pneumothorax  -Overnight patient desatted down into the eighties  -Chest x-ray showed left pneumothorax on 11/29  -General surgery was consulted and chest tube was placed  -Chest tube had drainage of 136 mL of blood-tinged fluid  -Repeat chest x-ray today showed worsening pneumothorax  -Gen surg placed 2nd chest tube   -Continue with chest tube and continue to monitor    Gastrointestinal  Transaminitis 2/2 Covid infection  -Liver enzymes trending down   -Continue to monitor    Concern for GI bleed  -Patient has had regular secretions  -FOBT positive   -Hemoglobin dropped to 6.9 overnight   -1 unit PRBC was given  -Post transfusion H&H was 8.4  -Heparin Products on hold   -Continue to monitor   -Monitor H&H every 8 hours    Infectious Disease   COVID-19 pneumonia  -See above  -Continue to monitor CRP D-dimer and pro-Carl  -Continue vitamin C, vitamin D and zinc    Renal   Stage III intrinsic CHARAN (resolving)   -Baseline creatinine of 0.5  -Urea 15.2  -Nephro is following  -Creatinine is trending down, to 1.0 today  -Continue to follow nephro recommendations  -1 mg bumex q8h   -Continue to monitor    Heme/Onc  Reactive leukocytosis 2/2 steroids and Covid infection  -WBC trending down to 18.1 today  -Cultures have been negative  -Fungitell negative   -Diflucan stopped   -Continue to follow ID recommendations    Psychiatric   History of schizoaffective disorder  -Continue home hydroxyzine, trazodone, quetiapine and sertraline    # Peptic ulcer prophylaxis:Protonix   # DVT Prophylaxis: Heparin   # Disposition: Cont current care     Thea Velazquez MD, PGY-1    I personally saw, examined and provided care for the patient. Radiographs, labs and medication list were reviewed by me independently. I spoke with bedside nursing, therapists and consultants. Critical care services and times documented are independent of procedures and multidisciplinary rounds with Residents. Additionally comprehensive, multidisciplinary rounds were conducted with the MICU team. The case was discussed in detail and plans for care were established. Review of Residents documentation was conducted and revisions were made as appropriate. I agree with the above documented exam, problem list and plan of care. ARDS /COVID  Developed pneumothorax . s/p Chest tube . .lung expnaded but now subcutaneous   Fluid overload . start bumex 1 mg /h   [id number]   plt less than 22  ABG looks PF 112  plt 25 ,drop PEEP   Prone and paralyzed   Stool hemococult ,to be check ,to readdress with staff today ,  Increase PPI bid  12 L   Start  Bumex 1 mg q 8 and watch BP   ID following ,wbc coming ,hd abx before   To change to   Will update family   prognosis poor      Care reviewed with nursing staff, medical and surgical specialty care, primary care and the patient's family as available. Chart review/lab review/X-ray viewing/documentation and had long Conversation with patient/family re: prognosis, care options and any end of life issues:      Critical care time spent reviewing labs/films, examining patient, collaborating with other physicians more than 35  Minutes  excluding procedures . Bibiana Cavanaugh M.D.   11/30/2021  10:43 PM

## 2021-11-30 NOTE — DISCHARGE SUMMARY
Discharge Summary    Angelica Gutierrez  :  1955  MRN:  [id number]    Admit date:  2021  Discharge date:  2021    Admitting Physician:  Berry Villanueva MD      Admission Condition:  Fair    Discharged Condition:  Patient     Discharge Diagnoses:   Patient Active Problem List   Diagnosis    Schizoaffective disorder [facility])    Acute respiratory failure with hypoxia ([address]    Rhabdomyolysis    Acute cystitis with hematuria    Primary spontaneous pneumothorax     Hospital Course:     Angelica Gutierrez is a 73 y.o. female with pmh of osteoarthritis, schizoaffective disorder and GERD who presented to the ED s/p fall at home. She was found down by roommate after an unknown period of time. When seen by EMS, her initial O2 saturation was 60%. She was brought to the ER and started on BiPAP after which she became alert and oriented and saturations increased to 100%. Work up revealed K: 3.2, Mag 2.6 , CK 3913, Covid 19 positive status, Lactic Acid 2.4, elevated Inflammatory Markers. Urinalysis was positive for bacteria, nitrites and blood. Chest Xray showed bilateral patchy airspace opacities worrisome for pneumonia. CTA chest was negative for PE, but revealed extensive multifocal ground-glass opacities predominantly in the peripheral lung zones bilaterally. CT head and CT spine were negative. Due to poor lung function and increased anxiety on Bipap ; Pulmonology was consulted. Patient was started on Remdesivir and Tocilizumab therapy, IV steroids, vitamin C, D and zinc , anticoagulation with lovenox and PRN inhalers. Patient also given one dose doxycycline + rocephin in the ER, and rocephin 1 g IV was continued daily. For her elevated CK , patient was given 4L NS in the ER and CK was trended while patient continued on IV fluids. 2021  Patient's hypokalemia (K:3.2) resolved after being repleted while in the ER. Her urine culture and blood cultures were negative.  She received total 3 days of IV rocephin (dc'd 11/18/2021). She was trialied on AirVo, but unable to tolerate, as such switched back to BiPAP. During evening of 11/18/2021 , patient was RRT'd due to worsening hypoxia on BiPAP ; with saturations 84-87%. She was transferred to the ICU and subsequently paralyzed, intubated, sedated, and proned (11/20/2021). Bilateral lower extremity doppler was negative for DVT. Due to concern for sepsis , patient was given one dose of cefepime and vancomycin in the ICU, and blood cultures were repeated. Her dexamethasone dosing was completed, patient was started on solu-cortef. Procalcitonin was 0.07, repeat 0.27; she was continued on zosyn and vancomycin for empiric coverage. In the ICU, patient was found to the unresponsive to NICOM, arterial and central lines were placed for better monitoring. Due to concern for decreasing platelets and HIT, heparin/lovenox were held and patient was started on argatroban. Fibrinogen was 374, Peripheral blood smear showed thrombocytopenia with normal platelet morphology, nucleated RBC, neutrophilia and lymphopenia. Spiritual Services was consulted, and brother was contacted during patient's stay ; confirming that patient was to remain FULL CODE. Nephrology consulted (11/23/2021) for Acute Kidney Injury secondary to Ischemic ATN, and worsening urine output. Recommendations including starting bumex PRN & monitoring kidney function. On 11/23/2021 , patient underwent bronchoscopy and was found to have a large mucus plug in right upper lobe bronchi which was removed. MRSA nares was negative. Respiratory cultures were positive for Candida, and Infectious Disease was consulted 11/26/2021 for further management. Recommendations included stopping all antibiotics, starting diflucan 400 mg IV q24hrs. Patient was started on Relistor for constipation, no obstruction/ileus noted on KUB. HIT antibody panel was negative and patient was switched back to heparin/lovenox. On 11/28/2021 patient found to have tension pneumothorax of left lung. General surgery was consulted and a left sided chest tubes were placed which drained serosanguinous fluid. Due to worsening hemoglobin levels , FOBT was obtained: positive. No interventions were added, and her hemoglobin was monitored. December 2021     During the month of December, patients oxygen requirements continued to worsen despite maximum efforts. She was deemed to be too fragile for tracheostomy/ PEG tube procedure. Palliative care was consulted, and patient's brother continued to assert that patient remain full code. Her creatinine continued to improve on bumex drip. On 12/20/2021 , code blue was alerted for patient. She was found to be in PEA, with concern for Right sided pneumothorax on physical examination ; confirmed by STAT CXR. ACLS protocol was initiated and attempted for 30 minutes, but ROSC was not achieved. She was pronounced dead at [phone] 12/20/2021. Discharge Medications:         Medication List      ASK your doctor about these medications    atorvastatin 10 MG tablet  Commonly known as: LIPITOR  Take 1 tablet by mouth daily     CHOLESTEROL MANAGEMENT PO     hydrOXYzine 100 MG capsule  Commonly known as: VISTARIL     LORazepam 0.5 MG tablet  Commonly known as: ATIVAN     MAGNESIUM GLYCINATE PO     QUEtiapine 200 MG tablet  Commonly known as: SEROQUEL     sertraline 100 MG tablet  Commonly known as: ZOLOFT     traZODone 100 MG tablet  Commonly known as: DESYREL            Consults:  pulmonary/intensive care, ID, nephrology and general surgery    Significant Diagnostic Studies:      Labs:  Na/K/Cl/CO2:  --/5.46/--/-- (12/20 1426)  BUN/Cr/glu/ALT/AST/amyl/lip:  12, 12/0.4, 0.4/--/42/17/--/-- (12/20 0423)  WBC/Hgb/Hct/Plts:  18.8/8.3, 8.5/27.4, 28.6/284 (12/20 0423)  [unfilled]  estimated creatinine clearance is 135 mL/min (A) (based on SCr of 0.4 mg/dL (L)).     New Imaging:  XR ABDOMEN (KUB) (SINGLE AP VIEW)    Result Date: 12/19/2021  EXAMINATION: TWO SUPINE XRAY VIEW(S) OF THE ABDOMEN 12/19/2021 10:37 am COMPARISON: None. HISTORY: ORDERING SYSTEM PROVIDED HISTORY: r/o obstruction TECHNOLOGIST PROVIDED HISTORY: Reason for exam:->r/o obstruction What reading provider will be dictating this exam?->CRC FINDINGS: Portable prone views of the lower chest and upper abdomen. The distal enteric tube lies within the proximal stomach and there is gaseous over distension of the gastric fundus. The more proximal side port of the distal tube lies near the GE junction and recommend further tube advancement for more optimal positioning. The small bowel and colon are nondilated. 1.  Distal enteric tube within the proximal stomach and the tube could be advanced by an additional 5 cm for more optimal positioning. 2.  Gaseous over distension of the gastric fundus and recommend clinical assessment of NG tube function. 3.  The small bowel and colon are nondilated. XR CHEST PORTABLE    Result Date: 12/20/2021  EXAMINATION: ONE XRAY VIEW OF THE CHEST 12/20/2021 2:29 pm COMPARISON: None. HISTORY: ORDERING SYSTEM PROVIDED HISTORY: possiblepneumo TECHNOLOGIST PROVIDED HISTORY: Reason for exam:->possiblepneumo What reading provider will be dictating this exam?->CRC FINDINGS: Much of the fluid in the right hemithorax has been removed and now there is a significant sized right-sided pneumothorax with possibly a frozen lung. There is volume loss in left hemithorax. There is significant left upper lobe infiltrate radiating to the left suprahilar region. Lines/tubes are unchanged. IMPRESSION Findings suggest removal of right-sided pleural fluid and development significant amount air in the right pleural space. This could reflect a frozen lung.   Clinical correlation is recommended     XR CHEST PORTABLE    Result Date: 12/10/2021  EXAMINATION: ONE XRAY VIEW OF THE CHEST 12/10/2021 8:48 am COMPARISON: 12/08/2021 and 12/09/2021 HISTORY: [address] PROVIDED HISTORY: Hypoxia TECHNOLOGIST PROVIDED HISTORY: Reason for exam:->Hypoxia What reading provider will be dictating this exam?->CRC FINDINGS: There is stable position of the 2 left chest tubes. No left pneumothorax is appreciated. There is no appreciable pneumomediastinum. Extensive multifocal bilateral airspace disease is noted unchanged when compared to the prior study. The support lines and tubes are stable in position. 1. There is no appreciable left pneumothorax. 2. Extensive multifocal bilateral airspace disease which is unchanged. XR CHEST PORTABLE    Result Date: 12/7/2021  EXAMINATION: ONE XRAY VIEW OF THE CHEST 12/7/2021 5:36 am COMPARISON: One-view chest x-ray 12/06/2021 HISTORY: ORDERING SYSTEM PROVIDED HISTORY: Hypoxia TECHNOLOGIST PROVIDED HISTORY: Reason for exam:->Hypoxia What reading provider will be dictating this exam?->CRC FINDINGS: There is mild enlargement of the cardiac silhouette, which is similar to the previous exam and may be exaggerated by AP technique. The mediastinal contours are within normal limits. An endotracheal tube is present, the tip of which terminates approximately 2 cm above the khai. An enteric tube extends into the upper abdomen in the region of the stomach, the tip of which is not imaged. A left PICC is unchanged, terminating in the region of the superior cavoatrial junction. Bilateral pulmonary opacities appear mildly increased, most notably in the lower right lung. 2 left chest tubes are redemonstrated. The left pneumothorax appears decreased with suspected trace residual in the basilar region. No significant pleural effusions or right pneumothorax. Mild osseous degenerative changes are present. EKG leads overlie the chest.     1. Decreased left pneumothorax with suspected trace residual.  2 left chest tubes are similar to the previous exam. 2. Bilateral pulmonary opacities appear mildly increased.  3. The support devices are unchanged and appear to be in acceptable position. XR CHEST PORTABLE    Result Date: 12/5/2021  EXAMINATION: ONE XRAY VIEW OF THE CHEST 12/5/2021 9:17 am COMPARISON: None. HISTORY: ORDERING SYSTEM PROVIDED HISTORY: Hypoxia TECHNOLOGIST PROVIDED HISTORY: Reason for exam:->Hypoxia What reading provider will be dictating this exam?->CRC FINDINGS: The support lines and tubes are unchanged in position. There are 2 left chest tubes. There is a less than 10% left pneumothorax. Extensive multifocal bilateral pulmonary infiltrates are noted. There is no right or left pleural effusion. 1. No interval change in extensive multifocal bilateral pneumonia 2. Less than 10% left pneumothorax. There is stable position of the 2 left chest tubes. XR CHEST PORTABLE    Result Date: 12/4/2021  EXAMINATION: ONE XRAY VIEW OF THE CHEST 12/4/2021 9:32 am COMPARISON: December 3, 2021 HISTORY: ORDERING SYSTEM PROVIDED HISTORY: Hypoxia TECHNOLOGIST PROVIDED HISTORY: Reason for exam:->Hypoxia What reading provider will be dictating this exam?->CRC FINDINGS: Endotracheal tube is 2.9 cm above the khai. Satisfactory position of NG tube. Left PICC line present with distal tip at location of right atrial/caval junction or right atrium. Stable diffuse airspace opacities. Left chest tube present. Minimal left pneumothorax. 1.  Stable diffuse interstitial pulmonary edema or pneumonia. 2.  Left chest tube demonstrated. Minimal residual left pneumothorax. XR CHEST PORTABLE    Result Date: 12/3/2021  EXAMINATION: ONE XRAY VIEW OF THE CHEST 12/3/2021 8:54 am COMPARISON: Chest, single view 12/02/2021 HISTORY: ORDERING SYSTEM PROVIDED HISTORY: Hypoxia TECHNOLOGIST PROVIDED HISTORY: Reason for exam:->Hypoxia What reading provider will be dictating this exam?->CRC FINDINGS: A single portable AP view of the chest was obtained. Endotracheal tube terminates approximately 3.2 cm above the khai. Enteric tube traverses into the stomach.   A left-sided PICC line terminates within the right atrium. Left-sided chest tube is stable. There is diffuse bilateral airspace disease. There is trace left pneumothorax which appears new or slightly increased compared to prior examination. This measures approximately 4 mm in thickness at the lateral left lung base. There is otherwise no significant interval change. There is possible right pleural effusion. 1. Stable diffuse bilateral airspace disease. 2. Small left pneumothorax appears new or increased compared to prior examination. Left chest tube appears stable in position. 3. Possible right pleural effusion. XR CHEST PORTABLE    Result Date: 11/30/2021  EXAMINATION: ONE XRAY VIEW OF THE CHEST 11/30/2021 1:37 pm COMPARISON: 11/30/2020 HISTORY: ORDERING SYSTEM PROVIDED HISTORY: placement  of second left chest tube TECHNOLOGIST PROVIDED HISTORY: Reason for exam:->placement  of second left chest tube What reading provider will be dictating this exam?->CRC FINDINGS: A 2nd left-sided chest tube has been placed. Other support tubes and line are stable. There is decreased size of left pneumothorax, with likely trace left pneumothorax remaining. There are stable extensive bilateral pulmonary airspace opacities. No large effusion is seen. No acute osseous abnormality. Significantly decreased size of left pneumothorax. There is likely trace left pneumothorax remaining. Stable extensive bilateral pulmonary airspace opacities. XR CHEST PORTABLE    Addendum Date: 11/30/2021    ADDENDUM: Verbal report was given to Lou Almaguer RN at 8:15 a.m. central standard time on 11/30/2021. Result Date: 11/30/2021  EXAMINATION: ONE XRAY VIEW OF THE CHEST 11/30/2021 7:59 am COMPARISON: None. HISTORY: ORDERING SYSTEM PROVIDED HISTORY: Hypoxia TECHNOLOGIST PROVIDED HISTORY: Reason for exam:->Hypoxia What reading provider will be dictating this exam?->CRC FINDINGS: The support tubes and line are stable.   There is new lucency in the medial left hemithorax, concerning for a new moderate size pneumothorax. There are extensive continued bilateral pulmonary airspace opacities. Left subcutaneous emphysema is again seen. No sizable effusion. New moderate sized left-sided pneumothorax. Stable extensive bilateral pulmonary airspace opacities     XR CHEST PORTABLE    Result Date: 11/29/2021  EXAMINATION: ONE XRAY VIEW OF THE CHEST 11/29/2021 7:46 am COMPARISON: 11/28/2021 HISTORY: ORDERING SYSTEM PROVIDED HISTORY: Hypoxia TECHNOLOGIST PROVIDED HISTORY: Reason for exam:->Hypoxia What reading provider will be dictating this exam?->CRC FINDINGS: Invasive lines and tubes appear satisfactory / unremarkable. Left chest wall emphysema has decreased slightly. The heart and mediastinum are normal for AP technique. Diffuse pulmonary opacity is unchanged. There are no signs of pneumothorax. 1. Lines okay. 2. Persistent edema/ARDS/pneumonia. 3. No sign of pneumothorax. XR CHEST PORTABLE    Result Date: 11/29/2021  EXAMINATION: ONE XRAY VIEW OF THE CHEST 11/28/2021 10:17 pm COMPARISON: Exam dated 11/28/2021 at 0721 hours HISTORY: ORDERING SYSTEM PROVIDED HISTORY: hypoxia, covid pna TECHNOLOGIST PROVIDED HISTORY: Reason for exam:->hypoxia, covid pna What reading provider will be dictating this exam?->CRC FINDINGS: EKG leads overlie the chest.  Support tubes and lines remain in place. Interval development of large left-sided pneumothorax with shift of the mediastinal structures towards the right consistent with tension pneumothorax. There is also inferior displacement of the left diaphragm associated with the tension pneumothorax. Extensive bilateral parenchymal infiltrates otherwise persist.  Questionable nondisplaced lateral right 8th rib fracture. Interval development of large left-sided tension pneumothorax. Extensive bilateral parenchymal infiltrates.  Findings were discussed with Dr. Kalli Strauss at approximately 0010 hours Eastern time on 2021. XR CHEST PORTABLE    Result Date: 2021  EXAMINATION: ONE XRAY VIEW OF THE CHEST 2021 8:45 am COMPARISON: 2021 and 2021 HISTORY: ORDERING SYSTEM PROVIDED HISTORY: Hypoxia TECHNOLOGIST PROVIDED HISTORY: Reason for exam:->Hypoxia What reading provider will be dictating this exam?->CRC FINDINGS: The support lines and tubes are unchanged in position. Note is made of a left internal jugular central venous catheter. Extensive multifocal bilateral pneumonia is noted. In comparison with the patient's prior study, there is worsening of the multifocal bilateral pneumonia. 1. Worsening of the multifocal bilateral pneumonia when compared with the prior study of 1 day earlier. Treatments:   IV hydration, antibiotics: vancomycin, ceftriaxone, cefepime, zosyn, and diflucan, analgesia: fentanyl, cardiac meds: metoprolol, vasopressors: norepinephrine, epinephrine, diuretics : diamox, bumex, lasix , sedation: versed , propofol  anticoagulation: LMW heparin and argatroban, steroids: solu-cortef and decadron, covid 19 therapy: tocilizumab, remdesivir, TPN, respiratory therapy: O2, chest PT, BiPAPand intubation and procedures: CVC placement, arterial line, bronchoscopy with BAL, chest tube placement. Discharge Exam:  Please refer to last examination day's progress note. Disposition:       No future appointments. More than 30 minutes was spent in preparation of this patient's discharge including, but not limited to, examination, preparation of documents, prescription preparation, counseling and coordination.     Signed:  Fermin Rodriguez MD  2021, 9:22 AM

## 2021-11-30 NOTE — PROGRESS NOTES
11/30/21   hgb 6.9 on AM labs. Dr. Yoav Yoder made aware. 1 Unit PRBC ordered along with type and screen. Brother Grover Laboy called and consented for blood. See consent sheet in patient chart. Type and screen sent down to lab. Awaiting result and unit of blood. Will continue to monitor.     Denis Sánchez RN

## 2021-11-30 NOTE — PROGRESS NOTES
Department of Internal Medicine  Infectious Diseases  Progress Note      C/C : Leukocytosis, COVID 19     Pt is intubated, sedated, paralyzed , prone   No fever     Current Facility-Administered Medications   Medication Dose Route Frequency Provider Last Rate Last Admin    0.9 % sodium chloride infusion   IntraVENous PRN Alf Hurtado MD        sucralfate (CARAFATE) tablet 1 g  1 g Oral 4 times per day Marco RICKS Capal, DO   1 g at 11/30/21 1137    lidocaine 1 % injection 5 mL  5 mL IntraDERmal Once Jeff Yanez MD        bumetanide (BUMEX) injection 1 mg  1 mg IntraVENous Q8H Alicia Owens MD   1 mg at 11/30/21 1137    sennosides-docusate sodium (SENOKOT-S) 8.6-50 MG tablet 2 tablet  2 tablet Oral Daily Nika Sheppard MD   2 tablet at 11/30/21 0805    polyethylene glycol (GLYCOLAX) packet 17 g  17 g Oral Daily PRN Nika Sheppard MD        sodium chloride flush 0.9 % injection 5-40 mL  5-40 mL IntraVENous 2 times per day Nika Sheppard MD   10 mL at 11/28/21 2010    sodium chloride flush 0.9 % injection 5-40 mL  5-40 mL IntraVENous PRN Nika Sheppard MD   10 mL at 11/28/21 2122    [Held by provider] heparin flush 100 UNIT/ML injection 300 Units  3 mL IntraVENous 2 times per day Nika Sheppard MD       [facility] by provider] heparin flush 100 UNIT/ML injection 300 Units  3 mL IntraCATHeter PRN Nika Sheppard MD        norepinephrine (LEVOPHED) 16 mg in dextrose 5% 250 mL infusion  2-100 mcg/min IntraVENous Continuous Yemi Redd MD 8.4 mL/hr at 11/30/21 0907 9 mcg/min at 11/30/21 0907    pantoprazole (PROTONIX) injection 40 mg  40 mg IntraVENous BID Mehrdad Lubin MD   40 mg at 11/30/21 0805    And    sodium chloride (PF) 0.9 % injection 10 mL  10 mL IntraVENous BID Mehrdad Lubin MD   10 mL at 11/30/21 0806    midazolam (VERSED) 100 mg in dextrose 5 % 100 mL infusion  1-10 mg/hr IntraVENous Continuous Phillip Argueta MD 3 mL/hr at 11/30/21 0130 3 mg/hr at 11/30/21 0130   [facility] by provider] heparin (porcine) injection 5,000 Units  5,000 Units SubCUTAneous Q8H Nenita Thy Venida Eisenmenger, MD   5,000 Units at 11/30/21 0539    chlorhexidine (PERIDEX) 0.12 % solution 15 mL  15 mL Mouth/Throat BID Keyona Slaughter MD   15 mL at 11/30/21 0805    lubrifresh P.M. (artificial tears) ophthalmic ointment   Both Eyes Q6H Keyona Slaughter MD   Given at 11/30/21 0806    fentaNYL 5 mcg/ml in 0.9%  ml infusion  12.5-200 mcg/hr IntraVENous Continuous Dalton Cervantes MD 40 mL/hr at 11/30/21 0807 200 mcg/hr at 11/30/21 0807    cisatracurium besylate (NIMBEX) 200 mg in sodium chloride 0.9 % 100 mL infusion  0.5-10 mcg/kg/min IntraVENous Continuous Dalton Cervantes MD 8.6 mL/hr at 11/30/21 0808 4 mcg/kg/min at 11/30/21 0808    ipratropium-albuterol (DUONEB) nebulizer solution 1 ampule  1 ampule Inhalation Q4H PRN Dalton Cervantes MD   1 ampule at 11/24/21 1248    budesonide (PULMICORT) nebulizer suspension 1,000 mcg  1 mg Nebulization BID Dayton Irving MD   1,000 mcg at 11/30/21 1020    Arformoterol Tartrate (BROVANA) nebulizer solution 15 mcg  15 mcg Nebulization BID Dayton Irving MD   15 mcg at 11/30/21 1021    atorvastatin (LIPITOR) tablet 10 mg  10 mg Oral Daily Evon Hernandez MD   10 mg at 11/30/21 0805    QUEtiapine (SEROQUEL) tablet 200 mg  200 mg Oral Daily Evon Hernandez MD   200 mg at 11/30/21 0805    sertraline (ZOLOFT) tablet 200 mg  200 mg Oral Daily Evon Hernandez MD   200 mg at 11/30/21 0805    traZODone (DESYREL) tablet 100 mg  100 mg Oral Nightly Evon Hernandez MD   100 mg at 11/29/21 2109    0.9 % sodium chloride infusion  25 mL IntraVENous PRN Evon Hernandez  mL/hr at 11/1956 Rate Verify at 11/1956    ondansetron (ZOFRAN-ODT) disintegrating tablet 4 mg  4 mg Oral Q8H PRN Evon Hernandez MD        Or    ondansetron [facility]) injection 4 mg  4 mg IntraVENous Q6H PRN Evon Hernandez MD        acetaminophen (TYLENOL) tablet 650 mg  650 mg Oral Q6H PRN Evon Hernandez MD 650 mg at 11/26/21 2035    Or    acetaminophen (TYLENOL) suppository 650 mg  650 mg Rectal Q6H PRN Eloina Hernandez MD   650 mg at 11/27/21 2091    ascorbic acid (VITAMIN C) tablet 1,000 mg  1,000 mg Oral Daily Eloina Hernandez MD   1,000 mg at 11/30/21 0805    Vitamin D (CHOLECALCIFEROL) tablet 2,000 Units  2,000 Units Oral Daily Eloina Hernandez MD   2,000 Units at 11/30/21 0805    zinc sulfate (ZINCATE) capsule 50 mg  50 mg Oral Daily Eloina Hernandez MD   50 mg at 11/30/21 1054    glucose (GLUTOSE) 40 % oral gel 15 g  15 g Oral PRN Eloina Hernandez MD        dextrose 50 % IV solution  12.5 g IntraVENous PRN Eloina Hernandez MD        glucagon (rDNA) injection 1 mg  1 mg IntraMUSCular PRN Eloina Hernandez MD        dextrose 5 % solution  100 mL/hr IntraVENous PRN Eloina Hernandez MD             REVIEW OF SYSTEMS: could not be obtained       PHYSICAL EXAM:      Vitals:     /77   Pulse 92   Temp 99.7 °F (37.6 °C) (Esophageal)   Resp (!) 34   Ht 5' 1\" (1.549 m)   Wt 172 lb 3.2 oz (78.1 kg)   SpO2 97%   BMI 32.54 kg/m²     General Appearance:    Intubated, sedated, prone, FiO2 75, PEEP 12    Head:    Normocephalic, atraumatic   Eyes:    + pallor,   Ears:    No obvious deformity or drainage.    Nose:    Throat:   ET tube in place, dry mucosa    Neck:   Supple, no lymphadenopathy   Lungs:     Clear to auscultation ,left chest tube    Heart:    Regular   Abdomen:     Soft,  bowel sounds present    Extremities:    No edema    Pulses:   Dorsalis pedis palpable    Skin:   no rashes             CBC with Differential:      Lab Results   Component Value Date    WBC 18.1 11/30/2021    RBC 2.12 11/30/2021    HGB 8.4 11/30/2021    HCT 26.2 11/30/2021     11/30/2021    .3 11/30/2021    MCH 32.5 11/30/2021    MCHC 31.5 11/30/2021    RDW 20.1 11/30/2021    NRBC 2.6 11/30/2021    METASPCT 0.9 11/21/2021    LYMPHOPCT 8.7 11/30/2021    MONOPCT 1.7 11/30/2021    MYELOPCT 0.9 11/25/2021    BASOPCT 0.1 11/30/2021    MONOSABS 0.36 11/30/2021    LYMPHSABS 1.63 11/30/2021    EOSABS 0.00 11/30/2021    BASOSABS 0.00 11/30/2021       CMP     Lab Results   Component Value Date     11/30/2021    K 4.3 11/30/2021    K 4.8 11/19/2021     11/30/2021    CO2 25 11/30/2021    BUN 48 11/30/2021    CREATININE 1.1 11/30/2021    GFRAA >60 11/30/2021    LABGLOM 50 11/30/2021    GLUCOSE 126 11/30/2021    GLUCOSE 77 05/12/2011    PROT 4.3 11/30/2021    LABALBU 2.5 11/30/2021    CALCIUM 7.4 11/30/2021    BILITOT 0.4 11/30/2021    ALKPHOS 84 11/30/2021    AST 80 11/30/2021    ALT 53 11/30/2021         Hepatic Function Panel:    Lab Results   Component Value Date    ALKPHOS 84 11/30/2021    ALT 53 11/30/2021    AST 80 11/30/2021    PROT 4.3 11/30/2021    BILITOT 0.4 11/30/2021    BILIDIR <0.2 11/30/2021    IBILI see below 11/30/2021    LABALBU 2.5 11/30/2021       PT/INR:    Lab Results   Component Value Date    PROTIME 12.8 11/30/2021    INR 1.2 11/30/2021       TSH:    Lab Results   Component Value Date    TSH 4.720 10/12/2021       U/A:    Lab Results   Component Value Date    COLORU Yellow 11/16/2021    PHUR 6.0 11/16/2021    WBCUA NONE 11/16/2021    RBCUA 1-3 11/16/2021    BACTERIA MANY 11/16/2021    CLARITYU Clear 11/16/2021    SPECGRAV 1.025 11/16/2021    LEUKOCYTESUR Negative 11/16/2021    UROBILINOGEN 0.2 11/16/2021    BILIRUBINUR Negative 11/16/2021    BLOODU LARGE 11/16/2021    GLUCOSEU Negative 11/16/2021       ABG:    Lab Results   Component Value Date    EOS6WIG 21.6 11/18/2021    JWT3TSD 32.3 11/18/2021    PO2ART 55.2 11/18/2021       MICROBIOLOGY:    Blood culture - neg on 11/25     Urine Culture -    Sputum Culture -  CULTURE, RESPIRATORY Oral Pharyngeal Kamille absent Abnormal      Smear, Respiratory --    Group 6: <25 PMN's/LPF and <25 Epithelial cells/LPF   Few Polymorphonuclear leukocytes   Rare Epithelial cells   No organisms seen     Organism Candida albicans Abnormal     CULTURE, RESPIRATORY One colony   Narrative:     Source: [facility]       Site:                     Specimen: Nasopharyngeal Swab Updated: 11/1956    SARS-CoV-2, NAAT DETECTED Abnormal     Comment: Rapid NAAT:   Negative results should be treated as presumptive            Ref Range & Units 11/27/21 1024   (1,3)-Beta-D-Glucan (Fungitell) Interpretation Negative Negative        Radiology :    Chest X ray : extensive bilateral infiltrates    IMPRESSION:     1. Severe COVID 19 pneumonia s/p remdesivir and tocilizumab   2. Respiratory failure - intubated   3. Leukocytosis - reactive  4. Candida colonization   5. S/P Chest tube insertion         RECOMMENDATIONS:      1. Stop diflucan   2. CBC with diff

## 2021-11-30 NOTE — PLAN OF CARE
Problem: Falls - Risk of:  Goal: Will remain free from falls  Description: Will remain free from falls  Outcome: Met This Shift  Goal: Absence of physical injury  Description: Absence of physical injury  11/29/2021 2010 by Catalina Jay RN  Outcome: Met This Shift  11/29/2021 1802 by Mumtaz Avila RN  Outcome: Met This Shift     Problem: Airway Clearance - Ineffective  Goal: Achieve or maintain patent airway  11/29/2021 2010 by Catalina Jay RN  Outcome: Met This Shift  11/29/2021 1802 by Mumtaz Avila RN  Outcome: Met This Shift     Problem: Gas Exchange - Impaired  Goal: Absence of hypoxia  Outcome: Met This Shift  Goal: Promote optimal lung function  Outcome: Met This Shift     Problem: Breathing Pattern - Ineffective  Goal: Ability to achieve and maintain a regular respiratory rate  11/29/2021 2010 by Catalina Jay RN  Outcome: Met This Shift  11/29/2021 1802 by Mumtaz Avila RN  Outcome: Met This Shift     Problem:  Body Temperature -  Risk of, Imbalanced  Goal: Ability to maintain a body temperature within defined limits  11/29/2021 2010 by Catalina Jay RN  Outcome: Met This Shift  11/29/2021 1802 by Mumtaz Avila RN  Outcome: Met This Shift  Goal: Will regain or maintain usual level of consciousness  Outcome: Met This Shift  Goal: Complications related to the disease process, condition or treatment will be avoided or minimized  Outcome: Met This Shift     Problem: Isolation Precautions - Risk of Spread of Infection  Goal: Prevent transmission of infection  Outcome: Met This Shift     Problem: Nutrition Deficits  Goal: Optimize nutritional status  Outcome: Met This Shift     Problem: Risk for Fluid Volume Deficit  Goal: Maintain normal heart rhythm  Outcome: Met This Shift  Goal: Maintain absence of muscle cramping  Outcome: Met This Shift  Goal: Maintain normal serum potassium, sodium, calcium, phosphorus, and pH  Outcome: Met This Shift     Problem: Loneliness or Risk for Loneliness  Goal: Demonstrate positive use of time alone when socialization is not possible  Outcome: Met This Shift     Problem: Fatigue  Goal: Verbalize increase energy and improved vitality  Outcome: Met This Shift     Problem: Patient Education: Go to Patient Education Activity  Goal: Patient/Family Education  Outcome: Met This Shift     Problem: Skin Integrity:  Goal: Will show no infection signs and symptoms  Description: Will show no infection signs and symptoms  Outcome: Met This Shift  Goal: Absence of new skin breakdown  Description: Absence of new skin breakdown  Outcome: Met This Shift     Problem: Inadequate oral food/beverage intake (NI-2.1)  Goal: Food and/or Nutrient Delivery  Description: Individualized approach for food/nutrient provision.   11/29/2021 1538 by Saundra Wheeler, MS, RD, LD  Outcome: Met This Shift     Problem: Infection, Septic Shock:  Goal: Will show no infection signs and symptoms  Description: Will show no infection signs and symptoms  Outcome: Met This Shift

## 2021-12-01 NOTE — PROGRESS NOTES
Department of Internal Medicine  Infectious Diseases  Progress Note      C/C : Leukocytosis, COVID 19     Pt is intubated, sedated, paralyzed , prone   On levophed   No fever     Current Facility-Administered Medications   Medication Dose Route Frequency Provider Last Rate Last Admin    bumetanide (BUMEX) injection 2 mg  2 mg IntraVENous Q12H Eddy Somers MD        sucralfate (CARAFATE) tablet 1 g  1 g Oral 4 times per day Chacha Marine, DO   1 g at 12/01/21 0737    sennosides-docusate sodium (SENOKOT-S) 8.6-50 MG tablet 2 tablet  2 tablet Oral Daily Janell Bennett MD   2 tablet at 12/01/21 0737    polyethylene glycol (GLYCOLAX) packet 17 g  17 g Oral Daily PRN Janell Bennett MD        sodium chloride flush 0.9 % injection 5-40 mL  5-40 mL IntraVENous 2 times per day Janell Bennett MD   10 mL at 12/01/21 0736    sodium chloride flush 0.9 % injection 5-40 mL  5-40 mL IntraVENous PRN Janell Bennett MD   10 mL at 11/28/21 2122    [Held by provider] heparin flush 100 UNIT/ML injection 300 Units  3 mL IntraVENous 2 times per day Janell Bennett MD       [facility] [place] by provider] heparin flush 100 UNIT/ML injection 300 Units  3 mL IntraCATHeter PRN Janell Bennett MD        norepinephrine (LEVOPHED) 16 mg in dextrose 5% 250 mL infusion  2-100 mcg/min IntraVENous Continuous Khoi Mendez MD 3.8 mL/hr at 12/01/21 1045 4 mcg/min at 12/01/21 1045    pantoprazole (PROTONIX) injection 40 mg  40 mg IntraVENous BID Sean Dee MD   40 mg at 12/01/21 0805    And    sodium chloride (PF) 0.9 % injection 10 mL  10 mL IntraVENous BID Sean Dee MD   10 mL at 12/01/21 0736    midazolam (VERSED) 100 mg in dextrose 5 % 100 mL infusion  1-10 mg/hr IntraVENous Continuous Maricel Rao MD 4 mL/hr at 11/30/21 2047 4 mg/hr at 11/30/21 2047    [Held by provider] heparin (porcine) injection 5,000 Units  5,000 Units SubCUTAneous Q8H Nenita [facility] Jose Miguel Pimentel MD   5,000 Units at 11/30/21 0539    chlorhexidine (PERIDEX) 0.12 % solution 15 mL  15 mL Mouth/Throat BID Keyona Kellogg MD   15 mL at 12/01/21 0735    lubrifresh P.M. (artificial tears) ophthalmic ointment   Both Eyes Q6H Keyona Kellogg MD   Given at 12/01/21 0735    fentaNYL 5 mcg/ml in 0.9%  ml infusion  12.5-200 mcg/hr IntraVENous Continuous Billy Cox MD 30 mL/hr at 12/01/21 0935 150 mcg/hr at 12/01/21 0935    cisatracurium besylate (NIMBEX) 200 mg in sodium chloride 0.9 % 100 mL infusion  0.5-10 mcg/kg/min IntraVENous Continuous Billy Cox MD 8.6 mL/hr at 12/01/21 0741 4 mcg/kg/min at 12/01/21 0741    ipratropium-albuterol (DUONEB) nebulizer solution 1 ampule  1 ampule Inhalation Q4H PRN Billy Cox MD   1 ampule at 11/24/21 1248    budesonide (PULMICORT) nebulizer suspension 1,000 mcg  1 mg Nebulization BID Joey Licea MD   1,000 mcg at 11/30/21 2152    Arformoterol Tartrate (BROVANA) nebulizer solution 15 mcg  15 mcg Nebulization BID Joey Licea MD   15 mcg at 11/30/21 2152    atorvastatin (LIPITOR) tablet 10 mg  10 mg Oral Daily Maggie Schroeder MD   10 mg at 12/01/21 0734    QUEtiapine (SEROQUEL) tablet 200 mg  200 mg Oral Daily Maggie Schroeder MD   200 mg at 12/01/21 0736    sertraline (ZOLOFT) tablet 200 mg  200 mg Oral Daily Maggie Schroeder MD   200 mg at 12/01/21 0737    traZODone (DESYREL) tablet 100 mg  100 mg Oral Nightly Maggie Schroeder MD   100 mg at 11/30/21 2024    0.9 % sodium chloride infusion  25 mL IntraVENous PRN Maggie Schroeder  mL/hr at 11/1956 Rate Verify at 11/1956    ondansetron (ZOFRAN-ODT) disintegrating tablet 4 mg  4 mg Oral Q8H PRN Maggie Schroeder MD        Or    ondansetron [facility]) injection 4 mg  4 mg IntraVENous Q6H PRN Maggie Schroeder MD        acetaminophen (TYLENOL) tablet 650 mg  650 mg Oral Q6H PRN Maggie Schroeder MD   650 mg at 11/26/21 5958    Or    acetaminophen (TYLENOL) suppository 650 mg  650 mg Rectal Q6H PRN Maggie Schroeder MD   650 mg at 11/27/21 0829    ascorbic acid (VITAMIN C) tablet 1,000 mg  1,000 mg Oral Daily Maggie Schroeder MD   1,000 mg at 12/01/21 0734    Vitamin D (CHOLECALCIFEROL) tablet 2,000 Units  2,000 Units Oral Daily Maggie Schroeder MD   2,000 Units at 12/01/21 0738    zinc sulfate (ZINCATE) capsule 50 mg  50 mg Oral Daily Maggie Schroeder MD   50 mg at 12/01/21 0738    glucose (GLUTOSE) 40 % oral gel 15 g  15 g Oral PRN Maggie Schroeder MD        dextrose 50 % IV solution  12.5 g IntraVENous PRN Maggie Schroeder MD        glucagon (rDNA) injection 1 mg  1 mg IntraMUSCular PRN Maggie Schroeder MD        dextrose 5 % solution  100 mL/hr IntraVENous PRN Maggie Schroeder MD             REVIEW OF SYSTEMS: could not be obtained       PHYSICAL EXAM:      Vitals:     BP 85/62   Pulse 86   Temp 98.8 °F (37.1 °C) (Esophageal)   Resp (!) 34   Ht 5' 1\" (1.549 m)   Wt 172 lb 3.2 oz (78.1 kg)   SpO2 96%   BMI 32.54 kg/m²     General Appearance:    Intubated, sedated, prone, FiO2 65, PEEP 12    Head:    Normocephalic, atraumatic   Eyes:    + pallor,   Ears:    No obvious deformity or drainage.    Nose:    Throat:   ET tube in place, dry mucosa    Neck:   Supple, no lymphadenopathy   Lungs:     Clear to auscultation ,left chest tube    Heart:    Regular   Abdomen:     Soft,  bowel sounds present    Extremities:    No edema    Pulses:   Dorsalis pedis palpable    Skin:   no rashes             CBC with Differential:      Lab Results   Component Value Date    WBC 14.9 12/01/2021    RBC 2.50 12/01/2021    HGB 7.8 12/01/2021    HCT 24.4 12/01/2021     12/01/2021    MCV 99.6 12/01/2021    MCH 32.0 12/01/2021    MCHC 32.1 12/01/2021    RDW 22.5 12/01/2021    NRBC 0.9 12/01/2021    METASPCT 0.9 11/21/2021    LYMPHOPCT 2.6 12/01/2021    MONOPCT 2.9 12/01/2021    MYELOPCT 1.7 12/01/2021    BASOPCT 0.2 12/01/2021    MONOSABS 0.00 12/01/2021    LYMPHSABS 0.45 12/01/2021    EOSABS 0.27 12/01/2021    BASOSABS 0.00 12/01/2021       CMP     Lab Results   Component Value Date     12/01/2021    K 3.8 should be treated as presumptive            Ref Range & Units 11/27/21 1024   (1,3)-Beta-D-Glucan (Fungitell) Interpretation Negative Negative        Radiology :    Chest X ray : extensive bilateral infiltrates    IMPRESSION:     1. Severe COVID 19 pneumonia s/p remdesivir and tocilizumab   2. Respiratory failure - intubated   3. Leukocytosis - reactive  4. Candida colonization   5. S/P Chest tube insertion         RECOMMENDATIONS:      1. Off abx   2. CBC with diff

## 2021-12-01 NOTE — PROGRESS NOTES
Large air leaks sats ok CXR tubes in place no ptx  HH noted, PPI carafate, transfuse as indicated.   No procedures planned    Ja Mccoy MD REFERRAL COMPLETE FX AND MAILED

## 2021-12-01 NOTE — PROGRESS NOTES
[facility] Attending    S: 60 y.o. female with a history of gerd, oa, schizoaffective disorder, and gastric fundiplication who was found down for an undetermined amount of time. Reports shortness of breath and cough for 2.5 weeks. She was found to be 60% on RA. In the ER, COVID testing was positive with significantly elevated CPKs. Patient is unvaccinated. Had desaturation to 88% wit PaO2 of 55 with RRT and subsequent transfer to the MICU. Desat to 40's when Bipap removed. Now intubated. Sedated, paralyzed, and prone. Noted to have pneumothorax and chest tube placed. Second chest tube placed when pneumothorax not improved. Today, prone. Intubated, sedated. Chest tube in place    O: VS- Blood pressure 137/77, pulse 89, temperature 99 °F (37.2 °C), temperature source Esophageal, resp. rate (!) 34, height 5' 1\" (1.549 m), weight 172 lb 3.2 oz (78.1 kg), SpO2 97 %. Exam is as noted by resident   Currently prone and nonresponsive. Lungs: coarse, vent   CV:  Rate controlled, regular   Ext-no C/C/E  Chest tube noted  CXR- resolution of pneumothorax      Impressions: Active Problems:    Acute respiratory failure with hypoxia (HCC)    Covid pneumonia    Rhabdomyolysis, CK improving    Concern for sepsis    Acute cystitis with hematuria, treated    CHARAN    Thrombocytopenia     Plan:      Acute hypoxic resp failure 2/2 covid - Decadron. Completed Tocimizilab. Getting Remdesivir. Vitamin C.  Vitamin D.  Zinc.  Appreciate Pulm management. Sepsis 2/2 PNA - done with vanc and zosyn  rhabdo - CK improving. CTM. No longer IVFs. U/s of lower extremities negative DVT 11/20. Slow wean of FiO2. Tube feeding. Fluid overloaded - on bumex  Schizoaffective - seroquel  Diflucan stopped. bumex begun  Full code at this time. Attending Physician Statement  I have reviewed the chart and seen the patient with the resident(s).   I personally reviewed images, EKG's and similar tests, if present. I personally reviewed and performed key elements of the history and exam.  I have reviewed and confirmed student and/or resident history and exam with changes as indicated above. I agree with the assessment, plan and orders as documented by the resident. Please refer to the  resident and/or student note for additional information.       Joanna Chappell MD

## 2021-12-01 NOTE — PROCEDURES
Arterial line placement    Procedure: Left Radial arterial line placement. Indications: Continuous monitoring of blood pressure in a patient with hypotension +/- shock, on Levophed. Anesthesia: Local infiltration of 1% lidocaine. Consent:  Unable to be obtained due to the emergent nature of this procedure. Technique: Time Out: Immediately prior to the procedure a \"timeout\" was called to verify the correct patient and procedure. Procedure was done using strict aseptic technique. Zack's test was performed and was normal. Left Radial site was cleaned with chloraprep and draped. Left Radial was identified, then Lidocaine 1% was infiltrated locally. Arterial line was inserted, a good blood flow was obtained, after which guidewire was inserted all the way with no resistance. Then the canula was inserted and needle with guidewire was withdrawn. Pulsatile bright red blood flow was observed. The canula was connected to BP monitoring apparatus and a good quality waveform was noted. Then the canula was secured with 2 stay sutures of 2-0 silk after Lidocaine infiltration, following which dressing was applied. Number of sticks: 2. Number of Kits used: 2. Complications: No immediate complication. Estimated blood loss: About 1 ml. Comment: Patient tolerated the procedure well.      Breanna Kerr MD PGY-1  12/1/2021 2:12 AM

## 2021-12-01 NOTE — PROGRESS NOTES
[address] [facility]  Department of Internal Medicine   Internal Medicine Residency   MICU Progress Note    Patient:  Estephania Couch 60 y.o. female  MRN: [id number]     Date of Service: 12/1/2021    Allergy: Ciprofloxacin  Follow up  ards  Subjective     Pt was seen and examined this AM. Intubated, sedated, paralyzed, prone. Responded well to Bumex 1 mg Q8h, with UOP 3.1L, but was net positive for the day. Objective     VS: /60   Pulse 90   Temp 99.7 °F (37.6 °C) (Esophageal)   Resp (!) 34   Ht 5' 1\" (1.549 m)   Wt 172 lb 3.2 oz (78.1 kg)   SpO2 95%   BMI 32.54 kg/m²   ABP (Arterial line BP): 99/58  ABP mean (Arterial line mean): 72 mmHg    I & O - 24hr:   Intake/Output Summary (Last 24 hours) at 12/1/2021 1217  Last data filed at 12/1/2021 1045  Gross per 24 hour   Intake 5815.5 ml   Output 3850 ml   Net 1965.5 ml       Physical Exam:  Physical Exam  Constitutional:       Interventions: She is sedated, chemically paralyzed and intubated. Comments: Prone   HENT:      Head: Normocephalic and atraumatic. Nose: Nose normal.      Mouth/Throat:      Mouth: Mucous membranes are moist.   Cardiovascular:      Pulses: Normal pulses. Pulmonary:      Effort: She is intubated. Breath sounds: Wheezing present. No rhonchi or rales. Musculoskeletal:      Right lower leg: 3+ Pitting Edema present. Left lower leg: 3+ Pitting Edema present. Skin:     General: Skin is warm and moist.      Capillary Refill: Capillary refill takes less than 2 seconds. Neurological:      Mental Status: She is unresponsive.           Lines     site day    Art line   L Radial 12/1   TLC None    PICC L Arm 11/29   Hemoaccess None      Mechanical Ventilation:   Mode: A/C   TV:320 ml RR: 34  PEEP 10 cmH2O FiO2 65   Pplat: 25  ABG:     Lab Results   Component Value Date    PH 7.413 12/01/2021    ABI0YOL 32.3 11/18/2021    PCO2 43.0 12/01/2021    PO2ART 55.2 11/18/2021    PO2 78.0 12/01/2021    JIJ1WSR 21.6 11/18/2021    HCO3 26.8 12/01/2021    BE 2.0 12/01/2021    THB 8.8 12/01/2021    O2SAT 94.6 12/01/2021        Medications     Infusions: (Fluid, Sedation, Vasopressors)  IVF:    None  Vasopressors   Levophed at rate 5 mcg/min  Sedation   Fentanyl at rate of 150; Versed at rate of 4; Nimbex at rate of 4    Nutrition:   OG TF Peptide Based  ATB:   Antibiotics  Days   S/p Vancomycin    S/p Zosyn          Labs     CBC with Differential:    Lab Results   Component Value Date    WBC 14.9 12/01/2021    RBC 2.50 12/01/2021    HGB 7.8 12/01/2021    HCT 24.4 12/01/2021     12/01/2021    MCV 99.6 12/01/2021    MCH 32.0 12/01/2021    MCHC 32.1 12/01/2021    RDW 22.5 12/01/2021    NRBC 0.9 12/01/2021    METASPCT 0.9 11/21/2021    LYMPHOPCT 2.6 12/01/2021    MONOPCT 2.9 12/01/2021    MYELOPCT 1.7 12/01/2021    BASOPCT 0.2 12/01/2021    MONOSABS 0.00 12/01/2021    LYMPHSABS 0.45 12/01/2021    EOSABS 0.27 12/01/2021    BASOSABS 0.00 12/01/2021     CMP:    Lab Results   Component Value Date     12/01/2021    K 3.8 12/01/2021    K 4.8 11/19/2021    CL 96 12/01/2021    CO2 24 12/01/2021    BUN 42 12/01/2021    CREATININE 1.0 12/01/2021    GFRAA >60 12/01/2021    LABGLOM 56 12/01/2021    GLUCOSE 109 12/01/2021    GLUCOSE 77 05/12/2011    PROT 4.3 12/01/2021    LABALBU 2.3 12/01/2021    CALCIUM 7.3 12/01/2021    BILITOT 0.4 12/01/2021    ALKPHOS 90 12/01/2021     12/01/2021    ALT 58 12/01/2021       Imaging Studies:  CXR:   Impression   No change in extensive bilateral pulmonary airspace opacities.  No   pneumothorax is radiographically visible on the current exam.       Resident's Assessment and Plan     Reba Goodell is 59yo F with PMHx GERD, HLD, OA, Schizoaffective who presented after being found unresponsive, found to be hypoxic and have COVID-19, was managed on the COVID-19 unit for 2 days and then transferred to the MICU for worsening hypoxia and need for intubation.     1. Acute hypoxic respiratory failure 2/2 COVID-19 PNA - intubated, sedated, paralyzed, prone  2. COVID-19 PNA - s/p remdesivir, tocilizumab, dexamethasone  3. Left lung pneumothorax - two chest tubes in place  4. GI bleed - s/p 1u pRBC, FOBT positive, PPI BID, Carafate, General Surgery following  5. CHARAN stage III, resolved  6. Transaminitis 2/2 #2  7. Leukocytosis - likely 2/2 steroids and COVID-19 PNA, Fungitell negative, cultures negative, ID following  8. Hx schizoaffective disorder  9. Hx HLD    -Intubated, sedated, paralyzed, prone  -Brovana, Pulmicort, Duoneb  -Vitamin C, D, zinc  -Follow daily ABG  -On Levophed, wean as tolerated  -Increased Bumex to 2 mg Q12H  -Chest tubes in place, air leak present, draining serosanguinous fluid  -Holding Heparin given GIB  -Follow H&H Q12H  -Continue Atorvastatin 10 mg  -Continue Quetiapine 200 mg daily, Sertraline 200 mg daily, Trazodone 100 mg nightly    PT/OT: None  DVT/GI ppx: Heparin (held)/Protonix  Dispo: Continue care    Selwyn Mccall MD, PGY-2  Attending physician: Dr. Jonathan Viveros    I personally saw, examined and provided care for the patient. Radiographs, labs and medication list were reviewed by me independently. I spoke with bedside nursing, therapists and consultants. Critical care services and times documented are independent of procedures and multidisciplinary rounds with Residents. Additionally comprehensive, multidisciplinary rounds were conducted with the MICU team. The case was discussed in detail and plans for care were established. Review of Residents documentation was conducted and revisions were made as appropriate. I agree with the above documented exam, problem list and plan of care. ARDS /COVID  Developed pneumothorax . s/p 2 Chest tube . .lung expnaded   Fluid overload . 2 mg q 8   [id number]   PF 10=20 plt less than 25  plt 25 ,drop PEEP 10  Prone and paralyzed   Hb monitor ,  Will check with surgery if ok for DVT since she is high risk and she Stool hemoccult

## 2021-12-01 NOTE — PLAN OF CARE
Problem: Falls - Risk of:  Goal: Will remain free from falls  Description: Will remain free from falls  12/1/2021 [id number] by René Orta RN  Outcome: Met This Shift  12/1/2021 0822 by René Orta RN  Outcome: Met This Shift  11/30/2021 2108 by Olman Richardson RN  Outcome: Met This Shift  Goal: Absence of physical injury  Description: Absence of physical injury  12/1/2021 0822 by René Orta RN  Outcome: Met This Shift  12/1/2021 0822 by René Orta RN  Outcome: Met This Shift  11/30/2021 2108 by Olman Richardson RN  Outcome: Met This Shift     Problem: Airway Clearance - Ineffective  Goal: Achieve or maintain patent airway  12/1/2021 0822 by René Orta RN  Outcome: Met This Shift  12/1/2021 0822 by René Orta RN  Outcome: Met This Shift  11/30/2021 2108 by Olman Richardson RN  Outcome: Met This Shift     Problem: Gas Exchange - Impaired  Goal: Absence of hypoxia  12/1/2021 0822 by René Orta RN  Outcome: Met This Shift  12/1/2021 0822 by René Orta RN  Outcome: Met This Shift  11/30/2021 2108 by Olman Richardson RN  Outcome: Met This Shift  Goal: Promote optimal lung function  12/1/2021 0822 by René Orta RN  Outcome: Met This Shift  12/1/2021 0822 by René Orta RN  Outcome: Met This Shift  11/30/2021 2108 by Olman Richardson RN  Outcome: Met This Shift     Problem: Breathing Pattern - Ineffective  Goal: Ability to achieve and maintain a regular respiratory rate  12/1/2021 0822 by René Orta RN  Outcome: Met This Shift  12/1/2021 0822 by René Orta RN  Outcome: Met This Shift  11/30/2021 2108 by Olman Richardson RN  Outcome: Met This Shift     Problem:  Body Temperature -  Risk of, Imbalanced  Goal: Ability to maintain a body temperature within defined limits  12/1/2021 0822 by René Orta RN  Outcome: Met This Shift  12/1/2021 0822 by René Orta, RN  Outcome: Met This Shift  11/30/2021 2108 by Olman Richardson, RN  Outcome: Met This Shift  Goal: Will regain or maintain usual level of consciousness  12/1/2021 0822 by Surjit Sun RN  Outcome: Met This Shift  12/1/2021 0822 by Surjit Sun RN  Outcome: Met This Shift  11/30/2021 2108 by Kyleigh Hinds RN  Outcome: Met This Shift  Goal: Complications related to the disease process, condition or treatment will be avoided or minimized  12/1/2021 0822 by Surjit Sun RN  Outcome: Met This Shift  12/1/2021 0822 by Surjit Sun RN  Outcome: Met This Shift  11/30/2021 2108 by Kyleigh Hinds RN  Outcome: Met This Shift     Problem: Isolation Precautions - Risk of Spread of Infection  Goal: Prevent transmission of infection  12/1/2021 0822 by Surjit Sun RN  Outcome: Met This Shift  12/1/2021 0822 by Surjit Sun RN  Outcome: Met This Shift  11/30/2021 2108 by Kyleigh Hinds RN  Outcome: Met This Shift     Problem: Nutrition Deficits  Goal: Optimize nutritional status  12/1/2021 0822 by Surjit Sun RN  Outcome: Met This Shift  12/1/2021 0822 by Surjit Sun RN  Outcome: Met This Shift  11/30/2021 2108 by Kyleigh Hinds RN  Outcome: Met This Shift     Problem: Risk for Fluid Volume Deficit  Goal: Maintain normal heart rhythm  12/1/2021 0822 by Surjit Sun RN  Outcome: Met This Shift  12/1/2021 0822 by Surjit Sun RN  Outcome: Met This Shift  11/30/2021 2108 by Kyleigh Hinds RN  Outcome: Met This Shift  Goal: Maintain absence of muscle cramping  12/1/2021 0822 by Surjit Sun RN  Outcome: Met This Shift  12/1/2021 0822 by Surjit Sun RN  Outcome: Met This Shift  11/30/2021 2108 by Kyleigh Hinds RN  Outcome: Met This Shift  Goal: Maintain normal serum potassium, sodium, calcium, phosphorus, and pH  12/1/2021 0822 by Surjit Sun RN  Outcome: Met This Shift  12/1/2021 0822 by Sherryn Dino, RN  Outcome: Met This Shift  11/30/2021 2108 by Kyleigh Hinds RN  Outcome: Met This Shift     Problem: Loneliness or Risk for Loneliness  Goal: Demonstrate positive use of time alone when socialization is not possible  12/1/2021 [id number] by Delta Laboy RN  Outcome: Met This Shift  12/1/2021 0822 by Delta Laboy RN  Outcome: Met This Shift  11/30/2021 2108 by Smiley Nesbitt RN  Outcome: Met This Shift     Problem: Fatigue  Goal: Verbalize increase energy and improved vitality  12/1/2021 0822 by Delta Laboy RN  Outcome: Met This Shift  12/1/2021 0822 by Delta Laboy RN  Outcome: Met This Shift  11/30/2021 2108 by Smiley Nesbitt RN  Outcome: Met This Shift     Problem: Patient Education: Go to Patient Education Activity  Goal: Patient/Family Education  12/1/2021 0822 by Delta Laboy RN  Outcome: Met This Shift  12/1/2021 0822 by Delta Laboy RN  Outcome: Met This Shift  11/30/2021 2108 by Smiley Nesbitt RN  Outcome: Met This Shift     Problem: Skin Integrity:  Goal: Will show no infection signs and symptoms  Description: Will show no infection signs and symptoms  12/1/2021 0822 by Delta Laboy RN  Outcome: Met This Shift  12/1/2021 0822 by Delta Laboy RN  Outcome: Met This Shift  11/30/2021 2108 by Smiley Nesbitt RN  Outcome: Met This Shift  Goal: Absence of new skin breakdown  Description: Absence of new skin breakdown  12/1/2021 0822 by Delta Laboy RN  Outcome: Met This Shift  12/1/2021 0822 by Delta Laboy RN  Outcome: Met This Shift  11/30/2021 2108 by Smiley Nesbitt RN  Outcome: Met This Shift     Problem: Infection, Septic Shock:  Goal: Will show no infection signs and symptoms  Description: Will show no infection signs and symptoms  12/1/2021 0822 by Delta Laboy RN  Outcome: Met This Shift  12/1/2021 0822 by Delta Laboy RN  Outcome: Met This Shift  11/30/2021 2108 by Smiley Nesbitt RN  Outcome: Met This Shift

## 2021-12-01 NOTE — PLAN OF CARE
Problem: Falls - Risk of:  Goal: Will remain free from falls  Description: Will remain free from falls  Outcome: Met This Shift  Goal: Absence of physical injury  Description: Absence of physical injury  Outcome: Met This Shift     Problem: Airway Clearance - Ineffective  Goal: Achieve or maintain patent airway  Outcome: Met This Shift     Problem: Gas Exchange - Impaired  Goal: Absence of hypoxia  Outcome: Met This Shift  Goal: Promote optimal lung function  Outcome: Met This Shift     Problem: Breathing Pattern - Ineffective  Goal: Ability to achieve and maintain a regular respiratory rate  Outcome: Met This Shift     Problem:  Body Temperature -  Risk of, Imbalanced  Goal: Ability to maintain a body temperature within defined limits  Outcome: Met This Shift  Goal: Will regain or maintain usual level of consciousness  Outcome: Met This Shift  Goal: Complications related to the disease process, condition or treatment will be avoided or minimized  Outcome: Met This Shift     Problem: Isolation Precautions - Risk of Spread of Infection  Goal: Prevent transmission of infection  Outcome: Met This Shift     Problem: Nutrition Deficits  Goal: Optimize nutritional status  Outcome: Met This Shift     Problem: Risk for Fluid Volume Deficit  Goal: Maintain normal heart rhythm  Outcome: Met This Shift  Goal: Maintain absence of muscle cramping  Outcome: Met This Shift  Goal: Maintain normal serum potassium, sodium, calcium, phosphorus, and pH  Outcome: Met This Shift     Problem: Loneliness or Risk for Loneliness  Goal: Demonstrate positive use of time alone when socialization is not possible  Outcome: Met This Shift     Problem: Fatigue  Goal: Verbalize increase energy and improved vitality  Outcome: Met This Shift     Problem: Patient Education: Go to Patient Education Activity  Goal: Patient/Family Education  Outcome: Met This Shift     Problem: Skin Integrity:  Goal: Will show no infection signs and symptoms  Description: Will show no infection signs and symptoms  Outcome: Met This Shift  Goal: Absence of new skin breakdown  Description: Absence of new skin breakdown  Outcome: Met This Shift     Problem: Infection, Septic Shock:  Goal: Will show no infection signs and symptoms  Description: Will show no infection signs and symptoms  Outcome: Met This Shift

## 2021-12-01 NOTE — PROGRESS NOTES
Nephrology Progress Note  Patient's Name: Haim Franco    Reason for Consult:  Acute kidney injury    History of Present Ilness from the 11/24/21 note:    Haim Franco is a 60 y.o. female with a history of GI GERD, osteoarthritis, and schizoaffective disorder. She initially presented to this hospital 5 days ago following a fall at home. She was found by her roommate following an unspecified duration. EMS was called. Upon their evaluation patient was noted to be hypoxic. She was subsequently brought to ED for evaluation. In the ED she was noted to be hypoxic with an oxygen saturation of 60% on room air. Also noted to to have a low-grade fever. Laboratory data was significant for WBC of 8.5, hemoglobin of 14.6, BUN 17 and a creatinine of 0.6. Rapid COVID-19 test was positive. Patient was admitted to telemetry. 2 days into her hospital course became increasingly more hypoxic and was transferred to MICU. Because of her persistent hypoxia patient was intubated with mechanical ventilation on 11/20;  . Over the past 48 hours she has been markedly hypotensive requiring fluid resuscitation and pressors. Creatinine has worsened to a current value of 1.6 associated with low urine output. Hence renal consult. Subjective    11/28: pt remains in COVID 19 Isolation. She remains proned and on an FIO2 65% and PEEP12 with an O2 sat 95-96%    11/29: pt seen through window of icu due to covid, chart reviewed    11/30: pt seen through window of icu due to covid, chart reviewed.      12/1/21 :pt seen through window of icu due to covid, review of chart perforned    Allergies:  Ciprofloxacin    Current Medications:    bumetanide (BUMEX) injection 2 mg, Q8H  sucralfate (CARAFATE) tablet 1 g, 4 times per day  sennosides-docusate sodium (SENOKOT-S) 8.6-50 MG tablet 2 tablet, Daily  polyethylene glycol (GLYCOLAX) packet 17 g, Daily PRN  sodium chloride flush 0.9 % injection 5-40 mL, 2 times per day  sodium chloride flush 0.9 % injection 5-40 mL, PRN  [Held by provider] heparin flush 100 UNIT/ML injection 300 Units, 2 times per day  [Held by provider] heparin flush 100 UNIT/ML injection 300 Units, PRN  norepinephrine (LEVOPHED) 16 mg in dextrose 5% 250 mL infusion, Continuous  pantoprazole (PROTONIX) injection 40 mg, BID   And  sodium chloride (PF) 0.9 % injection 10 mL, BID  midazolam (VERSED) 100 mg in dextrose 5 % 100 mL infusion, Continuous  [Held by provider] heparin (porcine) injection 5,000 Units, Q8H  chlorhexidine (PERIDEX) 0.12 % solution 15 mL, BID  lubrifresh P.M. (artificial tears) ophthalmic ointment, Q6H  fentaNYL 5 mcg/ml in 0.9%  ml infusion, Continuous  cisatracurium besylate (NIMBEX) 200 mg in sodium chloride 0.9 % 100 mL infusion, Continuous  ipratropium-albuterol (DUONEB) nebulizer solution 1 ampule, Q4H PRN  budesonide (PULMICORT) nebulizer suspension 1,000 mcg, BID  Arformoterol Tartrate (BROVANA) nebulizer solution 15 mcg, BID  atorvastatin (LIPITOR) tablet 10 mg, Daily  QUEtiapine (SEROQUEL) tablet 200 mg, Daily  sertraline (ZOLOFT) tablet 200 mg, Daily  traZODone (DESYREL) tablet 100 mg, Nightly  0.9 % sodium chloride infusion, PRN  ondansetron (ZOFRAN-ODT) disintegrating tablet 4 mg, Q8H PRN   Or  ondansetron (ZOFRAN) injection 4 mg, Q6H PRN  acetaminophen (TYLENOL) tablet 650 mg, Q6H PRN   Or  acetaminophen (TYLENOL) suppository 650 mg, Q6H PRN  ascorbic acid (VITAMIN C) tablet 1,000 mg, Daily  Vitamin D (CHOLECALCIFEROL) tablet 2,000 Units, Daily  zinc sulfate (ZINCATE) capsule 50 mg, Daily  glucose (GLUTOSE) 40 % oral gel 15 g, PRN  dextrose 50 % IV solution, PRN  glucagon (rDNA) injection 1 mg, PRN  dextrose 5 % solution, PRN        Review of Systems:   Review of systems not obtained due to patient factors.     Physical exam:  Vitals:    12/01/21 1035   BP:    Pulse: 90   Resp: (!) 34   Temp:    SpO2: 95%          No PE as in COVID Isolation      Data:   Labs:  Lab Results Component Value Date     (L) 12/01/2021     11/30/2021     11/30/2021    K 3.8 12/01/2021    K 4.4 11/30/2021    K 4.2 11/30/2021    CL 96 (L) 12/01/2021    CO2 24 12/01/2021    CO2 27 11/30/2021    CO2 25 11/30/2021    CREATININE 1.0 12/01/2021    CREATININE 1.0 11/30/2021    CREATININE 1.0 11/30/2021    BUN 42 (H) 12/01/2021    BUN 45 (H) 11/30/2021    BUN 44 (H) 11/30/2021    GLUCOSE 109 (H) 12/01/2021    GLUCOSE 121 (H) 11/30/2021    GLUCOSE 135 (H) 11/30/2021    PHOS 4.3 12/01/2021    PHOS 4.9 (H) 11/29/2021    PHOS 4.2 11/27/2021    WBC 14.9 (H) 12/01/2021    WBC 18.1 (H) 11/30/2021    WBC 19.7 (H) 11/29/2021    HGB 7.8 (L) 12/01/2021    HGB 8.0 (L) 12/01/2021    HGB 7.9 (L) 11/30/2021    HCT 24.4 (L) 12/01/2021    HCT 24.9 (L) 12/01/2021    HCT 24.7 (L) 11/30/2021    MCV 99.6 12/01/2021     (L) 12/01/2021         Imaging:  CT HEAD WO CONTRAST    Result Date: 11/16/2021  EXAMINATION: CT OF THE HEAD WITHOUT CONTRAST  11/16/2021 9:54 pm TECHNIQUE: CT of the head was performed without the administration of intravenous contrast. Dose modulation, iterative reconstruction, and/or weight based adjustment of the mA/kV was utilized to reduce the radiation dose to as low as reasonably achievable. COMPARISON: None. HISTORY: ORDERING SYSTEM PROVIDED HISTORY: fall TECHNOLOGIST PROVIDED HISTORY: Reason for exam:->fall Has a \"code stroke\" or \"stroke alert\" been called? ->No Decision Support Exception - unselect if not a suspected or confirmed emergency medical condition->Emergency Medical Condition (MA) What reading provider will be dictating this exam?->CRC FINDINGS: BRAIN/VENTRICLES: There are cortical atrophy and periventricular white matter ischemic changes. There is no acute intracranial hemorrhage, mass effect or midline shift. No abnormal extra-axial fluid collection. The gray-white differentiation is maintained without evidence of an acute infarct. There is no evidence of hydrocephalus. ORBITS: The visualized portion of the orbits demonstrate no acute abnormality. SINUSES: The visualized paranasal sinuses and mastoid air cells demonstrate no acute abnormality. SOFT TISSUES/SKULL:  No acute abnormality of the visualized skull or soft tissues. No acute intracranial abnormality. Cortical atrophy and periventricular white matter ischemic changes. XR CHEST PORTABLE    Result Date: 11/16/2021  EXAMINATION: ONE XRAY VIEW OF THE CHEST 11/16/2021 7:10 pm COMPARISON: 09/23/2021 HISTORY: ORDERING SYSTEM PROVIDED HISTORY: weakness TECHNOLOGIST PROVIDED HISTORY: Reason for exam:->weakness What reading provider will be dictating this exam?->CRC FINDINGS: Bilateral patchy airspace opacities. There is no effusion or pneumothorax. The cardiomediastinal silhouette is without acute process. The osseous structures are without acute process. Bilateral patchy airspace opacities worrisome for pneumonia. CTA CHEST W CONTRAST    Result Date: 11/16/2021  EXAMINATION: CTA OF THE CHEST 11/16/2021 9:54 pm TECHNIQUE: CTA of the chest was performed after the administration of intravenous contrast.  Multiplanar reformatted images are provided for review. MIP images are provided for review. Dose modulation, iterative reconstruction, and/or weight based adjustment of the mA/kV was utilized to reduce the radiation dose to as low as reasonably achievable. COMPARISON: None. HISTORY: ORDERING SYSTEM PROVIDED HISTORY: r/o pe TECHNOLOGIST PROVIDED HISTORY: Reason for exam:->r/o pe Decision Support Exception - unselect if not a suspected or confirmed emergency medical condition->Emergency Medical Condition (MA) What reading provider will be dictating this exam?->CRC FINDINGS: Pulmonary Arteries: Pulmonary arteries are adequately opacified for evaluation. No evidence of intraluminal filling defect to suggest pulmonary embolism. Main pulmonary artery is normal in caliber.  Mediastinum: No evidence of mediastinal lymphadenopathy. The heart and pericardium demonstrate no acute abnormality. There is no acute abnormality of the thoracic aorta. Lungs/pleura: The lungs demonstrate extensive multifocal ground-glass opacities predominantly in the peripheral lung zones. No evidence of pleural effusion or pneumothorax. Upper Abdomen: Limited images of the upper abdomen are unremarkable. Soft Tissues/Bones: No acute bone or soft tissue abnormality. No evidence of pulmonary embolism. Extensive multifocal ground-glass opacities predominantly in the peripheral lung zones bilaterally, highly concerning for atypical or COVID related pneumonia. Assessment/Plans    1. Acute kidney injury stage I  ischemic ATN occurring in the setting of hypotension  nonoliguric    Cr slow decrease 1.6-->1.5-->1.4-->1.3>1.2>1.1>1  Continue to monitor labs and urine output  continue bumex    2. Acute hypoxic respiratory failure  due to COVID-19 pneumonia  Intubation with mechanical ventilation; prone   As per Pulm/CCC    3 COVID-19 pneumonia  Received Toci; and dexamethasone  Currently on Solu-Cortef  Defer to Pulm/CCC    4. Septic shock  suspected possible superimposed bacterial pneumonia  now off pressors  on Zosyn + Vanc    5. Volume overload  Trial diuretic   uo 3.1L    6.  Hyponatremia  Lower diuretic to q 12      Melly Chase MD

## 2021-12-02 NOTE — PROGRESS NOTES
[address] [facility]   Department of Internal Medicine   Internal Medicine Residency  MICU Progress Note    Patient:  Angelica Lighter 60 y.o. female   MRN: [id number]       Date of Service: 2021    Allergy: Ciprofloxacin  Cc follow up ARDS  Subjective     Patient was seen and examined this morning at bedside in no acute distress. Overnight, no acute events noted. Hgb has been stable. Patient still has 2 chest tubes still, Patient has had 4 L output in the past 23 hours. No other issues at this time, patient remains prone. Objective     TEMPERATURE:  Current - Temp: 98.4 °F (36.9 °C); Max - Temp  Av.6 °F (37 °C)  Min: 97.7 °F (36.5 °C)  Max: 99.1 °F (37.3 °C)  RESPIRATIONS RANGE: Resp  Av  Min: 30  Max: 38  PULSE RANGE: Pulse  Av.4  Min: 81  Max: 94  BLOOD PRESSURE RANGE:  Systolic (56OPL), JHW:689 , Min:93 , YCY:515   ; Diastolic (12HGP), ZNV:09, Min:53, Max:77    PULSE OXIMETRY RANGE: SpO2  Av.7 %  Min: 82 %  Max: 98 %    I & O - 24hr:    Intake/Output Summary (Last 24 hours) at 2021 1317  Last data filed at 2021 1300  Gross per 24 hour   Intake 3909.4 ml   Output 4060 ml   Net -150.6 ml     I/O last 3 completed shifts: In: 3909.4 [I.V.:1320.4; NG/GT:2489; IV Piggyback:100]  Out: 8651 [HUJXB:8363; Chest Tube:320] I/O this shift:  In: -   Out: 930 [Urine:930]   Weight change:     Physical Exam  Constitutional:       Appearance: Normal appearance. Interventions: She is sedated and intubated. HENT:      Head: Normocephalic and atraumatic. Nose: Nose normal.   Eyes:      Pupils: Pupils are equal, round, and reactive to light. Cardiovascular:      Rate and Rhythm: Normal rate and regular rhythm. Pulses: Normal pulses. Heart sounds: Normal heart sounds. Pulmonary:      Effort: Pulmonary effort is normal. She is intubated. Breath sounds: Examination of the left-upper field reveals decreased breath sounds.  Examination of the left-middle field reveals decreased breath sounds. Examination of the left-lower field reveals decreased breath sounds. Decreased breath sounds and wheezing present. No rhonchi or rales. Abdominal:      General: Bowel sounds are normal. There is no distension. Palpations: Abdomen is soft. Musculoskeletal:      Cervical back: Normal range of motion. Skin:     General: Skin is warm and moist.      Capillary Refill: Capillary refill takes less than 2 seconds. Neurological:      General: No focal deficit present.          Medications     Continuous Infusions:   fentaNYL 5 mcg/ml in 0.9%  ml infusion 150 mcg/hr (12/02/21 1205)    norepinephrine 3 mcg/min (12/01/21 2317)    midazolam 4 mg/hr (12/02/21 1206)    cisatracurium (NIMBEX) infusion 3 mcg/kg/min (12/02/21 1152)    sodium chloride 100 mL/hr at 11/1956    dextrose       Scheduled Meds:   bumetanide  2 mg IntraVENous Q12H    sucralfate  1 g Oral 4 times per day    sennosides-docusate sodium  2 tablet Oral Daily    sodium chloride flush  5-40 mL IntraVENous 2 times per day    [Held by provider] heparin flush  3 mL IntraVENous 2 times per day    pantoprazole  40 mg IntraVENous BID    And    sodium chloride (PF)  10 mL IntraVENous BID    [Held by provider] heparin (porcine)  5,000 Units SubCUTAneous Q8H    chlorhexidine  15 mL Mouth/Throat BID    artificial tears   Both Eyes Q6H    budesonide  1 mg Nebulization BID    Arformoterol Tartrate  15 mcg Nebulization BID    atorvastatin  10 mg Oral Daily    QUEtiapine  200 mg Oral Daily    sertraline  200 mg Oral Daily    traZODone  100 mg Oral Nightly    ascorbic acid  1,000 mg Oral Daily    vitamin D  2,000 Units Oral Daily    zinc sulfate  50 mg Oral Daily     PRN Meds: polyethylene glycol, sodium chloride flush, [Held by provider] heparin flush, ipratropium-albuterol, sodium chloride, ondansetron **OR** ondansetron, acetaminophen **OR** acetaminophen, glucose, dextrose, glucagon (rDNA), dextrose  Nutrition:   NG/OG tube TF type: Pulmocare/Nephro/Glucerna/Jevity        At rate: ml/h    Labs and Imaging Studies     CBC:   Recent Labs     11/30/21  0444 11/30/21  1124 12/01/21  0403 12/01/21  0403 12/01/21  0833 12/01/21  2244 12/02/21  0500   WBC 18.1*  --  14.9*  --   --   --  13.8*   HGB 6.9*   < > 8.0*   < > 7.8* 7.1* 8.6*   HCT 21.9*   < > 24.9*   < > 24.4* 22.0* 26.7*   .3*  --  99.6  --   --   --  100.8*     --  102*  --   --   --  100*    < > = values in this interval not displayed. BMP:    Recent Labs     12/01/21  1238 12/01/21  2244 12/02/21  0500   * 132 132   K 4.1 3.7 4.0   CL 98 97* 96*   CO2 25 27 23   BUN 39* 38* 34*   CREATININE 0.9 0.9 0.8   GLUCOSE 99 102* 98       LIVER PROFILE:   Recent Labs     11/30/21  0444 12/01/21  0403 12/02/21  0500   AST 80* 110* 135*   ALT 53* 58* 75*   BILIDIR <0.2 <0.2 <0.2   BILITOT 0.4 0.4 0.5   ALKPHOS 84 90 89       PT/INR:   Recent Labs     11/30/21  0444 12/01/21  0403 12/02/21  0500   PROTIME 12.8* 13.0* 11.6   INR 1.2 1.2 1.1       APTT:   Recent Labs     11/30/21  0444 12/01/21  0403 12/02/21  0500   APTT 22.4* 23.0* 23.2*       Fasting Lipid Panel:    Lab Results   Component Value Date    CHOL 322 10/12/2021    TRIG 150 10/12/2021    HDL 70 10/12/2021       Cardiac Enzymes:    Lab Results   Component Value Date    CKTOTAL 135 11/22/2021    CKTOTAL 488 (H) 11/20/2021    CKTOTAL 585 (H) 11/19/2021       Notable Cultures:      Blood cultures   Blood Culture, Routine   Date Value Ref Range Status   11/25/2021 5 Days no growth  Final     Respiratory cultures No results found for: RESPCULTURE No results found for: LABGRAM  Urine   Urine Culture, Routine   Date Value Ref Range Status   11/19/2021 Growth not present  Final     Legionella No results found for: LABLEGI  C Diff PCR No results found for: CDIFPCR  Wound culture/abscess: No results for input(s): WNDABS in the last 72 hours.   Tip culture:No results for input(s): CXCATHTIP in the last 72 hours. Oxygen:     Vent Information  $Ventilation: $Subsequent Day  Skin Assessment: Clean, dry, & intact  Equipment ID: [id number]  Equipment Changed: Humidification  Vent Type: [phone]  Vent Mode: AC/VC+  Vt Ordered: 320 mL  Pressure Ordered: 34  Rate Set: 34 bmp  Peak Flow: 70 L/min  Pressure Support: 0 cmH20  FiO2 : 100 %  SpO2: (!) 85 %  SpO2/FiO2 ratio: 85  PaO2/FiO2 ratio: 120  Sensitivity: 3  PEEP/CPAP: 10  I Time/ I Time %: 0 s  Humidification Source: Heated wire  Humidification Temp: 37  Humidification Temp Measured: 337  Circuit Condensation: Drained  Mask Type: Full face mask  Mask Size: Small  Additional Respiratory  Assessments  Pulse: 94  Resp: (!) 34  SpO2: (!) 85 %  Position: (S) Prone (Proned patient per ABG and SpO2 of 81% while supine. )  Humidification Source: Heated wire  Humidification Temp: 37  Circuit Condensation: Drained  Oral Care Completed?: Yes  Oral Care: Mouth swabbed, Mouth suctioned  Subglottic Suction Done?: Yes  Airway Type: ET  Airway Size: 8  Cuff Pressure (cm H2O):  (mlt)       [REMOVED] Urethral Catheter Temperature probe-Output (mL): 10 mL  Urethral Catheter-Output (mL): 65 mL  [REMOVED] Urethral Catheter Temperature probe 16 fr-Output (mL): 250 mL    Imaging Studies:  XR CHEST PORTABLE   Final Result   1. There is no interval change in extensive multifocal bilateral pneumonia. XR CHEST PORTABLE   Final Result   No change in extensive bilateral pulmonary airspace opacities. No   pneumothorax is radiographically visible on the current exam.         XR CHEST PORTABLE   Final Result   Significantly decreased size of left pneumothorax. There is likely trace   left pneumothorax remaining. Stable extensive bilateral pulmonary airspace opacities. XR CHEST PORTABLE   Final Result   Addendum 1 of 1   ADDENDUM:   Verbal report was given to Lona Magana RN at 8:15 a.m. central standard   time on 11/30/2021.          Final   New moderate sized left-sided pneumothorax. Stable extensive bilateral pulmonary airspace opacities            XR CHEST PORTABLE   Final Result   1. Lines okay. 2. Persistent edema/ARDS/pneumonia. 3. No sign of pneumothorax. XR CHEST PORTABLE   Final Result   Interval placement of left-sided chest tube with questionable residual   pneumothorax versus artifact. Extensive bilateral infiltrates. Continued follow-up recommended. XR CHEST PORTABLE   Final Result   Interval development of large left-sided tension pneumothorax. Extensive bilateral parenchymal infiltrates. Findings were discussed with Dr. Nathalie Kidd at approximately 0010 hours [place]   time on 11/28/2021. XR CHEST PORTABLE   Final Result   Severe bilateral pulmonary opacification. XR CHEST PORTABLE   Final Result   1. Endotracheal tube is 3 cm above the khai. 2. Stable interstitial pulmonary edema or pneumonia throughout both lungs. XR CHEST PORTABLE   Final Result   1. Somewhat limited exam, grossly unchanged. Please see above comments. .      RECOMMENDATION:   1. Recommend follow-up thoracic imaging, as directed clinically. .         XR ABDOMEN (KUB) (SINGLE AP VIEW)   Final Result   1. Satisfactory position of the NG tube within the stomach         XR CHEST PORTABLE   Final Result   1. There is no interval change in the multifocal bilateral pneumonia. XR CHEST PORTABLE   Final Result   Bilateral airspace disease with interval progression on the right         XR CHEST PORTABLE   Final Result   1. Endotracheal tube is 2.7 cm above the khai. 2. Stable interstitial pulmonary edema or pneumonia throughout both lungs. XR CHEST PORTABLE   Final Result   1. Worsening of the multifocal bilateral pneumonia when compared with the   prior study of 1 day earlier. XR CHEST PORTABLE   Final Result   1.  Multifocal bilateral pneumonia more prominent within the right upper lobe 2. Minimal partial interval clearing of the pneumonia within the left lung   3. Worsening of the pneumonia within the right upper lobe. US DUP LOWER EXTREMITIES BILATERAL VENOUS   Final Result   Within the visualized vessels there is no evidence for deep venous   thrombosis               XR CHEST PORTABLE   Final Result   1. Lines okay. 2. Slightly worsened diffuse edema versus pneumonia. XR CHEST PORTABLE   Final Result   1. Lines okay   2. Improved aeration, mild improvement and diffuse pneumonia/edema. XR CHEST ABDOMEN NG PLACEMENT   Final Result   NG tube position satisfactory. XR CHEST PORTABLE   Final Result   Stable bilateral pneumonia or interstitial pulmonary edema. XR CHEST PORTABLE   Final Result   Increasing bilateral infiltrates. XR CHEST PORTABLE   Final Result   1. There is no interval change in the multifocal bilateral pneumonia. CT HEAD WO CONTRAST   Final Result   No acute intracranial abnormality. Cortical atrophy and periventricular white matter ischemic changes. CT CERVICAL SPINE WO CONTRAST   Final Result   No acute abnormality of the cervical spine. Moderate degenerative changes with disc space narrowing and marginal bony   spur formation C5 through C7. Ground-glass opacities in the visualized lung apices. Please refer to chest   CT for additional information. CTA CHEST W CONTRAST   Final Result   No evidence of pulmonary embolism. Extensive multifocal ground-glass opacities predominantly in the peripheral   lung zones bilaterally, highly concerning for atypical or COVID related   pneumonia. XR CHEST PORTABLE   Final Result   Bilateral patchy airspace opacities worrisome for pneumonia.          XR CHEST PORTABLE    (Results Pending)   XR CHEST PORTABLE    (Results Pending)        Resident's Assessment and Plan     Assessment and Plan:    Cardiovascular   History of hyperlipidemia  -Continue home atorvastatin 10 mg    Pulmonary  Acute hypoxic respiratory failure 2/2 Covid pneumonia  -Patient is intubated  -Last vent settings of respiratory rate 34, tidal volume of 320, PEEP of 12, plateau of 25 and compliance of 5.2 with FiO2 of 65%  -PF ratio of 1.12 today   -Currently sedated with fentanyl  -Completed Courses of Decadron and Toci  -Respiratory cultures have been negative, fungal cultures and BLE are also negative  -Continue ventilation with daily ABGs  -Completed remdesivir   -Continue breathing treatments  -Per ID continue Diflucan on with Fungitell pending  -Bumex 2 mg q12H     Left lung pneumothorax  -Overnight patient desatted down into the eighties  -Chest x-ray showed left pneumothorax on 11/29  -Repeat chest x-ray today showed worsening pneumothorax on 11/30  -2 chest tubes placed   -Continue with chest tube and continue to monitor    Gastrointestinal  Transaminitis 2/2 Covid infection  -Liver enzymes trending down   -Continue to monitor    Concern for GI bleed  -FOBT positive   -Hgb of 8.6 today   -Heparin resumed, okay per surgery  -Continue to monitor   -Monitor H&H every 8 hours    Infectious Disease   COVID-19 pneumonia  -See above  -Continue to monitor CRP D-dimer and pro-Carl  -Continue vitamin C, vitamin D and zinc    Renal   Stage III intrinsic CHARAN (resolving)   -Baseline creatinine of 0.5  -Urea 15.2  -Nephro is following  -Creatinine is trending down, to 0.8 today  -Continue to follow nephro recommendations  -2 mg bumex q12h   -Continue to monitor    Heme/Onc  Reactive leukocytosis 2/2 steroids and Covid infection  -WBC trending down to 13.8 today  -Cultures have been negative  -Fungitell negative   -Diflucan stopped   -Continue to follow ID recommendations    Psychiatric   History of schizoaffective disorder  -Continue home hydroxyzine, trazodone, quetiapine and sertraline    # Peptic ulcer prophylaxis:Protonix   # DVT Prophylaxis: Heparin resumed   # Disposition: Cont current care Berna Weinberg MD, PGY-1    1:17 PM  ARDS /COVID  Developed pneumothorax . s/p 2 Chest tube . .lung expnaded   Fluid overload . 2 mg q 8 ,negative 1.5 L   862-31-   PF 10=20 plt less than 25  plt 25 ,drop PEEP 10  Prone and paralyzed   Hb monitor ,  Will check with surgery if ok for DVT since she is high risk and she Stool hemoccult +,will check again    PPI bid   14 L   ID following ,wbc coming ,hd abx before   To change to   Will update family   prognosis poor   CXR kooks bad             . Care reviewed with nursing staff, medical and surgical specialty care, primary care and the patient's family as available. Chart review/lab review/X-ray viewing/documentation and had long Conversation with patient/family re: prognosis, care options and any end of life issues:      Critical care time spent reviewing labs/films, examining patient, collaborating with other physicians more than 35  Minutes  excluding procedures . Leon Walker     2 pm   Keyona Ovalle MD,[facility]  Pulmonary&Critical Care Medicine   Director of [facility]  Medical Director of [address] [place] [address]   Professor Stanislav Quinteros

## 2021-12-02 NOTE — PROGRESS NOTES
[facility] Attending    S: 60 y.o. female with a history of gerd, oa, schizoaffective disorder, and gastric fundiplication who was found down for an undetermined amount of time. Reports shortness of breath and cough for 2.5 weeks. She was found to be 60% on RA. In the ER, COVID testing was positive with significantly elevated CPKs. Patient is unvaccinated. Had desaturation to 88% wit PaO2 of 55 with RRT and subsequent transfer to the MICU. Desat to 40's when Bipap removed. Now intubated. Sedated, paralyzed, and prone. Noted to have pneumothorax and chest tube placed. Second chest tube placed when pneumothorax not improved. Today,  Intubated, sedated. Chest tube in place    O: VS- Blood pressure 107/60, pulse 85, temperature 97.7 °F (36.5 °C), temperature source Axillary, resp. rate (!) 34, height 5' 1\" (1.549 m), weight 189 lb (85.7 kg), SpO2 93 %. Exam is as noted by resident   Currently prone and nonresponsive. Lungs: coarse, vent   CV:  Rate controlled, regular   Ext-no C/C/E  Chest tube noted  CXR- resolution of pneumothorax      Impressions: Active Problems:    Acute respiratory failure with hypoxia (HCC)    Covid pneumonia    Rhabdomyolysis, CK improving    Concern for sepsis    Acute cystitis with hematuria, treated    CHARAN    Thrombocytopenia     Plan:      Acute hypoxic resp failure 2/2 covid - Decadron. Completed Tocimizilab. Getting Remdesivir. Vitamin C.  Vitamin D.  Zinc.  Appreciate Pulm management. Sepsis 2/2 PNA - done with vanc and zosyn  U/s of lower extremities negative DVT 11/20. Slow wean of FiO2. Tube feeding. Fluid overloaded - on bumex  Schizoaffective - seroquel  Diflucan stopped. bumex begun  Full code at this time. Attending Physician Statement  I have reviewed the chart and seen the patient with the resident(s). I personally reviewed images, EKG's and similar tests, if present.   I personally reviewed and performed key elements of the history and exam.  I have reviewed and confirmed student and/or resident history and exam with changes as indicated above. I agree with the assessment, plan and orders as documented by the resident. Please refer to the  resident and/or student note for additional information.       Chelita Smalls MD

## 2021-12-02 NOTE — PROGRESS NOTES
Department of Internal Medicine  Infectious Diseases  Progress Note      C/C : Leukocytosis, COVID 19     Pt is intubated, sedated, paralyzed , prone   On levophed   No fever     Current Facility-Administered Medications   Medication Dose Route Frequency Provider Last Rate Last Admin    fentaNYL 5 mcg/ml in 0.9%  ml infusion  12.5-200 mcg/hr IntraVENous Continuous Woody Gunderson MD 30 mL/hr at 12/02/21 1205 150 mcg/hr at 12/02/21 1205    bumetanide (BUMEX) injection 2 mg  2 mg IntraVENous Q12H Karoline Carreon MD   2 mg at 12/02/21 0553    sucralfate (CARAFATE) tablet 1 g  1 g Oral 4 times per day Sofiya Ji DO   1 g at 12/02/21 1359    sennosides-docusate sodium (SENOKOT-S) 8.6-50 MG tablet 2 tablet  2 tablet Oral Daily Dalton Cervantes MD   2 tablet at 12/02/21 8295    polyethylene glycol (GLYCOLAX) packet 17 g  17 g Oral Daily PRN Dalton Cervantes MD        sodium chloride flush 0.9 % injection 5-40 mL  5-40 mL IntraVENous 2 times per day Dalton Cervantes MD   10 mL at 12/02/21 0923    sodium chloride flush 0.9 % injection 5-40 mL  5-40 mL IntraVENous PRN Dalton Cervantes MD   10 mL at 11/28/21 2122    heparin flush 100 UNIT/ML injection 300 Units  3 mL IntraVENous 2 times per day Dalton Cervantes MD        heparin flush 100 UNIT/ML injection 300 Units  3 mL IntraCATHeter PRN Dalton Cervantes MD        norepinephrine (LEVOPHED) 16 mg in dextrose 5% 250 mL infusion  2-100 mcg/min IntraVENous Continuous Woody Gunderson MD 2.8 mL/hr at 12/01/21 2317 3 mcg/min at 12/01/21 2317    pantoprazole (PROTONIX) injection 40 mg  40 mg IntraVENous BID Dayton Irving MD   40 mg at 12/02/21 3531    And    sodium chloride (PF) 0.9 % injection 10 mL  10 mL IntraVENous BID Dayton Irving MD   10 mL at 12/02/21 3542    midazolam (VERSED) 100 mg in dextrose 5 % 100 mL infusion  1-10 mg/hr IntraVENous Continuous Ashleigh Zimmerman MD 4 mL/hr at 12/02/21 1345 4 mg/hr at 12/02/21 1345    heparin (porcine) injection 5,000 Units  5,000 Units SubCUTAneous Q8H Nenita Thy Venida Eisenmenger, MD   5,000 Units at 12/02/21 1400    chlorhexidine (PERIDEX) 0.12 % solution 15 mL  15 mL Mouth/Throat BID Keyona Slaughter MD   15 mL at 12/02/21 0921    lubrifresh P.M. (artificial tears) ophthalmic ointment   Both Eyes Q6H Keyona Slaughter MD   Given at 12/02/21 1359    cisatracurium besylate (NIMBEX) 200 mg in sodium chloride 0.9 % 100 mL infusion  0.5-10 mcg/kg/min IntraVENous Continuous Dalton Cervantes MD 6.5 mL/hr at 12/02/21 1152 3 mcg/kg/min at 12/02/21 1152    ipratropium-albuterol (DUONEB) nebulizer solution 1 ampule  1 ampule Inhalation Q4H PRN Dalton Cervantes MD   1 ampule at 12/01/21 2140    budesonide (PULMICORT) nebulizer suspension 1,000 mcg  1 mg Nebulization BID Dayton Irving MD   1,000 mcg at 12/02/21 0837    Arformoterol Tartrate (BROVANA) nebulizer solution 15 mcg  15 mcg Nebulization BID Dayton Irving MD   15 mcg at 12/02/21 0837    atorvastatin (LIPITOR) tablet 10 mg  10 mg Oral Daily Evon Hernandez MD   10 mg at 12/02/21 9377    QUEtiapine (SEROQUEL) tablet 200 mg  200 mg Oral Daily Evon Hernandez MD   200 mg at 12/02/21 2796    sertraline (ZOLOFT) tablet 200 mg  200 mg Oral Daily Evon Hernandez MD   200 mg at 12/02/21 6037    traZODone (DESYREL) tablet 100 mg  100 mg Oral Nightly Evon Hernandez MD   100 mg at 12/01/21 2244    0.9 % sodium chloride infusion  25 mL IntraVENous PRN Evon Hernandez  mL/hr at 11/1956 Rate Verify at 11/1956    ondansetron (ZOFRAN-ODT) disintegrating tablet 4 mg  4 mg Oral Q8H PRN Evon Hernandez MD        Or    ondansetron [facility]) injection 4 mg  4 mg IntraVENous Q6H PRN Evon Hernandez MD        acetaminophen (TYLENOL) tablet 650 mg  650 mg Oral Q6H PRN Evon Hernandez MD   650 mg at 11/26/21 0253    Or    acetaminophen (TYLENOL) suppository 650 mg  650 mg Rectal Q6H PRN Evon Hernandez MD   650 mg at 11/27/21 0829    ascorbic acid (VITAMIN C) tablet 1,000 mg  1,000 mg Oral Daily Maggie Schroeder MD   1,000 mg at 12/02/21 0920    Vitamin D (CHOLECALCIFEROL) tablet 2,000 Units  2,000 Units Oral Daily Maggie Schroeder MD   2,000 Units at 12/02/21 7849    zinc sulfate (ZINCATE) capsule 50 mg  50 mg Oral Daily Maggie Schroeder MD   50 mg at 12/02/21 0924    glucose (GLUTOSE) 40 % oral gel 15 g  15 g Oral PRN Maggie Schroeder MD        dextrose 50 % IV solution  12.5 g IntraVENous PRN Maggie Schroeder MD        glucagon (rDNA) injection 1 mg  1 mg IntraMUSCular PRN Maggie Schroeder MD        dextrose 5 % solution  100 mL/hr IntraVENous PRN Maggie Schroeder MD             REVIEW OF SYSTEMS: could not be obtained       PHYSICAL EXAM:      Vitals:     BP (!) 107/57   Pulse 97   Temp 98.4 °F (36.9 °C) (Esophageal)   Resp (!) 34   Ht 5' 1\" (1.549 m)   Wt 189 lb (85.7 kg)   SpO2 (!) 87%   BMI 35.71 kg/m²     General Appearance:    Intubated, sedated, prone, FiO2 65, PEEP 12    Head:    Normocephalic, atraumatic   Eyes:    + pallor,   Ears:    No obvious deformity or drainage.    Nose:    Throat:   ET tube in place, dry mucosa    Neck:   Supple, no lymphadenopathy   Lungs:     Clear to auscultation ,left chest tubes   Heart:    Regular   Abdomen:     Soft,  bowel sounds present    Extremities:    No edema    Pulses:   Dorsalis pedis palpable    Skin:   no rashes             CBC with Differential:      Lab Results   Component Value Date    WBC 13.8 12/02/2021    RBC 2.65 12/02/2021    HGB 8.6 12/02/2021    HCT 26.7 12/02/2021     12/02/2021    .8 12/02/2021    MCH 32.5 12/02/2021    MCHC 32.2 12/02/2021    RDW 22.9 12/02/2021    NRBC 0.9 12/01/2021    METASPCT 0.9 11/21/2021    LYMPHOPCT 2.6 12/02/2021    MONOPCT 3.0 12/02/2021    MYELOPCT 1.7 12/01/2021    BASOPCT 0.1 12/02/2021    MONOSABS 0.00 12/02/2021    LYMPHSABS 0.41 12/02/2021    EOSABS 0.36 12/02/2021    BASOSABS 0.00 12/02/2021       CMP     Lab Results   Component Value Date     12/02/2021    K 4.0 12/02/2021    K 4.8 11/19/2021 CL 96 12/02/2021    CO2 23 12/02/2021    BUN 34 12/02/2021    CREATININE 0.8 12/02/2021    GFRAA >60 12/02/2021    LABGLOM >60 12/02/2021    GLUCOSE 98 12/02/2021    GLUCOSE 77 05/12/2011    PROT 4.7 12/02/2021    LABALBU 2.1 12/02/2021    CALCIUM 7.2 12/02/2021    BILITOT 0.5 12/02/2021    ALKPHOS 89 12/02/2021     12/02/2021    ALT 75 12/02/2021         Hepatic Function Panel:    Lab Results   Component Value Date    ALKPHOS 89 12/02/2021    ALT 75 12/02/2021     12/02/2021    PROT 4.7 12/02/2021    BILITOT 0.5 12/02/2021    BILIDIR <0.2 12/02/2021    IBILI see below 12/02/2021    LABALBU 2.1 12/02/2021       PT/INR:    Lab Results   Component Value Date    PROTIME 11.6 12/02/2021    INR 1.1 12/02/2021       TSH:    Lab Results   Component Value Date    TSH 4.720 10/12/2021       U/A:    Lab Results   Component Value Date    COLORU Yellow 11/16/2021    PHUR 6.0 11/16/2021    WBCUA NONE 11/16/2021    RBCUA 1-3 11/16/2021    BACTERIA MANY 11/16/2021    CLARITYU Clear 11/16/2021    SPECGRAV 1.025 11/16/2021    LEUKOCYTESUR Negative 11/16/2021    UROBILINOGEN 0.2 11/16/2021    BILIRUBINUR Negative 11/16/2021    BLOODU LARGE 11/16/2021    GLUCOSEU Negative 11/16/2021       ABG:    Lab Results   Component Value Date    WZK4BIT 28.4 12/02/2021    AWX5VNV 51.5 12/02/2021    PO2ART 59.6 12/02/2021       MICROBIOLOGY:    Blood culture - neg on 11/25     Urine Culture -    Sputum Culture -  CULTURE, RESPIRATORY Oral Pharyngeal Kamille absent Abnormal      Smear, Respiratory --    Group 6: <25 PMN's/LPF and <25 Epithelial cells/LPF   Few Polymorphonuclear leukocytes   Rare Epithelial cells   No organisms seen     Organism Candida albicans Abnormal     CULTURE, RESPIRATORY One colony   Narrative:     Source: [facility]       Site:                     Specimen: Nasopharyngeal Swab Updated: 11/1956    SARS-CoV-2, NAAT DETECTED Abnormal     Comment: Rapid NAAT:   Negative results should be treated as presumptive Ref Range & Units 11/27/21 1024   (1,3)-Beta-D-Glucan (Fungitell) Interpretation Negative Negative        Radiology :    Chest X ray : extensive bilateral infiltrates    IMPRESSION:     1. Severe COVID 19 pneumonia s/p remdesivir and tocilizumab   2. Respiratory failure - intubated   3. Leukocytosis - reactive- trending down   4. Candida colonization   5. S/P Chest tube insertion         RECOMMENDATIONS:      1. Off abx   2.  Supportive care

## 2021-12-02 NOTE — PLAN OF CARE
Problem: Falls - Risk of:  Goal: Will remain free from falls  Description: Will remain free from falls  Outcome: Met This Shift     Problem: Falls - Risk of:  Goal: Absence of physical injury  Description: Absence of physical injury  Outcome: Met This Shift     Problem: Gas Exchange - Impaired  Goal: Absence of hypoxia  Outcome: Met This Shift     Problem: Breathing Pattern - Ineffective  Goal: Ability to achieve and maintain a regular respiratory rate  Outcome: Met This Shift     Problem:  Body Temperature -  Risk of, Imbalanced  Goal: Ability to maintain a body temperature within defined limits  Outcome: Met This Shift     Problem: Gas Exchange - Impaired  Goal: Promote optimal lung function  Outcome: Ongoing

## 2021-12-02 NOTE — PROGRESS NOTES
[facility] - Family Medicine Inpatient   Resident Progress Note    S:  Hospital day: 12   Brief Synopsis: Ruby Packer is a 60 y.o. female with pmh of GERD, osteoarthritis and schizoaffective disorder who presented to ER s/p fall at home. Patient found down at home, with initial O2 saturation of 60%. Brought to the ER; found to have COVID-19 pneumonia , requiring bipap for appropriate saturation. Rhabdomyolysis, UTI. Started on appropriate management including tocilizumab, ceftriaxone 1 g daily, and IV fluids for rhabdomyolysis. Decadron was started daily, and with patients worsening status - pulmonology consulted. FULL CODE. Transferred to ICU on 11/18 for rapid breathing and hypoxia and pO2 of 55. Intubated 11/20 secondary to O2 sats consistently <80% with rapid breathing. 11/29 , patient developed left sided tension pneumothorax and underwent chest tube placement. Patient Intubated, sedated, paralyzed, proned. On vasopressors. Hemoglobin dropped to 6.9 with 1 unit PRBC transfused. Second chest tube placed on left chest.     Patient remains prone, sedated and intubated when seen in the morning. Has been responding to Bumex trial. Antibiotics and vasopressors have been stopped.     Cont meds:    fentaNYL 5 mcg/ml in 0.9%  ml infusion 150 mcg/hr (12/02/21 1205)    norepinephrine 3 mcg/min (12/01/21 2317)    midazolam 4 mg/hr (12/02/21 1345)    cisatracurium (NIMBEX) infusion 3 mcg/kg/min (12/02/21 1152)    sodium chloride 100 mL/hr at 11/1956    dextrose       Scheduled meds:    bumetanide  2 mg IntraVENous Q12H    sucralfate  1 g Oral 4 times per day    sennosides-docusate sodium  2 tablet Oral Daily    sodium chloride flush  5-40 mL IntraVENous 2 times per day    heparin flush  3 mL IntraVENous 2 times per day    pantoprazole  40 mg IntraVENous BID    And    sodium chloride (PF)  10 mL IntraVENous BID    heparin (porcine)  5,000 Units SubCUTAneous Q8H    chlorhexidine  15 mL Mouth/Throat BID    artificial tears   Both Eyes Q6H    budesonide  1 mg Nebulization BID    Arformoterol Tartrate  15 mcg Nebulization BID    atorvastatin  10 mg Oral Daily    QUEtiapine  200 mg Oral Daily    sertraline  200 mg Oral Daily    traZODone  100 mg Oral Nightly    ascorbic acid  1,000 mg Oral Daily    vitamin D  2,000 Units Oral Daily    zinc sulfate  50 mg Oral Daily     PRN meds: polyethylene glycol, sodium chloride flush, heparin flush, ipratropium-albuterol, sodium chloride, ondansetron **OR** ondansetron, acetaminophen **OR** acetaminophen, glucose, dextrose, glucagon (rDNA), dextrose     I reviewed the patient's past medical and surgical history, Medications and Allergies. O:  BP (!) 107/57   Pulse 97   Temp 98.4 °F (36.9 °C) (Esophageal)   Resp (!) 34   Ht 5' 1\" (1.549 m)   Wt 189 lb (85.7 kg)   SpO2 (!) 87%   BMI 35.71 kg/m²   24 hour I&O: I/O last 3 completed shifts: In: 3723.4 [I.V.:1379.4; HA/JL:0723; IV Piggyback:100]  Out: 3631 [BAAOC:7015; Chest Tube:360]  No intake/output data recorded. Physical Exam  Constitutional:       Comments: Intubated, sedated, paralyzed, proned   Cardiovascular:      Rate and Rhythm: Normal rate and regular rhythm. Pulses: Normal pulses. Heart sounds: Normal heart sounds. Pulmonary:      Breath sounds: No wheezing, rhonchi or rales. Comments: Left sided Chest tube   Abdominal:      General: Bowel sounds are normal.      Palpations: Abdomen is soft. Musculoskeletal:         General: Normal range of motion. Right lower leg: Edema present. Left lower leg: Edema present. Labs:  Na/K/Cl/CO2:  132/4.0/96/23 (12/02 0500)  BUN/Cr/glu/ALT/AST/amyl/lip:  34/0.8/--/75/135/--/-- (12/02 0500)  WBC/Hgb/Hct/Plts:  13.8/8.6/26.7/100 (12/02 0500)  estimated creatinine clearance is 71 mL/min (based on SCr of 0.8 mg/dL). Other pertinent labs as noted below    Radiology:  XR CHEST PORTABLE   Final Result   1.  There is no PORTABLE   Final Result   1. There is no interval change in the multifocal bilateral pneumonia. XR CHEST PORTABLE   Final Result   Bilateral airspace disease with interval progression on the right         XR CHEST PORTABLE   Final Result   1. Endotracheal tube is 2.7 cm above the khai. 2. Stable interstitial pulmonary edema or pneumonia throughout both lungs. XR CHEST PORTABLE   Final Result   1. Worsening of the multifocal bilateral pneumonia when compared with the   prior study of 1 day earlier. XR CHEST PORTABLE   Final Result   1. Multifocal bilateral pneumonia more prominent within the right upper lobe   2. Minimal partial interval clearing of the pneumonia within the left lung   3. Worsening of the pneumonia within the right upper lobe. US DUP LOWER EXTREMITIES BILATERAL VENOUS   Final Result   Within the visualized vessels there is no evidence for deep venous   thrombosis               XR CHEST PORTABLE   Final Result   1. Lines okay. 2. Slightly worsened diffuse edema versus pneumonia. XR CHEST PORTABLE   Final Result   1. Lines okay   2. Improved aeration, mild improvement and diffuse pneumonia/edema. XR CHEST ABDOMEN NG PLACEMENT   Final Result   NG tube position satisfactory. XR CHEST PORTABLE   Final Result   Stable bilateral pneumonia or interstitial pulmonary edema. XR CHEST PORTABLE   Final Result   Increasing bilateral infiltrates. XR CHEST PORTABLE   Final Result   1. There is no interval change in the multifocal bilateral pneumonia. CT HEAD WO CONTRAST   Final Result   No acute intracranial abnormality. Cortical atrophy and periventricular white matter ischemic changes. CT CERVICAL SPINE WO CONTRAST   Final Result   No acute abnormality of the cervical spine. Moderate degenerative changes with disc space narrowing and marginal bony   spur formation C5 through C7.       Ground-glass opacities in the visualized lung apices. Please refer to chest   CT for additional information. CTA CHEST W CONTRAST   Final Result   No evidence of pulmonary embolism. Extensive multifocal ground-glass opacities predominantly in the peripheral   lung zones bilaterally, highly concerning for atypical or COVID related   pneumonia. XR CHEST PORTABLE   Final Result   Bilateral patchy airspace opacities worrisome for pneumonia. XR CHEST PORTABLE    (Results Pending)   XR CHEST PORTABLE    (Results Pending)       A/P:  Active Problems:    Acute respiratory failure with hypoxia (HCC)    Rhabdomyolysis    Acute cystitis with hematuria    Primary spontaneous pneumothorax  Resolved Problems:    * No resolved hospital problems. *    1. Intubated, Sedated, Paralyzed, Proned  11/20: Intubated 2/2 hypoxia and increased work of breathing, proned and paralyzed  S/p Left Chest Tube 11/29 for tension pneumothorax. Second chest tube placed on 11/30. Air leaks noted. Serosanguineous drainage. No further procedures planned by surgery. 2. Acute Hypoxic Respiratory Failure 2/2 Covid 19  Unvaccinated for Covid 19  Patient found with pulse ox of 60% --> currently intubated and sedated  CXR showing bilateral pulmonary infiltrates  Inflammatory markers: DDimer 530, , CRP 22.9, Ferritin 749 on admission   CTA pulm showing extensive bilateral pulmonary infiltrates consistent with COVID-19 pneumonia , no PE  Completed remdesivir and tocilizumab treatment  Decadron and SoluCortef course completed  Pulmonology consulted ; appreciate recs; daily ABG, Vitamin C, Vitamin D  Dietary consulted for further recommendations on nutrition status ; appreciate recs. 11/18 - transferred to ICU due to worsening resp status. CXR with resolution of pneumothorax. Advanced Care Planning with [facility] ; recs appreciated - FULL CODE.     3. Left Sided Tension Pneumothorax - improving  S/p left sided chest tube  11/29 , Tension Pneumothorax on Xray  Left lung re inflated on repeat CXR  Whistle like sound noted Left Upper Lung field 2/2 likely to Chest tube . 2nd chest tube placed on 11/30  Continue to monitor    4. Concern for Sepsis  Likely 2/2 superimposed bacterial pneumonia , candidal colonization  Tmax 102.2 , Tavg 100.3F  Given one dose cefepime and vancomycin in ICU  Procal 0.07, repeat 0.27 , blood cultures, urine culture negative. Completed Pip/Tazo, Vancomycin  Fungitell and B glucan pending  Diflucan stopped by ID.      5. CHARAN Stage I  Admission creatinine 0.6  Likely 2/2 to Ischemic ATN in setting of Hypotension  Creatinine increased to 1.6 --> trended down to 0.9  Nephrology following , appreciate recs   FeNa: 0.2- Pre- Renal     6. Anemia  2/2 to inflammation/ response to Covid 19  .3, MCHC 31.5 RDW 20.1  Vitamin B12/Folate normal March 2021  FOBT + 11/29  H/H < 7 11/30/2021 , s/p 1 unit PRBC. Hemoglobin 7.8 this AM.  Maintain H/H > 7    6. Thrombocytopenia  Likely 2/2 to SALENA vs inflammation from Covid 19  Hold all anticoagulation , patient trialed on heparin , lovenox and argatroban but platelets continue to decrease  SALENA panel- negative  Platelets 771 this am     7. Hx Schizoaffective Disorder / Anxiety  Continue seroquel , zoloft, trazodone  Hold ativan, vistaril     8. Hypokalemia - Resolved  Initial K 3.2 , Mag 2.6  CMP daily    9. Rhabdomyolysis - resolved  2/2 to fall for unknown time period  Patient found down for unknown period of time  Initial CK > 3000  Received 4 L NS in ER    10.  Concern for UTI - resolved   Likely simple cystitis ; patient with no CVA tenderness , presented initially with fever 102 F  Urinalysis with increased nitrites, bacteria, blood  Urine culture, blood cultures were negative  Given one dose doxy and rocephin in the ER  1g ceftriaxone IV /-  dc'd 11/18/2021     GI/DVT ppx: protonix  Diet: NPO, tube feeds  Disposition: MICU    Electronically signed by Geovani Edmondson Stevo Duran MD  [id number]-[id number] on 12/2/2021 at 3:03 PM  This case was discussed with attending physician: Dr. Binh Swift

## 2021-12-02 NOTE — CARE COORDINATION
12/2: Update CM Note: Pt came into the ER with a fall & fatigue/Covid+11/17. Pt has a PMH schizoaffect disorder. Pt remains intubated, sedated & on paralytic. Pt is on Levophed gtt, Bumex gtt, Spo2 87% & Fio2 100%. Pt has two chest tubes to -20 cm H20 water seal. D/C plan remains unclear at this time. [facility] following. SW/RAH will continue to follow.  Electronically signed by Lesli Ley RN on 12/2/2021 at 1:46 PM

## 2021-12-02 NOTE — PROGRESS NOTES
Nephrology Progress Note  Patient's Name: Tim Gomez    Reason for Consult:  Acute kidney injury    History of Present Ilness from the 11/24/21 note:    Tim Gomez is a 60 y.o. female with a history of GI GERD, osteoarthritis, and schizoaffective disorder. She initially presented to this hospital 5 days ago following a fall at home. She was found by her roommate following an unspecified duration. EMS was called. Upon their evaluation patient was noted to be hypoxic. She was subsequently brought to ED for evaluation. In the ED she was noted to be hypoxic with an oxygen saturation of 60% on room air. Also noted to to have a low-grade fever. Laboratory data was significant for WBC of 8.5, hemoglobin of 14.6, BUN 17 and a creatinine of 0.6. Rapid COVID-19 test was positive. Patient was admitted to telemetry. 2 days into her hospital course became increasingly more hypoxic and was transferred to MICU. Because of her persistent hypoxia patient was intubated with mechanical ventilation on 11/20;  . Over the past 48 hours she has been markedly hypotensive requiring fluid resuscitation and pressors. Creatinine has worsened to a current value of 1.6 associated with low urine output. Hence renal consult. Subjective    11/28: pt remains in COVID 19 Isolation. She remains proned and on an FIO2 65% and PEEP12 with an O2 sat 95-96%    11/29: pt seen through window of icu due to covid, chart reviewed    11/30: pt seen through window of icu due to covid, chart reviewed.      12/1/21 :pt seen through window of icu due to covid, review of chart performed    12/2/21: pt seen through window of icu due to covid, chart reviewed, intubated, chest tube left     Allergies:  Ciprofloxacin    Current Medications:    fentaNYL 5 mcg/ml in 0.9%  ml infusion, Continuous  bumetanide (BUMEX) injection 2 mg, Q12H  sucralfate (CARAFATE) tablet 1 g, 4 times per day  sennosides-docusate sodium (SENOKOT-S) 8.6-50 MG tablet 2 tablet, Daily  polyethylene glycol (GLYCOLAX) packet 17 g, Daily PRN  sodium chloride flush 0.9 % injection 5-40 mL, 2 times per day  sodium chloride flush 0.9 % injection 5-40 mL, PRN  [Held by provider] heparin flush 100 UNIT/ML injection 300 Units, 2 times per day  [Held by provider] heparin flush 100 UNIT/ML injection 300 Units, PRN  norepinephrine (LEVOPHED) 16 mg in dextrose 5% 250 mL infusion, Continuous  pantoprazole (PROTONIX) injection 40 mg, BID   And  sodium chloride (PF) 0.9 % injection 10 mL, BID  midazolam (VERSED) 100 mg in dextrose 5 % 100 mL infusion, Continuous  [Held by provider] heparin (porcine) injection 5,000 Units, Q8H  chlorhexidine (PERIDEX) 0.12 % solution 15 mL, BID  lubrifresh P.M. (artificial tears) ophthalmic ointment, Q6H  cisatracurium besylate (NIMBEX) 200 mg in sodium chloride 0.9 % 100 mL infusion, Continuous  ipratropium-albuterol (DUONEB) nebulizer solution 1 ampule, Q4H PRN  budesonide (PULMICORT) nebulizer suspension 1,000 mcg, BID  Arformoterol Tartrate (BROVANA) nebulizer solution 15 mcg, BID  atorvastatin (LIPITOR) tablet 10 mg, Daily  QUEtiapine (SEROQUEL) tablet 200 mg, Daily  sertraline (ZOLOFT) tablet 200 mg, Daily  traZODone (DESYREL) tablet 100 mg, Nightly  0.9 % sodium chloride infusion, PRN  ondansetron (ZOFRAN-ODT) disintegrating tablet 4 mg, Q8H PRN   Or  ondansetron (ZOFRAN) injection 4 mg, Q6H PRN  acetaminophen (TYLENOL) tablet 650 mg, Q6H PRN   Or  acetaminophen (TYLENOL) suppository 650 mg, Q6H PRN  ascorbic acid (VITAMIN C) tablet 1,000 mg, Daily  Vitamin D (CHOLECALCIFEROL) tablet 2,000 Units, Daily  zinc sulfate (ZINCATE) capsule 50 mg, Daily  glucose (GLUTOSE) 40 % oral gel 15 g, PRN  dextrose 50 % IV solution, PRN  glucagon (rDNA) injection 1 mg, PRN  dextrose 5 % solution, PRN        Review of Systems:   Review of systems not obtained due to patient factors.     Physical exam:  Vitals:    12/02/21 1121   BP:    Pulse: 91   Resp: (!) 34   Temp:    SpO2: (!) 86%          No PE as in COVID Isolation      Data:   Labs:  Lab Results   Component Value Date     12/02/2021     12/01/2021     (L) 12/01/2021    K 4.0 12/02/2021    K 3.7 12/01/2021    K 4.1 12/01/2021    CL 96 (L) 12/02/2021    CO2 23 12/02/2021    CO2 27 12/01/2021    CO2 25 12/01/2021    CREATININE 0.8 12/02/2021    CREATININE 0.9 12/01/2021    CREATININE 0.9 12/01/2021    BUN 34 (H) 12/02/2021    BUN 38 (H) 12/01/2021    BUN 39 (H) 12/01/2021    GLUCOSE 98 12/02/2021    GLUCOSE 102 (H) 12/01/2021    GLUCOSE 99 12/01/2021    PHOS 4.3 12/01/2021    PHOS 4.9 (H) 11/29/2021    PHOS 4.2 11/27/2021    WBC 13.8 (H) 12/02/2021    WBC 14.9 (H) 12/01/2021    WBC 18.1 (H) 11/30/2021    HGB 8.6 (L) 12/02/2021    HGB 7.1 (L) 12/01/2021    HGB 7.8 (L) 12/01/2021    HCT 26.7 (L) 12/02/2021    HCT 22.0 (L) 12/01/2021    HCT 24.4 (L) 12/01/2021    .8 (H) 12/02/2021     (L) 12/02/2021         Imaging:  CT HEAD WO CONTRAST    Result Date: 11/16/2021  EXAMINATION: CT OF THE HEAD WITHOUT CONTRAST  11/16/2021 9:54 pm TECHNIQUE: CT of the head was performed without the administration of intravenous contrast. Dose modulation, iterative reconstruction, and/or weight based adjustment of the mA/kV was utilized to reduce the radiation dose to as low as reasonably achievable. COMPARISON: None. HISTORY: ORDERING SYSTEM PROVIDED HISTORY: fall TECHNOLOGIST PROVIDED HISTORY: Reason for exam:->fall Has a \"code stroke\" or \"stroke alert\" been called? ->No Decision Support Exception - unselect if not a suspected or confirmed emergency medical condition->Emergency Medical Condition (MA) What reading provider will be dictating this exam?->CRC FINDINGS: BRAIN/VENTRICLES: There are cortical atrophy and periventricular white matter ischemic changes. There is no acute intracranial hemorrhage, mass effect or midline shift. No abnormal extra-axial fluid collection.   The gray-white differentiation is maintained without evidence of an acute infarct. There is no evidence of hydrocephalus. ORBITS: The visualized portion of the orbits demonstrate no acute abnormality. SINUSES: The visualized paranasal sinuses and mastoid air cells demonstrate no acute abnormality. SOFT TISSUES/SKULL:  No acute abnormality of the visualized skull or soft tissues. No acute intracranial abnormality. Cortical atrophy and periventricular white matter ischemic changes. XR CHEST PORTABLE    Result Date: 11/16/2021  EXAMINATION: ONE XRAY VIEW OF THE CHEST 11/16/2021 7:10 pm COMPARISON: 09/23/2021 HISTORY: ORDERING SYSTEM PROVIDED HISTORY: weakness TECHNOLOGIST PROVIDED HISTORY: Reason for exam:->weakness What reading provider will be dictating this exam?->CRC FINDINGS: Bilateral patchy airspace opacities. There is no effusion or pneumothorax. The cardiomediastinal silhouette is without acute process. The osseous structures are without acute process. Bilateral patchy airspace opacities worrisome for pneumonia. CTA CHEST W CONTRAST    Result Date: 11/16/2021  EXAMINATION: CTA OF THE CHEST 11/16/2021 9:54 pm TECHNIQUE: CTA of the chest was performed after the administration of intravenous contrast.  Multiplanar reformatted images are provided for review. MIP images are provided for review. Dose modulation, iterative reconstruction, and/or weight based adjustment of the mA/kV was utilized to reduce the radiation dose to as low as reasonably achievable. COMPARISON: None. HISTORY: ORDERING SYSTEM PROVIDED HISTORY: r/o pe TECHNOLOGIST PROVIDED HISTORY: Reason for exam:->r/o pe Decision Support Exception - unselect if not a suspected or confirmed emergency medical condition->Emergency Medical Condition (MA) What reading provider will be dictating this exam?->CRC FINDINGS: Pulmonary Arteries: Pulmonary arteries are adequately opacified for evaluation. No evidence of intraluminal filling defect to suggest pulmonary embolism.

## 2021-12-02 NOTE — PLAN OF CARE
Problem: Falls - Risk of:  Goal: Will remain free from falls  Description: Will remain free from falls  12/2/2021 0830 by Jonah Aguilar RN  Outcome: Met This Shift     Problem: Falls - Risk of:  Goal: Absence of physical injury  Description: Absence of physical injury  12/2/2021 0830 by Jonah Aguilar RN  Outcome: Met This Shift     Problem: Airway Clearance - Ineffective  Goal: Achieve or maintain patent airway  Outcome: Met This Shift     Problem: Gas Exchange - Impaired  Goal: Promote optimal lung function  12/2/2021 0830 by Jonah Aguilar RN  Outcome: Met This Shift     Problem: Skin Integrity:  Goal: Absence of new skin breakdown  Description: Absence of new skin breakdown  Outcome: Met This Shift   Plan of care discussed with patient / family.

## 2021-12-02 NOTE — PROGRESS NOTES
[facility]   Resident Progress Note    S:  Hospital day: 15   Brief Synopsis: Danuta Montoya is a 60 y.o. female with pmh of GERD, osteoarthritis and schizoaffective disorder who presented to ER s/p fall at home. Patient found down at home, with initial O2 saturation of 60%. Brought to the ER; found to have COVID-19 pneumonia , requiring bipap for appropriate saturation. Rhabdomyolysis, UTI. Started on appropriate management including tocilizumab, ceftriaxone 1 g daily, and IV fluids for rhabdomyolysis. Decadron was started daily, and with patients worsening status - pulmonology consulted. FULL CODE. Transferred to ICU on 11/18 for rapid breathing and hypoxia and pO2 of 55. Intubated 11/20 secondary to O2 sats consistently <80% with rapid breathing. 11/29 , patient developed left sided tension pneumothorax and underwent chest tube placement. Patient Intubated, sedated, paralyzed, proned. On vasopressors. Hemoglobin dropped to 6.9 with 1 unit PRBC transfused.  Second chest tube placed on left chest.       Cont meds:    norepinephrine 4 mcg/min (12/01/21 1045)    midazolam 4 mg/hr (12/01/21 1721)    fentaNYL 5 mcg/ml in 0.9%  ml infusion 150 mcg/hr (12/01/21 1556)    cisatracurium (NIMBEX) infusion 4 mcg/kg/min (12/01/21 0741)    sodium chloride 100 mL/hr at 11/1956    dextrose       Scheduled meds:    bumetanide  2 mg IntraVENous Q12H    sucralfate  1 g Oral 4 times per day    sennosides-docusate sodium  2 tablet Oral Daily    sodium chloride flush  5-40 mL IntraVENous 2 times per day    [Held by provider] heparin flush  3 mL IntraVENous 2 times per day    pantoprazole  40 mg IntraVENous BID    And    sodium chloride (PF)  10 mL IntraVENous BID    [Held by provider] heparin (porcine)  5,000 Units SubCUTAneous Q8H    chlorhexidine  15 mL Mouth/Throat BID    artificial tears   Both Eyes Q6H    budesonide  1 mg Nebulization BID    Arformoterol Tartrate  15 mcg Nebulization BID    atorvastatin  10 mg Oral Daily    QUEtiapine  200 mg Oral Daily    sertraline  200 mg Oral Daily    traZODone  100 mg Oral Nightly    ascorbic acid  1,000 mg Oral Daily    vitamin D  2,000 Units Oral Daily    zinc sulfate  50 mg Oral Daily     PRN meds: polyethylene glycol, sodium chloride flush, [Held by provider] heparin flush, ipratropium-albuterol, sodium chloride, ondansetron **OR** ondansetron, acetaminophen **OR** acetaminophen, glucose, dextrose, glucagon (rDNA), dextrose     I reviewed the patient's past medical and surgical history, Medications and Allergies. O:  BP (!) 102/59   Pulse 88   Temp 98.9 °F (37.2 °C) (Esophageal)   Resp (!) 34   Ht 5' 1\" (1.549 m)   Wt 172 lb 3.2 oz (78.1 kg)   SpO2 95%   BMI 32.54 kg/m²   24 hour I&O: I/O last 3 completed shifts: In: 1704 [I.V.:2128; NV/LN:8320; IV Piggyback:150]  Out: 2608 [UEYXM:3287; Chest Tube:650]  I/O this shift:  In: -   Out: 250 [Urine:250]      Physical Exam  Constitutional:       Comments: Intubated, sedated, paralyzed, proned   Cardiovascular:      Rate and Rhythm: Normal rate and regular rhythm. Pulses: Normal pulses. Heart sounds: Normal heart sounds. Pulmonary:      Breath sounds: No wheezing, rhonchi or rales. Comments: Left sided Chest tube   Abdominal:      General: Bowel sounds are normal.      Palpations: Abdomen is soft. Musculoskeletal:         General: Normal range of motion. Right lower leg: Edema present. Left lower leg: Edema present. Labs:  Na/K/Cl/CO2:  131/4.1/98/25 (12/01 1238)  BUN/Cr/glu/ALT/AST/amyl/lip:  39/0.9/--/--/--/--/-- (12/01 1238)  WBC/Hgb/Hct/Plts:  --/7.8/24.4/-- (12/01 0574)  estimated creatinine clearance is 60 mL/min (based on SCr of 0.9 mg/dL). Other pertinent labs as noted below    Radiology:  XR CHEST PORTABLE   Final Result   No change in extensive bilateral pulmonary airspace opacities.   No   pneumothorax is radiographically visible on the current exam.         XR CHEST PORTABLE   Final Result   Significantly decreased size of left pneumothorax. There is likely trace   left pneumothorax remaining. Stable extensive bilateral pulmonary airspace opacities. XR CHEST PORTABLE   Final Result   Addendum 1 of 1   ADDENDUM:   Verbal report was given to Vladislav Prieto RN at 8:15 a.m. central standard   time on 11/30/2021. Final   New moderate sized left-sided pneumothorax. Stable extensive bilateral pulmonary airspace opacities            XR CHEST PORTABLE   Final Result   1. Lines okay. 2. Persistent edema/ARDS/pneumonia. 3. No sign of pneumothorax. XR CHEST PORTABLE   Final Result   Interval placement of left-sided chest tube with questionable residual   pneumothorax versus artifact. Extensive bilateral infiltrates. Continued follow-up recommended. XR CHEST PORTABLE   Final Result   Interval development of large left-sided tension pneumothorax. Extensive bilateral parenchymal infiltrates. Findings were discussed with Dr. Fabian Mckeon at approximately 0010 hours [place]   time on 11/28/2021. XR CHEST PORTABLE   Final Result   Severe bilateral pulmonary opacification. XR CHEST PORTABLE   Final Result   1. Endotracheal tube is 3 cm above the khai. 2. Stable interstitial pulmonary edema or pneumonia throughout both lungs. XR CHEST PORTABLE   Final Result   1. Somewhat limited exam, grossly unchanged. Please see above comments. .      RECOMMENDATION:   1. Recommend follow-up thoracic imaging, as directed clinically. .         XR ABDOMEN (KUB) (SINGLE AP VIEW)   Final Result   1. Satisfactory position of the NG tube within the stomach         XR CHEST PORTABLE   Final Result   1. There is no interval change in the multifocal bilateral pneumonia.          XR CHEST PORTABLE   Final Result   Bilateral airspace disease with interval progression on the right         XR CHEST PORTABLE   Final Result   1. Endotracheal tube is 2.7 cm above the khai. 2. Stable interstitial pulmonary edema or pneumonia throughout both lungs. XR CHEST PORTABLE   Final Result   1. Worsening of the multifocal bilateral pneumonia when compared with the   prior study of 1 day earlier. XR CHEST PORTABLE   Final Result   1. Multifocal bilateral pneumonia more prominent within the right upper lobe   2. Minimal partial interval clearing of the pneumonia within the left lung   3. Worsening of the pneumonia within the right upper lobe. US DUP LOWER EXTREMITIES BILATERAL VENOUS   Final Result   Within the visualized vessels there is no evidence for deep venous   thrombosis               XR CHEST PORTABLE   Final Result   1. Lines okay. 2. Slightly worsened diffuse edema versus pneumonia. XR CHEST PORTABLE   Final Result   1. Lines okay   2. Improved aeration, mild improvement and diffuse pneumonia/edema. XR CHEST ABDOMEN NG PLACEMENT   Final Result   NG tube position satisfactory. XR CHEST PORTABLE   Final Result   Stable bilateral pneumonia or interstitial pulmonary edema. XR CHEST PORTABLE   Final Result   Increasing bilateral infiltrates. XR CHEST PORTABLE   Final Result   1. There is no interval change in the multifocal bilateral pneumonia. CT HEAD WO CONTRAST   Final Result   No acute intracranial abnormality. Cortical atrophy and periventricular white matter ischemic changes. CT CERVICAL SPINE WO CONTRAST   Final Result   No acute abnormality of the cervical spine. Moderate degenerative changes with disc space narrowing and marginal bony   spur formation C5 through C7. Ground-glass opacities in the visualized lung apices. Please refer to chest   CT for additional information. CTA CHEST W CONTRAST   Final Result   No evidence of pulmonary embolism.       Extensive multifocal ground-glass opacities predominantly in the peripheral   lung zones bilaterally, highly concerning for atypical or COVID related   pneumonia. XR CHEST PORTABLE   Final Result   Bilateral patchy airspace opacities worrisome for pneumonia. XR CHEST PORTABLE    (Results Pending)   XR CHEST PORTABLE    (Results Pending)       A/P:  Active Problems:    Acute respiratory failure with hypoxia (HCC)    Rhabdomyolysis    Acute cystitis with hematuria    Primary spontaneous pneumothorax  Resolved Problems:    * No resolved hospital problems. *    1. Intubated, Sedated, Paralyzed, Proned  11/20: Intubated 2/2 hypoxia and increased work of breathing, proned and paralyzed  S/p Left Chest Tube 11/29 for tension pneumothorax. Second chest tube placed on 11/30. Air leaks noted. No further procedures planned by surgery. 2. Acute Hypoxic Respiratory Failure 2/2 Covid 19  Unvaccinated for Covid 19  Patient found with pulse ox of 60% --> currently intubated and sedated  CXR showing bilateral pulmonary infiltrates  Inflammatory markers: DDimer 530, , CRP 22.9, Ferritin 749 on admission   CTA pulm showing extensive bilateral pulmonary infiltrates consistent with COVID-19 pneumonia , no PE  Completed remdesivir and tocilizumab treatment  Decadron and SoluCortef course completed  Pulmonology consulted ; appreciate recs; daily ABG, Vitamin C, Vitamin D  Dietary consulted for further recommendations on nutrition status ; appreciate recs. 11/18 - transferred to ICU due to worsening resp status. CXR with resolution of pneumothorax. Advanced Care Planning with Spiritual Services ; recs appreciated - FULL CODE. 3. Left Sided Tension Pneumothorax - improving  S/p left sided chest tube  11/29 , Tension Pneumothorax on Xray  Left lung re inflated on repeat CXR  Whistle like sound noted Left Upper Lung field 2/2 likely to Chest tube . 2nd chest tube placed on 11/30  Continue to monitor    4.  Concern for Sepsis  Likely 2/2 superimposed bacterial pneumonia , candidal colonization  Tmax 102.2 , Tavg 100.3F  Given one dose cefepime and vancomycin in ICU  Procal 0.07, repeat 0.27 , blood cultures, urine culture negative. Completed Pip/Tazo, Vancomycin  Fungitell and B glucan pending  Diflucan stopped by ID.      5. CHARAN Stage I  Admission creatinine 0.6  Likely 2/2 to Ischemic ATN in setting of Hypotension  Creatinine increased to 1.6 --> trended down to 0.9  Nephrology following , appreciate recs   FeNa: 0.2- Pre- Renal     6. Anemia  2/2 to inflammation/ response to Covid 19  .3, MCHC 31.5 RDW 20.1  Vitamin B12/Folate normal March 2021  FOBT + 11/29  H/H < 7 11/30/2021 , s/p 1 unit PRBC. Hemoglobin 7.8 this AM.  Maintain H/H > 7    6. Thrombocytopenia  Likely 2/2 to SALENA vs inflammation from Covid 19  Hold all anticoagulation , patient trialed on heparin , lovenox and argatroban but platelets continue to decrease  SALENA panel- negative  Platelets 942 this am     7. Hx Schizoaffective Disorder / Anxiety  Continue seroquel , zoloft, trazodone  Hold ativan, vistaril     8. Hypokalemia - Resolved  Initial K 3.2 , Mag 2.6  CMP daily    9. Rhabdomyolysis - resolved  2/2 to fall for unknown time period  Patient found down for unknown period of time  Initial CK > 3000  Received 4 L NS in ER    10.  Concern for UTI - resolved   Likely simple cystitis ; patient with no CVA tenderness , presented initially with fever 102 F  Urinalysis with increased nitrites, bacteria, blood  Urine culture, blood cultures were negative  Given one dose doxy and rocephin in the ER  1g ceftriaxone IV /-  dc'd 11/18/2021     GI/DVT ppx: protonix  Diet: NPO, tube feeds  Disposition: MICU    Electronically signed by Harleen Geiger MD  [id number] on 12/1/2021 at 7:57 PM  This case was discussed with attending physician: Dr. Carole Humphreys

## 2021-12-03 NOTE — PLAN OF CARE
Talked to pts brother Alex Storey and his wife was on call to  Hear us, explained poor prognosis, explained them the code status of DNR CCA, how she can have another pneumothorax with chest compressions and possible poor quality of life post resusitation, given how her CXR looks like and her being on vent for extended period of time, he understands, also says that pt herself told them \"she did not want any resuscitative measures, and life support measures\". Discussed with them how code decisions should be in the best interest of what patient wants it to be.     Electronically signed by Anabel Holland MD on 12/3/2021 at 5:11 PM

## 2021-12-03 NOTE — PROGRESS NOTES
Comprehensive Nutrition Assessment    Type and Reason for Visit:  Reassess    Nutrition Recommendations/Plan: Advance TF as tolerated  Note pt w/ trickle feeding/minimal nutrition x >1 week  Recommend Peptide-based formula @ 50 ml/hr to provide 1200 ml tv, 1440 kcal, 90 gm AA, 973 ml free water    Nutrition Assessment:  Pt w/ ongoing intubation/proning 2/2 +COVID19 PNA/ARDS. Noted rhabdo/CHARAN, found down PTA. Noted PTX s/p CT placement x2. Will provide updated TF recs and monitor    Malnutrition Assessment:  Malnutrition Status:   At risk for malnutrition (Comment)    Context:  Acute Illness     Findings of the 6 clinical characteristics of malnutrition:  Energy Intake:  7 - 50% or less of estimated energy requirements for 5 or more days  Weight Loss:  Unable to assess (no hx on file)     Body Fat Loss:  Unable to assess (d/t covid iso)     Muscle Mass Loss:  Unable to assess    Fluid Accumulation:   (d/t covid iso)     Strength:  Normal  strength    Estimated Daily Nutrient Needs:  Energy (kcal):  PS3B 1406; ; Weight Used for Energy Requirements:  Admission     Protein (g):  75-90; Weight Used for Protein Requirements:  Ideal (1.5-1.8)        Fluid (ml/day):  per critical care    Nutrition Related Findings:  sedated/paralyzed on vent, prone, MAP stable off pressor, soft abd, hypoactive BS, diarrhea w/ FMS, CT x2, OGT w/ trickle feeding, +1-3 edema, +I/Os 11L      Wounds:  None       Current Nutrition Therapies:    Current Tube Feeding (TF) Orders:  · Feeding Route: Orogastric  · Formula: Peptide Based (peptide based high protein hanging)  · Schedule: Continuous (20 ml/hr, 480 ml tv)  · Water Flushes: 30 ml q4 hr = 180 ml H2O  · Current TF & Flush Orders Provides: 480 kcal, 42 gm pro, 401 ml free water, 581 ml total water  · Goal TF & Flush Orders Provides: 576 kcal, 36 gm pro, 389 ml free water, 569 ml total water      Anthropometric Measures:  · Height: 5' 1\" (154.9 cm)  · Current Body Weight: 151 lb (68.5 kg) (adm wt, CBW elevated 2/2 fluids)   · Admission Body Weight: 151 lb (68.5 kg) (11/17 first measured)    · Usual Body Weight:  (UTO no actual EMR hx on file)     · Ideal Body Weight: 105 lbs; % Ideal Body Weight 143.8 %   · BMI: 28.5  · BMI Categories: Overweight (BMI 25.0-29. 9)       Nutrition Diagnosis:   · Inadequate oral intake related to impaired respiratory function as evidenced by NPO or clear liquid status due to medical condition, intubation, nutrition support - enteral nutrition      Nutrition Interventions:   Food and/or Nutrient Delivery:  Modify Tube Feeding (increase TF as tolerated, goal Peptide based @ 50 ml/hr)  Nutrition Education/Counseling:  Education not appropriate   Coordination of Nutrition Care:  Continue to monitor while inpatient    Goals:  New Goal - Pt to tolerate TF at goal rate       Nutrition Monitoring and Evaluation:   Food/Nutrient Intake Outcomes:  Enteral Nutrition Intake/Tolerance  Physical Signs/Symptoms Outcomes:  Biochemical Data, GI Status, Diarrhea, Fluid Status or Edema, Hemodynamic Status, Nutrition Focused Physical Findings, Skin, Weight     Discharge Planning:     Too soon to determine     Electronically signed by Tolu Castro, MS, RD, LD on 12/3/21 at 12:55 PM EST    Contact: [phone]

## 2021-12-03 NOTE — PROGRESS NOTES
[facility] Attending    S: 60 y.o. female with a history of gerd, oa, schizoaffective disorder, and gastric fundiplication who was found down for an undetermined amount of time. Reports shortness of breath and cough for 2.5 weeks. She was found to be 60% on RA. In the ER, COVID testing was positive with significantly elevated CPKs. Patient is unvaccinated. Had desaturation to 88% wit PaO2 of 55 with RRT and subsequent transfer to the MICU. Desat to 40's when Bipap removed. Now intubated. Sedated, paralyzed, and prone. Noted to have pneumothorax and chest tube placed. Second chest tube placed when pneumothorax not improved. Today,  Intubated, sedated. Chest tube in place    O: VS- Blood pressure 117/62, pulse 88, temperature 98.8 °F (37.1 °C), temperature source Esophageal, resp. rate (!) 34, height 5' 1\" (1.549 m), weight 189 lb (85.7 kg), SpO2 97 %. Exam is as noted by resident   Currently prone and nonresponsive. Lungs: coarse, vent. Decreased BS  CV:  Rate controlled, regular   Ext-no C/C/E  Chest tube noted  CXR- resolution of pneumothorax      Impressions: Active Problems:    Acute respiratory failure with hypoxia (HCC)    Covid pneumonia    Rhabdomyolysis, CK improving    Concern for sepsis    Acute cystitis with hematuria, treated    CHARAN    Thrombocytopenia     Plan:      Acute hypoxic resp failure 2/2 covid - Decadron. Completed Tocimizilab. Getting Remdesivir. Vitamin C.  Vitamin D.  Zinc.  Appreciate Pulm management. Sepsis 2/2 PNA - done with vanc and zosyn  U/s of lower extremities negative DVT 11/20. Slow wean of FiO2. Tube feeding. Fluid overloaded - on bumex  Schizoaffective - seroquel  Diflucan stopped. bumex begun  Full code at this time. Attending Physician Statement  I have reviewed the chart and seen the patient with the resident(s). I personally reviewed images, EKG's and similar tests, if present.   I personally reviewed and performed key elements of the history and exam.  I have reviewed and confirmed student and/or resident history and exam with changes as indicated above. I agree with the assessment, plan and orders as documented by the resident. Please refer to the  resident and/or student note for additional information.       Joe Pinzon MD

## 2021-12-03 NOTE — CARE COORDINATION
12/3: Update CM Note: Pt came into the ER with a fall & fatigue/Covid+11/17. Pt has a PMH schizoaffect disorder. Pt remains intubated, sedated & on paralytic. Pt's Levophed gtt stopped. Pt on Bumex gtt, Pt has two chest tubes to -20 cm H20 water seal. D/C plan remains unclear at this time. [facility] following. General Surgery has no plan for intervention for +FOBT. SW/RAH will continue to follow.

## 2021-12-03 NOTE — PROGRESS NOTES
[address] [facility]   Department of Internal Medicine   Internal Medicine Residency  MICU Progress Note    Patient:  Olga Osorio 60 y.o. female   MRN: [id number]       Date of Service: 12/3/2021    Allergy: Ciprofloxacin  Cc follow up ARDS  Subjective     Patient was seen and examined this morning at bedside in no acute distress. Overnight, no acute events noted. Hgb has been stable. Patient still has 2 chest tubes, Patient has had 2 L output in the past 24 hours. Patient remains prone, coming down on FiO2 to 95%. No other issues at this time, patient remains prone. Objective     TEMPERATURE:  Current - Temp: 98.8 °F (37.1 °C); Max - Temp  Av.6 °F (37 °C)  Min: 98.1 °F (36.7 °C)  Max: 98.8 °F (37.1 °C)  RESPIRATIONS RANGE: Resp  Av.6  Min: 27  Max: 39  PULSE RANGE: Pulse  Av.6  Min: 81  Max: 97  BLOOD PRESSURE RANGE:  Systolic (12ZRH), LNZ:837 , Min:93 , HK   ; Diastolic (81WWE), EFN:02, Min:53, Max:65    PULSE OXIMETRY RANGE: SpO2  Av.2 %  Min: 82 %  Max: 98 %    I & O - 24hr:    Intake/Output Summary (Last 24 hours) at 12/3/2021 0732  Last data filed at 12/3/2021 0537  Gross per 24 hour   Intake 2286 ml   Output 1240 ml   Net 1046 ml     I/O last 3 completed shifts: In: 2286 [I.V.:1177; NG/GT:1109]  Out: 1240 [Urine:1050; Chest Tube:190] No intake/output data recorded. Weight change:     Physical Exam  Constitutional:       Appearance: Normal appearance. Interventions: She is sedated and intubated. HENT:      Head: Normocephalic and atraumatic. Nose: Nose normal.   Eyes:      Pupils: Pupils are equal, round, and reactive to light. Cardiovascular:      Rate and Rhythm: Normal rate and regular rhythm. Pulses: Normal pulses. Heart sounds: Normal heart sounds. Pulmonary:      Effort: Pulmonary effort is normal. She is intubated. Breath sounds: Examination of the left-upper field reveals decreased breath sounds.  Examination of the left-middle field reveals decreased breath sounds. Examination of the left-lower field reveals decreased breath sounds. Decreased breath sounds and wheezing present. No rhonchi or rales. Abdominal:      General: Bowel sounds are normal. There is no distension. Palpations: Abdomen is soft. Musculoskeletal:      Cervical back: Normal range of motion. Skin:     General: Skin is warm and moist.      Capillary Refill: Capillary refill takes less than 2 seconds. Neurological:      General: No focal deficit present.          Medications     Continuous Infusions:   fentaNYL 5 mcg/ml in 0.9%  ml infusion 100 mcg/hr (12/03/21 0721)    norepinephrine Stopped (12/02/21 1500)    midazolam 3 mg/hr (12/02/21 1720)    cisatracurium (NIMBEX) infusion 3 mcg/kg/min (12/02/21 2207)    sodium chloride 100 mL/hr at 11/1956    dextrose       Scheduled Meds:   bumetanide  2 mg IntraVENous Q12H    sucralfate  1 g Oral 4 times per day    sennosides-docusate sodium  2 tablet Oral Daily    sodium chloride flush  5-40 mL IntraVENous 2 times per day    heparin flush  3 mL IntraVENous 2 times per day    pantoprazole  40 mg IntraVENous BID    And    sodium chloride (PF)  10 mL IntraVENous BID    heparin (porcine)  5,000 Units SubCUTAneous Q8H    chlorhexidine  15 mL Mouth/Throat BID    artificial tears   Both Eyes Q6H    budesonide  1 mg Nebulization BID    Arformoterol Tartrate  15 mcg Nebulization BID    atorvastatin  10 mg Oral Daily    QUEtiapine  200 mg Oral Daily    sertraline  200 mg Oral Daily    traZODone  100 mg Oral Nightly    ascorbic acid  1,000 mg Oral Daily    vitamin D  2,000 Units Oral Daily    zinc sulfate  50 mg Oral Daily     PRN Meds: polyethylene glycol, sodium chloride flush, heparin flush, ipratropium-albuterol, sodium chloride, ondansetron **OR** ondansetron, acetaminophen **OR** acetaminophen, glucose, dextrose, glucagon (rDNA), dextrose  Nutrition:   NG/OG tube TF type: Pulmocare/Nephro/Glucerna/Jevity        At rate: ml/h    Labs and Imaging Studies     CBC:   Recent Labs     12/01/21  0403 12/01/21  0833 12/02/21  0500 12/02/21  1653 12/03/21  0523   WBC 14.9*  --  13.8*  --  10.9   HGB 8.0*   < > 8.6* 8.2* 8.2*   HCT 24.9*   < > 26.7* 26.3* 26.4*   MCV 99.6  --  100.8*  --  105.2*   *  --  100*  --  96*    < > = values in this interval not displayed. BMP:    Recent Labs     12/01/21  2244 12/02/21  0500 12/02/21  1340    132 134   K 3.7 4.0 3.8   CL 97* 96* 97*   CO2 27 23 30*   BUN 38* 34* 34*   CREATININE 0.9 0.8 0.9   GLUCOSE 102* 98 109*       LIVER PROFILE:   Recent Labs     12/01/21  0403 12/02/21  0500 12/03/21  0522   * 135* 93*   ALT 58* 75* 77*   BILIDIR <0.2 <0.2 <0.2   BILITOT 0.4 0.5 0.3   ALKPHOS 90 89 94       PT/INR:   Recent Labs     12/01/21  0403 12/02/21  0500 12/03/21  0523   PROTIME 13.0* 11.6 12.5*   INR 1.2 1.1 1.2       APTT:   Recent Labs     12/01/21  0403 12/02/21  0500 12/03/21  0523   APTT 23.0* 23.2* 23.3*       Fasting Lipid Panel:    Lab Results   Component Value Date    CHOL 322 10/12/2021    TRIG 150 10/12/2021    HDL 70 10/12/2021       Cardiac Enzymes:    Lab Results   Component Value Date    CKTOTAL 135 11/22/2021    CKTOTAL 488 (H) 11/20/2021    CKTOTAL 585 (H) 11/19/2021       Notable Cultures:      Blood cultures   Blood Culture, Routine   Date Value Ref Range Status   11/25/2021 5 Days no growth  Final     Respiratory cultures No results found for: RESPCULTURE No results found for: LABGRAM  Urine   Urine Culture, Routine   Date Value Ref Range Status   11/19/2021 Growth not present  Final     Legionella No results found for: LABLEGI  C Diff PCR No results found for: CDIFPCR  Wound culture/abscess: No results for input(s): WNDABS in the last 72 hours. Tip culture:No results for input(s): CXCATHTIP in the last 72 hours.       Oxygen:     Vent Information  $Ventilation: $Subsequent Day  Skin Assessment: Clean, dry, & intact  Equipment ID: [id number]  Equipment Changed: Humidification  Vent Type: [phone]  Vent Mode: AC/VC+  Vt Ordered: 320 mL  Pressure Ordered: 34  Rate Set: 34 bmp  Peak Flow: 0 L/min  Pressure Support: 0 cmH20  FiO2 : 95 %  SpO2: 97 %  SpO2/FiO2 ratio: 102.11  PaO2/FiO2 ratio: 120  Sensitivity: 3  PEEP/CPAP: 10  I Time/ I Time %: 0 s  Humidification Source: Heated wire  Humidification Temp: 37  Humidification Temp Measured: 37  Circuit Condensation: Drained  Mask Type: Full face mask  Mask Size: Small  Additional Respiratory  Assessments  Pulse: 88  Resp: (!) 34  SpO2: 97 %  Position: Prone  Humidification Source: Heated wire  Humidification Temp: 37  Circuit Condensation: Drained  Oral Care Completed?: Yes  Oral Care: Mouthwash with chlorhexidine, Mouth swabbed, Mouth suctioned  Subglottic Suction Done?: Yes  Airway Type: ET  Airway Size: 8  Cuff Pressure (cm H2O):  (mlt)       [REMOVED] Urethral Catheter Temperature probe-Output (mL): 10 mL  Urethral Catheter-Output (mL): 20 mL  [REMOVED] Urethral Catheter Temperature probe 16 fr-Output (mL): 250 mL    Imaging Studies:  XR CHEST PORTABLE   Final Result   1. There is no interval change in extensive multifocal bilateral pneumonia. XR CHEST PORTABLE   Final Result   No change in extensive bilateral pulmonary airspace opacities. No   pneumothorax is radiographically visible on the current exam.         XR CHEST PORTABLE   Final Result   Significantly decreased size of left pneumothorax. There is likely trace   left pneumothorax remaining. Stable extensive bilateral pulmonary airspace opacities. XR CHEST PORTABLE   Final Result   Addendum 1 of 1   ADDENDUM:   Verbal report was given to Vazquez Ferguson RN at 8:15 a.m. central standard   time on 11/30/2021. Final   New moderate sized left-sided pneumothorax. Stable extensive bilateral pulmonary airspace opacities            XR CHEST PORTABLE   Final Result   1. Lines okay.    2. Persistent edema/ARDS/pneumonia. 3. No sign of pneumothorax. XR CHEST PORTABLE   Final Result   Interval placement of left-sided chest tube with questionable residual   pneumothorax versus artifact. Extensive bilateral infiltrates. Continued follow-up recommended. XR CHEST PORTABLE   Final Result   Interval development of large left-sided tension pneumothorax. Extensive bilateral parenchymal infiltrates. Findings were [facility] Dr. Rita K[facility]. XR CHEST PORTABLE   Final Result   Severe bilateral pulmonary opacification. XR CHEST PORTABLE   Final Result   1. Endotracheal tube is 3 cm above the khai. 2. Stable interstitial pulmonary edema or pneumonia throughout both lungs. XR CHEST PORTABLE   Final Result   1. Somewhat limited exam, grossly unchanged. Please see above comments. .      RECOMMENDATION:   1. Recommend follow-up thoracic imaging, as directed clinically. .         XR ABDOMEN (KUB) (SINGLE AP VIEW)   Final Result   1. Satisfactory position of the NG tube within the stomach         XR CHEST PORTABLE   Final Result   1. There is no interval change in the multifocal bilateral pneumonia. XR CHEST PORTABLE   Final Result   Bilateral airspace disease with interval progression on the right         XR CHEST PORTABLE   Final Result   1. Endotracheal tube is 2.7 cm above the khai. 2. Stable interstitial pulmonary edema or pneumonia throughout both lungs. XR CHEST PORTABLE   Final Result   1. Worsening of the multifocal bilateral pneumonia when compared with the   prior study of 1 day earlier. XR CHEST PORTABLE   Final Result   1. Multifocal bilateral pneumonia more prominent within the right upper lobe   2. Minimal partial interval clearing of the pneumonia within the left lung   3. Worsening of the pneumonia within the right upper lobe.          US DUP LOWER EXTREMITIES BILATERAL VENOUS   Final Result   Within the visualized vessels there is no evidence for deep venous   thrombosis               XR CHEST PORTABLE   Final Result   1. Lines okay. 2. Slightly worsened diffuse edema versus pneumonia. XR CHEST PORTABLE   Final Result   1. Lines okay   2. Improved aeration, mild improvement and diffuse pneumonia/edema. XR CHEST ABDOMEN NG PLACEMENT   Final Result   NG tube position satisfactory. XR CHEST PORTABLE   Final Result   Stable bilateral pneumonia or interstitial pulmonary edema. XR CHEST PORTABLE   Final Result   Increasing bilateral infiltrates. XR CHEST PORTABLE   Final Result   1. There is no interval change in the multifocal bilateral pneumonia. CT HEAD WO CONTRAST   Final Result   No acute intracranial abnormality. Cortical atrophy and periventricular white matter ischemic changes. CT CERVICAL SPINE WO CONTRAST   Final Result   No acute abnormality of the cervical spine. Moderate degenerative changes with disc space narrowing and marginal bony   spur formation C5 through C7. Ground-glass opacities in the visualized lung apices. Please refer to chest   CT for additional information. CTA CHEST W CONTRAST   Final Result   No evidence of pulmonary embolism. Extensive multifocal ground-glass opacities predominantly in the peripheral   lung zones bilaterally, highly concerning for atypical or COVID related   pneumonia. XR CHEST PORTABLE   Final Result   Bilateral patchy airspace opacities worrisome for pneumonia.          XR CHEST PORTABLE    (Results Pending)   XR CHEST PORTABLE    (Results Pending)   XR CHEST PORTABLE    (Results Pending)        Resident's Assessment and Plan     Assessment and Plan:    Cardiovascular   History of hyperlipidemia  -Continue home atorvastatin 10 mg    Pulmonary  Acute hypoxic respiratory failure 2/2 Covid pneumonia  -Patient is mg Jarvis MD, PGY-1    I personally saw, examined and provided care for the patient. Radiographs, labs and medication list were reviewed by me independently. I spoke with bedside nursing, therapists and consultants. Critical care services and times documented are independent of procedures and multidisciplinary rounds with Residents. Additionally comprehensive, multidisciplinary rounds were conducted with the MICU team. The case was discussed in detail and plans for care were established. Review of Residents documentation was conducted and revisions were made as appropriate. I agree with the above documented exam, problem list and plan of care. ARDS /COVID  Developed pneumothorax . s/p 2 Chest tube . .lung expnaded   Start bumex drip 0.5 mg /h    [id number]   PF 0.97  plt less than 30 around 25   Prone and paralyzed   Hb monitor ,plt 96   On DVT    PPI bid   ID following ,wbc coming ,hd abx before   To change to   Will update family and discuss code status   prognosis poor                     Care reviewed with nursing staff, medical and surgical specialty care, primary care and the patient's family as available. Chart review/lab review/X-ray viewing/documentation and had long Conversation with patient/family re: prognosis, care options and any end of life issues:      Critical care time spent reviewing labs/films, examining patient, collaborating with other physicians more than 35  Minutes  excluding procedures . Lasha Dupree M.D.   12/3/2021  8:02 PM

## 2021-12-03 NOTE — PROGRESS NOTES
[facility] - Family Medicine Inpatient   Resident Progress Note    S:  Hospital day: 17   Brief Synopsis: Carl Nye is a 60 y.o. female with pmh of GERD, osteoarthritis and schizoaffective disorder who presented to ER s/p fall at home. Patient found down at home, with initial O2 saturation of 60%. Brought to the ER; found to have COVID-19 pneumonia , requiring bipap for appropriate saturation. Rhabdomyolysis, UTI. Started on appropriate management including tocilizumab, ceftriaxone 1 g daily, and IV fluids for rhabdomyolysis. Decadron was started daily, and with patients worsening status - pulmonology consulted. FULL CODE. Transferred to ICU on 11/18 for rapid breathing and hypoxia and pO2 of 55. Intubated 11/20 secondary to O2 sats consistently <80% with rapid breathing. 11/29 , patient developed left sided tension pneumothorax and underwent chest tube placement. Patient Intubated, sedated, paralyzed, proned. On vasopressors. Hemoglobin dropped to 6.9 with 1 unit PRBC transfused. Second chest tube placed on left chest.     Patient remains prone, sedated and intubated when seen in the morning. Chest tubes in place. Family has been contacted regarding patients poor prognosis.     Cont meds:    bumetanide 0.1 mg/mL infusion 0.5 mg/hr (12/03/21 1611)    fentaNYL 5 mcg/ml in 0.9%  ml infusion 100 mcg/hr (12/03/21 0721)    midazolam 4 mg/hr (12/03/21 1258)    cisatracurium (NIMBEX) infusion 3 mcg/kg/min (12/03/21 1257)    sodium chloride 100 mL/hr at 11/1955    dextrose       Scheduled meds:    sucralfate  1 g Oral 4 times per day    sennosides-docusate sodium  2 tablet Oral Daily    sodium chloride flush  5-40 mL IntraVENous 2 times per day    heparin flush  3 mL IntraVENous 2 times per day    pantoprazole  40 mg IntraVENous BID    And    sodium chloride (PF)  10 mL IntraVENous BID    heparin (porcine)  5,000 Units SubCUTAneous Q8H    chlorhexidine  15 mL Mouth/Throat BID    artificial tears   Both Eyes Q6H    budesonide  1 mg Nebulization BID    Arformoterol Tartrate  15 mcg Nebulization BID    atorvastatin  10 mg Oral Daily    QUEtiapine  200 mg Oral Daily    sertraline  200 mg Oral Daily    traZODone  100 mg Oral Nightly    ascorbic acid  1,000 mg Oral Daily    vitamin D  2,000 Units Oral Daily    zinc sulfate  50 mg Oral Daily     PRN meds: polyethylene glycol, sodium chloride flush, heparin flush, ipratropium-albuterol, sodium chloride, ondansetron **OR** ondansetron, acetaminophen **OR** acetaminophen, glucose, dextrose, glucagon (rDNA), dextrose     I reviewed the patient's past medical and surgical history, Medications and Allergies. O:  /62   Pulse 87   Temp 98.8 °F (37.1 °C) (Esophageal)   Resp (!) 34   Ht 5' 1\" (1.549 m)   Wt 189 lb (85.7 kg)   SpO2 99%   BMI 35.71 kg/m²   24 hour I&O: I/O last 3 completed shifts: In: 9274 [I.V.:1043; NG/GT:634]  Out: 6471 [Urine:2415; Chest Tube:170]  No intake/output data recorded. Physical Exam  Constitutional:       Comments: Intubated, sedated, paralyzed, proned   Cardiovascular:      Rate and Rhythm: Normal rate and regular rhythm. Pulses: Normal pulses. Heart sounds: Normal heart sounds. Pulmonary:      Breath sounds: No wheezing, rhonchi or rales. Comments: Left sided Chest tube   Abdominal:      General: Bowel sounds are normal.      Palpations: Abdomen is soft. Musculoskeletal:         General: Normal range of motion. Right lower leg: Edema present. Left lower leg: Edema present. Labs:  Na/K/Cl/CO2:  138/3.3/99/30 (12/03 1141)  BUN/Cr/glu/ALT/AST/amyl/lip:  32/0.8/--/--/--/--/-- (12/03 1141)  WBC/Hgb/Hct/Plts:  10.9/8.2/26.4/96 (12/03 1718)  estimated creatinine clearance is 71 mL/min (based on SCr of 0.8 mg/dL). Other pertinent labs as noted below    Radiology:  XR CHEST PORTABLE   Final Result   1. Stable diffuse bilateral airspace disease.    2. Small left pneumothorax appears new or increased compared to prior   examination. Left chest tube appears stable in position. 3. Possible right pleural effusion. XR CHEST PORTABLE   Final Result   1. There is no interval change in extensive multifocal bilateral pneumonia. XR CHEST PORTABLE   Final Result   No change in extensive bilateral pulmonary airspace opacities. No   pneumothorax is radiographically visible on the current exam.         XR CHEST PORTABLE   Final Result   Significantly decreased size of left pneumothorax. There is likely trace   left pneumothorax remaining. Stable extensive bilateral pulmonary airspace opacities. XR CHEST PORTABLE   Final Result   Addendum 1 of 1   ADDENDUM:   Verbal report was given to Lilliana Philip RN at 8:15 a.m. central standard   time on 11/30/2021. Final   New moderate sized left-sided pneumothorax. Stable extensive bilateral pulmonary airspace opacities            XR CHEST PORTABLE   Final Result   1. Lines okay. 2. Persistent edema/ARDS/pneumonia. 3. No sign of pneumothorax. XR CHEST PORTABLE   Final Result   Interval placement of left-sided chest tube with questionable residual   pneumothorax versus artifact. Extensive bilateral infiltrates. Continued follow-up recommended. XR CHEST PORTABLE   Final Result   Interval development of large left-sided tension pneumothorax. Extensive bilateral parenchymal infiltrates. Findings were discussed with Dr. Anna Ordaz at approximately 0010 hours [place]   time on 11/28/2021. XR CHEST PORTABLE   Final Result   Severe bilateral pulmonary opacification. XR CHEST PORTABLE   Final Result   1. Endotracheal tube is 3 cm above the khai. 2. Stable interstitial pulmonary edema or pneumonia throughout both lungs. XR CHEST PORTABLE   Final Result   1. Somewhat limited exam, grossly unchanged. Please see above comments.       .      RECOMMENDATION: 1.  Recommend follow-up thoracic imaging, as directed clinically. .         XR ABDOMEN (KUB) (SINGLE AP VIEW)   Final Result   1. Satisfactory position of the NG tube within the stomach         XR CHEST PORTABLE   Final Result   1. There is no interval change in the multifocal bilateral pneumonia. XR CHEST PORTABLE   Final Result   Bilateral airspace disease with interval progression on the right         XR CHEST PORTABLE   Final Result   1. Endotracheal tube is 2.7 cm above the khai. 2. Stable interstitial pulmonary edema or pneumonia throughout both lungs. XR CHEST PORTABLE   Final Result   1. Worsening of the multifocal bilateral pneumonia when compared with the   prior study of 1 day earlier. XR CHEST PORTABLE   Final Result   1. Multifocal bilateral pneumonia more prominent within the right upper lobe   2. Minimal partial interval clearing of the pneumonia within the left lung   3. Worsening of the pneumonia within the right upper lobe. US DUP LOWER EXTREMITIES BILATERAL VENOUS   Final Result   Within the visualized vessels there is no evidence for deep venous   thrombosis               XR CHEST PORTABLE   Final Result   1. Lines okay. 2. Slightly worsened diffuse edema versus pneumonia. XR CHEST PORTABLE   Final Result   1. Lines okay   2. Improved aeration, mild improvement and diffuse pneumonia/edema. XR CHEST ABDOMEN NG PLACEMENT   Final Result   NG tube position satisfactory. XR CHEST PORTABLE   Final Result   Stable bilateral pneumonia or interstitial pulmonary edema. XR CHEST PORTABLE   Final Result   Increasing bilateral infiltrates. XR CHEST PORTABLE   Final Result   1. There is no interval change in the multifocal bilateral pneumonia. CT HEAD WO CONTRAST   Final Result   No acute intracranial abnormality. Cortical atrophy and periventricular white matter ischemic changes.          CT CERVICAL SPINE WO CONTRAST   Final Result   No acute abnormality of the cervical spine. Moderate degenerative changes with disc space narrowing and marginal bony   spur formation C5 through C7. Ground-glass opacities in the visualized lung apices. Please refer to chest   CT for additional information. CTA CHEST W CONTRAST   Final Result   No evidence of pulmonary embolism. Extensive multifocal ground-glass opacities predominantly in the peripheral   lung zones bilaterally, highly concerning for atypical or COVID related   pneumonia. XR CHEST PORTABLE   Final Result   Bilateral patchy airspace opacities worrisome for pneumonia. XR CHEST PORTABLE    (Results Pending)   XR CHEST PORTABLE    (Results Pending)       A/P:  Active Problems:    Acute respiratory failure with hypoxia (HCC)    Rhabdomyolysis    Acute cystitis with hematuria    Primary spontaneous pneumothorax  Resolved Problems:    * No resolved hospital problems. *    1. Intubated, Sedated, Paralyzed, Proned  11/20: Intubated 2/2 hypoxia and increased work of breathing, proned and paralyzed  S/p Left Chest Tube 11/29 for tension pneumothorax. Second chest tube placed on 11/30. Air leaks noted. Serosanguineous drainage. No further procedures planned by surgery. 2. Acute Hypoxic Respiratory Failure 2/2 Covid 19  Unvaccinated for Covid 19  Patient found with pulse ox of 60% --> currently intubated and sedated  CXR showing bilateral pulmonary infiltrates  Inflammatory markers: DDimer 530, , CRP 22.9, Ferritin 749 on admission   CTA pulm showing extensive bilateral pulmonary infiltrates consistent with COVID-19 pneumonia , no PE  Completed remdesivir and tocilizumab treatment  Decadron and SoluCortef course completed  Pulmonology consulted ; appreciate recs; daily ABG, Vitamin C, Vitamin D  Dietary consulted for further recommendations on nutrition status ; appreciate recs.    11/18 - transferred to ICU due to worsening resp status. CXR with resolution of pneumothorax. Advanced Care Planning with Spiritual Services ; recs appreciated - FULL CODE. Family reached out to consider code status. 3. Left Sided Tension Pneumothorax - improving  S/p left sided chest tube  11/29 , Tension Pneumothorax on Xray  Left lung re inflated on repeat CXR  Whistle like sound noted Left Upper Lung field 2/2 likely to Chest tube . 2nd chest tube placed on 11/30  Continue to monitor    4. Concern for Sepsis  Likely 2/2 superimposed bacterial pneumonia , candidal colonization  Tmax 102.2 , Tavg 100.3F  Given one dose cefepime and vancomycin in ICU  Procal 0.07, repeat 0.27 , blood cultures, urine culture negative. Completed Pip/Tazo, Vancomycin  Fungitell and B glucan pending  Diflucan stopped by ID.      5. CHARAN Stage I  Admission creatinine 0.6  Likely 2/2 to Ischemic ATN in setting of Hypotension  Creatinine increased to 1.6 --> trended down to 0.9  Nephrology following , appreciate recs   FeNa: 0.2- Pre- Renal     6. Anemia  2/2 to inflammation/ response to Covid 19  .3, MCHC 31.5 RDW 20.1  Vitamin B12/Folate normal March 2021  FOBT + 11/29  H/H < 7 11/30/2021 , s/p 1 unit PRBC. Hemoglobin 7.8 this AM.  Maintain H/H > 7    6. Thrombocytopenia  Likely 2/2 to SALENA vs inflammation from Covid 19  Hold all anticoagulation , patient trialed on heparin , lovenox and argatroban but platelets continue to decrease  SALENA panel- negative  Platelets 850 this am     7. Hx Schizoaffective Disorder / Anxiety  Continue seroquel , zoloft, trazodone  Hold ativan, vistaril     8. Hypokalemia - Resolved  Initial K 3.2 , Mag 2.6  CMP daily    9. Rhabdomyolysis - resolved  2/2 to fall for unknown time period  Patient found down for unknown period of time  Initial CK > 3000  Received 4 L NS in ER    10.  Concern for UTI - resolved   Likely simple cystitis ; patient with no CVA tenderness , presented initially with fever 102 F  Urinalysis with increased nitrites, bacteria, blood  Urine culture, blood cultures were negative  Given one dose doxy and rocephin in the ER  1g ceftriaxone IV /-  dc'd 11/18/2021     GI/DVT ppx: protonix  Diet: NPO, tube feeds  Disposition: MICU    Electronically signed by Katelynn Hairsotn MD  PGY-1 on 12/3/2021 at 6:28 PM  This case was discussed with attending physician: Dr. Riki Estevez

## 2021-12-03 NOTE — PROGRESS NOTES
SubCUTAneous Q8H Nenita Thy Marisel Quevedo MD   5,000 Units at 12/03/21 0535    chlorhexidine (PERIDEX) 0.12 % solution 15 mL  15 mL Mouth/Throat BID Keyona Fisher MD   15 mL at 12/02/21 2007    lubrifresh P.M. (artificial tears) ophthalmic ointment   Both Eyes Q6H Keyona Fisher MD   Given at 12/03/21 0302    cisatracurium besylate (NIMBEX) 200 mg in sodium chloride 0.9 % 100 mL infusion  0.5-10 mcg/kg/min IntraVENous Continuous Sarita Mancini MD 6.5 mL/hr at 12/02/21 2207 3 mcg/kg/min at 12/02/21 2207    ipratropium-albuterol (DUONEB) nebulizer solution 1 ampule  1 ampule Inhalation Q4H PRN Sarita Mancini MD   1 ampule at 12/01/21 2140    budesonide (PULMICORT) nebulizer suspension 1,000 mcg  1 mg Nebulization BID Wendie Turner MD   1,000 mcg at 12/02/21 2145    Arformoterol Tartrate (BROVANA) nebulizer solution 15 mcg  15 mcg Nebulization BID Wendie Turner MD   15 mcg at 12/02/21 2144    atorvastatin (LIPITOR) tablet 10 mg  10 mg Oral Daily Navin Mathews MD   10 mg at 12/02/21 2622    QUEtiapine (SEROQUEL) tablet 200 mg  200 mg Oral Daily Navin Mathews MD   200 mg at 12/02/21 6115    sertraline (ZOLOFT) tablet 200 mg  200 mg Oral Daily Navin Mathews MD   200 mg at 12/02/21 3069    traZODone (DESYREL) tablet 100 mg  100 mg Oral Nightly Navin Mathews MD   100 mg at 12/02/21 2008    0.9 % sodium chloride infusion  25 mL IntraVENous PRN Navin Mathews  mL/hr at 11/1956 Rate Verify at 11/1956    ondansetron (ZOFRAN-ODT) disintegrating tablet 4 mg  4 mg Oral Q8H PRN Navin Mathews MD        Or    ondansetron [facility] injection 4 mg  4 mg IntraVENous Q6H PRN Navin Mathews MD        acetaminophen (TYLENOL) tablet 650 mg  650 mg Oral Q6H PRN Navin Mathews MD   650 mg at 11/26/21 2683    Or    acetaminophen (TYLENOL) suppository 650 mg  650 mg Rectal Q6H PRN Navin Mathews MD   650 mg at 11/27/21 0829    ascorbic acid (VITAMIN C) tablet 1,000 mg  1,000 mg Oral Daily Navin Mathews MD 1,000 mg at 12/02/21 0920    Vitamin D (CHOLECALCIFEROL) tablet 2,000 Units  2,000 Units Oral Daily Mane Helton MD   2,000 Units at 12/02/21 1078    zinc sulfate (ZINCATE) capsule 50 mg  50 mg Oral Daily Mane Helton MD   50 mg at 12/02/21 0924    glucose (GLUTOSE) 40 % oral gel 15 g  15 g Oral PRN Mane Helton MD        dextrose 50 % IV solution  12.5 g IntraVENous PRN Mane Helton MD        glucagon (rDNA) injection 1 mg  1 mg IntraMUSCular PRN Mane Helton MD        dextrose 5 % solution  100 mL/hr IntraVENous PRN Mane Helton MD             REVIEW OF SYSTEMS: could not be obtained       PHYSICAL EXAM:      Vitals:     /62   Pulse 85   Temp 98.8 °F (37.1 °C) (Esophageal)   Resp (!) 31   Ht 5' 1\" (1.549 m)   Wt 189 lb (85.7 kg)   SpO2 97%   BMI 35.71 kg/m²     General Appearance:    Intubated, sedated, prone, FiO2 95, PEEP 10    Head:    Normocephalic, atraumatic   Eyes:    + pallor,   Ears:    No obvious deformity or drainage.    Nose:    Throat:   ET tube in place, swollen lips    Neck:   Supple, no lymphadenopathy   Lungs:     Clear to auscultation ,left chest tubes   Heart:    Regular   Abdomen:     Soft,  bowel sounds present    Extremities:    ++ edema    Pulses:   Dorsalis pedis palpable    Skin:   no rashes             CBC with Differential:      Lab Results   Component Value Date    WBC 10.9 12/03/2021    RBC 2.51 12/03/2021    HGB 8.2 12/03/2021    HCT 26.4 12/03/2021    PLT 96 12/03/2021    .2 12/03/2021    MCH 32.7 12/03/2021    MCHC 31.1 12/03/2021    RDW 23.4 12/03/2021    NRBC 0.9 12/01/2021    METASPCT 0.9 11/21/2021    LYMPHOPCT 2.6 12/02/2021    MONOPCT 3.0 12/02/2021    MYELOPCT 1.7 12/01/2021    BASOPCT 0.1 12/02/2021    MONOSABS 0.00 12/02/2021    LYMPHSABS 0.41 12/02/2021    EOSABS 0.36 12/02/2021    BASOSABS 0.00 12/02/2021       CMP     Lab Results   Component Value Date     12/02/2021    K 3.8 12/02/2021    K 4.8 11/19/2021    CL 97 12/02/2021    CO2 30 12/02/2021    BUN 34 12/02/2021    CREATININE 0.9 12/02/2021    GFRAA >60 12/02/2021    LABGLOM >60 12/02/2021    GLUCOSE 109 12/02/2021    GLUCOSE 77 05/12/2011    PROT 4.7 12/02/2021    LABALBU 2.1 12/02/2021    CALCIUM 8.1 12/02/2021    BILITOT 0.5 12/02/2021    ALKPHOS 89 12/02/2021     12/02/2021    ALT 75 12/02/2021         Hepatic Function Panel:    Lab Results   Component Value Date    ALKPHOS 89 12/02/2021    ALT 75 12/02/2021     12/02/2021    PROT 4.7 12/02/2021    BILITOT 0.5 12/02/2021    BILIDIR <0.2 12/02/2021    IBILI see below 12/02/2021    LABALBU 2.1 12/02/2021       PT/INR:    Lab Results   Component Value Date    PROTIME 12.5 12/03/2021    INR 1.2 12/03/2021       TSH:    Lab Results   Component Value Date    TSH 4.720 10/12/2021       U/A:    Lab Results   Component Value Date    COLORU Yellow 11/16/2021    PHUR 6.0 11/16/2021    WBCUA NONE 11/16/2021    RBCUA 1-3 11/16/2021    BACTERIA MANY 11/16/2021    CLARITYU Clear 11/16/2021    SPECGRAV 1.025 11/16/2021    LEUKOCYTESUR Negative 11/16/2021    UROBILINOGEN 0.2 11/16/2021    BILIRUBINUR Negative 11/16/2021    BLOODU LARGE 11/16/2021    GLUCOSEU Negative 11/16/2021       ABG:    Lab Results   Component Value Date    DTV6BIE 28.4 12/02/2021    RQU8FFM 51.5 12/02/2021    PO2ART 59.6 12/02/2021       MICROBIOLOGY:    Blood culture - neg on 11/25     Urine Culture -    Sputum Culture -  CULTURE, RESPIRATORY Oral Pharyngeal Kamille absent Abnormal      Smear, Respiratory --    Group 6: <25 PMN's/LPF and <25 Epithelial cells/LPF   Few Polymorphonuclear leukocytes   Rare Epithelial cells   No organisms seen     Organism Candida albicans Abnormal     CULTURE, RESPIRATORY One colony   Narrative:     Source: SP[facility]U       Site:                     Specimen: Nasopharyngeal Swab Updated: 11/1956    SARS-CoV-2, NAAT DETECTED Abnormal     Comment: Rapid NAAT:   Negative results should be treated as presumptive            Ref Range & Units 11/27/21 1024   (1,3)-Beta-D-Glucan (Fungitell) Interpretation Negative Negative        Radiology :    Chest X ray :      Extensive multifocal bilateral airspace disease         IMPRESSION:     1. Severe COVID 19 pneumonia s/p remdesivir and tocilizumab   2. Respiratory failure - intubated   3. Leukocytosis - reactive- improved   4. Candida colonization   5. S/P Chest tube insertion         RECOMMENDATIONS:      1. Off abx   2.  Supportive care

## 2021-12-03 NOTE — PLAN OF CARE
Problem: Falls - Risk of:  Goal: Will remain free from falls  Description: Will remain free from falls  Outcome: Met This Shift     Problem: Falls - Risk of:  Goal: Absence of physical injury  Description: Absence of physical injury  Outcome: Met This Shift     Problem: Airway Clearance - Ineffective  Goal: Achieve or maintain patent airway  Outcome: Met This Shift     Problem: Gas Exchange - Impaired  Goal: Absence of hypoxia  Outcome: Met This Shift     Problem: Skin Integrity:  Goal: Absence of new skin breakdown  Description: Absence of new skin breakdown  Outcome: Met This Shift   Plan of care discussed with patient / family.

## 2021-12-03 NOTE — PROGRESS NOTES
Nephrology Progress Note  Patient's Name: Liam Fleischer    Reason for Consult:  Acute kidney injury    History of Present Ilness from the 11/24/21 note:    Liam Fleischer is a 60 y.o. female with a history of GI GERD, osteoarthritis, and schizoaffective disorder. She initially presented to this hospital 5 days ago following a fall at home. She was found by her roommate following an unspecified duration. EMS was called. Upon their evaluation patient was noted to be hypoxic. She was subsequently brought to ED for evaluation. In the ED she was noted to be hypoxic with an oxygen saturation of 60% on room air. Also noted to to have a low-grade fever. Laboratory data was significant for WBC of 8.5, hemoglobin of 14.6, BUN 17 and a creatinine of 0.6. Rapid COVID-19 test was positive. Patient was admitted to telemetry. 2 days into her hospital course became increasingly more hypoxic and was transferred to MICU. Because of her persistent hypoxia patient was intubated with mechanical ventilation on 11/20;  . Over the past 48 hours she has been markedly hypotensive requiring fluid resuscitation and pressors. Creatinine has worsened to a current value of 1.6 associated with low urine output. Hence renal consult. Subjective    11/28: pt remains in COVID 19 Isolation. She remains proned and on an FIO2 65% and PEEP12 with an O2 sat 95-96%    11/29: pt seen through window of icu due to covid, chart reviewed    11/30: pt seen through window of icu due to covid, chart reviewed.      12/1/21 :pt seen through window of icu due to covid, review of chart performed    12/2/21: pt seen through window of icu due to covid, chart reviewed, intubated, chest tube left     12/3/21 :chart reviewed, chest tube x 2 on right, intubated and prone, seen through window of icu    Allergies:  Ciprofloxacin    Current Medications:    fentaNYL 5 mcg/ml in 0.9%  ml infusion, Continuous  bumetanide (BUMEX) injection 2 mg, Q12H  sucralfate (CARAFATE) tablet 1 g, 4 times per day  sennosides-docusate sodium (SENOKOT-S) 8.6-50 MG tablet 2 tablet, Daily  polyethylene glycol (GLYCOLAX) packet 17 g, Daily PRN  sodium chloride flush 0.9 % injection 5-40 mL, 2 times per day  sodium chloride flush 0.9 % injection 5-40 mL, PRN  heparin flush 100 UNIT/ML injection 300 Units, 2 times per day  heparin flush 100 UNIT/ML injection 300 Units, PRN  pantoprazole (PROTONIX) injection 40 mg, BID   And  sodium chloride (PF) 0.9 % injection 10 mL, BID  midazolam (VERSED) 100 mg in dextrose 5 % 100 mL infusion, Continuous  heparin (porcine) injection 5,000 Units, Q8H  chlorhexidine (PERIDEX) 0.12 % solution 15 mL, BID  lubrifresh P.M. (artificial tears) ophthalmic ointment, Q6H  cisatracurium besylate (NIMBEX) 200 mg in sodium chloride 0.9 % 100 mL infusion, Continuous  ipratropium-albuterol (DUONEB) nebulizer solution 1 ampule, Q4H PRN  budesonide (PULMICORT) nebulizer suspension 1,000 mcg, BID  Arformoterol Tartrate (BROVANA) nebulizer solution 15 mcg, BID  atorvastatin (LIPITOR) tablet 10 mg, Daily  QUEtiapine (SEROQUEL) tablet 200 mg, Daily  sertraline (ZOLOFT) tablet 200 mg, Daily  traZODone (DESYREL) tablet 100 mg, Nightly  0.9 % sodium chloride infusion, PRN  ondansetron (ZOFRAN-ODT) disintegrating tablet 4 mg, Q8H PRN   Or  ondansetron (ZOFRAN) injection 4 mg, Q6H PRN  acetaminophen (TYLENOL) tablet 650 mg, Q6H PRN   Or  acetaminophen (TYLENOL) suppository 650 mg, Q6H PRN  ascorbic acid (VITAMIN C) tablet 1,000 mg, Daily  Vitamin D (CHOLECALCIFEROL) tablet 2,000 Units, Daily  zinc sulfate (ZINCATE) capsule 50 mg, Daily  glucose (GLUTOSE) 40 % oral gel 15 g, PRN  dextrose 50 % IV solution, PRN  glucagon (rDNA) injection 1 mg, PRN  dextrose 5 % solution, PRN        Review of Systems:   Review of systems not obtained due to patient factors.     Physical exam:  Vitals:    12/03/21 1100   BP:    Pulse: 86   Resp: (!) 34   Temp:    SpO2: 99%          No PE as in COVID Isolation      Data:   Labs:  Lab Results   Component Value Date     12/02/2021     12/02/2021     12/01/2021    K 3.8 12/02/2021    K 4.0 12/02/2021    K 3.7 12/01/2021    CL 97 (L) 12/02/2021    CO2 30 (H) 12/02/2021    CO2 23 12/02/2021    CO2 27 12/01/2021    CREATININE 0.9 12/02/2021    CREATININE 0.8 12/02/2021    CREATININE 0.9 12/01/2021    BUN 34 (H) 12/02/2021    BUN 34 (H) 12/02/2021    BUN 38 (H) 12/01/2021    GLUCOSE 109 (H) 12/02/2021    GLUCOSE 98 12/02/2021    GLUCOSE 102 (H) 12/01/2021    PHOS 5.0 (H) 12/03/2021    PHOS 4.3 12/01/2021    PHOS 4.9 (H) 11/29/2021    WBC 10.9 12/03/2021    WBC 13.8 (H) 12/02/2021    WBC 14.9 (H) 12/01/2021    HGB 8.2 (L) 12/03/2021    HGB 8.2 (L) 12/02/2021    HGB 8.6 (L) 12/02/2021    HCT 26.4 (L) 12/03/2021    HCT 26.3 (L) 12/02/2021    HCT 26.7 (L) 12/02/2021    .2 (H) 12/03/2021    PLT 96 (L) 12/03/2021         Imaging:  CT HEAD WO CONTRAST    Result Date: 11/16/2021  EXAMINATION: CT OF THE HEAD WITHOUT CONTRAST  11/16/2021 9:54 pm TECHNIQUE: CT of the head was performed without the administration of intravenous contrast. Dose modulation, iterative reconstruction, and/or weight based adjustment of the mA/kV was utilized to reduce the radiation dose to as low as reasonably achievable. COMPARISON: None. HISTORY: ORDERING SYSTEM PROVIDED HISTORY: fall TECHNOLOGIST PROVIDED HISTORY: Reason for exam:->fall Has a \"code stroke\" or \"stroke alert\" been called? ->No Decision Support Exception - unselect if not a suspected or confirmed emergency medical condition->Emergency Medical Condition (MA) What reading provider will be dictating this exam?->CRC FINDINGS: BRAIN/VENTRICLES: There are cortical atrophy and periventricular white matter ischemic changes. There is no acute intracranial hemorrhage, mass effect or midline shift. No abnormal extra-axial fluid collection.   The gray-white differentiation is maintained without evidence of an acute infarct. There is no evidence of hydrocephalus. ORBITS: The visualized portion of the orbits demonstrate no acute abnormality. SINUSES: The visualized paranasal sinuses and mastoid air cells demonstrate no acute abnormality. SOFT TISSUES/SKULL:  No acute abnormality of the visualized skull or soft tissues. No acute intracranial abnormality. Cortical atrophy and periventricular white matter ischemic changes. XR CHEST PORTABLE    Result Date: 11/16/2021  EXAMINATION: ONE XRAY VIEW OF THE CHEST 11/16/2021 7:10 pm COMPARISON: 09/23/2021 HISTORY: ORDERING SYSTEM PROVIDED HISTORY: weakness TECHNOLOGIST PROVIDED HISTORY: Reason for exam:->weakness What reading provider will be dictating this exam?->CRC FINDINGS: Bilateral patchy airspace opacities. There is no effusion or pneumothorax. The cardiomediastinal silhouette is without acute process. The osseous structures are without acute process. Bilateral patchy airspace opacities worrisome for pneumonia. CTA CHEST W CONTRAST    Result Date: 11/16/2021  EXAMINATION: CTA OF THE CHEST 11/16/2021 9:54 pm TECHNIQUE: CTA of the chest was performed after the administration of intravenous contrast.  Multiplanar reformatted images are provided for review. MIP images are provided for review. Dose modulation, iterative reconstruction, and/or weight based adjustment of the mA/kV was utilized to reduce the radiation dose to as low as reasonably achievable. COMPARISON: None. HISTORY: ORDERING SYSTEM PROVIDED HISTORY: r/o pe TECHNOLOGIST PROVIDED HISTORY: Reason for exam:->r/o pe Decision Support Exception - unselect if not a suspected or confirmed emergency medical condition->Emergency Medical Condition (MA) What reading provider will be dictating this exam?->CRC FINDINGS: Pulmonary Arteries: Pulmonary arteries are adequately opacified for evaluation. No evidence of intraluminal filling defect to suggest pulmonary embolism.   Main pulmonary artery is normal in caliber. Mediastinum: No evidence of mediastinal lymphadenopathy. The heart and pericardium demonstrate no acute abnormality. There is no acute abnormality of the thoracic aorta. Lungs/pleura: The lungs demonstrate extensive multifocal ground-glass opacities predominantly in the peripheral lung zones. No evidence of pleural effusion or pneumothorax. Upper Abdomen: Limited images of the upper abdomen are unremarkable. Soft Tissues/Bones: No acute bone or soft tissue abnormality. No evidence of pulmonary embolism. Extensive multifocal ground-glass opacities predominantly in the peripheral lung zones bilaterally, highly concerning for atypical or COVID related pneumonia. Assessment/Plans    1. Acute kidney injury stage I  ischemic ATN occurring in the setting of hypotension  nonoliguric    Cr slow decrease 1.6-->1.5-->1.4-->1.3>1.2>1.1>1>0.8  Continue to monitor labs and urine output  continue bumex  uo 2.3L    2. Acute hypoxic respiratory failure  due to COVID-19 pneumonia  Intubation with mechanical ventilation; prone   As per Pulm/CCC    3. Septic shock  suspected possible superimposed bacterial pneumonia  now off pressors  abx per id    4. Volume overload  Trial diuretic   uo 2.3L  +12L    5. Hyponatremia  Lowered diuretic to q 12  Stopped free water 12/2  Await labs    6.  Hypocalcemia  Replace prn    Charles Capellan MD

## 2021-12-03 NOTE — PROGRESS NOTES
SURGERY NOTE  Remains intubated, sedated, and prone. Off levophed.   2x Chest tubes on right -- chest tube #1 with florid air leak, both to suction  CXR reviewed  No plans for endoscopy for +FOBT can continue PPI & CF  No additional surgical needs at this time  Defer further management of chest tubes to ICU team -- please page if any issues    Oskar Austin DO  Surgery Resident PGY-4  12/3/2021  9:25 AM

## 2021-12-04 NOTE — PROGRESS NOTES
[address] [facility]   Department of Internal Medicine   Internal Medicine Residency  MICU Progress Note    Patient:  Paige Solis 60 y.o. female   MRN: [id number]       Date of Service: 2021    Allergy: Ciprofloxacin  Cc follow up ARDS  Subjective     Patient was seen and examined this morning at bedside in no acute distress. Overnight, no acute events noted. Hgb has been stable. Patient still has 2 chest tubes, Patient has had 3 L output in the last 24 hours. Patient is had improvement of P/F ratio to 1.36 today. May be able to supine tomorrow. No other acute issues at this time. Objective     TEMPERATURE:  Current - Temp: 99.1 °F (37.3 °C); Max - Temp  Av °F (37.2 °C)  Min: 98.8 °F (37.1 °C)  Max: 99.1 °F (37.3 °C)  RESPIRATIONS RANGE: Resp  Av.1  Min: 30  Max: 38  PULSE RANGE: Pulse  Av.9  Min: 76  Max: 92  BLOOD PRESSURE RANGE:  Systolic (42KSD), TKH:324 , Min:104 , WJ   ; Diastolic (90SRO), PJF:03, Min:59, Max:66    PULSE OXIMETRY RANGE: SpO2  Av.9 %  Min: 95 %  Max: 99 %    I & O - 24hr:    Intake/Output Summary (Last 24 hours) at 2021 1027  Last data filed at 2021 0838  Gross per 24 hour   Intake 1898 ml   Output 4675 ml   Net -2777 ml     I/O last 3 completed shifts: In: 1898 [I.V.:1049; NG/GT:749; IV Piggyback:100]  Out: 5050 [Urine:4730; Chest Tube:320] I/O this shift:  In: -   Out: 400 [Urine:400]   Weight change:     Physical Exam  Constitutional:       Appearance: Normal appearance. Interventions: She is sedated and intubated. HENT:      Head: Normocephalic and atraumatic. Nose: Nose normal.   Eyes:      Pupils: Pupils are equal, round, and reactive to light. Cardiovascular:      Rate and Rhythm: Normal rate and regular rhythm. Pulses: Normal pulses. Heart sounds: Normal heart sounds. Pulmonary:      Effort: Pulmonary effort is normal. She is intubated.       Breath sounds: Examination of the left-upper field reveals decreased breath sounds. Examination of the left-middle field reveals decreased breath sounds. Examination of the left-lower field reveals decreased breath sounds. Decreased breath sounds and wheezing present. No rhonchi or rales. Abdominal:      General: Bowel sounds are normal. There is no distension. Palpations: Abdomen is soft. Musculoskeletal:      Cervical back: Normal range of motion. Skin:     General: Skin is warm and moist.      Capillary Refill: Capillary refill takes less than 2 seconds. Neurological:      General: No focal deficit present.          Medications     Continuous Infusions:   norepinephrine 2 mcg/min (12/04/21 0224)    bumetanide 0.1 mg/mL infusion 0.5 mg/hr (12/03/21 1611)    fentaNYL 5 mcg/ml in 0.9%  ml infusion 125 mcg/hr (12/04/21 1003)    midazolam 4 mg/hr (12/04/21 1003)    cisatracurium (NIMBEX) infusion 3 mcg/kg/min (12/04/21 0556)    sodium chloride 100 mL/hr at 11/1955    dextrose       Scheduled Meds:   potassium chloride  40 mEq IntraVENous Once    acetaZOLAMIDE  250 mg Oral Once    sucralfate  1 g Oral 4 times per day    sennosides-docusate sodium  2 tablet Oral Daily    sodium chloride flush  5-40 mL IntraVENous 2 times per day    heparin flush  3 mL IntraVENous 2 times per day    pantoprazole  40 mg IntraVENous BID    And    sodium chloride (PF)  10 mL IntraVENous BID    heparin (porcine)  5,000 Units SubCUTAneous Q8H    chlorhexidine  15 mL Mouth/Throat BID    artificial tears   Both Eyes Q6H    budesonide  1 mg Nebulization BID    Arformoterol Tartrate  15 mcg Nebulization BID    atorvastatin  10 mg Oral Daily    QUEtiapine  200 mg Oral Daily    sertraline  200 mg Oral Daily    traZODone  100 mg Oral Nightly    ascorbic acid  1,000 mg Oral Daily    vitamin D  2,000 Units Oral Daily    zinc sulfate  50 mg Oral Daily     PRN Meds: polyethylene glycol, sodium chloride flush, heparin flush, ipratropium-albuterol, sodium chloride, ondansetron **OR** ondansetron, acetaminophen **OR** acetaminophen, glucose, dextrose, glucagon (rDNA), dextrose  Nutrition:   NG/OG tube TF type: Pulmocare/Nephro/Glucerna/Jevity        At rate: ml/h    Labs and Imaging Studies     CBC:   Recent Labs     12/02/21  0500 12/02/21  1653 12/03/21  0523 12/03/21  1838 12/04/21  0436   WBC 13.8*  --  10.9  --  10.6   HGB 8.6*   < > 8.2* 7.8* 7.7*   HCT 26.7*   < > 26.4* 24.9* 24.0*   .8*  --  105.2*  --  101.3*   *  --  96*  --  122*    < > = values in this interval not displayed.        BMP:    Recent Labs     12/02/21  1340 12/03/21  1141 12/04/21  0141    138 139   K 3.8 3.3* 3.3*   CL 97* 99 99   CO2 30* 30* 30*   BUN 34* 32* 29*   CREATININE 0.9 0.8 0.7   GLUCOSE 109* 106* 109*       LIVER PROFILE:   Recent Labs     12/02/21  0500 12/03/21  0522 12/04/21  0436   * 93* 66*   ALT 75* 77* 69*   BILIDIR <0.2 <0.2 <0.2   BILITOT 0.5 0.3 0.4   ALKPHOS 89 94 85       PT/INR:   Recent Labs     12/02/21  0500 12/03/21  0523 12/04/21  0436   PROTIME 11.6 12.5* 13.4*   INR 1.1 1.2 1.2       APTT:   Recent Labs     12/02/21  0500 12/03/21  0523 12/04/21  0436   APTT 23.2* 23.3* 24.2*       Fasting Lipid Panel:    Lab Results   Component Value Date    CHOL 322 10/12/2021    TRIG 150 10/12/2021    HDL 70 10/12/2021       Cardiac Enzymes:    Lab Results   Component Value Date    CKTOTAL 135 11/22/2021    CKTOTAL 488 (H) 11/20/2021    CKTOTAL 585 (H) 11/19/2021       Notable Cultures:      Blood cultures   Blood Culture, Routine   Date Value Ref Range Status   11/25/2021 5 Days no growth  Final     Respiratory cultures No results found for: RESPCULTURE No results found for: LABGRAM  Urine   Urine Culture, Routine   Date Value Ref Range Status   11/19/2021 Growth not present  Final     Legionella No results found for: LABLEGI  C Diff PCR No results found for: CDIFPCR  Wound culture/abscess: No results for input(s): WNDABS in the last 72 hours. Tip culture:No results for input(s): CXCATHTIP in the last 72 hours. Oxygen:     Vent Information  $Ventilation: $Subsequent Day  Skin Assessment: Clean, dry, & intact  Equipment ID: [id number]  Equipment Changed: Humidification  Vent Type: 980  Vent Mode: AC/VC+  Vt Ordered: 320 mL  Pressure Ordered: 34  Rate Set: 34 bmp  Peak Flow: 0 L/min  Pressure Support: 0 cmH20  FiO2 : 80 %  SpO2: 97 %  SpO2/FiO2 ratio: 121.25  PaO2/FiO2 ratio: 120  Sensitivity: 3  PEEP/CPAP: 10  I Time/ I Time %: 0.76 s  Humidification Source: Heated wire  Humidification Temp: 37  Humidification Temp Measured: 36.1  Circuit Condensation: Drained  Mask Type: Full face mask  Mask Size: Small  Additional Respiratory  Assessments  Pulse: 92  Resp: (!) 34  SpO2: 97 %  Position: Prone  Humidification Source: Heated wire  Humidification Temp: 37  Circuit Condensation: Drained  Oral Care Completed?: Yes  Oral Care: Mouthwash with chlorhexidine, Mouth swabbed, Mouth suctioned  Subglottic Suction Done?: Yes  Airway Type: ET  Airway Size: 8  Cuff Pressure (cm H2O):  (MLT)       [REMOVED] Urethral Catheter Temperature probe-Output (mL): 10 mL  Urethral Catheter-Output (mL): 400 mL  [REMOVED] Urethral Catheter Temperature probe 16 fr-Output (mL): 250 mL    Imaging Studies:  XR CHEST PORTABLE   Final Result   1. Stable diffuse bilateral airspace disease. 2. Small left pneumothorax appears new or increased compared to prior   examination. Left chest tube appears stable in position. 3. Possible right pleural effusion. XR CHEST PORTABLE   Final Result   1. There is no interval change in extensive multifocal bilateral pneumonia. XR CHEST PORTABLE   Final Result   No change in extensive bilateral pulmonary airspace opacities. No   pneumothorax is radiographically visible on the current exam.         XR CHEST PORTABLE   Final Result   Significantly decreased size of left pneumothorax.   There is likely trace   left pneumothorax remaining. Stable extensive bilateral pulmonary airspace opacities. XR CHEST PORTABLE   Final Result   Addendum 1 of 1   ADDENDUM:   Verbal report was given to Katherine North RN at 8:15 a.m. central standard   time on 11/30/2021. Final   New moderate sized left-sided pneumothorax. Stable extensive bilateral pulmonary airspace opacities            XR CHEST PORTABLE   Final Result   1. Lines okay. 2. Persistent edema/ARDS/pneumonia. 3. No sign of pneumothorax. XR CHEST PORTABLE   Final Result   Interval placement of left-sided chest tube with questionable residual   pneumothorax versus artifact. Extensive bilateral infiltrates. Continued follow-up recommended. XR CHEST PORTABLE   Final Result   Interval development of large left-sided tension pneumothorax. Extensive bilateral parenchymal infiltrates. Findings were discussed with Dr. Sawant at approximately 0010 hours [place]   time on 11/28/2021. XR CHEST PORTABLE   Final Result   Severe bilateral pulmonary opacification. XR CHEST PORTABLE   Final Result   1. Endotracheal tube is 3 cm above the khai. 2. Stable interstitial pulmonary edema or pneumonia throughout both lungs. XR CHEST PORTABLE   Final Result   1. Somewhat limited exam, grossly unchanged. Please see above comments. .      RECOMMENDATION:   1. Recommend follow-up thoracic imaging, as directed clinically. .         XR ABDOMEN (KUB) (SINGLE AP VIEW)   Final Result   1. Satisfactory position of the NG tube within the stomach         XR CHEST PORTABLE   Final Result   1. There is no interval change in the multifocal bilateral pneumonia. XR CHEST PORTABLE   Final Result   Bilateral airspace disease with interval progression on the right         XR CHEST PORTABLE   Final Result   1. Endotracheal tube is 2.7 cm above the khai.    2. Stable interstitial pulmonary edema or pneumonia throughout both Result   Bilateral patchy airspace opacities worrisome for pneumonia.          XR CHEST PORTABLE    (Results Pending)   XR CHEST PORTABLE    (Results Pending)   XR CHEST PORTABLE    (Results Pending)        Resident's Assessment and Plan     Assessment and Plan:    Cardiovascular   History of hyperlipidemia  -Continue home atorvastatin 10 mg    Pulmonary  Acute hypoxic respiratory failure 2/2 Covid pneumonia  -Patient is intubated  -Last vent settings of respiratory rate 34, tidal volume of 320, PEEP of 10, plateau of less then 30 with FiO2 of 95%  -PF ratio of 0.97 today   -Currently sedated with fentanyl  -Completed Courses of Decadron and Toci  -Respiratory cultures have been negative, fungal cultures and BLE are also negative  -Continue ventilation with daily ABGs  -Completed remdesivir   -Continue breathing treatments  -Per ID continue Diflucan on with Fungitell pending  -Bumex drip 0.5 mg   -ABG showed pH of 7.5 today  -Was given 1 dose of Diamox   -Consider Supining patient tomorrow    Left lung pneumothorax  -Chest x-ray showed left pneumothorax on 11/29  -Repeat chest x-ray today showed worsening pneumothorax on 11/30  -2 chest tubes placed   -Continue with chest tube and continue to monitor, General Surgery monitoring     Gastrointestinal  Transaminitis 2/2 Covid infection  -Liver enzymes trending down   -Continue to monitor    Concern for GI bleed  -FOBT positive   -Hgb of 7.7 today   -Heparin resumed, okay per surgery  -Continue to monitor   -Monitor H&H every 8 hours    Infectious Disease   COVID-19 pneumonia  -See above  -Continue to monitor CRP D-dimer and pro-Carl  -Continue vitamin C, vitamin D and zinc    Renal   Stage III intrinsic CHARAN (resolved)   -Baseline creatinine of 0.5  -Urea 15.2  -Nephro is following  -Creatinine is trending down, to 0.7 today  -Continue to follow nephro recommendations  -Bumex Drip 0.5 mg   -Continue to monitor  -Continue to monitor and replace electrolytes as appropriate Heme/Onc  Reactive leukocytosis 2/2 steroids and Covid infection  -WBC stable today  -Cultures have been negative  -Fungitell negative   -Diflucan stopped   -Continue to follow ID recommendations    Thrombocytopenia 2/2 Covid?   -Platelets trending up to 122 today   -Was HIT negative  -No intervention at this time  -Continue to monitor    Psychiatric   History of schizoaffective disorder  -Continue home hydroxyzine, trazodone, quetiapine and sertraline    # Peptic ulcer prophylaxis:Protonix   # DVT Prophylaxis: Heparin resumed   # Disposition: Cont current care     Nas James MD, PGY-1    Attending Physician: Dr. Gay Ernst personally saw, examined and provided care for the patient. Radiographs, labs and medication list were reviewed by me independently. I spoke with bedside nursing, therapists and consultants. Critical care services and times documented are independent of procedures and multidisciplinary rounds with Residents. Additionally comprehensive, multidisciplinary rounds were conducted with the MICU team. The case was discussed in detail and plans for care were established. Review of Residents documentation was conducted and revisions were made as appropriate. I agree with the above documented exam, problem list and plan of care. ARDS /COVID  Developed pneumothorax . s/p 2 Chest tube . .lung expnaded   Start bumex drip 0.5 mg /h    [id number] ,negative 3 L   PF 130   plt less than 30 around 25   Prone and paralyzed   Hb monitor ,plt 122  On DVT    PPI bid   ID following ,wbc coming down   Will update family and discuss code status   prognosis poor             Keyona Ovalle MD,[facility]  Pulmonary&Critical Care Medicine   Director of [address] [place] [address] Director of [address] [place] [address]    Edwige Mijares

## 2021-12-04 NOTE — PROGRESS NOTES
Nephrology Progress Note  Patient's Name: Tim Gomez    Reason for Consult:  Acute kidney injury    History of Present Ilness from the 11/24/21 note:    Tim Gomez is a 60 y.o. female with a history of GI GERD, osteoarthritis, and schizoaffective disorder. She initially presented to this hospital 5 days ago following a fall at home. She was found by her roommate following an unspecified duration. EMS was called. Upon their evaluation patient was noted to be hypoxic. She was subsequently brought to ED for evaluation. In the ED she was noted to be hypoxic with an oxygen saturation of 60% on room air. Also noted to to have a low-grade fever. Laboratory data was significant for WBC of 8.5, hemoglobin of 14.6, BUN 17 and a creatinine of 0.6. Rapid COVID-19 test was positive. Patient was admitted to telemetry. 2 days into her hospital course became increasingly more hypoxic and was transferred to MICU. Because of her persistent hypoxia patient was intubated with mechanical ventilation on 11/20;  . Over the past 48 hours she has been markedly hypotensive requiring fluid resuscitation and pressors. Creatinine has worsened to a current value of 1.6 associated with low urine output. Hence renal consult. Subjective    11/28: pt remains in COVID 19 Isolation. She remains proned and on an FIO2 65% and PEEP12 with an O2 sat 95-96%    11/29: pt seen through window of icu due to covid, chart reviewed    11/30: pt seen through window of icu due to covid, chart reviewed.      12/1/21 :pt seen through window of icu due to covid, review of chart performed    12/2/21: pt seen through window of icu due to covid, chart reviewed, intubated, chest tube left     12/3/21 :chart reviewed, chest tube x 2 on right, intubated and prone, seen through window of icu    12/4: no new acute issues from overnight; large volume pleural fluid drainage from bilateral catheter    Allergies:  Ciprofloxacin    Current Medications:    norepinephrine (LEVOPHED) 16 mg in dextrose 5% 250 mL infusion, Continuous  potassium chloride 40 mEq in 100 mL IVPB (Central Line), Once  acetaZOLAMIDE (DIAMOX) tablet 250 mg, Once  bumetanide (BUMEX) 12.5 mg in sodium chloride 0.9 % 125 mL infusion, Continuous  fentaNYL 5 mcg/ml in 0.9%  ml infusion, Continuous  sucralfate (CARAFATE) tablet 1 g, 4 times per day  sennosides-docusate sodium (SENOKOT-S) 8.6-50 MG tablet 2 tablet, Daily  polyethylene glycol (GLYCOLAX) packet 17 g, Daily PRN  sodium chloride flush 0.9 % injection 5-40 mL, 2 times per day  sodium chloride flush 0.9 % injection 5-40 mL, PRN  heparin flush 100 UNIT/ML injection 300 Units, 2 times per day  heparin flush 100 UNIT/ML injection 300 Units, PRN  pantoprazole (PROTONIX) injection 40 mg, BID   And  sodium chloride (PF) 0.9 % injection 10 mL, BID  midazolam (VERSED) 100 mg in dextrose 5 % 100 mL infusion, Continuous  heparin (porcine) injection 5,000 Units, Q8H  chlorhexidine (PERIDEX) 0.12 % solution 15 mL, BID  lubrifresh P.M. (artificial tears) ophthalmic ointment, Q6H  cisatracurium besylate (NIMBEX) 200 mg in sodium chloride 0.9 % 100 mL infusion, Continuous  ipratropium-albuterol (DUONEB) nebulizer solution 1 ampule, Q4H PRN  budesonide (PULMICORT) nebulizer suspension 1,000 mcg, BID  Arformoterol Tartrate (BROVANA) nebulizer solution 15 mcg, BID  atorvastatin (LIPITOR) tablet 10 mg, Daily  QUEtiapine (SEROQUEL) tablet 200 mg, Daily  sertraline (ZOLOFT) tablet 200 mg, Daily  traZODone (DESYREL) tablet 100 mg, Nightly  0.9 % sodium chloride infusion, PRN  ondansetron (ZOFRAN-ODT) disintegrating tablet 4 mg, Q8H PRN   Or  ondansetron (ZOFRAN) injection 4 mg, Q6H PRN  acetaminophen (TYLENOL) tablet 650 mg, Q6H PRN   Or  acetaminophen (TYLENOL) suppository 650 mg, Q6H PRN  ascorbic acid (VITAMIN C) tablet 1,000 mg, Daily  Vitamin D (CHOLECALCIFEROL) tablet 2,000 Units, Daily  zinc sulfate (ZINCATE) capsule 50 mg, Daily  glucose (GLUTOSE) 40 % oral gel 15 g, PRN  dextrose 50 % IV solution, PRN  glucagon (rDNA) injection 1 mg, PRN  dextrose 5 % solution, PRN        Review of Systems:   Review of systems not obtained due to patient factors. Physical exam:  Vitals:    12/04/21 0900   BP:    Pulse: 87   Resp: (!) 35   Temp:    SpO2: 97%          General: intubated,  Sedated/paralyzed prone   Eyes: PERRL. No sclera icterus. No conjunctival injection. ENT: No discharge. Pharynx clear. Neck: Trachea midline. Normal thyroid. Lungs: coarse breath sounds   CV: Regular rate. Regular rhythm. No murmur or rub. .   Abd: Non-tender. Non-distended. No masses. No organmegaly. Normal bowel sounds. Skin: Warm and dry. No nodule on exposed extremities. No rash on exposed extremities.   Ext: No cyanosis, clubbing, 2+ pitting bilateral arms/legs edema  Neuro: sedated/paralyzed    Data:   Labs:  Lab Results   Component Value Date     12/04/2021     12/03/2021     12/02/2021    K 3.3 (L) 12/04/2021    K 3.3 (L) 12/03/2021    K 3.8 12/02/2021    CL 99 12/04/2021    CO2 30 (H) 12/04/2021    CO2 30 (H) 12/03/2021    CO2 30 (H) 12/02/2021    CREATININE 0.7 12/04/2021    CREATININE 0.8 12/03/2021    CREATININE 0.9 12/02/2021    BUN 29 (H) 12/04/2021    BUN 32 (H) 12/03/2021    BUN 34 (H) 12/02/2021    GLUCOSE 109 (H) 12/04/2021    GLUCOSE 106 (H) 12/03/2021    GLUCOSE 109 (H) 12/02/2021    PHOS 5.0 (H) 12/03/2021    PHOS 4.3 12/01/2021    PHOS 4.9 (H) 11/29/2021    WBC 10.6 12/04/2021    WBC 10.9 12/03/2021    WBC 13.8 (H) 12/02/2021    HGB 7.7 (L) 12/04/2021    HGB 7.8 (L) 12/03/2021    HGB 8.2 (L) 12/03/2021    HCT 24.0 (L) 12/04/2021    HCT 24.9 (L) 12/03/2021    HCT 26.4 (L) 12/03/2021    .3 (H) 12/04/2021     (L) 12/04/2021         Imaging:  CT HEAD WO CONTRAST    Result Date: 11/16/2021  EXAMINATION: CT OF THE HEAD WITHOUT CONTRAST  11/16/2021 9:54 pm TECHNIQUE: CT of the head was performed without the administration of intravenous contrast. Dose modulation, iterative reconstruction, and/or weight based adjustment of the mA/kV was utilized to reduce the radiation dose to as low as reasonably achievable. COMPARISON: None. HISTORY: ORDERING SYSTEM PROVIDED HISTORY: fall TECHNOLOGIST PROVIDED HISTORY: Reason for exam:->fall Has a \"code stroke\" or \"stroke alert\" been called? ->No Decision Support Exception - unselect if not a suspected or confirmed emergency medical condition->Emergency Medical Condition (MA) What reading provider will be dictating this exam?->CRC FINDINGS: BRAIN/VENTRICLES: There are cortical atrophy and periventricular white matter ischemic changes. There is no acute intracranial hemorrhage, mass effect or midline shift. No abnormal extra-axial fluid collection. The gray-white differentiation is maintained without evidence of an acute infarct. There is no evidence of hydrocephalus. ORBITS: The visualized portion of the orbits demonstrate no acute abnormality. SINUSES: The visualized paranasal sinuses and mastoid air cells demonstrate no acute abnormality. SOFT TISSUES/SKULL:  No acute abnormality of the visualized skull or soft tissues. No acute intracranial abnormality. Cortical atrophy and periventricular white matter ischemic changes. XR CHEST PORTABLE    Result Date: 11/16/2021  EXAMINATION: ONE XRAY VIEW OF THE CHEST 11/16/2021 7:10 pm COMPARISON: 09/23/2021 HISTORY: ORDERING SYSTEM PROVIDED HISTORY: weakness TECHNOLOGIST PROVIDED HISTORY: Reason for exam:->weakness What reading provider will be dictating this exam?->CRC FINDINGS: Bilateral patchy airspace opacities. There is no effusion or pneumothorax. The cardiomediastinal silhouette is without acute process. The osseous structures are without acute process. Bilateral patchy airspace opacities worrisome for pneumonia.      CTA CHEST W CONTRAST    Result Date: 11/16/2021  EXAMINATION: CTA OF THE CHEST 11/16/2021 9:54 pm TECHNIQUE: CTA of the chest was performed after the administration of intravenous contrast.  Multiplanar reformatted images are provided for review. MIP images are provided for review. Dose modulation, iterative reconstruction, and/or weight based adjustment of the mA/kV was utilized to reduce the radiation dose to as low as reasonably achievable. COMPARISON: None. HISTORY: ORDERING SYSTEM PROVIDED HISTORY: r/o pe TECHNOLOGIST PROVIDED HISTORY: Reason for exam:->r/o pe Decision Support Exception - unselect if not a suspected or confirmed emergency medical condition->Emergency Medical Condition (MA) What reading provider will be dictating this exam?->CRC FINDINGS: Pulmonary Arteries: Pulmonary arteries are adequately opacified for evaluation. No evidence of intraluminal filling defect to suggest pulmonary embolism. Main pulmonary artery is normal in caliber. Mediastinum: No evidence of mediastinal lymphadenopathy. The heart and pericardium demonstrate no acute abnormality. There is no acute abnormality of the thoracic aorta. Lungs/pleura: The lungs demonstrate extensive multifocal ground-glass opacities predominantly in the peripheral lung zones. No evidence of pleural effusion or pneumothorax. Upper Abdomen: Limited images of the upper abdomen are unremarkable. Soft Tissues/Bones: No acute bone or soft tissue abnormality. No evidence of pulmonary embolism. Extensive multifocal ground-glass opacities predominantly in the peripheral lung zones bilaterally, highly concerning for atypical or COVID related pneumonia. Assessment/Plans    1. Acute kidney injury stage I  ischemic ATN occurring in the setting of hypotension  nonoliguric    Cr continues to improve now back to baseline  Continue to monitor labs and urine output  Will continue low dose bumex drip  Good response    2.   Acute hypoxic respiratory failure  due to COVID-19 pneumonia  Intubation with mechanical ventilation; prone   As per

## 2021-12-04 NOTE — PROGRESS NOTES
[facility] - [facility] Inpatient   Resident Progress Note    S:  Hospital day: 25   Brief Synopsis: Caden Gallardo is a 60 y.o. female with pmh of GERD, osteoarthritis and schizoaffective disorder who presented to ER s/p fall at home. Patient found down at home, with initial O2 saturation of 60%. Brought to the ER; found to have COVID-19 pneumonia , requiring bipap for appropriate saturation. Rhabdomyolysis, UTI. Started on appropriate management including tocilizumab, ceftriaxone 1 g daily, and IV fluids for rhabdomyolysis. Decadron was started daily, and with patients worsening status - pulmonology consulted. FULL CODE. Transferred to ICU on 11/18 for rapid breathing and hypoxia and pO2 of 55. Intubated 11/20 secondary to O2 sats consistently <80% with rapid breathing. 11/29 , patient developed left sided tension pneumothorax and underwent chest tube placement. Patient Intubated, sedated, paralyzed, proned. On vasopressors. Hemoglobin dropped to 6.9 with 1 unit PRBC transfused. Second chest tube placed on left chest.     Patient remains prone, sedated and intubated when seen in the morning. Chest tubes in place. Family has been contacted regarding patients poor prognosis.     Cont meds:    norepinephrine 2 mcg/min (12/04/21 0224)    bumetanide 0.1 mg/mL infusion 0.5 mg/hr (12/03/21 1611)    fentaNYL 5 mcg/ml in 0.9%  ml infusion 125 mcg/hr (12/03/21 2490)    midazolam 4 mg/hr (12/03/21 1258)    cisatracurium (NIMBEX) infusion 3 mcg/kg/min (12/04/21 3356)    sodium chloride 100 mL/hr at 11/1956    dextrose       Scheduled meds:    potassium chloride  40 mEq IntraVENous Once    acetaZOLAMIDE  250 mg Oral Once    sucralfate  1 g Oral 4 times per day    sennosides-docusate sodium  2 tablet Oral Daily    sodium chloride flush  5-40 mL IntraVENous 2 times per day    heparin flush  3 mL IntraVENous 2 times per day    pantoprazole  40 mg IntraVENous BID    And    sodium chloride (PF) 10 mL IntraVENous BID    heparin (porcine)  5,000 Units SubCUTAneous Q8H    chlorhexidine  15 mL Mouth/Throat BID    artificial tears   Both Eyes Q6H    budesonide  1 mg Nebulization BID    Arformoterol Tartrate  15 mcg Nebulization BID    atorvastatin  10 mg Oral Daily    QUEtiapine  200 mg Oral Daily    sertraline  200 mg Oral Daily    traZODone  100 mg Oral Nightly    ascorbic acid  1,000 mg Oral Daily    vitamin D  2,000 Units Oral Daily    zinc sulfate  50 mg Oral Daily     PRN meds: polyethylene glycol, sodium chloride flush, heparin flush, ipratropium-albuterol, sodium chloride, ondansetron **OR** ondansetron, acetaminophen **OR** acetaminophen, glucose, dextrose, glucagon (rDNA), dextrose     I reviewed the patient's past medical and surgical history, Medications and Allergies. O:  BP (!) 106/59   Pulse 79   Temp 98.8 °F (37.1 °C) (Esophageal)   Resp (!) 34   Ht 5' 1\" (1.549 m)   Wt 169 lb 4.8 oz (76.8 kg)   SpO2 97%   BMI 31.99 kg/m²   24 hour I&O: I/O last 3 completed shifts: In: 1898 [I.V.:1049; NG/GT:749; IV Piggyback:100]  Out: 5050 [Urine:4730; Chest Tube:320]  No intake/output data recorded. Physical Exam  Constitutional:       Comments: Intubated, sedated, paralyzed, proned   HENT:      Mouth/Throat:      Comments: intubated  Cardiovascular:      Rate and Rhythm: Normal rate and regular rhythm. Pulses: Normal pulses. Heart sounds: Normal heart sounds. Pulmonary:      Breath sounds: No wheezing, rhonchi or rales. Comments: Left sided Chest tube x 2; adventitious sounds on auscultation  Abdominal:      Comments: Currently proned   Musculoskeletal:         General: Normal range of motion. Right lower leg: Edema present. Left lower leg: Edema present. Skin:     General: Skin is warm and dry. Findings: Bruising (mild, on back) present.    Neurological:      Comments: Sedated, paralyzed       Labs:  Na/K/Cl/CO2:  139/3.3/99/30 (12/04 3453)  BUN/Cr/glu/ALT/AST/amyl/lip:  --/--/--/69/66/--/-- (12/04 0436)  WBC/Hgb/Hct/Plts:  10.6/7.7/24.0/122 (12/04 0436)  estimated creatinine clearance is 76 mL/min (based on SCr of 0.7 mg/dL). Other pertinent labs as noted below    Radiology:  XR CHEST PORTABLE   Final Result   1. Stable diffuse bilateral airspace disease. 2. Small left pneumothorax appears new or increased compared to prior   examination. Left chest tube appears stable in position. 3. Possible right pleural effusion. XR CHEST PORTABLE   Final Result   1. There is no interval change in extensive multifocal bilateral pneumonia. XR CHEST PORTABLE   Final Result   No change in extensive bilateral pulmonary airspace opacities. No   pneumothorax is radiographically visible on the current exam.         XR CHEST PORTABLE   Final Result   Significantly decreased size of left pneumothorax. There is likely trace   left pneumothorax remaining. Stable extensive bilateral pulmonary airspace opacities. XR CHEST PORTABLE   Final Result   Addendum 1 of 1   ADDENDUM:   Verbal report was given to Christoph Pantoja RN at 8:15 a.m. central standard   time on 11/30/2021. Final   New moderate sized left-sided pneumothorax. Stable extensive bilateral pulmonary airspace opacities            XR CHEST PORTABLE   Final Result   1. Lines okay. 2. Persistent edema/ARDS/pneumonia. 3. No sign of pneumothorax. XR CHEST PORTABLE   Final Result   Interval placement of left-sided chest tube with questionable residual   pneumothorax versus artifact. Extensive bilateral infiltrates. Continued follow-up recommended. XR CHEST PORTABLE   Final Result   Interval development of large left-sided tension pneumothorax. Extensive bilateral parenchymal infiltrates. Findings were discussed with Dr. Gee Acuña at approximately 0010 hours [place]   time on 11/28/2021.          XR CHEST PORTABLE   Final Result   Severe bilateral pulmonary opacification. XR CHEST PORTABLE   Final Result   1. Endotracheal tube is 3 cm above the khai. 2. Stable interstitial pulmonary edema or pneumonia throughout both lungs. XR CHEST PORTABLE   Final Result   1. Somewhat limited exam, grossly unchanged. Please see above comments. .      RECOMMENDATION:   1. Recommend follow-up thoracic imaging, as directed clinically. .         XR ABDOMEN (KUB) (SINGLE AP VIEW)   Final Result   1. Satisfactory position of the NG tube within the stomach         XR CHEST PORTABLE   Final Result   1. There is no interval change in the multifocal bilateral pneumonia. XR CHEST PORTABLE   Final Result   Bilateral airspace disease with interval progression on the right         XR CHEST PORTABLE   Final Result   1. Endotracheal tube is 2.7 cm above the khai. 2. Stable interstitial pulmonary edema or pneumonia throughout both lungs. XR CHEST PORTABLE   Final Result   1. Worsening of the multifocal bilateral pneumonia when compared with the   prior study of 1 day earlier. XR CHEST PORTABLE   Final Result   1. Multifocal bilateral pneumonia more prominent within the right upper lobe   2. Minimal partial interval clearing of the pneumonia within the left lung   3. Worsening of the pneumonia within the right upper lobe. US DUP LOWER EXTREMITIES BILATERAL VENOUS   Final Result   Within the visualized vessels there is no evidence for deep venous   thrombosis               XR CHEST PORTABLE   Final Result   1. Lines okay. 2. Slightly worsened diffuse edema versus pneumonia. XR CHEST PORTABLE   Final Result   1. Lines okay   2. Improved aeration, mild improvement and diffuse pneumonia/edema. XR CHEST ABDOMEN NG PLACEMENT   Final Result   NG tube position satisfactory. XR CHEST PORTABLE   Final Result   Stable bilateral pneumonia or interstitial pulmonary edema.          XR CHEST PORTABLE Final Result   Increasing bilateral infiltrates. XR CHEST PORTABLE   Final Result   1. There is no interval change in the multifocal bilateral pneumonia. CT HEAD WO CONTRAST   Final Result   No acute intracranial abnormality. Cortical atrophy and periventricular white matter ischemic changes. CT CERVICAL SPINE WO CONTRAST   Final Result   No acute abnormality of the cervical spine. Moderate degenerative changes with disc space narrowing and marginal bony   spur formation C5 through C7. Ground-glass opacities in the visualized lung apices. Please refer to chest   CT for additional information. CTA CHEST W CONTRAST   Final Result   No evidence of pulmonary embolism. Extensive multifocal ground-glass opacities predominantly in the peripheral   lung zones bilaterally, highly concerning for atypical or COVID related   pneumonia. XR CHEST PORTABLE   Final Result   Bilateral patchy airspace opacities worrisome for pneumonia. XR CHEST PORTABLE    (Results Pending)   XR CHEST PORTABLE    (Results Pending)   XR CHEST PORTABLE    (Results Pending)       A/P:  Active Problems:    Acute respiratory failure with hypoxia (HCC)    Rhabdomyolysis    Acute cystitis with hematuria    Primary spontaneous pneumothorax  Resolved Problems:    * No resolved hospital problems. *    1. Intubated, Sedated, Paralyzed, Proned  11/20: Intubated 2/2 hypoxia and increased work of breathing, proned and paralyzed  S/p Left Chest Tube 11/29 for tension pneumothorax. Second chest tube placed on 11/30. Air leaks noted. Serosanguineous drainage. No further procedures planned by surgery.     2. Acute Hypoxic Respiratory Failure 2/2 Covid 19  Unvaccinated for Covid 19  Patient found with pulse ox of 60% --> currently intubated and sedated  CXR showing bilateral pulmonary infiltrates  Inflammatory markers: DDimer 530, , CRP 22.9, Ferritin 749 on admission   CTA pulm showing extensive bilateral pulmonary infiltrates consistent with COVID-19 pneumonia , no PE  Completed remdesivir and tocilizumab treatment  Decadron and SoluCortef course completed  Pulmonology consulted ; appreciate recs; daily ABG, Vitamin C, Vitamin D  Dietary consulted for further recommendations on nutrition status ; appreciate recs. 11/18 - transferred to ICU due to worsening resp status. CXR with resolution of pneumothorax. Advanced Care Planning with Spiritual Services ; recs appreciated - FULL CODE. Family reached out to consider code status. Further discussion with brother by [facility] team today    3. Left Sided Tension Pneumothorax - improving  S/p left sided chest tube  11/29 , Tension Pneumothorax on Xray  Left lung re inflated on repeat CXR  Whistle like sound noted Left Upper Lung field 2/2 likely to Chest tube . 2nd chest tube placed on 11/30  Continue to monitor    4. Concern for Sepsis  Likely 2/2 superimposed bacterial pneumonia , candidal colonization  Tmax 102.2 , Tavg 100.3F  Given one dose cefepime and vancomycin in ICU  Procal 0.07, repeat 0.27 , blood cultures, urine culture negative. Completed Pip/Tazo, Vancomycin  Fungitell and B glucan pending  Diflucan stopped by ID.      5. CHARAN Stage I  Admission creatinine 0.6  Likely 2/2 to Ischemic ATN in setting of Hypotension  Creatinine increased to 1.6 --> trended down to 0.9  Nephrology following , appreciate recs   FeNa: 0.2- Pre- Renal     6. Anemia  2/2 to inflammation/ response to Covid 19  .3, MCHC 31.5 RDW 20.1  Vitamin B12/Folate normal March 2021  FOBT + 11/29  H/H < 7 11/30/2021 , s/p 1 unit PRBC. Hemoglobin 7.8 this AM.  Maintain H/H > 7    6. Thrombocytopenia  Likely 2/2 to SALENA vs inflammation from Covid 19  Hold all anticoagulation , patient trialed on heparin , lovenox and argatroban but platelets continue to decrease  SALENA panel- negative  Platelets 836 this am     7.  Hx Schizoaffective Disorder / Anxiety  Continue seroquel ,

## 2021-12-04 NOTE — PROGRESS NOTES
12/04/21 1008   Vent Information   Equipment -45   Vent Type 980   Vent Mode AC/VC+   Vt Ordered 320 mL   Rate Set 34 bmp   Peak Flow 0 L/min   Pressure Support 0 cmH20   FiO2  80 %   SpO2 97 %   SpO2/FiO2 ratio 121.25   Sensitivity 3   PEEP/CPAP 10   I Time/ I Time % 0.76 s   Humidification Source Heated wire   Humidification Temp 37   Humidification Temp Measured 36.1   Circuit Condensation Drained   Vent Patient Data   High Peep/I Pressure 0   Peak Inspiratory Pressure 26 cmH2O   Mean Airway Pressure 17 cmH20   Rate Measured 34 br/min   Vt Exhaled 252 mL   Minute Volume 8.55 Liters   I:E Ratio 1:1.30   Plateau Pressure 13 [id number]   Static Compliance 36 mL/cmH2O

## 2021-12-04 NOTE — PROGRESS NOTES
I was contacted by Rah Gonzalez the brother of Helder Ortega on my way to work today. Rah Gonzalez wanted to speak about Marilyn's condition and her goals for care. Prior to being placed on the ventilator I had a conversation with Helder Ortega regarding what care she who want. At that time she stated she wanted everything done. When asked if her health worsens and it becomes clear that recovery is unlikely she stated she would not want the breathing machine at the time. Rah Gonzalez is aware of the conversation as I spoke to him then pre Marilyn's request. Rah Gonzalez and his sister are in communication and considering the goals of care at this time. Please continue conversation regarding her care status and any changes. Much emotional and prayer support provided.

## 2021-12-04 NOTE — PROGRESS NOTES
[address] [employer] Attending    S: 60 y.o. female with a history of gerd, oa, schizoaffective disorder, and gastric fundiplication who was found down for an undetermined amount of time. Reports shortness of breath and cough for 2.5 weeks. She was found to be 60% on RA. In the ER, COVID testing was positive with significantly elevated CPKs. Patient is unvaccinated. Had desaturation to 88% wit PaO2 of 55 with RRT and subsequent transfer to the MICU. Desat to 40's when Bipap removed. Now intubated. Sedated, paralyzed, and prone. Noted to have pneumothorax and chest tube placed. Second chest tube placed when pneumothorax not improved. Today,  Intubated, sedated, prone. Chest tube in place. O: VS- Blood pressure 124/66, pulse 92, temperature 99.1 °F (37.3 °C), temperature source Esophageal, resp. rate (!) 34, height 5' 1\" (1.549 m), weight 169 lb 4.8 oz (76.8 kg), SpO2 97 %. Exam is as noted by resident   Currently prone and nonresponsive. Lungs: coarse, vent. Decreased BS  CV:  Rate controlled, regular   Ext-no C/C/E  Chest tube noted    Impressions: Active Problems:    Acute respiratory failure with hypoxia (HCC)    Covid pneumonia    Rhabdomyolysis, CK improving    Concern for sepsis    Acute cystitis with hematuria, treated    CHARAN    Thrombocytopenia     Plan:      Acute hypoxic resp failure 2/2 covid - Decadron. Completed Tocimizilab. Getting Remdesivir. Vitamin C.  Vitamin D.  Zinc.  Appreciate Pulm management. Sepsis 2/2 PNA - done with vanc and zosyn  U/s of lower extremities negative DVT 11/20. Slow wean of FiO2. Tube feeding. Fluid overloaded - on bumex  Schizoaffective - seroquel  Diflucan stopped. Full code at this time. Discussed with patient's brother; addressed goals of care and prognosis in the event of cardiac arrest, and he expressed understanding. He will be speaking with his family and ICU team today about code status going forward. Attending Physician Statement  I have reviewed the chart and seen the patient with the resident(s). I personally reviewed images, EKG's and similar tests, if present. I personally reviewed and performed key elements of the history and exam.  I have reviewed and confirmed student and/or resident history and exam with changes as indicated above. I agree with the assessment, plan and orders as documented by the resident. Please refer to the  resident and/or student note for additional information.       Benigno Levin, DO

## 2021-12-05 NOTE — PROGRESS NOTES
[address] [facility] Attending    S: 60 y.o. female with a history of gerd, oa, schizoaffective disorder, and gastric fundiplication who was found down for an undetermined amount of time. Reports shortness of breath and cough for 2.5 weeks. She was found to be 60% on RA. In the ER, COVID testing was positive with significantly elevated CPKs. Patient is unvaccinated. Had desaturation to 88% wit PaO2 of 55 with RRT and subsequent transfer to the MICU. Desat to 40's when Bipap removed. Now intubated. Sedated, paralyzed, and prone. Noted to have pneumothorax and chest tube placed. Second chest tube placed when pneumothorax not improved. Today,  Intubated, sedated, prone. Chest tube in place. No events overnight. O: VS- Blood pressure 111/65, pulse 91, temperature 97.5 °F (36.4 °C), temperature source Esophageal, resp. rate 23, height 5' 1\" (1.549 m), weight 169 lb 4.8 oz (76.8 kg), SpO2 97 %. Exam is as noted by resident   Currently prone and nonresponsive. Lungs: coarse, vent. Decreased BS  CV:  Rate controlled, regular   Ext-no C/C/E  Chest tube noted    Impressions: Active Problems:    Acute respiratory failure with hypoxia (HCC)    Covid pneumonia    Rhabdomyolysis, CK improving    Concern for sepsis    Acute cystitis with hematuria, treated    CHARAN    Thrombocytopenia     Plan:      Acute hypoxic resp failure 2/2 covid - Decadron. Completed Tocimizilab. Getting Remdesivir. Vitamin C.  Vitamin D.  Zinc.  Appreciate Pulm management. Sepsis 2/2 PNA - done with vanc and zosyn  U/s of lower extremities negative DVT 11/20. Slow wean of FiO2. Tube feeding. Fluid overloaded - on bumex  Schizoaffective - seroquel  Diflucan stopped. Full code at this time. Discussed with patient's brother 12/4; addressed goals of care and prognosis in the event of cardiac arrest, and he expressed understanding.           Attending Physician Statement  I have reviewed the chart and seen the patient with the resident(s). I personally reviewed images, EKG's and similar tests, if present. I personally reviewed and performed key elements of the history and exam.  I have reviewed and confirmed student and/or resident history and exam with changes as indicated above. I agree with the assessment, plan and orders as documented by the resident. Please refer to the  resident and/or student note for additional information.       Silvina Draper,

## 2021-12-05 NOTE — PROGRESS NOTES
[address] [facility]   Department of Internal Medicine   Internal Medicine Residency  MICU Progress Note    Patient:  Mikel Ortega 60 y.o. female   MRN: [id number]       Date of Service: 2021    Allergy: Ciprofloxacin  Cc follow up ARDS  Subjective     Patient was seen and examined this morning at bedside in no acute distress. Overnight, no acute events noted. Hgb has been stable. Chest tube stable. K dropped to 2.8 and was replaced. Stopped Bumex drip. P/F ratio decreased to 1.03, no other issues at this time, patient to remain prone today. Objective     TEMPERATURE:  Current - Temp: 97.5 °F (36.4 °C); Max - Temp  Av.7 °F (37.1 °C)  Min: 97.5 °F (36.4 °C)  Max: 99.2 °F (37.3 °C)  RESPIRATIONS RANGE: Resp  Av.6  Min: 28  Max: 35  PULSE RANGE: Pulse  Av.9  Min: 85  Max: 100  BLOOD PRESSURE RANGE:  Systolic (57DEP), MYH:540 , Min:111 , YRH:616   ; Diastolic (70YOO), RJY:28, Min:55, Max:66    PULSE OXIMETRY RANGE: SpO2  Av.8 %  Min: 94 %  Max: 98 %    I & O - 24hr:    Intake/Output Summary (Last 24 hours) at 2021 0751  Last data filed at 2021 0715  Gross per 24 hour   Intake 1740 ml   Output 8390 ml   Net -6650 ml     I/O last 3 completed shifts: In: 4644 [I.V.:990; NG/GT:750]  Out: 8090 [Urine:7350; Stool:500; Chest Tube:240] I/O this shift:  In: -   Out: 300 [Urine:300]   Weight change:     Physical Exam  Constitutional:       Appearance: Normal appearance. Interventions: She is sedated and intubated. HENT:      Head: Normocephalic and atraumatic. Nose: Nose normal.   Eyes:      Pupils: Pupils are equal, round, and reactive to light. Cardiovascular:      Rate and Rhythm: Normal rate and regular rhythm. Pulses: Normal pulses. Heart sounds: Normal heart sounds. Pulmonary:      Effort: Pulmonary effort is normal. She is intubated. Breath sounds: Examination of the left-upper field reveals decreased breath sounds.  Examination of the left-middle field reveals decreased breath sounds. Examination of the left-lower field reveals decreased breath sounds. Decreased breath sounds and wheezing present. No rhonchi or rales. Abdominal:      General: Bowel sounds are normal. There is no distension. Palpations: Abdomen is soft. Musculoskeletal:      Cervical back: Normal range of motion. Skin:     General: Skin is warm and moist.      Capillary Refill: Capillary refill takes less than 2 seconds. Neurological:      General: No focal deficit present.          Medications     Continuous Infusions:   norepinephrine Stopped (12/04/21 1040)    fentaNYL 5 mcg/ml in 0.9%  ml infusion 150 mcg/hr (12/05/21 0405)    midazolam 4 mg/hr (12/04/21 1003)    cisatracurium (NIMBEX) infusion 3 mcg/kg/min (12/04/21 1642)    sodium chloride 100 mL/hr at 11/1956    dextrose       Scheduled Meds:   potassium chloride  40 mEq IntraVENous Q2H    acetaZOLAMIDE  250 mg Oral Once    bumetanide  1 mg IntraVENous TID    sucralfate  1 g Oral 4 times per day    sennosides-docusate sodium  2 tablet Oral Daily    sodium chloride flush  5-40 mL IntraVENous 2 times per day    heparin flush  3 mL IntraVENous 2 times per day    pantoprazole  40 mg IntraVENous BID    And    sodium chloride (PF)  10 mL IntraVENous BID    heparin (porcine)  5,000 Units SubCUTAneous Q8H    chlorhexidine  15 mL Mouth/Throat BID    artificial tears   Both Eyes Q6H    budesonide  1 mg Nebulization BID    Arformoterol Tartrate  15 mcg Nebulization BID    atorvastatin  10 mg Oral Daily    QUEtiapine  200 mg Oral Daily    sertraline  200 mg Oral Daily    traZODone  100 mg Oral Nightly    ascorbic acid  1,000 mg Oral Daily    vitamin D  2,000 Units Oral Daily    zinc sulfate  50 mg Oral Daily     PRN Meds: polyethylene glycol, sodium chloride flush, heparin flush, ipratropium-albuterol, sodium chloride, ondansetron **OR** ondansetron, acetaminophen **OR** acetaminophen, hours.      Oxygen:     Vent Information  $Ventilation: $Subsequent Day  Skin Assessment: Clean, dry, & intact  Equipment ID: [id number]  Equipment Changed: Humidification  Vent Type: 980  Vent Mode: AC/VC+  Vt Ordered: 320 mL  Pressure Ordered: 34  Rate Set: 32 bmp  Peak Flow: 0 L/min  Pressure Support: 0 cmH20  FiO2 : 70 %  SpO2: 95 %  SpO2/FiO2 ratio: 135.71  PaO2/FiO2 ratio: 120  Sensitivity: 3  PEEP/CPAP: 10  I Time/ I Time %: 0.76 s  Humidification Source: Heated wire  Humidification Temp: 37  Humidification Temp Measured: 36.9  Circuit Condensation: Drained  Mask Type: Full face mask  Mask Size: Small  Additional Respiratory  Assessments  Pulse: 90  Resp: (!) 32  SpO2: 95 %  Position: Prone  Humidification Source: Heated wire  Humidification Temp: 37  Circuit Condensation: Drained  Oral Care Completed?: Yes  Oral Care: Mouth swabbed, Mouth suctioned, Mouth moisturizer, Lip moisturizer applied  Subglottic Suction Done?: Yes  Airway Type: ET  Airway Size: 8  Cuff Pressure (cm H2O):  (MLT)       [REMOVED] Urethral Catheter Temperature probe-Output (mL): 10 mL  Urethral Catheter-Output (mL): 300 mL  [REMOVED] Urethral Catheter Temperature probe 16 fr-Output (mL): 250 mL    Imaging Studies:  XR CHEST PORTABLE   Final Result   1. Stable diffuse interstitial pulmonary edema or pneumonia. 2.  Left chest tube demonstrated. Minimal residual left pneumothorax. XR CHEST PORTABLE   Final Result   1. Stable diffuse bilateral airspace disease. 2. Small left pneumothorax appears new or increased compared to prior   examination. Left chest tube appears stable in position. 3. Possible right pleural effusion. XR CHEST PORTABLE   Final Result   1. There is no interval change in extensive multifocal bilateral pneumonia. XR CHEST PORTABLE   Final Result   No change in extensive bilateral pulmonary airspace opacities.   No   pneumothorax is radiographically visible on the current exam.         XR CHEST PORTABLE   Final Result   Significantly decreased size of left pneumothorax. There is likely trace   left pneumothorax remaining. Stable extensive bilateral pulmonary airspace opacities. XR CHEST PORTABLE   Final Result   Addendum 1 of 1   ADDENDUM:   Verbal report was given to Eduardo Maharaj RN at 8:15 a.m. central standard   time on 11/30/2021. Final   New moderate sized left-sided pneumothorax. Stable extensive bilateral pulmonary airspace opacities            XR CHEST PORTABLE   Final Result   1. Lines okay. 2. Persistent edema/ARDS/pneumonia. 3. No sign of pneumothorax. XR CHEST PORTABLE   Final Result   Interval placement of left-sided chest tube with questionable residual   pneumothorax versus artifact. Extensive bilateral infiltrates. Continued follow-up recommended. XR CHEST PORTABLE   Final Result   Interval development of large left-sided tension pneumothorax. Extensive bilateral parenchymal infiltrates. Findings were discussed with Dr. Dariana Garland at approximately 0010 hours [place]   time on 11/28/2021. XR CHEST PORTABLE   Final Result   Severe bilateral pulmonary opacification. XR CHEST PORTABLE   Final Result   1. Endotracheal tube is 3 cm above the khai. 2. Stable interstitial pulmonary edema or pneumonia throughout both lungs. XR CHEST PORTABLE   Final Result   1. Somewhat limited exam, grossly unchanged. Please see above comments. .      RECOMMENDATION:   1. Recommend follow-up thoracic imaging, as directed clinically. .         XR ABDOMEN (KUB) (SINGLE AP VIEW)   Final Result   1. Satisfactory position of the NG tube within the stomach         XR CHEST PORTABLE   Final Result   1. There is no interval change in the multifocal bilateral pneumonia. XR CHEST PORTABLE   Final Result   Bilateral airspace disease with interval progression on the right         XR CHEST PORTABLE   Final Result   1. Endotracheal tube is 2.7 cm above the khai. 2. Stable interstitial pulmonary edema or pneumonia throughout both lungs. XR CHEST PORTABLE   Final Result   1. Worsening of the multifocal bilateral pneumonia when compared with the   prior study of 1 day earlier. XR CHEST PORTABLE   Final Result   1. Multifocal bilateral pneumonia more prominent within the right upper lobe   2. Minimal partial interval clearing of the pneumonia within the left lung   3. Worsening of the pneumonia within the right upper lobe. US DUP LOWER EXTREMITIES BILATERAL VENOUS   Final Result   Within the visualized vessels there is no evidence for deep venous   thrombosis               XR CHEST PORTABLE   Final Result   1. Lines okay. 2. Slightly worsened diffuse edema versus pneumonia. XR CHEST PORTABLE   Final Result   1. Lines okay   2. Improved aeration, mild improvement and diffuse pneumonia/edema. XR CHEST ABDOMEN NG PLACEMENT   Final Result   NG tube position satisfactory. XR CHEST PORTABLE   Final Result   Stable bilateral pneumonia or interstitial pulmonary edema. XR CHEST PORTABLE   Final Result   Increasing bilateral infiltrates. XR CHEST PORTABLE   Final Result   1. There is no interval change in the multifocal bilateral pneumonia. CT HEAD WO CONTRAST   Final Result   No acute intracranial abnormality. Cortical atrophy and periventricular white matter ischemic changes. CT CERVICAL SPINE WO CONTRAST   Final Result   No acute abnormality of the cervical spine. Moderate degenerative changes with disc space narrowing and marginal bony   spur formation C5 through C7. Ground-glass opacities in the visualized lung apices. Please refer to chest   CT for additional information. CTA CHEST W CONTRAST   Final Result   No evidence of pulmonary embolism.       Extensive multifocal ground-glass opacities predominantly in the peripheral   lung steroids and Covid infection  -WBC trending down to 11.1 today   -Cultures have been negative  -Fungitell negative   -Diflucan stopped   -Continue to follow ID recommendations    Thrombocytopenia 2/2 Covid?   -Platelets trending up to 122 today   -Was HIT negative  -No intervention at this time  -Continue to monitor    Psychiatric   History of schizoaffective disorder  -Continue home hydroxyzine, trazodone, quetiapine and sertraline    # Peptic ulcer prophylaxis:Protonix   # DVT Prophylaxis: Heparin resumed   # Disposition: Cont current care     Tanika Vazquez MD, PGY-1  I personally saw, examined and provided care for the patient. Radiographs, labs and medication list were reviewed by me independently. I spoke with bedside nursing, therapists and consultants. Critical care services and times documented are independent of procedures and multidisciplinary rounds with Residents. Additionally comprehensive, multidisciplinary rounds were conducted with the MICU team. The case was discussed in detail and plans for care were established. Review of Residents documentation was conducted and revisions were made as appropriate. I agree with the above documented exam, problem list and plan of care. ARDS /COVID  Developed pneumothorax . s/p 2 Chest tube . .lung expnaded   Down to 1 mg bid ,negative 6 L     [id number] ,negative       plt less than 30 around 25   Prone and paralyzed   Hb monitor ,plt 136  On DVT px    PPI bid   ID following ,wbc coming down   Will update family and discuss code status again     Care reviewed with nursing staff, medical and surgical specialty care, primary care and the patient's family as available.      Chart review/lab review/X-ray viewing/documentation and had long Conversation with patient/family re: prognosis, care options and any end of life issues:      Critical care time spent reviewing labs/films, examining patient, collaborating with other physicians more than 35  Minutes excluding procedures . Lasha Barry Dupree M.D.   12/5/2021  11:15 AM      prognosis poor

## 2021-12-05 NOTE — PROGRESS NOTES
[facility] - Family Medicine Inpatient   Resident Progress Note    S:  Hospital day: 23   Brief Synopsis: Maksim Buchanan is a 60 y.o. female with pmh of GERD, osteoarthritis and schizoaffective disorder who presented to ER s/p fall at home. Patient found down at home, with initial O2 saturation of 60%. Brought to the ER; found to have COVID-19 pneumonia , requiring bipap for appropriate saturation. Rhabdomyolysis, UTI. Started on appropriate management including tocilizumab, ceftriaxone 1 g daily, and IV fluids for rhabdomyolysis. Decadron was started daily, and with patients worsening status - pulmonology consulted. FULL CODE. Transferred to ICU on 11/18 for rapid breathing and hypoxia and pO2 of 55. Intubated 11/20 secondary to O2 sats consistently <80% with rapid breathing. 11/29 , patient developed left sided tension pneumothorax and underwent chest tube placement. Patient Intubated, sedated, paralyzed, proned. On vasopressors. Hemoglobin dropped to 6.9 with 1 unit PRBC transfused. Second chest tube placed on left chest.     Patient remains prone, sedated and intubated when seen in the morning. Chest tubes in place.      Cont meds:    norepinephrine Stopped (12/04/21 1040)    bumetanide 0.1 mg/mL infusion 0.5 mg/hr (12/04/21 1211)    fentaNYL 5 mcg/ml in 0.9%  ml infusion 150 mcg/hr (12/05/21 0405)    midazolam 4 mg/hr (12/04/21 1003)    cisatracurium (NIMBEX) infusion 3 mcg/kg/min (12/04/21 1642)    sodium chloride 100 mL/hr at 11/1956    dextrose       Scheduled meds:    potassium chloride  40 mEq IntraVENous Q2H    sucralfate  1 g Oral 4 times per day    sennosides-docusate sodium  2 tablet Oral Daily    sodium chloride flush  5-40 mL IntraVENous 2 times per day    heparin flush  3 mL IntraVENous 2 times per day    pantoprazole  40 mg IntraVENous BID    And    sodium chloride (PF)  10 mL IntraVENous BID    heparin (porcine)  5,000 Units SubCUTAneous Q8H    chlorhexidine  15 mL Mouth/Throat BID    artificial tears   Both Eyes Q6H    budesonide  1 mg Nebulization BID    Arformoterol Tartrate  15 mcg Nebulization BID    atorvastatin  10 mg Oral Daily    QUEtiapine  200 mg Oral Daily    sertraline  200 mg Oral Daily    traZODone  100 mg Oral Nightly    ascorbic acid  1,000 mg Oral Daily    vitamin D  2,000 Units Oral Daily    zinc sulfate  50 mg Oral Daily     PRN meds: polyethylene glycol, sodium chloride flush, heparin flush, ipratropium-albuterol, sodium chloride, ondansetron **OR** ondansetron, acetaminophen **OR** acetaminophen, glucose, dextrose, glucagon (rDNA), dextrose     I reviewed the patient's past medical and surgical history, Medications and Allergies. O:  BP (!) 112/55   Pulse 91   Temp 98.6 °F (37 °C) (Esophageal)   Resp (!) 32   Ht 5' 1\" (1.549 m)   Wt 169 lb 4.8 oz (76.8 kg)   SpO2 95%   BMI 31.99 kg/m²   24 hour I&O: I/O last 3 completed shifts: In: 6589 [I.V.:990; NG/GT:750]  Out: 7840 [Urine:7100; Stool:500; Chest Tube:240]  No intake/output data recorded. Physical Exam  Constitutional:       Comments: Intubated, sedated, paralyzed, proned   HENT:      Head: Normocephalic and atraumatic. Mouth/Throat:      Comments: intubated  Cardiovascular:      Rate and Rhythm: Normal rate and regular rhythm. Pulses: Normal pulses. Heart sounds: Normal heart sounds. Pulmonary:      Breath sounds: No wheezing, rhonchi or rales. Comments: Left sided Chest tube x 2; adventitious sounds on auscultation  Abdominal:      Comments: Currently proned   Musculoskeletal:         General: Normal range of motion. Right lower leg: Edema present. Left lower leg: Edema present. Skin:     General: Skin is warm and dry. Findings: Bruising (mild, on back) present.    Neurological:      Comments: Sedated, paralyzed       Labs:  Na/K/Cl/CO2:  142/2.8/97/33 (12/05 0248)  BUN/Cr/glu/ALT/AST/amyl/lip:  --/--/--/78/59/--/-- (12/05 0422)  WBC/Hgb/Hct/Plts:  11.1/8.5/27.5/136 (12/05 0422)  estimated creatinine clearance is 76 mL/min (based on SCr of 0.7 mg/dL). Other pertinent labs as noted below    Radiology:  XR CHEST PORTABLE   Final Result   1. Stable diffuse interstitial pulmonary edema or pneumonia. 2.  Left chest tube demonstrated. Minimal residual left pneumothorax. XR CHEST PORTABLE   Final Result   1. Stable diffuse bilateral airspace disease. 2. Small left pneumothorax appears new or increased compared to prior   examination. Left chest tube appears stable in position. 3. Possible right pleural effusion. XR CHEST PORTABLE   Final Result   1. There is no interval change in extensive multifocal bilateral pneumonia. XR CHEST PORTABLE   Final Result   No change in extensive bilateral pulmonary airspace opacities. No   pneumothorax is radiographically visible on the current exam.         XR CHEST PORTABLE   Final Result   Significantly decreased size of left pneumothorax. There is likely trace   left pneumothorax remaining. Stable extensive bilateral pulmonary airspace opacities. XR CHEST PORTABLE   Final Result   Addendum 1 of 1   ADDENDUM:   Verbal report was given to Gely Paula RN at 8:15 a.m. central standard   time on 11/30/2021. Final   New moderate sized left-sided pneumothorax. Stable extensive bilateral pulmonary airspace opacities            XR CHEST PORTABLE   Final Result   1. Lines okay. 2. Persistent edema/ARDS/pneumonia. 3. No sign of pneumothorax. XR CHEST PORTABLE   Final Result   Interval placement of left-sided chest tube with questionable residual   pneumothorax versus artifact. Extensive bilateral infiltrates. Continued follow-up recommended. XR CHEST PORTABLE   Final Result   Interval development of large left-sided tension pneumothorax. Extensive bilateral parenchymal infiltrates.       Findings were discussed with  Loretta Tanner at approximately 0010 hours [place]   time on 11/28/2021. XR CHEST PORTABLE   Final Result   Severe bilateral pulmonary opacification. XR CHEST PORTABLE   Final Result   1. Endotracheal tube is 3 cm above the khai. 2. Stable interstitial pulmonary edema or pneumonia throughout both lungs. XR CHEST PORTABLE   Final Result   1. Somewhat limited exam, grossly unchanged. Please see above comments. .      RECOMMENDATION:   1. Recommend follow-up thoracic imaging, as directed clinically. .         XR ABDOMEN (KUB) (SINGLE AP VIEW)   Final Result   1. Satisfactory position of the NG tube within the stomach         XR CHEST PORTABLE   Final Result   1. There is no interval change in the multifocal bilateral pneumonia. XR CHEST PORTABLE   Final Result   Bilateral airspace disease with interval progression on the right         XR CHEST PORTABLE   Final Result   1. Endotracheal tube is 2.7 cm above the khai. 2. Stable interstitial pulmonary edema or pneumonia throughout both lungs. XR CHEST PORTABLE   Final Result   1. Worsening of the multifocal bilateral pneumonia when compared with the   prior study of 1 day earlier. XR CHEST PORTABLE   Final Result   1. Multifocal bilateral pneumonia more prominent within the right upper lobe   2. Minimal partial interval clearing of the pneumonia within the left lung   3. Worsening of the pneumonia within the right upper lobe. US DUP LOWER EXTREMITIES BILATERAL VENOUS   Final Result   Within the visualized vessels there is no evidence for deep venous   thrombosis               XR CHEST PORTABLE   Final Result   1. Lines okay. 2. Slightly worsened diffuse edema versus pneumonia. XR CHEST PORTABLE   Final Result   1. Lines okay   2. Improved aeration, mild improvement and diffuse pneumonia/edema. XR CHEST ABDOMEN NG PLACEMENT   Final Result   NG tube position satisfactory.          XR CHEST PORTABLE   Final Result   Stable bilateral pneumonia or interstitial pulmonary edema. XR CHEST PORTABLE   Final Result   Increasing bilateral infiltrates. XR CHEST PORTABLE   Final Result   1. There is no interval change in the multifocal bilateral pneumonia. CT HEAD WO CONTRAST   Final Result   No acute intracranial abnormality. Cortical atrophy and periventricular white matter ischemic changes. CT CERVICAL SPINE WO CONTRAST   Final Result   No acute abnormality of the cervical spine. Moderate degenerative changes with disc space narrowing and marginal bony   spur formation C5 through C7. Ground-glass opacities in the visualized lung apices. Please refer to chest   CT for additional information. CTA CHEST W CONTRAST   Final Result   No evidence of pulmonary embolism. Extensive multifocal ground-glass opacities predominantly in the peripheral   lung zones bilaterally, highly concerning for atypical or COVID related   pneumonia. XR CHEST PORTABLE   Final Result   Bilateral patchy airspace opacities worrisome for pneumonia. XR CHEST PORTABLE    (Results Pending)   XR CHEST PORTABLE    (Results Pending)   XR CHEST PORTABLE    (Results Pending)       A/P:  Active Problems:    Acute respiratory failure with hypoxia (HCC)    Rhabdomyolysis    Acute cystitis with hematuria    Primary spontaneous pneumothorax  Resolved Problems:    * No resolved hospital problems. *    1. Intubated, Sedated, Paralyzed, Proned  11/20: Intubated 2/2 hypoxia and increased work of breathing, proned and paralyzed  S/p Left Chest Tube 11/29 for tension pneumothorax. Second chest tube placed on 11/30. Air leaks noted. Serosanguineous drainage. No further procedures planned by surgery.     2. Acute Hypoxic Respiratory Failure 2/2 Covid 19  Unvaccinated for Covid 19  Patient found with pulse ox of 60% --> currently intubated and sedated  CXR showing bilateral pulmonary infiltrates  Inflammatory markers: DDimer 530, , CRP 22.9, Ferritin 749 on admission   CTA pulm showing extensive bilateral pulmonary infiltrates consistent with COVID-19 pneumonia , no PE  Completed remdesivir and tocilizumab treatment  Decadron and SoluCortef course completed  Pulmonology consulted ; appreciate recs; daily ABG, Vitamin C, Vitamin D  Dietary consulted for further recommendations on nutrition status ; appreciate recs. 11/18 - transferred to ICU due to worsening resp status. CXR with resolution of pneumothorax. Advanced Care Planning with Spiritual Services ; recs appreciated - FULL CODE. Family reached out to consider code status. Further discussion with brother by [facility] team today    3. Left Sided Tension Pneumothorax - improving  S/p left sided chest tube  11/29 , Tension Pneumothorax on Xray  Left lung re inflated on repeat CXR  Whistle like sound noted Left Upper Lung field 2/2 likely to Chest tube . 2nd chest tube placed on 11/30  Continue to monitor    4. Concern for Sepsis  Likely 2/2 superimposed bacterial pneumonia , candidal colonization  Tmax 102.2 , Tavg 100.3F  Given one dose cefepime and vancomycin in ICU  Procal 0.07, repeat 0.27 , blood cultures, urine culture negative. Completed Pip/Tazo, Vancomycin  Fungitell and B glucan pending  Diflucan stopped by ID. No current Abx    5. CHARAN Stage I  Admission creatinine 0.6  Likely 2/2 to Ischemic ATN in setting of Hypotension  Creatinine increased to 1.6 --> trended down to 0.9  Nephrology following , appreciate recs   FeNa: 0.2- Pre- Renal     6. Anemia  2/2 to inflammation/ response to Covid 19  .3, MCHC 31.5 RDW 20.1  Vitamin B12/Folate normal March 2021  FOBT + 11/29  H/H < 7 11/30/2021 , s/p 1 unit PRBC. Hemoglobin 7.8 this AM.  Maintain H/H > 7    6.  Thrombocytopenia  Likely 2/2 to SALENA vs inflammation from Covid 19  Hold all anticoagulation , patient trialed on heparin , lovenox and argatroban but platelets continue to decrease  SALENA panel- negative  Platelets 383 this am     7. Hx Schizoaffective Disorder / Anxiety  Continue seroquel , zoloft, trazodone  Hold ativan, vistaril     8. Hypokalemia  Initial K 3.2 , Mag 2.6  Replace as needed  CMP daily    9. Rhabdomyolysis - resolved  2/2 to fall for unknown time period  Patient found down for unknown period of time  Initial CK > 3000  Received 4 L NS in ER    10.  Concern for UTI - resolved   Likely simple cystitis ; patient with no CVA tenderness , presented initially with fever 102 F  Urinalysis with increased nitrites, bacteria, blood  Urine culture, blood cultures were negative  Given one dose doxy and rocephin in the ER  1g ceftriaxone IV /-  dc'd 11/18/2021     GI/DVT ppx: protonix  Diet: NPO, tube feeds  Disposition: MICU    Electronically signed by Jefe Veronica DO  PGY-3 on 12/5/2021 at 7:02 AM  This case was discussed with attending physician: Dr. Calderon Linker

## 2021-12-05 NOTE — PROGRESS NOTES
Nephrology Progress Note  Patient's Name: Kvng Olivas    Reason for Consult:  Acute kidney injury    History of Present Ilness from the 11/24/21 note:    Kvng Olivas is a 60 y.o. female with a history of GI GERD, osteoarthritis, and schizoaffective disorder. She initially presented to this hospital 5 days ago following a fall at home. She was found by her roommate following an unspecified duration. EMS was called. Upon their evaluation patient was noted to be hypoxic. She was subsequently brought to ED for evaluation. In the ED she was noted to be hypoxic with an oxygen saturation of 60% on room air. Also noted to to have a low-grade fever. Laboratory data was significant for WBC of 8.5, hemoglobin of 14.6, BUN 17 and a creatinine of 0.6. Rapid COVID-19 test was positive. Patient was admitted to telemetry. 2 days into her hospital course became increasingly more hypoxic and was transferred to MICU. Because of her persistent hypoxia patient was intubated with mechanical ventilation on 11/20;  . Over the past 48 hours she has been markedly hypotensive requiring fluid resuscitation and pressors. Creatinine has worsened to a current value of 1.6 associated with low urine output. Hence renal consult. Subjective    11/28: pt remains in COVID 19 Isolation. She remains proned and on an FIO2 65% and PEEP12 with an O2 sat 95-96%    11/29: pt seen through window of icu due to covid, chart reviewed    11/30: pt seen through window of icu due to covid, chart reviewed.      12/1/21 :pt seen through window of icu due to covid, review of chart performed    12/2/21: pt seen through window of icu due to covid, chart reviewed, intubated, chest tube left     12/3/21 :chart reviewed, chest tube x 2 on right, intubated and prone, seen through window of icu    12/4: no new acute issues from overnight; large volume pleural fluid drainage from bilateral catheter    12/5: Brisk response to Bumex drip now placed on hold transition to intermittent dosing        Allergies:  Ciprofloxacin    Current Medications:    acetaZOLAMIDE (DIAMOX) tablet 250 mg, Once  bumetanide (BUMEX) injection 1 mg, TID  norepinephrine (LEVOPHED) 16 mg in dextrose 5% 250 mL infusion, Continuous  fentaNYL 5 mcg/ml in 0.9%  ml infusion, Continuous  sucralfate (CARAFATE) tablet 1 g, 4 times per day  sennosides-docusate sodium (SENOKOT-S) 8.6-50 MG tablet 2 tablet, Daily  polyethylene glycol (GLYCOLAX) packet 17 g, Daily PRN  sodium chloride flush 0.9 % injection 5-40 mL, 2 times per day  sodium chloride flush 0.9 % injection 5-40 mL, PRN  heparin flush 100 UNIT/ML injection 300 Units, 2 times per day  heparin flush 100 UNIT/ML injection 300 Units, PRN  pantoprazole (PROTONIX) injection 40 mg, BID   And  sodium chloride (PF) 0.9 % injection 10 mL, BID  midazolam (VERSED) 100 mg in dextrose 5 % 100 mL infusion, Continuous  heparin (porcine) injection 5,000 Units, Q8H  chlorhexidine (PERIDEX) 0.12 % solution 15 mL, BID  lubrifresh P.M. (artificial tears) ophthalmic ointment, Q6H  cisatracurium besylate (NIMBEX) 200 mg in sodium chloride 0.9 % 100 mL infusion, Continuous  ipratropium-albuterol (DUONEB) nebulizer solution 1 ampule, Q4H PRN  budesonide (PULMICORT) nebulizer suspension 1,000 mcg, BID  Arformoterol Tartrate (BROVANA) nebulizer solution 15 mcg, BID  atorvastatin (LIPITOR) tablet 10 mg, Daily  QUEtiapine (SEROQUEL) tablet 200 mg, Daily  sertraline (ZOLOFT) tablet 200 mg, Daily  traZODone (DESYREL) tablet 100 mg, Nightly  0.9 % sodium chloride infusion, PRN  ondansetron (ZOFRAN-ODT) disintegrating tablet 4 mg, Q8H PRN   Or  ondansetron (ZOFRAN) injection 4 mg, Q6H PRN  acetaminophen (TYLENOL) tablet 650 mg, Q6H PRN   Or  acetaminophen (TYLENOL) suppository 650 mg, Q6H PRN  ascorbic acid (VITAMIN C) tablet 1,000 mg, Daily  Vitamin D (CHOLECALCIFEROL) tablet 2,000 Units, Daily  zinc sulfate (ZINCATE) capsule 50 mg, Daily  glucose (GLUTOSE) 40 % oral gel 15 g, PRN  dextrose 50 % IV solution, PRN  glucagon (rDNA) injection 1 mg, PRN  dextrose 5 % solution, PRN        Review of Systems:   Review of systems not obtained due to patient factors. Physical exam:  Vitals:    12/05/21 0832   BP:    Pulse:    Resp: 23   Temp:    SpO2: 97%          General: intubated,  Sedated/paralyzed prone   Eyes: PERRL. No sclera icterus. No conjunctival injection. ENT: No discharge. Pharynx clear. Neck: Trachea midline. Normal thyroid. Lungs: coarse breath sounds   CV: Regular rate. Regular rhythm. No murmur or rub. .   Abd: Non-tender. Non-distended. No masses. No organmegaly. Normal bowel sounds. Skin: Warm and dry. No nodule on exposed extremities. No rash on exposed extremities.   Ext: No cyanosis, clubbing, 2+ pitting bilateral arms/legs edema  Neuro: sedated/paralyzed    Data:   Labs:  Lab Results   Component Value Date     12/05/2021     12/04/2021     12/04/2021    K 2.8 (L) 12/05/2021    K 3.2 (L) 12/04/2021    K 3.3 (L) 12/04/2021    CL 97 (L) 12/05/2021    CO2 33 (H) 12/05/2021    CO2 30 (H) 12/04/2021    CO2 30 (H) 12/04/2021    CREATININE 0.7 12/05/2021    CREATININE 0.7 12/04/2021    CREATININE 0.7 12/04/2021    BUN 27 (H) 12/05/2021    BUN 28 (H) 12/04/2021    BUN 29 (H) 12/04/2021    GLUCOSE 108 (H) 12/05/2021    GLUCOSE 127 (H) 12/04/2021    GLUCOSE 109 (H) 12/04/2021    PHOS 3.4 12/05/2021    PHOS 5.0 (H) 12/03/2021    PHOS 4.3 12/01/2021    WBC 11.1 12/05/2021    WBC 10.6 12/04/2021    WBC 10.9 12/03/2021    HGB 8.5 (L) 12/05/2021    HGB 9.0 (L) 12/04/2021    HGB 7.7 (L) 12/04/2021    HCT 27.5 (L) 12/05/2021    HCT 29.0 (L) 12/04/2021    HCT 24.0 (L) 12/04/2021    .0 (H) 12/05/2021     12/05/2021         Imaging:  CT HEAD WO CONTRAST    Result Date: 11/16/2021  EXAMINATION: CT OF THE HEAD WITHOUT CONTRAST  11/16/2021 9:54 pm TECHNIQUE: CT of the head was performed without the administration of intravenous contrast. Dose modulation, iterative reconstruction, and/or weight based adjustment of the mA/kV was utilized to reduce the radiation dose to as low as reasonably achievable. COMPARISON: None. HISTORY: ORDERING SYSTEM PROVIDED HISTORY: fall TECHNOLOGIST PROVIDED HISTORY: Reason for exam:->fall Has a \"code stroke\" or \"stroke alert\" been called? ->No Decision Support Exception - unselect if not a suspected or confirmed emergency medical condition->Emergency Medical Condition (MA) What reading provider will be dictating this exam?->CRC FINDINGS: BRAIN/VENTRICLES: There are cortical atrophy and periventricular white matter ischemic changes. There is no acute intracranial hemorrhage, mass effect or midline shift. No abnormal extra-axial fluid collection. The gray-white differentiation is maintained without evidence of an acute infarct. There is no evidence of hydrocephalus. ORBITS: The visualized portion of the orbits demonstrate no acute abnormality. SINUSES: The visualized paranasal sinuses and mastoid air cells demonstrate no acute abnormality. SOFT TISSUES/SKULL:  No acute abnormality of the visualized skull or soft tissues. No acute intracranial abnormality. Cortical atrophy and periventricular white matter ischemic changes. XR CHEST PORTABLE    Result Date: 11/16/2021  EXAMINATION: ONE XRAY VIEW OF THE CHEST 11/16/2021 7:10 pm COMPARISON: 09/23/2021 HISTORY: ORDERING SYSTEM PROVIDED HISTORY: weakness TECHNOLOGIST PROVIDED HISTORY: Reason for exam:->weakness What reading provider will be dictating this exam?->CRC FINDINGS: Bilateral patchy airspace opacities. There is no effusion or pneumothorax. The cardiomediastinal silhouette is without acute process. The osseous structures are without acute process. Bilateral patchy airspace opacities worrisome for pneumonia.      CTA CHEST W CONTRAST    Result Date: 11/16/2021  EXAMINATION: CTA OF THE CHEST 11/16/2021 9:54 pm TECHNIQUE: CTA of the chest was performed after the administration of intravenous contrast.  Multiplanar reformatted images are provided for review. MIP images are provided for review. Dose modulation, iterative reconstruction, and/or weight based adjustment of the mA/kV was utilized to reduce the radiation dose to as low as reasonably achievable. COMPARISON: None. HISTORY: ORDERING SYSTEM PROVIDED HISTORY: r/o pe TECHNOLOGIST PROVIDED HISTORY: Reason for exam:->r/o pe Decision Support Exception - unselect if not a suspected or confirmed emergency medical condition->Emergency Medical Condition (MA) What reading provider will be dictating this exam?->CRC FINDINGS: Pulmonary Arteries: Pulmonary arteries are adequately opacified for evaluation. No evidence of intraluminal filling defect to suggest pulmonary embolism. Main pulmonary artery is normal in caliber. Mediastinum: No evidence of mediastinal lymphadenopathy. The heart and pericardium demonstrate no acute abnormality. There is no acute abnormality of the thoracic aorta. Lungs/pleura: The lungs demonstrate extensive multifocal ground-glass opacities predominantly in the peripheral lung zones. No evidence of pleural effusion or pneumothorax. Upper Abdomen: Limited images of the upper abdomen are unremarkable. Soft Tissues/Bones: No acute bone or soft tissue abnormality. No evidence of pulmonary embolism. Extensive multifocal ground-glass opacities predominantly in the peripheral lung zones bilaterally, highly concerning for atypical or COVID related pneumonia. Assessment/Plans    1. Acute kidney injury stage I  ischemic ATN occurring in the setting of hypotension  nonoliguric    Cr continues to improve now back to baseline  Continue to monitor labs and urine output  Bumex drip put on hold; transition to intermittent dosing  Good response    2. Acute hypoxic respiratory failure  due to COVID-19 pneumonia  Intubation with mechanical ventilation; prone       3.   Septic shock  suspected possible superimposed bacterial pneumonia  now off pressors  abx per id    4. Volume overload  Good response to low-dose Bumex drip  Hold for now  transitioned to intermittent dosing      5. Hyponatremia  Lowered diuretic to q 12  Stopped free water 12/2  Await labs    6. Hypocalcemia  Replace prn    D/W. Resident      Linden Crigler, MD

## 2021-12-06 NOTE — PROGRESS NOTES
[facility] - Family Medicine Inpatient   Resident Progress Note    S:  Hospital day: 20   Brief Synopsis: Kvng Olivas is a 73 y.o. female with pmh of GERD, osteoarthritis and schizoaffective disorder who presented to ER s/p fall at home. Patient found down at home, with initial O2 saturation of 60%. Brought to the ER; found to have COVID-19 pneumonia , requiring bipap for appropriate saturation. Rhabdomyolysis, UTI. Started on appropriate management including tocilizumab, ceftriaxone 1 g daily, and IV fluids for rhabdomyolysis. Decadron was started daily, and with patients worsening status - pulmonology consulted. FULL CODE. Transferred to ICU on 11/18 for rapid breathing and hypoxia and pO2 of 55. Intubated 11/20 secondary to O2 sats consistently <80% with rapid breathing. 11/29 , patient developed left sided tension pneumothorax and underwent chest tube placement. Patient Intubated, sedated, paralyzed, proned. On vasopressors. Hemoglobin dropped to 6.9 with 1 unit PRBC transfused. Second chest tube placed on left chest.     Patient remains prone, sedated and intubated when seen in the morning. Chest tubes in place.      Cont meds:    norepinephrine Stopped (12/06/21 1115)    fentaNYL 5 mcg/ml in 0.9%  ml infusion 125 mcg/hr (12/06/21 0926)    midazolam 5 mg/hr (12/06/21 0330)    cisatracurium (NIMBEX) infusion Stopped (12/06/21 1125)    sodium chloride 100 mL/hr at 11/1955    dextrose       Scheduled meds:    midodrine  10 mg Oral TID WC    hydrocortisone sodium succinate PF  100 mg IntraVENous Q8H    enoxaparin  40 mg SubCUTAneous BID    sodium chloride (PF)  10 mL IntraVENous BID    And    [START ON 12/7/2021] pantoprazole  40 mg IntraVENous Daily    [START ON 12/7/2021] bumetanide  1 mg IntraVENous Daily with breakfast    sucralfate  1 g Oral 4 times per day    sennosides-docusate sodium  2 tablet Oral Daily    sodium chloride flush  5-40 mL IntraVENous 2 times per day    heparin flush  3 mL IntraVENous 2 times per day    chlorhexidine  15 mL Mouth/Throat BID    artificial tears   Both Eyes Q6H    budesonide  1 mg Nebulization BID    Arformoterol Tartrate  15 mcg Nebulization BID    atorvastatin  10 mg Oral Daily    QUEtiapine  200 mg Oral Daily    sertraline  200 mg Oral Daily    traZODone  100 mg Oral Nightly    ascorbic acid  1,000 mg Oral Daily    vitamin D  2,000 Units Oral Daily    zinc sulfate  50 mg Oral Daily     PRN meds: polyethylene glycol, sodium chloride flush, heparin flush, ipratropium-albuterol, sodium chloride, ondansetron **OR** ondansetron, acetaminophen **OR** acetaminophen, glucose, dextrose, glucagon (rDNA), dextrose     I reviewed the patient's past medical and surgical history, Medications and Allergies. O:  BP (!) 155/79   Pulse 88   Temp 99 °F (37.2 °C) (Esophageal)   Resp (!) 32   Ht 5' 1\" (1.549 m)   Wt 169 lb 4.8 oz (76.8 kg)   SpO2 95%   BMI 31.99 kg/m²   24 hour I&O: I/O last 3 completed shifts: In: 2202 [I.V.:1449; NG/GT:753]  Out: 2895 [Urine:2415; Stool:200; Chest Tube:280]  I/O this shift:  In: 30 [NG/GT:30]  Out: 730 [Urine:630; Chest Tube:100]      Physical Exam  Constitutional:       Comments: Intubated, sedated, paralyzed, proned   HENT:      Head: Normocephalic and atraumatic. Mouth/Throat:      Comments: intubated  Cardiovascular:      Rate and Rhythm: Normal rate and regular rhythm. Pulses: Normal pulses. Heart sounds: Normal heart sounds. Pulmonary:      Breath sounds: No wheezing, rhonchi or rales. Comments: Left sided Chest tube x 2; adventitious sounds on auscultation  Abdominal:      Comments: Currently proned   Musculoskeletal:         General: Normal range of motion. Right lower leg: Edema present. Left lower leg: Edema present. Skin:     General: Skin is warm and dry. Findings: Bruising (mild, on back) present.    Neurological:      Comments: Sedated, paralyzed Labs:  Na/K/Cl/CO2:  142/3.5/102/31 (12/06 8239)  BUN/Cr/glu/ALT/AST/amyl/lip:  31/0.7/--/--/--/--/-- (12/06 1904)  WBC/Hgb/Hct/Plts:  9.1/8.3/26.9/145 (12/06 0140)  estimated creatinine clearance is 75 mL/min (based on SCr of 0.7 mg/dL). Other pertinent labs as noted below    Radiology:  XR CHEST PORTABLE   Final Result   Unchanged airspace disease and left pneumothorax. XR CHEST PORTABLE   Final Result   1. No interval change in extensive multifocal bilateral pneumonia   2. Less than 10% left pneumothorax. There is stable position of the 2 left   chest tubes. XR CHEST PORTABLE   Final Result   1. Stable diffuse interstitial pulmonary edema or pneumonia. 2.  Left chest tube demonstrated. Minimal residual left pneumothorax. XR CHEST PORTABLE   Final Result   1. Stable diffuse bilateral airspace disease. 2. Small left pneumothorax appears new or increased compared to prior   examination. Left chest tube appears stable in position. 3. Possible right pleural effusion. XR CHEST PORTABLE   Final Result   1. There is no interval change in extensive multifocal bilateral pneumonia. XR CHEST PORTABLE   Final Result   No change in extensive bilateral pulmonary airspace opacities. No   pneumothorax is radiographically visible on the current exam.         XR CHEST PORTABLE   Final Result   Significantly decreased size of left pneumothorax. There is likely trace   left pneumothorax remaining. Stable extensive bilateral pulmonary airspace opacities. XR CHEST PORTABLE   Final Result   Addendum 1 of 1   ADDENDUM:   Verbal report was given to Nataliya Ocampo RN at 8:15 a.m. central standard   time on 11/30/2021. Final   New moderate sized left-sided pneumothorax. Stable extensive bilateral pulmonary airspace opacities            XR CHEST PORTABLE   Final Result   1. Lines okay. 2. Persistent edema/ARDS/pneumonia. 3. No sign of pneumothorax. XR CHEST PORTABLE   Final Result   Interval placement of left-sided chest tube with questionable residual   pneumothorax versus artifact. Extensive bilateral infiltrates. Continued follow-up recommended. XR CHEST PORTABLE   Final Result   Interval development of large left-sided tension pneumothorax. Extensive bilateral parenchymal infiltrates. Findings were discussed with Dr. Tamika Falk at approximately 0010 hours [place]   time on 11/28/2021. XR CHEST PORTABLE   Final Result   Severe bilateral pulmonary opacification. XR CHEST PORTABLE   Final Result   1. Endotracheal tube is 3 cm above the khai. 2. Stable interstitial pulmonary edema or pneumonia throughout both lungs. XR CHEST PORTABLE   Final Result   1. Somewhat limited exam, grossly unchanged. Please see above comments. .      RECOMMENDATION:   1. Recommend follow-up thoracic imaging, as directed clinically. .         XR ABDOMEN (KUB) (SINGLE AP VIEW)   Final Result   1. Satisfactory position of the NG tube within the stomach         XR CHEST PORTABLE   Final Result   1. There is no interval change in the multifocal bilateral pneumonia. XR CHEST PORTABLE   Final Result   Bilateral airspace disease with interval progression on the right         XR CHEST PORTABLE   Final Result   1. Endotracheal tube is 2.7 cm above the khai. 2. Stable interstitial pulmonary edema or pneumonia throughout both lungs. XR CHEST PORTABLE   Final Result   1. Worsening of the multifocal bilateral pneumonia when compared with the   prior study of 1 day earlier. XR CHEST PORTABLE   Final Result   1. Multifocal bilateral pneumonia more prominent within the right upper lobe   2. Minimal partial interval clearing of the pneumonia within the left lung   3. Worsening of the pneumonia within the right upper lobe.          US DUP LOWER EXTREMITIES BILATERAL VENOUS   Final Result   Within the visualized vessels there is no evidence for deep venous   thrombosis               XR CHEST PORTABLE   Final Result   1. Lines okay. 2. Slightly worsened diffuse edema versus pneumonia. XR CHEST PORTABLE   Final Result   1. Lines okay   2. Improved aeration, mild improvement and diffuse pneumonia/edema. XR CHEST ABDOMEN NG PLACEMENT   Final Result   NG tube position satisfactory. XR CHEST PORTABLE   Final Result   Stable bilateral pneumonia or interstitial pulmonary edema. XR CHEST PORTABLE   Final Result   Increasing bilateral infiltrates. XR CHEST PORTABLE   Final Result   1. There is no interval change in the multifocal bilateral pneumonia. CT HEAD WO CONTRAST   Final Result   No acute intracranial abnormality. Cortical atrophy and periventricular white matter ischemic changes. CT CERVICAL SPINE WO CONTRAST   Final Result   No acute abnormality of the cervical spine. Moderate degenerative changes with disc space narrowing and marginal bony   spur formation C5 through C7. Ground-glass opacities in the visualized lung apices. Please refer to chest   CT for additional information. CTA CHEST W CONTRAST   Final Result   No evidence of pulmonary embolism. Extensive multifocal ground-glass opacities predominantly in the peripheral   lung zones bilaterally, highly concerning for atypical or COVID related   pneumonia. XR CHEST PORTABLE   Final Result   Bilateral patchy airspace opacities worrisome for pneumonia. XR CHEST PORTABLE    (Results Pending)   XR CHEST PORTABLE    (Results Pending)       A/P:  Active Problems:    Acute respiratory failure with hypoxia (HCC)    Rhabdomyolysis    Acute cystitis with hematuria    Primary spontaneous pneumothorax  Resolved Problems:    * No resolved hospital problems. *    1. Intubated, Sedated, Paralyzed, Proned  11/20:  Intubated 2/2 hypoxia and increased work of breathing, proned and paralyzed  S/p Left Chest Tube 11/29 for tension pneumothorax. Second chest tube placed on 11/30. Air leaks noted. Serosanguineous drainage. No further procedures planned by surgery. 2. Acute Hypoxic Respiratory Failure 2/2 Covid 19  Unvaccinated for Covid 19  Patient found with pulse ox of 60% --> currently intubated and sedated  CXR showing bilateral pulmonary infiltrates  Inflammatory markers: DDimer 530, , CRP 22.9, Ferritin 749 on admission   CTA pulm showing extensive bilateral pulmonary infiltrates consistent with COVID-19 pneumonia , no PE  Completed remdesivir and tocilizumab treatment  Decadron and SoluCortef course completed  Pulmonology consulted ; appreciate recs; daily ABG, Vitamin C, Vitamin D  Dietary consulted for further recommendations on nutrition status ; appreciate recs. 11/18 - transferred to ICU due to worsening resp status. CXR with resolution of pneumothorax. Advanced Care Planning with Spiritual Services ; recs appreciated - FULL CODE. Brother wants everything done for Marilyn per discussion with palliate medicine. 3. Left Sided Tension Pneumothorax - improving  S/p left sided chest tube  11/29 , Tension Pneumothorax on Xray  Left lung re inflated on repeat CXR  Whistle like sound noted Left Upper Lung field 2/2 likely to Chest tube . 2nd chest tube placed on 11/30  Continue to monitor    4. Concern for Sepsis  Likely 2/2 superimposed bacterial pneumonia , candidal colonization  Tmax 102.2 , Tavg 100.3F  Given one dose cefepime and vancomycin in ICU  Procal 0.07, repeat 0.27 , blood cultures, urine culture negative. Completed Pip/Tazo, Vancomycin  Fungitell and B glucan pending  Diflucan stopped by ID. No current Abx    5. CHARAN Stage I  Admission creatinine 0.6  Likely 2/2 to Ischemic ATN in setting of Hypotension  Creatinine increased to 1.6 --> trended down to 0.9  Nephrology following , appreciate recs   FeNa: 0.2- Pre- Renal     6.  Anemia  2/2 to

## 2021-12-06 NOTE — PLAN OF CARE
Problem: Falls - Risk of:  Goal: Will remain free from falls  Description: Will remain free from falls  12/6/2021 2574 by Jluis Hughes RN  Outcome: Met This Shift     Problem: Falls - Risk of:  Goal: Absence of physical injury  Description: Absence of physical injury  12/6/2021 9514 by Jluis Hughes RN  Outcome: Met This Shift     Problem: Airway Clearance - Ineffective  Goal: Achieve or maintain patent airway  12/6/2021 9848 by Jluis Hughes RN  Outcome: Met This Shift     Problem: Gas Exchange - Impaired  Goal: Absence of hypoxia  Outcome: Met This Shift     Problem: Gas Exchange - Impaired  Goal: Promote optimal lung function  Outcome: Met This Shift     Problem: Breathing Pattern - Ineffective  Goal: Ability to achieve and maintain a regular respiratory rate  Outcome: Met This Shift     Problem:  Body Temperature -  Risk of, Imbalanced  Goal: Ability to maintain a body temperature within defined limits  12/6/2021 1621 by Jluis Hughes RN  Outcome: Met This Shift     Problem: Isolation Precautions - Risk of Spread of Infection  Goal: Prevent transmission of infection  12/6/2021 7649 by Jluis Hughes RN  Outcome: Met This Shift     Problem: Risk for Fluid Volume Deficit  Goal: Maintain normal heart rhythm  12/6/2021 6829 by Jluis Hughes RN  Outcome: Met This Shift     Problem: Risk for Fluid Volume Deficit  Goal: Maintain normal serum potassium, sodium, calcium, phosphorus, and pH  Outcome: Met This Shift     Problem: Skin Integrity:  Goal: Will show no infection signs and symptoms  Description: Will show no infection signs and symptoms  Outcome: Ongoing     Problem: Skin Integrity:  Goal: Absence of new skin breakdown  Description: Absence of new skin breakdown  Outcome: Ongoing

## 2021-12-06 NOTE — CONSULTS
Palliative Care Department  [phone]  Palliative Care Initial Consult  Provider Cecilio Hallman MD      PATIENT: Cassie Solis  : 1955  MRN: [id number]  ADMISSION DATE: 2021  6:41 PM  Referring Provider: Alpheus Epley, MD    Palliative Medicine was consulted on hospital day 20 for assistance with Goals of care, Code Status Discussion, Family support    HPI:     Maite Lomeli is a 73 y.o. y/o female with a history of schizoaffective disorder, GERD, gastric fundoplication, osteoarthritis who presented to [facility]) on 2021 with shortness of breath. Patient was found to be Covid positive. Currently intubated, and sedated    ASSESSMENT/PLAN:     Pertinent Hospital Diagnoses      COVID 19 pneumonia   Acute hypoxic respiratory failure    Palliative Care Encounter / Counseling Regarding Goals of Care  Please see detailed goals of care discussion as below   At this time, Cassie Solis, Does Not have capacity for medical decision-making. Capacity is time limited and situation/question specific   Outcome of goals of care meeting: Continue current care   Code status Full Code   Advanced Directives: no POA or living will in epic   Surrogate/Legal NOK: Jovany Manueler, [phone]    Spiritual assessment: no spiritual distress identified  Bereavement and grief: to be determined  Referrals to: none today    Thank you for the opportunity to participate in the care of Cassie Solis. Cecilio Hallman MD  Palliative Medicine     SUBJECTIVE:     Details of Conversation: Chart reviewed. Spoke to brother Pascual Bojorquez, introduced palliative medicine to him. We had a discussion regarding her current care at the moment. How she has been progressing. Today is the birthday for patient, he is having a lot of difficulty in hearing about her condition. He became emotional over the phone. We spoke about long-term process, what may happen.   We spoke about tracheostomy and PEG tube feeding. I also had a discussion regarding advanced directives. He continues to want everything done for his sister, I explained to him about Covid and the lungs, how she may never be the same. CODE STATUS will remain full. We will follow patient and watch her progression. Comfort and support were provided    Prognosis: Poor    OBJECTIVE:     BP (!) 155/79   Pulse 82   Temp 98.8 °F (37.1 °C) (Bladder)   Resp (!) 32   Ht 5' 1\" (1.549 m)   Wt 169 lb 4.8 oz (76.8 kg)   SpO2 97%   BMI 31.99 kg/m²     Due to the current efforts to prevent transmission of COVID-19 and also the need to preserve PPE for other caregivers, a face-to-face encounter with the patient was not performed. That being said, all relevant records and diagnostic tests were reviewed, including laboratory results and imaging. Please reference any relevant documentation elsewhere. Care will be coordinated with the primary service and other specialties as appropriate. Objective data reviewed: labs, images, records, medication use, vitals and chart    Time/Communication  Greater than 50% of time spent, total 60 minutes in counseling and coordination of care at the bedside regarding goals of care. Thank you for allowing Palliative Medicine to participate in the care of [address] [place] [address]. Note: This report was completed using computerCobook voiced recognition software. Every effort has been made to ensure accuracy; however, inadvertent computerized transcription errors may be present.

## 2021-12-06 NOTE — PROGRESS NOTES
[address] [facility]   Department of Internal Medicine   Internal Medicine Residency  MICU Progress Note    Patient:  Peter Pascual 73 y.o. female   MRN: [id number]       Date of Service: 2021    Allergy: Ciprofloxacin  Cc follow up ARDS  Subjective     Patient was seen and examined this morning at bedside in no acute distress. Overnight, no acute events noted. Potassium of 2.7 this morning was replaced. Cortisol level was ordered today. Palliative was consulted for goals of care. Midodrine started for pressures. Patient put in supine position. No other acute concerns at this time. Objective     TEMPERATURE:  Current - Temp: 99 °F (37.2 °C); Max - Temp  Av.6 °F (37 °C)  Min: 97.2 °F (36.2 °C)  Max: 99.3 °F (37.4 °C)  RESPIRATIONS RANGE: Resp  Av.4  Min: 22  Max: 35  PULSE RANGE: Pulse  Av.5  Min: 63  Max: 96  BLOOD PRESSURE RANGE:  Systolic (49ANE), PIX:154 , Min:155 , GVM:042   ; Diastolic (90TAK), XMS:74, Min:79, Max:79    PULSE OXIMETRY RANGE: SpO2  Av.3 %  Min: 92 %  Max: 100 %    I & O - 24hr:    Intake/Output Summary (Last 24 hours) at 2021 1331  Last data filed at 2021 1300  Gross per 24 hour   Intake 1932 ml   Output 3005 ml   Net -1073 ml     I/O last 3 completed shifts: In: 2202 [I.V.:1449; NG/GT:753]  Out: 2895 [Urine:2415; Stool:200; Chest Tube:280] I/O this shift:  In: 30 [NG/GT:30]  Out: 980 [Urine:880; Chest Tube:100]   Weight change:     Physical Exam  Constitutional:       Appearance: Normal appearance. Interventions: She is sedated and intubated. HENT:      Head: Normocephalic and atraumatic. Nose: Nose normal.   Eyes:      Pupils: Pupils are equal, round, and reactive to light. Cardiovascular:      Rate and Rhythm: Normal rate and regular rhythm. Pulses: Normal pulses. Heart sounds: Normal heart sounds. Pulmonary:      Effort: Pulmonary effort is normal. She is intubated.       Breath sounds: Examination of the left-upper field reveals decreased breath sounds. Examination of the left-middle field reveals decreased breath sounds. Examination of the left-lower field reveals decreased breath sounds. Decreased breath sounds and wheezing present. No rhonchi or rales. Abdominal:      General: Bowel sounds are normal. There is no distension. Palpations: Abdomen is soft. Musculoskeletal:      Cervical back: Normal range of motion. Skin:     General: Skin is warm and moist.      Capillary Refill: Capillary refill takes less than 2 seconds. Neurological:      General: No focal deficit present.          Medications     Continuous Infusions:   norepinephrine 4 mcg/min (12/06/21 1300)    fentaNYL 5 mcg/ml in 0.9%  ml infusion 125 mcg/hr (12/06/21 0926)    midazolam 5 mg/hr (12/06/21 0330)    cisatracurium (NIMBEX) infusion Stopped (12/06/21 1125)    sodium chloride 100 mL/hr at 11/1956    dextrose       Scheduled Meds:   midodrine  10 mg Oral TID WC    hydrocortisone sodium succinate PF  100 mg IntraVENous Q8H    enoxaparin  40 mg SubCUTAneous BID    sodium chloride (PF)  10 mL IntraVENous BID    And    [START ON 12/7/2021] pantoprazole  40 mg IntraVENous Daily    [START ON 12/7/2021] bumetanide  1 mg IntraVENous Daily with breakfast    sucralfate  1 g Oral 4 times per day    sennosides-docusate sodium  2 tablet Oral Daily    sodium chloride flush  5-40 mL IntraVENous 2 times per day    heparin flush  3 mL IntraVENous 2 times per day    chlorhexidine  15 mL Mouth/Throat BID    artificial tears   Both Eyes Q6H    budesonide  1 mg Nebulization BID    Arformoterol Tartrate  15 mcg Nebulization BID    atorvastatin  10 mg Oral Daily    QUEtiapine  200 mg Oral Daily    sertraline  200 mg Oral Daily    traZODone  100 mg Oral Nightly    ascorbic acid  1,000 mg Oral Daily    vitamin D  2,000 Units Oral Daily    zinc sulfate  50 mg Oral Daily     PRN Meds: polyethylene glycol, sodium chloride flush, heparin flush, ipratropium-albuterol, sodium chloride, ondansetron **OR** ondansetron, acetaminophen **OR** acetaminophen, glucose, dextrose, glucagon (rDNA), dextrose  Nutrition:   NG/OG tube TF type: Pulmocare/Nephro/Glucerna/Jevity        At rate: ml/h    Labs and Imaging Studies     CBC:   Recent Labs     12/04/21  0436 12/04/21  0436 12/04/21  1659 12/05/21 0422 12/06/21 0433   WBC 10.6  --   --  11.1 9.1   HGB 7.7*   < > 9.0* 8.5* 8.3*   HCT 24.0*   < > 29.0* 27.5* 26.9*   .3*  --   --  103.0* 106.3*   *  --   --  136 145    < > = values in this interval not displayed.        BMP:    Recent Labs     12/05/21  1020 12/06/21  0008 12/06/21  0859    142 142   K 4.5 2.7* 3.5   CL 99 99 102   CO2 31* 33* 31*   BUN 28* 30* 31*   CREATININE 0.7 0.7 0.7   GLUCOSE 119* 114* 132*       LIVER PROFILE:   Recent Labs     12/04/21 0436 12/05/21 0422 12/06/21 0433   AST 66* 59* 39*   ALT 69* 78* 73*   BILIDIR <0.2 <0.2 <0.2   BILITOT 0.4 0.4 0.4   ALKPHOS 85 104 79       PT/INR:   Recent Labs     12/04/21  0436 12/05/21 0422 12/06/21 0433   PROTIME 13.4* 12.8* 13.7*   INR 1.2 1.1 1.2       APTT:   Recent Labs     12/04/21  0436 12/05/21 0422 12/06/21 0433   APTT 24.2* 25.0 24.2*       Fasting Lipid Panel:    Lab Results   Component Value Date    CHOL 322 10/12/2021    TRIG 150 10/12/2021    HDL 70 10/12/2021       Cardiac Enzymes:    Lab Results   Component Value Date    CKTOTAL 135 11/22/2021    CKTOTAL 488 (H) 11/20/2021    CKTOTAL 585 (H) 11/19/2021       Notable Cultures:      Blood cultures   Blood Culture, Routine   Date Value Ref Range Status   11/25/2021 5 Days no growth  Final     Respiratory cultures No results found for: RESPCULTURE No results found for: LABGRAM  Urine   Urine Culture, Routine   Date Value Ref Range Status   11/19/2021 Growth not present  Final     Legionella No results found for: LABLEGI  C Diff PCR No results found for: CDIFPCR  Wound culture/abscess: No results for input(s): WNDABS in the last 72 hours. Tip culture:No results for input(s): CXCATHTIP in the last 72 hours. Oxygen:     Vent Information  $Ventilation: $Subsequent Day  Skin Assessment: Skin breakdown present (see comment/note)  Equipment ID: [id number]  Equipment Changed: (S) Humidification  Vent Type: 980  Vent Mode: AC/VC+  Vt Ordered: 320 mL  Pressure Ordered: 34  Rate Set: 32 bmp  Peak Flow: 0 L/min  Pressure Support: 0 cmH20  FiO2 : 65 %  SpO2: 93 %  SpO2/FiO2 ratio: 143.08  PaO2/FiO2 ratio: 120  Sensitivity: 3  PEEP/CPAP: 10  I Time/ I Time %: 0 s  Humidification Source: Heated wire  Humidification Temp: 37  Humidification Temp Measured: 37  Circuit Condensation: Drained  Mask Type: Full face mask  Mask Size: Small  Additional Respiratory  Assessments  Pulse: 85  Resp: (!) 32  SpO2: 93 %  Position: (S) Supine  Humidification Source: Heated wire  Humidification Temp: 37  Circuit Condensation: Drained  Oral Care Completed?: Yes  Oral Care: Mouth swabbed, Mouth suctioned, Mouth moisturizer, Lip moisturizer applied  Subglottic Suction Done?: Yes  Airway Type: ET  Airway Size: 8 (23)  Cuff Pressure (cm H2O):  (MLT)       [REMOVED] Urethral Catheter Temperature probe-Output (mL): 10 mL  Urethral Catheter-Output (mL): 250 mL  [REMOVED] Urethral Catheter Temperature probe 16 fr-Output (mL): 250 mL    Imaging Studies:  XR CHEST PORTABLE   Final Result   Unchanged airspace disease and left pneumothorax. XR CHEST PORTABLE   Final Result   1. No interval change in extensive multifocal bilateral pneumonia   2. Less than 10% left pneumothorax. There is stable position of the 2 left   chest tubes. XR CHEST PORTABLE   Final Result   1. Stable diffuse interstitial pulmonary edema or pneumonia. 2.  Left chest tube demonstrated. Minimal residual left pneumothorax. XR CHEST PORTABLE   Final Result   1. Stable diffuse bilateral airspace disease.    2. Small left pneumothorax appears new or increased compared to prior   examination. Left chest tube appears stable in position. 3. Possible right pleural effusion. XR CHEST PORTABLE   Final Result   1. There is no interval change in extensive multifocal bilateral pneumonia. XR CHEST PORTABLE   Final Result   No change in extensive bilateral pulmonary airspace opacities. No   pneumothorax is radiographically visible on the current exam.         XR CHEST PORTABLE   Final Result   Significantly decreased size of left pneumothorax. There is likely trace   left pneumothorax remaining. Stable extensive bilateral pulmonary airspace opacities. XR CHEST PORTABLE   Final Result   Addendum 1 of 1   ADDENDUM:   Verbal report was given to Nitesh Robles RN at 8:15 a.m. central standard   time on 11/30/2021. Final   New moderate sized left-sided pneumothorax. Stable extensive bilateral pulmonary airspace opacities            XR CHEST PORTABLE   Final Result   1. Lines okay. 2. Persistent edema/ARDS/pneumonia. 3. No sign of pneumothorax. XR CHEST PORTABLE   Final Result   Interval placement of left-sided chest tube with questionable residual   pneumothorax versus artifact. Extensive bilateral infiltrates. Continued follow-up recommended. XR CHEST PORTABLE   Final Result   Interval development of large left-sided tension pneumothorax. Extensive bilateral parenchymal infiltrates. Findings were discussed with Dr. Carmen Forrester at approximately 0010 hours [place]   time on 11/28/2021. XR CHEST PORTABLE   Final Result   Severe bilateral pulmonary opacification. XR CHEST PORTABLE   Final Result   1. Endotracheal tube is 3 cm above the khai. 2. Stable interstitial pulmonary edema or pneumonia throughout both lungs. XR CHEST PORTABLE   Final Result   1. Somewhat limited exam, grossly unchanged. Please see above comments. .      RECOMMENDATION:   1.   Recommend follow-up thoracic imaging, as directed clinically. .         XR ABDOMEN (KUB) (SINGLE AP VIEW)   Final Result   1. Satisfactory position of the NG tube within the stomach         XR CHEST PORTABLE   Final Result   1. There is no interval change in the multifocal bilateral pneumonia. XR CHEST PORTABLE   Final Result   Bilateral airspace disease with interval progression on the right         XR CHEST PORTABLE   Final Result   1. Endotracheal tube is 2.7 cm above the khai. 2. Stable interstitial pulmonary edema or pneumonia throughout both lungs. XR CHEST PORTABLE   Final Result   1. Worsening of the multifocal bilateral pneumonia when compared with the   prior study of 1 day earlier. XR CHEST PORTABLE   Final Result   1. Multifocal bilateral pneumonia more prominent within the right upper lobe   2. Minimal partial interval clearing of the pneumonia within the left lung   3. Worsening of the pneumonia within the right upper lobe. US DUP LOWER EXTREMITIES BILATERAL VENOUS   Final Result   Within the visualized vessels there is no evidence for deep venous   thrombosis               XR CHEST PORTABLE   Final Result   1. Lines okay. 2. Slightly worsened diffuse edema versus pneumonia. XR CHEST PORTABLE   Final Result   1. Lines okay   2. Improved aeration, mild improvement and diffuse pneumonia/edema. XR CHEST ABDOMEN NG PLACEMENT   Final Result   NG tube position satisfactory. XR CHEST PORTABLE   Final Result   Stable bilateral pneumonia or interstitial pulmonary edema. XR CHEST PORTABLE   Final Result   Increasing bilateral infiltrates. XR CHEST PORTABLE   Final Result   1. There is no interval change in the multifocal bilateral pneumonia. CT HEAD WO CONTRAST   Final Result   No acute intracranial abnormality. Cortical atrophy and periventricular white matter ischemic changes.          CT CERVICAL SPINE WO CONTRAST   Final Result   No acute abnormality of the cervical spine. Moderate degenerative changes with disc space narrowing and marginal bony   spur formation C5 through C7. Ground-glass opacities in the visualized lung apices. Please refer to chest   CT for additional information. CTA CHEST W CONTRAST   Final Result   No evidence of pulmonary embolism. Extensive multifocal ground-glass opacities predominantly in the peripheral   lung zones bilaterally, highly concerning for atypical or COVID related   pneumonia. XR CHEST PORTABLE   Final Result   Bilateral patchy airspace opacities worrisome for pneumonia.          XR CHEST PORTABLE    (Results Pending)   XR CHEST PORTABLE    (Results Pending)        Resident's Assessment and Plan     Assessment and Plan:    Cardiovascular   History of hyperlipidemia  -Continue home atorvastatin 10 mg    Pulmonary  Acute hypoxic respiratory failure 2/2 Covid pneumonia  -Patient is intubated  -PF ratio of 0.98  -Currently sedated with fentanyl  -Completed Courses of Decadron and Toci  -Respiratory cultures have been negative, fungal cultures and BLE are also negative  -Continue ventilation with daily ABGs  -Completed remdesivir   -Continue breathing treatments  -Stopped Bumex  -Started on hydrocortisone, can stop if cortisol above 20  -Started on Midodrine   -Restarted on Levophed   -Put into supine position     Left lung pneumothorax  -Chest x-ray showed left pneumothorax on 11/29  -Repeat chest x-ray today showed worsening pneumothorax on 11/30  -2 chest tubes placed   -Continue with chest tube and continue to monitor, General Surgery monitoring     Gastrointestinal  Transaminitis 2/2 Covid infection  -Liver enzymes trending down   -Continue to monitor    Concern for GI bleed  -FOBT positive   -Hgb of 8.5 today   -Continue to monitor   -Switched to Lovenox   -Monitor H&H every 8 hours    Infectious Disease   COVID-19 pneumonia  -See above  -Continue to monitor CRP D-dimer and pro-Carl  -Continue vitamin C, vitamin D and zinc    Renal   Stage III intrinsic CHARAN (resolved)   -Baseline creatinine of 0.5  -Urea 15.2  -Nephro is following  -Creatinine is stable today 0.7  -Continue to follow nephro recommendations  -Continue to monitor  -Continue to monitor and replace electrolytes as appropriate     Heme/Onc  Reactive leukocytosis 2/2 steroids and Covid infection  -WBC trending down to 9.1 today   -Cultures have been negative  -Fungitell negative   -Diflucan stopped   -Continue to follow ID recommendations    Thrombocytopenia 2/2 Covid?   -Platelets stable  -Was HIT negative  -No intervention at this time  -Continue to monitor     Psychiatric   History of schizoaffective disorder  -Continue home hydroxyzine, trazodone, quetiapine and sertraline    # Peptic ulcer prophylaxis:Protonix   # DVT Prophylaxis: Lovenox   # Disposition: Cont current care     Breana MD Pauline, PGY-1    Attending Physician: Dr. Van Garcia     I personally saw, examined and provided care for the patient. Radiographs, labs and medication list were reviewed by me independently. I spoke with bedside nursing, therapists and consultants. Critical care services and times documented are independent of procedures and multidisciplinary rounds with Residents. Additionally comprehensive, multidisciplinary rounds were conducted with the MICU team. The case was discussed in detail and plans for care were established. Review of Residents documentation was conducted and revisions were made as appropriate. I agree with the above documented exam, problem list and plan of care.   Jade Stapleton MD  CCT excluding procedures:40'

## 2021-12-06 NOTE — PROGRESS NOTES
Nephrology Progress Note  Patient's Name: Liam Fleischer    Reason for Consult:  Acute kidney injury    History of Present Ilness from the 11/24/21 note:    Liam Fleischer is a 73 y.o. female with a history of GI GERD, osteoarthritis, and schizoaffective disorder. She initially presented to this hospital 5 days ago following a fall at home. She was found by her roommate following an unspecified duration. EMS was called. Upon their evaluation patient was noted to be hypoxic. She was subsequently brought to ED for evaluation. In the ED she was noted to be hypoxic with an oxygen saturation of 60% on room air. Also noted to to have a low-grade fever. Laboratory data was significant for WBC of 8.5, hemoglobin of 14.6, BUN 17 and a creatinine of 0.6. Rapid COVID-19 test was positive. Patient was admitted to telemetry. 2 days into her hospital course became increasingly more hypoxic and was transferred to MICU. Because of her persistent hypoxia patient was intubated with mechanical ventilation on 11/20;  . Over the past 48 hours she has been markedly hypotensive requiring fluid resuscitation and pressors. Creatinine has worsened to a current value of 1.6 associated with low urine output. Hence renal consult. Subjective    11/28: pt remains in COVID 19 Isolation. She remains proned and on an FIO2 65% and PEEP12 with an O2 sat 95-96%    11/29: pt seen through window of icu due to covid, chart reviewed    11/30: pt seen through window of icu due to covid, chart reviewed.      12/1/21 :pt seen through window of icu due to covid, review of chart performed    12/2/21: pt seen through window of icu due to covid, chart reviewed, intubated, chest tube left     12/3/21 :chart reviewed, chest tube x 2 on right, intubated and prone, seen through window of icu    12/4: no new acute issues from overnight; large volume pleural fluid drainage from bilateral catheter    12/5: Brisk response to Bumex drip now placed on hold transition to intermittent dosing    12/6: pt seen through window of icu, chart reviewed, intubated        Allergies:  Ciprofloxacin    Current Medications:    midodrine (PROAMATINE) tablet 10 mg, TID WC  hydrocortisone sodium succinate PF (SOLU-CORTEF) injection 100 mg, Q8H  enoxaparin (LOVENOX) injection 40 mg, BID  sodium chloride (PF) 0.9 % injection 10 mL, BID   And  [START ON 12/7/2021] pantoprazole (PROTONIX) injection 40 mg, Daily  norepinephrine (LEVOPHED) 16 mg in dextrose 5% 250 mL infusion, Continuous  fentaNYL 5 mcg/ml in 0.9%  ml infusion, Continuous  sucralfate (CARAFATE) tablet 1 g, 4 times per day  sennosides-docusate sodium (SENOKOT-S) 8.6-50 MG tablet 2 tablet, Daily  polyethylene glycol (GLYCOLAX) packet 17 g, Daily PRN  sodium chloride flush 0.9 % injection 5-40 mL, 2 times per day  sodium chloride flush 0.9 % injection 5-40 mL, PRN  heparin flush 100 UNIT/ML injection 300 Units, 2 times per day  heparin flush 100 UNIT/ML injection 300 Units, PRN  midazolam (VERSED) 100 mg in dextrose 5 % 100 mL infusion, Continuous  chlorhexidine (PERIDEX) 0.12 % solution 15 mL, BID  lubrifresh P.M. (artificial tears) ophthalmic ointment, Q6H  cisatracurium besylate (NIMBEX) 200 mg in sodium chloride 0.9 % 100 mL infusion, Continuous  ipratropium-albuterol (DUONEB) nebulizer solution 1 ampule, Q4H PRN  budesonide (PULMICORT) nebulizer suspension 1,000 mcg, BID  Arformoterol Tartrate (BROVANA) nebulizer solution 15 mcg, BID  atorvastatin (LIPITOR) tablet 10 mg, Daily  QUEtiapine (SEROQUEL) tablet 200 mg, Daily  sertraline (ZOLOFT) tablet 200 mg, Daily  traZODone (DESYREL) tablet 100 mg, Nightly  0.9 % sodium chloride infusion, PRN  ondansetron (ZOFRAN-ODT) disintegrating tablet 4 mg, Q8H PRN   Or  ondansetron (ZOFRAN) injection 4 mg, Q6H PRN  acetaminophen (TYLENOL) tablet 650 mg, Q6H PRN   Or  acetaminophen (TYLENOL) suppository 650 mg, Q6H PRN  ascorbic acid (VITAMIN C) tablet 1,000 mg, Daily  Vitamin D (CHOLECALCIFEROL) tablet 2,000 Units, Daily  zinc sulfate (ZINCATE) capsule 50 mg, Daily  glucose (GLUTOSE) 40 % oral gel 15 g, PRN  dextrose 50 % IV solution, PRN  glucagon (rDNA) injection 1 mg, PRN  dextrose 5 % solution, PRN        Review of Systems:   Review of systems not obtained due to patient factors. Physical exam:  Vitals:    12/06/21 1115   BP:    Pulse: 90   Resp: 29   Temp:    SpO2: 95%     PE  Pt not examined due to covid    Data:   Labs:  Lab Results   Component Value Date     12/06/2021     12/06/2021     12/05/2021    K 3.5 12/06/2021    K 2.7 (LL) 12/06/2021    K 4.5 12/05/2021     12/06/2021    CO2 31 (H) 12/06/2021    CO2 33 (H) 12/06/2021    CO2 31 (H) 12/05/2021    CREATININE 0.7 12/06/2021    CREATININE 0.7 12/06/2021    CREATININE 0.7 12/05/2021    BUN 31 (H) 12/06/2021    BUN 30 (H) 12/06/2021    BUN 28 (H) 12/05/2021    GLUCOSE 132 (H) 12/06/2021    GLUCOSE 114 (H) 12/06/2021    GLUCOSE 119 (H) 12/05/2021    PHOS 3.4 12/05/2021    PHOS 5.0 (H) 12/03/2021    PHOS 4.3 12/01/2021    WBC 9.1 12/06/2021    WBC 11.1 12/05/2021    WBC 10.6 12/04/2021    HGB 8.3 (L) 12/06/2021    HGB 8.5 (L) 12/05/2021    HGB 9.0 (L) 12/04/2021    HCT 26.9 (L) 12/06/2021    HCT 27.5 (L) 12/05/2021    HCT 29.0 (L) 12/04/2021    .3 (H) 12/06/2021     12/06/2021         Imaging:  CT HEAD WO CONTRAST    Result Date: 11/16/2021  EXAMINATION: CT OF THE HEAD WITHOUT CONTRAST  11/16/2021 9:54 pm TECHNIQUE: CT of the head was performed without the administration of intravenous contrast. Dose modulation, iterative reconstruction, and/or weight based adjustment of the mA/kV was utilized to reduce the radiation dose to as low as reasonably achievable. COMPARISON: None. HISTORY: ORDERING SYSTEM PROVIDED HISTORY: fall TECHNOLOGIST PROVIDED HISTORY: Reason for exam:->fall Has a \"code stroke\" or \"stroke alert\" been called? ->No Decision Support Exception - unselect if not a suspected or confirmed emergency medical condition->Emergency Medical Condition (MA) What reading provider will be dictating this exam?->CRC FINDINGS: BRAIN/VENTRICLES: There are cortical atrophy and periventricular white matter ischemic changes. There is no acute intracranial hemorrhage, mass effect or midline shift. No abnormal extra-axial fluid collection. The gray-white differentiation is maintained without evidence of an acute infarct. There is no evidence of hydrocephalus. ORBITS: The visualized portion of the orbits demonstrate no acute abnormality. SINUSES: The visualized paranasal sinuses and mastoid air cells demonstrate no acute abnormality. SOFT TISSUES/SKULL:  No acute abnormality of the visualized skull or soft tissues. No acute intracranial abnormality. Cortical atrophy and periventricular white matter ischemic changes. XR CHEST PORTABLE    Result Date: 11/16/2021  EXAMINATION: ONE XRAY VIEW OF THE CHEST 11/16/2021 7:10 pm COMPARISON: 09/23/2021 HISTORY: ORDERING SYSTEM PROVIDED HISTORY: weakness TECHNOLOGIST PROVIDED HISTORY: Reason for exam:->weakness What reading provider will be dictating this exam?->CRC FINDINGS: Bilateral patchy airspace opacities. There is no effusion or pneumothorax. The cardiomediastinal silhouette is without acute process. The osseous structures are without acute process. Bilateral patchy airspace opacities worrisome for pneumonia. CTA CHEST W CONTRAST    Result Date: 11/16/2021  EXAMINATION: CTA OF THE CHEST 11/16/2021 9:54 pm TECHNIQUE: CTA of the chest was performed after the administration of intravenous contrast.  Multiplanar reformatted images are provided for review. MIP images are provided for review. Dose modulation, iterative reconstruction, and/or weight based adjustment of the mA/kV was utilized to reduce the radiation dose to as low as reasonably achievable. COMPARISON: None.  HISTORY: ORDERING SYSTEM PROVIDED HISTORY: r/o pe TECHNOLOGIST PROVIDED HISTORY: Reason for exam:->r/o pe Decision Support Exception - unselect if not a suspected or confirmed emergency medical condition->Emergency Medical Condition (MA) What reading provider will be dictating this exam?->CRC FINDINGS: Pulmonary Arteries: Pulmonary arteries are adequately opacified for evaluation. No evidence of intraluminal filling defect to suggest pulmonary embolism. Main pulmonary artery is normal in caliber. Mediastinum: No evidence of mediastinal lymphadenopathy. The heart and pericardium demonstrate no acute abnormality. There is no acute abnormality of the thoracic aorta. Lungs/pleura: The lungs demonstrate extensive multifocal ground-glass opacities predominantly in the peripheral lung zones. No evidence of pleural effusion or pneumothorax. Upper Abdomen: Limited images of the upper abdomen are unremarkable. Soft Tissues/Bones: No acute bone or soft tissue abnormality. No evidence of pulmonary embolism. Extensive multifocal ground-glass opacities predominantly in the peripheral lung zones bilaterally, highly concerning for atypical or COVID related pneumonia. Assessment/Plans    1. Acute kidney injury stage I  ischemic ATN occurring in the setting of hypotension  nonoliguric    Cr continues to improve now back to baseline  Continue to monitor labs and urine output  Bumex drip put on hold; transition to intermittent dosing  Good response  uo 2.4L  Cr 0.7    2. Acute hypoxic respiratory failure  due to COVID-19 pneumonia  Intubation with mechanical ventilation; prone       3. Septic shock  suspected possible superimposed bacterial pneumonia  now off pressors  abx per id    4. Volume overload  Good response to low-dose Bumex drip  Hold for now  Order low dose bumex    5. Hyponatremia  Na at 142    6. Hypocalcemia  Replace prn    D/W. Resident      Vaughn Pradhan MD

## 2021-12-06 NOTE — CARE COORDINATION
12/6 Care Coordination: Pt remains in MICU, Micky. On vent,fio2 65%, peep 10, AC 32. On IV Sedation, Paralytic stopped this AM. [facility] following. D/C plan remains unclear at this time. CM/SW will continue to follow for discharge planning.    Vianca FOSTERN,RN-CV-BC  [phone]

## 2021-12-06 NOTE — PROGRESS NOTES
[facility] Attending    S: 73 y.o. female with a history of gerd, oa, schizoaffective disorder, and gastric fundiplication who was found down for an undetermined amount of time. Reports shortness of breath and cough for 2.5 weeks. She was found to be 60% on RA. In the ER, COVID testing was positive with significantly elevated CPKs. Patient is unvaccinated. Had desaturation to 88% wit PaO2 of 55 with RRT and subsequent transfer to the MICU. Desat to 40's when Bipap removed. Now intubated. Sedated, paralyzed, and prone. Noted to have pneumothorax and chest tube placed. Second chest tube placed when pneumothorax not improved. Today,  Intubated, sedated, prone. Chest tubes in place. O: VS- Blood pressure (!) 155/79, pulse 71, temperature 98.8 °F (37.1 °C), temperature source Bladder, resp. rate (!) 32, height 5' 1\" (1.549 m), weight 169 lb 4.8 oz (76.8 kg), SpO2 97 %. Exam is as noted by resident   Currently prone and nonresponsive. Lungs: coarse, vent. Decreased BS  CV:  Rate controlled, regular   Ext-no C/C/E  Chest tubes noted    Impressions: Active Problems:    Acute respiratory failure with hypoxia (HCC)    Covid pneumonia    Rhabdomyolysis, CK improving    Concern for sepsis    Acute cystitis with hematuria, treated    CHARAN    Thrombocytopenia     Plan:      Acute hypoxic resp failure 2/2 covid - Decadron. Completed Tocimizilab. Vitamin C.  Vitamin D.  Zinc.  Appreciate Pulm management. Sepsis 2/2 PNA - done with vanc and zosyn  U/s of lower extremities negative DVT 11/20. Slow wean of FiO2. Tube feeding. Fluid overloaded - on bumex  Schizoaffective - seroquel  Diflucan stopped. Ongoing communication with family  Following hemoglobin and WBC  Bumex  ICU management  Full code at this time. Attending Physician Statement  I have reviewed the chart and seen the patient with the resident(s).   I personally reviewed images, EKG's and similar tests, if present. I personally reviewed and performed key elements of the history and exam.  I have reviewed and confirmed student and/or resident history and exam with changes as indicated above. I agree with the assessment, plan and orders as documented by the resident. Please refer to the  resident and/or student note for additional information.       Lucila Harper MD

## 2021-12-06 NOTE — PROGRESS NOTES
Department of Internal Medicine  Infectious Diseases  Progress Note      C/C : Leukocytosis, COVID 19     Pt is intubated, sedated, off paralytic, supine   Off  levophed   No fever     Current Facility-Administered Medications   Medication Dose Route Frequency Provider Last Rate Last Admin    midodrine (PROAMATINE) tablet 10 mg  10 mg Oral TID WC Leandra Sanchez MD   10 mg at 12/06/21 1211    hydrocortisone sodium succinate PF (SOLU-CORTEF) injection 100 mg  100 mg IntraVENous Q8H Leandra Sanchez MD   100 mg at 12/06/21 1211    enoxaparin (LOVENOX) injection 40 mg  40 mg SubCUTAneous BID Leandra Sanchez MD        sodium chloride (PF) 0.9 % injection 10 mL  10 mL IntraVENous BID Janey Almanza MD   10 mL at 12/06/21 0929    And    [START ON 12/7/2021] pantoprazole (PROTONIX) injection 40 mg  40 mg IntraVENous Daily MD Ashley Barba [facility] ON 12/7/2021] bumetanide (BUMEX) injection 1 mg  1 mg IntraVENous Daily with breakfast Sean Snow MD        norepinephrine (LEVOPHED) 16 mg in dextrose 5% 250 mL infusion  2-100 mcg/min IntraVENous Continuous Vickie Martins, DO 3.8 mL/hr at 12/06/21 1300 4 mcg/min at 12/06/21 1300    fentaNYL 5 mcg/ml in 0.9%  ml infusion  12.5-200 mcg/hr IntraVENous Continuous Ann Mendoza MD 25 mL/hr at 12/06/21 0926 125 mcg/hr at 12/06/21 0926    sucralfate (CARAFATE) tablet 1 g  1 g Oral 4 times per day Maren RICKS Capal, DO   1 g at 12/06/21 0929    sennosides-docusate sodium (SENOKOT-S) 8.6-50 MG tablet 2 tablet  2 tablet Oral Daily Balta Smith MD   2 tablet at 12/06/21 0928    polyethylene glycol (GLYCOLAX) packet 17 g  17 g Oral Daily PRN Balta Smith MD   17 g at 12/06/21 0929    sodium chloride flush 0.9 % injection 5-40 mL  5-40 mL IntraVENous 2 times per day Balta Smith MD   10 mL at 12/05/21 2026    sodium chloride flush 0.9 % injection 5-40 mL  5-40 mL IntraVENous PRN Balta Smith MD   10 mL at 11/28/21 2122  heparin flush 100 UNIT/ML injection 300 Units  3 mL IntraVENous 2 times per day Carlton Perez MD        heparin flush 100 UNIT/ML injection 300 Units  3 mL IntraCATHeter PRN Carlton Perez MD        midazolam (VERSED) 100 mg in dextrose 5 % 100 mL infusion  1-10 mg/hr IntraVENous Continuous Inga Ramos MD 5 mL/hr at 12/06/21 0330 5 mg/hr at 12/06/21 0330    chlorhexidine (PERIDEX) 0.12 % solution 15 mL  15 mL Mouth/Throat BID Keyona Sweet MD   15 mL at 12/06/21 0929    lubrifresh P.M. (artificial tears) ophthalmic ointment   Both Eyes Q6H Keyona Sweet MD   Given at 12/06/21 0932    cisatracurium besylate (NIMBEX) 200 mg in sodium chloride 0.9 % 100 mL infusion  0.5-10 mcg/kg/min IntraVENous Continuous Carlton Perez MD   Stopped at 12/06/21 1125    ipratropium-albuterol (DUONEB) nebulizer solution 1 ampule  1 ampule Inhalation Q4H PRN Carlton Perez MD   1 ampule at 12/01/21 2140    budesonide (PULMICORT) nebulizer suspension 1,000 mcg  1 mg Nebulization BID Ratna Renteria MD   1,000 mcg at 12/06/21 1031    Arformoterol Tartrate (BROVANA) nebulizer solution 15 mcg  15 mcg Nebulization BID Ratna Renteria MD   15 mcg at 12/06/21 1031    atorvastatin (LIPITOR) tablet 10 mg  10 mg Oral Daily Zachery Leyden, MD   10 mg at 12/06/21 1126    QUEtiapine (SEROQUEL) tablet 200 mg  200 mg Oral Daily Zachery Leyden, MD   200 mg at 12/06/21 1126    sertraline (ZOLOFT) tablet 200 mg  200 mg Oral Daily Zachery Leyden, MD   200 mg at 12/06/21 1126    traZODone (DESYREL) tablet 100 mg  100 mg Oral Nightly Zachery Leyden, MD   100 mg at 12/05/21 2025    0.9 % sodium chloride infusion  25 mL IntraVENous PRN Zachery Leyden,  mL/hr at 11/1956 Rate Verify at 11/1956    ondansetron (ZOFRAN-ODT) disintegrating tablet 4 mg  4 mg Oral Q8H PRN Zachery Leyden, MD        Or    ondansetron [facility]) injection 4 mg  4 mg IntraVENous Q6H PRN Zachery Leyden, MD        acetaminophen (TYLENOL) tablet 650 mg  650 mg Oral Q6H PRN Mane Helton MD   650 mg at 11/26/21 0553    Or    acetaminophen (TYLENOL) suppository 650 mg  650 mg Rectal Q6H PRN Mane Helton MD   650 mg at 11/27/21 0829    ascorbic acid (VITAMIN C) tablet 1,000 mg  1,000 mg Oral Daily Mane Helton MD   1,000 mg at 12/05/21 1408    Vitamin D (CHOLECALCIFEROL) tablet 2,000 Units  2,000 Units Oral Daily Mane Helton MD   2,000 Units at 12/06/21 0929    zinc sulfate (ZINCATE) capsule 50 mg  50 mg Oral Daily Mane Helton MD   50 mg at 12/06/21 0928    glucose (GLUTOSE) 40 % oral gel 15 g  15 g Oral PRN Mane Helton MD        dextrose 50 % IV solution  12.5 g IntraVENous PRN Mane Helton MD        glucagon (rDNA) injection 1 mg  1 mg IntraMUSCular PRN Mane Helton MD        dextrose 5 % solution  100 mL/hr IntraVENous PRN Mane Helton MD             REVIEW OF SYSTEMS: could not be obtained       PHYSICAL EXAM:      Vitals:     BP (!) 155/79   Pulse 85   Temp 99 °F (37.2 °C) (Esophageal)   Resp (!) 32   Ht 5' 1\" (1.549 m)   Wt 169 lb 4.8 oz (76.8 kg)   SpO2 93%   BMI 31.99 kg/m²     General Appearance:    Intubated, sedated, supine , FiO2 65, PEEP 10    Head:    Normocephalic, atraumatic   Eyes:    + pallor,eyelid swelling    Ears:    No obvious deformity or drainage.    Nose:    Throat:   ET tube in place, swollen lips    Neck:   Supple, no lymphadenopathy   Lungs:     Clear to auscultation ,left chest tubes   Heart:    Regular   Abdomen:     Soft,  bowel sounds present    Extremities:    ++ edema    Pulses:   Dorsalis pedis palpable    Skin:   no rashes        CBC with Differential:      Lab Results   Component Value Date    WBC 9.1 12/06/2021    RBC 2.53 12/06/2021    HGB 8.3 12/06/2021    HCT 26.9 12/06/2021     12/06/2021    .3 12/06/2021    MCH 32.8 12/06/2021    MCHC 30.9 12/06/2021    RDW 24.6 12/06/2021    NRBC 0.9 12/01/2021    METASPCT 0.9 12/04/2021    LYMPHOPCT 2.6 12/06/2021    MONOPCT 2.6 12/06/2021    MYELOPCT 0.9 12/03/2021    BASOPCT 0.1 12/06/2021    MONOSABS 0.27 12/06/2021    LYMPHSABS 0.27 12/06/2021    EOSABS 0.32 12/06/2021    BASOSABS 0.00 12/06/2021       CMP     Lab Results   Component Value Date     12/06/2021    K 3.5 12/06/2021    K 4.8 11/19/2021     12/06/2021    CO2 31 12/06/2021    BUN 31 12/06/2021    CREATININE 0.7 12/06/2021    GFRAA >60 12/06/2021    LABGLOM >60 12/06/2021    GLUCOSE 132 12/06/2021    GLUCOSE 77 05/12/2011    PROT 5.1 12/06/2021    LABALBU 2.3 12/06/2021    CALCIUM 8.5 12/06/2021    BILITOT 0.4 12/06/2021    ALKPHOS 79 12/06/2021    AST 39 12/06/2021    ALT 73 12/06/2021         Hepatic Function Panel:    Lab Results   Component Value Date    ALKPHOS 79 12/06/2021    ALT 73 12/06/2021    AST 39 12/06/2021    PROT 5.1 12/06/2021    BILITOT 0.4 12/06/2021    BILIDIR <0.2 12/06/2021    IBILI see below 12/06/2021    LABALBU 2.3 12/06/2021       PT/INR:    Lab Results   Component Value Date    PROTIME 13.7 12/06/2021    INR 1.2 12/06/2021       TSH:    Lab Results   Component Value Date    TSH 4.720 10/12/2021       U/A:    Lab Results   Component Value Date    COLORU Yellow 11/16/2021    PHUR 6.0 11/16/2021    WBCUA NONE 11/16/2021    RBCUA 1-3 11/16/2021    BACTERIA MANY 11/16/2021    CLARITYU Clear 11/16/2021    SPECGRAV 1.025 11/16/2021    LEUKOCYTESUR Negative 11/16/2021    UROBILINOGEN 0.2 11/16/2021    BILIRUBINUR Negative 11/16/2021    BLOODU LARGE 11/16/2021    GLUCOSEU Negative 11/16/2021       ABG:    Lab Results   Component Value Date    AHZ2EPZ 28.4 12/02/2021    AZR4PYI 51.5 12/02/2021    PO2ART 59.6 12/02/2021       MICROBIOLOGY:    Blood culture - neg on 11/25     Urine Culture -    Sputum Culture -  CULTURE, RESPIRATORY Oral Pharyngeal Kamille absent Abnormal      Smear, Respiratory --    Group 6: <25 PMN's/LPF and <25 Epithelial cells/LPF   Few Polymorphonuclear leukocytes   Rare Epithelial cells   No organisms seen     Organism Candida albicans Abnormal  CULTURE, RESPIRATORY One colony   Narrative:     Source: [facility]       Site:                     Specimen: Nasopharyngeal Swab Updated: 11/1956    SARS-CoV-2, NAAT DETECTED Abnormal     Comment: Rapid NAAT:   Negative results should be treated as presumptive            Ref Range & Units 11/27/21 1024   (1,3)-Beta-D-Glucan (Fungitell) Interpretation Negative Negative        Radiology :    Chest X ray :     Bilateral infiltrates, left pneumothorax     IMPRESSION:     1. Severe COVID 19 pneumonia s/p remdesivir and tocilizumab   2. Respiratory failure - intubated   3. Leukocytosis - reactive- improved   4. Candida colonization   5. S/P Pneumothorax , s/p chest tube insertion         RECOMMENDATIONS:      1. Off abx   2.  Supportive care

## 2021-12-06 NOTE — PROGRESS NOTES
Pulmonary [facility]                      Pulmonary Consult Sukhdev Mason Note :          Cc follow up ARDS  Subjective     Still on Vent  Prone,Paralyzed  No fever Or chills    Objective     Vitals:    12/07/21 2103   BP:    Pulse: 69   Resp: 29   Temp:    SpO2: (!) 88%       I & O - 24hr:    Intake/Output Summary (Last 24 hours) at 12/6/2021 1250  Last data filed at 12/6/2021 1100  Gross per 24 hour   Intake 2112 ml   Output 2875 ml   Net -763 ml     I/O last 3 completed shifts: In: 2202 [I.V.:1449; NG/GT:753]  Out: 6105 [Urine:2415; Stool:200; Chest Tube:280] I/O this shift:  In: 30 [NG/GT:30]  Out: 730 [Urine:630; Chest Tube:100]   Weight change:     Physical Exam  Constitutional:       Appearance: Normal appearance. Interventions: She is sedated and intubated. HENT:      Head: Normocephalic and atraumatic. Nose: Nose normal.   Eyes:      Pupils: Pupils are equal, round, and reactive to light. Cardiovascular:      Rate and Rhythm: Normal rate and regular rhythm. Pulses: Normal pulses. Heart sounds: Normal heart sounds. Pulmonary:      Effort: Pulmonary effort is normal. She is intubated. Breath sounds: Examination of the left-upper field reveals decreased breath sounds. Examination of the left-middle field reveals decreased breath sounds. Examination of the left-lower field reveals decreased breath sounds. Decreased breath sounds and wheezing present. No rhonchi or rales. Abdominal:      General: Bowel sounds are normal. There is no distension. Palpations: Abdomen is soft. Musculoskeletal:      Cervical back: Normal range of motion. Skin:     General: Skin is warm and moist.      Capillary Refill: Capillary refill takes less than 2 seconds. Neurological:      General: No focal deficit present.         Assessment and Plan     Assessment and Plan:  Acute hypoxic respiratory failure 2/2 Covid pneumonia  -Patient is intubated  -PF ratio of 1.08 today, decreasing   -Currently sedated with fentanyl  -Completed Courses of Decadron and Toci  -Respiratory cultures have been negative, fungal cultures and BLE are also negative  -Continue ventilation with daily ABGs  -Continue breathing treatments  May need trach/Peg     Left lung pneumothorax  -s/p chest tube  Keyona Ovalle MD,[facility]  Pulmonary&Critical Care Medicine   Director of [address] [place] [address] Director of [address] [place] [address]    Deon Wilhelm

## 2021-12-07 NOTE — PROGRESS NOTES
Coordination of care discussion and chart review with PM team. LSW spoke with pts brother Lacy Nation for ongoing support. He is waiting to see how pt will do off sedation. No immediate PM psychosocial needs identified for Tasneem.  will remain available for on-going psychosocial support, as needed.

## 2021-12-07 NOTE — PROGRESS NOTES
[facility], [place] Attending    S: 73 y.o. female with a history of gerd, oa, schizoaffective disorder, and gastric fundiplication who was found down for an undetermined amount of time. Reports shortness of breath and cough for 2.5 weeks. She was found to be 60% on RA. In the ER, COVID testing was positive with significantly elevated CPKs. Patient is unvaccinated. Had desaturation to 88% wit PaO2 of 55 with RRT and subsequent transfer to the MICU. Desat to 40's when Bipap removed. Now intubated. Sedated, paralyzed, and prone. Noted to have pneumothorax and chest tube placed. Second chest tube placed when pneumothorax not improved. Today,  Intubated, sedated, now supine. O: VS- Blood pressure (!) 116/54, pulse 74, temperature 94.6 °F (34.8 °C), temperature source Esophageal, resp. rate (!) 33, height 5' 1\" (1.549 m), weight 161 lb 4.8 oz (73.2 kg), SpO2 94 %. Exam is as noted by resident   Currently nonresponsive. Lungs: coarse, vent. Decreased BS  CV:  Rate controlled, regular   Ext-no C/C/E    FiO2 65%   PEEP 10    Impressions: Active Problems:    Acute respiratory failure with hypoxia (HCC)    Covid pneumonia    Rhabdomyolysis, CK improving    Concern for sepsis    Acute cystitis with hematuria, treated    CHARAN    Thrombocytopenia     Plan:      Acute hypoxic resp failure 2/2 covid - Decadron. Completed Tocimizilab. Vitamin C.  Vitamin D.  Zinc.  Appreciate Pulm management. Sepsis 2/2 PNA - done with vanc and zosyn  U/s of lower extremities negative DVT 11/20. Slow wean of FiO2. Tube feeding. Fluid overloaded - on bumex  Schizoaffective - seroquel  Diflucan stopped. Ongoing communication with family re code status  Following hemoglobin and WBC  Bumex  ICU management  Full code at this time. Attending Physician Statement  I have reviewed the chart and seen the patient with the resident(s).   I personally reviewed images, EKG's and similar tests, if present. I personally reviewed and performed key elements of the history and exam.  I have reviewed and confirmed student and/or resident history and exam with changes as indicated above. I agree with the assessment, plan and orders as documented by the resident. Please refer to the  resident and/or student note for additional information.       Shaka Guzman MD

## 2021-12-07 NOTE — CONSULTS
200 mg by mouth daily    Yes Historical Provider, MD   sertraline (ZOLOFT) 100 MG tablet Take 200 mg by mouth daily   Yes Historical Provider, MD       Allergies   Allergen Reactions    Ciprofloxacin Nausea Only       Family History   Problem Relation Age of Onset    Premenopausal breast cancer Mother 46        invasive    Postmenopausal breast cancer Maternal Grandmother 67    Postmenopausal breast cancer Maternal Aunt 79       Social History     Tobacco Use    Smoking status: Never Smoker    Smokeless tobacco: Never Used   Vaping Use    Vaping Use: Never used   Substance Use Topics    Alcohol use: Not Currently    Drug use: Never         Review of Systems   Unable to assess       PHYSICAL EXAM:    Vitals:    12/07/21 1719   BP:    Pulse: 62   Resp:    Temp:    SpO2: 93%       General Appearance: Intubated and sedated   Skin:  Skin color, texture, turgor normal. Scattered ecchymosis   Head/face:  NCAT  Eyes:  No gross abnormalities. Lungs:  Intubated and sedated, RR 32 on vent  Heart:  Heart regular rate and rhythm  Abdomen:  Soft, non distended, 4 lap surgical scars across abdomen   Extremities: pulses present in all extremities  Female Rectal: Rectal exam deferred    LABS:    CBC  Recent Labs     12/07/21  0446 12/07/21  0446 12/07/21  1536   WBC 10.9  --   --    HGB 8.6*   < > 8.5*   HCT 28.0*   < > 27.4*     --   --     < > = values in this interval not displayed. BMP  Recent Labs     12/07/21  1326      K 3.6      CO2 31*   BUN 30*   CREATININE 0.6   CALCIUM 8.2*     Liver Function  Recent Labs     12/07/21  0446   BILITOT 0.3   BILIDIR <0.2   AST 36*   ALT 75*   ALKPHOS 115*   PROT 5.3*   LABALBU 2.6*     No results for input(s): LACTATE in the last 72 hours.   Recent Labs     12/07/21  0446   INR 1.2       RADIOLOGY    CT HEAD WO CONTRAST    Result Date: 11/16/2021  EXAMINATION: CT OF THE HEAD WITHOUT CONTRAST  11/16/2021 9:54 pm TECHNIQUE: CT of the head was performed without the administration of intravenous contrast. Dose modulation, iterative reconstruction, and/or weight based adjustment of the mA/kV was utilized to reduce the radiation dose to as low as reasonably achievable. COMPARISON: None. HISTORY: ORDERING SYSTEM PROVIDED HISTORY: fall TECHNOLOGIST PROVIDED HISTORY: Reason for exam:->fall Has a \"code stroke\" or \"stroke alert\" been called? ->No Decision Support Exception - unselect if not a suspected or confirmed emergency medical condition->Emergency Medical Condition (MA) What reading provider will be dictating this exam?->CRC FINDINGS: BRAIN/VENTRICLES: There are cortical atrophy and periventricular white matter ischemic changes. There is no acute intracranial hemorrhage, mass effect or midline shift. No abnormal extra-axial fluid collection. The gray-white differentiation is maintained without evidence of an acute infarct. There is no evidence of hydrocephalus. ORBITS: The visualized portion of the orbits demonstrate no acute abnormality. SINUSES: The visualized paranasal sinuses and mastoid air cells demonstrate no acute abnormality. SOFT TISSUES/SKULL:  No acute abnormality of the visualized skull or soft tissues. No acute intracranial abnormality. Cortical atrophy and periventricular white matter ischemic changes. CT CERVICAL SPINE WO CONTRAST    Result Date: 11/16/2021  EXAMINATION: CT OF THE CERVICAL SPINE WITHOUT CONTRAST 11/16/2021 9:54 pm TECHNIQUE: CT of the cervical spine was performed without the administration of intravenous contrast. Multiplanar reformatted images are provided for review. Dose modulation, iterative reconstruction, and/or weight based adjustment of the mA/kV was utilized to reduce the radiation dose to as low as reasonably achievable. COMPARISON: None.  HISTORY: ORDERING SYSTEM PROVIDED HISTORY: fall TECHNOLOGIST PROVIDED HISTORY: Reason for exam:->fall Decision Support Exception - unselect if not a suspected or confirmed emergency medical condition->Emergency Medical Condition (MA) What reading provider will be dictating this exam?->CRC FINDINGS: BONES/ALIGNMENT: There is no acute fracture or traumatic malalignment. DEGENERATIVE CHANGES: There are moderate multilevel degenerative changes C5 through C7. Multilevel bilateral facet arthrosis noted. SOFT TISSUES: There is no prevertebral soft tissue swelling. Lung apices: Patchy ground glass opacities. No acute abnormality of the cervical spine. Moderate degenerative changes with disc space narrowing and marginal bony spur formation C5 through C7. Ground-glass opacities in the visualized lung apices. Please refer to chest CT for additional information. XR CHEST PORTABLE    Result Date: 11/16/2021  EXAMINATION: ONE XRAY VIEW OF THE CHEST 11/16/2021 7:10 pm COMPARISON: 09/23/2021 HISTORY: ORDERING SYSTEM PROVIDED HISTORY: weakness TECHNOLOGIST PROVIDED HISTORY: Reason for exam:->weakness What reading provider will be dictating this exam?->CRC FINDINGS: Bilateral patchy airspace opacities. There is no effusion or pneumothorax. The cardiomediastinal silhouette is without acute process. The osseous structures are without acute process. Bilateral patchy airspace opacities worrisome for pneumonia. CTA CHEST W CONTRAST    Result Date: 11/16/2021  EXAMINATION: CTA OF THE CHEST 11/16/2021 9:54 pm TECHNIQUE: CTA of the chest was performed after the administration of intravenous contrast.  Multiplanar reformatted images are provided for review. MIP images are provided for review. Dose modulation, iterative reconstruction, and/or weight based adjustment of the mA/kV was utilized to reduce the radiation dose to as low as reasonably achievable. COMPARISON: None.  HISTORY: ORDERING SYSTEM PROVIDED HISTORY: r/o pe TECHNOLOGIST PROVIDED HISTORY: Reason for exam:->r/o pe Decision Support Exception - unselect if not a suspected or confirmed emergency medical condition->Emergency Medical Condition (MA) What reading provider will be dictating this exam?->CRC FINDINGS: Pulmonary Arteries: Pulmonary arteries are adequately opacified for evaluation. No evidence of intraluminal filling defect to suggest pulmonary embolism. Main pulmonary artery is normal in caliber. Mediastinum: No evidence of mediastinal lymphadenopathy. The heart and pericardium demonstrate no acute abnormality. There is no acute abnormality of the thoracic aorta. Lungs/pleura: The lungs demonstrate extensive multifocal ground-glass opacities predominantly in the peripheral lung zones. No evidence of pleural effusion or pneumothorax. Upper Abdomen: Limited images of the upper abdomen are unremarkable. Soft Tissues/Bones: No acute bone or soft tissue abnormality. No evidence of pulmonary embolism. Extensive multifocal ground-glass opacities predominantly in the peripheral lung zones bilaterally, highly concerning for atypical or COVID related pneumonia. ASSESSMENT:  73 y.o. female with covid PNA in need of trach/peg placement     PLAN:  - Current ventilator settings not amenable to trach/peg placement at this time. We will wait for improvement in ventilator settings before proceeding. Will follow along for now.   - appreciate ICU care     Discussed with Dr. Shae Augustine     Electronically signed by Ahsan Mccracken MD on 12/7/21 at 5:50 PM EST

## 2021-12-07 NOTE — PLAN OF CARE
Problem: Falls - Risk of:  Goal: Will remain free from falls  Description: Will remain free from falls  12/6/2021 2335 by Dakota Kee RN  Outcome: Met This Shift  12/6/2021 2026 by Dakota Kee RN  Outcome: Met This Shift  12/6/2021 1229 by Lisseth Li RN  Outcome: Met This Shift  Goal: Absence of physical injury  Description: Absence of physical injury  12/6/2021 2335 by Dakota Kee RN  Outcome: Met This Shift  12/6/2021 2026 by Dakota Kee RN  Outcome: Met This Shift  12/6/2021 2122 by Lisseth Li RN  Outcome: Met This Shift     Problem: Body Temperature -  Risk of, Imbalanced  Goal: Ability to maintain a body temperature within defined limits  12/6/2021 2335 by Dakota Kee RN  Outcome: Met This Shift  12/6/2021 2026 by Dakota Kee RN  Outcome: Ongoing  12/6/2021 7749 by Lisseth Li RN  Outcome: Met This Shift     Problem: Gas Exchange - Impaired  Goal: Absence of hypoxia  12/6/2021 2335 by Dakota Kee RN  Outcome: Ongoing  12/6/2021 2026 by Dakota Kee RN  Outcome: Met This Shift  12/6/2021 0325 by Lisseth Li RN  Outcome: Met This Shift  Goal: Promote optimal lung function  12/6/2021 2335 by Dakota Kee RN  Outcome: Ongoing  12/6/2021 2026 by Dakota Kee RN  Outcome: Ongoing  12/6/2021 5726 by Lisseht Li RN  Outcome: Met This Shift     Problem: Breathing Pattern - Ineffective  Goal: Ability to achieve and maintain a regular respiratory rate  12/6/2021 2335 by Dakota Kee RN  Outcome: Ongoing  12/6/2021 2026 by Dakota Kee RN  Outcome: Not Met This Shift  12/6/2021 2832 by Lisseth Li RN  Outcome: Met This Shift     Problem:  Body Temperature -  Risk of, Imbalanced  Goal: Complications related to the disease process, condition or treatment will be avoided or minimized  12/6/2021 2335 by Dakota Kee RN  Outcome: Ongoing  12/6/2021 2026 by Dakota Kee RN  Outcome: Ongoing     Problem: Isolation Precautions - Risk of Spread of Infection  Goal: Prevent transmission of infection  2021 233 by Rosalynd Apgar, RN  Outcome: Ongoing  2021 by Rosalynd Apgar, RN  Outcome: Ongoing  2021 9604 by Melonie Cobian RN  Outcome: Met This Shift     Problem: Nutrition Deficits  Goal: Optimize nutritional status  2021 233 by Rosalynd Apgar, RN  Outcome: Ongoing  2021 by Rosalynd Apgar, RN  Outcome: Ongoing     Problem: Risk for Fluid Volume Deficit  Goal: Maintain normal heart rhythm  2021 233 by Rosalynd Apgar, RN  Outcome: Ongoing  2021 by Rosalynd Apgar, RN  Outcome: Ongoing  2021 4493 by Melonie Cobian RN  Outcome: Met This Shift  Goal: Maintain absence of muscle cramping  2021 by Rosalynd Apgar, RN  Outcome: Ongoing  2021 by Rosalynd Apgar, RN  Outcome: Ongoing  Goal: Maintain normal serum potassium, sodium, calcium, phosphorus, and pH  2021 by Rosalynd Apgar, RN  Outcome: Ongoing  2021 by Rosalynd Apgar, RN  Outcome: Ongoing  2021 6455 by Melonie Cobian RN  Outcome: Met This Shift     Problem: Skin Integrity:  Goal: Will show no infection signs and symptoms  Description: Will show no infection signs and symptoms  2021 233 by Rosalynd Apgar, RN  Outcome: Ongoing  2021 by Rosalynd Apgar, RN  Outcome: Ongoing  2021 8265 by Melonie Cobian RN  Outcome: Ongoing  Goal: Absence of new skin breakdown  Description: Absence of new skin breakdown  2021 233 by Rosalynd Apgar, RN  Outcome: Ongoing  2021 by Rosalynd Apgar, RN  Outcome: Ongoing  2021 4997 by Melonie Cobian RN  Outcome: Ongoing     Problem: Airway Clearance - Ineffective  Goal: Achieve or maintain patent airway  2021 by Rosalynd Apgar, RN  Outcome: Not Met This Shift  2021 by Rosalynd Apgar, RN  Outcome: Not Met This Shift  2021 7111 by Melonie Cobian RN  Outcome: Met This Shift     Problem:  Body Temperature -  Risk of, Imbalanced  Goal: Will regain or maintain usual level of consciousness  12/6/2021 2335 by Livan Merlos, RN  Outcome: Not Met This Shift  12/6/2021 2026 by Livan Merlos, RN  Outcome: Not Met This Shift     Problem: Fatigue  Goal: Verbalize increase energy and improved vitality  Outcome: Not Met This Shift

## 2021-12-07 NOTE — PROGRESS NOTES
[address] [facility]   Department of Internal Medicine   Internal Medicine Residency  MICU Progress Note    Patient:  Caden Gallardo 73 y.o. female   MRN: [id number]       Date of Service: 2021    Allergy: Ciprofloxacin  Cc follow up ARDS  Subjective     Patient was seen and examined this morning at bedside in no acute distress. Overnight, no acute events noted. Potassium of 3.6 this morning and was replaced. Patient has been supine since yesterday morning. She is tolerating supine position. General Surgery consulted for Trach and peg. No other acute issues at this time. Objective     TEMPERATURE:  Current - Temp: 98.2 °F (36.8 °C); Max - Temp  Av.5 °F (36.4 °C)  Min: 94.6 °F (34.8 °C)  Max: 98.2 °F (36.8 °C)  RESPIRATIONS RANGE: Resp  Av.2  Min: 18  Max: 34  PULSE RANGE: Pulse  Av.7  Min: 58  Max: 81  BLOOD PRESSURE RANGE:  Systolic (05IRS), CJC:634 , Min:102 , WUJ:186   ; Diastolic (40QME), QTP:90, Min:51, Max:67    PULSE OXIMETRY RANGE: SpO2  Av.4 %  Min: 91 %  Max: 98 %    I & O - 24hr:    Intake/Output Summary (Last 24 hours) at 2021 1435  Last data filed at 2021 1426  Gross per 24 hour   Intake 2604.34 ml   Output 1935 ml   Net 669.34 ml     I/O last 3 completed shifts: In:  [I.V.:1257.4; NG/GT:581; IV Piggyback:158.6]  Out: 2100 [Urine:1620; Stool:250; Chest Tube:230] I/O this shift:  In: 637.4 [I.V.:577.4; NG/GT:60]  Out: 815 [Urine:715; Stool:60; Chest Tube:40]   Weight change:     Physical Exam  Constitutional:       Appearance: Normal appearance. Interventions: She is sedated and intubated. HENT:      Head: Normocephalic and atraumatic. Nose: Nose normal.   Eyes:      Pupils: Pupils are equal, round, and reactive to light. Cardiovascular:      Rate and Rhythm: Normal rate and regular rhythm. Pulses: Normal pulses. Heart sounds: Normal heart sounds. Pulmonary:      Effort: Pulmonary effort is normal. She is intubated. Breath sounds: Examination of the left-upper field reveals decreased breath sounds. Examination of the left-middle field reveals decreased breath sounds. Examination of the left-lower field reveals decreased breath sounds. Decreased breath sounds and wheezing present. No rhonchi or rales. Abdominal:      General: Bowel sounds are normal. There is no distension. Palpations: Abdomen is soft. Musculoskeletal:      Cervical back: Normal range of motion. Skin:     General: Skin is warm and moist.      Capillary Refill: Capillary refill takes less than 2 seconds. Neurological:      General: No focal deficit present.          Medications     Continuous Infusions:   norepinephrine 2 mcg/min (12/06/21 1840)    fentaNYL 5 mcg/ml in 0.9%  ml infusion 200 mcg/hr (12/07/21 1238)    midazolam 7 mg/hr (12/07/21 1238)    cisatracurium (NIMBEX) infusion Stopped (12/06/21 1125)    sodium chloride 100 mL/hr at 11/1955    dextrose       Scheduled Meds:   midodrine  10 mg Oral TID WC    hydrocortisone sodium succinate PF  100 mg IntraVENous Q8H    enoxaparin  40 mg SubCUTAneous BID    sodium chloride (PF)  10 mL IntraVENous BID    And    pantoprazole  40 mg IntraVENous Daily    bumetanide  1 mg IntraVENous Daily with breakfast    sucralfate  1 g Oral 4 times per day    sennosides-docusate sodium  2 tablet Oral Daily    sodium chloride flush  5-40 mL IntraVENous 2 times per day    heparin flush  3 mL IntraVENous 2 times per day    chlorhexidine  15 mL Mouth/Throat BID    artificial tears   Both Eyes Q6H    budesonide  1 mg Nebulization BID    Arformoterol Tartrate  15 mcg Nebulization BID    atorvastatin  10 mg Oral Daily    QUEtiapine  200 mg Oral Daily    sertraline  200 mg Oral Daily    traZODone  100 mg Oral Nightly    ascorbic acid  1,000 mg Oral Daily    vitamin D  2,000 Units Oral Daily    zinc sulfate  50 mg Oral Daily     PRN Meds: polyethylene glycol, sodium chloride flush, heparin flush, ipratropium-albuterol, sodium chloride, ondansetron **OR** ondansetron, acetaminophen **OR** acetaminophen, glucose, dextrose, glucagon (rDNA), dextrose  Nutrition:   NG/OG tube TF type: Pulmocare/Nephro/Glucerna/Jevity        At rate: ml/h    Labs and Imaging Studies     CBC:   Recent Labs     12/05/21  0422 12/05/21  0422 12/06/21  0433 12/06/21  0433 12/06/21  1500 12/07/21  0332 12/07/21 0446   WBC 11.1  --  9.1  --   --   --  10.9   HGB 8.5*   < > 8.7*  8.3*   < > 8.7* 8.7* 8.6*   HCT 27.5*   < > 28.5*  26.9*   < > 28.6* 28.8* 28.0*   .0*  --  106.3*  --   --   --  104.5*     --  145  --   --   --  155    < > = values in this interval not displayed.        BMP:    Recent Labs     12/06/21  0859 12/06/21  1500 12/07/21  0123    144 142   K 3.5 3.2* 3.6    101 101   CO2 31* 31* 31*   BUN 31* 31* 33*   CREATININE 0.7 0.7 0.7   GLUCOSE 132* 133* 147*       LIVER PROFILE:   Recent Labs     12/05/21  0422 12/06/21  0433 12/07/21 0446   AST 59* 39* 36*   ALT 78* 73* 75*   BILIDIR <0.2 <0.2 <0.2   BILITOT 0.4 0.4 0.3   ALKPHOS 104 79  --        PT/INR:   Recent Labs     12/05/21  0422 12/06/21  0433 12/07/21  0446   PROTIME 12.8* 13.7* 13.3*   INR 1.1 1.2 1.2       APTT:   Recent Labs     12/05/21  0422 12/06/21  0433 12/07/21 0446   APTT 25.0 24.2* 26.4       Fasting Lipid Panel:    Lab Results   Component Value Date    CHOL 322 10/12/2021    TRIG 150 10/12/2021    HDL 70 10/12/2021       Cardiac Enzymes:    Lab Results   Component Value Date    CKTOTAL 135 11/22/2021    CKTOTAL 488 (H) 11/20/2021    CKTOTAL 585 (H) 11/19/2021       Notable Cultures:      Blood cultures   Blood Culture, Routine   Date Value Ref Range Status   11/25/2021 5 Days no growth  Final     Respiratory cultures No results found for: RESPCULTURE No results found for: LABGRAM  Urine   Urine Culture, Routine   Date Value Ref Range Status   11/19/2021 Growth not present  Final     Legionella No results found for: LABLEGI  C Diff PCR No results found for: CDIFPCR  Wound culture/abscess: No results for input(s): WNDABS in the last 72 hours. Tip culture:No results for input(s): CXCATHTIP in the last 72 hours. Oxygen:     Vent Information  $Ventilation: $Subsequent Day  Skin Assessment: Skin breakdown present (see comment/note)  Equipment ID: [id number]  Equipment Changed: (S) Humidification  Vent Type: 980  Vent Mode: AC/VC+  Vt Ordered: 350 mL  Pressure Ordered: 34  Rate Set: 32 bmp  Peak Flow: 0 L/min  Pressure Support: 0 cmH20  FiO2 : 65 %  SpO2: 91 %  SpO2/FiO2 ratio: 140  PaO2/FiO2 ratio: 120  Sensitivity: 3  PEEP/CPAP: 8  I Time/ I Time %: 0.7 s  Humidification Source: Heated wire  Humidification Temp: 37  Humidification Temp Measured: 37  Circuit Condensation: Drained  Mask Type: Full face mask  Mask Size: Small  Additional Respiratory  Assessments  Pulse: 58  Resp: (!) 32  SpO2: 91 %  Position: Semi-Calabrese's  Humidification Source: Heated wire  Humidification Temp: 37  Circuit Condensation: Drained  Oral Care Completed?: Yes  Oral Care: Mouth swabbed, Mouth moisturizer, Mouth suctioned, Suction toothette  Subglottic Suction Done?: Yes  Airway Type: ET  Airway Size: 8  Cuff Pressure (cm H2O):  (MLT)       [REMOVED] Urethral Catheter Temperature probe-Output (mL): 10 mL  Urethral Catheter-Output (mL): 75 mL  [REMOVED] Urethral Catheter Temperature probe 16 fr-Output (mL): 250 mL    Imaging Studies:  XR CHEST PORTABLE   Final Result   1. Decreased left pneumothorax with suspected trace residual.  2 left chest   tubes are similar to the previous exam.   2. Bilateral pulmonary opacities appear mildly increased. 3. The support devices are unchanged and appear to be in acceptable position. XR CHEST PORTABLE   Final Result   Unchanged airspace disease and left pneumothorax. XR CHEST PORTABLE   Final Result   1. No interval change in extensive multifocal bilateral pneumonia   2.  Less than 10% left pneumothorax. There is stable position of the 2 left   chest tubes. XR CHEST PORTABLE   Final Result   1. Stable diffuse interstitial pulmonary edema or pneumonia. 2.  Left chest tube demonstrated. Minimal residual left pneumothorax. XR CHEST PORTABLE   Final Result   1. Stable diffuse bilateral airspace disease. 2. Small left pneumothorax appears new or increased compared to prior   examination. Left chest tube appears stable in position. 3. Possible right pleural effusion. XR CHEST PORTABLE   Final Result   1. There is no interval change in extensive multifocal bilateral pneumonia. XR CHEST PORTABLE   Final Result   No change in extensive bilateral pulmonary airspace opacities. No   pneumothorax is radiographically visible on the current exam.         XR CHEST PORTABLE   Final Result   Significantly decreased size of left pneumothorax. There is likely trace   left pneumothorax remaining. Stable extensive bilateral pulmonary airspace opacities. XR CHEST PORTABLE   Final Result   Addendum 1 of 1   ADDENDUM:   Verbal report was given to Lou Almaguer RN at 8:15 a.m. central standard   time on 11/30/2021. Final   New moderate sized left-sided pneumothorax. Stable extensive bilateral pulmonary airspace opacities            XR CHEST PORTABLE   Final Result   1. Lines okay. 2. Persistent edema/ARDS/pneumonia. 3. No sign of pneumothorax. XR CHEST PORTABLE   Final Result   Interval placement of left-sided chest tube with questionable residual   pneumothorax versus artifact. Extensive bilateral infiltrates. Continued follow-up recommended. XR CHEST PORTABLE   Final Result   Interval development of large left-sided tension pneumothorax. Extensive bilateral parenchymal infiltrates. Findings were discussed with Dr. Kasia Sam at approximately 0010 hours [place]   time on 11/28/2021.          XR CHEST PORTABLE   Final Result Severe bilateral pulmonary opacification. XR CHEST PORTABLE   Final Result   1. Endotracheal tube is 3 cm above the khai. 2. Stable interstitial pulmonary edema or pneumonia throughout both lungs. XR CHEST PORTABLE   Final Result   1. Somewhat limited exam, grossly unchanged. Please see above comments. .      RECOMMENDATION:   1. Recommend follow-up thoracic imaging, as directed clinically. .         XR ABDOMEN (KUB) (SINGLE AP VIEW)   Final Result   1. Satisfactory position of the NG tube within the stomach         XR CHEST PORTABLE   Final Result   1. There is no interval change in the multifocal bilateral pneumonia. XR CHEST PORTABLE   Final Result   Bilateral airspace disease with interval progression on the right         XR CHEST PORTABLE   Final Result   1. Endotracheal tube is 2.7 cm above the khai. 2. Stable interstitial pulmonary edema or pneumonia throughout both lungs. XR CHEST PORTABLE   Final Result   1. Worsening of the multifocal bilateral pneumonia when compared with the   prior study of 1 day earlier. XR CHEST PORTABLE   Final Result   1. Multifocal bilateral pneumonia more prominent within the right upper lobe   2. Minimal partial interval clearing of the pneumonia within the left lung   3. Worsening of the pneumonia within the right upper lobe. US DUP LOWER EXTREMITIES BILATERAL VENOUS   Final Result   Within the visualized vessels there is no evidence for deep venous   thrombosis               XR CHEST PORTABLE   Final Result   1. Lines okay. 2. Slightly worsened diffuse edema versus pneumonia. XR CHEST PORTABLE   Final Result   1. Lines okay   2. Improved aeration, mild improvement and diffuse pneumonia/edema. XR CHEST ABDOMEN NG PLACEMENT   Final Result   NG tube position satisfactory. XR CHEST PORTABLE   Final Result   Stable bilateral pneumonia or interstitial pulmonary edema.          XR CHEST PORTABLE   Final Result   Increasing bilateral infiltrates. XR CHEST PORTABLE   Final Result   1. There is no interval change in the multifocal bilateral pneumonia. CT HEAD WO CONTRAST   Final Result   No acute intracranial abnormality. Cortical atrophy and periventricular white matter ischemic changes. CT CERVICAL SPINE WO CONTRAST   Final Result   No acute abnormality of the cervical spine. Moderate degenerative changes with disc space narrowing and marginal bony   spur formation C5 through C7. Ground-glass opacities in the visualized lung apices. Please refer to chest   CT for additional information. CTA CHEST W CONTRAST   Final Result   No evidence of pulmonary embolism. Extensive multifocal ground-glass opacities predominantly in the peripheral   lung zones bilaterally, highly concerning for atypical or COVID related   pneumonia. XR CHEST PORTABLE   Final Result   Bilateral patchy airspace opacities worrisome for pneumonia.          XR CHEST PORTABLE    (Results Pending)   XR CHEST PORTABLE    (Results Pending)        Resident's Assessment and Plan     Assessment and Plan:    Cardiovascular   History of hyperlipidemia  -Continue home atorvastatin 10 mg    Pulmonary  Acute hypoxic respiratory failure 2/2 Covid pneumonia  -Patient is intubated  -PF ratio of 1.08  -Currently sedated with fentanyl  -Completed Courses of Decadron and Toci  -Respiratory cultures have been negative, fungal cultures and BLE are also negative  -Continue ventilation with daily ABGs  -Completed remdesivir   -Continue breathing treatments  -Continue Hydrocortisone  -Continue on Midodrine   -Off Pressors   -In supine position   -General Surgery consulted for a trach and peg     Left lung pneumothorax  -Chest x-ray showed left pneumothorax on 11/29  -Repeat chest x-ray today showed worsening pneumothorax on 11/30  -2 chest tubes placed   -Continue with chest tube and continue to monitor, General Surgery monitoring     Gastrointestinal  Transaminitis 2/2 Covid infection  -Liver enzymes trending down   -Continue to monitor    Concern for GI bleed-stable/resolved   -FOBT positive   -Hgb of 8.6 today   -Continue to monitor   -Switched to Lovenox   -Monitor H&H every 8 hours    Infectious Disease   COVID-19 pneumonia  -See above  -Continue to monitor CRP D-dimer and pro-Carl  -Continue vitamin C, vitamin D and zinc    Renal   Stage III intrinsic CHARAN (resolved)   -Baseline creatinine of 0.5  -Nephro is following  -Creatinine is stable today 0.7  -Continue to follow nephro recommendations  -Continue to monitor  -Continue to monitor and replace electrolytes as appropriate     Heme/Onc  Reactive leukocytosis 2/2 steroids and Covid infection  -WBC stable today  -Cultures have been negative  -Fungitell negative   -Diflucan stopped   -Continue to follow ID recommendations    Thrombocytopenia 2/2 Covid?   -Platelets stable  -Was HIT negative  -No intervention at this time  -Continue to monitor     Psychiatric   History of schizoaffective disorder  -Continue home hydroxyzine, trazodone, quetiapine and sertraline    # Peptic ulcer prophylaxis:Protonix   # DVT Prophylaxis: Lovenox   # Disposition: Cont current care     Azul Hardy MD, PGY-1    Attending Physician: Dr. Jazz Carlson     I personally saw, examined and provided care for the patient. Radiographs, labs and medication list were reviewed by me independently. I spoke with bedside nursing, therapists and consultants. Critical care services and times documented are independent of procedures and multidisciplinary rounds with Residents. Additionally comprehensive, multidisciplinary rounds were conducted with the MICU team. The case was discussed in detail and plans for care were established. Review of Residents documentation was conducted and revisions were made as appropriate.  I agree with the above documented exam, problem list and plan of

## 2021-12-07 NOTE — PROGRESS NOTES
Department of Internal Medicine  Infectious Diseases  Progress Note      C/C : Leukocytosis, COVID 19     Pt is intubated, sedated, off paralytic, supine   Off  levophed   No fever     Current Facility-Administered Medications   Medication Dose Route Frequency Provider Last Rate Last Admin    midodrine (PROAMATINE) tablet 10 mg  10 mg Oral TID WC Nadege Lazaro MD   10 mg at 12/07/21 0803    hydrocortisone sodium succinate PF (SOLU-CORTEF) injection 100 mg  100 mg IntraVENous Q8H Buffy Lemus MD   100 mg at 12/07/21 1007    enoxaparin (LOVENOX) injection 40 mg  40 mg SubCUTAneous BID Nadege Lazaro MD   40 mg at 12/07/21 0802    sodium chloride (PF) 0.9 % injection 10 mL  10 mL IntraVENous BID Mehrdad Lubin MD   10 mL at 12/07/21 0804    And    pantoprazole (PROTONIX) injection 40 mg  40 mg IntraVENous Daily Nadege Lazaro MD   40 mg at 12/07/21 0804    bumetanide (BUMEX) injection 1 mg  1 mg IntraVENous Daily with breakfast Ravinder Lagunas MD   1 mg at 12/07/21 0803    norepinephrine (LEVOPHED) 16 mg in dextrose 5% 250 mL infusion  2-100 mcg/min IntraVENous Continuous Vickie Martins DO 1.9 mL/hr at 12/06/21 1840 2 mcg/min at 12/06/21 1840    fentaNYL 5 mcg/ml in 0.9%  ml infusion  12.5-200 mcg/hr IntraVENous Continuous Yemi Redd MD 35 mL/hr at 12/07/21 0516 175 mcg/hr at 12/07/21 0516    sucralfate (CARAFATE) tablet 1 g  1 g Oral 4 times per day [facility] RAMBO Capal, DO   1 g at 12/07/21 0804    sennosides-docusate sodium (SENOKOT-S) 8.6-50 MG tablet 2 tablet  2 tablet Oral Daily Nika Sheppard MD   2 tablet at 12/07/21 0804    polyethylene glycol (GLYCOLAX) packet 17 g  17 g Oral Daily PRN Nika Sheppard MD   17 g at 12/06/21 0929    sodium chloride flush 0.9 % injection 5-40 mL  5-40 mL IntraVENous 2 times per day Nika Sheppard MD   10 mL at 12/07/21 0804    sodium chloride flush 0.9 % injection 5-40 mL  5-40 mL IntraVENous PRN Nika Sheppard MD   10 mL at 11/28/21 2122    heparin flush 100 UNIT/ML injection 300 Units  3 mL IntraVENous 2 times per day Cristo Parish MD   300 Units at 12/07/21 0804    heparin flush 100 UNIT/ML injection 300 Units  3 mL IntraCATHeter PRN Cristo Parish MD        midazolam (VERSED) 100 mg in dextrose 5 % 100 mL infusion  1-10 mg/hr IntraVENous Continuous Ranjan Green MD 6 mL/hr at 12/07/21 1021 6 mg/hr at 12/07/21 1021    chlorhexidine (PERIDEX) 0.12 % solution 15 mL  15 mL Mouth/Throat BID Keyona Chacon MD   15 mL at 12/07/21 0803    lubrifresh P.M. (artificial tears) ophthalmic ointment   Both Eyes Q6H Keyona Chacon MD   Given at 12/07/21 0802    cisatracurium besylate (NIMBEX) 200 mg in sodium chloride 0.9 % 100 mL infusion  0.5-10 mcg/kg/min IntraVENous Continuous Cristo Parish MD   Stopped at 12/06/21 1125    ipratropium-albuterol (DUONEB) nebulizer solution 1 ampule  1 ampule Inhalation Q4H PRN Cristo Parish MD   1 ampule at 12/07/21 0148    budesonide (PULMICORT) nebulizer suspension 1,000 mcg  1 mg Nebulization BID Patricia Spivey MD   1,000 mcg at 12/07/21 1024    Arformoterol Tartrate (BROVANA) nebulizer solution 15 mcg  15 mcg Nebulization BID Patricia Spivey MD   15 mcg at 12/07/21 1024    atorvastatin (LIPITOR) tablet 10 mg  10 mg Oral Daily Christie Leigh MD   10 mg at 12/07/21 0802    QUEtiapine (SEROQUEL) tablet 200 mg  200 mg Oral Daily Christie Leigh MD   200 mg at 12/07/21 0803    sertraline (ZOLOFT) tablet 200 mg  200 mg Oral Daily Christie Leigh MD   200 mg at 12/07/21 0803    traZODone (DESYREL) tablet 100 mg  100 mg Oral Nightly Christie Leigh MD   100 mg at 12/06/21 2103    0.9 % sodium chloride infusion  25 mL IntraVENous PRN Christie Leigh  mL/hr at 11/1956 Rate Verify at 11/1956    ondansetron (ZOFRAN-ODT) disintegrating tablet 4 mg  4 mg Oral Q8H PRN Christie Leigh MD        Or    ondansetron [facility] injection 4 mg  4 mg IntraVENous Q6H PRN Christie Leigh MD  acetaminophen (TYLENOL) tablet 650 mg  650 mg Oral Q6H PRN Martina Wilburn MD   650 mg at 11/26/21 1038    Or    acetaminophen (TYLENOL) suppository 650 mg  650 mg Rectal Q6H PRN Martina Wilburn MD   650 mg at 11/27/21 4728    ascorbic acid (VITAMIN C) tablet 1,000 mg  1,000 mg Oral Daily Martina Wilburn MD   1,000 mg at 12/07/21 0054    Vitamin D (CHOLECALCIFEROL) tablet 2,000 Units  2,000 Units Oral Daily Martina Wilburn MD   2,000 Units at 12/07/21 0803    zinc sulfate (ZINCATE) capsule 50 mg  50 mg Oral Daily Martina Wilburn MD   50 mg at 12/07/21 0803    glucose (GLUTOSE) 40 % oral gel 15 g  15 g Oral PRN Martina Wilburn MD        dextrose 50 % IV solution  12.5 g IntraVENous PRN Martina Wilburn MD        glucagon (rDNA) injection 1 mg  1 mg IntraMUSCular PRN Martina Wilburn MD        dextrose 5 % solution  100 mL/hr IntraVENous PRN Martina Wilburn MD             REVIEW OF SYSTEMS: could not be obtained       PHYSICAL EXAM:      Vitals:     BP (!) 116/54   Pulse 74   Temp 94.6 °F (34.8 °C) (Esophageal)   Resp (!) 33   Ht 5' 1\" (1.549 m)   Wt 161 lb 4.8 oz (73.2 kg)   SpO2 94%   BMI 30.48 kg/m²     General Appearance:    Intubated, sedated, supine , FiO2 65, PEEP 10    Head:    Normocephalic, atraumatic   Eyes:    + pallor,eyelid swelling    Ears:    No obvious deformity or drainage.    Nose:   No nasal drainage    Throat:   ET tube in place, swollen lips    Neck:   Supple, no lymphadenopathy   Lungs:     Scattered rhonchi , left chest tubes   Heart:    Regular   Abdomen:     Soft,  bowel sounds present    Extremities:    ++ edema    Pulses:   Dorsalis pedis palpable    Skin:   no rashes        CBC with Differential:      Lab Results   Component Value Date    WBC 10.9 12/07/2021    RBC 2.68 12/07/2021    HGB 8.6 12/07/2021    HCT 28.0 12/07/2021     12/07/2021    .5 12/07/2021    MCH 32.1 12/07/2021    MCHC 30.7 12/07/2021    RDW 23.5 12/07/2021    NRBC 0.9 12/01/2021    METASPCT 0.9 12/04/2021 LYMPHOPCT 4.4 12/07/2021    MONOPCT 2.6 12/07/2021    MYELOPCT 0.9 12/03/2021    BASOPCT 0.2 12/07/2021    MONOSABS 0.33 12/07/2021    LYMPHSABS 0.44 12/07/2021    EOSABS 0.00 12/07/2021    BASOSABS 0.00 12/07/2021       CMP     Lab Results   Component Value Date     12/07/2021    K 3.6 12/07/2021    K 4.8 11/19/2021     12/07/2021    CO2 31 12/07/2021    BUN 33 12/07/2021    CREATININE 0.7 12/07/2021    GFRAA >60 12/07/2021    LABGLOM >60 12/07/2021    GLUCOSE 147 12/07/2021    GLUCOSE 77 05/12/2011    PROT 5.3 12/07/2021    LABALBU 2.6 12/07/2021    CALCIUM 8.5 12/07/2021    BILITOT 0.3 12/07/2021    ALKPHOS 79 12/06/2021    AST 36 12/07/2021    ALT 75 12/07/2021         Hepatic Function Panel:    Lab Results   Component Value Date    ALKPHOS 79 12/06/2021    ALT 75 12/07/2021    AST 36 12/07/2021    PROT 5.3 12/07/2021    BILITOT 0.3 12/07/2021    BILIDIR <0.2 12/07/2021    IBILI see below 12/07/2021    LABALBU 2.6 12/07/2021       PT/INR:    Lab Results   Component Value Date    PROTIME 13.3 12/07/2021    INR 1.2 12/07/2021       TSH:    Lab Results   Component Value Date    TSH 4.720 10/12/2021       U/A:    Lab Results   Component Value Date    COLORU Yellow 11/16/2021    PHUR 6.0 11/16/2021    WBCUA NONE 11/16/2021    RBCUA 1-3 11/16/2021    BACTERIA MANY 11/16/2021    CLARITYU Clear 11/16/2021    SPECGRAV 1.025 11/16/2021    LEUKOCYTESUR Negative 11/16/2021    UROBILINOGEN 0.2 11/16/2021    BILIRUBINUR Negative 11/16/2021    BLOODU LARGE 11/16/2021    GLUCOSEU Negative 11/16/2021       ABG:    Lab Results   Component Value Date    LJP5GIN 28.4 12/02/2021    ZDP7WYY 51.5 12/02/2021    PO2ART 59.6 12/02/2021       MICROBIOLOGY:    Blood culture - neg on 11/25     Urine Culture -    Sputum Culture -  CULTURE, RESPIRATORY Oral Pharyngeal Kamille absent Abnormal      Smear, Respiratory --    Group 6: <25 PMN's/LPF and <25 Epithelial cells/LPF   Few Polymorphonuclear leukocytes   Rare Epithelial cells No organisms seen     Organism Candida albicans Abnormal     CULTURE, RESPIRATORY One colony   Narrative:     Source: [facility]       Site:                     Specimen: Nasopharyngeal Swab Updated: 11/1956    SARS-CoV-2, NAAT DETECTED Abnormal     Comment: Rapid NAAT:   Negative results should be treated as presumptive            Ref Range & Units 11/27/21 1024   (1,3)-Beta-D-Glucan (Fungitell) Interpretation Negative Negative        Radiology :    Chest X ray :     Bilateral infiltrates, left pneumothorax     IMPRESSION:     1. Severe COVID 19 pneumonia s/p remdesivir and tocilizumab   2. Respiratory failure - intubated   3. Leukocytosis -  improved   4. Candida colonization   5. Pneumothorax , s/p chest tube insertion         RECOMMENDATIONS:      1. Off abx   2.  Supportive care

## 2021-12-07 NOTE — PROGRESS NOTES
[facility]  Nephrology Attending Progress Note  Tamara Drummond. Yonny Montano MD        SUBJECTIVE:     11/28: pt remains in COVID 19 Isolation. She remains proned and on an FIO2 65% and PEEP12 with an O2 sat 95-96%    11/29: pt seen through window of icu due to covid, chart reviewed    11/30: pt seen through window of icu due to covid, chart reviewed.      12/1/21 :pt seen through window of icu due to covid, review of chart performed    12/2/21: pt seen through window of icu due to covid, chart reviewed, intubated, chest tube left     12/3/21 :chart reviewed, chest tube x 2 on right, intubated and prone, seen through window of icu    12/4: no new acute issues from overnight; large volume pleural fluid drainage from bilateral catheter    12/5: Brisk response to Bumex drip now placed on hold transition to intermittent dosing    12/6: pt seen through window of icu, chart reviewed, intubated    12/7: chart reviewed, seen through window of icu, intubated      PROBLEM LIST:    Patient Active Problem List   Diagnosis    Schizoaffective disorder ([facility] [address]    Acute respiratory failure with hypoxia ([address] [address]    Rhabdomyolysis    Acute cystitis with hematuria    Primary spontaneous pneumothorax        PAST MEDICAL HISTORY:    Past Medical History:   Diagnosis Date    GERD (gastroesophageal reflux disease)     Osteoarthritis of right knee     Schizoaffective disorder (HCC)     [facility]care       DIET:    ADULT TUBE FEEDING; Orogastric; Peptide Based; Continuous; 10; Yes; 10; Q 24 hours; 20; 30; Q 4 hours     PHYSICAL EXAM:     Patient Vitals for the past 24 hrs:   BP Temp Temp src Pulse Resp SpO2 Weight   12/07/21 1026    80 29 95 %    12/07/21 1000 (!) 116/56   72 (!) 31 95 %    12/07/21 0900 (!) 110/55   67 28 95 %    12/07/21 0800 (!) 121/57 94.6 °F (34.8 °C) Esophageal 63 30 95 %    12/07/21 0700 (!) 113/53   67 26 94 %    12/07/21 0600 (!) 140/62 97.7 °F (36.5 °C) Esophageal 68 28 92 %    12/07/21 0500 (!) 137/59 97.5 °F (36.4 °C) Esophageal 74 28 93 %    12/07/21 0400 (!) 135/59 97.7 °F (36.5 °C) Esophageal 78 29 95 %    12/07/21 0300 (!) 150/62 97.5 °F (36.4 °C) Esophageal 77 18 93 % 161 lb 4.8 oz (73.2 kg)   12/07/21 0200 (!) 120/51 97.7 °F (36.5 °C) Esophageal 74 (!) 32 95 %    12/07/21 0100 (!) 105/53   63 (!) 32 92 %    12/07/21 0000 (!) 102/52 97.7 °F (36.5 °C) Esophageal 65 (!) 32 93 %    12/06/21 2327    65      12/06/21 2300 (!) 104/54   71 (!) 33 93 %    12/06/21 2200 138/65 97.7 °F (36.5 °C) Esophageal 61 (!) 32 93 %    12/06/21 2116    75 (!) 32 95 %    12/06/21 2100 (!) 117/54 98.1 °F (36.7 °C) Esophageal 74 (!) 34 98 %    12/06/21 2024    78      12/06/21 2000 (!) 119/56 97.9 °F (36.6 °C) Esophageal 81 28 97 %    12/06/21 1900 (!) 126/57   75 30 94 %    12/06/21 1830    78 (!) 33 95 %    12/06/21 1800  97.5 °F (36.4 °C) Esophageal 67 (!) 32 96 %    12/06/21 1730    72 (!) 32 96 %    12/06/21 1700  97.9 °F (36.6 °C) Esophageal 66 26 96 %    12/06/21 1630    63 26 97 %    12/06/21 1615    60 29 96 %    12/06/21 1600  97.9 °F (36.6 °C) Esophageal 62 29 97 %    12/06/21 1545    69 29 93 %    12/06/21 1530    73 30 94 %    12/06/21 1515    61 27 96 %    12/06/21 1500    74 24 93 %    12/06/21 1445    69 27 95 %    12/06/21 1430    73 27 92 %    12/06/21 1415    74 30 92 %    12/06/21 1400  98.6 °F (37 °C) Esophageal 71 30 92 %    12/06/21 1345    76 29 91 %    12/06/21 1330    77 (!) 32 92 %    12/06/21 1300    85 (!) 32 93 %    12/06/21 1245    91 (!) 32 93 %    12/06/21 1230    96 (!) 35 94 %    12/06/21 1220    96 30 94 %    12/06/21 1215    92 (!) 33 94 %    12/06/21 1210     (!) 32     12/06/21 1200    93 30 95 %    12/06/21 1130  99 °F (37.2 °C) Esophageal 88 (!) 32 95 %    12/06/21 1115    90 29 95 %    12/06/21 1100    85 (!) 34 94 %    @      Intake/Output Summary (Last 24 hours) at 12/7/2021 1058  Last data filed at 12/7/2021 1000  Gross per 24 hour   Intake 1966.96 ml   Output 2335 ml   Net -368.04 ml         Wt Readings from Last 3 Encounters:   12/07/21 161 lb 4.8 oz (73.2 kg)   10/26/21 152 lb (68.9 kg)   10/12/21 149 lb (67.6 kg)     PE  Constitutional:  Pt is intubated  Seen through window of icu    MEDS (scheduled):    midodrine  10 mg Oral TID WC    hydrocortisone sodium succinate PF  100 mg IntraVENous Q8H    enoxaparin  40 mg SubCUTAneous BID    sodium chloride (PF)  10 mL IntraVENous BID    And    pantoprazole  40 mg IntraVENous Daily    bumetanide  1 mg IntraVENous Daily with breakfast    sucralfate  1 g Oral 4 times per day    sennosides-docusate sodium  2 tablet Oral Daily    sodium chloride flush  5-40 mL IntraVENous 2 times per day    heparin flush  3 mL IntraVENous 2 times per day    chlorhexidine  15 mL Mouth/Throat BID    artificial tears   Both Eyes Q6H    budesonide  1 mg Nebulization BID    Arformoterol Tartrate  15 mcg Nebulization BID    atorvastatin  10 mg Oral Daily    QUEtiapine  200 mg Oral Daily    sertraline  200 mg Oral Daily    traZODone  100 mg Oral Nightly    ascorbic acid  1,000 mg Oral Daily    vitamin D  2,000 Units Oral Daily    zinc sulfate  50 mg Oral Daily       MEDS (infusions):   norepinephrine 2 mcg/min (12/06/21 1840)    fentaNYL 5 mcg/ml in 0.9%  ml infusion 175 mcg/hr (12/07/21 0516)    midazolam 6 mg/hr (12/07/21 1021)    cisatracurium (NIMBEX) infusion Stopped (12/06/21 1125)    sodium chloride 100 mL/hr at 11/1956    dextrose         MEDS (prn):  polyethylene glycol, sodium chloride flush, heparin flush, ipratropium-albuterol, sodium chloride, ondansetron **OR** ondansetron, acetaminophen **OR** acetaminophen, glucose, dextrose, glucagon (rDNA), dextrose    DATA:    Recent Labs     12/05/21  0422 12/05/21  0422 12/06/21  0433 12/06/21  0433 12/06/21  1500 12/07/21  0332 12/07/21  0446   WBC 11.1  -- 9.1  --   --   --  10.9   HGB 8.5*   < > 8.7*  8.3*   < > 8.7* 8.7* 8.6*   HCT 27.5*   < > 28.5*  26.9*   < > 28.6* 28.8* 28.0*   .0*  --  106.3*  --   --   --  104.5*     --  145  --   --   --  155    < > = values in this interval not displayed. Recent Labs     12/05/21  0422 12/05/21  1020 12/06/21  0008 12/06/21  0433 12/06/21  0859 12/06/21  1500 12/07/21  0123 12/07/21  0446   NA  --    < >   < >  --  142 144 142  --    K  --    < >   < >  --  3.5 3.2* 3.6  --    CL  --    < >   < >  --  102 101 101  --    CO2  --    < >   < >  --  31* 31* 31*  --    BUN  --    < >   < >  --  31* 31* 33*  --    CREATININE  --    < >   < >  --  0.7 0.7 0.7  --    LABGLOM  --    < >   < >  --  >60 >60 >60  --    GLUCOSE  --    < >   < >  --  132* 133* 147*  --    CALCIUM  --    < >   < >  --  8.5* 8.4* 8.5*  --    ALT 78*  --   --  73*  --   --   --  75*   AST 59*  --   --  39*  --   --   --  36*   BILIDIR <0.2  --   --  <0.2  --   --   --  <0.2   BILITOT 0.4  --   --  0.4  --   --   --  0.3   ALKPHOS 104  --   --  79  --   --   --   --    MG 1.7  --   --   --   --   --   --   --    PHOS 3.4  --   --   --   --   --   --  4.9*    < > = values in this interval not displayed.        Lab Results   Component Value Date    LABALBU 2.6 (L) 12/07/2021    LABALBU 2.3 (L) 12/06/2021    LABALBU 2.4 (L) 12/05/2021     Lab Results   Component Value Date    TSH 4.720 (H) 10/12/2021       Iron Studies  Lab Results   Component Value Date    FERRITIN 234 12/06/2021     Vitamin B-12   Date Value Ref Range Status   03/02/2021 771 211 - 946 pg/mL Final     Folate   Date Value Ref Range Status   03/02/2021 >20.0 4.8 - 24.2 ng/mL Final       No results found for: VITD25  No results found for: PTH    No components found for: URIC    Lab Results   Component Value Date    COLORU Yellow 11/16/2021    NITRU POSITIVE 11/16/2021    GLUCOSEU Negative 11/16/2021    KETUA 15 11/16/2021    UROBILINOGEN 0.2 11/16/2021    BILIRUBINUR Negative 11/16/2021       No results found for: LIPIDPAN      IMPRESSION/RECOMMENDATIONS:      1. Acute kidney injury stage I  ischemic ATN occurring in the setting of hypotension  nonoliguric    Cr continues to improve now back to baseline  Continue to monitor labs and urine output  Bumex drip put on hold; transition to intermittent dosing  Good response  uo 1.6L  Cr 0.7     2. Acute hypoxic respiratory failure  due to COVID-19 pneumonia  Intubation with mechanical ventilation; prone      3. Septic shock  suspected possible superimposed bacterial pneumonia  On levo  abx per id     4. Volume overload  Good response to low-dose Bumex drip  Hold for now  Order low dose bumex 1 mg iv qd   5. Hyponatremia  Na at 142     6. Hypocalcemia  Replace prn    [facility].  Lonnie Hernandez MD

## 2021-12-07 NOTE — FLOWSHEET NOTE
Inpatient Wound Care(initial evaluation)  4414    Admit Date: 11/16/2021  6:41 PM    Reason for consult: skin rounds post prone    Significant history:  Covid 19 +    Findings:       12/07/21 0945   Wound 12/06/21 Ear Right   Date First Assessed/Time First Assessed: 12/06/21 1100   Present on Hospital Admission: No  Primary Wound Type: Pressure Injury  Location: Ear  Wound Location Orientation: Right   Wound Image    Wound Etiology Deep tissue/Injury   Dressing/Treatment Open to air   Wound Length (cm) 5 cm   Wound Width (cm) 1 cm   Wound Depth (cm) 0.1 cm   Wound Surface Area (cm^2) 5 cm^2   Wound Volume (cm^3) 0.5 cm^3   Wound Assessment Purple/maroon; Pink/red   Drainage Amount None   Glenys-wound Assessment Dry/flaky   mouth no accessed  Heels intact  Supine at this time  **Informed Consent**    photos taken of wound and inserted into their chart as part of their permanent medical record for purposes of documentation, treatment management and/or medical review. All Images taken on 12/7/21 of patient name: Poornima Huang were transmitted and stored on secured [employer] located within [facility] by a registered [employer] Mobile Application Device. Impression:  DTI right ear    Plan:   Will need continued preventative care  Foam to ear if prone  heelmedix boots  Will follow    Fabio Rivera RN 12/7/2021 11:40 AM

## 2021-12-07 NOTE — PLAN OF CARE
Problem: Falls - Risk of:  Goal: Will remain free from falls  Description: Will remain free from falls  12/6/2021 2026 by Jena Donato RN  Outcome: Met This Shift  12/6/2021 2848 by Benja Muniz RN  Outcome: Met This Shift  12/6/2021 0633 by Araceli Bundy RN  Outcome: Met This Shift  Goal: Absence of physical injury  Description: Absence of physical injury  12/6/2021 2026 by Jena Donato RN  Outcome: Met This Shift  12/6/2021 4859 by Benja Muniz RN  Outcome: Met This Shift  12/6/2021 0633 by Araceli Bundy RN  Outcome: Met This Shift     Problem: Gas Exchange - Impaired  Goal: Absence of hypoxia  12/6/2021 2026 by Jena Donato RN  Outcome: Met This Shift  12/6/2021 8107 by Benja Muniz RN  Outcome: Met This Shift     Problem: Gas Exchange - Impaired  Goal: Promote optimal lung function  12/6/2021 2026 by Jena Donato RN  Outcome: Ongoing  12/6/2021 4885 by Benja Muniz RN  Outcome: Met This Shift     Problem: Body Temperature -  Risk of, Imbalanced  Goal: Ability to maintain a body temperature within defined limits  12/6/2021 2026 by Jena Donato RN  Outcome: Ongoing  12/6/2021 0499 by Benja Muniz RN  Outcome: Met This Shift  12/6/2021 0633 by Araceli Bundy RN  Outcome: Met This Shift  Goal: Complications related to the disease process, condition or treatment will be avoided or minimized  Outcome: Ongoing     Problem: Isolation Precautions - Risk of Spread of Infection  Goal: Prevent transmission of infection  12/6/2021 2026 by Jena Donato RN  Outcome: Ongoing  12/6/2021 9289 by Benja Muniz RN  Outcome: Met This Shift  12/6/2021 0633 by Araceli Bundy RN  Outcome: Met This Shift     Problem: Nutrition Deficits  Goal: Optimize nutritional status  Outcome: Ongoing     Problem: Risk for Fluid Volume Deficit  Goal: Maintain normal heart rhythm  12/6/2021 2026 by Jena Donato RN  Outcome: Ongoing  12/6/2021 0093 by Benja Muniz RN  Outcome:  Met This Shift  2021 9193 by Tonya Galdamez RN  Outcome: Met This Shift  Goal: Maintain absence of muscle cramping  Outcome: Ongoing  Goal: Maintain normal serum potassium, sodium, calcium, phosphorus, and pH  2021 by Rosalynd Apgar, RN  Outcome: Ongoing  2021 0448 by Melonie Cobian RN  Outcome: Met This Shift     Problem: Skin Integrity:  Goal: Will show no infection signs and symptoms  Description: Will show no infection signs and symptoms  2021 by Rosalynd Apgar, RN  Outcome: Ongoing  2021 4588 by Melonie Cobian RN  Outcome: Ongoing  Goal: Absence of new skin breakdown  Description: Absence of new skin breakdown  2021 by Rosalynd Apgar, RN  Outcome: Ongoing  2021 4475 by Melonie Cobian RN  Outcome: Ongoing     Problem: Airway Clearance - Ineffective  Goal: Achieve or maintain patent airway  2021 by Rosalynd Apgar, RN  Outcome: Not Met This Shift  2021 0028 by Melonie Cobian RN  Outcome: Met This Shift  2021 0633 by Tonya Galdamez RN  Outcome: Met This Shift     Problem: Breathing Pattern - Ineffective  Goal: Ability to achieve and maintain a regular respiratory rate  2021 by Rosalynd Apgar, RN  Outcome: Not Met This Shift  2021 0571 by Melonie Cobian RN  Outcome: Met This Shift     Problem:  Body Temperature -  Risk of, Imbalanced  Goal: Will regain or maintain usual level of consciousness  Outcome: Not Met This Shift

## 2021-12-07 NOTE — PROGRESS NOTES
Comprehensive Nutrition Assessment    Type and Reason for Visit:  Reassess    Nutrition Recommendations/Plan: Tube feeding recommendation d/t elevated phosphorus levels. Recommend Renal (Nepro) @30/hr plus 1 protein modular daily to provide 720ml, 1296 calories, 58g protein, 522ml water (1400 calories and 84g protein w/ protein modular). Nutrition Assessment:  Pt remains NPO ; receiving Vital High Protein trickle tube feedings (Peptide based ordered) ; pt also intubated and sedated ; adm w/ acute respiratory failure with hypoxia ; pt COVID-19 positive ; concern for sepsis ; noted CHARAN and thrombocytopenia ; concern for GI bleed ; hx of schizoaffective disorder ; s/p bronchoscopy 11/23 ; noted pneumothorax ; will provide updated TF recommendations    Malnutrition Assessment:  Malnutrition Status: At risk for malnutrition (Comment)    Context:  Acute Illness     Findings of the 6 clinical characteristics of malnutrition:  Energy Intake:  7 - 50% or less of estimated energy requirements for 5 or more days  Weight Loss:  Unable to assess (d/t lack of actual weight history)     Body Fat Loss:  Unable to assess (d/t COVID isolation)     Muscle Mass Loss:  Unable to assess (d/t COVID isolation)    Fluid Accumulation:  No significant fluid accumulation     Strength:  Not Performed    Estimated Daily Nutrient Needs:  Energy (kcal):  PS3B 6471-8314 (minute volume 7.59 ; Tmax 98.2); Weight Used for Energy Requirements:  Admission     Protein (g):  75-90 (1.5-1.8g/kg IBW);  Weight Used for Protein Requirements:  Ideal        Fluid (ml/day):  per critical care; Method Used for Fluid Requirements:  1 ml/kcal      Nutrition Related Findings:  -I&Os (-3.6 L), 2+ edema, chest tubes x 2, ETT/OGT w/ TF, intubated/sedated, hypoactive BS, rounded abd, loose stools, FMS, MAP WNL      Wounds:  Open Wounds, Deep Tissue Injury (wounds x 2 noted to ear and mouth)       Current Nutrition Therapies:    Current Tube Feeding (TF)

## 2021-12-08 NOTE — PROGRESS NOTES
Department of Internal Medicine  Infectious Diseases  Progress Note      C/C : Leukocytosis, COVID 19     Pt is intubated, sedated, off paralytic, supine   Off  levophed   No fever     Current Facility-Administered Medications   Medication Dose Route Frequency Provider Last Rate Last Admin    lubrifresh P.M. (artificial tears) ophthalmic ointment   Both Eyes Q12H Radha Carty MD   Given at 12/08/21 0206    midodrine (PROAMATINE) tablet 10 mg  10 mg Oral TID WC Radha Carty MD   10 mg at 12/08/21 1207    hydrocortisone sodium succinate PF (SOLU-CORTEF) injection 100 mg  100 mg IntraVENous Q8H Radha Carty MD   100 mg at 12/08/21 0859    enoxaparin (LOVENOX) injection 40 mg  40 mg SubCUTAneous BID Radha Carty MD   40 mg at 12/08/21 0901    sodium chloride (PF) 0.9 % injection 10 mL  10 mL IntraVENous BID Siobhan Avila MD   10 mL at 12/08/21 0901    And    pantoprazole (PROTONIX) injection 40 mg  40 mg IntraVENous Daily Radha Carty MD   40 mg at 12/08/21 0859    bumetanide (BUMEX) injection 1 mg  1 mg IntraVENous Daily with breakfast Wesley Gonzalez MD   1 mg at 12/08/21 0900    norepinephrine (LEVOPHED) 16 mg in dextrose 5% 250 mL infusion  2-100 mcg/min IntraVENous Continuous Vickie Martins, DO 3.8 mL/hr at 12/08/21 0430 4 mcg/min at 12/08/21 0430    fentaNYL 5 mcg/ml in 0.9%  ml infusion  12.5-200 mcg/hr IntraVENous Continuous Angelina Ellis MD 40 mL/hr at 12/08/21 1203 200 mcg/hr at 12/08/21 1203    sucralfate (CARAFATE) tablet 1 g  1 g Oral 4 times per day Chipley B Capal, DO   1 g at 12/08/21 0858    sennosides-docusate sodium (SENOKOT-S) 8.6-50 MG tablet 2 tablet  2 tablet Oral Daily Raffy Agrawal MD   2 tablet at 12/07/21 0804    polyethylene glycol (GLYCOLAX) packet 17 g  17 g Oral Daily PRN Raffy Agrawal MD   17 g at 12/08/21 0858    sodium chloride flush 0.9 % injection 5-40 mL  5-40 mL IntraVENous 2 times per day Raffy Agrawal MD 10 mL at 12/08/21 0902    sodium chloride flush 0.9 % injection 5-40 mL  5-40 mL IntraVENous PRN Demetrice Downing MD   10 mL at 11/28/21 2122    heparin flush 100 UNIT/ML injection 300 Units  3 mL IntraVENous 2 times per day Demetrice Downing MD   300 Units at 12/08/21 0901    heparin flush 100 UNIT/ML injection 300 Units  3 mL IntraCATHeter PRN Demetrice Downing MD        midazolam (VERSED) 100 mg in dextrose 5 % 100 mL infusion  1-10 mg/hr IntraVENous Continuous Tomy Lorenzo MD 10 mL/hr at 12/08/21 1202 10 mg/hr at 12/08/21 1202    chlorhexidine (PERIDEX) 0.12 % solution 15 mL  15 mL Mouth/Throat BID Keyona Carson MD   15 mL at 12/08/21 0902    ipratropium-albuterol (DUONEB) nebulizer solution 1 ampule  1 ampule Inhalation Q4H PRN Demetrice Downing MD   1 ampule at 12/07/21 0148    budesonide (PULMICORT) nebulizer suspension 1,000 mcg  1 mg Nebulization BID Gerardine Klinefelter, MD   1,000 mcg at 12/08/21 0604    Arformoterol Tartrate (BROVANA) nebulizer solution 15 mcg  15 mcg Nebulization BID Gerardine Klinefelter, MD   15 mcg at 12/08/21 0604    atorvastatin (LIPITOR) tablet 10 mg  10 mg Oral Daily Alexis Hewitt MD   10 mg at 12/08/21 0902    QUEtiapine (SEROQUEL) tablet 200 mg  200 mg Oral Daily Alexis Hewitt MD   200 mg at 12/08/21 0900    sertraline (ZOLOFT) tablet 200 mg  200 mg Oral Daily Alexis Hewitt MD   200 mg at 12/08/21 0858    traZODone (DESYREL) tablet 100 mg  100 mg Oral Nightly Alexis Hewitt MD   100 mg at 12/07/21 1947    0.9 % sodium chloride infusion  25 mL IntraVENous PRN Alexis Hewitt  mL/hr at 11/1956 Rate Verify at 11/1956    ondansetron (ZOFRAN-ODT) disintegrating tablet 4 mg  4 mg Oral Q8H PRN Alexis Hewitt MD        Or    ondansetron [facility]) injection 4 mg  4 mg IntraVENous Q6H PRN Alexis Hewitt MD        acetaminophen (TYLENOL) tablet 650 mg  650 mg Oral Q6H PRN Alexis Hewitt MD   650 mg at 11/26/21 7924    Or    acetaminophen (TYLENOL) suppository 650 mg  650 mg Rectal Q6H PRN Kristofer Barakat MD   650 mg at 11/27/21 8949    ascorbic acid (VITAMIN C) tablet 1,000 mg  1,000 mg Oral Daily Kristofer Barakat MD   1,000 mg at 12/08/21 0859    Vitamin D (CHOLECALCIFEROL) tablet 2,000 Units  2,000 Units Oral Daily Kristofer Barakat MD   2,000 Units at 12/08/21 0901    zinc sulfate (ZINCATE) capsule 50 mg  50 mg Oral Daily Kristofer Barakat MD   50 mg at 12/08/21 0859    glucose (GLUTOSE) 40 % oral gel 15 g  15 g Oral PRN Kristofer Barakat MD        dextrose 50 % IV solution  12.5 g IntraVENous PRN Kristofer Barakat MD        glucagon (rDNA) injection 1 mg  1 mg IntraMUSCular PRN Kristofer Barakat MD        dextrose 5 % solution  100 mL/hr IntraVENous PRN Kristofer Barakat MD             REVIEW OF SYSTEMS: could not be obtained       PHYSICAL EXAM:      Vitals:     BP (!) 122/56   Pulse 68   Temp 97.7 °F (36.5 °C) (Core)   Resp 28   Ht 5' 1\" (1.549 m)   Wt 161 lb 4.8 oz (73.2 kg)   SpO2 95%   BMI 30.48 kg/m²     General Appearance:    Intubated, sedated, supine , FiO2 80 , PEEP 10    Head:    Normocephalic, atraumatic   Eyes:    + pallor,eyelid swelling    Ears:    No obvious deformity or drainage.    Nose:   No nasal drainage    Throat:   ET tube in place, swollen lips    Neck:   Supple, no lymphadenopathy   Lungs:     Scattered rhonchi , left chest tubes   Heart:    Regular   Abdomen:     Soft,  bowel sounds present    Extremities:    ++ edema    Pulses:   Dorsalis pedis palpable    Skin:   no rashes        CBC with Differential:      Lab Results   Component Value Date    WBC 14.0 12/08/2021    RBC 2.73 12/08/2021    HGB 9.0 12/08/2021    HCT 29.4 12/08/2021     12/08/2021    .7 12/08/2021    MCH 33.0 12/08/2021    MCHC 30.6 12/08/2021    RDW 23.3 12/08/2021    NRBC 0.9 12/01/2021    METASPCT 0.9 12/04/2021    LYMPHOPCT 3.5 12/08/2021    MONOPCT 1.7 12/08/2021    MYELOPCT 0.9 12/03/2021    BASOPCT 0.2 12/08/2021    MONOSABS 0.28 12/08/2021    LYMPHSABS 0.56 12/08/2021    EOSABS 0.00 12/08/2021    BASOSABS 0.00 12/08/2021       CMP     Lab Results   Component Value Date     12/07/2021    K 3.6 12/07/2021    K 4.8 11/19/2021     12/07/2021    CO2 31 12/07/2021    BUN 30 12/07/2021    CREATININE 0.6 12/07/2021    GFRAA >60 12/07/2021    LABGLOM >60 12/07/2021    GLUCOSE 131 12/07/2021    GLUCOSE 77 05/12/2011    PROT 5.2 12/08/2021    LABALBU 2.6 12/08/2021    CALCIUM 8.2 12/07/2021    BILITOT 0.3 12/08/2021    ALKPHOS 92 12/08/2021    AST 28 12/08/2021    ALT 65 12/08/2021         Hepatic Function Panel:    Lab Results   Component Value Date    ALKPHOS 92 12/08/2021    ALT 65 12/08/2021    AST 28 12/08/2021    PROT 5.2 12/08/2021    BILITOT 0.3 12/08/2021    BILIDIR <0.2 12/08/2021    IBILI see below 12/08/2021    LABALBU 2.6 12/08/2021       PT/INR:    Lab Results   Component Value Date    PROTIME 12.7 12/08/2021    INR 1.2 12/08/2021       TSH:    Lab Results   Component Value Date    TSH 4.720 10/12/2021       U/A:    Lab Results   Component Value Date    COLORU Yellow 11/16/2021    PHUR 6.0 11/16/2021    WBCUA NONE 11/16/2021    RBCUA 1-3 11/16/2021    BACTERIA MANY 11/16/2021    CLARITYU Clear 11/16/2021    SPECGRAV 1.025 11/16/2021    LEUKOCYTESUR Negative 11/16/2021    UROBILINOGEN 0.2 11/16/2021    BILIRUBINUR Negative 11/16/2021    BLOODU LARGE 11/16/2021    GLUCOSEU Negative 11/16/2021       ABG:    Lab Results   Component Value Date    ESL7OII 28.4 12/02/2021    KTW8PMG 51.5 12/02/2021    PO2ART 59.6 12/02/2021       MICROBIOLOGY:    Blood culture - neg on 11/25     Urine Culture -    Sputum Culture -  CULTURE, RESPIRATORY Oral Pharyngeal Kamille absent Abnormal      Smear, Respiratory --    Group 6: <25 PMN's/LPF and <25 Epithelial cells/LPF   Few Polymorphonuclear leukocytes   Rare Epithelial cells   No organisms seen     Organism Candida albicans Abnormal     CULTURE, RESPIRATORY One colony   Narrative:     Source: [facility]       Site:                     Specimen: Nasopharyngeal Swab Updated: 11/1956    SARS-CoV-2, NAAT DETECTED Abnormal     Comment: Rapid NAAT:   Negative results should be treated as presumptive            Ref Range & Units 11/27/21 1024   (1,3)-Beta-D-Glucan (Fungitell) Interpretation Negative Negative        Radiology :    Chest X ray :     Bilateral infiltrates, left pneumothorax     IMPRESSION:     1. Severe COVID 19 pneumonia s/p remdesivir and tocilizumab   2. Respiratory failure - intubated   3. Leukocytosis - 14 K    4. Candida colonization   5. Pneumothorax , s/p chest tube insertion         RECOMMENDATIONS:      1.  CBC with diff   2.  Supportive care

## 2021-12-08 NOTE — PROGRESS NOTES
[facility] - Family Medicine Inpatient   Resident Progress Note    S:  Hospital day: 24   Brief Synopsis: Mikel Ortega is a 73 y.o. female with pmh of GERD, osteoarthritis and schizoaffective disorder who presented to ER s/p fall at home. Patient found down at home, with initial O2 saturation of 60%. Brought to the ER; found to have COVID-19 pneumonia , requiring bipap for appropriate saturation. Rhabdomyolysis, UTI. Started on appropriate management including tocilizumab, ceftriaxone 1 g daily, and IV fluids for rhabdomyolysis. Decadron was started daily, and with patients worsening status - pulmonology consulted. FULL CODE. Transferred to ICU on 11/18 for rapid breathing and hypoxia and pO2 of 55. Intubated 11/20 secondary to O2 sats consistently <80% with rapid breathing. 11/29 , patient developed left sided tension pneumothorax and underwent chest tube placement. Patient Intubated, sedated, paralyzed, proned. On vasopressors. Hemoglobin dropped to 6.9 with 1 unit PRBC transfused. Second chest tube placed on left chest.     Patient seen in the morning. Supine, sedated and intubated. Chest tubes remain in place.     Cont meds:    norepinephrine 2 mcg/min (12/06/21 1840)    fentaNYL 5 mcg/ml in 0.9%  ml infusion 175 mcg/hr (12/07/21 1500)    midazolam 7 mg/hr (12/07/21 1238)    sodium chloride 100 mL/hr at 11/1956    dextrose       Scheduled meds:    [START ON 12/8/2021] artificial tears   Both Eyes Q12H    midodrine  10 mg Oral TID WC    hydrocortisone sodium succinate PF  100 mg IntraVENous Q8H    enoxaparin  40 mg SubCUTAneous BID    sodium chloride (PF)  10 mL IntraVENous BID    And    pantoprazole  40 mg IntraVENous Daily    bumetanide  1 mg IntraVENous Daily with breakfast    sucralfate  1 g Oral 4 times per day    sennosides-docusate sodium  2 tablet Oral Daily    sodium chloride flush  5-40 mL IntraVENous 2 times per day    heparin flush  3 mL IntraVENous 2 times per day    chlorhexidine  15 mL Mouth/Throat BID    budesonide  1 mg Nebulization BID    Arformoterol Tartrate  15 mcg Nebulization BID    atorvastatin  10 mg Oral Daily    QUEtiapine  200 mg Oral Daily    sertraline  200 mg Oral Daily    traZODone  100 mg Oral Nightly    ascorbic acid  1,000 mg Oral Daily    vitamin D  2,000 Units Oral Daily    zinc sulfate  50 mg Oral Daily     PRN meds: polyethylene glycol, sodium chloride flush, heparin flush, ipratropium-albuterol, sodium chloride, ondansetron **OR** ondansetron, acetaminophen **OR** acetaminophen, glucose, dextrose, glucagon (rDNA), dextrose     I reviewed the patient's past medical and surgical history, Medications and Allergies. O:  BP (!) 118/54   Pulse 53   Temp 97.7 °F (36.5 °C) (Esophageal)   Resp (!) 32   Ht 5' 1\" (1.549 m)   Wt 161 lb 4.8 oz (73.2 kg)   SpO2 92%   BMI 30.48 kg/m²   24 hour I&O: I/O last 3 completed shifts: In: 1669.3 [I.V.:1139.8; NG/GT:371; IV Piggyback:158.6]  Out: 1685 [Urine:1375; Stool:160; Chest Tube:150]  No intake/output data recorded. Physical Exam  Constitutional:       Comments: Intubated, sedated, paralyzed, proned   HENT:      Head: Normocephalic and atraumatic. Mouth/Throat:      Comments: intubated  Cardiovascular:      Rate and Rhythm: Normal rate and regular rhythm. Pulses: Normal pulses. Heart sounds: Normal heart sounds. Pulmonary:      Breath sounds: No wheezing, rhonchi or rales. Comments: Left sided Chest tube x 2; adventitious sounds on auscultation  Abdominal:      General: There is no distension. Palpations: There is no mass. Hernia: No hernia is present. Musculoskeletal:         General: Normal range of motion. Right lower leg: Edema present. Left lower leg: Edema present. Skin:     General: Skin is warm and dry. Findings: Bruising (mild, on back) present.    Neurological:      Comments: Sedated, paralyzed       Labs:  Na/K/Cl/CO2:  142/3.6/101/31 (12/07 1326)  BUN/Cr/glu/ALT/AST/amyl/lip:  30/0.6/--/--/--/--/-- (12/07 1326)  WBC/Hgb/Hct/Plts:  --/8.5/27.4/-- (12/07 1536)  estimated creatinine clearance is 86 mL/min (based on SCr of 0.6 mg/dL). Other pertinent labs as noted below    Radiology:  XR CHEST PORTABLE   Final Result   1. Decreased left pneumothorax with suspected trace residual.  2 left chest   tubes are similar to the previous exam.   2. Bilateral pulmonary opacities appear mildly increased. 3. The support devices are unchanged and appear to be in acceptable position. XR CHEST PORTABLE   Final Result   Unchanged airspace disease and left pneumothorax. XR CHEST PORTABLE   Final Result   1. No interval change in extensive multifocal bilateral pneumonia   2. Less than 10% left pneumothorax. There is stable position of the 2 left   chest tubes. XR CHEST PORTABLE   Final Result   1. Stable diffuse interstitial pulmonary edema or pneumonia. 2.  Left chest tube demonstrated. Minimal residual left pneumothorax. XR CHEST PORTABLE   Final Result   1. Stable diffuse bilateral airspace disease. 2. Small left pneumothorax appears new or increased compared to prior   examination. Left chest tube appears stable in position. 3. Possible right pleural effusion. XR CHEST PORTABLE   Final Result   1. There is no interval change in extensive multifocal bilateral pneumonia. XR CHEST PORTABLE   Final Result   No change in extensive bilateral pulmonary airspace opacities. No   pneumothorax is radiographically visible on the current exam.         XR CHEST PORTABLE   Final Result   Significantly decreased size of left pneumothorax. There is likely trace   left pneumothorax remaining. Stable extensive bilateral pulmonary airspace opacities.          XR CHEST PORTABLE   Final Result   Addendum 1 of 1   ADDENDUM:   Verbal report was given to Severiano Bolster, RN at 8:15 a.m. central standard   time on 11/30/2021. Final   New moderate sized left-sided pneumothorax. Stable extensive bilateral pulmonary airspace opacities            XR CHEST PORTABLE   Final Result   1. Lines okay. 2. Persistent edema/ARDS/pneumonia. 3. No sign of pneumothorax. XR CHEST PORTABLE   Final Result   Interval placement of left-sided chest tube with questionable residual   pneumothorax versus artifact. Extensive bilateral infiltrates. Continued follow-up recommended. XR CHEST PORTABLE   Final Result   Interval development of large left-sided tension pneumothorax. Extensive bilateral parenchymal infiltrates. Findings were discussed with Dr. Jacob Navarro at approximately 0010 hours [place]   time on 11/28/2021. XR CHEST PORTABLE   Final Result   Severe bilateral pulmonary opacification. XR CHEST PORTABLE   Final Result   1. Endotracheal tube is 3 cm above the khai. 2. Stable interstitial pulmonary edema or pneumonia throughout both lungs. XR CHEST PORTABLE   Final Result   1. Somewhat limited exam, grossly unchanged. Please see above comments. .      RECOMMENDATION:   1. Recommend follow-up thoracic imaging, as directed clinically. .         XR ABDOMEN (KUB) (SINGLE AP VIEW)   Final Result   1. Satisfactory position of the NG tube within the stomach         XR CHEST PORTABLE   Final Result   1. There is no interval change in the multifocal bilateral pneumonia. XR CHEST PORTABLE   Final Result   Bilateral airspace disease with interval progression on the right         XR CHEST PORTABLE   Final Result   1. Endotracheal tube is 2.7 cm above the khai. 2. Stable interstitial pulmonary edema or pneumonia throughout both lungs. XR CHEST PORTABLE   Final Result   1. Worsening of the multifocal bilateral pneumonia when compared with the   prior study of 1 day earlier. XR CHEST PORTABLE   Final Result   1.  Multifocal bilateral pneumonia more prominent within the right upper lobe   2. Minimal partial interval clearing of the pneumonia within the left lung   3. Worsening of the pneumonia within the right upper lobe. US DUP LOWER EXTREMITIES BILATERAL VENOUS   Final Result   Within the visualized vessels there is no evidence for deep venous   thrombosis               XR CHEST PORTABLE   Final Result   1. Lines okay. 2. Slightly worsened diffuse edema versus pneumonia. XR CHEST PORTABLE   Final Result   1. Lines okay   2. Improved aeration, mild improvement and diffuse pneumonia/edema. XR CHEST ABDOMEN NG PLACEMENT   Final Result   NG tube position satisfactory. XR CHEST PORTABLE   Final Result   Stable bilateral pneumonia or interstitial pulmonary edema. XR CHEST PORTABLE   Final Result   Increasing bilateral infiltrates. XR CHEST PORTABLE   Final Result   1. There is no interval change in the multifocal bilateral pneumonia. CT HEAD WO CONTRAST   Final Result   No acute intracranial abnormality. Cortical atrophy and periventricular white matter ischemic changes. CT CERVICAL SPINE WO CONTRAST   Final Result   No acute abnormality of the cervical spine. Moderate degenerative changes with disc space narrowing and marginal bony   spur formation C5 through C7. Ground-glass opacities in the visualized lung apices. Please refer to chest   CT for additional information. CTA CHEST W CONTRAST   Final Result   No evidence of pulmonary embolism. Extensive multifocal ground-glass opacities predominantly in the peripheral   lung zones bilaterally, highly concerning for atypical or COVID related   pneumonia. XR CHEST PORTABLE   Final Result   Bilateral patchy airspace opacities worrisome for pneumonia.          XR CHEST PORTABLE    (Results Pending)   XR CHEST PORTABLE    (Results Pending)       A/P:  Active Problems:    Acute respiratory failure with hypoxia ([facility] [address]    Rhabdomyolysis    Acute cystitis with hematuria    Primary spontaneous pneumothorax  Resolved Problems:    * No resolved hospital problems. *    1. Intubated, Sedated, Paralyzed,   11/20: Intubated 2/2 hypoxia and increased work of breathing, proned and paralyzed  S/p Left Chest Tube 11/29 for tension pneumothorax. Second chest tube placed on 11/30. Air leaks noted. Serosanguineous drainage. No further procedures planned by surgery. Patient now supine. Gen surgery consulted for trach and peg tube placement. 2. Acute Hypoxic Respiratory Failure 2/2 Covid 19  Unvaccinated for Covid 19  Patient found with pulse ox of 60% --> currently intubated and sedated  CXR showing bilateral pulmonary infiltrates  Inflammatory markers: DDimer 530, , CRP 22.9, Ferritin 749 on admission   CTA pulm showing extensive bilateral pulmonary infiltrates consistent with COVID-19 pneumonia , no PE  Completed remdesivir and tocilizumab treatment  Decadron and SoluCortef course completed  Pulmonology consulted ; appreciate recs; daily ABG, Vitamin C, Vitamin D  Dietary consulted for further recommendations on nutrition status ; appreciate recs. 11/18 - transferred to ICU due to worsening resp status. CXR with resolution of pneumothorax. Advanced Care Planning with Spiritual Services ; recs appreciated - FULL CODE. Brother wants everything done for Marilyn per discussion with palliate medicine. 3. Left Sided Tension Pneumothorax -   S/p left sided chest tube  11/29 , Tension Pneumothorax on Xray  Left lung re inflated on repeat CXR  Whistle like sound noted Left Upper Lung field 2/2 likely to Chest tube . 2nd chest tube placed on 11/30  Continue to monitor    4. Concern for Sepsis  Likely 2/2 superimposed bacterial pneumonia , candidal colonization  Tmax 102.2 , Tavg 100.3F  Given one dose cefepime and vancomycin in ICU  Procal 0.07, repeat 0.27 , blood cultures, urine culture negative.    Completed Pip/Tazo, Vancomycin  Fungitell and B glucan pending  Diflucan stopped by ID. No current Abx    5. CHARAN Stage I  Admission creatinine 0.6  Likely 2/2 to Ischemic ATN in setting of Hypotension  Creatinine increased to 1.6 --> trended down to 0.9  Nephrology following , appreciate recs   FeNa: 0.2- Pre- Renal     6. Anemia  2/2 to inflammation/ response to Covid 19  .3, MCHC 31.5 RDW 20.1  Vitamin B12/Folate normal March 2021  FOBT + 11/29  H/H < 7 11/30/2021 , s/p 1 unit PRBC. Hemoglobin 7.8 this AM.  Maintain H/H > 7    6. Thrombocytopenia  Likely 2/2 to SALENA vs inflammation from Covid 19  Hold all anticoagulation , patient trialed on heparin , lovenox and argatroban but platelets continue to decrease  SALENA panel- negative  Platelets 178 this am     7. Hx Schizoaffective Disorder / Anxiety  Continue seroquel , zoloft, trazodone  Hold ativan, vistaril     8. Hypokalemia  Initial K 3.2 , Mag 2.6  Replace as needed  CMP daily    9. Rhabdomyolysis - resolved  2/2 to fall for unknown time period  Patient found down for unknown period of time  Initial CK > 3000  Received 4 L NS in ER    10.  Concern for UTI - resolved   Likely simple cystitis ; patient with no CVA tenderness , presented initially with fever 102 F  Urinalysis with increased nitrites, bacteria, blood  Urine culture, blood cultures were negative  Given one dose doxy and rocephin in the ER  1g ceftriaxone IV /-  dc'd 11/18/2021     GI/DVT ppx: protonix  Diet: NPO, tube feeds  Disposition: MICU    Electronically signed by Nicholas Al MD  [id number] on 12/7/2021 at 7:07 PM  This case was discussed with attending physician: Dr. Aye Anne

## 2021-12-08 NOTE — PROGRESS NOTES
[facility]  Nephrology Attending Progress Note  Deretha Barthel. Jefferson Fournier MD        SUBJECTIVE:     11/28: pt remains in COVID 19 Isolation. She remains proned and on an FIO2 65% and PEEP12 with an O2 sat 95-96%    11/29: pt seen through window of icu due to covid, chart reviewed    11/30: pt seen through window of icu due to covid, chart reviewed. 12/1/21 :pt seen through window of icu due to covid, review of chart performed    12/2/21: pt seen through window of icu due to covid, chart reviewed, intubated, chest tube left     12/3/21 :chart reviewed, chest tube x 2 on right, intubated and prone, seen through window of icu    12/4: no new acute issues from overnight; large volume pleural fluid drainage from bilateral catheter    12/5: Brisk response to Bumex drip now placed on hold transition to intermittent dosing    12/6: pt seen through window of icu, chart reviewed, intubated    12/7: chart reviewed, seen through window of icu, intubated    12/8: pt seen through window of icu.  Intubated on levo      PROBLEM LIST:    Patient Active Problem List   Diagnosis    Schizoaffective disorder ([facility] [address]    Acute respiratory failure with hypoxia (HCC)    Rhabdomyolysis    Acute cystitis with hematuria    Primary spontaneous pneumothorax        PAST MEDICAL HISTORY:    Past Medical History:   Diagnosis Date    GERD (gastroesophageal reflux disease)     Osteoarthritis of right knee     Schizoaffective disorder (HCC)     psycare       DIET:    ADULT TUBE FEEDING; Orogastric; Peptide Based; Continuous; 10; Yes; 10; Q 24 hours; 20; 30; Q 4 hours     PHYSICAL EXAM:     Patient Vitals for the past 24 hrs:   BP Temp Temp src Pulse Resp SpO2 Height   12/08/21 0900    69 (!) 31 95 %    12/08/21 0800  97.9 °F (36.6 °C) Bladder 68 26 95 %    12/08/21 0700    65 21 93 %    12/08/21 0605    67 28 94 %    12/08/21 0600 (!) 122/56   67 28 96 %    12/08/21 0500 133/60   70 28 95 %    12/08/21 0400 (!) 135/58 98.2 °F (36.8 °C) Esophageal 66 26 95 %    12/08/21 0300 (!) 158/66   60 26 94 %    12/08/21 0200 125/61   68 27 (!) 87 %    12/08/21 0100 (!) 114/50   58 25 (!) 89 %    12/08/21 0034    56 25 90 %    12/08/21 0000 (!) 111/50 98.1 °F (36.7 °C) Esophageal 56 28 90 %    12/07/21 2352    56      12/07/21 2300 (!) 113/50   58 28 (!) 89 %    12/07/21 2200 (!) 96/43   71 27 (!) 88 %    12/07/21 2103    69 29 (!) 88 %    12/07/21 2100    76 30 (!) 89 %    12/07/21 2000 (!) 135/58 97.9 °F (36.6 °C) Esophageal 55 (!) 32 94 %    12/07/21 1800 (!) 118/54   53 (!) 32 92 %    12/07/21 1719    62  93 %    12/07/21 1600 (!) 108/51 97.7 °F (36.5 °C) Esophageal 57 (!) 32 93 %    12/07/21 1500 127/60   61 (!) 32 95 %    12/07/21 1400 129/63   58 (!) 32 91 %    12/07/21 1300 105/67   64 28 92 %    12/07/21 1241       5' 1\" (1.549 m)   12/07/21 1229    72 28 94 %    12/07/21 1220    70 29 94 %    12/07/21 1200 (!) 119/55 98.2 °F (36.8 °C) Esophageal 67 26 94 %    12/07/21 1100 (!) 116/54   74 (!) 33 94 %    12/07/21 1026    80 29 95 %    12/07/21 1000 (!) 116/56   72 (!) 31 95 %    @      Intake/Output Summary (Last 24 hours) at 12/8/2021 0958  Last data filed at 12/8/2021 0900  Gross per 24 hour   Intake 1173.38 ml   Output 1445 ml   Net -271.62 ml         Wt Readings from Last 3 Encounters:   12/07/21 161 lb 4.8 oz (73.2 kg)   10/26/21 152 lb (68.9 kg)   10/12/21 149 lb (67.6 kg)     PE  Constitutional:  Pt is intubated  Seen through window of icu    MEDS (scheduled):    artificial tears   Both Eyes Q12H    midodrine  10 mg Oral TID WC    hydrocortisone sodium succinate PF  100 mg IntraVENous Q8H    enoxaparin  40 mg SubCUTAneous BID    sodium chloride (PF)  10 mL IntraVENous BID    And    pantoprazole  40 mg IntraVENous Daily    bumetanide  1 mg IntraVENous Daily with breakfast    sucralfate  1 g Oral 4 times per day    sennosides-docusate sodium  2 tablet Oral Daily    sodium chloride flush  5-40 mL IntraVENous 2 times per day    heparin flush  3 mL IntraVENous 2 times per day    chlorhexidine  15 mL Mouth/Throat BID    budesonide  1 mg Nebulization BID    Arformoterol Tartrate  15 mcg Nebulization BID    atorvastatin  10 mg Oral Daily    QUEtiapine  200 mg Oral Daily    sertraline  200 mg Oral Daily    traZODone  100 mg Oral Nightly    ascorbic acid  1,000 mg Oral Daily    vitamin D  2,000 Units Oral Daily    zinc sulfate  50 mg Oral Daily       MEDS (infusions):   norepinephrine 4 mcg/min (12/08/21 0430)    fentaNYL 5 mcg/ml in 0.9%  ml infusion 200 mcg/hr (12/08/21 0428)    midazolam 10 mg/hr (12/08/21 0400)    sodium chloride 100 mL/hr at 11/1956    dextrose         MEDS (prn):  polyethylene glycol, sodium chloride flush, heparin flush, ipratropium-albuterol, sodium chloride, ondansetron **OR** ondansetron, acetaminophen **OR** acetaminophen, glucose, dextrose, glucagon (rDNA), dextrose    DATA:    Recent Labs     12/06/21  0433 12/06/21  1500 12/07/21  0446 12/07/21  0446 12/07/21  1536 12/08/21  0201 12/08/21 0422   WBC 9.1  --  10.9  --   --   --  14.0*   HGB 8.7*  8.3*   < > 8.6*   < > 8.5* 9.2* 9.0*   HCT 28.5*  26.9*   < > 28.0*   < > 27.4* 29.9* 29.4*   .3*  --  104.5*  --   --   --  107.7*     --  155  --   --   --  184    < > = values in this interval not displayed.      Recent Labs     12/06/21  0433 12/06/21  0859 12/06/21  1500 12/07/21  0123 12/07/21  0446 12/07/21  1326 12/08/21  0422   NA  --    < > 144 142  --  142  --    K  --    < > 3.2* 3.6  --  3.6  --    CL  --    < > 101 101  --  101  --    CO2  --    < > 31* 31*  --  31*  --    BUN  --    < > 31* 33*  --  30*  --    CREATININE  --    < > 0.7 0.7  --  0.6  --    LABGLOM  --    < > >60 >60  --  >60  --    GLUCOSE  --    < > 133* 147*  --  131*  --    CALCIUM  --    < > 8.4* 8.5*  --  8.2*  --    ALT 73*  --   --   --  75*  --  65*   AST 39* --   --   --  36*  --  28   BILIDIR <0.2  --   --   --  <0.2  --  <0.2   BILITOT 0.4  --   --   --  0.3  --  0.3   ALKPHOS 79  --   --   --  115*  --  92   MG  --   --   --   --  1.8  --   --    PHOS  --   --   --   --  4.9*  --   --     < > = values in this interval not displayed. Lab Results   Component Value Date    LABALBU 2.6 (L) 12/08/2021    LABALBU 2.6 (L) 12/07/2021    LABALBU 2.3 (L) 12/06/2021     Lab Results   Component Value Date    TSH 4.720 (H) 10/12/2021       Iron Studies  Lab Results   Component Value Date    FERRITIN 274 12/07/2021     Vitamin B-12   Date Value Ref Range Status   03/02/2021 771 211 - 946 pg/mL Final     Folate   Date Value Ref Range Status   03/02/2021 >20.0 4.8 - 24.2 ng/mL Final       No results found for: VITD25  No results found for: PTH    No components found for: URIC    Lab Results   Component Value Date    COLORU Yellow 11/16/2021    NITRU POSITIVE 11/16/2021    GLUCOSEU Negative 11/16/2021    KETUA 15 11/16/2021    UROBILINOGEN 0.2 11/16/2021    BILIRUBINUR Negative 11/16/2021       No results found for: Amilcar Mcclendon      IMPRESSION/RECOMMENDATIONS:      1. Acute kidney injury stage I  ischemic ATN occurring in the setting of hypotension  nonoliguric    Cr continues to improve now back to baseline  Continue to monitor labs and urine output  Bumex drip put on hold; transition to intermittent dosing  Good response  uo 1.6L  Cr 0.7     2. Acute hypoxic respiratory failure  due to COVID-19 pneumonia  Intubation with mechanical ventilation; prone      3. Septic shock  suspected possible superimposed bacterial pneumonia  On levo  abx per id     4. Volume overload  bumex 1 mg iv qd   uo 1.3L  -4.2L overall    5. Hyponatremia  Na at 142     6. Hypocalcemia  Replace prn    Jose Daniel Ernst.  Krissy Dsouza MD

## 2021-12-08 NOTE — PROGRESS NOTES
[facility] - [facility] Inpatient   Resident Progress Note    S:  Hospital day: 22   Brief Synopsis: Debbie Roman is a 73 y.o. female with pmh of GERD, osteoarthritis and schizoaffective disorder who presented to ER s/p fall at home. Patient found down at home, with initial O2 saturation of 60%. Brought to the ER; found to have COVID-19 pneumonia , requiring bipap for appropriate saturation. Rhabdomyolysis, UTI. Started on appropriate management including tocilizumab, ceftriaxone 1 g daily, and IV fluids for rhabdomyolysis. Decadron was started daily, and with patients worsening status - pulmonology consulted. FULL CODE. Transferred to ICU on 11/18 for rapid breathing and hypoxia and pO2 of 55. Intubated 11/20 secondary to O2 sats consistently <80% with rapid breathing. 11/29 , patient developed left sided tension pneumothorax and underwent chest tube placement. Patient Intubated, sedated, paralyzed, proned. On vasopressors. Hemoglobin dropped to 6.9 with 1 unit PRBC transfused. Second chest tube placed on left chest.     Patient seen in the morning. Supine, sedated and intubated. Chest tubes remain in place. FiO2 80%, PEEP 8. General surgery ton consider PEG and trach tube placement pending improvement of ventilator settings.     Cont meds:    norepinephrine 4 mcg/min (12/08/21 0430)    fentaNYL 5 mcg/ml in 0.9%  ml infusion 200 mcg/hr (12/08/21 0428)    midazolam 10 mg/hr (12/08/21 0400)    sodium chloride 100 mL/hr at 11/1955    dextrose       Scheduled meds:    artificial tears   Both Eyes Q12H    midodrine  10 mg Oral TID WC    hydrocortisone sodium succinate PF  100 mg IntraVENous Q8H    enoxaparin  40 mg SubCUTAneous BID    sodium chloride (PF)  10 mL IntraVENous BID    And    pantoprazole  40 mg IntraVENous Daily    bumetanide  1 mg IntraVENous Daily with breakfast    sucralfate  1 g Oral 4 times per day    sennosides-docusate sodium  2 tablet Oral Daily    sodium present. Neurological:      Comments: Sedated, paralyzed       Labs:  Na/K/Cl/CO2:  142/3.6/101/31 (12/07 1326)  BUN/Cr/glu/ALT/AST/amyl/lip:  --/--/--/65/28/--/-- (12/08 0422)  WBC/Hgb/Hct/Plts:  14.0/9.0/29.4/184 (12/08 0422)  estimated creatinine clearance is 86 mL/min (based on SCr of 0.6 mg/dL). Other pertinent labs as noted below    Radiology:  XR CHEST PORTABLE   Final Result   Stable support lines and bilateral airspace disease. XR CHEST PORTABLE   Final Result   Unchanged bilateral diffuse infiltrates. XR CHEST PORTABLE   Final Result   1. Decreased left pneumothorax with suspected trace residual.  2 left chest   tubes are similar to the previous exam.   2. Bilateral pulmonary opacities appear mildly increased. 3. The support devices are unchanged and appear to be in acceptable position. XR CHEST PORTABLE   Final Result   Unchanged airspace disease and left pneumothorax. XR CHEST PORTABLE   Final Result   1. No interval change in extensive multifocal bilateral pneumonia   2. Less than 10% left pneumothorax. There is stable position of the 2 left   chest tubes. XR CHEST PORTABLE   Final Result   1. Stable diffuse interstitial pulmonary edema or pneumonia. 2.  Left chest tube demonstrated. Minimal residual left pneumothorax. XR CHEST PORTABLE   Final Result   1. Stable diffuse bilateral airspace disease. 2. Small left pneumothorax appears new or increased compared to prior   examination. Left chest tube appears stable in position. 3. Possible right pleural effusion. XR CHEST PORTABLE   Final Result   1. There is no interval change in extensive multifocal bilateral pneumonia. XR CHEST PORTABLE   Final Result   No change in extensive bilateral pulmonary airspace opacities. No   pneumothorax is radiographically visible on the current exam.         XR CHEST PORTABLE   Final Result   Significantly decreased size of left pneumothorax. There is likely trace   left pneumothorax remaining. Stable extensive bilateral pulmonary airspace opacities. XR CHEST PORTABLE   Final Result   Addendum 1 of 1   ADDENDUM:   Verbal report was given to Gricelda Black RN at 8:15 a.m. central standard   time on 11/30/2021. Final   New moderate sized left-sided pneumothorax. Stable extensive bilateral pulmonary airspace opacities            XR CHEST PORTABLE   Final Result   1. Lines okay. 2. Persistent edema/ARDS/pneumonia. 3. No sign of pneumothorax. XR CHEST PORTABLE   Final Result   Interval placement of left-sided chest tube with questionable residual   pneumothorax versus artifact. Extensive bilateral infiltrates. Continued follow-up recommended. XR CHEST PORTABLE   Final Result   Interval development of large left-sided tension pneumothorax. Extensive bilateral parenchymal infiltrates. Findings were discussed with Dr. Federico Alvarenga at approximately 0010 hours [place]   time on 11/28/2021. XR CHEST PORTABLE   Final Result   Severe bilateral pulmonary opacification. XR CHEST PORTABLE   Final Result   1. Endotracheal tube is 3 cm above the khai. 2. Stable interstitial pulmonary edema or pneumonia throughout both lungs. XR CHEST PORTABLE   Final Result   1. Somewhat limited exam, grossly unchanged. Please see above comments. .      RECOMMENDATION:   1. Recommend follow-up thoracic imaging, as directed clinically. .         XR ABDOMEN (KUB) (SINGLE AP VIEW)   Final Result   1. Satisfactory position of the NG tube within the stomach         XR CHEST PORTABLE   Final Result   1. There is no interval change in the multifocal bilateral pneumonia. XR CHEST PORTABLE   Final Result   Bilateral airspace disease with interval progression on the right         XR CHEST PORTABLE   Final Result   1. Endotracheal tube is 2.7 cm above the khai.    2. Stable interstitial pulmonary edema or pneumonia throughout both lungs. XR CHEST PORTABLE   Final Result   1. Worsening of the multifocal bilateral pneumonia when compared with the   prior study of 1 day earlier. XR CHEST PORTABLE   Final Result   1. Multifocal bilateral pneumonia more prominent within the right upper lobe   2. Minimal partial interval clearing of the pneumonia within the left lung   3. Worsening of the pneumonia within the right upper lobe. US DUP LOWER EXTREMITIES BILATERAL VENOUS   Final Result   Within the visualized vessels there is no evidence for deep venous   thrombosis               XR CHEST PORTABLE   Final Result   1. Lines okay. 2. Slightly worsened diffuse edema versus pneumonia. XR CHEST PORTABLE   Final Result   1. Lines okay   2. Improved aeration, mild improvement and diffuse pneumonia/edema. XR CHEST ABDOMEN NG PLACEMENT   Final Result   NG tube position satisfactory. XR CHEST PORTABLE   Final Result   Stable bilateral pneumonia or interstitial pulmonary edema. XR CHEST PORTABLE   Final Result   Increasing bilateral infiltrates. XR CHEST PORTABLE   Final Result   1. There is no interval change in the multifocal bilateral pneumonia. CT HEAD WO CONTRAST   Final Result   No acute intracranial abnormality. Cortical atrophy and periventricular white matter ischemic changes. CT CERVICAL SPINE WO CONTRAST   Final Result   No acute abnormality of the cervical spine. Moderate degenerative changes with disc space narrowing and marginal bony   spur formation C5 through C7. Ground-glass opacities in the visualized lung apices. Please refer to chest   CT for additional information. CTA CHEST W CONTRAST   Final Result   No evidence of pulmonary embolism.       Extensive multifocal ground-glass opacities predominantly in the peripheral   lung zones bilaterally, highly concerning for atypical or COVID related pneumonia. XR CHEST PORTABLE   Final Result   Bilateral patchy airspace opacities worrisome for pneumonia. XR CHEST PORTABLE    (Results Pending)   XR CHEST PORTABLE    (Results Pending)       A/P:  Active Problems:    Acute respiratory failure with hypoxia (HCC)    Rhabdomyolysis    Acute cystitis with hematuria    Primary spontaneous pneumothorax  Resolved Problems:    * No resolved hospital problems. *    1. Intubated, Sedated, Paralyzed,   11/20: Intubated 2/2 hypoxia and increased work of breathing, proned and paralyzed  S/p Left Chest Tube 11/29 for tension pneumothorax. Second chest tube placed on 11/30. Air leaks noted. Serosanguineous drainage. No further procedures planned by surgery. Patient now supine. Gen Surg considering trach and peg tube placement pending ventilator setting improvement. 2. Acute Hypoxic Respiratory Failure 2/2 Covid 19  Unvaccinated for Covid 19  Patient found with pulse ox of 60% --> currently intubated and sedated  CXR showing bilateral pulmonary infiltrates  Inflammatory markers: DDimer 530, , CRP 22.9, Ferritin 749 on admission   CTA pulm showing extensive bilateral pulmonary infiltrates consistent with COVID-19 pneumonia , no PE  Completed remdesivir and tocilizumab treatment  Decadron and SoluCortef course completed  Pulmonology consulted ; appreciate recs; daily ABG, Vitamin C, Vitamin D  Dietary consulted for further recommendations on nutrition status ; appreciate recs. 11/18 - transferred to ICU due to worsening resp status. CXR with resolution of pneumothorax. Advanced Care Planning with Spiritual Services ; recs appreciated - FULL CODE. Brother wants everything done for Marilyn per discussion with palliate medicine. 3. Left Sided Tension Pneumothorax -   S/p left sided chest tube  11/29 , Tension Pneumothorax on Xray  Left lung re inflated on repeat CXR  Whistle like sound noted Left Upper Lung field 2/2 likely to Chest tube .   2nd chest tube placed on 11/30  Continue to monitor    4. Concern for Sepsis  Likely 2/2 superimposed bacterial pneumonia , candidal colonization  Tmax 102.2 , Tavg 100.3F  Given one dose cefepime and vancomycin in ICU  Procal 0.07, repeat 0.27 , blood cultures, urine culture negative. Completed Pip/Tazo, Vancomycin  Fungitell and B glucan pending  Diflucan stopped by ID. No current Abx    5. CHARAN Stage I  Admission creatinine 0.6  Likely 2/2 to Ischemic ATN in setting of Hypotension  Creatinine increased to 1.6 --> trended down to 0.9  Nephrology following , appreciate recs   FeNa: 0.2- Pre- Renal     6. Anemia  2/2 to inflammation/ response to Covid 19  .3, MCHC 31.5 RDW 20.1  Vitamin B12/Folate normal March 2021  FOBT + 11/29  H/H < 7 11/30/2021 , s/p 1 unit PRBC. Hemoglobin 7.8 this AM.  Maintain H/H > 7    6. Thrombocytopenia  Likely 2/2 to SALENA vs inflammation from Covid 19  Hold all anticoagulation , patient trialed on heparin , lovenox and argatroban but platelets continue to decrease  SALENA panel- negative  Platelets 314 this am     7. Hx Schizoaffective Disorder / Anxiety  Continue seroquel , zoloft, trazodone  Hold ativan, vistaril     8. Hypokalemia  Initial K 3.2 , Mag 2.6  Replace as needed  CMP daily    9. Rhabdomyolysis - resolved  2/2 to fall for unknown time period  Patient found down for unknown period of time  Initial CK > 3000  Received 4 L NS in ER    10.  Concern for UTI - resolved   Likely simple cystitis ; patient with no CVA tenderness , presented initially with fever 102 F  Urinalysis with increased nitrites, bacteria, blood  Urine culture, blood cultures were negative  Given one dose doxy and rocephin in the ER  1g ceftriaxone IV /-  dc'd 11/18/2021     GI/DVT ppx: protonix  Diet: NPO, tube feeds  Disposition: MICU    Electronically signed by Viky Toribio MD  PGY-1 on 12/8/2021 at 11:40 AM  This case was discussed with attending physician: Dr. Bri Denis

## 2021-12-08 NOTE — CARE COORDINATION
12/8 Care Coordination: Pt remains in MICU, COVID. On vent,fio2 90%, peep 8, AC 32. Chest Tubes x2. On IV Sedation, IV levophed. [facility] following. D/C plan remains unclear at this time. CM/SW will continue to follow for discharge planning.    Corina FOSTERN,RN-CV-BC  [phone]

## 2021-12-08 NOTE — PROGRESS NOTES
[facility] Attending    S: 73 y.o. female with a history of gerd, oa, schizoaffective disorder, and gastric fundiplication who was found down for an undetermined amount of time. Reports shortness of breath and cough for 2.5 weeks. She was found to be 60% on RA. In the ER, COVID testing was positive with significantly elevated CPKs. Patient is unvaccinated. Had desaturation to 88% wit PaO2 of 55 with RRT and subsequent transfer to the MICU. Desat to 40's when Bipap removed. Now intubated. Sedated, paralyzed, and prone. Noted to have pneumothorax and chest tube placed. Second chest tube placed when pneumothorax not improved. Today,  Intubated, sedated, now supine. O: VS- Blood pressure (!) 122/56, pulse 69, temperature 97.9 °F (36.6 °C), temperature source Bladder, resp. rate (!) 32, height 5' 1\" (1.549 m), weight 161 lb 4.8 oz (73.2 kg), SpO2 95 %. Exam is as noted by resident   Currently nonresponsive. Lungs: coarse, vent. Decreased BS  CV:  Rate controlled, regular   Ext-no C/C/E    FiO2 80%  PEEP 8, following a temporary need this morning for 100%, unknown reason    Impressions: Active Problems:    Acute respiratory failure with hypoxia (HCC)    Covid pneumonia    Rhabdomyolysis, CK improving    Concern for sepsis    Acute cystitis with hematuria, treated    CHARAN    Thrombocytopenia     Plan:      Acute hypoxic resp failure 2/2 covid - Decadron. Completed Tocimizilab. Vitamin C.  Vitamin D.  Zinc.  Appreciate Pulm management. Sepsis 2/2 PNA - done with vanc and zosyn  U/s of lower extremities negative DVT 11/20. Slow wean of FiO2. Tube feeding. Fluid overloaded - on bumex  Schizoaffective - seroquel  Diflucan stopped. Ongoing communication with family re code status  Following hemoglobin and WBC  Bumex  ICU management  For trach and PEG when more stable  Full code at this time.          Attending Physician Statement  I have reviewed the chart and seen the patient with the resident(s). I personally reviewed images, EKG's and similar tests, if present. I personally reviewed and performed key elements of the history and exam.  I have reviewed and confirmed student and/or resident history and exam with changes as indicated above. I agree with the assessment, plan and orders as documented by the resident. Please refer to the  resident and/or student note for additional information.       Marcy Rogers MD

## 2021-12-09 NOTE — PROGRESS NOTES
[facility] Attending    S: 73 y.o. female with a history of gerd, oa, schizoaffective disorder, and gastric fundiplication who was found down for an undetermined amount of time. Reports shortness of breath and cough for 2.5 weeks. She was found to be 60% on RA. In the ER, COVID testing was positive with significantly elevated CPKs. Patient is unvaccinated. Had desaturation to 88% wit PaO2 of 55 with RRT and subsequent transfer to the MICU. Desat to 40's when Bipap removed. Now intubated. Sedated, paralyzed, and prone. Noted to have pneumothorax and chest tube placed. Second chest tube placed when pneumothorax not improved. Today,  Intubated, sedated    O: VS- Blood pressure (!) 153/65, pulse 72, temperature 98.2 °F (36.8 °C), temperature source Esophageal, resp. rate 30, height 5' 1\" (1.549 m), weight 161 lb 4.8 oz (73.2 kg), SpO2 96 %. Exam is as noted by resident   Currently nonresponsive. Lungs: coarse, vent. CV:  Rate controlled, regular   Ext-no C/C/E    FiO2 70%  PEEP 8,       O2 sat 94%  PF 0.9    Impressions: Active Problems:    Acute respiratory failure with hypoxia (HCC)    Covid pneumonia    Rhabdomyolysis improved    Concern for sepsis    Acute cystitis with hematuria, treated    CHARAN        Plan:      Acute hypoxic resp failure 2/2 covid - Decadron. Completed Tocimizilab. Vitamin C.  Vitamin D.  Zinc.  Appreciate Pulm management. Sepsis 2/2 PNA - done with vanc and zosyn  U/s of lower extremities negative DVT 11/20. Slow wean of FiO2. Tube feeding. Fluid overloaded - on bumex  Schizoaffective - seroquel  Diflucan stopped. Ongoing communication with family re code status  Following hemoglobin and WBC  Bumex  ICU management  For trach and PEG when more stable  Full code at this time. Attending Physician Statement  I have reviewed the chart and seen the patient with the resident(s).   I personally reviewed images, EKG's and similar tests, if present. I personally reviewed and performed key elements of the history and exam.  I have reviewed and confirmed student and/or resident history and exam with changes as indicated above. I agree with the assessment, plan and orders as documented by the resident. Please refer to the  resident and/or student note for additional information.       Omega Perez MD

## 2021-12-09 NOTE — PROGRESS NOTES
[facility] - [facility] Inpatient   Resident Progress Note    S:  Hospital day: 21   Brief Synopsis: Jody Pfeiffer is a 73 y.o. female with pmh of GERD, osteoarthritis and schizoaffective disorder who presented to ER s/p fall at home. Patient found down at home, with initial O2 saturation of 60%. Brought to the ER; found to have COVID-19 pneumonia , requiring bipap for appropriate saturation. Rhabdomyolysis, UTI. Started on appropriate management including tocilizumab, ceftriaxone 1 g daily, and IV fluids for rhabdomyolysis. Decadron was started daily, and with patients worsening status - pulmonology consulted. FULL CODE. Transferred to ICU on 11/18 for rapid breathing and hypoxia and pO2 of 55. Intubated 11/20 secondary to O2 sats consistently <80% with rapid breathing. 11/29 , patient developed left sided tension pneumothorax and underwent chest tube placement. Patient Intubated, sedated, paralyzed, proned. On vasopressors. Hemoglobin dropped to 6.9 with 1 unit PRBC transfused. Second chest tube placed on left chest. Vacuum changed to water seals. Patient seen today in the morning. Remains supine, sedated and intubated. Chest tubes remain in place. FiO2 70%, PEEP 8, p/f ratio 0.90. General surgery to consider PEG and trach tube placement pending improvement of ventilator settings.     Cont meds:    norepinephrine 2 mcg/min (12/09/21 0509)    fentaNYL 5 mcg/ml in 0.9%  ml infusion 200 mcg/hr (12/09/21 0349)    midazolam 10 mg/hr (12/08/21 2215)    sodium chloride 100 mL/hr at 11/1956    dextrose       Scheduled meds:    magnesium sulfate  1,000 mg IntraVENous Once    hydrocortisone sodium succinate PF  100 mg IntraVENous Q12H    artificial tears   Both Eyes Q12H    midodrine  10 mg Oral TID WC    enoxaparin  40 mg SubCUTAneous BID    sodium chloride (PF)  10 mL IntraVENous BID    And    pantoprazole  40 mg IntraVENous Daily    bumetanide  1 mg IntraVENous Daily with breakfast  sucralfate  1 g Oral 4 times per day    sennosides-docusate sodium  2 tablet Oral Daily    sodium chloride flush  5-40 mL IntraVENous 2 times per day    heparin flush  3 mL IntraVENous 2 times per day    chlorhexidine  15 mL Mouth/Throat BID    budesonide  1 mg Nebulization BID    Arformoterol Tartrate  15 mcg Nebulization BID    atorvastatin  10 mg Oral Daily    QUEtiapine  200 mg Oral Daily    sertraline  200 mg Oral Daily    traZODone  100 mg Oral Nightly    ascorbic acid  1,000 mg Oral Daily    vitamin D  2,000 Units Oral Daily    zinc sulfate  50 mg Oral Daily     PRN meds: polyethylene glycol, sodium chloride flush, heparin flush, ipratropium-albuterol, sodium chloride, ondansetron **OR** ondansetron, acetaminophen **OR** acetaminophen, glucose, dextrose, glucagon (rDNA), dextrose     I reviewed the patient's past medical and surgical history, Medications and Allergies. O:  BP (!) 153/65   Pulse 72   Temp 98.2 °F (36.8 °C) (Esophageal)   Resp 30   Ht 5' 1\" (1.549 m)   Wt 161 lb 4.8 oz (73.2 kg)   SpO2 96%   BMI 30.48 kg/m²   24 hour I&O: I/O last 3 completed shifts: In: 7691.7 [I.V.:6960.8; NG/GT:698; IV Piggyback:32.9]  Out: 2114 [Urine:1934; Stool:100; Chest Tube:80]  No intake/output data recorded. Physical Exam  Constitutional:       Comments: Intubated, sedated, paralyzed, supine   HENT:      Head: Normocephalic and atraumatic. Mouth/Throat:      Comments: intubated  Cardiovascular:      Rate and Rhythm: Normal rate and regular rhythm. Pulses: Normal pulses. Heart sounds: Normal heart sounds. Pulmonary:      Breath sounds: No wheezing, rhonchi or rales. Comments: Left sided Chest tube x 2  Abdominal:      General: There is no distension. Palpations: There is no mass. Hernia: No hernia is present. Musculoskeletal:         General: Normal range of motion. Right lower leg: Edema present. Left lower leg: Edema present.    Skin: General: Skin is warm and dry. Findings: Bruising (mild, on back) present. Neurological:      Comments: Sedated, paralyzed       Labs:  Na/K/Cl/CO2:  142/3.9/103/30 (12/09 0417)  BUN/Cr/glu/ALT/AST/amyl/lip:  24/0.4/--/53/22/--/-- (12/09 0417)  WBC/Hgb/Hct/Plts:  11.6/8.1/26.1/187 (12/09 0417)  estimated creatinine clearance is 128 mL/min (A) (based on SCr of 0.4 mg/dL (L)). Other pertinent labs as noted below    Radiology:  XR CHEST PORTABLE   Final Result   Stable support lines and bilateral airspace disease. XR CHEST PORTABLE   Final Result   Unchanged bilateral diffuse infiltrates. XR CHEST PORTABLE   Final Result   1. Decreased left pneumothorax with suspected trace residual.  2 left chest   tubes are similar to the previous exam.   2. Bilateral pulmonary opacities appear mildly increased. 3. The support devices are unchanged and appear to be in acceptable position. XR CHEST PORTABLE   Final Result   Unchanged airspace disease and left pneumothorax. XR CHEST PORTABLE   Final Result   1. No interval change in extensive multifocal bilateral pneumonia   2. Less than 10% left pneumothorax. There is stable position of the 2 left   chest tubes. XR CHEST PORTABLE   Final Result   1. Stable diffuse interstitial pulmonary edema or pneumonia. 2.  Left chest tube demonstrated. Minimal residual left pneumothorax. XR CHEST PORTABLE   Final Result   1. Stable diffuse bilateral airspace disease. 2. Small left pneumothorax appears new or increased compared to prior   examination. Left chest tube appears stable in position. 3. Possible right pleural effusion. XR CHEST PORTABLE   Final Result   1. There is no interval change in extensive multifocal bilateral pneumonia. XR CHEST PORTABLE   Final Result   No change in extensive bilateral pulmonary airspace opacities.   No   pneumothorax is radiographically visible on the current exam.         XR CHEST PORTABLE   Final Result   Significantly decreased size of left pneumothorax. There is likely trace   left pneumothorax remaining. Stable extensive bilateral pulmonary airspace opacities. XR CHEST PORTABLE   Final Result   Addendum 1 of 1   ADDENDUM:   Verbal report was given to Gely Paula RN at 8:15 a.m. central standard   time on 11/30/2021. Final   New moderate sized left-sided pneumothorax. Stable extensive bilateral pulmonary airspace opacities            XR CHEST PORTABLE   Final Result   1. Lines okay. 2. Persistent edema/ARDS/pneumonia. 3. No sign of pneumothorax. XR CHEST PORTABLE   Final Result   Interval placement of left-sided chest tube with questionable residual   pneumothorax versus artifact. Extensive bilateral infiltrates. Continued follow-up recommended. XR CHEST PORTABLE   Final Result   Interval development of large left-sided tension pneumothorax. Extensive bilateral parenchymal infiltrates. Findings were discussed with Dr. [place] at approximately 0010 hours [place]   time on 11/28/2021. XR CHEST PORTABLE   Final Result   Severe bilateral pulmonary opacification. XR CHEST PORTABLE   Final Result   1. Endotracheal tube is 3 cm above the khai. 2. Stable interstitial pulmonary edema or pneumonia throughout both lungs. XR CHEST PORTABLE   Final Result   1. Somewhat limited exam, grossly unchanged. Please see above comments. .      RECOMMENDATION:   1. Recommend follow-up thoracic imaging, as directed clinically. .         XR ABDOMEN (KUB) (SINGLE AP VIEW)   Final Result   1. Satisfactory position of the NG tube within the stomach         XR CHEST PORTABLE   Final Result   1. There is no interval change in the multifocal bilateral pneumonia.          XR CHEST PORTABLE   Final Result   Bilateral airspace disease with interval progression on the right         XR CHEST PORTABLE   Final Result 1. Endotracheal tube is 2.7 cm above the khai. 2. Stable interstitial pulmonary edema or pneumonia throughout both lungs. XR CHEST PORTABLE   Final Result   1. Worsening of the multifocal bilateral pneumonia when compared with the   prior study of 1 day earlier. XR CHEST PORTABLE   Final Result   1. Multifocal bilateral pneumonia more prominent within the right upper lobe   2. Minimal partial interval clearing of the pneumonia within the left lung   3. Worsening of the pneumonia within the right upper lobe. US DUP LOWER EXTREMITIES BILATERAL VENOUS   Final Result   Within the visualized vessels there is no evidence for deep venous   thrombosis               XR CHEST PORTABLE   Final Result   1. Lines okay. 2. Slightly worsened diffuse edema versus pneumonia. XR CHEST PORTABLE   Final Result   1. Lines okay   2. Improved aeration, mild improvement and diffuse pneumonia/edema. XR CHEST ABDOMEN NG PLACEMENT   Final Result   NG tube position satisfactory. XR CHEST PORTABLE   Final Result   Stable bilateral pneumonia or interstitial pulmonary edema. XR CHEST PORTABLE   Final Result   Increasing bilateral infiltrates. XR CHEST PORTABLE   Final Result   1. There is no interval change in the multifocal bilateral pneumonia. CT HEAD WO CONTRAST   Final Result   No acute intracranial abnormality. Cortical atrophy and periventricular white matter ischemic changes. CT CERVICAL SPINE WO CONTRAST   Final Result   No acute abnormality of the cervical spine. Moderate degenerative changes with disc space narrowing and marginal bony   spur formation C5 through C7. Ground-glass opacities in the visualized lung apices. Please refer to chest   CT for additional information. CTA CHEST W CONTRAST   Final Result   No evidence of pulmonary embolism.       Extensive multifocal ground-glass opacities predominantly in the peripheral lung zones bilaterally, highly concerning for atypical or COVID related   pneumonia. XR CHEST PORTABLE   Final Result   Bilateral patchy airspace opacities worrisome for pneumonia. XR CHEST PORTABLE    (Results Pending)   XR CHEST PORTABLE    (Results Pending)   XR CHEST PORTABLE    (Results Pending)   XR CHEST PORTABLE    (Results Pending)       A/P:  Active Problems:    Acute respiratory failure with hypoxia (HCC)    Rhabdomyolysis    Acute cystitis with hematuria    Primary spontaneous pneumothorax  Resolved Problems:    * No resolved hospital problems. *    1. Intubated, Sedated, Paralyzed,   11/20: Intubated 2/2 hypoxia and increased work of breathing, proned and paralyzed  S/p Left Chest Tube 11/29 for tension pneumothorax. Second chest tube placed on 11/30. Air leaks noted. Serosanguineous drainage. No further procedures planned by surgery. Patient now supine. Gen Surg considering trach and peg tube placement pending ventilator setting improvement. 2. Acute Hypoxic Respiratory Failure 2/2 Covid 19  Unvaccinated for Covid 19  Patient found with pulse ox of 60% --> currently intubated and sedated  CXR showing bilateral pulmonary infiltrates  Inflammatory markers: DDimer 530, , CRP 22.9, Ferritin 749 on admission   CTA pulm showing extensive bilateral pulmonary infiltrates consistent with COVID-19 pneumonia , no PE  Completed remdesivir and tocilizumab treatment  Decadron and SoluCortef course completed  Pulmonology consulted ; appreciate recs; daily ABG, Vitamin C, Vitamin D  Dietary consulted for further recommendations on nutrition status ; appreciate recs. 11/18 - transferred to ICU due to worsening resp status. CXR with resolution of pneumothorax. Advanced Care Planning with Spiritual Services ; recs appreciated - FULL CODE. Brother wants everything done for Marilyn per discussion with palliate medicine.     3. Left Sided Tension Pneumothorax -   S/p left sided chest tube  11/29 , Tension Pneumothorax on Xray  Left lung re inflated on repeat CXR  Whistle like sound noted Left Upper Lung field 2/2 likely to Chest tube . 2nd chest tube placed on 11/30  Continue to monitor    4. Concern for Sepsis  Likely 2/2 superimposed bacterial pneumonia , candidal colonization  Tmax 102.2 , Tavg 100.3F  Given one dose cefepime and vancomycin in ICU  Procal 0.07, repeat 0.27 , blood cultures, urine culture negative. Completed Pip/Tazo, Vancomycin  Fungitell and B glucan pending  Diflucan stopped by ID. No current Abx    5. CHARAN Stage I  Admission creatinine 0.6  Likely 2/2 to Ischemic ATN in setting of Hypotension  Creatinine increased to 1.6 --> trended down to 0.9  Nephrology following , appreciate recs   FeNa: 0.2- Pre- Renal     6. Anemia  2/2 to inflammation/ response to Covid 19  .3, MCHC 31.5 RDW 20.1  Vitamin B12/Folate normal March 2021  FOBT + 11/29  H/H < 7 11/30/2021 , s/p 1 unit PRBC. Hemoglobin 7.8 this AM.  Maintain H/H > 7    6. Thrombocytopenia  Likely 2/2 to SALENA vs inflammation from Covid 19  Hold all anticoagulation , patient trialed on heparin , lovenox and argatroban but platelets continue to decrease  SALENA panel- negative  Platelets 878 this am     7. Hx Schizoaffective Disorder / Anxiety  Continue seroquel , zoloft, trazodone  Hold ativan, vistaril     8. Hypokalemia  Initial K 3.2 , Mag 2.6  Replace as needed  CMP daily    9. Rhabdomyolysis - resolved  2/2 to fall for unknown time period  Patient found down for unknown period of time  Initial CK > 3000  Received 4 L NS in ER    10.  Concern for UTI - resolved   Likely simple cystitis ; patient with no CVA tenderness , presented initially with fever 102 F  Urinalysis with increased nitrites, bacteria, blood  Urine culture, blood cultures were negative  Given one dose doxy and rocephin in the ER  1g ceftriaxone IV /-  dc'd 11/18/2021     GI/DVT ppx: protonix  Diet: NPO, tube feeds  Disposition: MICU    Electronically signed by Marylene Pinks, MD  [id number] on 12/9/2021 at 8:53 AM  This case was discussed with attending physician: Dr. Prinec Devries

## 2021-12-09 NOTE — PROGRESS NOTES
[facility]   Department of Internal Medicine   Internal Medicine Residency  MICU Progress Note    Patient:  Samia Harper 73 y.o. female   MRN: [id number]       Date of Service: 2021    Allergy: Ciprofloxacin  Cc follow up ARDS  Subjective     Patient was seen and examined this morning at bedside in no acute distress. Overnight, no acute events noted. Potassium of 3.0 today  and was replaced. Patient has been supine since Monday. She is tolerating supine position. Plan for trach and peg once she is stable on the vent. Cortisol of 28, steroids to be stopped. Vacuum removed from chest tube, switched to water seal.    Objective     TEMPERATURE:  Current - Temp: 97.9 °F (36.6 °C); Max - Temp  Av °F (36.7 °C)  Min: 97.7 °F (36.5 °C)  Max: 98.2 °F (36.8 °C)  RESPIRATIONS RANGE: Resp  Av.3  Min: 21  Max: 33  PULSE RANGE: Pulse  Av.6  Min: 55  Max: 76  BLOOD PRESSURE RANGE:  Systolic (90CWO), RGU:290 , Min:96 , ASY:035   ; Diastolic (23PDD), VZR:87, Min:43, Max:66    PULSE OXIMETRY RANGE: SpO2  Av.4 %  Min: 87 %  Max: 96 %    I & O - 24hr:    Intake/Output Summary (Last 24 hours) at 2021 1920  Last data filed at 2021 1800  Gross per 24 hour   Intake 1790.39 ml   Output 2075 ml   Net -284.61 ml     I/O last 3 completed shifts: In: 536 [I.V.:476; NG/GT:60]  Out: 1620 [Urine:1540; Chest Tube:80] I/O this shift:  In: 1254.4 [I.V.:889.5; NG/GT:332; IV Piggyback:32.9]  Out: 455 [Urine:300; Stool:100; Chest Tube:55]   Weight change:     Physical Exam  Constitutional:       Appearance: Normal appearance. Interventions: She is sedated and intubated. HENT:      Head: Normocephalic and atraumatic. Nose: Nose normal.   Eyes:      Pupils: Pupils are equal, round, and reactive to light. Cardiovascular:      Rate and Rhythm: Normal rate and regular rhythm. Pulses: Normal pulses. Heart sounds: Normal heart sounds.    Pulmonary:      Effort: Pulmonary effort is normal. She is intubated. Breath sounds: Examination of the left-upper field reveals decreased breath sounds. Examination of the left-middle field reveals decreased breath sounds. Examination of the left-lower field reveals decreased breath sounds. Decreased breath sounds and wheezing present. No rhonchi or rales. Abdominal:      General: Bowel sounds are normal. There is no distension. Palpations: Abdomen is soft. Musculoskeletal:      Cervical back: Normal range of motion. Skin:     General: Skin is warm and moist.      Capillary Refill: Capillary refill takes less than 2 seconds. Neurological:      General: No focal deficit present.          Medications     Continuous Infusions:   norepinephrine 3 mcg/min (12/08/21 1006)    fentaNYL 5 mcg/ml in 0.9%  ml infusion 200 mcg/hr (12/08/21 1203)    midazolam 10 mg/hr (12/08/21 1202)    sodium chloride 100 mL/hr at 11/1955    dextrose       Scheduled Meds:   potassium chloride  40 mEq IntraVENous Q4H    Followed by   Riana Myers ON 12/9/2021] potassium chloride  20 mEq IntraVENous Once    hydrocortisone sodium succinate PF  100 mg IntraVENous Q12H    artificial tears   Both Eyes Q12H    midodrine  10 mg Oral TID WC    enoxaparin  40 mg SubCUTAneous BID    sodium chloride (PF)  10 mL IntraVENous BID    And    pantoprazole  40 mg IntraVENous Daily    bumetanide  1 mg IntraVENous Daily with breakfast    sucralfate  1 g Oral 4 times per day    sennosides-docusate sodium  2 tablet Oral Daily    sodium chloride flush  5-40 mL IntraVENous 2 times per day    heparin flush  3 mL IntraVENous 2 times per day    chlorhexidine  15 mL Mouth/Throat BID    budesonide  1 mg Nebulization BID    Arformoterol Tartrate  15 mcg Nebulization BID    atorvastatin  10 mg Oral Daily    QUEtiapine  200 mg Oral Daily    sertraline  200 mg Oral Daily    traZODone  100 mg Oral Nightly    ascorbic acid  1,000 mg Oral Daily    vitamin D  2,000 Units Oral Daily    zinc sulfate  50 mg Oral Daily     PRN Meds: polyethylene glycol, sodium chloride flush, heparin flush, ipratropium-albuterol, sodium chloride, ondansetron **OR** ondansetron, acetaminophen **OR** acetaminophen, glucose, dextrose, glucagon (rDNA), dextrose  Nutrition:   NG/OG tube TF type: Pulmocare/Nephro/Glucerna/Jevity        At rate: ml/h    Labs and Imaging Studies     CBC:   Recent Labs     12/06/21  0433 12/06/21  1500 12/07/21  0446 12/07/21  1536 12/08/21  0201 12/08/21  0422 12/08/21  1712   WBC 9.1  --  10.9  --   --  14.0*  --    HGB 8.7*  8.3*   < > 8.6*   < > 9.2* 9.0* 7.9*   HCT 28.5*  26.9*   < > 28.0*   < > 29.9* 29.4* 25.5*   .3*  --  104.5*  --   --  107.7*  --      --  155  --   --  184  --     < > = values in this interval not displayed.        BMP:    Recent Labs     12/07/21  0123 12/07/21  1326 12/08/21  1309    142 141   K 3.6 3.6 3.0*    101 102   CO2 31* 31* 27   BUN 33* 30* 28*   CREATININE 0.7 0.6 0.5   GLUCOSE 147* 131* 153*       LIVER PROFILE:   Recent Labs     12/06/21  0433 12/07/21  0446 12/08/21  0422   AST 39* 36* 28   ALT 73* 75* 65*   BILIDIR <0.2 <0.2 <0.2   BILITOT 0.4 0.3 0.3   ALKPHOS 79 115* 92       PT/INR:   Recent Labs     12/06/21  0433 12/07/21  0446 12/08/21  0422   PROTIME 13.7* 13.3* 12.7*   INR 1.2 1.2 1.2       APTT:   Recent Labs     12/06/21  0433 12/07/21  0446 12/08/21 0422   APTT 24.2* 26.4 27.2       Fasting Lipid Panel:    Lab Results   Component Value Date    CHOL 322 10/12/2021    TRIG 150 10/12/2021    HDL 70 10/12/2021       Cardiac Enzymes:    Lab Results   Component Value Date    CKTOTAL 135 11/22/2021    CKTOTAL 488 (H) 11/20/2021    CKTOTAL 585 (H) 11/19/2021       Notable Cultures:      Blood cultures   Blood Culture, Routine   Date Value Ref Range Status   11/25/2021 5 Days no growth  Final     Respiratory cultures No results found for: RESPCULTURE No results found for: LABGRAM  Urine   Urine Culture, Routine   Date Value Ref Range Status   11/19/2021 Growth not present  Final     Legionella No results found for: LABLEGI  C Diff PCR No results found for: CDIFPCR  Wound culture/abscess: No results for input(s): WNDABS in the last 72 hours. Tip culture:No results for input(s): CXCATHTIP in the last 72 hours. Oxygen:     Vent Information  $Ventilation: $Subsequent Day  Skin Assessment: Clean, dry, & intact  Equipment ID: [id number]  Equipment Changed: (S) Humidification  Vent Type: 980  Vent Mode: AC/VC+  Vt Ordered: 350 mL  Pressure Ordered: 34  Rate Set: 32 bmp  Peak Flow: 0 L/min  Pressure Support: 0 cmH20  FiO2 : 70 %  SpO2: 92 %  SpO2/FiO2 ratio: 131.43  PaO2/FiO2 ratio: 120  Sensitivity: 3  PEEP/CPAP: 8  I Time/ I Time %: 0.7 s  Humidification Source: Heated wire  Humidification Temp: 37  Humidification Temp Measured: 37  Circuit Condensation: Drained  Mask Type: Full face mask  Mask Size: Small  Additional Respiratory  Assessments  Pulse: 61  Resp: (!) 32  SpO2: 92 %  Position: Semi-Calabrese's  Humidification Source: Heated wire  Humidification Temp: 37  Circuit Condensation: Drained  Oral Care Completed?: Yes  Oral Care: Mouth swabbed, Mouth moisturizer, Mouth suctioned, Suction toothette  Subglottic Suction Done?: Yes  Airway Type: ET  Airway Size: 8  Cuff Pressure (cm H2O):  (MLT)       [REMOVED] Urethral Catheter Temperature probe-Output (mL): 10 mL  Urethral Catheter-Output (mL): 100 mL  [REMOVED] Urethral Catheter Temperature probe 16 fr-Output (mL): 250 mL    Imaging Studies:  XR CHEST PORTABLE   Final Result   Stable support lines and bilateral airspace disease. XR CHEST PORTABLE   Final Result   Unchanged bilateral diffuse infiltrates. XR CHEST PORTABLE   Final Result   1. Decreased left pneumothorax with suspected trace residual.  2 left chest   tubes are similar to the previous exam.   2. Bilateral pulmonary opacities appear mildly increased.    3. The support devices are unchanged and appear to be in acceptable position. XR CHEST PORTABLE   Final Result   Unchanged airspace disease and left pneumothorax. XR CHEST PORTABLE   Final Result   1. No interval change in extensive multifocal bilateral pneumonia   2. Less than 10% left pneumothorax. There is stable position of the 2 left   chest tubes. XR CHEST PORTABLE   Final Result   1. Stable diffuse interstitial pulmonary edema or pneumonia. 2.  Left chest tube demonstrated. Minimal residual left pneumothorax. XR CHEST PORTABLE   Final Result   1. Stable diffuse bilateral airspace disease. 2. Small left pneumothorax appears new or increased compared to prior   examination. Left chest tube appears stable in position. 3. Possible right pleural effusion. XR CHEST PORTABLE   Final Result   1. There is no interval change in extensive multifocal bilateral pneumonia. XR CHEST PORTABLE   Final Result   No change in extensive bilateral pulmonary airspace opacities. No   pneumothorax is radiographically visible on the current exam.         XR CHEST PORTABLE   Final Result   Significantly decreased size of left pneumothorax. There is likely trace   left pneumothorax remaining. Stable extensive bilateral pulmonary airspace opacities. XR CHEST PORTABLE   Final Result   Addendum 1 of 1   ADDENDUM:   Verbal report was given to Zhao Hayden RN at 8:15 a.m. central standard   time on 11/30/2021. Final   New moderate sized left-sided pneumothorax. Stable extensive bilateral pulmonary airspace opacities            XR CHEST PORTABLE   Final Result   1. Lines okay. 2. Persistent edema/ARDS/pneumonia. 3. No sign of pneumothorax. XR CHEST PORTABLE   Final Result   Interval placement of left-sided chest tube with questionable residual   pneumothorax versus artifact. Extensive bilateral infiltrates. Continued follow-up recommended.          XR CHEST PORTABLE   Final Result   Interval development of large left-sided tension pneumothorax. Extensive bilateral parenchymal infiltrates. Findings were discussed with Dr. Kasia Sam at approximately 0010 hours [place]   time on 11/28/2021. XR CHEST PORTABLE   Final Result   Severe bilateral pulmonary opacification. XR CHEST PORTABLE   Final Result   1. Endotracheal tube is 3 cm above the khai. 2. Stable interstitial pulmonary edema or pneumonia throughout both lungs. XR CHEST PORTABLE   Final Result   1. Somewhat limited exam, grossly unchanged. Please see above comments. .      RECOMMENDATION:   1. Recommend follow-up thoracic imaging, as directed clinically. .         XR ABDOMEN (KUB) (SINGLE AP VIEW)   Final Result   1. Satisfactory position of the NG tube within the stomach         XR CHEST PORTABLE   Final Result   1. There is no interval change in the multifocal bilateral pneumonia. XR CHEST PORTABLE   Final Result   Bilateral airspace disease with interval progression on the right         XR CHEST PORTABLE   Final Result   1. Endotracheal tube is 2.7 cm above the khai. 2. Stable interstitial pulmonary edema or pneumonia throughout both lungs. XR CHEST PORTABLE   Final Result   1. Worsening of the multifocal bilateral pneumonia when compared with the   prior study of 1 day earlier. XR CHEST PORTABLE   Final Result   1. Multifocal bilateral pneumonia more prominent within the right upper lobe   2. Minimal partial interval clearing of the pneumonia within the left lung   3. Worsening of the pneumonia within the right upper lobe. US DUP LOWER EXTREMITIES BILATERAL VENOUS   Final Result   Within the visualized vessels there is no evidence for deep venous   thrombosis               XR CHEST PORTABLE   Final Result   1. Lines okay. 2. Slightly worsened diffuse edema versus pneumonia. XR CHEST PORTABLE   Final Result   1. Lines okay   2.  Improved aeration, mild improvement and diffuse pneumonia/edema. XR CHEST ABDOMEN NG PLACEMENT   Final Result   NG tube position satisfactory. XR CHEST PORTABLE   Final Result   Stable bilateral pneumonia or interstitial pulmonary edema. XR CHEST PORTABLE   Final Result   Increasing bilateral infiltrates. XR CHEST PORTABLE   Final Result   1. There is no interval change in the multifocal bilateral pneumonia. CT HEAD WO CONTRAST   Final Result   No acute intracranial abnormality. Cortical atrophy and periventricular white matter ischemic changes. CT CERVICAL SPINE WO CONTRAST   Final Result   No acute abnormality of the cervical spine. Moderate degenerative changes with disc space narrowing and marginal bony   spur formation C5 through C7. Ground-glass opacities in the visualized lung apices. Please refer to chest   CT for additional information. CTA CHEST W CONTRAST   Final Result   No evidence of pulmonary embolism. Extensive multifocal ground-glass opacities predominantly in the peripheral   lung zones bilaterally, highly concerning for atypical or COVID related   pneumonia. XR CHEST PORTABLE   Final Result   Bilateral patchy airspace opacities worrisome for pneumonia.          XR CHEST PORTABLE    (Results Pending)   XR CHEST PORTABLE    (Results Pending)   XR CHEST PORTABLE    (Results Pending)        Resident's Assessment and Plan     Assessment and Plan:    Cardiovascular   History of hyperlipidemia  -Continue home atorvastatin 10 mg    Pulmonary  Acute hypoxic respiratory failure 2/2 Covid pneumonia  -Patient is intubated  -PF ratio of 1.04, holding steady  -Currently sedated with fentanyl  -Completed Courses of Decadron and Toci  -Respiratory cultures have been negative, fungal cultures and BLE are also negative  -Continue ventilation with daily ABGs  -Completed remdesivir   -Continue breathing treatments  -Stoopped with Residents. Additionally comprehensive, multidisciplinary rounds were conducted with the MICU team. The case was discussed in detail and plans for care were established. Review of Residents documentation was conducted and revisions were made as appropriate. I agree with the above documented exam, problem list and plan of care.   Margi Goddard MD   CCT excluding procedures:38'

## 2021-12-09 NOTE — PROGRESS NOTES
[facility]  Nephrology Attending Progress Note  Lupillo Emanuel. Marielena Thornton MD        SUBJECTIVE:     11/28: pt remains in COVID 19 Isolation. She remains proned and on an FIO2 65% and PEEP12 with an O2 sat 95-96%    11/29: pt seen through window of icu due to covid, chart reviewed    11/30: pt seen through window of icu due to covid, chart reviewed. 12/1/21 :pt seen through window of icu due to covid, review of chart performed    12/2/21: pt seen through window of icu due to covid, chart reviewed, intubated, chest tube left     12/3/21 :chart reviewed, chest tube x 2 on right, intubated and prone, seen through window of icu    12/4: no new acute issues from overnight; large volume pleural fluid drainage from bilateral catheter    12/5: Brisk response to Bumex drip now placed on hold transition to intermittent dosing    12/6: pt seen through window of icu, chart reviewed, intubated    12/7: chart reviewed, seen through window of icu, intubated    12/8: pt seen through window of icu.  Intubated on levo    12/9: pt seen through window of icu, intubated and sedated, chest tube in place, on levo    PROBLEM LIST:    Patient Active Problem List   Diagnosis    Schizoaffective disorder ([address]    Acute respiratory failure with hypoxia ([address]    Rhabdomyolysis    Acute cystitis with hematuria    Primary spontaneous pneumothorax        PAST MEDICAL HISTORY:    Past Medical History:   Diagnosis Date    GERD (gastroesophageal reflux disease)     Osteoarthritis of right knee     Schizoaffective disorder (HCC)     [facility]       DIET:    ADULT TUBE FEEDING; Orogastric; Peptide Based; Continuous; 10; Yes; 10; Q 24 hours; 20; 30; Q 4 hours     PHYSICAL EXAM:     Patient Vitals for the past 24 hrs:   BP Temp Temp src Pulse Resp SpO2   12/09/21 1106    76 16 94 %   12/09/21 0809    72 30 96 %   12/09/21 0600 (!) 153/65 98.2 °F (36.8 °C) Esophageal 74 23 95 %   12/09/21 0500    62 (!) 32 94 %   12/09/21 0400 (!) 101/47 98.1 °F (36.7 °C) Esophageal 64 (!) 32 93 %   12/09/21 0300    61 (!) 32 92 %   12/09/21 0200  98.2 °F (36.8 °C) Esophageal 61 (!) 32 92 %   12/09/21 0100    66 (!) 32 92 %   12/09/21 0000 (!) 140/72 98.6 °F (37 °C) Esophageal 68 (!) 32 91 %   12/08/21 2300    68 (!) 32 91 %   12/08/21 2200  97.7 °F (36.5 °C) Esophageal 68 (!) 32 93 %   12/08/21 2112    66 (!) 32 95 %   12/08/21 2111     (!) 32 95 %   12/08/21 2110     (!) 32 96 %   12/08/21 2100    57 (!) 32 94 %   12/08/21 2000 (!) 155/72 97.7 °F (36.5 °C) Esophageal 66 (!) 32 93 %   12/08/21 1900    60 24 92 %   12/08/21 1800    61 (!) 32 92 %   12/08/21 1700    62 (!) 32 92 %   12/08/21 1659    59 (!) 32 90 %   12/08/21 1600  97.9 °F (36.6 °C) Esophageal 73 28 94 %   12/08/21 1500    62 26 90 %   12/08/21 1400    64 (!) 33 95 %   12/08/21 1305    68 29 95 %   12/08/21 1300    62 28 94 %   12/08/21 1200  97.7 °F (36.5 °C) CORE 68 28 95 %   @      Intake/Output Summary (Last 24 hours) at 12/9/2021 1118  Last data filed at 12/9/2021 0651  Gross per 24 hour   Intake 7691.73 ml   Output 1614 ml   Net 6077.73 ml         Wt Readings from Last 3 Encounters:   12/07/21 161 lb 4.8 oz (73.2 kg)   10/26/21 152 lb (68.9 kg)   10/12/21 149 lb (67.6 kg)     PE  Constitutional:  Pt is intubated  Seen through window of icu    MEDS (scheduled):    magnesium sulfate  1,000 mg IntraVENous Once    hydrocortisone sodium succinate PF  100 mg IntraVENous Q12H    artificial tears   Both Eyes Q12H    midodrine  10 mg Oral TID WC    enoxaparin  40 mg SubCUTAneous BID    sodium chloride (PF)  10 mL IntraVENous BID    And    pantoprazole  40 mg IntraVENous Daily    bumetanide  1 mg IntraVENous Daily with breakfast    sucralfate  1 g Oral 4 times per day    sennosides-docusate sodium  2 tablet Oral Daily    sodium chloride flush  5-40 mL IntraVENous 2 times per day    heparin flush  3 mL IntraVENous 2 times per day    chlorhexidine  15 mL Mouth/Throat BID    budesonide  1 mg Nebulization BID    Arformoterol Tartrate  15 mcg Nebulization BID    atorvastatin  10 mg Oral Daily    QUEtiapine  200 mg Oral Daily    sertraline  200 mg Oral Daily    traZODone  100 mg Oral Nightly    ascorbic acid  1,000 mg Oral Daily    vitamin D  2,000 Units Oral Daily    zinc sulfate  50 mg Oral Daily       MEDS (infusions):   norepinephrine 2 mcg/min (12/09/21 0509)    fentaNYL 5 mcg/ml in 0.9%  ml infusion 200 mcg/hr (12/09/21 0908)    midazolam 10 mg/hr (12/08/21 2215)    sodium chloride 100 mL/hr at 11/1956    dextrose         MEDS (prn):  polyethylene glycol, sodium chloride flush, heparin flush, ipratropium-albuterol, sodium chloride, ondansetron **OR** ondansetron, acetaminophen **OR** acetaminophen, glucose, dextrose, glucagon (rDNA), dextrose    DATA:    Recent Labs     12/07/21  0446 12/07/21  1536 12/08/21  0422 12/08/21  1712 12/09/21  0417   WBC 10.9  --  14.0*  --  11.6*   HGB 8.6*   < > 9.0* 7.9* 8.1*   HCT 28.0*   < > 29.4* 25.5* 26.1*   .5*  --  107.7*  --  103.6*     --  184  --  187    < > = values in this interval not displayed. Recent Labs     12/07/21  0446 12/07/21  1326 12/08/21  0422 12/08/21  1309 12/09/21  0052 12/09/21  0417   NA  --    < >  --  141 138 142   K  --    < >  --  3.0* 4.0 3.9   CL  --    < >  --  102 103 103   CO2  --    < >  --  27 26 30*   BUN  --    < >  --  28* 24* 24*   CREATININE  --    < >  --  0.5 0.4* 0.4*   LABGLOM  --    < >  --  >60 >60 >60   GLUCOSE  --    < >  --  153* 127* 135*   CALCIUM  --    < >  --  8.2* 7.8* 8.0*   ALT 75*  --  65*  --   --  53*   AST 36*  --  28  --   --  22   BILIDIR <0.2  --  <0.2  --   --  <0.2   BILITOT 0.3  --  0.3  --   --  0.3   ALKPHOS 115*  --  92  --   --  82   MG 1.8  --   --   --   --  1.7   PHOS 4.9*  --   --   --   --  2.4*    < > = values in this interval not displayed.        Lab Results   Component Value Date LABALBU 2.3 (L) 12/09/2021    LABALBU 2.6 (L) 12/08/2021    LABALBU 2.6 (L) 12/07/2021     Lab Results   Component Value Date    TSH 4.720 (H) 10/12/2021       Iron Studies  Lab Results   Component Value Date    FERRITIN 220 12/09/2021     Vitamin B-12   Date Value Ref Range Status   03/02/2021 771 211 - 946 pg/mL Final     Folate   Date Value Ref Range Status   03/02/2021 >20.0 4.8 - 24.2 ng/mL Final       No results found for: VITD25  No results found for: PTH    No components found for: URIC    Lab Results   Component Value Date    COLORU Yellow 11/16/2021    NITRU POSITIVE 11/16/2021    GLUCOSEU Negative 11/16/2021    KETUA 15 11/16/2021    UROBILINOGEN 0.2 11/16/2021    BILIRUBINUR Negative 11/16/2021       No results found for: Cydne Duet      IMPRESSION/RECOMMENDATIONS:      1. Acute kidney injury stage I  ischemic ATN occurring in the setting of hypotension  nonoliguric    Cr continues to improve now back to baseline  Continue to monitor labs and urine output  Bumex drip put on hold; transition to intermittent dosing  Good response  uo 1.9L  Cr 0.4     2. Acute hypoxic respiratory failure  due to COVID-19 pneumonia  Intubation with mechanical ventilation; prone      3. Septic shock  suspected possible superimposed bacterial pneumonia  On levo  abx per id     4. Volume overload  bumex 1 mg iv qd   uo 1.9L  -4.2L overall as of yesterday  I/o miscalculated yesterday and now inaccurate    5. Hyponatremia  Na at 142     6. Hypocalcemia  Replace prn  hypoalbuminemic    Nereyda Andersen.  Katrin Mo MD

## 2021-12-09 NOTE — PLAN OF CARE
Problem: Falls - Risk of:  Goal: Will remain free from falls  Description: Will remain free from falls  Outcome: Met This Shift  Goal: Absence of physical injury  Description: Absence of physical injury  Outcome: Met This Shift     Problem: Airway Clearance - Ineffective  Goal: Achieve or maintain patent airway  Outcome: Met This Shift     Problem: Falls - Risk of:  Goal: Will remain free from falls  Description: Will remain free from falls  Outcome: Met This Shift  Goal: Absence of physical injury  Description: Absence of physical injury  Outcome: Met This Shift     Problem: Airway Clearance - Ineffective  Goal: Achieve or maintain patent airway  Outcome: Met This Shift     Problem: Gas Exchange - Impaired  Goal: Absence of hypoxia  Outcome: Met This Shift  Goal: Promote optimal lung function  Outcome: Met This Shift     Problem: Breathing Pattern - Ineffective  Goal: Ability to achieve and maintain a regular respiratory rate  Outcome: Met This Shift     Problem:  Body Temperature -  Risk of, Imbalanced  Goal: Ability to maintain a body temperature within defined limits  Outcome: Met This Shift  Goal: Will regain or maintain usual level of consciousness  Outcome: Met This Shift  Goal: Complications related to the disease process, condition or treatment will be avoided or minimized  Outcome: Met This Shift     Problem: Isolation Precautions - Risk of Spread of Infection  Goal: Prevent transmission of infection  Outcome: Met This Shift     Problem: Nutrition Deficits  Goal: Optimize nutritional status  Outcome: Met This Shift     Problem: Risk for Fluid Volume Deficit  Goal: Maintain normal heart rhythm  Outcome: Met This Shift  Goal: Maintain absence of muscle cramping  Outcome: Met This Shift  Goal: Maintain normal serum potassium, sodium, calcium, phosphorus, and pH  Outcome: Met This Shift     Problem: Loneliness or Risk for Loneliness  Goal: Demonstrate positive use of time alone when socialization is not possible  Outcome: Met This Shift     Problem: Fatigue  Goal: Verbalize increase energy and improved vitality  Outcome: Met This Shift     Problem: Skin Integrity:  Goal: Will show no infection signs and symptoms  Description: Will show no infection signs and symptoms  Outcome: Met This Shift  Goal: Absence of new skin breakdown  Description: Absence of new skin breakdown  Outcome: Met This Shift     Problem: Infection, Septic Shock:  Goal: Will show no infection signs and symptoms  Description: Will show no infection signs and symptoms  Outcome: Met This Shift     Problem: Patient Education: Go to Patient Education Activity  Goal: Patient/Family Education  Outcome: Ongoing

## 2021-12-09 NOTE — PROGRESS NOTES
[address] [facility]   Department of Internal Medicine   Internal Medicine Residency  MICU Progress Note    Patient:  Tim Gomez 73 y.o. female   MRN: [id number]       Date of Service: 2021    Allergy: Ciprofloxacin    Subjective     Patient was seen and examined this morning at bedside. Patient continues to remain in critical condition. Overnight, hgb was stable, BMP WNL. Objective     TEMPERATURE:  Current - Temp: 98.6 °F (37 °C); Max - Temp  Av.1 °F (36.7 °C)  Min: 97.7 °F (36.5 °C)  Max: 98.6 °F (37 °C)  RESPIRATIONS RANGE: Resp  Av  Min: 14  Max: 36  PULSE RANGE: Pulse  Av  Min: 57  Max: 77  BLOOD PRESSURE RANGE:  Systolic (44SUC), KKV:450 , Min:101 , NPU:644   ; Diastolic (04QWM), CZX:37, Min:47, Max:72    PULSE OXIMETRY RANGE: SpO2  Av.9 %  Min: 86 %  Max: 96 %    I & O - 24hr:    Intake/Output Summary (Last 24 hours) at 2021 1523  Last data filed at 2021 1300  Gross per 24 hour   Intake 7691.73 ml   Output 1714 ml   Net 5977.73 ml     I/O last 3 completed shifts: In: 7691.7 [I.V.:6960.8; NG/GT:698; IV Piggyback:32.9]  Out: 4955 [ZCYPE:8224; Stool:100; Chest Tube:80] No intake/output data recorded. Weight change:     Physical Exam:  General Appearance:   Chronically ill-appearing, sedated    HEENT:    NC/AT, mucous membranes are moist   Neck:   Supple, no jugular venous distention. Resp:     CTAB, No wheezes, No rhonchi, no use of accessory muscles   Heart:    RRR, S1 and S2 normal, no murmur, rub or gallop.     Abdomen:     Soft, non-tender, non-distended with normal bowel sounds   Extremities:   Atraumatic, no cyanosis or edema   Pulses:  Radial and pedal pulses are intact bilaterally   Neurologic:  Sedated and intubated but opens eyes to name        Medications     Continuous Infusions:   propofol      norepinephrine 2 mcg/min (21 0509)    fentaNYL 5 mcg/ml in 0.9%  ml infusion 200 mcg/hr (21 0964)    midazolam 10 mg/hr (12/08/21 2215)    sodium chloride 100 mL/hr at 11/1955    dextrose       Scheduled Meds:   midodrine  15 mg Oral TID WC    hydrocortisone sodium succinate PF  100 mg IntraVENous Q12H    artificial tears   Both Eyes Q12H    enoxaparin  40 mg SubCUTAneous BID    sodium chloride (PF)  10 mL IntraVENous BID    And    pantoprazole  40 mg IntraVENous Daily    [Held by provider] bumetanide  1 mg IntraVENous Daily with breakfast    sucralfate  1 g Oral 4 times per day    sennosides-docusate sodium  2 tablet Oral Daily    sodium chloride flush  5-40 mL IntraVENous 2 times per day    heparin flush  3 mL IntraVENous 2 times per day    chlorhexidine  15 mL Mouth/Throat BID    budesonide  1 mg Nebulization BID    Arformoterol Tartrate  15 mcg Nebulization BID    atorvastatin  10 mg Oral Daily    QUEtiapine  200 mg Oral Daily    sertraline  200 mg Oral Daily    traZODone  100 mg Oral Nightly    ascorbic acid  1,000 mg Oral Daily    vitamin D  2,000 Units Oral Daily    zinc sulfate  50 mg Oral Daily     PRN Meds: polyethylene glycol, sodium chloride flush, heparin flush, ipratropium-albuterol, sodium chloride, ondansetron **OR** ondansetron, acetaminophen **OR** acetaminophen, glucose, dextrose, glucagon (rDNA), dextrose  Nutrition:   NG/OG tube TF type: Pulmocare/Nephro/Glucerna/Jevity        At rate:     Labs and Imaging Studies     CBC:   Recent Labs     12/07/21  0446 12/07/21  1536 12/08/21  0422 12/08/21  1712 12/09/21  0417   WBC 10.9  --  14.0*  --  11.6*   HGB 8.6*   < > 9.0* 7.9* 8.1*   HCT 28.0*   < > 29.4* 25.5* 26.1*   .5*  --  107.7*  --  103.6*     --  184  --  187    < > = values in this interval not displayed.        BMP:    Recent Labs     12/08/21  1309 12/09/21  0052 12/09/21  0417    138 142   K 3.0* 4.0 3.9    103 103   CO2 27 26 30*   BUN 28* 24* 24*   CREATININE 0.5 0.4* 0.4*   GLUCOSE 153* 127* 135*       LIVER PROFILE:   Recent Labs     12/07/21  2489 12/08/21 0422 12/09/21 0417   AST 36* 28 22   ALT 75* 65* 53*   BILIDIR <0.2 <0.2 <0.2   BILITOT 0.3 0.3 0.3   ALKPHOS 115* 92 82       PT/INR:   Recent Labs     12/07/21  0446 12/08/21  0422 12/09/21 0417   PROTIME 13.3* 12.7* 12.9*   INR 1.2 1.2 1.2       APTT:   Recent Labs     12/07/21  0446 12/08/21  0422 12/09/21 0417   APTT 26.4 27.2 27.4       Fasting Lipid Panel:    Lab Results   Component Value Date    CHOL 322 10/12/2021    TRIG 150 10/12/2021    HDL 70 10/12/2021       Cardiac Enzymes:    Lab Results   Component Value Date    CKTOTAL 135 11/22/2021    CKTOTAL 488 (H) 11/20/2021    CKTOTAL 585 (H) 11/19/2021       Notable Cultures:      Blood cultures   Blood Culture, Routine   Date Value Ref Range Status   11/25/2021 5 Days no growth  Final     Respiratory cultures No results found for: RESPCULTURE No results found for: LABGRAM  Urine   Urine Culture, Routine   Date Value Ref Range Status   11/19/2021 Growth not present  Final     Legionella No results found for: LABLEGI  C Diff PCR No results found for: CDIFPCR  Wound culture/abscess: No results for input(s): WNDABS in the last 72 hours. Tip culture:No results for input(s): CXCATHTIP in the last 72 hours.       Oxygen:     Vent Information  $Ventilation: $Subsequent Day  Skin Assessment: Clean, dry, & intact  Equipment ID: [id number]  Equipment Changed: (S) Humidification  Vent Type: 980  Vent Mode: AC/VC+  Vt Ordered: (S) 320 mL  Pressure Ordered: 34  Rate Set: (S) 28 bmp  Peak Flow: 0 L/min  Pressure Support: 0 cmH20  FiO2 : 100 %  SpO2: 95 %  SpO2/FiO2 ratio: 95  PaO2/FiO2 ratio: 120  Sensitivity: 3  PEEP/CPAP: (S) 10  I Time/ I Time %: 0 s  Humidification Source: Heated wire  Humidification Temp: 37  Humidification Temp Measured: 37  Circuit Condensation: Drained  Mask Type: Full face mask  Mask Size: Small  Additional Respiratory  Assessments  Pulse: 73  Resp: 22  SpO2: 95 %  Position: Semi-Calabrese's  Humidification Source: Heated wire  Humidification Temp: 37  Circuit Condensation: Drained  Oral Care Completed?: Yes  Oral Care: Mouth swabbed, Mouth moisturizer, Mouth suctioned, Suction toothette  Subglottic Suction Done?: Yes  Airway Type: ET  Airway Size: 8  Cuff Pressure (cm H2O):  (MLT)       [REMOVED] Urethral Catheter Temperature probe-Output (mL): 10 mL  Urethral Catheter-Output (mL): 150 mL  [REMOVED] Urethral Catheter Temperature probe 16 fr-Output (mL): 250 mL    Imaging Studies:  XR CHEST PORTABLE   Final Result   1. Slightly improved aeration at the right apex and left lower lung   2. Stable position and alignment of the tubes and catheters   3. Extensive bilateral airspace disease concerning for consolidative   pneumonia slightly improved         XR CHEST PORTABLE   Final Result   Stable support lines and bilateral airspace disease. XR CHEST PORTABLE   Final Result   Unchanged bilateral diffuse infiltrates. XR CHEST PORTABLE   Final Result   1. Decreased left pneumothorax with suspected trace residual.  2 left chest   tubes are similar to the previous exam.   2. Bilateral pulmonary opacities appear mildly increased. 3. The support devices are unchanged and appear to be in acceptable position. XR CHEST PORTABLE   Final Result   Unchanged airspace disease and left pneumothorax. XR CHEST PORTABLE   Final Result   1. No interval change in extensive multifocal bilateral pneumonia   2. Less than 10% left pneumothorax. There is stable position of the 2 left   chest tubes. XR CHEST PORTABLE   Final Result   1. Stable diffuse interstitial pulmonary edema or pneumonia. 2.  Left chest tube demonstrated. Minimal residual left pneumothorax. XR CHEST PORTABLE   Final Result   1. Stable diffuse bilateral airspace disease. 2. Small left pneumothorax appears new or increased compared to prior   examination. Left chest tube appears stable in position. 3. Possible right pleural effusion. XR CHEST PORTABLE   Final Result   1. There is no interval change in extensive multifocal bilateral pneumonia. XR CHEST PORTABLE   Final Result   No change in extensive bilateral pulmonary airspace opacities. No   pneumothorax is radiographically visible on the current exam.         XR CHEST PORTABLE   Final Result   Significantly decreased size of left pneumothorax. There is likely trace   left pneumothorax remaining. Stable extensive bilateral pulmonary airspace opacities. XR CHEST PORTABLE   Final Result   Addendum 1 of 1   ADDENDUM:   Verbal report was given to Zhao Hayden RN at 8:15 a.m. central standard   time on 11/30/2021. Final   New moderate sized left-sided pneumothorax. Stable extensive bilateral pulmonary airspace opacities            XR CHEST PORTABLE   Final Result   1. Lines okay. 2. Persistent edema/ARDS/pneumonia. 3. No sign of pneumothorax. XR CHEST PORTABLE   Final Result   Interval placement of left-sided chest tube with questionable residual   pneumothorax versus artifact. Extensive bilateral infiltrates. Continued follow-up recommended. XR CHEST PORTABLE   Final Result   Interval development of large left-sided tension pneumothorax. Extensive bilateral parenchymal infiltrates. Findings were discussed with Dr. Los Posey at approximately 0010 hours [place]   time on 11/28/2021. XR CHEST PORTABLE   Final Result   Severe bilateral pulmonary opacification. XR CHEST PORTABLE   Final Result   1. Endotracheal tube is 3 cm above the khai. 2. Stable interstitial pulmonary edema or pneumonia throughout both lungs. XR CHEST PORTABLE   Final Result   1. Somewhat limited exam, grossly unchanged. Please see above comments. .      RECOMMENDATION:   1. Recommend follow-up thoracic imaging, as directed clinically. .         XR ABDOMEN (KUB) (SINGLE AP VIEW)   Final Result   1.  Satisfactory position of the NG tube within the stomach         XR CHEST PORTABLE   Final Result   1. There is no interval change in the multifocal bilateral pneumonia. XR CHEST PORTABLE   Final Result   Bilateral airspace disease with interval progression on the right         XR CHEST PORTABLE   Final Result   1. Endotracheal tube is 2.7 cm above the khai. 2. Stable interstitial pulmonary edema or pneumonia throughout both lungs. XR CHEST PORTABLE   Final Result   1. Worsening of the multifocal bilateral pneumonia when compared with the   prior study of 1 day earlier. XR CHEST PORTABLE   Final Result   1. Multifocal bilateral pneumonia more prominent within the right upper lobe   2. Minimal partial interval clearing of the pneumonia within the left lung   3. Worsening of the pneumonia within the right upper lobe. US DUP LOWER EXTREMITIES BILATERAL VENOUS   Final Result   Within the visualized vessels there is no evidence for deep venous   thrombosis               XR CHEST PORTABLE   Final Result   1. Lines okay. 2. Slightly worsened diffuse edema versus pneumonia. XR CHEST PORTABLE   Final Result   1. Lines okay   2. Improved aeration, mild improvement and diffuse pneumonia/edema. XR CHEST ABDOMEN NG PLACEMENT   Final Result   NG tube position satisfactory. XR CHEST PORTABLE   Final Result   Stable bilateral pneumonia or interstitial pulmonary edema. XR CHEST PORTABLE   Final Result   Increasing bilateral infiltrates. XR CHEST PORTABLE   Final Result   1. There is no interval change in the multifocal bilateral pneumonia. CT HEAD WO CONTRAST   Final Result   No acute intracranial abnormality. Cortical atrophy and periventricular white matter ischemic changes. CT CERVICAL SPINE WO CONTRAST   Final Result   No acute abnormality of the cervical spine.       Moderate degenerative changes with disc space narrowing and marginal bony spur formation C5 through C7. Ground-glass opacities in the visualized lung apices. Please refer to chest   CT for additional information. CTA CHEST W CONTRAST   Final Result   No evidence of pulmonary embolism. Extensive multifocal ground-glass opacities predominantly in the peripheral   lung zones bilaterally, highly concerning for atypical or COVID related   pneumonia. XR CHEST PORTABLE   Final Result   Bilateral patchy airspace opacities worrisome for pneumonia. XR CHEST PORTABLE    (Results Pending)   XR CHEST PORTABLE    (Results Pending)   XR CHEST PORTABLE    (Results Pending)        Resident's Assessment and Plan     Assessment and Plan:    Cardiovascular   History of hyperlipidemia  -Continue home atorvastatin 10 mg     Pulmonary  Acute hypoxic respiratory failure 2/2 Covid pneumonia  -Patient is intubated  -s/p remdesivir, Decadron and Toci  -Vent settings PF ratio of 90,   -Currently sedated with fentanyl  -Started levo today  -Respiratory cultures have been negative, fungal cultures and BLE are also negative  -Continue ventilation with daily ABGs  -Continue breathing treatments  -Stoped Hydrocortisone  -Continue on Midodrine   -Continue supine position   -Asynchronous with vent. Propofol started today. Wean off Versed as able       Left lung pneumothorax (resolving)  -Chest x-ray showed left pneumothorax on 11/29  -Repeat chest x-ray today showed worsening pneumothorax on 11/30  -2 chest tubes placed.   Clamp 1 chest tube today, possibly remove will just use as able to more       Gastrointestinal  Transaminitis 2/2 Covid infection  -Liver enzymes trending down   -Continue to monitor     Concern for GI bleed-stable/resolved   -FOBT positive   -Hgb of 8.1 today   -Continue to monitor   -Switched to Lovenox   -Monitor H&H every 8 hours     Infectious Disease   COVID-19 pneumonia  -See above  -Continue to monitor CRP D-dimer and pro-Carl  -Continue vitamin C, vitamin D and zinc     Renal   Stage III intrinsic CHARAN (resolved)   -Baseline creatinine of 0.5  -Nephro is following  -Creatinine is stable today 0.4  -Continue to follow nephro recommendations  -Continue to monitor  -Continue to monitor and replace electrolytes as appropriate      Heme/Onc  Reactive leukocytosis 2/2 steroids and Covid infection  -WBC stable today  -Cultures have been negative  -Fungitell negative   -Diflucan stopped   -Continue to follow ID recommendations     Thrombocytopenia 2/2 Covid (Resolved)   -Platelets stable  -Was HIT negative  -No intervention at this time  -Continue to monitor      Psychiatric   History of schizoaffective disorder  -Continue home hydroxyzine, trazodone, quetiapine and sertraline         # Peptic ulcer prophylaxis: Tonics  # DVT Prophylaxis: Lovenox  # Disposition: Cont current care     La Ramachandran MD, PGY-1    Attending Physician: Dr. Lenwood Merlin    I personally saw, examined and provided care for the patient. Radiographs, labs and medication list were reviewed by me independently. I spoke with bedside nursing, therapists and consultants. Critical care services and times documented are independent of procedures and multidisciplinary rounds with Residents. Additionally comprehensive, multidisciplinary rounds were conducted with the MICU team. The case was discussed in detail and plans for care were established. Review of Residents documentation was conducted and revisions were made as appropriate. I agree with the above documented exam, problem list and plan of care.   Marlene Rodriguez MD   CCT excluding procedures:38'

## 2021-12-09 NOTE — PROGRESS NOTES
Department of Internal Medicine  Infectious Diseases  Progress Note      C/C : Leukocytosis, COVID 19     Pt is intubated, sedated, off paralytic, supine   Off  levophed   No fever     Current Facility-Administered Medications   Medication Dose Route Frequency Provider Last Rate Last Admin    midodrine (PROAMATINE) tablet 15 mg  15 mg Oral TID  Malu Henry MD        propofol injection  5-50 mcg/kg/min IntraVENous Titrated Malu Henry MD        hydrocortisone sodium succinate PF (SOLU-CORTEF) injection 100 mg  100 mg IntraVENous Q12H Malu Henry MD   100 mg at 12/09/21 9794    lubrifresh P.M. (artificial tears) ophthalmic ointment   Both Eyes Q12H Jay Bains MD   Given at 12/09/21 0319    enoxaparin (LOVENOX) injection 40 mg  40 mg SubCUTAneous BID Jay Bains MD   40 mg at 12/09/21 0906    sodium chloride (PF) 0.9 % injection 10 mL  10 mL IntraVENous BID Peggy Abarca MD   10 mL at 12/09/21 0907    And    pantoprazole (PROTONIX) injection 40 mg  40 mg IntraVENous Daily Jay Bains MD   40 mg at 12/09/21 0906    [Held by provider] bumetanide (BUMEX) injection 1 mg  1 mg IntraVENous Daily with breakfast Diane Carrillo MD   1 mg at 12/09/21 0907    norepinephrine (LEVOPHED) 16 mg in dextrose 5% 250 mL infusion  2-100 mcg/min IntraVENous Continuous Vickie Martins, DO 1.9 mL/hr at 12/09/21 0509 2 mcg/min at 12/09/21 0509    fentaNYL 5 mcg/ml in 0.9%  ml infusion  12.5-200 mcg/hr IntraVENous Continuous Alfredo Allred MD 40 mL/hr at 12/09/21 0908 200 mcg/hr at 12/09/21 0908    sucralfate (CARAFATE) tablet 1 g  1 g Oral 4 times per day Fide Ion B Capal, DO   1 g at 12/09/21 0907    sennosides-docusate sodium (SENOKOT-S) 8.6-50 MG tablet 2 tablet  2 tablet Oral Daily Adelia Aguirre MD   2 tablet at 12/07/21 0804    polyethylene glycol (GLYCOLAX) packet 17 g  17 g Oral Daily PRN Adelia Aguirre MD   17 g at 12/08/21 9301    sodium chloride flush 0.9 % injection 5-40 mL  5-40 mL IntraVENous 2 times per day Merline Raddle, MD   10 mL at 12/09/21 0909    sodium chloride flush 0.9 % injection 5-40 mL  5-40 mL IntraVENous PRN Merline Raddle, MD   10 mL at 11/28/21 2122    heparin flush 100 UNIT/ML injection 300 Units  3 mL IntraVENous 2 times per day Merline Raddle, MD   300 Units at 12/09/21 0910    heparin flush 100 UNIT/ML injection 300 Units  3 mL IntraCATHeter PRN Merline Raddle, MD        midazolam (VERSED) 100 mg in dextrose 5 % 100 mL infusion  1-10 mg/hr IntraVENous Continuous Florentina Gowers, MD 10 mL/hr at 12/08/21 2215 10 mg/hr at 12/08/21 2215    chlorhexidine (PERIDEX) 0.12 % solution 15 mL  15 mL Mouth/Throat BID Keyona Isidro MD   15 mL at 12/09/21 0909    ipratropium-albuterol (DUONEB) nebulizer solution 1 ampule  1 ampule Inhalation Q4H PRN Merline Raddle, MD   1 ampule at 12/09/21 0809    budesonide (PULMICORT) nebulizer suspension 1,000 mcg  1 mg Nebulization BID Madison More MD   1,000 mcg at 12/09/21 0809    Arformoterol Tartrate (BROVANA) nebulizer solution 15 mcg  15 mcg Nebulization BID Madison More MD   15 mcg at 12/09/21 0809    atorvastatin (LIPITOR) tablet 10 mg  10 mg Oral Daily Caro Burch MD   10 mg at 12/09/21 1255    QUEtiapine (SEROQUEL) tablet 200 mg  200 mg Oral Daily Caro Burch MD   200 mg at 12/09/21 1256    sertraline (ZOLOFT) tablet 200 mg  200 mg Oral Daily Caro Burch MD   200 mg at 12/09/21 1256    traZODone (DESYREL) tablet 100 mg  100 mg Oral Nightly Caro Burch MD   100 mg at 12/08/21 2053    0.9 % sodium chloride infusion  25 mL IntraVENous PRN Caro Burch  mL/hr at 11/1956 Rate Verify at 11/1956    ondansetron (ZOFRAN-ODT) disintegrating tablet 4 mg  4 mg Oral Q8H PRN Caro Burch MD        Or    ondansetron [facility]) injection 4 mg  4 mg IntraVENous Q6H PRN Caro Burch MD        acetaminophen (TYLENOL) tablet 650 mg  650 mg Oral Q6H PRN Omkar Garcia MD   650 mg at 11/26/21 4740    Or    acetaminophen (TYLENOL) suppository 650 mg  650 mg Rectal Q6H PRN Omkar Garcia MD   650 mg at 11/27/21 4741    ascorbic acid (VITAMIN C) tablet 1,000 mg  1,000 mg Oral Daily Omkar Garcia MD   1,000 mg at 12/09/21 7193    Vitamin D (CHOLECALCIFEROL) tablet 2,000 Units  2,000 Units Oral Daily Omkar Garcia MD   2,000 Units at 12/08/21 0901    zinc sulfate (ZINCATE) capsule 50 mg  50 mg Oral Daily Omkar Garcia MD   50 mg at 12/09/21 0907    glucose (GLUTOSE) 40 % oral gel 15 g  15 g Oral PRN Omkar Garcia MD        dextrose 50 % IV solution  12.5 g IntraVENous PRN Omkar Garcia MD        glucagon (rDNA) injection 1 mg  1 mg IntraMUSCular PRN Omkar Garcia MD        dextrose 5 % solution  100 mL/hr IntraVENous PRN Omkar Garcia MD             REVIEW OF SYSTEMS: could not be obtained       PHYSICAL EXAM:      Vitals:     BP (!) 153/65   Pulse 75   Temp 98.6 °F (37 °C) (Esophageal)   Resp 28   Ht 5' 1\" (1.549 m)   Wt 161 lb 4.8 oz (73.2 kg)   SpO2 (!) 86%   BMI 30.48 kg/m²     General Appearance:    Intubated, sedated, supine , FiO2 70 , PEEP 8    Head:    Normocephalic, atraumatic   Eyes:    + pallor    Ears:    No obvious deformity or drainage.    Nose:   No nasal drainage    Throat:   ET tube in place, swollen lips    Neck:   Supple, no lymphadenopathy   Lungs:     Scattered rhonchi , left chest tubes   Heart:    Regular   Abdomen:     Soft,  bowel sounds present    Extremities:    ++ edema    Pulses:   Dorsalis pedis palpable    Skin:   no rashes        CBC with Differential:      Lab Results   Component Value Date    WBC 11.6 12/09/2021    RBC 2.52 12/09/2021    HGB 8.1 12/09/2021    HCT 26.1 12/09/2021     12/09/2021    .6 12/09/2021    MCH 32.1 12/09/2021    MCHC 31.0 12/09/2021    RDW 23.1 12/09/2021    NRBC 0.9 12/01/2021    METASPCT 0.9 12/04/2021    LYMPHOPCT 1.7 12/09/2021    MONOPCT 2.6 12/09/2021    MYELOPCT 0.9 12/03/2021 BASOPCT 0.1 12/09/2021    MONOSABS 0.35 12/09/2021    LYMPHSABS 0.23 12/09/2021    EOSABS 0.00 12/09/2021    BASOSABS 0.00 12/09/2021       CMP     Lab Results   Component Value Date     12/09/2021    K 3.9 12/09/2021    K 4.8 11/19/2021     12/09/2021    CO2 30 12/09/2021    BUN 24 12/09/2021    CREATININE 0.4 12/09/2021    GFRAA >60 12/09/2021    LABGLOM >60 12/09/2021    GLUCOSE 135 12/09/2021    GLUCOSE 77 05/12/2011    PROT 5.5 12/09/2021    LABALBU 2.3 12/09/2021    CALCIUM 8.0 12/09/2021    BILITOT 0.3 12/09/2021    ALKPHOS 82 12/09/2021    AST 22 12/09/2021    ALT 53 12/09/2021         Hepatic Function Panel:    Lab Results   Component Value Date    ALKPHOS 82 12/09/2021    ALT 53 12/09/2021    AST 22 12/09/2021    PROT 5.5 12/09/2021    BILITOT 0.3 12/09/2021    BILIDIR <0.2 12/09/2021    IBILI see below 12/09/2021    LABALBU 2.3 12/09/2021       PT/INR:    Lab Results   Component Value Date    PROTIME 12.9 12/09/2021    INR 1.2 12/09/2021       TSH:    Lab Results   Component Value Date    TSH 4.720 10/12/2021       U/A:    Lab Results   Component Value Date    COLORU Yellow 11/16/2021    PHUR 6.0 11/16/2021    WBCUA NONE 11/16/2021    RBCUA 1-3 11/16/2021    BACTERIA MANY 11/16/2021    CLARITYU Clear 11/16/2021    SPECGRAV 1.025 11/16/2021    LEUKOCYTESUR Negative 11/16/2021    UROBILINOGEN 0.2 11/16/2021    BILIRUBINUR Negative 11/16/2021    BLOODU LARGE 11/16/2021    GLUCOSEU Negative 11/16/2021       ABG:    Lab Results   Component Value Date    CCY5EBS 28.4 12/02/2021    JSO6ZGG 51.5 12/02/2021    PO2ART 59.6 12/02/2021       MICROBIOLOGY:    Blood culture - neg on 11/25     Urine Culture -    Sputum Culture -  CULTURE, RESPIRATORY Oral Pharyngeal Kamille absent Abnormal      Smear, Respiratory --    Group 6: <25 PMN's/LPF and <25 Epithelial cells/LPF   Few Polymorphonuclear leukocytes   Rare Epithelial cells   No organisms seen     Organism Candida albicans Abnormal     CULTURE, RESPIRATORY One colony   Narrative:     Source: SPUSU       Site:                     Specimen: Nasopharyngeal Swab Updated: 11/1956    SARS-CoV-2, NAAT DETECTED Abnormal     Comment: Rapid NAAT:   Negative results should be treated as presumptive            Ref Range & Units 11/27/21 1024   (1,3)-Beta-D-Glucan (Fungitell) Interpretation Negative Negative        Radiology :    Chest X ray :   Extensive bilateral airspace disease concerning for consolidative   pneumonia slightly improved     IMPRESSION:     1. Severe COVID 19 pneumonia s/p remdesivir and tocilizumab   2. Respiratory failure - intubated   3. Leukocytosis - improved     4. Candida colonization   5. Pneumothorax , s/p chest tube insertion         RECOMMENDATIONS:      1.  CBC with diff   2.  Supportive care

## 2021-12-10 NOTE — PROGRESS NOTES
[address] [facility]   Department of Internal Medicine   Internal Medicine Residency  MICU Progress Note    Patient:  Bear Dasilva 73 y.o. female   MRN: [id number]       Date of Service: 12/10/2021    Allergy: Ciprofloxacin    Subjective     Patient was seen and examined this morning at bedside. Patient continues to remain in critical condition. Overnight, potassium was replaced, patient was breath stacking propofol was increased levo was also increased. A-line was replaced. Patient was put on Nimbex. Breath stacking P/F ratio slightly improved to 97. Objective     TEMPERATURE:  Current - Temp: 97 °F (36.1 °C); Max - Temp  Av.9 °F (35.5 °C)  Min: 94.3 °F (34.6 °C)  Max: 97.9 °F (36.6 °C)  RESPIRATIONS RANGE: Resp  Av.3  Min: 18  Max: 28  PULSE RANGE: Pulse  Av.4  Min: 53  Max: 71  BLOOD PRESSURE RANGE:  Systolic (70ICK), IXZ:351 , Min:86 , KQ   ; Diastolic (37UGJ), GCM:49, Min:47, Max:67    PULSE OXIMETRY RANGE: SpO2  Av.7 %  Min: 87 %  Max: 98 %    I & O - 24hr:    Intake/Output Summary (Last 24 hours) at 12/10/2021 1922  Last data filed at 12/10/2021 1800  Gross per 24 hour   Intake 2199.79 ml   Output 1540 ml   Net 659.79 ml     I/O last 3 completed shifts: In: 3451.2 [I.V.:2460.2; NG/GT:991]  Out: 1330 [Urine:1190; Chest Tube:140] I/O this shift:  In: -   Out: 800 [Urine:800]   Weight change:     Physical Exam:  General Appearance:   Chronically ill-appearing, sedated with 2 left chest tubes in place   HEENT:    NC/AT, mucous membranes are moist   Neck:   Supple, no jugular venous distention. Resp:     CTAB, No wheezes, No rhonchi, no use of accessory muscles   Heart:    RRR, S1 and S2 normal, no murmur, rub or gallop.     Abdomen:     Soft, non-tender, non-distended with normal bowel sounds   Extremities:   Atraumatic, no cyanosis or edema   Pulses:  Radial and pedal pulses are intact bilaterally   Neurologic:  Sedated and intubated but opens eyes to name Medications     Continuous Infusions:   propofol 10 mcg/kg/min (12/10/21 0803)    cisatracurium (NIMBEX) infusion 3 mcg/kg/min (12/10/21 1244)    norepinephrine 2 mcg/min (12/10/21 1103)    fentaNYL 5 mcg/ml in 0.9%  ml infusion 200 mcg/hr (12/10/21 1853)    midazolam 10 mg/hr (12/10/21 1245)    sodium chloride 100 mL/hr at 11/1956    dextrose       Scheduled Meds:   artificial tears   Both Eyes Q4H    midodrine  15 mg Oral Q8H    hydrocortisone sodium succinate PF  100 mg IntraVENous Q8H    enoxaparin  40 mg SubCUTAneous BID    sodium chloride (PF)  10 mL IntraVENous BID    And    pantoprazole  40 mg IntraVENous Daily    sucralfate  1 g Oral 4 times per day    sennosides-docusate sodium  2 tablet Oral Daily    sodium chloride flush  5-40 mL IntraVENous 2 times per day    heparin flush  3 mL IntraVENous 2 times per day    chlorhexidine  15 mL Mouth/Throat BID    budesonide  1 mg Nebulization BID    Arformoterol Tartrate  15 mcg Nebulization BID    atorvastatin  10 mg Oral Daily    QUEtiapine  200 mg Oral Daily    sertraline  200 mg Oral Daily    traZODone  100 mg Oral Nightly    ascorbic acid  1,000 mg Oral Daily    vitamin D  2,000 Units Oral Daily    zinc sulfate  50 mg Oral Daily     PRN Meds: polyethylene glycol, sodium chloride flush, heparin flush, ipratropium-albuterol, sodium chloride, ondansetron **OR** ondansetron, acetaminophen **OR** acetaminophen, glucose, dextrose, glucagon (rDNA), dextrose  Nutrition:   NG/OG tube TF type: Pulmocare/Nephro/Glucerna/Jevity        At rate:     Labs and Imaging Studies     CBC:   Recent Labs     12/08/21  0422 12/08/21  1712 12/09/21  0417 12/09/21  0417 12/09/21  1724 12/10/21  0404 12/10/21  1614   WBC 14.0*  --  11.6*  --   --  12.3*  --    HGB 9.0*   < > 8.1*   < > 8.2* 8.4* 7.8*   HCT 29.4*   < > 26.1*   < > 27.5* 28.0* 26.6*   .7*  --  103.6*  --   --  109.8*  --      --  187  --   --  177  --     < > = values in this interval not displayed. BMP:    Recent Labs     12/09/21  0417 12/09/21  1724 12/10/21  0404    140 140   K 3.9 3.3* 4.7    101 102   CO2 30* 30* 29   BUN 24* 22 23   CREATININE 0.4* 0.5 0.5   GLUCOSE 135* 104* 148*       LIVER PROFILE:   Recent Labs     12/08/21  0422 12/09/21  0417 12/10/21  0404   AST 28 22 21   ALT 65* 53* 50*   BILIDIR <0.2 <0.2 <0.2   BILITOT 0.3 0.3 <0.2   ALKPHOS 92 82 88       PT/INR:   Recent Labs     12/08/21  0422 12/09/21  0417 12/10/21  0404   PROTIME 12.7* 12.9* 12.7*   INR 1.2 1.2 1.1       APTT:   Recent Labs     12/08/21  0422 12/09/21  0417 12/10/21  0404   APTT 27.2 27.4 24.2*       Fasting Lipid Panel:    Lab Results   Component Value Date    CHOL 322 10/12/2021    TRIG 150 10/12/2021    HDL 70 10/12/2021       Cardiac Enzymes:    Lab Results   Component Value Date    CKTOTAL 135 11/22/2021    CKTOTAL 488 (H) 11/20/2021    CKTOTAL 585 (H) 11/19/2021       Notable Cultures:      Blood cultures   Blood Culture, Routine   Date Value Ref Range Status   11/25/2021 5 Days no growth  Final     Respiratory cultures No results found for: RESPCULTURE No results found for: LABGRAM  Urine   Urine Culture, Routine   Date Value Ref Range Status   11/19/2021 Growth not present  Final     Legionella No results found for: LABLEGI  C Diff PCR No results found for: CDIFPCR  Wound culture/abscess: No results for input(s): WNDABS in the last 72 hours. Tip culture:No results for input(s): CXCATHTIP in the last 72 hours.       Oxygen:     Vent Information  $Ventilation: $Subsequent Day  Skin Assessment: Clean, dry, & intact  Equipment ID: [id number]  Equipment Changed: (S) Humidification  Vent Type: 980  Vent Mode: AC/VC+  Vt Ordered: 320 mL  Pressure Ordered: 34  Rate Set: 28 bmp  Peak Flow: 0 L/min  Pressure Support: 0 cmH20  FiO2 : 70 %  SpO2: 92 %  SpO2/FiO2 ratio: 131.43  PaO2/FiO2 ratio: 120  Sensitivity: 3  PEEP/CPAP: 10  I Time/ I Time %: 0.7 s  Humidification Source: Heated wire  Humidification Temp: 37  Humidification Temp Measured: 37  Circuit Condensation: Drained  Mask Type: Full face mask  Mask Size: Small  Additional Respiratory  Assessments  Pulse: 54  Resp: 28  SpO2: 92 %  Position: Semi-Calabrese's  Humidification Source: Heated wire  Humidification Temp: 37  Circuit Condensation: Drained  Oral Care Completed?: Yes  Oral Care: Mouth swabbed, Mouth moisturizer, Mouth suctioned, Suction toothette  Subglottic Suction Done?: Yes  Airway Type: ET  Airway Size: 8  Cuff Pressure (cm H2O):  (MLT)       [REMOVED] Urethral Catheter Temperature probe-Output (mL): 10 mL  [REMOVED] Urethral Catheter-Output (mL): 150 mL  [REMOVED] External Urinary Catheter-Output (mL): 0 mL  Urethral Catheter-Output (mL): 400 mL  [REMOVED] Urethral Catheter Temperature probe 16 fr-Output (mL): 250 mL    Imaging Studies:  XR CHEST PORTABLE   Final Result   1. There is no appreciable left pneumothorax. 2. Extensive multifocal bilateral airspace disease which is unchanged. XR CHEST PORTABLE   Final Result   1. Slightly improved aeration at the right apex and left lower lung   2. Stable position and alignment of the tubes and catheters   3. Extensive bilateral airspace disease concerning for consolidative   pneumonia slightly improved         XR CHEST PORTABLE   Final Result   Stable support lines and bilateral airspace disease. XR CHEST PORTABLE   Final Result   Unchanged bilateral diffuse infiltrates. XR CHEST PORTABLE   Final Result   1. Decreased left pneumothorax with suspected trace residual.  2 left chest   tubes are similar to the previous exam.   2. Bilateral pulmonary opacities appear mildly increased. 3. The support devices are unchanged and appear to be in acceptable position. XR CHEST PORTABLE   Final Result   Unchanged airspace disease and left pneumothorax. XR CHEST PORTABLE   Final Result   1.  No interval change in extensive multifocal bilateral pneumonia   2. Less than 10% left pneumothorax. There is stable position of the 2 left   chest tubes. XR CHEST PORTABLE   Final Result   1. Stable diffuse interstitial pulmonary edema or pneumonia. 2.  Left chest tube demonstrated. Minimal residual left pneumothorax. XR CHEST PORTABLE   Final Result   1. Stable diffuse bilateral airspace disease. 2. Small left pneumothorax appears new or increased compared to prior   examination. Left chest tube appears stable in position. 3. Possible right pleural effusion. XR CHEST PORTABLE   Final Result   1. There is no interval change in extensive multifocal bilateral pneumonia. XR CHEST PORTABLE   Final Result   No change in extensive bilateral pulmonary airspace opacities. No   pneumothorax is radiographically visible on the current exam.         XR CHEST PORTABLE   Final Result   Significantly decreased size of left pneumothorax. There is likely trace   left pneumothorax remaining. Stable extensive bilateral pulmonary airspace opacities. XR CHEST PORTABLE   Final Result   Addendum 1 of 1   ADDENDUM:   Verbal report was given to Gricelda Black RN at 8:15 a.m. central standard   time on 11/30/2021. Final   New moderate sized left-sided pneumothorax. Stable extensive bilateral pulmonary airspace opacities            XR CHEST PORTABLE   Final Result   1. Lines okay. 2. Persistent edema/ARDS/pneumonia. 3. No sign of pneumothorax. XR CHEST PORTABLE   Final Result   Interval placement of left-sided chest tube with questionable residual   pneumothorax versus artifact. Extensive bilateral infiltrates. Continued follow-up recommended. XR CHEST PORTABLE   Final Result   Interval development of large left-sided tension pneumothorax. Extensive bilateral parenchymal infiltrates.       Findings were discussed with Dr. Federico Alvarenga at approximately 0010 hours [place]   time on 11/28/2021. XR CHEST PORTABLE   Final Result   Severe bilateral pulmonary opacification. XR CHEST PORTABLE   Final Result   1. Endotracheal tube is 3 cm above the khai. 2. Stable interstitial pulmonary edema or pneumonia throughout both lungs. XR CHEST PORTABLE   Final Result   1. Somewhat limited exam, grossly unchanged. Please see above comments. .      RECOMMENDATION:   1. Recommend follow-up thoracic imaging, as directed clinically. .         XR ABDOMEN (KUB) (SINGLE AP VIEW)   Final Result   1. Satisfactory position of the NG tube within the stomach         XR CHEST PORTABLE   Final Result   1. There is no interval change in the multifocal bilateral pneumonia. XR CHEST PORTABLE   Final Result   Bilateral airspace disease with interval progression on the right         XR CHEST PORTABLE   Final Result   1. Endotracheal tube is 2.7 cm above the khai. 2. Stable interstitial pulmonary edema or pneumonia throughout both lungs. XR CHEST PORTABLE   Final Result   1. Worsening of the multifocal bilateral pneumonia when compared with the   prior study of 1 day earlier. XR CHEST PORTABLE   Final Result   1. Multifocal bilateral pneumonia more prominent within the right upper lobe   2. Minimal partial interval clearing of the pneumonia within the left lung   3. Worsening of the pneumonia within the right upper lobe. US DUP LOWER EXTREMITIES BILATERAL VENOUS   Final Result   Within the visualized vessels there is no evidence for deep venous   thrombosis               XR CHEST PORTABLE   Final Result   1. Lines okay. 2. Slightly worsened diffuse edema versus pneumonia. XR CHEST PORTABLE   Final Result   1. Lines okay   2. Improved aeration, mild improvement and diffuse pneumonia/edema. XR CHEST ABDOMEN NG PLACEMENT   Final Result   NG tube position satisfactory.          XR CHEST PORTABLE   Final Result   Stable bilateral pneumonia pneumothorax on 11/29  -Repeat chest x-ray today showed worsening pneumothorax on 11/30  -2 chest tubes placed. Respiratory status stable after clamping 1 tube  -PerfectServe surgery for possible removal of chest tube tomorrow       Gastrointestinal  Transaminitis 2/2 Covid infection  -Liver enzymes trending down   -Continue to monitor     Concern for GI bleed-stable/resolved   -FOBT positive   -Hgb of 8.1 today   -Continue to monitor   -Switched to Lovenox   -Monitor H&H every 8 hours     Infectious Disease   COVID-19 pneumonia  -See above  -Continue to monitor CRP D-dimer and pro-Carl  -Continue vitamin C, vitamin D and zinc     Renal   Stage III intrinsic CHARAN (resolved)   -Baseline creatinine of 0.5  -Nephro is following  -Creatinine is stable today 0.5  -Continue to follow nephro recommendations  -Continue to monitor and replace electrolytes as appropriate     Volume overload  -Nephrology on board  -On Bumex 1 mg IV daily  -Urine output 1.9L  -Overall -4 point 2L yesterday  -Possible miscalculated FIDE on 12/9, now inaccurate      Heme/Onc  Reactive leukocytosis 2/2 steroids and Covid infection - improved  -WBC stable today  -Cultures have been negative  -Off antibiotics  -ID signed off     Thrombocytopenia 2/2 Covid (Resolved)   -Platelets stable  -Was HIT negative  -No intervention at this time  -Continue to monitor      Psychiatric   History of schizoaffective disorder  -Continue home hydroxyzine, trazodone, quetiapine and sertraline       Resolved issues    Hyponatremia     Concern for UTI    Rhabdomyolysis    # Peptic ulcer prophylaxis: Protonix  # DVT Prophylaxis: Lovenox  # Disposition: Cont current care     Rodrick Leyden, MD, PGY-1    Attending Physician: Dr. Jazz Carlson    I personally saw, examined and provided care for the patient. Radiographs, labs and medication list were reviewed by me independently. I spoke with bedside nursing, therapists and consultants.  Critical care services and times documented are independent of procedures and multidisciplinary rounds with Residents. Additionally comprehensive, multidisciplinary rounds were conducted with the MICU team. The case was discussed in detail and plans for care were established. Review of Residents documentation was conducted and revisions were made as appropriate. I agree with the above documented exam, problem list and plan of care.   Nadege Lazaro MD   CCT excluding procedures:38'

## 2021-12-10 NOTE — PLAN OF CARE
Problem: Gas Exchange - Impaired  Goal: Absence of hypoxia  Outcome: Met This Shift     Problem: Breathing Pattern - Ineffective  Goal: Ability to achieve and maintain a regular respiratory rate  Outcome: Met This Shift     Problem:  Body Temperature -  Risk of, Imbalanced  Goal: Ability to maintain a body temperature within defined limits  Outcome: Met This Shift

## 2021-12-10 NOTE — PROGRESS NOTES
[facility] Attending    S: 73 y.o. female with a history of gerd, oa, schizoaffective disorder, and gastric fundiplication who was found down for an undetermined amount of time. Reports shortness of breath and cough for 2.5 weeks. She was found to be 60% on RA. In the ER, COVID testing was positive with significantly elevated CPKs. Patient is unvaccinated. Had desaturation to 88% wit PaO2 of 55 with RRT and subsequent transfer to the MICU. Desat to 40's when Bipap removed. Now intubated. Sedated, paralyzed,  Noted to have pneumothorax and chest tube placed. Second chest tube placed when pneumothorax not improved. Today,  Intubated, sedated, now supine. 1 chest tube is clamped    FiO2 80%   PEEP 10    Saturation currently 92%    O: VS- Blood pressure (!) 86/47, pulse 62, temperature 96.6 °F (35.9 °C), temperature source Esophageal, resp. rate 28, height 5' 1\" (1.549 m), weight 161 lb 4.8 oz (73.2 kg), SpO2 92 %. Exam is as noted by resident   Currently nonresponsive. Lungs: coarse, vent. Decreased BS  CV:  Rate controlled, regular   Ext-no C/C/E    Impressions: Active Problems:    Acute respiratory failure with hypoxia (HCC)    Covid pneumonia    Rhabdomyolysis, CK improved    Acute cystitis with hematuria, treated    CHARAN    Thrombocytopenia     Plan:      Acute hypoxic resp failure 2/2 covid - Decadron. Completed Tocimizilab. Vitamin C.  Vitamin D.  Zinc.  Appreciate Pulm management. Sepsis 2/2 PNA - done with vanc and zosyn  U/s of lower extremities negative DVT 11/20. Slow wean of FiO2. Tube feeding. Fluid overloaded - on bumex  Schizoaffective - seroquel  Diflucan stopped. Ongoing communication with family re code status  Following hemoglobin and WBC  Bumex  ICU management  For trach and PEG when more stable  Full code at this time. Attending Physician Statement  I have reviewed the chart and seen the patient with the resident(s).   I personally reviewed images, EKG's and similar tests, if present. I personally reviewed and performed key elements of the history and exam.  I have reviewed and confirmed student and/or resident history and exam with changes as indicated above. I agree with the assessment, plan and orders as documented by the resident. Please refer to the  resident and/or student note for additional information.       Laura Castaneda MD

## 2021-12-10 NOTE — PROGRESS NOTES
Department of Internal Medicine  Infectious Diseases  Progress Note      C/C : Leukocytosis, COVID 19     Pt is intubated, sedated, off paralytic, supine   Low dose  levophed   No fever     Current Facility-Administered Medications   Medication Dose Route Frequency Provider Last Rate Last Admin    lubrifresh P.M. (artificial tears) ophthalmic ointment   Both Eyes Q4H Vanna Ford MD   Given at 12/10/21 1111    midodrine (PROAMATINE) tablet 15 mg  15 mg Oral TID WC Kandy Monroy MD   15 mg at 12/10/21 1104    propofol injection  5-50 mcg/kg/min IntraVENous Titrated Kandy Monroy MD 4.4 mL/hr at 12/10/21 0803 10 mcg/kg/min at 12/10/21 0803    cisatracurium besylate (NIMBEX) 200 mg in sodium chloride 0.9 % 100 mL infusion  0.5-10 mcg/kg/min IntraVENous Continuous Kurtis Duran MD 5.5 mL/hr at 12/10/21 0542 2.5 mcg/kg/min at 12/10/21 0542    hydrocortisone sodium succinate PF (SOLU-CORTEF) injection 100 mg  100 mg IntraVENous Q12H Kandy Monroy MD   100 mg at 12/10/21 0802    enoxaparin (LOVENOX) injection 40 mg  40 mg SubCUTAneous BID Vanna Ford MD   40 mg at 12/10/21 0802    sodium chloride (PF) 0.9 % injection 10 mL  10 mL IntraVENous BID Wendie Turner MD   10 mL at 12/10/21 0802    And    pantoprazole (PROTONIX) injection 40 mg  40 mg IntraVENous Daily Vanna Ford MD   40 mg at 12/10/21 0802    [Held by provider] bumetanide (BUMEX) injection 1 mg  1 mg IntraVENous Daily with breakfast Jacqueline Clark MD   1 mg at 12/09/21 0907    norepinephrine (LEVOPHED) 16 mg in dextrose 5% 250 mL infusion  2-100 mcg/min IntraVENous Continuous Vickie Martins DO 1.9 mL/hr at 12/10/21 1103 2 mcg/min at 12/10/21 1103    fentaNYL 5 mcg/ml in 0.9%  ml infusion  12.5-200 mcg/hr IntraVENous Continuous Bibi Bundy MD 40 mL/hr at 12/10/21 0541 200 mcg/hr at 12/10/21 0541    sucralfate (CARAFATE) tablet 1 g  1 g Oral 4 times per day Bud RICKS Capal, DO   1 g at 12/10/21 0802    sennosides-docusate sodium (SENOKOT-S) 8.6-50 MG tablet 2 tablet  2 tablet Oral Daily Zoraida Hernandez MD   2 tablet at 12/10/21 0802    polyethylene glycol (GLYCOLAX) packet 17 g  17 g Oral Daily PRN Zoraida Hernandez MD   17 g at 12/08/21 0858    sodium chloride flush 0.9 % injection 5-40 mL  5-40 mL IntraVENous 2 times per day Zoraida Hernandez MD   10 mL at 12/10/21 0804    sodium chloride flush 0.9 % injection 5-40 mL  5-40 mL IntraVENous PRN Zoraida Hernandez MD   10 mL at 11/28/21 2122    heparin flush 100 UNIT/ML injection 300 Units  3 mL IntraVENous 2 times per day Zoraida Hernandez MD   300 Units at 12/10/21 0803    heparin flush 100 UNIT/ML injection 300 Units  3 mL IntraCATHeter PRN Zoraida Hernandez MD        midazolam (VERSED) 100 mg in dextrose 5 % 100 mL infusion  1-10 mg/hr IntraVENous Continuous Lawxiao Rivera MD 10 mL/hr at 12/10/21 0515 10 mg/hr at 12/10/21 0515    chlorhexidine (PERIDEX) 0.12 % solution 15 mL  15 mL Mouth/Throat BID Keyona Ovalle MD   15 mL at 12/10/21 0802    ipratropium-albuterol (DUONEB) nebulizer solution 1 ampule  1 ampule Inhalation Q4H PRN Zoraida Hernandez MD   1 ampule at 12/09/21 0809    budesonide (PULMICORT) nebulizer suspension 1,000 mcg  1 mg Nebulization BID Sumaya Joe MD   1,000 mcg at 12/10/21 1511    Arformoterol Tartrate (BROVANA) nebulizer solution 15 mcg  15 mcg Nebulization BID Sumaya Joe MD   15 mcg at 12/10/21 2760    atorvastatin (LIPITOR) tablet 10 mg  10 mg Oral Daily Maylin Pitts MD   10 mg at 12/10/21 0801    QUEtiapine (SEROQUEL) tablet 200 mg  200 mg Oral Daily Maylin Pitts MD   200 mg at 12/10/21 0801    sertraline (ZOLOFT) tablet 200 mg  200 mg Oral Daily Maylin Pitts MD   200 mg at 12/10/21 0801    traZODone (DESYREL) tablet 100 mg  100 mg Oral Nightly Maylin Pitts MD   100 mg at 12/09/21 2016    0.9 % sodium chloride infusion  25 mL IntraVENous PRN Maylin Pitts  mL/hr at 11/22/21 1956 Rate Verify at 11/1956    ondansetron (ZOFRAN-ODT) disintegrating tablet 4 mg  4 mg Oral Q8H PRN Rickie Khan MD        Or    ondansetron [facility]) injection 4 mg  4 mg IntraVENous Q6H PRN Rickie Khan MD        acetaminophen (TYLENOL) tablet 650 mg  650 mg Oral Q6H PRN Rickie Khan MD   650 mg at 11/26/21 1856    Or    acetaminophen (TYLENOL) suppository 650 mg  650 mg Rectal Q6H PRN Rickie Khan MD   650 mg at 11/27/21 0408    ascorbic acid (VITAMIN C) tablet 1,000 mg  1,000 mg Oral Daily Rickie Khan MD   1,000 mg at 12/10/21 0802    Vitamin D (CHOLECALCIFEROL) tablet 2,000 Units  2,000 Units Oral Daily Rickie Khan MD   2,000 Units at 12/10/21 0801    zinc sulfate (ZINCATE) capsule 50 mg  50 mg Oral Daily Rickie Khan MD   50 mg at 12/10/21 0802    glucose (GLUTOSE) 40 % oral gel 15 g  15 g Oral PRN Rickie Khan MD        dextrose 50 % IV solution  12.5 g IntraVENous PRN Rickie Khan MD        glucagon (rDNA) injection 1 mg  1 mg IntraMUSCular PRN Rickie Khan MD        dextrose 5 % solution  100 mL/hr IntraVENous PRN Rickie Khan MD             REVIEW OF SYSTEMS: could not be obtained       PHYSICAL EXAM:      Vitals:     BP (!) 92/49   Pulse 66   Temp 96.6 °F (35.9 °C) (Esophageal)   Resp 28   Ht 5' 1\" (1.549 m)   Wt 161 lb 4.8 oz (73.2 kg)   SpO2 92%   BMI 30.48 kg/m²     General Appearance:    Intubated, sedated, supine , FiO2 70 , PEEP 8    Head:    Normocephalic, atraumatic   Eyes:    + pallor    Ears:    No obvious deformity or drainage.    Nose:   No nasal drainage    Throat:   ET tube in place, swollen lips    Neck:   Supple, no lymphadenopathy   Lungs:     Scattered rhonchi , left chest tubes   Heart:    Regular   Abdomen:     Soft,  bowel sounds present    Extremities:    ++ edema    Pulses:   Dorsalis pedis palpable    Skin:   no rashes        CBC with Differential:      Lab Results   Component Value Date    WBC 12.3 12/10/2021    RBC 2.55 12/10/2021    HGB 8.4 12/10/2021    HCT 28.0 12/10/2021     12/10/2021    .8 12/10/2021    MCH 32.9 12/10/2021    MCHC 30.0 12/10/2021    RDW 22.9 12/10/2021    NRBC 0.9 12/01/2021    METASPCT 0.9 12/04/2021    LYMPHOPCT 2.6 12/10/2021    MONOPCT 0.9 12/10/2021    MYELOPCT 0.9 12/03/2021    BASOPCT 0.2 12/10/2021    MONOSABS 0.12 12/10/2021    LYMPHSABS 0.37 12/10/2021    EOSABS 0.00 12/10/2021    BASOSABS 0.00 12/10/2021       CMP     Lab Results   Component Value Date     12/10/2021    K 4.7 12/10/2021    K 4.8 11/19/2021     12/10/2021    CO2 29 12/10/2021    BUN 23 12/10/2021    CREATININE 0.5 12/10/2021    GFRAA >60 12/10/2021    LABGLOM >60 12/10/2021    GLUCOSE 148 12/10/2021    GLUCOSE 77 05/12/2011    PROT 5.3 12/10/2021    LABALBU 2.2 12/10/2021    CALCIUM 8.1 12/10/2021    BILITOT <0.2 12/10/2021    ALKPHOS 88 12/10/2021    AST 21 12/10/2021    ALT 50 12/10/2021         Hepatic Function Panel:    Lab Results   Component Value Date    ALKPHOS 88 12/10/2021    ALT 50 12/10/2021    AST 21 12/10/2021    PROT 5.3 12/10/2021    BILITOT <0.2 12/10/2021    BILIDIR <0.2 12/10/2021    IBILI see below 12/10/2021    LABALBU 2.2 12/10/2021       PT/INR:    Lab Results   Component Value Date    PROTIME 12.7 12/10/2021    INR 1.1 12/10/2021       TSH:    Lab Results   Component Value Date    TSH 4.720 10/12/2021       U/A:    Lab Results   Component Value Date    COLORU Yellow 11/16/2021    PHUR 6.0 11/16/2021    WBCUA NONE 11/16/2021    RBCUA 1-3 11/16/2021    BACTERIA MANY 11/16/2021    CLARITYU Clear 11/16/2021    SPECGRAV 1.025 11/16/2021    LEUKOCYTESUR Negative 11/16/2021    UROBILINOGEN 0.2 11/16/2021    BILIRUBINUR Negative 11/16/2021    BLOODU LARGE 11/16/2021    GLUCOSEU Negative 11/16/2021       ABG:    Lab Results   Component Value Date    BAF8FPD 28.4 12/02/2021    ERG2LMO 51.5 12/02/2021    PO2ART 59.6 12/02/2021       MICROBIOLOGY:    Blood culture - neg on 11/25     Urine Culture -    Sputum Culture -  CULTURE, RESPIRATORY Oral Pharyngeal Kamille absent Abnormal      Smear, Respiratory --    Group 6: <25 PMN's/LPF and <25 Epithelial cells/LPF   Few Polymorphonuclear leukocytes   Rare Epithelial cells   No organisms seen     Organism Candida albicans Abnormal     CULTURE, RESPIRATORY One colony   Narrative:     Source: [facility]       Site:                     Specimen: Nasopharyngeal Swab Updated: 11/1956    SARS-CoV-2, NAAT DETECTED Abnormal     Comment: Rapid NAAT:   Negative results should be treated as presumptive            Ref Range & Units 11/27/21 1024   (1,3)-Beta-D-Glucan (Fungitell) Interpretation Negative Negative        Radiology :    Chest X ray :   Extensive bilateral airspace disease concerning for consolidative   pneumonia slightly improved     IMPRESSION:     1. Severe COVID 19 pneumonia s/p remdesivir and tocilizumab   2. Respiratory failure - intubated   3. Leukocytosis - improved     4. Candida colonization   5. Pneumothorax , s/p chest tube insertion         RECOMMENDATIONS:      1. Off antibiotics, Supportive care   2.  ID sign off

## 2021-12-10 NOTE — PROGRESS NOTES
[facility]  Nephrology Attending Progress Note  Mirela Campa. Chuck Nino MD        SUBJECTIVE:     11/28: pt remains in COVID 19 Isolation. She remains proned and on an FIO2 65% and PEEP12 with an O2 sat 95-96%    11/29: pt seen through window of icu due to covid, chart reviewed    11/30: pt seen through window of icu due to covid, chart reviewed. 12/1/21 :pt seen through window of icu due to covid, review of chart performed    12/2/21: pt seen through window of icu due to covid, chart reviewed, intubated, chest tube left     12/3/21 :chart reviewed, chest tube x 2 on right, intubated and prone, seen through window of icu    12/4: no new acute issues from overnight; large volume pleural fluid drainage from bilateral catheter    12/5: Brisk response to Bumex drip now placed on hold transition to intermittent dosing    12/6: pt seen through window of icu, chart reviewed, intubated    12/7: chart reviewed, seen through window of icu, intubated    12/8: pt seen through window of icu.  Intubated on levo    12/9: pt seen through window of icu, intubated and sedated, chest tube in place, on levo    12/10: pt seen through window of icu, intubated and sedated with chest tube, on levo    PROBLEM LIST:    Patient Active Problem List   Diagnosis    Schizoaffective disorder ([address]    Acute respiratory failure with hypoxia ([address]    Rhabdomyolysis    Acute cystitis with hematuria    Primary spontaneous pneumothorax        PAST MEDICAL HISTORY:    Past Medical History:   Diagnosis Date    GERD (gastroesophageal reflux disease)     Osteoarthritis of right knee     Schizoaffective disorder (HCC)     psycare       DIET:    ADULT TUBE FEEDING; Orogastric; Peptide Based; Continuous; 10; Yes; 10; Q 24 hours; 20; 30; Q 4 hours     PHYSICAL EXAM:     Patient Vitals for the past 24 hrs:   BP Temp Temp src Pulse Resp SpO2   12/10/21 1100 (!) 92/49   66 28 92 %   12/10/21 1000 (!) 98/55   57 28 91 %   12/10/21 0922    62 28 92 %   12/10/21 0920     28 92 %   12/10/21 0919     28 92 %   12/10/21 0900 (!) 86/47   71 28 93 %   12/10/21 0800 (!) 117/57 96.6 °F (35.9 °C) Esophageal 64 28 94 %   12/10/21 0700    65 28 94 %   12/10/21 0600    70 28 95 %   12/10/21 0500    70 28 96 %   12/10/21 0400 121/64 97.9 °F (36.6 °C) Oral 57 28 96 %   12/10/21 0300    60 28 94 %   12/10/21 0200  94.6 °F (34.8 °C) Esophageal 60 28 94 %   12/10/21 0100    57 28 90 %   12/10/21 0019    55 28 90 %   12/10/21 0000 125/67 94.3 °F (34.6 °C) Esophageal 61 28 90 %   12/09/21 2300    64 19 (!) 88 %   12/09/21 2212    64 18 (!) 88 %   12/09/21 2211     22 (!) 88 %   12/09/21 2200  95 °F (35 °C) Esophageal 64 19 (!) 87 %   12/09/21 2100    65 19 (!) 87 %   12/09/21 2000 (!) 112/51 95.4 °F (35.2 °C) Esophageal 62 19 90 %   12/09/21 1900    66 19 90 %   12/09/21 1800    67 16 90 %   12/09/21 1700    68 17 91 %   12/09/21 1600    71 13 90 %   12/09/21 1500    73 20 93 %   12/09/21 1456    73 22 95 %   12/09/21 1400    74 28 (!) 86 %   12/09/21 1300    75 28 (!) 86 %   @      Intake/Output Summary (Last 24 hours) at 12/10/2021 1212  Last data filed at 12/10/2021 0600  Gross per 24 hour   Intake 2514.43 ml   Output 1040 ml   Net 1474.43 ml         Wt Readings from Last 3 Encounters:   12/07/21 161 lb 4.8 oz (73.2 kg)   10/26/21 152 lb (68.9 kg)   10/12/21 149 lb (67.6 kg)     PE  Constitutional:  Pt is intubated  Seen through window of icu    MEDS (scheduled):    artificial tears   Both Eyes Q4H    midodrine  15 mg Oral TID WC    hydrocortisone sodium succinate PF  100 mg IntraVENous Q12H    enoxaparin  40 mg SubCUTAneous BID    sodium chloride (PF)  10 mL IntraVENous BID    And    pantoprazole  40 mg IntraVENous Daily    [Held by provider] bumetanide  1 mg IntraVENous Daily with breakfast    sucralfate  1 g Oral 4 times per day    sennosides-docusate sodium  2 tablet Oral Daily    sodium chloride flush  5-40 mL IntraVENous 2 times per day    heparin flush  3 mL IntraVENous 2 times per day    chlorhexidine  15 mL Mouth/Throat BID    budesonide  1 mg Nebulization BID    Arformoterol Tartrate  15 mcg Nebulization BID    atorvastatin  10 mg Oral Daily    QUEtiapine  200 mg Oral Daily    sertraline  200 mg Oral Daily    traZODone  100 mg Oral Nightly    ascorbic acid  1,000 mg Oral Daily    vitamin D  2,000 Units Oral Daily    zinc sulfate  50 mg Oral Daily       MEDS (infusions):   propofol 10 mcg/kg/min (12/10/21 0803)    cisatracurium (NIMBEX) infusion 2.5 mcg/kg/min (12/10/21 0542)    norepinephrine 2 mcg/min (12/10/21 1103)    fentaNYL 5 mcg/ml in 0.9%  ml infusion 200 mcg/hr (12/10/21 0541)    midazolam 10 mg/hr (12/10/21 0515)    sodium chloride 100 mL/hr at 11/1956    dextrose         MEDS (prn):  polyethylene glycol, sodium chloride flush, heparin flush, ipratropium-albuterol, sodium chloride, ondansetron **OR** ondansetron, acetaminophen **OR** acetaminophen, glucose, dextrose, glucagon (rDNA), dextrose    DATA:    Recent Labs     12/08/21  0422 12/08/21  1712 12/09/21  0417 12/09/21  1724 12/10/21  0404   WBC 14.0*  --  11.6*  --  12.3*   HGB 9.0*   < > 8.1* 8.2* 8.4*   HCT 29.4*   < > 26.1* 27.5* 28.0*   .7*  --  103.6*  --  109.8*     --  187  --  177    < > = values in this interval not displayed.      Recent Labs     12/08/21  0422 12/08/21  1309 12/09/21  0417 12/09/21  1724 12/10/21  0404   NA  --    < > 142 140 140   K  --    < > 3.9 3.3* 4.7   CL  --    < > 103 101 102   CO2  --    < > 30* 30* 29   BUN  --    < > 24* 22 23   CREATININE  --    < > 0.4* 0.5 0.5   LABGLOM  --    < > >60 >60 >60   GLUCOSE  --    < > 135* 104* 148*   CALCIUM  --    < > 8.0* 8.0* 8.1*   ALT 65*  --  53*  --  50*   AST 28  --  22  --  21   BILIDIR <0.2  --  <0.2  --  <0.2   BILITOT 0.3  --  0.3  --  <0.2   ALKPHOS 92  --  82  --  88   MG  --   --  1.7  --   --    PHOS  -- --  2.4*  --   --     < > = values in this interval not displayed. Lab Results   Component Value Date    LABALBU 2.2 (L) 12/10/2021    LABALBU 2.3 (L) 12/09/2021    LABALBU 2.6 (L) 12/08/2021     Lab Results   Component Value Date    TSH 4.720 (H) 10/12/2021       Iron Studies  Lab Results   Component Value Date    FERRITIN 238 12/10/2021     Vitamin B-12   Date Value Ref Range Status   03/02/2021 771 211 - 946 pg/mL Final     Folate   Date Value Ref Range Status   03/02/2021 >20.0 4.8 - 24.2 ng/mL Final       No results found for: VITD25  No results found for: PTH    No components found for: URIC    Lab Results   Component Value Date    COLORU Yellow 11/16/2021    NITRU POSITIVE 11/16/2021    GLUCOSEU Negative 11/16/2021    KETUA 15 11/16/2021    UROBILINOGEN 0.2 11/16/2021    BILIRUBINUR Negative 11/16/2021       No results found for: Derek Peña      IMPRESSION/RECOMMENDATIONS:      1. Acute kidney injury stage I  ischemic ATN occurring in the setting of hypotension  nonoliguric    Cr continues to improve now back to baseline  Continue to monitor labs and urine output  Bumex drip put on hold; transition to intermittent dosing  Good response  Cr 0.4     2. Acute hypoxic respiratory failure  due to COVID-19 pneumonia  Intubation with mechanical ventilation; prone      3. Septic shock  suspected possible superimposed bacterial pneumonia  On levo  abx per id     4. Volume overload  bumex 1 mg iv qd   uo 1.9L  -4.2L overall as of yesterday  I/o miscalculated 12/9 and now inaccurate    5. Hyponatremia  Na at 140     6. Hypocalcemia  Replace prn  hypoalbuminemic    Swapna Ramirez.  Blaine Moritz, MD

## 2021-12-10 NOTE — PROCEDURES
Arterial line placement    Procedure: Right radial arterial line placement. Indications: Continuous monitoring of blood pressure in a patient with hypotension +/- shock, on Levophed. Anesthesia: Local infiltration of 1% lidocaine. Consent:  Unable to be obtained due to the emergent nature of this procedure. Technique: Time Out: Immediately prior to the procedure a \"timeout\" was called to verify the correct patient and procedure. Procedure was done using strict aseptic technique. Zack's test was performed and was normal. right radial site was cleaned with chloraprep and draped. Radial artery was identified, then Lidocaine 1% was infiltrated locally. Radial arterial line was inserted, a good blood flow was obtained, after which guidewire was inserted all the way with no resistance. Then the canula was inserted and needle with guidewire was withdrawn. Pulsatile bright red blood flow was observed. The canula was connected to BP monitoring apparatus and a good quality waveform was noted. Then the canula was secured with 2 stay sutures of 3-0 silk after Lidocaine infiltration, following which dressing was applied. Number of sticks: 3. Number of Kits used: 3. Complications: No immediate complication. Estimated blood loss: About 5 ml. Comment: Patient tolerated the procedure well.      Maria Singh MD PGY-2  12/9/2021 9:11 PM

## 2021-12-10 NOTE — PROGRESS NOTES
[facility] Inpatient   Resident Progress Note    S:  Hospital day: 24   Brief Synopsis: Estephania Couch is a 73 y.o. female with pmh of GERD, osteoarthritis and schizoaffective disorder who presented to ER s/p fall at home. Patient found down at home, with initial O2 saturation of 60%. Brought to the ER; found to have COVID-19 pneumonia , requiring bipap for appropriate saturation. Rhabdomyolysis, UTI. Started on appropriate management including tocilizumab, ceftriaxone 1 g daily, and IV fluids for rhabdomyolysis. Decadron was started daily, and with patients worsening status - pulmonology consulted. FULL CODE. Transferred to ICU on 11/18 for rapid breathing and hypoxia and pO2 of 55. Intubated 11/20 secondary to O2 sats consistently <80% with rapid breathing. 11/29 , patient developed left sided tension pneumothorax and underwent chest tube placement. Patient Intubated, sedated, paralyzed, proned. On vasopressors. Hemoglobin dropped to 6.9 with 1 unit PRBC transfused. Second chest tube placed on left chest. Vacuum changed to water seals. Patient seen today in the morning. Remains supine, sedated and intubated. 1 Chest tubes clamped overnight. FiO2 80%, PEEP 10, p/f ratio 0.97. General surgery to consider PEG and trach tube placement.           Cont meds:    propofol 10 mcg/kg/min (12/10/21 0803)    cisatracurium (NIMBEX) infusion 2.5 mcg/kg/min (12/10/21 0542)    norepinephrine 2 mcg/min (12/10/21 1103)    fentaNYL 5 mcg/ml in 0.9%  ml infusion 200 mcg/hr (12/10/21 0541)    midazolam 10 mg/hr (12/10/21 0515)    sodium chloride 100 mL/hr at 11/1955    dextrose       Scheduled meds:    artificial tears   Both Eyes Q4H    midodrine  15 mg Oral TID WC    hydrocortisone sodium succinate PF  100 mg IntraVENous Q12H    enoxaparin  40 mg SubCUTAneous BID    sodium chloride (PF)  10 mL IntraVENous BID    And    pantoprazole  40 mg IntraVENous Daily    [Held by provider] bumetanide  1 mg IntraVENous Daily with breakfast    sucralfate  1 g Oral 4 times per day    sennosides-docusate sodium  2 tablet Oral Daily    sodium chloride flush  5-40 mL IntraVENous 2 times per day    heparin flush  3 mL IntraVENous 2 times per day    chlorhexidine  15 mL Mouth/Throat BID    budesonide  1 mg Nebulization BID    Arformoterol Tartrate  15 mcg Nebulization BID    atorvastatin  10 mg Oral Daily    QUEtiapine  200 mg Oral Daily    sertraline  200 mg Oral Daily    traZODone  100 mg Oral Nightly    ascorbic acid  1,000 mg Oral Daily    vitamin D  2,000 Units Oral Daily    zinc sulfate  50 mg Oral Daily     PRN meds: polyethylene glycol, sodium chloride flush, heparin flush, ipratropium-albuterol, sodium chloride, ondansetron **OR** ondansetron, acetaminophen **OR** acetaminophen, glucose, dextrose, glucagon (rDNA), dextrose     I reviewed the patient's past medical and surgical history, Medications and Allergies. O:  BP (!) 92/49   Pulse 66   Temp 96.6 °F (35.9 °C) (Esophageal)   Resp 28   Ht 5' 1\" (1.549 m)   Wt 161 lb 4.8 oz (73.2 kg)   SpO2 92%   BMI 30.48 kg/m²   24 hour I&O: I/O last 3 completed shifts: In: 2514.4 [I.V.:1738.4; NG/GT:776]  Out: 1390 [Urine:1250; Chest Tube:140]  No intake/output data recorded. Physical Exam  Constitutional:       Comments: Intubated, sedated, paralyzed, supine   HENT:      Head: Normocephalic and atraumatic. Mouth/Throat:      Comments: intubated  Cardiovascular:      Rate and Rhythm: Normal rate and regular rhythm. Pulses: Normal pulses. Heart sounds: Normal heart sounds. Pulmonary:      Breath sounds: No wheezing, rhonchi or rales. Comments: Left sided Chest tube x 2  Abdominal:      General: There is no distension. Palpations: There is no mass. Hernia: No hernia is present. Musculoskeletal:         General: Normal range of motion. Right lower leg: Edema present.       Left lower leg: Edema present. Skin:     General: Skin is warm and dry. Findings: Bruising (mild, on back) present. Neurological:      Comments: Sedated, paralyzed       Labs:  Na/K/Cl/CO2:  140/4.7/102/29 (12/10 0404)  BUN/Cr/glu/ALT/AST/amyl/lip:  23/0.5/--/50/21/--/-- (12/10 0404)  WBC/Hgb/Hct/Plts:  12.3/8.4/28.0/177 (12/10 0404)  estimated creatinine clearance is 103 mL/min (based on SCr of 0.5 mg/dL). Other pertinent labs as noted below    Radiology:  XR CHEST PORTABLE   Preliminary Result   1. There is no appreciable left pneumothorax. 2. Extensive multifocal bilateral airspace disease which is unchanged. XR CHEST PORTABLE   Final Result   1. Slightly improved aeration at the right apex and left lower lung   2. Stable position and alignment of the tubes and catheters   3. Extensive bilateral airspace disease concerning for consolidative   pneumonia slightly improved         XR CHEST PORTABLE   Final Result   Stable support lines and bilateral airspace disease. XR CHEST PORTABLE   Final Result   Unchanged bilateral diffuse infiltrates. XR CHEST PORTABLE   Final Result   1. Decreased left pneumothorax with suspected trace residual.  2 left chest   tubes are similar to the previous exam.   2. Bilateral pulmonary opacities appear mildly increased. 3. The support devices are unchanged and appear to be in acceptable position. XR CHEST PORTABLE   Final Result   Unchanged airspace disease and left pneumothorax. XR CHEST PORTABLE   Final Result   1. No interval change in extensive multifocal bilateral pneumonia   2. Less than 10% left pneumothorax. There is stable position of the 2 left   chest tubes. XR CHEST PORTABLE   Final Result   1. Stable diffuse interstitial pulmonary edema or pneumonia. 2.  Left chest tube demonstrated. Minimal residual left pneumothorax. XR CHEST PORTABLE   Final Result   1. Stable diffuse bilateral airspace disease.    2. Small left pneumothorax appears new or increased compared to prior   examination. Left chest tube appears stable in position. 3. Possible right pleural effusion. XR CHEST PORTABLE   Final Result   1. There is no interval change in extensive multifocal bilateral pneumonia. XR CHEST PORTABLE   Final Result   No change in extensive bilateral pulmonary airspace opacities. No   pneumothorax is radiographically visible on the current exam.         XR CHEST PORTABLE   Final Result   Significantly decreased size of left pneumothorax. There is likely trace   left pneumothorax remaining. Stable extensive bilateral pulmonary airspace opacities. XR CHEST PORTABLE   Final Result   Addendum 1 of 1   ADDENDUM:   Verbal report was given to Vazquez Ferguson RN at 8:15 a.m. central standard   time on 11/30/2021. Final   New moderate sized left-sided pneumothorax. Stable extensive bilateral pulmonary airspace opacities            XR CHEST PORTABLE   Final Result   1. Lines okay. 2. Persistent edema/ARDS/pneumonia. 3. No sign of pneumothorax. XR CHEST PORTABLE   Final Result   Interval placement of left-sided chest tube with questionable residual   pneumothorax versus artifact. Extensive bilateral infiltrates. Continued follow-up recommended. XR CHEST PORTABLE   Final Result   Interval development of large left-sided tension pneumothorax. Extensive bilateral parenchymal infiltrates. Findings were discussed with Dr. James Backdiana at approximately 0010 hours [place]   time on 11/28/2021. XR CHEST PORTABLE   Final Result   Severe bilateral pulmonary opacification. XR CHEST PORTABLE   Final Result   1. Endotracheal tube is 3 cm above the khai. 2. Stable interstitial pulmonary edema or pneumonia throughout both lungs. XR CHEST PORTABLE   Final Result   1. Somewhat limited exam, grossly unchanged. Please see above comments.       .      RECOMMENDATION: 1.  Recommend follow-up thoracic imaging, as directed clinically. .         XR ABDOMEN (KUB) (SINGLE AP VIEW)   Final Result   1. Satisfactory position of the NG tube within the stomach         XR CHEST PORTABLE   Final Result   1. There is no interval change in the multifocal bilateral pneumonia. XR CHEST PORTABLE   Final Result   Bilateral airspace disease with interval progression on the right         XR CHEST PORTABLE   Final Result   1. Endotracheal tube is 2.7 cm above the khai. 2. Stable interstitial pulmonary edema or pneumonia throughout both lungs. XR CHEST PORTABLE   Final Result   1. Worsening of the multifocal bilateral pneumonia when compared with the   prior study of 1 day earlier. XR CHEST PORTABLE   Final Result   1. Multifocal bilateral pneumonia more prominent within the right upper lobe   2. Minimal partial interval clearing of the pneumonia within the left lung   3. Worsening of the pneumonia within the right upper lobe. US DUP LOWER EXTREMITIES BILATERAL VENOUS   Final Result   Within the visualized vessels there is no evidence for deep venous   thrombosis               XR CHEST PORTABLE   Final Result   1. Lines okay. 2. Slightly worsened diffuse edema versus pneumonia. XR CHEST PORTABLE   Final Result   1. Lines okay   2. Improved aeration, mild improvement and diffuse pneumonia/edema. XR CHEST ABDOMEN NG PLACEMENT   Final Result   NG tube position satisfactory. XR CHEST PORTABLE   Final Result   Stable bilateral pneumonia or interstitial pulmonary edema. XR CHEST PORTABLE   Final Result   Increasing bilateral infiltrates. XR CHEST PORTABLE   Final Result   1. There is no interval change in the multifocal bilateral pneumonia. CT HEAD WO CONTRAST   Final Result   No acute intracranial abnormality. Cortical atrophy and periventricular white matter ischemic changes.          CT CERVICAL SPINE WO CONTRAST   Final Result   No acute abnormality of the cervical spine. Moderate degenerative changes with disc space narrowing and marginal bony   spur formation C5 through C7. Ground-glass opacities in the visualized lung apices. Please refer to chest   CT for additional information. CTA CHEST W CONTRAST   Final Result   No evidence of pulmonary embolism. Extensive multifocal ground-glass opacities predominantly in the peripheral   lung zones bilaterally, highly concerning for atypical or COVID related   pneumonia. XR CHEST PORTABLE   Final Result   Bilateral patchy airspace opacities worrisome for pneumonia. XR CHEST PORTABLE    (Results Pending)   XR CHEST PORTABLE    (Results Pending)   XR CHEST PORTABLE    (Results Pending)       A/P:  Active Problems:    Acute respiratory failure with hypoxia (HCC)    Rhabdomyolysis    Acute cystitis with hematuria    Primary spontaneous pneumothorax  Resolved Problems:    * No resolved hospital problems. *    1. Intubated, Sedated, Paralyzed,   11/20: Intubated 2/2 hypoxia and increased work of breathing, proned and paralyzed  S/p Left Chest Tube 11/29 for tension pneumothorax. Second chest tube placed on 11/30. Air leaks noted. Serosanguineous drainage. No further procedures planned by surgery. Patient now supine. Gen Surg considering trach and peg tube placement pending ventilator setting improvement.     2. Acute Hypoxic Respiratory Failure 2/2 Covid 19  Unvaccinated for Covid 19  Patient found with pulse ox of 60% --> currently intubated and sedated  CXR showing bilateral pulmonary infiltrates  Inflammatory markers: DDimer 530, , CRP 22.9, Ferritin 749 on admission   CTA pulm showing extensive bilateral pulmonary infiltrates consistent with COVID-19 pneumonia , no PE  Completed remdesivir and tocilizumab treatment  Decadron and SoluCortef course completed  Pulmonology consulted ; appreciate recs; daily ABG, Vitamin C, Vitamin D  Dietary consulted for further recommendations on nutrition status ; appreciate recs. 11/18 - transferred to ICU due to worsening resp status. CXR with resolution of pneumothorax. Advanced Care Planning with Spiritual Services ; recs appreciated - FULL CODE. Brother wants everything done for Marilyn per discussion with palliate medicine. 3. Left Sided Tension Pneumothorax -   S/p left sided chest tube  11/29 , Tension Pneumothorax on Xray  Left lung re inflated on repeat CXR  Whistle like sound noted Left Upper Lung field 2/2 likely to Chest tube . 2nd chest tube placed on 11/30  Continue to monitor    4. Concern for Sepsis  Likely 2/2 superimposed bacterial pneumonia , candidal colonization  Tmax 102.2 , Tavg 100.3F  Given one dose cefepime and vancomycin in ICU  Procal 0.07, repeat 0.27 , blood cultures, urine culture negative. Completed Pip/Tazo, Vancomycin  Fungitell and B glucan pending  Diflucan stopped by ID. No current Abx    5. CHARAN Stage I  Admission creatinine 0.6  Likely 2/2 to Ischemic ATN in setting of Hypotension  Creatinine increased to 1.6 --> trended down to 0.9  Nephrology following , appreciate recs   FeNa: 0.2- Pre- Renal     6. Anemia  2/2 to inflammation/ response to Covid 19  .3, MCHC 31.5 RDW 20.1  Vitamin B12/Folate normal March 2021  FOBT + 11/29  H/H < 7 11/30/2021 , s/p 1 unit PRBC. Hemoglobin 7.8 this AM.  Maintain H/H > 7    6. Thrombocytopenia  Likely 2/2 to SALENA vs inflammation from Covid 19  Hold all anticoagulation , patient trialed on heparin , lovenox and argatroban but platelets continue to decrease  SALENA panel- negative  Platelets 246 this am     7. Hx Schizoaffective Disorder / Anxiety  Continue seroquel , zoloft, trazodone  Hold ativan, vistaril     8. Hypokalemia  Initial K 3.2 , Mag 2.6  Replace as needed  CMP daily    9.  Rhabdomyolysis - resolved  2/2 to fall for unknown time period  Patient found down for unknown period of time  Initial CK > 3000  Received 4 L NS in ER    10.  Concern for UTI - resolved   Likely simple cystitis ; patient with no CVA tenderness , presented initially with fever 102 F  Urinalysis with increased nitrites, bacteria, blood  Urine culture, blood cultures were negative  Given one dose doxy and rocephin in the ER  1g ceftriaxone IV /-  dc'd 11/18/2021     GI/DVT ppx: protonix  Diet: NPO, tube feeds  Disposition: MICU    Electronically signed by Nikky Rendon MD  PGY-1 on 12/10/2021 at 11:20 AM  This case was discussed with attending physician: Dr. Bella Krause

## 2021-12-10 NOTE — PROGRESS NOTES
Comprehensive Nutrition Assessment    Type and Reason for Visit:  Reassess    Nutrition Recommendations/Plan: Tube feeding recommendation to better help meet increased nutritional needs. Recommend Diabetic (Glucerna 1.5) @30/hr plus 1 protein modular daily to provide 720ml, 1080 calories, 59g protein, 546ml water (1184 calories and 85g protein w/ protein modular) (1300 calories w/ current propofol). Current propofol @4.4/hr provides 116 calories daily from lipids. Monitor phosphorus levels as available. Diabetic formula being recommended d/t elevated blood glucose levels. Nutrition Assessment:  Pt remains NPO ; receiving tube feedings (Peptide based ordered/receiving Glucerna 1.5) ; pt also intubated and sedated ; on propofol ; adm w/ acute respiratory failure with hypoxia ; pt COVID-19 positive ; septic shock ; noted CHARAN and thrombocytopenia ; concern for GI bleed ; hx of schizoaffective disorder ; s/p bronchoscopy 11/23 ; noted pneumothorax ; possible PEG and trach when stable ; will provide updated TF recommendations    Malnutrition Assessment:  Malnutrition Status: At risk for malnutrition (Comment)    Context:  Acute Illness     Findings of the 6 clinical characteristics of malnutrition:  Energy Intake:  7 - 50% or less of estimated energy requirements for 5 or more days  Weight Loss:  Unable to assess (d/t lack of actual weight history)     Body Fat Loss:  Unable to assess (d/t COVID isolation)     Muscle Mass Loss:  Unable to assess (d/t COVID isolation)    Fluid Accumulation:  No significant fluid accumulation     Strength:  Not Performed    Estimated Daily Nutrient Needs:  Energy (kcal):  PS3B 1278-1803 (minute volume 9.02 ; Tmax 97.9); Weight Used for Energy Requirements:  Admission     Protein (g):  75-90 (1.5-1.8g/kg IBW);  Weight Used for Protein Requirements:  Ideal        Fluid (ml/day):  per critical care; Method Used for Fluid Requirements:  1 ml/kcal      Nutrition Related Findings:  +I&Os (+2.4 L), 2+ edema, hypoactive BS, rounded abd, loose stools, FMS, ETT/OGT w/ TF, intubated/sedated, MAP WNL, chest tubes x 2      Wounds:  Open Wounds, Deep Tissue Injury (wounds x 2 noted to ear and mouth)       Current Nutrition Therapies:    Current Tube Feeding (TF) Orders:  · Feeding Route: Orogastric  · Formula: Peptide Based (receiving Glucerna 1.5)  · Schedule: Continuous (@20/hr)  · Water Flushes: 30 ml q4 hr = 180 ml H2O  · Current TF & Flush Orders Provides: 480ml, 720 calories, 40g protein, 364ml water      Anthropometric Measures:  · Height: 5' 1\" (154.9 cm)  · Current Body Weight: 151 lb (68.5 kg) (11/17, actual ; will not use 161# on 12/7 d/t fluid accumulation)   · Admission Body Weight: 151 lb (68.5 kg) (11/17 first measured)    · Usual Body Weight:  (UTO no actual EMR hx on file)     · Ideal Body Weight: 105 lbs; % Ideal Body Weight 143.8 %   · BMI: 28.5  · BMI Categories: Overweight (BMI 25.0-29. 9)       Nutrition Diagnosis:   · Inadequate oral intake related to impaired respiratory function as evidenced by NPO or clear liquid status due to medical condition, intubation, nutrition support - enteral nutrition      Nutrition Interventions:   Food and/or Nutrient Delivery:  Continue NPO, Modify Tube Feeding  Nutrition Education/Counseling:  Education not indicated   Coordination of Nutrition Care:  Continue to monitor while inpatient    Goals:  TF will meet nutritional needs with good tolerance       Nutrition Monitoring and Evaluation:   Behavioral-Environmental Outcomes:  None Identified   Food/Nutrient Intake Outcomes:  Enteral Nutrition Intake/Tolerance  Physical Signs/Symptoms Outcomes:  Biochemical Data, GI Status, Diarrhea, Fluid Status or Edema, Hemodynamic Status, Nutrition Focused Physical Findings, Skin, Weight     Discharge Planning:     Too soon to determine     Electronically signed by Jamil Bray RD, LD on 12/10/21 at 2:00 PM EST    Contact: [phone]

## 2021-12-10 NOTE — PLAN OF CARE
minimized  Outcome: Ongoing     Problem: Risk for Fluid Volume Deficit  Goal: Maintain normal serum potassium, sodium, calcium, phosphorus, and pH  Outcome: Ongoing     Problem: Fatigue  Goal: Verbalize increase energy and improved vitality  Outcome: Ongoing     Problem: Patient Education: Go to Patient Education Activity  Goal: Patient/Family Education  Outcome: Ongoing

## 2021-12-10 NOTE — PROGRESS NOTES
Tried locating bladder scanner multiple times with no success. Concerns for urine retention. Replaced scott, patient excreted 700 of urine. Noted in charting.

## 2021-12-11 NOTE — PLAN OF CARE
Problem: Falls - Risk of:  Goal: Will remain free from falls  Description: Will remain free from falls  Outcome: Met This Shift  Goal: Absence of physical injury  Description: Absence of physical injury  Outcome: Met This Shift     Problem: Airway Clearance - Ineffective  Goal: Achieve or maintain patent airway  Outcome: Met This Shift     Problem: Gas Exchange - Impaired  Goal: Absence of hypoxia  12/10/2021 2133 by Arjun Gastelum RN  Outcome: Met This Shift  12/10/2021 1647 by Flavio Madden RN  Outcome: Met This Shift  Goal: Promote optimal lung function  Outcome: Met This Shift     Problem: Breathing Pattern - Ineffective  Goal: Ability to achieve and maintain a regular respiratory rate  12/10/2021 2133 by Arjun Gastelum RN  Outcome: Met This Shift  12/10/2021 1647 by Flavio Madden RN  Outcome: Met This Shift     Problem:  Body Temperature -  Risk of, Imbalanced  Goal: Ability to maintain a body temperature within defined limits  12/10/2021 2133 by Arjun Gastelum RN  Outcome: Met This Shift  12/10/2021 1647 by Flavio Madden RN  Outcome: Met This Shift     Problem: Isolation Precautions - Risk of Spread of Infection  Goal: Prevent transmission of infection  Outcome: Met This Shift     Problem: Nutrition Deficits  Goal: Optimize nutritional status  Outcome: Met This Shift     Problem: Risk for Fluid Volume Deficit  Goal: Maintain normal heart rhythm  Outcome: Met This Shift  Goal: Maintain absence of muscle cramping  Outcome: Met This Shift     Problem: Loneliness or Risk for Loneliness  Goal: Demonstrate positive use of time alone when socialization is not possible  Outcome: Met This Shift     Problem: Skin Integrity:  Goal: Will show no infection signs and symptoms  Description: Will show no infection signs and symptoms  Outcome: Met This Shift  Goal: Absence of new skin breakdown  Description: Absence of new skin breakdown  Outcome: Met This Shift     Problem: Inadequate oral food/beverage intake (NI-2. 1)  Goal: Food and/or Nutrient Delivery  Description: Individualized approach for food/nutrient provision. 12/10/2021 1400 by Ana Rosa Fajardo RD, [place]  Outcome: Met This Shift     Problem: Infection, Septic Shock:  Goal: Will show no infection signs and symptoms  Description: Will show no infection signs and symptoms  Outcome: Met This Shift     Problem:  Body Temperature -  Risk of, Imbalanced  Goal: Will regain or maintain usual level of consciousness  Outcome: Ongoing  Goal: Complications related to the disease process, condition or treatment will be avoided or minimized  Outcome: Ongoing     Problem: Risk for Fluid Volume Deficit  Goal: Maintain normal serum potassium, sodium, calcium, phosphorus, and pH  Outcome: Ongoing     Problem: Fatigue  Goal: Verbalize increase energy and improved vitality  Outcome: Ongoing     Problem: Patient Education: Go to Patient Education Activity  Goal: Patient/Family Education  Outcome: Ongoing

## 2021-12-11 NOTE — PLAN OF CARE
Problem: Falls - Risk of:  Goal: Will remain free from falls  Description: Will remain free from falls  Outcome: Met This Shift  Goal: Absence of physical injury  Description: Absence of physical injury  Outcome: Met This Shift     Problem: Airway Clearance - Ineffective  Goal: Achieve or maintain patent airway  Outcome: Met This Shift     Problem: Skin Integrity:  Goal: Will show no infection signs and symptoms  Description: Will show no infection signs and symptoms  Outcome: Met This Shift  Goal: Absence of new skin breakdown  Description: Absence of new skin breakdown  Outcome: Met This Shift

## 2021-12-11 NOTE — PROGRESS NOTES
Dr. Marcin Magdaleno about the removal of patients clamped chest tube. Read at [phone] with no reply as of now. Resident showed up to floor and removed one chest tube and clamped the other. Per verbal order probably will remove other chest tube tomorrow after chest xray results.

## 2021-12-11 NOTE — PROGRESS NOTES
[address] [facility] Attending    S: 73 y.o. female with a history of gerd, oa, schizoaffective disorder, and gastric fundiplication who was found down for an undetermined amount of time. Reports shortness of breath and cough for 2.5 weeks. She was found to be 60% on RA. In the ER, COVID testing was positive with significantly elevated CPKs. Patient is unvaccinated. Had desaturation to 88% wit PaO2 of 55 with RRT and subsequent transfer to the MICU. Desat to 40's when Bipap removed. Now intubated. Sedated, paralyzed,  Noted to have pneumothorax and chest tube placed. Second chest tube placed when pneumothorax not improved. Today,  Supine. FiO2 at 70, PEEP 10      O: VS- Blood pressure (!) 113/55, pulse 66, temperature 99.3 °F (37.4 °C), temperature source Bladder, resp. rate 28, height 5' 1\" (1.549 m), weight 161 lb 4.8 oz (73.2 kg), SpO2 94 %. Exam is as noted by resident   Currently nonresponsive. Lungs: coarse, vent. Decreased BS  CV:  Rate controlled, regular   Ext-no C/C/E    Impressions: Active Problems:    Acute respiratory failure with hypoxia (HCC)    Covid pneumonia    Rhabdomyolysis, CK improved    Acute cystitis with hematuria, treated    CHARAN    Thrombocytopenia     Plan:      Acute hypoxic resp failure 2/2 covid - Decadron. Completed Tocimizilab. Vitamin C.  Vitamin D.  Zinc.  Appreciate Pulm management. Sepsis 2/2 PNA - done with vanc and zosyn  U/s of lower extremities negative DVT 11/20. Tube feeding. Fluid overloaded - on bumex  Schizoaffective - seroquel  Diflucan stopped. Ongoing communication with family re code status  Following hemoglobin and WBC  Bumex  ICU management  For trach and PEG when more stable  Full code at this time. Attending Physician Statement  I have reviewed the chart and seen the patient with the resident(s). I personally reviewed images, EKG's and similar tests, if present.   I personally reviewed and performed key elements of the history and exam.  I have reviewed and confirmed student and/or resident history and exam with changes as indicated above. I agree with the assessment, plan and orders as documented by the resident. Please refer to the  resident and/or student note for additional information.       Geovanni Centeno MD

## 2021-12-11 NOTE — PROGRESS NOTES
[facility]  Nephrology Attending Progress Note  Benita Paige. Darling Barrett MD        SUBJECTIVE:     11/28: pt remains in COVID 19 Isolation. She remains proned and on an FIO2 65% and PEEP12 with an O2 sat 95-96%    11/29: pt seen through window of icu due to covid, chart reviewed    11/30: pt seen through window of icu due to covid, chart reviewed. 12/1/21 :pt seen through window of icu due to covid, review of chart performed    12/2/21: pt seen through window of icu due to covid, chart reviewed, intubated, chest tube left     12/3/21 :chart reviewed, chest tube x 2 on right, intubated and prone, seen through window of icu    12/4: no new acute issues from overnight; large volume pleural fluid drainage from bilateral catheter    12/5: Brisk response to Bumex drip now placed on hold transition to intermittent dosing    12/6: pt seen through window of icu, chart reviewed, intubated    12/7: chart reviewed, seen through window of icu, intubated    12/8: pt seen through window of icu.  Intubated on levo    12/9: pt seen through window of icu, intubated and sedated, chest tube in place, on levo    12/10: pt seen through window of icu, intubated and sedated with chest tube, on levo    12/11: seen through icu window, intubated and sedated, on levo    PROBLEM LIST:    Patient Active Problem List   Diagnosis    Schizoaffective disorder ([address]    Acute respiratory failure with hypoxia ([address]    Rhabdomyolysis    Acute cystitis with hematuria    Primary spontaneous pneumothorax        PAST MEDICAL HISTORY:    Past Medical History:   Diagnosis Date    GERD (gastroesophageal reflux disease)     Osteoarthritis of right knee     Schizoaffective disorder ([address]     psycare       DIET:    ADULT TUBE FEEDING; Orogastric; Peptide Based; Continuous; 10; Yes; 10; Q 24 hours; 20; 30; Q 4 hours     PHYSICAL EXAM:     Patient Vitals for the past 24 hrs:   BP Temp Temp src Pulse Resp SpO2 Height   12/11/21 1000  99.5 °F (37.5 °C) Bladder       12/11/21 0854    86 28 90 %    12/11/21 0800  99.7 °F (37.6 °C) Bladder 81 (!) 32 92 %    12/11/21 0600  99.3 °F (37.4 °C) Bladder 66 28 94 %    12/11/21 0500    63 28 94 %    12/11/21 0400 (!) 113/55 98.6 °F (37 °C) Bladder 64 28 94 %    12/11/21 0300    61 28 92 %    12/11/21 0200 (!) 106/52 98.6 °F (37 °C) Bladder 73 28 90 %    12/11/21 0100    70 28 91 %    12/11/21 0000 (!) 99/48 97.7 °F (36.5 °C) Bladder 73 28 91 %    12/10/21 2300    71 29 92 %    12/10/21 2200  97.9 °F (36.6 °C) Bladder 67 28 91 %    12/10/21 2129    70 29 91 %    12/10/21 2128     29 91 %    12/10/21 2100    71 29 90 %    12/10/21 2000 (!) 114/53 97.9 °F (36.6 °C) Bladder 68 29 92 %    12/10/21 1900    76 28 91 %    12/10/21 1800 (!) 116/57   54 28 92 %    12/10/21 1700 (!) 110/54   61 28 96 %    12/10/21 1658    53 27 98 %    12/10/21 1600 (!) 108/53 97 °F (36.1 °C) Bladder 61 28 98 %    12/10/21 1500 (!) 106/53   54 28 98 %    12/10/21 1400 (!) 103/51   62 28 97 %    12/10/21 1349       5' 1\" (1.549 m)   12/10/21 1300 110/60   59 28 94 %    12/10/21 1249    58 28 94 %    12/10/21 1200 (!) 101/54 96 °F (35.6 °C) Esophageal 63 28 93 %    @      Intake/Output Summary (Last 24 hours) at 12/11/2021 1157  Last data filed at 12/11/2021 1100  Gross per 24 hour   Intake 2400.79 ml   Output 2135 ml   Net 265.79 ml         Wt Readings from Last 3 Encounters:   12/07/21 161 lb 4.8 oz (73.2 kg)   10/26/21 152 lb (68.9 kg)   10/12/21 149 lb (67.6 kg)     PE  Constitutional:  Pt is intubated  Seen through window of icu    MEDS (scheduled):    midodrine  20 mg Oral Q8H    albumin human  25 g IntraVENous Q8H    artificial tears   Both Eyes Q4H    hydrocortisone sodium succinate PF  100 mg IntraVENous Q8H    enoxaparin  40 mg SubCUTAneous BID    sodium chloride (PF)  10 mL IntraVENous BID    And    pantoprazole  40 mg IntraVENous Daily    sucralfate  1 g Oral 4 times per day    sennosides-docusate sodium  2 tablet Oral Daily    sodium chloride flush  5-40 mL IntraVENous 2 times per day    heparin flush  3 mL IntraVENous 2 times per day    chlorhexidine  15 mL Mouth/Throat BID    budesonide  1 mg Nebulization BID    Arformoterol Tartrate  15 mcg Nebulization BID    atorvastatin  10 mg Oral Daily    QUEtiapine  200 mg Oral Daily    sertraline  200 mg Oral Daily    traZODone  100 mg Oral Nightly    ascorbic acid  1,000 mg Oral Daily    vitamin D  2,000 Units Oral Daily    zinc sulfate  50 mg Oral Daily       MEDS (infusions):   propofol 10 mcg/kg/min (12/11/21 0800)    cisatracurium (NIMBEX) infusion 4 mcg/kg/min (12/11/21 0313)    norepinephrine 2 mcg/min (12/10/21 1103)    fentaNYL 5 mcg/ml in 0.9%  ml infusion 200 mcg/hr (12/11/21 0912)    midazolam 10 mg/hr (12/11/21 1027)    sodium chloride 100 mL/hr at 11/1956    dextrose         MEDS (prn):  polyethylene glycol, sodium chloride flush, heparin flush, ipratropium-albuterol, sodium chloride, ondansetron **OR** ondansetron, acetaminophen **OR** acetaminophen, glucose, dextrose, glucagon (rDNA), dextrose    DATA:    Recent Labs     12/09/21  0417 12/09/21  1724 12/10/21  0404 12/10/21  0404 12/10/21  1614 12/11/21 0409 12/11/21 0922   WBC 11.6*  --  12.3*  --   --  12.2*  --    HGB 8.1*   < > 8.4*   < > 7.8* 8.1* 8.2*   HCT 26.1*   < > 28.0*   < > 26.6* 27.5* 28.1*   .6*  --  109.8*  --   --  110.0*  --      --  177  --   --  214  --     < > = values in this interval not displayed.      Recent Labs     12/09/21 0417 12/09/21 0417 12/09/21  1724 12/10/21  0404 12/11/21  0409 12/11/21  0922      < > 140 140  --  136   K 3.9   < > 3.3* 4.7  --  4.2      < > 101 102  --  100   CO2 30*   < > 30* 29  --  30*   BUN 24*   < > 22 23  --  20   CREATININE 0.4*   < > 0.5 0.5  --  0.5   LABGLOM >60   < > >60 >60  --  >60   GLUCOSE 135*   < > 104* 148*  --  134* CALCIUM 8.0*   < > 8.0* 8.1*  --  8.4*   ALT 53*   < >  --  50* 49* 51*   AST 22   < >  --  21 24 23   BILIDIR <0.2  --   --  <0.2 <0.2  --    BILITOT 0.3   < >  --  <0.2 0.2 0.2   ALKPHOS 82   < >  --  88 116* 118*   MG 1.7  --   --   --  1.9  --    PHOS 2.4*  --   --   --  3.4  --     < > = values in this interval not displayed. Lab Results   Component Value Date    LABALBU 2.2 (L) 12/11/2021    LABALBU 2.2 (L) 12/11/2021    LABALBU 2.2 (L) 12/10/2021     Lab Results   Component Value Date    TSH 4.720 (H) 10/12/2021       Iron Studies  Lab Results   Component Value Date    FERRITIN 247 12/11/2021     Vitamin B-12   Date Value Ref Range Status   03/02/2021 771 211 - 946 pg/mL Final     Folate   Date Value Ref Range Status   03/02/2021 >20.0 4.8 - 24.2 ng/mL Final       No results found for: VITD25  No results found for: PTH    No components found for: URIC    Lab Results   Component Value Date    COLORU Yellow 11/16/2021    NITRU POSITIVE 11/16/2021    GLUCOSEU Negative 11/16/2021    KETUA 15 11/16/2021    UROBILINOGEN 0.2 11/16/2021    BILIRUBINUR Negative 11/16/2021       No results found for: Ray Gunderson      IMPRESSION/RECOMMENDATIONS:      1. Acute kidney injury stage I  ischemic ATN occurring in the setting of hypotension  nonoliguric    Cr continues to improve now back to baseline  Continue to monitor labs and urine output  Bumex drip put on hold; transition to intermittent dosing  Good response  Cr 0.5     2. Acute hypoxic respiratory failure  due to COVID-19 pneumonia  Intubation with mechanical ventilation  Ct for pneumo left     3. Septic shock  suspected possible superimposed bacterial pneumonia  On levo  abx per id     4. Volume overload  bumex 1 mg iv qd held with low bp  uo 1.9L  +2.8L  overall as of yesterday  I/o miscalculated 12/9 and now inaccurate    5. Hyponatremia  Na at 136     6. Hypocalcemia  Replace prn  hypoalbuminemic    Benji Scott.  Minh Carvalho MD

## 2021-12-11 NOTE — PROGRESS NOTES
[facility] Inpatient   Resident Progress Note    S:  Hospital day: 25   Brief Synopsis: Madeleine Fu is a 73 y.o. female with pmh of GERD, osteoarthritis and schizoaffective disorder who presented to ER s/p fall at home. Patient found down at home, with initial O2 saturation of 60%. Brought to the ER; found to have COVID-19 pneumonia , requiring bipap for appropriate saturation. Rhabdomyolysis, UTI. Started on appropriate management including tocilizumab, ceftriaxone 1 g daily, and IV fluids for rhabdomyolysis. Decadron was started daily, and with patients worsening status - pulmonology consulted. FULL CODE. Transferred to ICU on 11/18 for rapid breathing and hypoxia and pO2 of 55. Intubated 11/20 secondary to O2 sats consistently <80% with rapid breathing. 11/29 , patient developed left sided tension pneumothorax and underwent chest tube placement. Patient Intubated, sedated, paralyzed, proned. On vasopressors. Hemoglobin dropped to 6.9 with 1 unit PRBC transfused. Second chest tube placed on left chest. Vacuum changed to water seals. Overnight  Patient seen today in the morning. No acute events overnight. Remains supine, sedated and intubated. . FiO2 70%, PEEP 10.          Cont meds:    propofol 10 mcg/kg/min (12/10/21 0803)    cisatracurium (NIMBEX) infusion 4 mcg/kg/min (12/11/21 0313)    norepinephrine 2 mcg/min (12/10/21 1103)    fentaNYL 5 mcg/ml in 0.9%  ml infusion 200 mcg/hr (12/11/21 0147)    midazolam 10 mg/hr (12/10/21 2319)    sodium chloride 100 mL/hr at 11/1956    dextrose       Scheduled meds:    artificial tears   Both Eyes Q4H    midodrine  15 mg Oral Q8H    hydrocortisone sodium succinate PF  100 mg IntraVENous Q8H    enoxaparin  40 mg SubCUTAneous BID    sodium chloride (PF)  10 mL IntraVENous BID    And    pantoprazole  40 mg IntraVENous Daily    sucralfate  1 g Oral 4 times per day    sennosides-docusate sodium  2 tablet Oral Daily    sodium chloride flush  5-40 mL IntraVENous 2 times per day    heparin flush  3 mL IntraVENous 2 times per day    chlorhexidine  15 mL Mouth/Throat BID    budesonide  1 mg Nebulization BID    Arformoterol Tartrate  15 mcg Nebulization BID    atorvastatin  10 mg Oral Daily    QUEtiapine  200 mg Oral Daily    sertraline  200 mg Oral Daily    traZODone  100 mg Oral Nightly    ascorbic acid  1,000 mg Oral Daily    vitamin D  2,000 Units Oral Daily    zinc sulfate  50 mg Oral Daily     PRN meds: polyethylene glycol, sodium chloride flush, heparin flush, ipratropium-albuterol, sodium chloride, ondansetron **OR** ondansetron, acetaminophen **OR** acetaminophen, glucose, dextrose, glucagon (rDNA), dextrose     I reviewed the patient's past medical and surgical history, Medications and Allergies. O:  BP (!) 113/55   Pulse 66   Temp 99.3 °F (37.4 °C) (Bladder)   Resp 28   Ht 5' 1\" (1.549 m)   Wt 161 lb 4.8 oz (73.2 kg)   SpO2 94%   BMI 30.48 kg/m²   24 hour I&O: I/O last 3 completed shifts: In: 2400.8 [I.V.:1801.8; NG/GT:599]  [facility] 1990 [id number]  No intake/output data recorded. Physical Exam  Constitutional:       Comments: Intubated, sedated, paralyzed, supine   HENT:      Head: Normocephalic and atraumatic. Mouth/Throat:      Comments: intubated  Cardiovascular:      Rate and Rhythm: Normal rate and regular rhythm. Pulses: Normal pulses. Heart sounds: Normal heart sounds. Pulmonary:      Breath sounds: No wheezing, rhonchi or rales. Comments: Left sided Chest tube x 2  Abdominal:      General: There is no distension. Palpations: There is no mass. Hernia: No hernia is present. Musculoskeletal:         General: Normal range of motion. Right lower leg: Edema present. Left lower leg: Edema present. Skin:     General: Skin is warm and dry. Findings: Bruising (mild, on back) present.    Neurological:      Comments: Sedated, paralyzed Labs:  Na/K/Cl/CO2:  140/4.7/102/29 (12/10 0404)  BUN/Cr/glu/ALT/AST/amyl/lip:  --/--/--/49/24/--/-- (12/11 0409)  WBC/Hgb/Hct/Plts:  12.2/8.1/27.5/214 (12/11 0409)  estimated creatinine clearance is 103 mL/min (based on SCr of 0.5 mg/dL). Other pertinent labs as noted below    Radiology:  XR CHEST PORTABLE   Final Result   1. There is no appreciable left pneumothorax. 2. Extensive multifocal bilateral airspace disease which is unchanged. XR CHEST PORTABLE   Final Result   1. Slightly improved aeration at the right apex and left lower lung   2. Stable position and alignment of the tubes and catheters   3. Extensive bilateral airspace disease concerning for consolidative   pneumonia slightly improved         XR CHEST PORTABLE   Final Result   Stable support lines and bilateral airspace disease. XR CHEST PORTABLE   Final Result   Unchanged bilateral diffuse infiltrates. XR CHEST PORTABLE   Final Result   1. Decreased left pneumothorax with suspected trace residual.  2 left chest   tubes are similar to the previous exam.   2. Bilateral pulmonary opacities appear mildly increased. 3. The support devices are unchanged and appear to be in acceptable position. XR CHEST PORTABLE   Final Result   Unchanged airspace disease and left pneumothorax. XR CHEST PORTABLE   Final Result   1. No interval change in extensive multifocal bilateral pneumonia   2. Less than 10% left pneumothorax. There is stable position of the 2 left   chest tubes. XR CHEST PORTABLE   Final Result   1. Stable diffuse interstitial pulmonary edema or pneumonia. 2.  Left chest tube demonstrated. Minimal residual left pneumothorax. XR CHEST PORTABLE   Final Result   1. Stable diffuse bilateral airspace disease. 2. Small left pneumothorax appears new or increased compared to prior   examination. Left chest tube appears stable in position. 3. Possible right pleural effusion. XR CHEST PORTABLE   Final Result   1. There is no interval change in extensive multifocal bilateral pneumonia. XR CHEST PORTABLE   Final Result   No change in extensive bilateral pulmonary airspace opacities. No   pneumothorax is radiographically visible on the current exam.         XR CHEST PORTABLE   Final Result   Significantly decreased size of left pneumothorax. There is likely trace   left pneumothorax remaining. Stable extensive bilateral pulmonary airspace opacities. XR CHEST PORTABLE   Final Result   Addendum 1 of 1   ADDENDUM:   Verbal report was given to Marsha Butcher RN at 8:15 a.m. central standard   time on 11/30/2021. Final   New moderate sized left-sided pneumothorax. Stable extensive bilateral pulmonary airspace opacities            XR CHEST PORTABLE   Final Result   1. Lines okay. 2. Persistent edema/ARDS/pneumonia. 3. No sign of pneumothorax. XR CHEST PORTABLE   Final Result   Interval placement of left-sided chest tube with questionable residual   pneumothorax versus artifact. Extensive bilateral infiltrates. Continued follow-up recommended. XR CHEST PORTABLE   Final Result   Interval development of large left-sided tension pneumothorax. Extensive bilateral parenchymal infiltrates. Findings were discussed with Dr. Julieta Manley at approximately 0010 hours [place]   time on 11/28/2021. XR CHEST PORTABLE   Final Result   Severe bilateral pulmonary opacification. XR CHEST PORTABLE   Final Result   1. Endotracheal tube is 3 cm above the hkai. 2. Stable interstitial pulmonary edema or pneumonia throughout both lungs. XR CHEST PORTABLE   Final Result   1. Somewhat limited exam, grossly unchanged. Please see above comments. .      RECOMMENDATION:   1. Recommend follow-up thoracic imaging, as directed clinically. .         XR ABDOMEN (KUB) (SINGLE AP VIEW)   Final Result   1.  Satisfactory position of the NG tube within the stomach         XR CHEST PORTABLE   Final Result   1. There is no interval change in the multifocal bilateral pneumonia. XR CHEST PORTABLE   Final Result   Bilateral airspace disease with interval progression on the right         XR CHEST PORTABLE   Final Result   1. Endotracheal tube is 2.7 cm above the khai. 2. Stable interstitial pulmonary edema or pneumonia throughout both lungs. XR CHEST PORTABLE   Final Result   1. Worsening of the multifocal bilateral pneumonia when compared with the   prior study of 1 day earlier. XR CHEST PORTABLE   Final Result   1. Multifocal bilateral pneumonia more prominent within the right upper lobe   2. Minimal partial interval clearing of the pneumonia within the left lung   3. Worsening of the pneumonia within the right upper lobe. US DUP LOWER EXTREMITIES BILATERAL VENOUS   Final Result   Within the visualized vessels there is no evidence for deep venous   thrombosis               XR CHEST PORTABLE   Final Result   1. Lines okay. 2. Slightly worsened diffuse edema versus pneumonia. XR CHEST PORTABLE   Final Result   1. Lines okay   2. Improved aeration, mild improvement and diffuse pneumonia/edema. XR CHEST ABDOMEN NG PLACEMENT   Final Result   NG tube position satisfactory. XR CHEST PORTABLE   Final Result   Stable bilateral pneumonia or interstitial pulmonary edema. XR CHEST PORTABLE   Final Result   Increasing bilateral infiltrates. XR CHEST PORTABLE   Final Result   1. There is no interval change in the multifocal bilateral pneumonia. CT HEAD WO CONTRAST   Final Result   No acute intracranial abnormality. Cortical atrophy and periventricular white matter ischemic changes. CT CERVICAL SPINE WO CONTRAST   Final Result   No acute abnormality of the cervical spine.       Moderate degenerative changes with disc space narrowing and marginal bony   spur formation C5 through C7. Ground-glass opacities in the visualized lung apices. Please refer to chest   CT for additional information. CTA CHEST W CONTRAST   Final Result   No evidence of pulmonary embolism. Extensive multifocal ground-glass opacities predominantly in the peripheral   lung zones bilaterally, highly concerning for atypical or COVID related   pneumonia. XR CHEST PORTABLE   Final Result   Bilateral patchy airspace opacities worrisome for pneumonia. XR CHEST PORTABLE    (Results Pending)   XR CHEST PORTABLE    (Results Pending)   XR CHEST PORTABLE    (Results Pending)   XR CHEST PORTABLE    (Results Pending)       A/P:  Active Problems:    Acute respiratory failure with hypoxia (HCC)    Rhabdomyolysis    Acute cystitis with hematuria    Primary spontaneous pneumothorax  Resolved Problems:    * No resolved hospital problems. *    1. Intubated, Sedated, Paralyzed,   11/20: Intubated 2/2 hypoxia and increased work of breathing, proned and paralyzed  S/p Left Chest Tube 11/29 for tension pneumothorax. Second chest tube placed on 11/30. Air leaks noted. Serosanguineous drainage. No further procedures planned by surgery. Patient now supine. Gen Surg considering trach and peg tube placement pending ventilator setting improvement. 2. Acute Hypoxic Respiratory Failure 2/2 Covid 19  Unvaccinated for Covid 19  Patient found with pulse ox of 60%. 11/18 - transferred to ICU due to worsening resp status.  --> currently intubated and sedated  CXR showing bilateral pulmonary infiltrates  Inflammatory markers: DDimer 530, , CRP 22.9, Ferritin 749 on admission   CTA pulm showing extensive bilateral pulmonary infiltrates consistent with COVID-19 pneumonia , no PE  Completed remdesivir and tocilizumab treatment  Decadron and SoluCortef course completed  Pulmonology consulted ; appreciate recs; daily ABG, Vitamin C, Vitamin D  Dietary consulted for further recommendations on nutrition status ; appreciate recs. CXR with resolution of pneumothorax. Advanced Care Planning with Spiritual Services ; recs appreciated - FULL CODE. Brother wants everything done for Marilyn per discussion with palliate medicine. 3. Left Sided Tension Pneumothorax -   S/p left sided chest tube  11/29 , Tension Pneumothorax on Xray  Left lung re inflated on repeat CXR  Whistle like sound noted Left Upper Lung field 2/2 likely to Chest tube . 2nd chest tube placed on 11/30  Continue to monitor    4. Concern for Sepsis - resolved  Likely 2/2 superimposed bacterial pneumonia , candidal colonization  Tmax 102.2 , Tavg 100.3F, Afebrile overnight. Given one dose cefepime and vancomycin in ICU  Procal 0.07, repeat 0.27 , blood cultures, urine culture negative. Completed Pip/Tazo, Vancomycin  Fungitell and B glucan pending  Diflucan stopped by ID. ID Signed off  No current Abx    5. CHARAN Stage I  Admission creatinine 0.6  Likely 2/2 to Ischemic ATN in setting of Hypotension  Creatinine increased to 1.6 --> trended down to 0.9  Nephrology following , appreciate recs   FeNa: 0.2- Pre- Renal     6. Anemia  2/2 to inflammation/ response to Covid 19  .3, MCHC 31.5 RDW 20.1  Vitamin B12/Folate normal March 2021  FOBT + 11/29  H/H < 7 11/30/2021 , s/p 1 unit PRBC. Hemoglobin 8.1 this AM.  Maintain H/H > 7    6. Thrombocytopenia  Likely 2/2 to SALENA vs inflammation from Covid 19  Hold all anticoagulation , patient trialed on heparin , lovenox and argatroban but platelets continue to decrease  SALENA panel- negative  Platelets 225 this am     7. Hx Schizoaffective Disorder / Anxiety  Continue seroquel , zoloft, trazodone  Hold ativan, vistaril     8. Hypokalemia  Initial K 3.2 , Mag 2.6  Replace as needed  CMP daily    9. Rhabdomyolysis - resolved  2/2 to fall for unknown time period  Patient found down for unknown period of time  Initial CK > 3000  Received 4 L NS in ER    10.  Concern for UTI - resolved   Likely

## 2021-12-11 NOTE — PROGRESS NOTES
[address] [facility]   Department of Internal Medicine   Internal Medicine Residency  MICU Progress Note    Patient:  Kvng Olivas 73 y.o. female   MRN: [id number]       Date of Service: 2021    Allergy: Ciprofloxacin  Cc follow up ARDS  Subjective     Patient was seen and examined this morning at bedside in no acute distress. Overnight, no acute events noted. Patient remains in critical condition. General Surgery asked to remove chest tube. No other acute noted at this time. Objective     TEMPERATURE:  Current - Temp: 99.5 °F (37.5 °C); Max - Temp  Av.2 °F (36.8 °C)  Min: 96 °F (35.6 °C)  Max: 99.7 °F (37.6 °C)  RESPIRATIONS RANGE: Resp  Av.3  Min: 27  Max: 32  PULSE RANGE: Pulse  Av  Min: 53  Max: 86  BLOOD PRESSURE RANGE:  Systolic (98IID), PTW:183 , Min:99 , GQQ:702   ; Diastolic (98JJO), JAL:45, Min:48, Max:60    PULSE OXIMETRY RANGE: SpO2  Av %  Min: 90 %  Max: 98 %    I & O - 24hr:    Intake/Output Summary (Last 24 hours) at 2021 1139  Last data filed at 2021 1100  Gross per 24 hour   Intake 2400.79 ml   Output 2135 ml   Net 265.79 ml     I/O last 3 completed shifts: In: 2400.8 [I.V.:1801.8; NG/GT:599]  Out:  [JIDFB:0808] I/O this shift:  In: -   Out: 145 [Urine:145]   Weight change:     Physical Exam  Constitutional:       Appearance: Normal appearance. Interventions: She is sedated and intubated. HENT:      Head: Normocephalic and atraumatic. Nose: Nose normal.   Eyes:      Pupils: Pupils are equal, round, and reactive to light. Cardiovascular:      Rate and Rhythm: Normal rate and regular rhythm. Pulses: Normal pulses. Heart sounds: Normal heart sounds. Pulmonary:      Effort: Pulmonary effort is normal. She is intubated. Breath sounds: Examination of the left-upper field reveals decreased breath sounds. Examination of the left-middle field reveals decreased breath sounds.  Examination of the left-lower field reveals decreased breath sounds. Decreased breath sounds and wheezing present. No rhonchi or rales. Abdominal:      General: Bowel sounds are normal. There is no distension. Palpations: Abdomen is soft. Musculoskeletal:      Cervical back: Normal range of motion. Skin:     General: Skin is warm and moist.      Capillary Refill: Capillary refill takes less than 2 seconds. Neurological:      General: No focal deficit present.          Medications     Continuous Infusions:   propofol 10 mcg/kg/min (12/11/21 0800)    cisatracurium (NIMBEX) infusion 4 mcg/kg/min (12/11/21 0313)    norepinephrine 2 mcg/min (12/10/21 1103)    fentaNYL 5 mcg/ml in 0.9%  ml infusion 200 mcg/hr (12/11/21 0912)    midazolam 10 mg/hr (12/11/21 1027)    sodium chloride 100 mL/hr at 11/1956    dextrose       Scheduled Meds:   midodrine  20 mg Oral Q8H    albumin human  25 g IntraVENous Q8H    artificial tears   Both Eyes Q4H    hydrocortisone sodium succinate PF  100 mg IntraVENous Q8H    enoxaparin  40 mg SubCUTAneous BID    sodium chloride (PF)  10 mL IntraVENous BID    And    pantoprazole  40 mg IntraVENous Daily    sucralfate  1 g Oral 4 times per day    sennosides-docusate sodium  2 tablet Oral Daily    sodium chloride flush  5-40 mL IntraVENous 2 times per day    heparin flush  3 mL IntraVENous 2 times per day    chlorhexidine  15 mL Mouth/Throat BID    budesonide  1 mg Nebulization BID    Arformoterol Tartrate  15 mcg Nebulization BID    atorvastatin  10 mg Oral Daily    QUEtiapine  200 mg Oral Daily    sertraline  200 mg Oral Daily    traZODone  100 mg Oral Nightly    ascorbic acid  1,000 mg Oral Daily    vitamin D  2,000 Units Oral Daily    zinc sulfate  50 mg Oral Daily     PRN Meds: polyethylene glycol, sodium chloride flush, heparin flush, ipratropium-albuterol, sodium chloride, ondansetron **OR** ondansetron, acetaminophen **OR** acetaminophen, glucose, dextrose, glucagon (rDNA), dextrose  Nutrition:   NG/OG tube TF type: Pulmocare/Nephro/Glucerna/Jevity        At rate: ml/h    Labs and Imaging Studies     CBC:   Recent Labs     12/09/21 0417 12/09/21  1724 12/10/21  0404 12/10/21  0404 12/10/21  1614 12/11/21  0409 12/11/21 0922   WBC 11.6*  --  12.3*  --   --  12.2*  --    HGB 8.1*   < > 8.4*   < > 7.8* 8.1* 8.2*   HCT 26.1*   < > 28.0*   < > 26.6* 27.5* 28.1*   .6*  --  109.8*  --   --  110.0*  --      --  177  --   --  214  --     < > = values in this interval not displayed. BMP:    Recent Labs     12/09/21  1724 12/10/21  0404 12/11/21  0922    140 136   K 3.3* 4.7 4.2    102 100   CO2 30* 29 30*   BUN 22 23 20   CREATININE 0.5 0.5 0.5   GLUCOSE 104* 148* 134*       LIVER PROFILE:   Recent Labs     12/09/21  0417 12/09/21  0417 12/10/21  0404 12/11/21  0409 12/11/21  0922   AST 22   < > 21 24 23   ALT 53*   < > 50* 49* 51*   BILIDIR <0.2  --  <0.2 <0.2  --    BILITOT 0.3   < > <0.2 0.2 0.2   ALKPHOS 82   < > 88 116* 118*    < > = values in this interval not displayed.        PT/INR:   Recent Labs     12/09/21  0417 12/10/21  0404 12/11/21 0409   PROTIME 12.9* 12.7* 12.3   INR 1.2 1.1 1.1       APTT:   Recent Labs     12/09/21  0417 12/10/21  0404 12/11/21 0409   APTT 27.4 24.2* 29.6       Fasting Lipid Panel:    Lab Results   Component Value Date    CHOL 322 10/12/2021    TRIG 195 12/11/2021    HDL 70 10/12/2021       Cardiac Enzymes:    Lab Results   Component Value Date    CKTOTAL 135 11/22/2021    CKTOTAL 488 (H) 11/20/2021    CKTOTAL 585 (H) 11/19/2021       Notable Cultures:      Blood cultures   Blood Culture, Routine   Date Value Ref Range Status   11/25/2021 5 Days no growth  Final     Respiratory cultures No results found for: RESPCULTURE No results found for: LABGRAM  Urine   Urine Culture, Routine   Date Value Ref Range Status   11/19/2021 Growth not present  Final     Legionella No results found for: LABLEGI  C Diff PCR No results found for: CDIFPCR  Wound culture/abscess: No results for input(s): WNDABS in the last 72 hours. Tip culture:No results for input(s): CXCATHTIP in the last 72 hours. Oxygen:     Vent Information  $Ventilation: $Subsequent Day  Skin Assessment: Clean, dry, & intact  Equipment ID: [id number]  Equipment Changed: (S) Humidification  Vent Type: 980  Vent Mode: AC/VC+  Vt Ordered: 320 mL  Pressure Ordered: 34  Rate Set: 28 bmp  Peak Flow: 0 L/min  Pressure Support: 0 cmH20  FiO2 : 70 %  SpO2: 90 %  SpO2/FiO2 ratio: 128.57  PaO2/FiO2 ratio: 120  Sensitivity: 3  PEEP/CPAP: 10  I Time/ I Time %: 0.7 s  Humidification Source: Heated wire  Humidification Temp: 37  Humidification Temp Measured: 37  Circuit Condensation: Drained  Mask Type: Full face mask  Mask Size: Small  Additional Respiratory  Assessments  Pulse: 86  Resp: 28  SpO2: 90 %  Position: Semi-Calabrese's  Humidification Source: Heated wire  Humidification Temp: 37  Circuit Condensation: Drained  Oral Care Completed?: Yes  Oral Care: Mouth swabbed, Mouth moisturizer, Mouth suctioned, Suction toothette  Subglottic Suction Done?: Yes  Airway Type: ET  Airway Size: 8  Cuff Pressure (cm H2O): 29 cm H2O       [REMOVED] Urethral Catheter Temperature probe-Output (mL): 10 mL  [REMOVED] Urethral Catheter-Output (mL): 150 mL  [REMOVED] External Urinary Catheter-Output (mL): 0 mL  Urethral Catheter-Output (mL): 30 mL  [REMOVED] Urethral Catheter Temperature probe 16 fr-Output (mL): 250 mL    Imaging Studies:  XR CHEST PORTABLE   Final Result   No significant changes since the previous study of a December 10. XR CHEST PORTABLE   Final Result   1. There is no appreciable left pneumothorax. 2. Extensive multifocal bilateral airspace disease which is unchanged. XR CHEST PORTABLE   Final Result   1. Slightly improved aeration at the right apex and left lower lung   2. Stable position and alignment of the tubes and catheters   3.  Extensive bilateral airspace disease concerning for consolidative   pneumonia slightly improved         XR CHEST PORTABLE   Final Result   Stable support lines and bilateral airspace disease. XR CHEST PORTABLE   Final Result   Unchanged bilateral diffuse infiltrates. XR CHEST PORTABLE   Final Result   1. Decreased left pneumothorax with suspected trace residual.  2 left chest   tubes are similar to the previous exam.   2. Bilateral pulmonary opacities appear mildly increased. 3. The support devices are unchanged and appear to be in acceptable position. XR CHEST PORTABLE   Final Result   Unchanged airspace disease and left pneumothorax. XR CHEST PORTABLE   Final Result   1. No interval change in extensive multifocal bilateral pneumonia   2. Less than 10% left pneumothorax. There is stable position of the 2 left   chest tubes. XR CHEST PORTABLE   Final Result   1. Stable diffuse interstitial pulmonary edema or pneumonia. 2.  Left chest tube demonstrated. Minimal residual left pneumothorax. XR CHEST PORTABLE   Final Result   1. Stable diffuse bilateral airspace disease. 2. Small left pneumothorax appears new or increased compared to prior   examination. Left chest tube appears stable in position. 3. Possible right pleural effusion. XR CHEST PORTABLE   Final Result   1. There is no interval change in extensive multifocal bilateral pneumonia. XR CHEST PORTABLE   Final Result   No change in extensive bilateral pulmonary airspace opacities. No   pneumothorax is radiographically visible on the current exam.         XR CHEST PORTABLE   Final Result   Significantly decreased size of left pneumothorax. There is likely trace   left pneumothorax remaining. Stable extensive bilateral pulmonary airspace opacities. XR CHEST PORTABLE   Final Result   Addendum 1 of 1   ADDENDUM:   Verbal report was given to Soledad Rangel RN at 8:15 a.m. central standard   time on 11/30/2021. Final   New moderate sized left-sided pneumothorax. Stable extensive bilateral pulmonary airspace opacities            XR CHEST PORTABLE   Final Result   1. Lines okay. 2. Persistent edema/ARDS/pneumonia. 3. No sign of pneumothorax. XR CHEST PORTABLE   Final Result   Interval placement of left-sided chest tube with questionable residual   pneumothorax versus artifact. Extensive bilateral infiltrates. Continued follow-up recommended. XR CHEST PORTABLE   Final Result   Interval development of large left-sided tension pneumothorax. Extensive bilateral parenchymal infiltrates. Findings were discussed with Dr. Kalli Strauss at approximately 0010 hours [place]   time on 11/28/2021. XR CHEST PORTABLE   Final Result   Severe bilateral pulmonary opacification. XR CHEST PORTABLE   Final Result   1. Endotracheal tube is 3 cm above the khai. 2. Stable interstitial pulmonary edema or pneumonia throughout both lungs. XR CHEST PORTABLE   Final Result   1. Somewhat limited exam, grossly unchanged. Please see above comments. .      RECOMMENDATION:   1. Recommend follow-up thoracic imaging, as directed clinically. .         XR ABDOMEN (KUB) (SINGLE AP VIEW)   Final Result   1. Satisfactory position of the NG tube within the stomach         XR CHEST PORTABLE   Final Result   1. There is no interval change in the multifocal bilateral pneumonia. XR CHEST PORTABLE   Final Result   Bilateral airspace disease with interval progression on the right         XR CHEST PORTABLE   Final Result   1. Endotracheal tube is 2.7 cm above the khai. 2. Stable interstitial pulmonary edema or pneumonia throughout both lungs. XR CHEST PORTABLE   Final Result   1. Worsening of the multifocal bilateral pneumonia when compared with the   prior study of 1 day earlier. XR CHEST PORTABLE   Final Result   1.  Multifocal bilateral pneumonia more prominent within the right upper lobe   2. Minimal partial interval clearing of the pneumonia within the left lung   3. Worsening of the pneumonia within the right upper lobe. US DUP LOWER EXTREMITIES BILATERAL VENOUS   Final Result   Within the visualized vessels there is no evidence for deep venous   thrombosis               XR CHEST PORTABLE   Final Result   1. Lines okay. 2. Slightly worsened diffuse edema versus pneumonia. XR CHEST PORTABLE   Final Result   1. Lines okay   2. Improved aeration, mild improvement and diffuse pneumonia/edema. XR CHEST ABDOMEN NG PLACEMENT   Final Result   NG tube position satisfactory. XR CHEST PORTABLE   Final Result   Stable bilateral pneumonia or interstitial pulmonary edema. XR CHEST PORTABLE   Final Result   Increasing bilateral infiltrates. XR CHEST PORTABLE   Final Result   1. There is no interval change in the multifocal bilateral pneumonia. CT HEAD WO CONTRAST   Final Result   No acute intracranial abnormality. Cortical atrophy and periventricular white matter ischemic changes. CT CERVICAL SPINE WO CONTRAST   Final Result   No acute abnormality of the cervical spine. Moderate degenerative changes with disc space narrowing and marginal bony   spur formation C5 through C7. Ground-glass opacities in the visualized lung apices. Please refer to chest   CT for additional information. CTA CHEST W CONTRAST   Final Result   No evidence of pulmonary embolism. Extensive multifocal ground-glass opacities predominantly in the peripheral   lung zones bilaterally, highly concerning for atypical or COVID related   pneumonia. XR CHEST PORTABLE   Final Result   Bilateral patchy airspace opacities worrisome for pneumonia.          XR CHEST PORTABLE    (Results Pending)   XR CHEST PORTABLE    (Results Pending)   XR CHEST PORTABLE    (Results Pending)        Resident's Assessment and Plan Assessment and Plan:    Cardiovascular   History of hyperlipidemia  -Continue home atorvastatin 10 mg    Pulmonary  Acute hypoxic respiratory failure 2/2 Covid pneumonia  -Patient is intubated  -PF ratio of 1.18  -Currently sedated with fentanyl  -Completed Courses of Decadron and Toci  -Respiratory cultures have been negative, fungal cultures and BLE are also negative  -Continue ventilation with daily ABGs  -Completed remdesivir   -Continue breathing treatments  -Continue Hydrocortisone due to low Cortisol   -Increased midodrine to 20 mg   -Wean Levopehd   -Continue supine position   -General Surgery will do trach and peg after stable on the vent, need to have goals of care discussion with family     Left lung pneumothorax (resolving)  -Chest x-ray showed left pneumothorax on 11/29  -Repeat chest x-ray today showed worsening pneumothorax on 11/30  -General Surgery Consulted to remove chest tubes     Gastrointestinal  Transaminitis 2/2 Covid infection (resolving)   -Continue to monitor    Concern for GI bleed-stable/resolved     Infectious Disease   COVID-19 pneumonia  -See above  -Continue to monitor CRP D-dimer and pro-Carl  -Continue vitamin C, vitamin D and zinc    Renal   Stage III intrinsic CHARAN (resolved)   -Baseline creatinine of 0.5  -Nephro is following  -Creatinine is stable today 0.5  -Continue to follow nephro recommendations  -Continue to monitor  -Started on Albumin 25 g due to continue volume overlaod   -Continue to monitor and replace electrolytes as appropriate     Heme/Onc  Reactive leukocytosis 2/2 steroids and Covid infection  -WBC stable today  -Cultures have been negative  -Fungitell negative   -Diflucan stopped   -Continue to follow ID recommendations    Thrombocytopenia 2/2 Covid (Resolved)     Psychiatric   History of schizoaffective disorder  -Continue home hydroxyzine, trazodone, quetiapine and sertraline    # Peptic ulcer prophylaxis:Protonix   # DVT Prophylaxis: Lovenox   # Disposition: Cont current care     Dwight Quevdeo MD, PGY-1      I personally saw, examined and provided care for the patient. Radiographs, labs and medication list were reviewed by me independently. I spoke with bedside nursing, therapists and consultants. Critical care services and times documented are independent of procedures and multidisciplinary rounds with Residents. Additionally comprehensive, multidisciplinary rounds were conducted with the MICU team. The case was discussed in detail and plans for care were established. Review of Residents documentation was conducted and revisions were made as appropriate. I agree with the above documented exam, problem list and plan of care.   Radha Roman MD   CCT excluding procedures:38'

## 2021-12-11 NOTE — PROGRESS NOTES
Palliative Medicine  Progress Note    Patient condition continues to be poor. Unable to reach family to discuss goals of care. Will continue to discuss goals as able to make contact with family. Coy Whitman APRN-CNP  Palliative Medicine    Note: This report was completed using [employer] voiced recognition software. Every effort has been made to ensure accuracy; however, inadvertent computerized transcription errors may be present.

## 2021-12-12 NOTE — PROGRESS NOTES
[address] [facility] Attending    S: 73 y.o. female with a history of gerd, oa, schizoaffective disorder, and gastric fundiplication who was found down for an undetermined amount of time. Reports shortness of breath and cough for 2.5 weeks. She was found to be 60% on RA. In the ER, COVID testing was positive with significantly elevated CPKs. Patient is unvaccinated. Had desaturation to 88% wit PaO2 of 55 with RRT and subsequent transfer to the MICU. Desat to 40's when Bipap removed. Now intubated. Sedated, paralyzed,  Noted to have pneumothorax and chest tube placed. Second chest tube placed when pneumothorax not improved. Today,  Supine. One chest tube removed      O: VS- Blood pressure (!) 110/53, pulse 51, temperature 98.6 °F (37 °C), temperature source Bladder, resp. rate 28, height 5' 1\" (1.549 m), weight 161 lb 4.8 oz (73.2 kg), SpO2 95 %. Exam is as noted by resident   Currently nonresponsive. Supine  Lungs: coarse, vent. Decreased BS  CV:  Rate controlled, regular   Ext-no C/C/E    Impressions: Active Problems:    Acute respiratory failure with hypoxia (HCC)    Covid pneumonia    Rhabdomyolysis, CK improved    Acute cystitis with hematuria, treated    CHARAN    Thrombocytopenia     Plan:      Acute hypoxic resp failure 2/2 covid - Decadron. Completed Tocimizilab. Vitamin C.  Vitamin D.  Zinc.  Appreciate Pulm management. Sepsis 2/2 PNA - done with vanc and zosyn  U/s of lower extremities negative DVT 11/20. Tube feeding. Fluid overloaded - on bumex  Schizoaffective - seroquel  Diflucan stopped. Ongoing communication with family re code status  Following hemoglobin and WBC  Bumex  ICU management  For trach and PEG when more stable  Full code at this time. Attending Physician Statement  I have reviewed the chart and seen the patient with the resident(s). I personally reviewed images, EKG's and similar tests, if present.   I personally reviewed and performed key elements of the history and exam.  I have reviewed and confirmed student and/or resident history and exam with changes as indicated above. I agree with the assessment, plan and orders as documented by the resident. Please refer to the  resident and/or student note for additional information.       Bo Johnson MD

## 2021-12-12 NOTE — PROGRESS NOTES
[address] [facility]   Department of Internal Medicine   Internal Medicine Residency  MICU Progress Note    Patient:  Siobhan Vargas 73 y.o. female   MRN: [id number]       Date of Service: 2021    Allergy: Ciprofloxacin  Cc follow up ARDS  Subjective     Patient was seen and examined this morning at bedside in no acute distress. Overnight, no acute events noted. Chest tube was removed yesterday. Wean FiO2 to 65% and try for trach and peg tomorrow. No other acute issues at this time. Objective     TEMPERATURE:  Current - Temp: 99.3 °F (37.4 °C); Max - Temp  Av.5 °F (37.5 °C)  Min: 99.3 °F (37.4 °C)  Max: 99.9 °F (37.7 °C)  RESPIRATIONS RANGE: Resp  Av.2  Min: 28  Max: 32  PULSE RANGE: Pulse  Av.8  Min: 60  Max: 92  BLOOD PRESSURE RANGE:  Systolic (81FTW), IPW:590 , Min:110 , JLU:589   ; Diastolic (52ZTS), WRA:48, Min:53, Max:53    PULSE OXIMETRY RANGE: SpO2  Av.1 %  Min: 88 %  Max: 94 %    I & O - 24hr:    Intake/Output Summary (Last 24 hours) at 2021 0402  Last data filed at 2021 0206  Gross per 24 hour   Intake 2033 ml   Output 835 ml   Net 1198 ml     I/O last 3 completed shifts: In: 3065 [I.V.:2242; NG/GT:823]  Out: 925 [Urine:925] I/O this shift:  In: -   Out: 210 [Urine:210]   Weight change:     Physical Exam  Constitutional:       Appearance: Normal appearance. Interventions: She is sedated and intubated. HENT:      Head: Normocephalic and atraumatic. Nose: Nose normal.   Eyes:      Pupils: Pupils are equal, round, and reactive to light. Cardiovascular:      Rate and Rhythm: Normal rate and regular rhythm. Pulses: Normal pulses. Heart sounds: Normal heart sounds. Pulmonary:      Effort: Pulmonary effort is normal. She is intubated. Breath sounds: Examination of the left-upper field reveals decreased breath sounds. Examination of the left-middle field reveals decreased breath sounds.  Examination of the left-lower field reveals decreased breath sounds. Decreased breath sounds and wheezing present. No rhonchi or rales. Abdominal:      General: Bowel sounds are normal. There is no distension. Palpations: Abdomen is soft. Musculoskeletal:      Cervical back: Normal range of motion. Skin:     General: Skin is warm and moist.      Capillary Refill: Capillary refill takes less than 2 seconds. Neurological:      General: No focal deficit present.          Medications     Continuous Infusions:   propofol 15 mcg/kg/min (12/12/21 0254)    cisatracurium (NIMBEX) infusion 4 mcg/kg/min (12/11/21 1722)    norepinephrine 1 mcg/min (12/12/21 0206)    fentaNYL 5 mcg/ml in 0.9%  ml infusion 200 mcg/hr (12/11/21 2303)    midazolam 10 mg/hr (12/11/21 2103)    sodium chloride 100 mL/hr at 11/1956    dextrose       Scheduled Meds:   miconazole nitrate   Topical BID    midodrine  20 mg Oral Q8H    albumin human  25 g IntraVENous Q8H    artificial tears   Both Eyes Q4H    hydrocortisone sodium succinate PF  100 mg IntraVENous Q8H    enoxaparin  40 mg SubCUTAneous BID    sodium chloride (PF)  10 mL IntraVENous BID    And    pantoprazole  40 mg IntraVENous Daily    sucralfate  1 g Oral 4 times per day    sennosides-docusate sodium  2 tablet Oral Daily    sodium chloride flush  5-40 mL IntraVENous 2 times per day    heparin flush  3 mL IntraVENous 2 times per day    chlorhexidine  15 mL Mouth/Throat BID    budesonide  1 mg Nebulization BID    Arformoterol Tartrate  15 mcg Nebulization BID    atorvastatin  10 mg Oral Daily    QUEtiapine  200 mg Oral Daily    sertraline  200 mg Oral Daily    traZODone  100 mg Oral Nightly    ascorbic acid  1,000 mg Oral Daily    vitamin D  2,000 Units Oral Daily    zinc sulfate  50 mg Oral Daily     PRN Meds: polyethylene glycol, sodium chloride flush, heparin flush, ipratropium-albuterol, sodium chloride, ondansetron **OR** ondansetron, acetaminophen **OR** acetaminophen, glucose, dextrose, glucagon (rDNA), dextrose  Nutrition:   NG/OG tube TF type: Pulmocare/Nephro/Glucerna/Jevity        At rate: ml/h    Labs and Imaging Studies     CBC:   Recent Labs     12/09/21  0417 12/09/21  1724 12/10/21  0404 12/10/21  1614 12/11/21  0409 12/11/21  0922 12/11/21  1532   WBC 11.6*  --  12.3*  --  12.2*  --   --    HGB 8.1*   < > 8.4*   < > 8.1* 8.2* 7.6*   HCT 26.1*   < > 28.0*   < > 27.5* 28.1* 25.6*   .6*  --  109.8*  --  110.0*  --   --      --  177  --  214  --   --     < > = values in this interval not displayed. BMP:    Recent Labs     12/09/21  1724 12/10/21  0404 12/11/21  0922    140 136   K 3.3* 4.7 4.2    102 100   CO2 30* 29 30*   BUN 22 23 20   CREATININE 0.5 0.5 0.5   GLUCOSE 104* 148* 134*       LIVER PROFILE:   Recent Labs     12/09/21 0417 12/09/21 0417 12/10/21  0404 12/11/21  0409 12/11/21 0922   AST 22   < > 21 24 23   ALT 53*   < > 50* 49* 51*   BILIDIR <0.2  --  <0.2 <0.2  --    BILITOT 0.3   < > <0.2 0.2 0.2   ALKPHOS 82   < > 88 116* 118*    < > = values in this interval not displayed.        PT/INR:   Recent Labs     12/09/21  0417 12/10/21  0404 12/11/21 0409   PROTIME 12.9* 12.7* 12.3   INR 1.2 1.1 1.1       APTT:   Recent Labs     12/09/21  0417 12/10/21  0404 12/11/21 0409   APTT 27.4 24.2* 29.6       Fasting Lipid Panel:    Lab Results   Component Value Date    CHOL 322 10/12/2021    TRIG 195 12/11/2021    HDL 70 10/12/2021       Cardiac Enzymes:    Lab Results   Component Value Date    CKTOTAL 135 11/22/2021    CKTOTAL 488 (H) 11/20/2021    CKTOTAL 585 (H) 11/19/2021       Notable Cultures:      Blood cultures   Blood Culture, Routine   Date Value Ref Range Status   11/25/2021 5 Days no growth  Final     Respiratory cultures No results found for: RESPCULTURE No results found for: LABGRAM  Urine   Urine Culture, Routine   Date Value Ref Range Status   11/19/2021 Growth not present  Final     Legionella No results found for: [place] Diff PCR No results found for: CDIFPCR  Wound culture/abscess: No results for input(s): WNDABS in the last 72 hours. Tip culture:No results for input(s): CXCATHTIP in the last 72 hours. Oxygen:     Vent Information  $Ventilation: $Subsequent Day  Skin Assessment: Clean, dry, & intact  Equipment ID: [id number]  Equipment Changed: (S) Humidification  Vent Type: [id number]  Vent Mode: AC/VC+  Vt Ordered: 320 mL  Pressure Ordered: 34  Rate Set: 28 bmp  Peak Flow: 0 L/min  Pressure Support: 0 cmH20  FiO2 : 75 %  SpO2: (!) 89 %  SpO2/FiO2 ratio: 118.67  PaO2/FiO2 ratio: 120  Sensitivity: 3  PEEP/CPAP: 10  I Time/ I Time %: 0.7 s  Humidification Source: Heated wire  Humidification Temp: 37  Humidification Temp Measured: 37  Circuit Condensation: Drained  Mask Type: Full face mask  Mask Size: Small  Additional Respiratory  Assessments  Pulse: 66  Resp: 28  SpO2: (!) 89 %  Position: Semi-Calabrese's  Humidification Source: Heated wire  Humidification Temp: 37  Circuit Condensation: Drained  Oral Care Completed?: Yes  Oral Care: Mouthwash with chlorhexidine, Lip moisturizer applied, Mouth swabbed, Mouth moisturizer, Mouth suctioned  Subglottic Suction Done?: Yes  Airway Type: ET  Airway Size: 8  Cuff Pressure (cm H2O): 29 cm H2O       [REMOVED] Urethral Catheter Temperature probe-Output (mL): 10 mL  [REMOVED] Urethral Catheter-Output (mL): 150 mL  [REMOVED] External Urinary Catheter-Output (mL): 0 mL  Urethral Catheter-Output (mL): 150 mL  [REMOVED] Urethral Catheter Temperature probe 16 fr-Output (mL): 250 mL    Imaging Studies:  XR CHEST PORTABLE   Final Result   No significant changes since the previous study of a December 10. XR CHEST PORTABLE   Final Result   1. There is no appreciable left pneumothorax. 2. Extensive multifocal bilateral airspace disease which is unchanged. XR CHEST PORTABLE   Final Result   1. Slightly improved aeration at the right apex and left lower lung   2.  Stable position and alignment of the tubes and catheters   3. Extensive bilateral airspace disease concerning for consolidative   pneumonia slightly improved         XR CHEST PORTABLE   Final Result   Stable support lines and bilateral airspace disease. XR CHEST PORTABLE   Final Result   Unchanged bilateral diffuse infiltrates. XR CHEST PORTABLE   Final Result   1. Decreased left pneumothorax with suspected trace residual.  2 left chest   tubes are similar to the previous exam.   2. Bilateral pulmonary opacities appear mildly increased. 3. The support devices are unchanged and appear to be in acceptable position. XR CHEST PORTABLE   Final Result   Unchanged airspace disease and left pneumothorax. XR CHEST PORTABLE   Final Result   1. No interval change in extensive multifocal bilateral pneumonia   2. Less than 10% left pneumothorax. There is stable position of the 2 left   chest tubes. XR CHEST PORTABLE   Final Result   1. Stable diffuse interstitial pulmonary edema or pneumonia. 2.  Left chest tube demonstrated. Minimal residual left pneumothorax. XR CHEST PORTABLE   Final Result   1. Stable diffuse bilateral airspace disease. 2. Small left pneumothorax appears new or increased compared to prior   examination. Left chest tube appears stable in position. 3. Possible right pleural effusion. XR CHEST PORTABLE   Final Result   1. There is no interval change in extensive multifocal bilateral pneumonia. XR CHEST PORTABLE   Final Result   No change in extensive bilateral pulmonary airspace opacities. No   pneumothorax is radiographically visible on the current exam.         XR CHEST PORTABLE   Final Result   Significantly decreased size of left pneumothorax. There is likely trace   left pneumothorax remaining. Stable extensive bilateral pulmonary airspace opacities.          XR CHEST PORTABLE   Final Result   Addendum 1 of 1   ADDENDUM:   Verbal report was given to Kade Fierro RN at 8:15 a.m. central standard   time on 11/30/2021. Final   New moderate sized left-sided pneumothorax. Stable extensive bilateral pulmonary airspace opacities            XR CHEST PORTABLE   Final Result   1. Lines okay. 2. Persistent edema/ARDS/pneumonia. 3. No sign of pneumothorax. XR CHEST PORTABLE   Final Result   Interval placement of left-sided chest tube with questionable residual   pneumothorax versus artifact. Extensive bilateral infiltrates. Continued follow-up recommended. XR CHEST PORTABLE   Final Result   Interval development of large left-sided tension pneumothorax. Extensive bilateral parenchymal infiltrates. Findings were discussed with Dr. Usman Jacob at approximately 0010 hours [place]   time on 11/28/2021. XR CHEST PORTABLE   Final Result   Severe bilateral pulmonary opacification. XR CHEST PORTABLE   Final Result   1. Endotracheal tube is 3 cm above the khai. 2. Stable interstitial pulmonary edema or pneumonia throughout both lungs. XR CHEST PORTABLE   Final Result   1. Somewhat limited exam, grossly unchanged. Please see above comments. .      RECOMMENDATION:   1. Recommend follow-up thoracic imaging, as directed clinically. .         XR ABDOMEN (KUB) (SINGLE AP VIEW)   Final Result   1. Satisfactory position of the NG tube within the stomach         XR CHEST PORTABLE   Final Result   1. There is no interval change in the multifocal bilateral pneumonia. XR CHEST PORTABLE   Final Result   Bilateral airspace disease with interval progression on the right         XR CHEST PORTABLE   Final Result   1. Endotracheal tube is 2.7 cm above the khai. 2. Stable interstitial pulmonary edema or pneumonia throughout both lungs. XR CHEST PORTABLE   Final Result   1. Worsening of the multifocal bilateral pneumonia when compared with the   prior study of 1 day earlier.          XR CHEST PORTABLE   Final Result   1. Multifocal bilateral pneumonia more prominent within the right upper lobe   2. Minimal partial interval clearing of the pneumonia within the left lung   3. Worsening of the pneumonia within the right upper lobe. US DUP LOWER EXTREMITIES BILATERAL VENOUS   Final Result   Within the visualized vessels there is no evidence for deep venous   thrombosis               XR CHEST PORTABLE   Final Result   1. Lines okay. 2. Slightly worsened diffuse edema versus pneumonia. XR CHEST PORTABLE   Final Result   1. Lines okay   2. Improved aeration, mild improvement and diffuse pneumonia/edema. XR CHEST ABDOMEN NG PLACEMENT   Final Result   NG tube position satisfactory. XR CHEST PORTABLE   Final Result   Stable bilateral pneumonia or interstitial pulmonary edema. XR CHEST PORTABLE   Final Result   Increasing bilateral infiltrates. XR CHEST PORTABLE   Final Result   1. There is no interval change in the multifocal bilateral pneumonia. CT HEAD WO CONTRAST   Final Result   No acute intracranial abnormality. Cortical atrophy and periventricular white matter ischemic changes. CT CERVICAL SPINE WO CONTRAST   Final Result   No acute abnormality of the cervical spine. Moderate degenerative changes with disc space narrowing and marginal bony   spur formation C5 through C7. Ground-glass opacities in the visualized lung apices. Please refer to chest   CT for additional information. CTA CHEST W CONTRAST   Final Result   No evidence of pulmonary embolism. Extensive multifocal ground-glass opacities predominantly in the peripheral   lung zones bilaterally, highly concerning for atypical or COVID related   pneumonia. XR CHEST PORTABLE   Final Result   Bilateral patchy airspace opacities worrisome for pneumonia.          XR CHEST PORTABLE    (Results Pending)   XR CHEST PORTABLE    (Results Pending)   XR CHEST PORTABLE    (Results Pending)   XR CHEST PORTABLE    (Results Pending)   XR CHEST PORTABLE    (Results Pending)        Resident's Assessment and Plan     Assessment and Plan:    Cardiovascular   History of hyperlipidemia  -Continue home atorvastatin 10 mg    Pulmonary  Acute hypoxic respiratory failure 2/2 Covid pneumonia  -Patient is intubated  -PF ratio of 1.18  -Currently sedated with fentanyl  -Completed Courses of Decadron and Toci  -Respiratory cultures have been negative, fungal cultures and BLE are also negative  -Continue ventilation with daily ABGs  -Completed remdesivir   -Continue breathing treatments  -Continue Hydrocortisone due to low Cortisol, will try to wean tomorrow   -Continue midodrine to 20 mg   -Continue supine position   -FiO2 down to 65%, reached out to gen surg for trach and peg    Left lung pneumothorax (resolving)  -Chest x-ray showed left pneumothorax on 11/29  -Repeat chest x-ray today showed worsening pneumothorax on 11/30  -General Surgery Consulted to remove chest tubes     Gastrointestinal  Transaminitis 2/2 Covid infection (resolving)   -Continue to monitor    Infectious Disease   COVID-19 pneumonia  -See above  -Continue to monitor CRP D-dimer and pro-Carl  -Continue vitamin C, vitamin D and zinc    Renal   Stage III intrinsic CHARAN (resolved)   -Baseline creatinine of 0.5  -Nephro is following  -Creatinine is stable today 0.5  -Continue to follow nephro recommendations  -Continue to monitor  -Continue on Albumin 25 g  -Started on Lasix 40 mg daily  -Continue to monitor and replace electrolytes as appropriate     Heme/Onc  Reactive leukocytosis 2/2 steroids and Covid infection (stable)  -WBC stable today  -Cultures have been negative  -Fungitell negative   -Diflucan stopped   -Continue to follow ID recommendations    Thrombocytopenia 2/2 Covid (Resolved)     Psychiatric   History of schizoaffective disorder  -Continue home hydroxyzine, trazodone, quetiapine and sertraline    # Peptic ulcer prophylaxis:Protonix   # DVT Prophylaxis: Lovenox   # Disposition: Cont current care     Syed Magana MD, PGY-1    I personally saw, examined and provided care for the patient. Radiographs, labs and medication list were reviewed by me independently. I spoke with bedside nursing, therapists and consultants. Critical care services and times documented are independent of procedures and multidisciplinary rounds with Residents. Additionally comprehensive, multidisciplinary rounds were conducted with the MICU team. The case was discussed in detail and plans for care were established. Review of Residents documentation was conducted and revisions were made as appropriate. I agree with the above documented exam, problem list and plan of care.   Shirley Rodriguez MD   CCT excluding procedures:38'

## 2021-12-12 NOTE — PLAN OF CARE
Problem: Falls - Risk of:  Goal: Will remain free from falls  Description: Will remain free from falls  Outcome: Met This Shift     Problem: Falls - Risk of:  Goal: Absence of physical injury  Description: Absence of physical injury  Outcome: Met This Shift     Problem: Skin Integrity:  Goal: Will show no infection signs and symptoms  Description: Will show no infection signs and symptoms  Outcome: Met This Shift     Problem: Skin Integrity:  Goal: Absence of new skin breakdown  Description: Absence of new skin breakdown  Outcome: Met This Shift     Problem: Gas Exchange - Impaired  Goal: Absence of hypoxia  Outcome: Ongoing     Problem: Gas Exchange - Impaired  Goal: Promote optimal lung function  Outcome: Ongoing

## 2021-12-12 NOTE — PROGRESS NOTES
[facility] - [facility]   Resident Progress Note    S:  Hospital day: 32   Brief Synopsis: Kvng Olivas is a 73 y.o. female with pmh of GERD, osteoarthritis and schizoaffective disorder who presented to ER s/p fall at home. Patient found down at home, with initial O2 saturation of 60%. Brought to the ER; found to have COVID-19 pneumonia , requiring bipap for appropriate saturation. Rhabdomyolysis, UTI. Started on appropriate management including tocilizumab, ceftriaxone 1 g daily, and IV fluids for rhabdomyolysis. Decadron was started daily, and with patients worsening status - pulmonology consulted. FULL CODE. Transferred to ICU on 11/18 for rapid breathing and hypoxia and pO2 of 55. Intubated 11/20 secondary to O2 sats consistently <80% with rapid breathing. 11/29 , patient developed left sided tension pneumothorax and underwent chest tube placement. Patient Intubated, sedated, paralyzed, proned. On vasopressors. Hemoglobin dropped to 6.9 with 1 unit PRBC transfused. Second chest tube placed on left chest. Vacuum changed to water seals. General surgery removed one smaller bore chest tube on 12/11/21        Overnight  Patient seen today in the morning. No acute events overnight. Chest tube removed yesterday. Remains supine, sedated and intubated. . FiO2 70%, PEEP 10.          Cont meds:    propofol 15 mcg/kg/min (12/12/21 0254)    cisatracurium (NIMBEX) infusion 4 mcg/kg/min (12/12/21 0557)    norepinephrine Stopped (12/12/21 0610)    fentaNYL 5 mcg/ml in 0.9%  ml infusion 200 mcg/hr (12/12/21 0557)    midazolam 10 mg/hr (12/12/21 0557)    sodium chloride 100 mL/hr at 11/1956    dextrose       Scheduled meds:    miconazole nitrate   Topical BID    midodrine  20 mg Oral Q8H    albumin human  25 g IntraVENous Q8H    artificial tears   Both Eyes Q4H    hydrocortisone sodium succinate PF  100 mg IntraVENous Q8H    enoxaparin  40 mg SubCUTAneous BID    sodium chloride (PF) 10 mL IntraVENous BID    And    pantoprazole  40 mg IntraVENous Daily    sucralfate  1 g Oral 4 times per day    sennosides-docusate sodium  2 tablet Oral Daily    sodium chloride flush  5-40 mL IntraVENous 2 times per day    heparin flush  3 mL IntraVENous 2 times per day    chlorhexidine  15 mL Mouth/Throat BID    budesonide  1 mg Nebulization BID    Arformoterol Tartrate  15 mcg Nebulization BID    atorvastatin  10 mg Oral Daily    QUEtiapine  200 mg Oral Daily    sertraline  200 mg Oral Daily    traZODone  100 mg Oral Nightly    ascorbic acid  1,000 mg Oral Daily    vitamin D  2,000 Units Oral Daily    zinc sulfate  50 mg Oral Daily     PRN meds: polyethylene glycol, sodium chloride flush, heparin flush, ipratropium-albuterol, sodium chloride, ondansetron **OR** ondansetron, acetaminophen **OR** acetaminophen, glucose, dextrose, glucagon (rDNA), dextrose     I reviewed the patient's past medical and surgical history, Medications and Allergies. O:  BP (!) 110/53   Pulse 51   Temp 98.6 °F (37 °C) (Bladder)   Resp 28   Ht 5' 1\" (1.549 m)   Wt 161 lb 4.8 oz (73.2 kg)   SpO2 95%   BMI 30.48 kg/m²   24 hour I&O: I/O last 3 completed shifts: In: 3065 [I.V.:2242; NG/GT:823]  Out: 925 [Urine:925]  I/O this shift: In: 461 [I.V.:666; NG/GT:261]  Out: 735 [Urine:735]      Physical Exam  Constitutional:       Comments: Intubated, sedated, paralyzed, supine   HENT:      Head: Normocephalic and atraumatic. Mouth/Throat:      Comments: intubated  Cardiovascular:      Rate and Rhythm: Normal rate and regular rhythm. Pulses: Normal pulses. Heart sounds: Normal heart sounds. Pulmonary:      Breath sounds: No wheezing, rhonchi or rales. Comments: Left sided Chest tube x 2  Abdominal:      General: There is no distension. Palpations: There is no mass. Hernia: No hernia is present. Musculoskeletal:         General: Normal range of motion.       Right lower leg: Edema present. Left lower leg: Edema present. Skin:     General: Skin is warm and dry. Findings: Bruising (mild, on back) present. Neurological:      Comments: Sedated, paralyzed       Labs:  Na/K/Cl/CO2:  141/4.0/103/29 (12/12 0403)  BUN/Cr/glu/ALT/AST/amyl/lip:  18/0.4/--/39/20/--/-- (12/12 0403)  WBC/Hgb/Hct/Plts:  10.3/7.2/24.1/230 (12/12 0403)  estimated creatinine clearance is 128 mL/min (A) (based on SCr of 0.4 mg/dL (L)). Other pertinent labs as noted below    Radiology:  XR CHEST PORTABLE   Final Result   No significant changes since the previous study of a December 10. XR CHEST PORTABLE   Final Result   1. There is no appreciable left pneumothorax. 2. Extensive multifocal bilateral airspace disease which is unchanged. XR CHEST PORTABLE   Final Result   1. Slightly improved aeration at the right apex and left lower lung   2. Stable position and alignment of the tubes and catheters   3. Extensive bilateral airspace disease concerning for consolidative   pneumonia slightly improved         XR CHEST PORTABLE   Final Result   Stable support lines and bilateral airspace disease. XR CHEST PORTABLE   Final Result   Unchanged bilateral diffuse infiltrates. XR CHEST PORTABLE   Final Result   1. Decreased left pneumothorax with suspected trace residual.  2 left chest   tubes are similar to the previous exam.   2. Bilateral pulmonary opacities appear mildly increased. 3. The support devices are unchanged and appear to be in acceptable position. XR CHEST PORTABLE   Final Result   Unchanged airspace disease and left pneumothorax. XR CHEST PORTABLE   Final Result   1. No interval change in extensive multifocal bilateral pneumonia   2. Less than 10% left pneumothorax. There is stable position of the 2 left   chest tubes. XR CHEST PORTABLE   Final Result   1. Stable diffuse interstitial pulmonary edema or pneumonia.       2.  Left chest tube demonstrated. Minimal residual left pneumothorax. XR CHEST PORTABLE   Final Result   1. Stable diffuse bilateral airspace disease. 2. Small left pneumothorax appears new or increased compared to prior   examination. Left chest tube appears stable in position. 3. Possible right pleural effusion. XR CHEST PORTABLE   Final Result   1. There is no interval change in extensive multifocal bilateral pneumonia. XR CHEST PORTABLE   Final Result   No change in extensive bilateral pulmonary airspace opacities. No   pneumothorax is radiographically visible on the current exam.         XR CHEST PORTABLE   Final Result   Significantly decreased size of left pneumothorax. There is likely trace   left pneumothorax remaining. Stable extensive bilateral pulmonary airspace opacities. XR CHEST PORTABLE   Final Result   Addendum 1 of 1   ADDENDUM:   Verbal report was given to Willian Negrete RN at 8:15 a.m. central standard   time on 11/30/2021. Final   New moderate sized left-sided pneumothorax. Stable extensive bilateral pulmonary airspace opacities            XR CHEST PORTABLE   Final Result   1. Lines okay. 2. Persistent edema/ARDS/pneumonia. 3. No sign of pneumothorax. XR CHEST PORTABLE   Final Result   Interval placement of left-sided chest tube with questionable residual   pneumothorax versus artifact. Extensive bilateral infiltrates. Continued follow-up recommended. XR CHEST PORTABLE   Final Result   Interval development of large left-sided tension pneumothorax. Extensive bilateral parenchymal infiltrates. Findings were discussed with Dr. Saniya Gonzalez at approximately 0010 hours [place]   time on 11/28/2021. XR CHEST PORTABLE   Final Result   Severe bilateral pulmonary opacification. XR CHEST PORTABLE   Final Result   1. Endotracheal tube is 3 cm above the khai.    2. Stable interstitial pulmonary edema or pneumonia throughout both lungs. XR CHEST PORTABLE   Final Result   1. Somewhat limited exam, grossly unchanged. Please see above comments. .      RECOMMENDATION:   1. Recommend follow-up thoracic imaging, as directed clinically. .         XR ABDOMEN (KUB) (SINGLE AP VIEW)   Final Result   1. Satisfactory position of the NG tube within the stomach         XR CHEST PORTABLE   Final Result   1. There is no interval change in the multifocal bilateral pneumonia. XR CHEST PORTABLE   Final Result   Bilateral airspace disease with interval progression on the right         XR CHEST PORTABLE   Final Result   1. Endotracheal tube is 2.7 cm above the khai. 2. Stable interstitial pulmonary edema or pneumonia throughout both lungs. XR CHEST PORTABLE   Final Result   1. Worsening of the multifocal bilateral pneumonia when compared with the   prior study of 1 day earlier. XR CHEST PORTABLE   Final Result   1. Multifocal bilateral pneumonia more prominent within the right upper lobe   2. Minimal partial interval clearing of the pneumonia within the left lung   3. Worsening of the pneumonia within the right upper lobe. US DUP LOWER EXTREMITIES BILATERAL VENOUS   Final Result   Within the visualized vessels there is no evidence for deep venous   thrombosis               XR CHEST PORTABLE   Final Result   1. Lines okay. 2. Slightly worsened diffuse edema versus pneumonia. XR CHEST PORTABLE   Final Result   1. Lines okay   2. Improved aeration, mild improvement and diffuse pneumonia/edema. XR CHEST ABDOMEN NG PLACEMENT   Final Result   NG tube position satisfactory. XR CHEST PORTABLE   Final Result   Stable bilateral pneumonia or interstitial pulmonary edema. XR CHEST PORTABLE   Final Result   Increasing bilateral infiltrates. XR CHEST PORTABLE   Final Result   1. There is no interval change in the multifocal bilateral pneumonia.          CT HEAD WO CONTRAST Final Result   No acute intracranial abnormality. Cortical atrophy and periventricular white matter ischemic changes. CT CERVICAL SPINE WO CONTRAST   Final Result   No acute abnormality of the cervical spine. Moderate degenerative changes with disc space narrowing and marginal bony   spur formation C5 through C7. Ground-glass opacities in the visualized lung apices. Please refer to chest   CT for additional information. CTA CHEST W CONTRAST   Final Result   No evidence of pulmonary embolism. Extensive multifocal ground-glass opacities predominantly in the peripheral   lung zones bilaterally, highly concerning for atypical or COVID related   pneumonia. XR CHEST PORTABLE   Final Result   Bilateral patchy airspace opacities worrisome for pneumonia. XR CHEST PORTABLE    (Results Pending)   XR CHEST PORTABLE    (Results Pending)   XR CHEST PORTABLE    (Results Pending)   XR CHEST PORTABLE    (Results Pending)   XR CHEST PORTABLE    (Results Pending)       A/P:  Active Problems:    Acute respiratory failure with hypoxia (HCC)    Rhabdomyolysis    Acute cystitis with hematuria    Primary spontaneous pneumothorax  Resolved Problems:    * No resolved hospital problems. *    1. Intubated, Sedated, Paralyzed,   11/20: Intubated 2/2 hypoxia and increased work of breathing, proned and paralyzed  S/p Left Chest Tube 11/29 for tension pneumothorax. Second chest tube placed on 11/30. Air leaks noted. Serosanguineous drainage. No further procedures planned by surgery. Patient now supine. Gen Surg considering trach and peg tube placement pending ventilator setting improvement. 2. Acute Hypoxic Respiratory Failure 2/2 Covid 19  Unvaccinated for Covid 19  Patient found with pulse ox of 60%. 11/18 - transferred to ICU due to worsening resp status.  --> currently intubated and sedated  CXR showing bilateral pulmonary infiltrates  Inflammatory markers: DDimer 530, , CRP 22.9, Ferritin 749 on admission   CTA pulm showing extensive bilateral pulmonary infiltrates consistent with COVID-19 pneumonia , no PE  Completed remdesivir and tocilizumab treatment  Decadron and SoluCortef course completed  Pulmonology consulted ; appreciate recs; daily ABG, Vitamin C, Vitamin D  Dietary consulted for further recommendations on nutrition status ; appreciate recs. CXR with resolution of pneumothorax. Advanced Care Planning with Spiritual Services ; recs appreciated - FULL CODE. Brother wants everything done for Marilyn per discussion with palliate medicine. 3. Hypotension  - continue Levophed titrate off per ICU protocol. 4. Left Sided Tension Pneumothorax -   S/p left sided chest tube  11/29 , Tension Pneumothorax on Xray  Left lung re inflated on repeat CXR  Whistle like sound noted Left Upper Lung field 2/2 likely to Chest tube . 2nd chest tube placed on 11/30. General surgery removed one smaller bore chest tube on 12/11/21  Continue to monitor    5. Concern for Sepsis - resolved  Likely 2/2 superimposed bacterial pneumonia , candidal colonization  Tmax 102.2 , Tavg 100.3F, Afebrile overnight. Given one dose cefepime and vancomycin in ICU  Procal 0.07, repeat 0.27 , blood cultures, urine culture negative. Completed Pip/Tazo, Vancomycin  Fungitell and B glucan pending  Diflucan stopped by ID. ID Signed off  No current Abx    7. CHARAN Stage I - resolved  Admission creatinine 0.6  Likely 2/2 to Ischemic ATN in setting of Hypotension  Creatinine increased to 1.6 --> trended down to 0.4  Nephrology following , appreciate recs     8. Anemia  2/2 to inflammation/ response to Covid 19  .3, MCHC 31.5 RDW 20.1  Vitamin B12/Folate normal March 2021  FOBT + 11/29  H/H < 7 11/30/2021 , s/p 1 unit PRBC. Hemoglobin 7.2 this AM.  Maintain H/H > 7    9.  Thrombocytopenia  Likely 2/2 to SALENA vs inflammation from Covid 19  Hold all anticoagulation , patient trialed on heparin , lovenox and argatroban but platelets continue to decrease  SALENA panel- negative  Platelets 967 this am     10. Hx Schizoaffective Disorder / Anxiety  Continue seroquel , zoloft, trazodone  Hold ativan, vistaril     11. Hypokalemia  Initial K 3.2 , Mag 2.6  Replace as needed  CMP daily    12. Rhabdomyolysis - resolved  2/2 to fall for unknown time period  Patient found down for unknown period of time  Initial CK > 3000  Received 4 L NS in ER    13. Concern for UTI - resolved   Likely simple cystitis ; patient with no CVA tenderness , presented initially with fever 102 F  Urinalysis with increased nitrites, bacteria, blood  Urine culture, blood cultures were negative  Given one dose doxy and rocephin in the ER  1g ceftriaxone IV /-  dc'd 11/18/2021   ID signed off.      GI/DVT ppx: protonix  Diet: NPO, tube feeds  Disposition: MICU    Electronically signed by Antonia Leon MD  PGY-2 on 12/12/2021 at 6:47 AM  This case was discussed with attending physician: Dr. Jono Martinez

## 2021-12-12 NOTE — PROGRESS NOTES
Patient seen and examined, smaller bore CT removed. Will get post-pull CXR 12/12 to guide removal of second CT.     Electronically signed by Alfredo Power MD on 12/11/2021 at 8:00 PM

## 2021-12-12 NOTE — PROGRESS NOTES
[facility]  Nephrology Attending Progress Note  Serg Mata. Jean Litten, MD        SUBJECTIVE:     11/28: pt remains in COVID 19 Isolation. She remains proned and on an FIO2 65% and PEEP12 with an O2 sat 95-96%    11/29: pt seen through window of icu due to covid, chart reviewed    11/30: pt seen through window of icu due to covid, chart reviewed. 12/1/21 :pt seen through window of icu due to covid, review of chart performed    12/2/21: pt seen through window of icu due to covid, chart reviewed, intubated, chest tube left     12/3/21 :chart reviewed, chest tube x 2 on right, intubated and prone, seen through window of icu    12/4: no new acute issues from overnight; large volume pleural fluid drainage from bilateral catheter    12/5: Brisk response to Bumex drip now placed on hold transition to intermittent dosing    12/6: pt seen through window of icu, chart reviewed, intubated    12/7: chart reviewed, seen through window of icu, intubated    12/8: pt seen through window of icu.  Intubated on levo    12/9: pt seen through window of icu, intubated and sedated, chest tube in place, on levo    12/10: pt seen through window of icu, intubated and sedated with chest tube, on levo    12/11: seen through icu window, intubated and sedated, on levo    12/12: pt seen through window of icu, chart reviewed, intubated and sedated    PROBLEM LIST:    Patient Active Problem List   Diagnosis    Schizoaffective disorder ([address]    Acute respiratory failure with hypoxia ([address]    Rhabdomyolysis    Acute cystitis with hematuria    Primary spontaneous pneumothorax        PAST MEDICAL HISTORY:    Past Medical History:   Diagnosis Date    GERD (gastroesophageal reflux disease)     Osteoarthritis of right knee     Schizoaffective disorder ([address]     psycare       DIET:    ADULT TUBE FEEDING; Orogastric; Peptide Based; Continuous; 10; Yes; 10; Q 24 hours; 20; 30; Q 4 hours     PHYSICAL EXAM:     Patient Vitals for the past 24 hrs: BP Temp Temp src Pulse Resp SpO2   12/12/21 1100    76 28 91 %   12/12/21 1017    80 28 (!) 87 %   12/12/21 1000    75 28 (!) 87 %   12/12/21 0900    55 28 93 %   12/12/21 0800  99 °F (37.2 °C) Bladder 55 28 93 %   12/12/21 0700    62 28 93 %   12/12/21 0600    51 28 95 %   12/12/21 0500    57 27 94 %   12/12/21 0400  98.6 °F (37 °C) Bladder 54 28 93 %   12/12/21 0300    61 27 93 %   12/12/21 0200    66 28 (!) 89 %   12/12/21 0109    65 28 90 %   12/12/21 0000  99.3 °F (37.4 °C) Bladder 63 28 92 %   12/11/21 2300    60 28 92 %   12/11/21 2200    64 28 91 %   12/11/21 2100    71 28 (!) 89 %   12/11/21 2000  99.5 °F (37.5 °C) Bladder 85 28 90 %   12/11/21 1959    86 28 90 %   12/11/21 1800  99.5 °F (37.5 °C) Bladder 70 28 (!) 89 %   12/11/21 1700    65 28 92 %   12/11/21 1654    66 28 94 %   12/11/21 1600  99.9 °F (37.7 °C) Bladder 83 28 93 %   12/11/21 1500    72 28 94 %   12/11/21 1400    68 28 92 %   12/11/21 1354    67 28 (!) 88 %   12/11/21 1300    65 28    12/11/21 1200 (!) 110/53 99.5 °F (37.5 °C) Bladder 76 28 (!) 89 %   @      Intake/Output Summary (Last 24 hours) at 12/12/2021 1128  Last data filed at 12/12/2021 1100  Gross per 24 hour   Intake 2528 ml   Output 1275 ml   Net 1253 ml         Wt Readings from Last 3 Encounters:   12/07/21 161 lb 4.8 oz (73.2 kg)   10/26/21 152 lb (68.9 kg)   10/12/21 149 lb (67.6 kg)     PE  Constitutional:  Pt is intubated  Seen through window of icu    MEDS (scheduled):    miconazole nitrate   Topical BID    naloxegol  12.5 mg Oral QAM    midodrine  20 mg Oral Q8H    albumin human  25 g IntraVENous Q8H    artificial tears   Both Eyes Q4H    hydrocortisone sodium succinate PF  100 mg IntraVENous Q8H    enoxaparin  40 mg SubCUTAneous BID    sodium chloride (PF)  10 mL IntraVENous BID    And    pantoprazole  40 mg IntraVENous Daily    sucralfate  1 g Oral 4 times per day    sennosides-docusate sodium 2 tablet Oral Daily    sodium chloride flush  5-40 mL IntraVENous 2 times per day    heparin flush  3 mL IntraVENous 2 times per day    chlorhexidine  15 mL Mouth/Throat BID    budesonide  1 mg Nebulization BID    Arformoterol Tartrate  15 mcg Nebulization BID    atorvastatin  10 mg Oral Daily    QUEtiapine  200 mg Oral Daily    sertraline  200 mg Oral Daily    traZODone  100 mg Oral Nightly    ascorbic acid  1,000 mg Oral Daily    vitamin D  2,000 Units Oral Daily    zinc sulfate  50 mg Oral Daily       MEDS (infusions):   propofol 15 mcg/kg/min (12/12/21 0254)    cisatracurium (NIMBEX) infusion 4 mcg/kg/min (12/12/21 0557)    norepinephrine Stopped (12/12/21 0610)    fentaNYL 5 mcg/ml in 0.9%  ml infusion 200 mcg/hr (12/12/21 0557)    midazolam 10 mg/hr (12/12/21 0557)    sodium chloride 100 mL/hr at 11/1956    dextrose         MEDS (prn):  polyethylene glycol, sodium chloride flush, heparin flush, ipratropium-albuterol, sodium chloride, ondansetron **OR** ondansetron, acetaminophen **OR** acetaminophen, glucose, dextrose, glucagon (rDNA), dextrose    DATA:    Recent Labs     12/10/21  0404 12/10/21  1614 12/11/21  0409 12/11/21  0409 12/11/21 0922 12/11/21  1532 12/12/21  0403   WBC 12.3*  --  12.2*  --   --   --  10.3   HGB 8.4*   < > 8.1*   < > 8.2* 7.6* 7.2*   HCT 28.0*   < > 27.5*   < > 28.1* 25.6* 24.1*   .8*  --  110.0*  --   --   --  108.6*     --  214  --   --   --  230    < > = values in this interval not displayed.      Recent Labs     12/10/21  0404 12/10/21  0404 12/11/21  0409 12/11/21 0922 12/12/21  0403     --   --  136 141   K 4.7  --   --  4.2 4.0     --   --  100 103   CO2 29  --   --  30* 29   BUN 23  --   --  20 18   CREATININE 0.5  --   --  0.5 0.4*   LABGLOM >60  --   --  >60 >60   GLUCOSE 148*  --   --  134* 130*   CALCIUM 8.1*  --   --  8.4* 8.4*   ALT 50*   < > 49* 51* 39*   AST 21   < > 24 23 20   BILIDIR <0.2  --  <0.2  --  <0.2 BILITOT <0.2   < > 0.2 0.2 0.4   ALKPHOS 88   < > 116* 118* 95   MG  --   --  1.9  --   --    PHOS  --   --  3.4  --   --     < > = values in this interval not displayed. Lab Results   Component Value Date    LABALBU 3.2 (L) 12/12/2021    LABALBU 2.2 (L) 12/11/2021    LABALBU 2.2 (L) 12/11/2021     Lab Results   Component Value Date    TSH 4.720 (H) 10/12/2021       Iron Studies  Lab Results   Component Value Date    FERRITIN 197 12/12/2021     Vitamin B-12   Date Value Ref Range Status   03/02/2021 771 211 - 946 pg/mL Final     Folate   Date Value Ref Range Status   03/02/2021 >20.0 4.8 - 24.2 ng/mL Final       No results found for: VITD25  No results found for: PTH    No components found for: URIC    Lab Results   Component Value Date    COLORU Yellow 11/16/2021    NITRU POSITIVE 11/16/2021    GLUCOSEU Negative 11/16/2021    KETUA 15 11/16/2021    UROBILINOGEN 0.2 11/16/2021    BILIRUBINUR Negative 11/16/2021       No results found for: Porter Osborne      IMPRESSION/RECOMMENDATIONS:      1. Acute kidney injury stage I  ischemic ATN occurring in the setting of hypotension  nonoliguric    Cr continues to improve now back to baseline  Continue to monitor labs and urine output  Bumex drip put on hold; transition to intermittent dosing  Good response  Cr 0.4     2. Acute hypoxic respiratory failure  due to COVID-19 pneumonia  Intubation with mechanical ventilation  Ct for pneumo left     3. Septic shock  suspected possible superimposed bacterial pneumonia  On levo  abx per id     4. Volume overload  bumex 1 mg iv qd held with low bp  uo 1.9L  +2.8L  overall as of yesterday  I/o miscalculated 12/9 and now inaccurate    5. Hyponatremia  Na at 141     6. Hypocalcemia  Replace prn  hypoalbuminemic    dw icu team    Claudette Toussaint.  Henry Cano MD

## 2021-12-13 NOTE — PROGRESS NOTES
Called POA brother Daily Friedman for permission for blood transfusion, ok so sent type and screen, blood done will call for blood now

## 2021-12-13 NOTE — PROGRESS NOTES
Palliative Care Department  [phone]  Palliative Care Progress Note  Provider KOFI Buckley CNP      PATIENT: Samia Harper  : 1955  MRN: [id number]  ADMISSION DATE: 2021  6:41 PM  Referring Provider: Javid Chavira MD    Palliative Medicine was consulted on hospital day 20 for assistance with Goals of care, Code Status Discussion, Family support    HPI:     Bola Perez is a 73 y.o. y/o female with a history of schizoaffective disorder, GERD, gastric fundoplication, osteoarthritis who presented to [facility]) on 2021 with shortness of breath. Patient was found to be Covid positive. Currently intubated, and sedated    ASSESSMENT/PLAN:     Pertinent Hospital Diagnoses      COVID 19 pneumonia   Acute hypoxic respiratory failure    Palliative Care Encounter / Counseling Regarding Goals of Care  Please see detailed goals of care discussion as below   At this time, Samia Harper, Does Not have capacity for medical decision-making. Capacity is time limited and situation/question specific   Outcome of goals of care meeting: Continue current care. Spoke to the patient's brother Gilson Bernard he would like to move forward with a trach and PEG. He would like to continue CODE STATUS as a full code.  Code status Full Code   Advanced Directives: no POA or living will in epic   Surrogate/Legal NOK: Brother Taylor, [phone]    Spiritual assessment: no spiritual distress identified  Bereavement and grief: to be determined  Referrals to: none today    Thank you for the opportunity to participate in the care of Samia Harper. KOFI Buckley CNP  Palliative Medicine     SUBJECTIVE:     Details of Conversation: Chart reviewed and spoke with the patient's bedside nurse. Paralytic was turned off this morning. Per the notes plan is for a trach and PEG. I called and spoke with the patient's brother Gilson Bernard.   All questions were answered. He would like to move forward with a trach and peg and would also like to continue CODE STATUS as a full code. He is hopeful that she will eventually be able to be weaned from the ventilator, but understands the long term complications of COVID. He also spoke with his other sister and they are both in agreement that they would like to continue doing everything. There are no further PM needs at this time. PM will now sign off. If new PM needs arise, please re-consult. Thank you. Prognosis: Guarded     OBJECTIVE:     /83   Pulse 74   Temp 99.1 °F (37.3 °C) (Bladder)   Resp 28   Ht 5' 1\" (1.549 m)   Wt 161 lb 4.8 oz (73.2 kg)   SpO2 90%   BMI 30.48 kg/m²     Due to the current efforts to prevent transmission of COVID-19 and also the need to preserve PPE for other caregivers, a face-to-face encounter with the patient was not performed. That being said, all relevant records and diagnostic tests were reviewed, including laboratory results and imaging. Please reference any relevant documentation elsewhere. Care will be coordinated with the primary service and other specialties as appropriate. Objective data reviewed: labs, images, records, medication use, vitals and chart    Time/Communication  Greater than 50% of time spent, total 35 minutes in counseling and coordination of care at the bedside regarding goals of care. Thank you for allowing Palliative Medicine to participate in the care of [address] [place] [address]. Note: This report was completed using computerBromium voiced recognition software. Every effort has been made to ensure accuracy; however, inadvertent computerized transcription errors may be present.

## 2021-12-13 NOTE — CARE COORDINATION
12/13  Care Coordination: Pt remains in MICU, Micky. On vent,fio2 80%, peep 10, AC 28. Chest Tubes x2. On IV Sedation, IV Nimbex,IV levophed. [facility] following. D/C plan remains unclear at this time. For trach and PEG when more stable. [facility] following. CM/SW will continue to follow for discharge planning.    Esmer KUMAR,RN--BC  [phone]

## 2021-12-13 NOTE — PROGRESS NOTES
[facility] Inpatient   Resident Progress Note    S:  Hospital day: 32   Brief Synopsis: Lindy Clinton is a 73 y.o. female with pmh of GERD, osteoarthritis and schizoaffective disorder who presented to ER s/p fall at home. Patient found down at home, with initial O2 saturation of 60%. Brought to the ER; found to have COVID-19 pneumonia , requiring bipap for appropriate saturation. Rhabdomyolysis, UTI. Started on appropriate management including tocilizumab, ceftriaxone 1 g daily, and IV fluids for rhabdomyolysis. Decadron was started daily, and with patients worsening status - pulmonology consulted. FULL CODE. Transferred to ICU on 11/18 for rapid breathing and hypoxia and pO2 of 55. Intubated 11/20 secondary to O2 sats consistently <80% with rapid breathing. 11/29 , patient developed left sided tension pneumothorax and underwent chest tube placement. Patient Intubated, sedated, paralyzed, proned. On vasopressors. Hemoglobin dropped to 6.9 with 1 unit PRBC transfused. Second chest tube placed on left chest. Vacuum changed to water seals. General surgery removed one smaller bore chest tube on 12/11/21 and the 2nd chest tube on 12/13/2021. Patient seen today in the morning. Remains supine, sedated and intubated. FiO2 80%, PEEP 10, P/f ratio: 0.76. Gen Surg planning for trach and peg tube placement.         Cont meds:    propofol 20 mcg/kg/min (12/13/21 7967)    fentaNYL 5 mcg/ml in 0.9%  ml infusion 200 mcg/hr (12/13/21 9142)    midazolam 10 mg/hr (12/13/21 0409)    sodium chloride 100 mL/hr at 11/1955    dextrose       Scheduled meds:    potassium chloride  40 mEq IntraVENous Once    [START ON 12/14/2021] lansoprazole  30 mg Oral QAM AC    [START ON 12/14/2021] polyethylene glycol  17 g Oral Daily    hydrocortisone sodium succinate PF  50 mg IntraVENous Q8H    artificial tears   Both Eyes Q12H    miconazole nitrate   Topical BID    naloxegol  12.5 mg Oral QAM    midodrine  20 mg Oral Q8H    [Held by provider] enoxaparin  40 mg SubCUTAneous BID    sucralfate  1 g Oral 4 times per day    sennosides-docusate sodium  2 tablet Oral Daily    sodium chloride flush  5-40 mL IntraVENous 2 times per day    heparin flush  3 mL IntraVENous 2 times per day    chlorhexidine  15 mL Mouth/Throat BID    budesonide  1 mg Nebulization BID    Arformoterol Tartrate  15 mcg Nebulization BID    atorvastatin  10 mg Oral Daily    QUEtiapine  200 mg Oral Daily    sertraline  200 mg Oral Daily    traZODone  100 mg Oral Nightly    ascorbic acid  1,000 mg Oral Daily    vitamin D  2,000 Units Oral Daily    zinc sulfate  50 mg Oral Daily     PRN meds: sodium chloride flush, heparin flush, ipratropium-albuterol, sodium chloride, acetaminophen **OR** acetaminophen, glucose, dextrose, glucagon (rDNA), dextrose     I reviewed the patient's past medical and surgical history, Medications and Allergies. O:  /83   Pulse 56   Temp 99.1 °F (37.3 °C) (Bladder)   Resp 27   Ht 5' 1\" (1.549 m)   Wt 161 lb 4.8 oz (73.2 kg)   SpO2 93%   BMI 30.48 kg/m²   24 hour I&O: I/O last 3 completed shifts: In: 6214 [I.V.:2283; Blood:400; NG/GT:767]  Out: 2180 [Urine:2180]  I/O this shift:  In: -   Out: 60 [Urine:60]      Physical Exam  Constitutional:       Comments: Intubated, sedated, paralyzed, supine   HENT:      Head: Normocephalic and atraumatic. Mouth/Throat:      Comments: intubated  Cardiovascular:      Rate and Rhythm: Normal rate and regular rhythm. Pulses: Normal pulses. Heart sounds: Normal heart sounds. Pulmonary:      Breath sounds: No wheezing, rhonchi or rales. Abdominal:      General: There is no distension. Palpations: There is no mass. Hernia: No hernia is present. Musculoskeletal:         General: Normal range of motion. Right lower leg: Edema present. Left lower leg: Edema present. Skin:     General: Skin is warm and dry. Findings: Bruising: mild, on back. Neurological:      Comments: Sedated, paralyzed       Labs:  Na/K/Cl/CO2:  138/3.2/99/30 (12/13 0510)  BUN/Cr/glu/ALT/AST/amyl/lip:  17/0.4/--/38/23/--/-- (12/13 0510)  WBC/Hgb/Hct/Plts:  10.9/9.1/29.9/243 (12/13 0510)  estimated creatinine clearance is 128 mL/min (A) (based on SCr of 0.4 mg/dL (L)). Other pertinent labs as noted below    Radiology:  XR CHEST PORTABLE   Final Result   Unchanged bilateral airspace disease. Right pleural effusion with suspected   interval progression. XR CHEST PORTABLE   Final Result   1. No significant changes since the December 11.      2.  Continued air density under the left diaphragma limits expansion of the   left lower lung base. 3.  Further evaluation with CT chest recommended as above discussed. XR CHEST PORTABLE   Final Result   No significant changes since the previous study of a December 10. XR CHEST PORTABLE   Final Result   1. There is no appreciable left pneumothorax. 2. Extensive multifocal bilateral airspace disease which is unchanged. XR CHEST PORTABLE   Final Result   1. Slightly improved aeration at the right apex and left lower lung   2. Stable position and alignment of the tubes and catheters   3. Extensive bilateral airspace disease concerning for consolidative   pneumonia slightly improved         XR CHEST PORTABLE   Final Result   Stable support lines and bilateral airspace disease. XR CHEST PORTABLE   Final Result   Unchanged bilateral diffuse infiltrates. XR CHEST PORTABLE   Final Result   1. Decreased left pneumothorax with suspected trace residual.  2 left chest   tubes are similar to the previous exam.   2. Bilateral pulmonary opacities appear mildly increased. 3. The support devices are unchanged and appear to be in acceptable position. XR CHEST PORTABLE   Final Result   Unchanged airspace disease and left pneumothorax.          XR CHEST PORTABLE normal... Final Result   1. No interval change in extensive multifocal bilateral pneumonia   2. Less than 10% left pneumothorax. There is stable position of the 2 left   chest tubes. XR CHEST PORTABLE   Final Result   1. Stable diffuse interstitial pulmonary edema or pneumonia. 2.  Left chest tube demonstrated. Minimal residual left pneumothorax. XR CHEST PORTABLE   Final Result   1. Stable diffuse bilateral airspace disease. 2. Small left pneumothorax appears new or increased compared to prior   examination. Left chest tube appears stable in position. 3. Possible right pleural effusion. XR CHEST PORTABLE   Final Result   1. There is no interval change in extensive multifocal bilateral pneumonia. XR CHEST PORTABLE   Final Result   No change in extensive bilateral pulmonary airspace opacities. No   pneumothorax is radiographically visible on the current exam.         XR CHEST PORTABLE   Final Result   Significantly decreased size of left pneumothorax. There is likely trace   left pneumothorax remaining. Stable extensive bilateral pulmonary airspace opacities. XR CHEST PORTABLE   Final Result   Addendum 1 of 1   ADDENDUM:   Verbal report was given to Nitesh Robles RN at 8:15 a.m. central standard   time on 11/30/2021. Final   New moderate sized left-sided pneumothorax. Stable extensive bilateral pulmonary airspace opacities            XR CHEST PORTABLE   Final Result   1. Lines okay. 2. Persistent edema/ARDS/pneumonia. 3. No sign of pneumothorax. XR CHEST PORTABLE   Final Result   Interval placement of left-sided chest tube with questionable residual   pneumothorax versus artifact. Extensive bilateral infiltrates. Continued follow-up recommended. XR CHEST PORTABLE   Final Result   Interval development of large left-sided tension pneumothorax. Extensive bilateral parenchymal infiltrates.       Findings were discussed with  Deyanira Ramsey at approximately 0010 hours [facility]   time on 11/28/2021. XR CHEST PORTABLE   Final Result   Severe bilateral pulmonary opacification. XR CHEST PORTABLE   Final Result   1. Endotracheal tube is 3 cm above the khai. 2. Stable interstitial pulmonary edema or pneumonia throughout both lungs. XR CHEST PORTABLE   Final Result   1. Somewhat limited exam, grossly unchanged. Please see above comments. .      RECOMMENDATION:   1. Recommend follow-up thoracic imaging, as directed clinically. .         XR ABDOMEN (KUB) (SINGLE AP VIEW)   Final Result   1. Satisfactory position of the NG tube within the stomach         XR CHEST PORTABLE   Final Result   1. There is no interval change in the multifocal bilateral pneumonia. XR CHEST PORTABLE   Final Result   Bilateral airspace disease with interval progression on the right         XR CHEST PORTABLE   Final Result   1. Endotracheal tube is 2.7 cm above the khai. 2. Stable interstitial pulmonary edema or pneumonia throughout both lungs. XR CHEST PORTABLE   Final Result   1. Worsening of the multifocal bilateral pneumonia when compared with the   prior study of 1 day earlier. XR CHEST PORTABLE   Final Result   1. Multifocal bilateral pneumonia more prominent within the right upper lobe   2. Minimal partial interval clearing of the pneumonia within the left lung   3. Worsening of the pneumonia within the right upper lobe. US DUP LOWER EXTREMITIES BILATERAL VENOUS   Final Result   Within the visualized vessels there is no evidence for deep venous   thrombosis               XR CHEST PORTABLE   Final Result   1. Lines okay. 2. Slightly worsened diffuse edema versus pneumonia. XR CHEST PORTABLE   Final Result   1. Lines okay   2. Improved aeration, mild improvement and diffuse pneumonia/edema. XR CHEST ABDOMEN NG PLACEMENT   Final Result   NG tube position satisfactory.          XR CHEST PORTABLE   Final Result   Stable bilateral pneumonia or interstitial pulmonary edema. XR CHEST PORTABLE   Final Result   Increasing bilateral infiltrates. XR CHEST PORTABLE   Final Result   1. There is no interval change in the multifocal bilateral pneumonia. CT HEAD WO CONTRAST   Final Result   No acute intracranial abnormality. Cortical atrophy and periventricular white matter ischemic changes. CT CERVICAL SPINE WO CONTRAST   Final Result   No acute abnormality of the cervical spine. Moderate degenerative changes with disc space narrowing and marginal bony   spur formation C5 through C7. Ground-glass opacities in the visualized lung apices. Please refer to chest   CT for additional information. CTA CHEST W CONTRAST   Final Result   No evidence of pulmonary embolism. Extensive multifocal ground-glass opacities predominantly in the peripheral   lung zones bilaterally, highly concerning for atypical or COVID related   pneumonia. XR CHEST PORTABLE   Final Result   Bilateral patchy airspace opacities worrisome for pneumonia. XR CHEST PORTABLE    (Results Pending)   XR CHEST PORTABLE    (Results Pending)   XR CHEST PORTABLE    (Results Pending)   XR CHEST PORTABLE    (Results Pending)   XR CHEST PORTABLE    (Results Pending)       A/P:  Active Problems:    Acute respiratory failure with hypoxia (HCC)    Rhabdomyolysis    Acute cystitis with hematuria    Primary spontaneous pneumothorax  Resolved Problems:    * No resolved hospital problems. *    1. Intubated, Sedated, Paralyzed,   11/20: Intubated 2/2 hypoxia and increased work of breathing, proned and paralyzed  S/p Left Chest Tube 11/29 for tension pneumothorax with second chest tube placed on 11/30. Chest tubes removed 12/11 and 12/13. Patient now supine. Gen Surg considering trach and peg tube placement pending ventilator setting improvement.     2. Acute Hypoxic Respiratory Failure 2/2 Covid 19  Unvaccinated for Covid 19  Patient found with pulse ox of 60%. 11/18 - transferred to ICU due to worsening resp status. --> currently intubated and sedated  CXR showing bilateral pulmonary infiltrates  Inflammatory markers: DDimer 530, , CRP 22.9, Ferritin 749 on admission   CTA pulm showing extensive bilateral pulmonary infiltrates consistent with COVID-19 pneumonia , no PE  Completed remdesivir and tocilizumab treatment  Decadron and SoluCortef course completed  Pulmonology consulted ; appreciate recs; daily ABG, Vitamin C, Vitamin D  Dietary consulted for further recommendations on nutrition status ; appreciate recs. CXR with resolution of pneumothorax. Advanced Care Planning with Spiritual Services ; recs appreciated - FULL CODE. Brother wants everything done for Marilyn per discussion with palliate medicine. 3. Hypotension  - continue Levophed titrate off per ICU protocol. 4. Left Sided Tension Pneumothorax -   S/p left sided chest tube  11/29 , Tension Pneumothorax on Xray  Left lung re inflated on repeat CXR  Whistle like sound noted Left Upper Lung field 2/2 likely to Chest tube . 2nd chest tube placed on 11/30. General surgery removed  chest tubes on 12/11/21 and 12/13/2021  Chest x-ray from today with right pleural effusion more apparent, may be mildly progressive. Continue to monitor    5. Concern for Sepsis - resolved  Likely 2/2 superimposed bacterial pneumonia , candidal colonization  Tmax 102.2 , Tavg 100.3F, Afebrile overnight. Given one dose cefepime and vancomycin in ICU  Procal 0.07, repeat 0.27 , blood cultures, urine culture negative. Completed Pip/Tazo, Vancomycin  Fungitell and B glucan pending  Diflucan stopped by ID. ID Signed off  No current Abx    7. CHARAN Stage I - resolved  Admission creatinine 0.6  Likely 2/2 to Ischemic ATN in setting of Hypotension  Creatinine increased to 1.6 --> trended down to 0.4  Nephrology following , appreciate recs     8. Anemia  2/2 to inflammation/ response to Covid 19  .3, MCHC 31.5 RDW 20.1  Vitamin B12/Folate normal March 2021  FOBT + 11/29  H/H < 7 11/30/2021 , s/p 1 unit PRBC. Hemoglobin 7.2 this AM.  Maintain H/H > 7    9. Thrombocytopenia  Likely 2/2 to SALENA vs inflammation from Covid 19  Hold all anticoagulation , patient trialed on heparin , lovenox and argatroban but platelets continue to decrease  SALENA panel- negative  Platelets 030 this am     10. Hx Schizoaffective Disorder / Anxiety  Continue seroquel , zoloft, trazodone  Hold ativan, vistaril     11. Hypokalemia  Initial K 3.2 , Mag 2.6  Replace as needed  CMP daily    12. Rhabdomyolysis - resolved  2/2 to fall for unknown time period  Patient found down for unknown period of time  Initial CK > 3000  Received 4 L NS in ER    13. Concern for UTI - resolved   Likely simple cystitis ; patient with no CVA tenderness , presented initially with fever 102 F  Urinalysis with increased nitrites, bacteria, blood  Urine culture, blood cultures were negative  Given one dose doxy and rocephin in the ER  1g ceftriaxone IV /-  dc'd 11/18/2021   ID signed off.      GI/DVT ppx: protonix  Diet: NPO, tube feeds  Disposition: MICU    Electronically signed by Carrie Nichols MD  [id number] on 12/13/2021 at 11:00 AM  This case was discussed with attending physician: Dr. Binh Swift

## 2021-12-13 NOTE — PROGRESS NOTES
[address] [facility]   Department of Internal Medicine   Internal Medicine Residency  MICU Progress Note    Patient:  Angelica Lighter 73 y.o. female   MRN: [id number]       Date of Service: 2021    Allergy: Ciprofloxacin  Cc follow up ARDS  Subjective     Patient was seen and examined this morning at bedside in no acute distress. Overnight, no acute events noted. 2nd chest tube removed today. Spoke with general surgery, they will plan for trach and peg tomorrow. No other issues at this time Wean FiO2 to 60% today. Objective     TEMPERATURE:  Current - Temp: 99.1 °F (37.3 °C); Max - Temp  Av.1 °F (37.3 °C)  Min: 99 °F (37.2 °C)  Max: 99.3 °F (37.4 °C)  RESPIRATIONS RANGE: Resp  Av  Min: 27  Max: 28  PULSE RANGE: Pulse  Av.5  Min: 51  Max: 80  BLOOD PRESSURE RANGE:  Systolic (93MGM), BLD:789 , Min:104 , WTU:514   ; Diastolic (60ZRW), KRO:67, Min:53, Max:89    PULSE OXIMETRY RANGE: SpO2  Av.1 %  Min: 87 %  Max: 95 %    I & O - 24hr:    Intake/Output Summary (Last 24 hours) at 2021 0906  Last data filed at 2021 0600  Gross per 24 hour   Intake 3450 ml   Output 2080 ml   Net 1370 ml     I/O last 3 completed shifts: In: 3655 [I.V.:2283; Blood:400; NG/GT:767]  Out: 2180 [Urine:2180] No intake/output data recorded. Weight change:     Physical Exam  Constitutional:       Appearance: Normal appearance. Interventions: She is sedated and intubated. HENT:      Head: Normocephalic and atraumatic. Nose: Nose normal.   Eyes:      Pupils: Pupils are equal, round, and reactive to light. Cardiovascular:      Rate and Rhythm: Normal rate and regular rhythm. Pulses: Normal pulses. Heart sounds: Normal heart sounds. Pulmonary:      Effort: Pulmonary effort is normal. She is intubated. Breath sounds: Examination of the left-upper field reveals decreased breath sounds. Examination of the left-middle field reveals decreased breath sounds.  Examination of the left-lower field reveals decreased breath sounds. Decreased breath sounds and wheezing present. No rhonchi or rales. Abdominal:      General: Bowel sounds are normal. There is no distension. Palpations: Abdomen is soft. Musculoskeletal:      Cervical back: Normal range of motion. Skin:     General: Skin is warm and moist.      Capillary Refill: Capillary refill takes less than 2 seconds. Neurological:      General: No focal deficit present.          Medications     Continuous Infusions:   sodium chloride      propofol 15 mcg/kg/min (12/13/21 0743)    cisatracurium (NIMBEX) infusion 4 mcg/kg/min (12/12/21 1808)    norepinephrine Stopped (12/12/21 1814)    fentaNYL 5 mcg/ml in 0.9%  ml infusion 200 mcg/hr (12/13/21 0742)    midazolam 10 mg/hr (12/13/21 0409)    sodium chloride 100 mL/hr at 11/1955    dextrose       Scheduled Meds:   potassium chloride  40 mEq IntraVENous Once    miconazole nitrate   Topical BID    naloxegol  12.5 mg Oral QAM    midodrine  20 mg Oral Q8H    artificial tears   Both Eyes Q4H    hydrocortisone sodium succinate PF  100 mg IntraVENous Q8H    [Held by provider] enoxaparin  40 mg SubCUTAneous BID    sodium chloride (PF)  10 mL IntraVENous BID    And    pantoprazole  40 mg IntraVENous Daily    sucralfate  1 g Oral 4 times per day    sennosides-docusate sodium  2 tablet Oral Daily    sodium chloride flush  5-40 mL IntraVENous 2 times per day    heparin flush  3 mL IntraVENous 2 times per day    chlorhexidine  15 mL Mouth/Throat BID    budesonide  1 mg Nebulization BID    Arformoterol Tartrate  15 mcg Nebulization BID    atorvastatin  10 mg Oral Daily    QUEtiapine  200 mg Oral Daily    sertraline  200 mg Oral Daily    traZODone  100 mg Oral Nightly    ascorbic acid  1,000 mg Oral Daily    vitamin D  2,000 Units Oral Daily    zinc sulfate  50 mg Oral Daily     PRN Meds: sodium chloride, polyethylene glycol, sodium chloride flush, heparin flush, ipratropium-albuterol, sodium chloride, ondansetron **OR** ondansetron, acetaminophen **OR** acetaminophen, glucose, dextrose, glucagon (rDNA), dextrose  Nutrition:   NG/OG tube TF type: Pulmocare/Nephro/Glucerna/Jevity        At rate: ml/h    Labs and Imaging Studies     CBC:   Recent Labs     12/11/21  0409 12/11/21  0922 12/12/21  0403 12/12/21  1625 12/13/21  0510   WBC 12.2*  --  10.3  --  10.9   HGB 8.1*   < > 7.2* 6.5* 9.1*   HCT 27.5*   < > 24.1* 21.3* 29.9*   .0*  --  108.6*  --  103.8*     --  230  --  243    < > = values in this interval not displayed. BMP:    Recent Labs     12/11/21  0922 12/12/21 0403 12/13/21  0510    141 138   K 4.2 4.0 3.2*    103 99   CO2 30* 29 30*   BUN 20 18 17   CREATININE 0.5 0.4* 0.4*   GLUCOSE 134* 130* 124*       LIVER PROFILE:   Recent Labs     12/11/21  0409 12/11/21  0409 12/11/21  0922 12/12/21  0403 12/13/21  0510   AST 24   < > 23 20 23   ALT 49*   < > 51* 39* 38*   BILIDIR <0.2  --   --  <0.2 <0.2   BILITOT 0.2   < > 0.2 0.4 0.3   ALKPHOS 116*   < > 118* 95 103    < > = values in this interval not displayed.        PT/INR:   Recent Labs     12/11/21  0409 12/12/21  0403 12/13/21  0510   PROTIME 12.3 12.6* 12.1   INR 1.1 1.2 1.1       APTT:   Recent Labs     12/11/21  0409 12/12/21  0403 12/13/21  0510   APTT 29.6 29.2 26.6       Fasting Lipid Panel:    Lab Results   Component Value Date    CHOL 322 10/12/2021    TRIG 195 12/11/2021    HDL 70 10/12/2021       Cardiac Enzymes:    Lab Results   Component Value Date    CKTOTAL 135 11/22/2021    CKTOTAL 488 (H) 11/20/2021    CKTOTAL 585 (H) 11/19/2021       Notable Cultures:      Blood cultures   Blood Culture, Routine   Date Value Ref Range Status   11/25/2021 5 Days no growth  Final     Respiratory cultures No results found for: RESPCULTURE No results found for: LABGRAM  Urine   Urine Culture, Routine   Date Value Ref Range Status   11/19/2021 Growth not present  Final Legionella No results found for: LABLEGI  C Diff PCR No results found for: CDIFPCR  Wound culture/abscess: No results for input(s): WNDABS in the last 72 hours. Tip culture:No results for input(s): CXCATHTIP in the last 72 hours. Oxygen:     Vent Information  $Ventilation: $Subsequent Day  Skin Assessment: Clean, dry, & intact  Equipment ID: [id number]  Equipment Changed: (S) Humidification  Vent Type: 980  Vent Mode: AC/VC+  Vt Ordered: 320 mL  Pressure Ordered: 34  Rate Set: 28 bmp  Peak Flow: 0 L/min  Pressure Support: 0 cmH20  FiO2 : 80 %  SpO2: 92 %  SpO2/FiO2 ratio: 115  PaO2/FiO2 ratio: 120  Sensitivity: 3  PEEP/CPAP: 10  I Time/ I Time %: 0 s  Humidification Source: Heated wire  Humidification Temp: 37  Humidification Temp Measured: 37  Circuit Condensation: Drained  Mask Type: Full face mask  Mask Size: Small  Additional Respiratory  Assessments  Pulse: 55  Resp: 28  SpO2: 92 %  Position: Semi-Calabrese's  Humidification Source: Heated wire  Humidification Temp: 37  Circuit Condensation: Drained  Oral Care Completed?: Yes  Oral Care: Mouth suctioned, Mouth swabbed, Mouth moisturizer  Subglottic Suction Done?: Yes  Airway Type: ET  Airway Size: 8  Cuff Pressure (cm H2O): 29 cm H2O       [REMOVED] Urethral Catheter Temperature probe-Output (mL): 10 mL  [REMOVED] Urethral Catheter-Output (mL): 150 mL  [REMOVED] External Urinary Catheter-Output (mL): 0 mL  Urethral Catheter-Output (mL): 200 mL  [REMOVED] Urethral Catheter Temperature probe 16 fr-Output (mL): 250 mL    Imaging Studies:  XR CHEST PORTABLE   Final Result   Unchanged bilateral airspace disease. Right pleural effusion with suspected   interval progression. XR CHEST PORTABLE   Final Result   1. No significant changes since the December 11.      2.  Continued air density under the left diaphragma limits expansion of the   left lower lung base. 3.  Further evaluation with CT chest recommended as above discussed.          XR CHEST PORTABLE Final Result   No significant changes since the previous study of a December 10. XR CHEST PORTABLE   Final Result   1. There is no appreciable left pneumothorax. 2. Extensive multifocal bilateral airspace disease which is unchanged. XR CHEST PORTABLE   Final Result   1. Slightly improved aeration at the right apex and left lower lung   2. Stable position and alignment of the tubes and catheters   3. Extensive bilateral airspace disease concerning for consolidative   pneumonia slightly improved         XR CHEST PORTABLE   Final Result   Stable support lines and bilateral airspace disease. XR CHEST PORTABLE   Final Result   Unchanged bilateral diffuse infiltrates. XR CHEST PORTABLE   Final Result   1. Decreased left pneumothorax with suspected trace residual.  2 left chest   tubes are similar to the previous exam.   2. Bilateral pulmonary opacities appear mildly increased. 3. The support devices are unchanged and appear to be in acceptable position. XR CHEST PORTABLE   Final Result   Unchanged airspace disease and left pneumothorax. XR CHEST PORTABLE   Final Result   1. No interval change in extensive multifocal bilateral pneumonia   2. Less than 10% left pneumothorax. There is stable position of the 2 left   chest tubes. XR CHEST PORTABLE   Final Result   1. Stable diffuse interstitial pulmonary edema or pneumonia. 2.  Left chest tube demonstrated. Minimal residual left pneumothorax. XR CHEST PORTABLE   Final Result   1. Stable diffuse bilateral airspace disease. 2. Small left pneumothorax appears new or increased compared to prior   examination. Left chest tube appears stable in position. 3. Possible right pleural effusion. XR CHEST PORTABLE   Final Result   1. There is no interval change in extensive multifocal bilateral pneumonia.          XR CHEST PORTABLE   Final Result   No change in extensive bilateral pulmonary airspace opacities. No   pneumothorax is radiographically visible on the current exam.         XR CHEST PORTABLE   Final Result   Significantly decreased size of left pneumothorax. There is likely trace   left pneumothorax remaining. Stable extensive bilateral pulmonary airspace opacities. XR CHEST PORTABLE   Final Result   Addendum 1 of 1   ADDENDUM:   Verbal report was given to Domi Duff RN at 8:15 a.m. central standard   time on 11/30/2021. Final   New moderate sized left-sided pneumothorax. Stable extensive bilateral pulmonary airspace opacities            XR CHEST PORTABLE   Final Result   1. Lines okay. 2. Persistent edema/ARDS/pneumonia. 3. No sign of pneumothorax. XR CHEST PORTABLE   Final Result   Interval placement of left-sided chest tube with questionable residual   pneumothorax versus artifact. Extensive bilateral infiltrates. Continued follow-up recommended. XR CHEST PORTABLE   Final Result   Interval development of large left-sided tension pneumothorax. Extensive bilateral parenchymal infiltrates. Findings were discussed with Dr. Viviana Max at approximately 0010 hours [place]   time on 11/28/2021. XR CHEST PORTABLE   Final Result   Severe bilateral pulmonary opacification. XR CHEST PORTABLE   Final Result   1. Endotracheal tube is 3 cm above the khai. 2. Stable interstitial pulmonary edema or pneumonia throughout both lungs. XR CHEST PORTABLE   Final Result   1. Somewhat limited exam, grossly unchanged. Please see above comments. .      RECOMMENDATION:   1. Recommend follow-up thoracic imaging, as directed clinically. .         XR ABDOMEN (KUB) (SINGLE AP VIEW)   Final Result   1. Satisfactory position of the NG tube within the stomach         XR CHEST PORTABLE   Final Result   1. There is no interval change in the multifocal bilateral pneumonia.          XR CHEST PORTABLE   Final Result   Bilateral airspace disease with interval progression on the right         XR CHEST PORTABLE   Final Result   1. Endotracheal tube is 2.7 cm above the khai. 2. Stable interstitial pulmonary edema or pneumonia throughout both lungs. XR CHEST PORTABLE   Final Result   1. Worsening of the multifocal bilateral pneumonia when compared with the   prior study of 1 day earlier. XR CHEST PORTABLE   Final Result   1. Multifocal bilateral pneumonia more prominent within the right upper lobe   2. Minimal partial interval clearing of the pneumonia within the left lung   3. Worsening of the pneumonia within the right upper lobe. US DUP LOWER EXTREMITIES BILATERAL VENOUS   Final Result   Within the visualized vessels there is no evidence for deep venous   thrombosis               XR CHEST PORTABLE   Final Result   1. Lines okay. 2. Slightly worsened diffuse edema versus pneumonia. XR CHEST PORTABLE   Final Result   1. Lines okay   2. Improved aeration, mild improvement and diffuse pneumonia/edema. XR CHEST ABDOMEN NG PLACEMENT   Final Result   NG tube position satisfactory. XR CHEST PORTABLE   Final Result   Stable bilateral pneumonia or interstitial pulmonary edema. XR CHEST PORTABLE   Final Result   Increasing bilateral infiltrates. XR CHEST PORTABLE   Final Result   1. There is no interval change in the multifocal bilateral pneumonia. CT HEAD WO CONTRAST   Final Result   No acute intracranial abnormality. Cortical atrophy and periventricular white matter ischemic changes. CT CERVICAL SPINE WO CONTRAST   Final Result   No acute abnormality of the cervical spine. Moderate degenerative changes with disc space narrowing and marginal bony   spur formation C5 through C7. Ground-glass opacities in the visualized lung apices. Please refer to chest   CT for additional information.          CTA CHEST W CONTRAST   Final Result   No evidence of pulmonary embolism. Extensive multifocal ground-glass opacities predominantly in the peripheral   lung zones bilaterally, highly concerning for atypical or COVID related   pneumonia. XR CHEST PORTABLE   Final Result   Bilateral patchy airspace opacities worrisome for pneumonia.          XR CHEST PORTABLE    (Results Pending)   XR CHEST PORTABLE    (Results Pending)   XR CHEST PORTABLE    (Results Pending)   XR CHEST PORTABLE    (Results Pending)   XR CHEST PORTABLE    (Results Pending)        Resident's Assessment and Plan     Assessment and Plan:    Cardiovascular   History of hyperlipidemia  -Continue home atorvastatin 10 mg    Pulmonary  Acute hypoxic respiratory failure 2/2 Covid pneumonia  -Patient is intubated  -PF ratio of 1.18  -Currently sedated with fentanyl  -Completed Courses of Decadron and Toci  -Respiratory cultures have been negative, fungal cultures and BLE are also negative  -Continue ventilation with daily ABGs  -Completed remdesivir   -Continue breathing treatments  -Wean Cortisol  -Continue midodrine to 20 mg   -Continue supine position   -FiO2 down to 60%  -Plan for surgery to do trach and peg tomorrow per gen surg    Left lung pneumothorax (resolved)     Gastrointestinal  Transaminitis 2/2 Covid infection (resolving)   -Continue to monitor    Infectious Disease   COVID-19 pneumonia  -See above  -Continue to monitor CRP D-dimer and pro-Carl  -Continue vitamin C, vitamin D and zinc    Renal   Stage III intrinsic CHARAN (resolved)   -Baseline creatinine of 0.5  -Nephro is following  -Creatinine is stable today 0.5  -Continue to follow nephro recommendations  -Continue to monitor  -Continue on Albumin 25 g  -Started on Lasix 40 mg daily  -Continue to monitor and replace electrolytes as appropriate     Heme/Onc  Acute Anemia  -Get FOBT   -1 Unit given last night     Reactive leukocytosis 2/2 steroids and Covid infection (stable)  -WBC stable today    Thrombocytopenia 2/2 Covid (Resolved) Psychiatric   History of schizoaffective disorder  -Continue home hydroxyzine, trazodone, quetiapine and sertraline    # Peptic ulcer prophylaxis:Protonix   # DVT Prophylaxis: Lovenox   # Disposition: Cont current care     Tha Constantino MD, PGY-1    Attending Physician: Dr. Rebecca Carson'    I personally saw, examined and provided care for the patient. Radiographs, labs and medication list were reviewed by me independently. I spoke with bedside nursing, therapists and consultants. Critical care services and times documented are independent of procedures and multidisciplinary rounds with Residents. Additionally comprehensive, multidisciplinary rounds were conducted with the MICU team. The case was discussed in detail and plans for care were established. Review of Residents documentation was conducted and revisions were made as appropriate. I agree with the above documented exam, problem list and plan of care.    Possible trach and Peg tomorrow   As Per surgery  Keep supportive care    Julisa Campos MD,[facility]  Pulmonary&Critical Care Medicine   Director of [facility] Director of [address] [place] [address]    Daniel Butterfield

## 2021-12-13 NOTE — PROGRESS NOTES
Surgeon in and pulled last chest tube he is to order chest xray blood is running at 200 pt tolerating and resting comfortable

## 2021-12-13 NOTE — PROGRESS NOTES
[facility]  Nephrology Attending Progress Note          SUBJECTIVE:     11/28: pt remains in COVID 19 Isolation. She remains proned and on an FIO2 65% and PEEP12 with an O2 sat 95-96%    11/29: pt seen through window of icu due to covid, chart reviewed    11/30: pt seen through window of icu due to covid, chart reviewed. 12/1/21 :pt seen through window of icu due to covid, review of chart performed    12/2/21: pt seen through window of icu due to covid, chart reviewed, intubated, chest tube left     12/3/21 :chart reviewed, chest tube x 2 on right, intubated and prone, seen through window of icu    12/4: no new acute issues from overnight; large volume pleural fluid drainage from bilateral catheter    12/5: Brisk response to Bumex drip now placed on hold transition to intermittent dosing    12/6: pt seen through window of icu, chart reviewed, intubated    12/7: chart reviewed, seen through window of icu, intubated    12/8: pt seen through window of icu.  Intubated on levo    12/9: pt seen through window of icu, intubated and sedated, chest tube in place, on levo    12/10: pt seen through window of icu, intubated and sedated with chest tube, on levo    12/11: seen through icu window, intubated and sedated, on levo    12/12: pt seen through window of icu, chart reviewed, intubated and sedated     12/13: off nimbex; received 1 unit PRBC last pm    PROBLEM LIST:    Patient Active Problem List   Diagnosis    Schizoaffective disorder ([address]    Acute respiratory failure with hypoxia ([address]    Rhabdomyolysis    Acute cystitis with hematuria    Primary spontaneous pneumothorax        PAST MEDICAL HISTORY:    Past Medical History:   Diagnosis Date    GERD (gastroesophageal reflux disease)     Osteoarthritis of right knee     Schizoaffective disorder ([address]     [facility]       DIET:    ADULT TUBE FEEDING; Orogastric; Peptide Based; Continuous; 10; Yes; 10; Q 24 hours; 20; 30; Q 4 hours  Diet NPO     PHYSICAL EXAM: Patient Vitals for the past 24 hrs:   BP Temp Temp src Pulse Resp SpO2   12/13/21 1600  98.4 °F (36.9 °C) Bladder 51 28 91 %   12/13/21 1500    (!) 49 28 91 %   12/13/21 1400    53 28 (!) 87 %   12/13/21 1322    54 28 90 %   12/13/21 1300    58 27 94 %   12/13/21 1200  98.9 °F (37.2 °C) Bladder 74 28 90 %   12/13/21 1100    83 30 (!) 88 %   12/13/21 1000    56 27 93 %   12/13/21 0900    52 28 93 %   12/13/21 0856    55 28 92 %   12/13/21 0836     28 94 %   12/13/21 0835     27 93 %   12/13/21 0800  99.1 °F (37.3 °C) Bladder 55 28 93 %   12/13/21 0700    55 28 94 %   12/13/21 0600 104/83 99.3 °F (37.4 °C) Bladder 61 28 95 %   12/13/21 0547 (!) 146/89 99.3 °F (37.4 °C) Bladder      12/13/21 0500 (!) 173/70   67 27 91 %   12/13/21 0414 131/60 99.1 °F (37.3 °C) Bladder 54 28 90 %   12/13/21 0400 (!) 129/59   51 28 90 %   12/13/21 0356 (!) 126/59 99.1 °F (37.3 °C) Bladder 51 28    12/13/21 0300    51 28 90 %   12/13/21 0200    53 28 92 %   12/13/21 0149    56 28 93 %   12/13/21 0000 (!) 117/53 99.1 °F (37.3 °C) Bladder 60 28 90 %   12/12/21 2200 (!) 126/57   69 28 91 %   12/12/21 2111     28 91 %   12/12/21 2110     28 92 %   12/12/21 2109    61 28 92 %   12/12/21 2000 (!) 113/53 99.1 °F (37.3 °C) Bladder 67 28 (!) 88 %   12/12/21 1800    63 28 (!) 89 %   12/12/21 1708    68 28 90 %   12/12/21 1703    69 28 (!) 88 %   12/12/21 1700    68 28 (!) 87 %   12/12/21 1657    67 28 (!) 87 %   @      Intake/Output Summary (Last 24 hours) at 12/13/2021 1656  Last data filed at 12/13/2021 1600  Gross per 24 hour   Intake 4595 ml   Output 1140 ml   Net 3455 ml         Wt Readings from Last 3 Encounters:   12/07/21 161 lb 4.8 oz (73.2 kg)   10/26/21 152 lb (68.9 kg)   10/12/21 149 lb (67.6 kg)     PE  Constitutional:  Pt is intubated  Seen through window of icu    MEDS (scheduled):    [START ON 12/14/2021] lansoprazole  30 mg Oral QAM AC    [START ON 12/14/2021] polyethylene glycol  17 g Oral Daily    hydrocortisone sodium succinate PF  50 mg IntraVENous Q8H    artificial tears   Both Eyes Q12H    miconazole nitrate   Topical BID    naloxegol  12.5 mg Oral QAM    midodrine  20 mg Oral Q8H    [Held by provider] enoxaparin  40 mg SubCUTAneous BID    sucralfate  1 g Oral 4 times per day    sennosides-docusate sodium  2 tablet Oral Daily    sodium chloride flush  5-40 mL IntraVENous 2 times per day    heparin flush  3 mL IntraVENous 2 times per day    chlorhexidine  15 mL Mouth/Throat BID    budesonide  1 mg Nebulization BID    Arformoterol Tartrate  15 mcg Nebulization BID    atorvastatin  10 mg Oral Daily    QUEtiapine  200 mg Oral Daily    sertraline  200 mg Oral Daily    traZODone  100 mg Oral Nightly    ascorbic acid  1,000 mg Oral Daily    vitamin D  2,000 Units Oral Daily    zinc sulfate  50 mg Oral Daily       MEDS (infusions):   propofol 20 mcg/kg/min (12/13/21 1057)    fentaNYL 5 mcg/ml in 0.9%  ml infusion 200 mcg/hr (12/13/21 1405)    midazolam 10 mg/hr (12/13/21 1234)    sodium chloride 100 mL/hr at 11/1956    dextrose         MEDS (prn):  sodium chloride flush, heparin flush, ipratropium-albuterol, sodium chloride, acetaminophen **OR** acetaminophen, glucose, dextrose, glucagon (rDNA), dextrose    DATA:    Recent Labs     12/11/21  0409 12/11/21 0922 12/12/21  0403 12/12/21  0403 12/12/21  1625 12/13/21  0510 12/13/21  1542   WBC 12.2*  --  10.3  --   --  10.9  --    HGB 8.1*   < > 7.2*   < > 6.5* 9.1* 8.6*   HCT 27.5*   < > 24.1*   < > 21.3* 29.9* 28.2*   .0*  --  108.6*  --   --  103.8*  --      --  230  --   --  243  --     < > = values in this interval not displayed.      Recent Labs     12/11/21  0409 12/11/21  0409 12/11/21 0922 12/12/21  0403 12/13/21  0510   NA  --   --  136 141 138   K  --   --  4.2 4.0 3.2*   CL  --   --  100 103 99   CO2  --   --  30* 29 30*   BUN  --   --  20 18 17   CREATININE  --   --  0.5 0.4* 0.4*   LABGLOM  --   --  >60 >60 >60   GLUCOSE  --   --  134* 130* 124*   CALCIUM  --   --  8.4* 8.4* 8.4*   ALT 49*   < > 51* 39* 38*   AST 24   < > 23 20 23   BILIDIR <0.2  --   --  <0.2 <0.2   BILITOT 0.2   < > 0.2 0.4 0.3   ALKPHOS 116*   < > 118* 95 103   MG 1.9  --   --   --  1.8   PHOS 3.4  --   --   --  2.9    < > = values in this interval not displayed. Lab Results   Component Value Date    LABALBU 3.5 12/13/2021    LABALBU 3.2 (L) 12/12/2021    LABALBU 2.2 (L) 12/11/2021     Lab Results   Component Value Date    TSH 4.720 (H) 10/12/2021       Iron Studies  Lab Results   Component Value Date    FERRITIN 198 12/13/2021     Vitamin B-12   Date Value Ref Range Status   03/02/2021 771 211 - 946 pg/mL Final     Folate   Date Value Ref Range Status   03/02/2021 >20.0 4.8 - 24.2 ng/mL Final       No results found for: VITD25  No results found for: PTH    No components found for: URIC    Lab Results   Component Value Date    COLORU Yellow 11/16/2021    NITRU POSITIVE 11/16/2021    GLUCOSEU Negative 11/16/2021    KETUA 15 11/16/2021    UROBILINOGEN 0.2 11/16/2021    BILIRUBINUR Negative 11/16/2021       No results found for: Kevin Potts      IMPRESSION/RECOMMENDATIONS:      1. Acute kidney injury stage I  ischemic ATN occurring in the setting of hypotension  nonoliguric    Cr continues to improve now back to baseline  Continue to monitor labs and urine output       2. Acute hypoxic respiratory failure  due to COVID-19 pneumonia  Intubation with mechanical ventilation  Ct for pneumo left     3. Septic shock  suspected possible superimposed bacterial pneumonia  On levo  abx per id     4. Volume overload  bumex 1 mg iv qd held with low bp  UO approx 2 L past 24 hrs  +2.8L  overall as of yesterday  I/o miscalculated 12/9 and now inaccurate    5. Hyponatremia  Na at 141     6.  Hypocalcemia  Replace prn  hypoalbuminemic    dw Resident    Mane Alicia MD

## 2021-12-14 NOTE — PROGRESS NOTES
%   · BMI: 28.5 BMI Categories: Overweight (BMI 25.0-29. 9)       Nutrition Diagnosis:   · Inadequate oral intake related to impaired respiratory function as evidenced by NPO or clear liquid status due to medical condition, intubation, nutrition support - enteral nutrition    Nutrition Interventions:   Nutrition Education/Counseling:  Education not appropriate   Coordination of Nutrition Care:  Continue to monitor while inpatient    Goals:  Resume EN when appropriate/ tolerate       Nutrition Monitoring and Evaluation:   Food/Nutrient Intake Outcomes:  Diet Advancement/Tolerance, Enteral Nutrition Intake/Tolerance  Physical Signs/Symptoms Outcomes:  Biochemical Data, Nutrition Focused Physical Findings, Skin, Weight, GI Status, Fluid Status or Edema, Hemodynamic Status, Diarrhea     Discharge Planning:     Too soon to determine     Electronically signed by Linnea Betlrán RD, LD on 12/14/21 at 2:38 PM EST    Contact: Ext [phone]

## 2021-12-14 NOTE — PROGRESS NOTES
[facility] Attending    S: 73 y.o. female with a history of gerd, oa, schizoaffective disorder, and gastric fundiplication who was found down for an undetermined amount of time. Reports shortness of breath and cough for 2.5 weeks. She was found to be 60% on RA. In the ER, COVID testing was positive with significantly elevated CPKs. Patient is unvaccinated. Had desaturation to 88% wit PaO2 of 55 with RRT and subsequent transfer to the MICU. Desat to 40's when Bipap removed. Now intubated. Sedated, paralyzed,  Noted to have pneumothorax and chest tube placed. Second chest tube placed when pneumothorax not improved. Chest tubes subsequently removed. Today patient's sedation was turned off and since then oxygen saturation has been in low 80s despite suctioning. Sedation turned back on. Eyes open but patient not following commands. O: VS- Blood pressure (!) 187/94, pulse 68, temperature 98.6 °F (37 °C), temperature source Bladder, resp. rate (!) 32, height 5' 1\" (1.549 m), weight 177 lb 8 oz (80.5 kg), SpO2 (!) 80 %. Exam is as noted by resident   Currently nonresponsive. Supine  Lungs: coarse, vent. Decreased BS  CV:  Rate controlled, regular   Ext-no C/C/E    Impressions: Active Problems:    Acute respiratory failure with hypoxia (HCC)    Covid pneumonia    Rhabdomyolysis, CK improved    Acute cystitis with hematuria, treated    CHARAN    Thrombocytopenia     Plan:      Acute hypoxic resp failure 2/2 covid - Decadron. Completed Tocimizilab. Vitamin C.  Vitamin D.  Zinc.  Appreciate Pulm management. Sepsis 2/2 PNA - done with vanc and zosyn  U/s of lower extremities negative DVT 11/20. Tube feeding. Fluid overloaded - on bumex  Schizoaffective - seroquel  Diflucan stopped. Ongoing communication with family re code status  Following hemoglobin and WBC  Bumex  ICU management  For trach and PEG when more stable  Full code at this time.          Attending Physician Statement  I have reviewed the chart and seen the patient with the resident(s). I personally reviewed images, EKG's and similar tests, if present. I personally reviewed and performed key elements of the history and exam.  I have reviewed and confirmed student and/or resident history and exam with changes as indicated above. I agree with the assessment, plan and orders as documented by the resident. Please refer to the  resident and/or student note for additional information. Elizabeth Weaver.  Demian Richardson MD

## 2021-12-14 NOTE — FLOWSHEET NOTE
RT called to bedside, suctioned numerous times for copious secretions, not able to maintain SATs, MD made aware.

## 2021-12-14 NOTE — PROGRESS NOTES
[address] [facility]   Department of Internal Medicine   Internal Medicine Residency  MICU Progress Note    Patient:  Debbie Roman 73 y.o. female   MRN: [id number]       Date of Service: 2021    Allergy: Ciprofloxacin  Cc follow up ARDS  Subjective     Patient was seen and examined this morning at bedside in no acute distress. Overnight, no acute events noted. Plan to do trach and peg today. Patient starting to open eyes. Wean sedation. Patient Bradycardic, continue to monitor. No other issues at this time. Objective     TEMPERATURE:  Current - Temp: 98.6 °F (37 °C); Max - Temp  Av.8 °F (37.1 °C)  Min: 98.4 °F (36.9 °C)  Max: 99.1 °F (37.3 °C)  RESPIRATIONS RANGE: Resp  Av.9  Min: 26  Max: 30  PULSE RANGE: Pulse  Av.3  Min: 41  Max: 83  BLOOD PRESSURE RANGE:  Systolic (61ZFK), RLM:317 , Min:107 , NMH:224   ; Diastolic (43HFI), BRR:68, Min:75, Max:106    PULSE OXIMETRY RANGE: SpO2  Av.5 %  Min: 87 %  Max: 95 %    I & O - 24hr:    Intake/Output Summary (Last 24 hours) at 2021 0636  Last data filed at 2021 0600  Gross per 24 hour   Intake 2354 ml   Output 998 ml   Net 1356 ml     I/O last 3 completed shifts: In: 6568 [I.V.:2656; Blood:400; NG/GT:926]  Out: 2782 [Urine:1085] I/O this shift: In: 451 [I.V.:451]  Out: 263 [Urine:263]   Weight change:     Physical Exam  Constitutional:       Appearance: Normal appearance. Interventions: She is sedated and intubated. HENT:      Head: Normocephalic and atraumatic. Nose: Nose normal.   Eyes:      Pupils: Pupils are equal, round, and reactive to light. Cardiovascular:      Rate and Rhythm: Normal rate and regular rhythm. Pulses: Normal pulses. Heart sounds: Normal heart sounds. Pulmonary:      Effort: Pulmonary effort is normal. She is intubated. Breath sounds: Examination of the left-upper field reveals decreased breath sounds.  Examination of the left-middle field reveals decreased breath sounds. Examination of the left-lower field reveals decreased breath sounds. Decreased breath sounds and wheezing present. No rhonchi or rales. Abdominal:      General: Bowel sounds are normal. There is no distension. Palpations: Abdomen is soft. Musculoskeletal:      Cervical back: Normal range of motion. Skin:     General: Skin is warm and moist.      Capillary Refill: Capillary refill takes less than 2 seconds. Neurological:      General: No focal deficit present.          Medications     Continuous Infusions:   propofol Stopped (12/14/21 0500)    fentaNYL 5 mcg/ml in 0.9%  ml infusion 200 mcg/hr (12/14/21 0612)    midazolam 9 mg/hr (12/14/21 0246)    sodium chloride 100 mL/hr at 11/1955    dextrose       Scheduled Meds:   calcium gluconate IVPB  1,000 mg IntraVENous Once    lansoprazole  30 mg Oral QAM AC    polyethylene glycol  17 g Oral Daily    hydrocortisone sodium succinate PF  50 mg IntraVENous Q8H    artificial tears   Both Eyes Q12H    miconazole nitrate   Topical BID    naloxegol  12.5 mg Oral QAM    midodrine  20 mg Oral Q8H    [Held by provider] enoxaparin  40 mg SubCUTAneous BID    sucralfate  1 g Oral 4 times per day    sennosides-docusate sodium  2 tablet Oral Daily    sodium chloride flush  5-40 mL IntraVENous 2 times per day    heparin flush  3 mL IntraVENous 2 times per day    chlorhexidine  15 mL Mouth/Throat BID    budesonide  1 mg Nebulization BID    Arformoterol Tartrate  15 mcg Nebulization BID    atorvastatin  10 mg Oral Daily    QUEtiapine  200 mg Oral Daily    sertraline  200 mg Oral Daily    traZODone  100 mg Oral Nightly    ascorbic acid  1,000 mg Oral Daily    vitamin D  2,000 Units Oral Daily    zinc sulfate  50 mg Oral Daily     PRN Meds: sodium chloride flush, heparin flush, ipratropium-albuterol, sodium chloride, acetaminophen **OR** acetaminophen, glucose, dextrose, glucagon (rDNA), dextrose  Nutrition:   NG/OG tube TF type: Pulmocare/Nephro/Glucerna/Jevity        At rate: ml/h    Labs and Imaging Studies     CBC:   Recent Labs     12/12/21  0403 12/12/21  1625 12/13/21  0510 12/13/21  1542 12/14/21  0501   WBC 10.3  --  10.9  --  12.3*   HGB 7.2*   < > 9.1* 8.6* 9.8*   HCT 24.1*   < > 29.9* 28.2* 31.5*   .6*  --  103.8*  --  101.3*     --  243  --  265    < > = values in this interval not displayed. BMP:    Recent Labs     12/12/21  0403 12/13/21  0510 12/14/21  0501    138 138   K 4.0 3.2* 3.8    99 100   CO2 29 30* 27   BUN 18 17 15   CREATININE 0.4* 0.4* 0.3*   GLUCOSE 130* 124* 113*       LIVER PROFILE:   Recent Labs     12/12/21  0403 12/13/21  0510 12/14/21  0501   AST 20 23 25   ALT 39* 38* 34*   BILIDIR <0.2 <0.2 <0.2   BILITOT 0.4 0.3 0.3   ALKPHOS 95 103 93       PT/INR:   Recent Labs     12/12/21  0403 12/13/21  0510 12/14/21  0501   PROTIME 12.6* 12.1 12.5*   INR 1.2 1.1 1.1       APTT:   Recent Labs     12/12/21  0403 12/13/21  0510 12/14/21  0501   APTT 29.2 26.6 24.5       Fasting Lipid Panel:    Lab Results   Component Value Date    CHOL 322 10/12/2021    TRIG 220 12/14/2021    HDL 70 10/12/2021       Cardiac Enzymes:    Lab Results   Component Value Date    CKTOTAL 135 11/22/2021    CKTOTAL 488 (H) 11/20/2021    CKTOTAL 585 (H) 11/19/2021       Notable Cultures:      Blood cultures   Blood Culture, Routine   Date Value Ref Range Status   11/25/2021 5 Days no growth  Final     Respiratory cultures No results found for: RESPCULTURE No results found for: LABGRAM  Urine   Urine Culture, Routine   Date Value Ref Range Status   11/19/2021 Growth not present  Final     Legionella No results found for: LABLEGI  C Diff PCR No results found for: CDIFPCR  Wound culture/abscess: No results for input(s): WNDABS in the last 72 hours. Tip culture:No results for input(s): CXCATHTIP in the last 72 hours.       Oxygen:     Vent Information  $Ventilation: $Subsequent Day  Skin Assessment: Clean, dry, & intact  Equipment ID: [id number]  Equipment Changed: (S) Humidification  Vent Type: 980  Vent Mode: AC/VC  Vt Ordered: 320 mL  Pressure Ordered: 34  Rate Set: 28 bmp  Peak Flow: 65 L/min  Pressure Support: 0 cmH20  FiO2 : 80 %  SpO2: 95 %  SpO2/FiO2 ratio: 118.75  PaO2/FiO2 ratio: 120  Sensitivity: 3  PEEP/CPAP: 10  I Time/ I Time %: 0 s  Humidification Source: Heated wire  Humidification Temp: 37  Humidification Temp Measured: 36.9  Circuit Condensation: Drained  Mask Type: Full face mask  Mask Size: Small  Additional Respiratory  Assessments  Pulse: (!) 43  Resp: 28  SpO2: 95 %  Position: Semi-Calabrese's  Humidification Source: Heated wire  Humidification Temp: 37  Circuit Condensation: Drained  Oral Care Completed?: Yes  Oral Care: Mouthwash with chlorhexidine, Lip moisturizer applied, Mouth swabbed, Mouth moisturizer, Mouth suctioned, Suction toothette  Subglottic Suction Done?: Yes  Airway Type: ET  Airway Size: 8  Cuff Pressure (cm H2O): 29 cm H2O       [REMOVED] Urethral Catheter Temperature probe-Output (mL): 10 mL  [REMOVED] Urethral Catheter-Output (mL): 150 mL  [REMOVED] External Urinary Catheter-Output (mL): 0 mL  Urethral Catheter-Output (mL): 25 mL  [REMOVED] Urethral Catheter Temperature probe 16 fr-Output (mL): 250 mL    Imaging Studies:  XR CHEST PORTABLE   Final Result   Unchanged bilateral airspace disease. Right pleural effusion with suspected   interval progression. XR CHEST PORTABLE   Final Result   1. No significant changes since the December 11.      2.  Continued air density under the left diaphragma limits expansion of the   left lower lung base. 3.  Further evaluation with CT chest recommended as above discussed. XR CHEST PORTABLE   Final Result   No significant changes since the previous study of a December 10. XR CHEST PORTABLE   Final Result   1. There is no appreciable left pneumothorax. 2. Extensive multifocal bilateral airspace disease which is unchanged. XR CHEST PORTABLE   Final Result   1. Slightly improved aeration at the right apex and left lower lung   2. Stable position and alignment of the tubes and catheters   3. Extensive bilateral airspace disease concerning for consolidative   pneumonia slightly improved         XR CHEST PORTABLE   Final Result   Stable support lines and bilateral airspace disease. XR CHEST PORTABLE   Final Result   Unchanged bilateral diffuse infiltrates. XR CHEST PORTABLE   Final Result   1. Decreased left pneumothorax with suspected trace residual.  2 left chest   tubes are similar to the previous exam.   2. Bilateral pulmonary opacities appear mildly increased. 3. The support devices are unchanged and appear to be in acceptable position. XR CHEST PORTABLE   Final Result   Unchanged airspace disease and left pneumothorax. XR CHEST PORTABLE   Final Result   1. No interval change in extensive multifocal bilateral pneumonia   2. Less than 10% left pneumothorax. There is stable position of the 2 left   chest tubes. XR CHEST PORTABLE   Final Result   1. Stable diffuse interstitial pulmonary edema or pneumonia. 2.  Left chest tube demonstrated. Minimal residual left pneumothorax. XR CHEST PORTABLE   Final Result   1. Stable diffuse bilateral airspace disease. 2. Small left pneumothorax appears new or increased compared to prior   examination. Left chest tube appears stable in position. 3. Possible right pleural effusion. XR CHEST PORTABLE   Final Result   1. There is no interval change in extensive multifocal bilateral pneumonia. XR CHEST PORTABLE   Final Result   No change in extensive bilateral pulmonary airspace opacities. No   pneumothorax is radiographically visible on the current exam.         XR CHEST PORTABLE   Final Result   Significantly decreased size of left pneumothorax. There is likely trace   left pneumothorax remaining.       Stable extensive bilateral pulmonary airspace opacities. XR CHEST PORTABLE   Final Result   Addendum 1 of 1   ADDENDUM:   Verbal report was given to Katherine North RN at 8:15 a.m. central standard   time on 11/30/2021. Final   New moderate sized left-sided pneumothorax. Stable extensive bilateral pulmonary airspace opacities            XR CHEST PORTABLE   Final Result   1. Lines okay. 2. Persistent edema/ARDS/pneumonia. 3. No sign of pneumothorax. XR CHEST PORTABLE   Final Result   Interval placement of left-sided chest tube with questionable residual   pneumothorax versus artifact. Extensive bilateral infiltrates. Continued follow-up recommended. XR CHEST PORTABLE   Final Result   Interval development of large left-sided tension pneumothorax. Extensive bilateral parenchymal infiltrates. Findings were discussed with Dr. Sawant at approximately 0010 hours [place]   time on 11/28/2021. XR CHEST PORTABLE   Final Result   Severe bilateral pulmonary opacification. XR CHEST PORTABLE   Final Result   1. Endotracheal tube is 3 cm above the khai. 2. Stable interstitial pulmonary edema or pneumonia throughout both lungs. XR CHEST PORTABLE   Final Result   1. Somewhat limited exam, grossly unchanged. Please see above comments. .      RECOMMENDATION:   1. Recommend follow-up thoracic imaging, as directed clinically. .         XR ABDOMEN (KUB) (SINGLE AP VIEW)   Final Result   1. Satisfactory position of the NG tube within the stomach         XR CHEST PORTABLE   Final Result   1. There is no interval change in the multifocal bilateral pneumonia. XR CHEST PORTABLE   Final Result   Bilateral airspace disease with interval progression on the right         XR CHEST PORTABLE   Final Result   1. Endotracheal tube is 2.7 cm above the khai. 2. Stable interstitial pulmonary edema or pneumonia throughout both lungs.          XR CHEST PORTABLE Final Result   1. Worsening of the multifocal bilateral pneumonia when compared with the   prior study of 1 day earlier. XR CHEST PORTABLE   Final Result   1. Multifocal bilateral pneumonia more prominent within the right upper lobe   2. Minimal partial interval clearing of the pneumonia within the left lung   3. Worsening of the pneumonia within the right upper lobe. US DUP LOWER EXTREMITIES BILATERAL VENOUS   Final Result   Within the visualized vessels there is no evidence for deep venous   thrombosis               XR CHEST PORTABLE   Final Result   1. Lines okay. 2. Slightly worsened diffuse edema versus pneumonia. XR CHEST PORTABLE   Final Result   1. Lines okay   2. Improved aeration, mild improvement and diffuse pneumonia/edema. XR CHEST ABDOMEN NG PLACEMENT   Final Result   NG tube position satisfactory. XR CHEST PORTABLE   Final Result   Stable bilateral pneumonia or interstitial pulmonary edema. XR CHEST PORTABLE   Final Result   Increasing bilateral infiltrates. XR CHEST PORTABLE   Final Result   1. There is no interval change in the multifocal bilateral pneumonia. CT HEAD WO CONTRAST   Final Result   No acute intracranial abnormality. Cortical atrophy and periventricular white matter ischemic changes. CT CERVICAL SPINE WO CONTRAST   Final Result   No acute abnormality of the cervical spine. Moderate degenerative changes with disc space narrowing and marginal bony   spur formation C5 through C7. Ground-glass opacities in the visualized lung apices. Please refer to chest   CT for additional information. CTA CHEST W CONTRAST   Final Result   No evidence of pulmonary embolism. Extensive multifocal ground-glass opacities predominantly in the peripheral   lung zones bilaterally, highly concerning for atypical or COVID related   pneumonia.          XR CHEST PORTABLE   Final Result   Bilateral patchy airspace opacities worrisome for pneumonia.          XR CHEST PORTABLE    (Results Pending)   XR CHEST PORTABLE    (Results Pending)   XR CHEST PORTABLE    (Results Pending)   XR CHEST PORTABLE    (Results Pending)   XR CHEST PORTABLE    (Results Pending)   XR CHEST PORTABLE    (Results Pending)        Resident's Assessment and Plan     Assessment and Plan:    Cardiovascular   History of hyperlipidemia  -Continue home atorvastatin 10 mg    Pulmonary  Acute hypoxic respiratory failure 2/2 Covid pneumonia  -Patient is intubated  -PF ratio of 0.76  -Completed Courses of Decadron and Toci  -Respiratory cultures have been negative, fungal cultures and BLE are also negative  -Continue ventilation with daily ABGs  -Completed remdesivir   -Continue breathing treatments  -Continue to Wean Cortisol  -Continue midodrine to 20 mg   -Continue supine position   -FiO2 down to 60%  -Plan for surgery to do trach and peg today  -Wean Sedation and assess mentation     Left lung pneumothorax (resolved)     Gastrointestinal  Transaminitis 2/2 Covid infection (resolving)   -Continue to monitor    Infectious Disease   COVID-19 pneumonia  -See above  -Continue to monitor CRP D-dimer and pro-Carl  -Continue vitamin C, vitamin D and zinc    Renal   Stage III intrinsic CHARAN (resolved)   -Baseline creatinine of 0.5  -Nephro is following  -Creatinine is stable today 0.3  -Continue to follow nephro recommendations  -Continue to monitor  -Continue on Albumin 25 g  -Started on Lasix 40 mg daily  -Continue to monitor and replace electrolytes as appropriate     Heme/Onc  Acute Anemia  -FOBT pending  -Lovenox held  -MMonitor HgB    Reactive leukocytosis 2/2 steroids and Covid infection (stable)  -WBC stable today    Thrombocytopenia 2/2 Covid (Resolved)     Psychiatric   History of schizoaffective disorder  -Continue home hydroxyzine, trazodone, quetiapine and sertraline    # Peptic ulcer prophylaxis:Protonix   # DVT Prophylaxis: Lovenox   # Disposition: Cont current care     Yoli Zhang MD, PGY-1    Attending Physician: Dr. Edilia Lamar'    I personally saw, examined and provided care for the patient. Radiographs, labs and medication list were reviewed by me independently. I spoke with bedside nursing, therapists and consultants. Critical care services and times documented are independent of procedures and multidisciplinary rounds with Residents. Additionally comprehensive, multidisciplinary rounds were conducted with the MICU team. The case was discussed in detail and plans for care were established. Review of Residents documentation was conducted and revisions were made as appropriate. I agree with the above documented exam, problem list and plan of care.     For trach today defer to surgery ,will discuss  With them as She may not stable   Alicia Moreno last night ,was on veresed and fentanyl  CXR looks worse  Open eyes but no follow commands  Keyona Ovalle MD,[facility]  Pulmonary&Critical Care Medicine   Director of [address] [place] [address] Director of [address] [place] [address]    Nita Patten

## 2021-12-14 NOTE — PROGRESS NOTES
[facility]  Nephrology Attending Progress Note          SUBJECTIVE:     12/7: chart reviewed, seen through window of icu, intubated    12/8: pt seen through window of icu.  Intubated on levo    12/9: pt seen through window of icu, intubated and sedated, chest tube in place, on levo    12/10: pt seen through window of icu, intubated and sedated with chest tube, on levo    12/11: seen through icu window, intubated and sedated, on levo    12/12: pt seen through window of icu, chart reviewed, intubated and sedated     12/13: off nimbex; received 1 unit PRBC last pm    12/14: No new acute issues reported from overnight; remains intubated; for trach/PEG today    PROBLEM LIST:    Patient Active Problem List   Diagnosis    Schizoaffective disorder ([address]    Acute respiratory failure with hypoxia ([address]    Rhabdomyolysis    Acute cystitis with hematuria    Primary spontaneous pneumothorax        PAST MEDICAL HISTORY:    Past Medical History:   Diagnosis Date    GERD (gastroesophageal reflux disease)     Osteoarthritis of right knee     Schizoaffective disorder ([address]     [facility]       DIET:    Diet NPO     PHYSICAL EXAM:     Patient Vitals for the past 24 hrs:   BP Temp Temp src Pulse Resp SpO2 Weight   12/14/21 0910 (!) 202/92   67 23 95 %    12/14/21 0900 (!) 184/87   50 28 95 %    12/14/21 0800 131/69   (!) 47 28 95 %    12/14/21 0700 136/76   (!) 42 28 96 %    12/14/21 0600 137/77   (!) 43 28 95 %    12/14/21 0500 (!) 140/75   (!) 44 28 93 % 177 lb 8 oz (80.5 kg)   12/14/21 0400 (!) 119/106 98.6 °F (37 °C) Bladder (!) 44 28 93 %    12/14/21 0300    52 28 92 %    12/14/21 0200    (!) 41 28 92 %    12/14/21 0100    (!) 44 28 94 %    12/14/21 0000 (!) 107/94 98.4 °F (36.9 °C) Bladder (!) 49 28 93 %    12/13/21 2300    (!) 47 28 90 %    12/13/21 2208    53 28 90 %    12/13/21 2200    58 26 91 %    12/13/21 2100    57 28 91 %    12/13/21 2000 118/75 99.1 °F (37.3 °C) Bladder (!) 49 28 91 %    12/13/21 1900    56 28 (!) 88 %    12/13/21 1800    53 28 (!) 88 %    12/13/21 1700    54 28 90 %    12/13/21 1600  98.4 °F (36.9 °C) Bladder 51 28 91 %    12/13/21 1500    (!) 49 28 91 %    12/13/21 1400    53 28 (!) 87 %    12/13/21 1322    54 28 90 %    12/13/21 1300    58 27 94 %    12/13/21 1200  98.9 °F (37.2 °C) Bladder 74 28 90 %    12/13/21 1100    83 30 (!) 88 %    12/13/21 1000    56 27 93 %    @      Intake/Output Summary (Last 24 hours) at 12/14/2021 0938  Last data filed at 12/14/2021 0600  Gross per 24 hour   Intake 2354 ml   Output 938 ml   Net 1416 ml         Wt Readings from Last 3 Encounters:   12/14/21 177 lb 8 oz (80.5 kg)   10/26/21 152 lb (68.9 kg)   10/12/21 149 lb (67.6 kg)     PE  Constitutional:  Pt is intubated  Seen through window of icu    MEDS (scheduled):    calcium gluconate IVPB  1,000 mg IntraVENous Once    hydrALAZINE        lansoprazole  30 mg Oral QAM AC    polyethylene glycol  17 g Oral Daily    hydrocortisone sodium succinate PF  50 mg IntraVENous Q8H    artificial tears   Both Eyes Q12H    miconazole nitrate   Topical BID    naloxegol  12.5 mg Oral QAM    midodrine  20 mg Oral Q8H    [Held by provider] enoxaparin  40 mg SubCUTAneous BID    sucralfate  1 g Oral 4 times per day    sennosides-docusate sodium  2 tablet Oral Daily    sodium chloride flush  5-40 mL IntraVENous 2 times per day    heparin flush  3 mL IntraVENous 2 times per day    chlorhexidine  15 mL Mouth/Throat BID    budesonide  1 mg Nebulization BID    Arformoterol Tartrate  15 mcg Nebulization BID    atorvastatin  10 mg Oral Daily    QUEtiapine  200 mg Oral Daily    sertraline  200 mg Oral Daily    traZODone  100 mg Oral Nightly    ascorbic acid  1,000 mg Oral Daily    vitamin D  2,000 Units Oral Daily    zinc sulfate  50 mg Oral Daily       MEDS (infusions):   fentaNYL 5 mcg/ml in 0.9%  ml infusion 200 mcg/hr (12/14/21 8868)    midazolam 9 mg/hr (12/14/21 0246)    sodium chloride 100 mL/hr at 11/1956    dextrose         MEDS (prn):  sodium chloride flush, heparin flush, ipratropium-albuterol, sodium chloride, acetaminophen **OR** acetaminophen, glucose, dextrose, glucagon (rDNA), dextrose    DATA:    Recent Labs     12/12/21  0403 12/12/21  1625 12/13/21  0510 12/13/21  1542 12/14/21  0501   WBC 10.3  --  10.9  --  12.3*   HGB 7.2*   < > 9.1* 8.6* 9.8*   HCT 24.1*   < > 29.9* 28.2* 31.5*   .6*  --  103.8*  --  101.3*     --  243  --  265    < > = values in this interval not displayed. Recent Labs     12/12/21  0403 12/13/21  0510 12/14/21  0501    138 138   K 4.0 3.2* 3.8    99 100   CO2 29 30* 27   BUN 18 17 15   CREATININE 0.4* 0.4* 0.3*   LABGLOM >60 >60 >60   GLUCOSE 130* 124* 113*   CALCIUM 8.4* 8.4* 8.2*   ALT 39* 38* 34*   AST 20 23 25   BILIDIR <0.2 <0.2 <0.2   BILITOT 0.4 0.3 0.3   ALKPHOS 95 103 93   MG  --  1.8  --    PHOS  --  2.9  --        Lab Results   Component Value Date    LABALBU 3.2 (L) 12/14/2021    LABALBU 3.5 12/13/2021    LABALBU 3.2 (L) 12/12/2021     Lab Results   Component Value Date    TSH 4.720 (H) 10/12/2021       Iron Studies  Lab Results   Component Value Date    FERRITIN 222 12/14/2021     Vitamin B-12   Date Value Ref Range Status   03/02/2021 771 211 - 946 pg/mL Final     Folate   Date Value Ref Range Status   03/02/2021 >20.0 4.8 - 24.2 ng/mL Final       No results found for: VITD25  No results found for: PTH    No components found for: URIC    Lab Results   Component Value Date    COLORU Yellow 11/16/2021    NITRU POSITIVE 11/16/2021    GLUCOSEU Negative 11/16/2021    KETUA 15 11/16/2021    UROBILINOGEN 0.2 11/16/2021    BILIRUBINUR Negative 11/16/2021       No results found for: LIPIDPAN      IMPRESSION/RECOMMENDATIONS:      1.   Acute kidney injury stage I  ischemic ATN occurring in the setting of hypotension  nonoliguric    Cr continues to improve now back to baseline  Continue to monitor labs and urine output       2. Acute hypoxic respiratory failure  due to COVID-19 pneumonia  Intubation with mechanical ventilation       3. Septic shock in setting of COVID-19 infection  suspected possible superimposed bacterial pneumonia  now resolved; off pressors  abx per id     4. Volume overload  cumulative fluid balance +14 L  UO approx 1 L past 24 hours  resume Bumex , as now hemodynamically much improved  I/o miscalculated 12/9 and now inaccurate    5. Hyponatremia  Na at 141     6.  Hypocalcemia  Replace prn  hypoalbuminemic    dw Resident    Tennille Mcfarland MD

## 2021-12-14 NOTE — PROGRESS NOTES
[facility] - [facility] Inpatient   Resident Progress Note    S:  Hospital day: 29   Brief Synopsis: Anastacio Mcdermott is a 73 y.o. female with pmh of GERD, osteoarthritis and schizoaffective disorder who presented to ER s/p fall at home. Patient found down at home, with initial O2 saturation of 60%. Brought to the ER; found to have COVID-19 pneumonia , requiring bipap for appropriate saturation. Rhabdomyolysis, UTI. Started on appropriate management including tocilizumab, ceftriaxone 1 g daily, and IV fluids for rhabdomyolysis. Decadron was started daily, and with patients worsening status - pulmonology consulted. FULL CODE. Transferred to ICU on 11/18 for rapid breathing and hypoxia and pO2 of 55. Intubated 11/20 secondary to O2 sats consistently <80% with rapid breathing. 11/29 , patient developed left sided tension pneumothorax and underwent chest tube placement. Patient Intubated, sedated, paralyzed, proned. On vasopressors. Hemoglobin dropped to 6.9 with 1 unit PRBC transfused. Second chest tube placed on left chest. Vacuum changed to water seals. General surgery removed one smaller bore chest tube on 12/11/21 and the 2nd chest tube on 12/13/2021. Patient seen today in the morning. Remains supine, sedated and intubated. FiO2 80%, PEEP 10, P/f ratio: 0.76. Gen Surg planning for trach and peg tube placement. Sedation is being weaned off.         Cont meds:    fentaNYL 5 mcg/ml in 0.9%  ml infusion 100 mcg/hr (12/14/21 1000)    midazolam 4 mg/hr (12/14/21 1001)    sodium chloride 100 mL/hr at 11/1955    dextrose       Scheduled meds:    hydrALAZINE        hydrocortisone sodium succinate PF  50 mg IntraVENous Q12H    lansoprazole  30 mg Oral QAM AC    polyethylene glycol  17 g Oral Daily    artificial tears   Both Eyes Q12H    miconazole nitrate   Topical BID    naloxegol  12.5 mg Oral QAM    midodrine  20 mg Oral Q8H    [Held by provider] enoxaparin  40 mg SubCUTAneous BID    sucralfate  1 g Oral 4 times per day    sennosides-docusate sodium  2 tablet Oral Daily    sodium chloride flush  5-40 mL IntraVENous 2 times per day    heparin flush  3 mL IntraVENous 2 times per day    chlorhexidine  15 mL Mouth/Throat BID    budesonide  1 mg Nebulization BID    Arformoterol Tartrate  15 mcg Nebulization BID    atorvastatin  10 mg Oral Daily    QUEtiapine  200 mg Oral Daily    sertraline  200 mg Oral Daily    traZODone  100 mg Oral Nightly    vitamin D  2,000 Units Oral Daily     PRN meds: sodium chloride flush, heparin flush, ipratropium-albuterol, sodium chloride, acetaminophen **OR** acetaminophen, glucose, dextrose, glucagon (rDNA), dextrose     I reviewed the patient's past medical and surgical history, Medications and Allergies. O:  BP (!) 202/92   Pulse 67   Temp 98.6 °F (37 °C) (Bladder)   Resp 23   Ht 5' 1\" (1.549 m)   Wt 177 lb 8 oz (80.5 kg)   SpO2 95%   BMI 33.54 kg/m²   24 hour I&O: I/O last 3 completed shifts: In: 3956 [I.V.:1862; NG/GT:492]  Out: 998 [Urine:998]  No intake/output data recorded. Physical Exam  Constitutional:       Comments: Intubated, sedated, paralyzed, supine   HENT:      Head: Normocephalic and atraumatic. Mouth/Throat:      Comments: intubated  Cardiovascular:      Rate and Rhythm: Normal rate and regular rhythm. Pulses: Normal pulses. Heart sounds: Normal heart sounds. Pulmonary:      Breath sounds: No wheezing, rhonchi or rales. Abdominal:      General: There is no distension. Palpations: There is no mass. Hernia: No hernia is present. Musculoskeletal:         General: Normal range of motion. Right lower leg: Edema present. Left lower leg: Edema present. Skin:     General: Skin is warm and dry. Findings: Bruising: mild, on back.    Neurological:      Comments: Sedated, paralyzed       Labs:  Na/K/Cl/CO2:  138/3.8/100/27 (12/14 0501)  BUN/Cr/glu/ALT/AST/amyl/lip:  15/0.3/--/34/25/--/-- (12/14 0501)  WBC/Hgb/Hct/Plts:  12.3/9.8/31.5/265 (12/14 0501)  estimated creatinine clearance is 180 mL/min (A) (based on SCr of 0.3 mg/dL (L)). Other pertinent labs as noted below    Radiology:  XR CHEST PORTABLE   Final Result   1. There is no interval change in extensive multifocal bilateral pneumonia. XR CHEST PORTABLE   Final Result   Unchanged bilateral airspace disease. Right pleural effusion with suspected   interval progression. XR CHEST PORTABLE   Final Result   1. No significant changes since the December 11.      2.  Continued air density under the left diaphragma limits expansion of the   left lower lung base. 3.  Further evaluation with CT chest recommended as above discussed. XR CHEST PORTABLE   Final Result   No significant changes since the previous study of a December 10. XR CHEST PORTABLE   Final Result   1. There is no appreciable left pneumothorax. 2. Extensive multifocal bilateral airspace disease which is unchanged. XR CHEST PORTABLE   Final Result   1. Slightly improved aeration at the right apex and left lower lung   2. Stable position and alignment of the tubes and catheters   3. Extensive bilateral airspace disease concerning for consolidative   pneumonia slightly improved         XR CHEST PORTABLE   Final Result   Stable support lines and bilateral airspace disease. XR CHEST PORTABLE   Final Result   Unchanged bilateral diffuse infiltrates. XR CHEST PORTABLE   Final Result   1. Decreased left pneumothorax with suspected trace residual.  2 left chest   tubes are similar to the previous exam.   2. Bilateral pulmonary opacities appear mildly increased. 3. The support devices are unchanged and appear to be in acceptable position. XR CHEST PORTABLE   Final Result   Unchanged airspace disease and left pneumothorax. XR CHEST PORTABLE   Final Result   1.  No interval change in extensive multifocal bilateral pneumonia   2. Less than 10% left pneumothorax. There is stable position of the 2 left   chest tubes. XR CHEST PORTABLE   Final Result   1. Stable diffuse interstitial pulmonary edema or pneumonia. 2.  Left chest tube demonstrated. Minimal residual left pneumothorax. XR CHEST PORTABLE   Final Result   1. Stable diffuse bilateral airspace disease. 2. Small left pneumothorax appears new or increased compared to prior   examination. Left chest tube appears stable in position. 3. Possible right pleural effusion. XR CHEST PORTABLE   Final Result   1. There is no interval change in extensive multifocal bilateral pneumonia. XR CHEST PORTABLE   Final Result   No change in extensive bilateral pulmonary airspace opacities. No   pneumothorax is radiographically visible on the current exam.         XR CHEST PORTABLE   Final Result   Significantly decreased size of left pneumothorax. There is likely trace   left pneumothorax remaining. Stable extensive bilateral pulmonary airspace opacities. XR CHEST PORTABLE   Final Result   Addendum 1 of 1   ADDENDUM:   Verbal report was given to Gely Paula RN at 8:15 a.m. central standard   time on 11/30/2021. Final   New moderate sized left-sided pneumothorax. Stable extensive bilateral pulmonary airspace opacities            XR CHEST PORTABLE   Final Result   1. Lines okay. 2. Persistent edema/ARDS/pneumonia. 3. No sign of pneumothorax. XR CHEST PORTABLE   Final Result   Interval placement of left-sided chest tube with questionable residual   pneumothorax versus artifact. Extensive bilateral infiltrates. Continued follow-up recommended. XR CHEST PORTABLE   Final Result   Interval development of large left-sided tension pneumothorax. Extensive bilateral parenchymal infiltrates. Findings were discussed with Dr. [place] at approximately 0010 hours [place]   time on 11/28/2021. XR CHEST PORTABLE   Final Result   Severe bilateral pulmonary opacification. XR CHEST PORTABLE   Final Result   1. Endotracheal tube is 3 cm above the khai. 2. Stable interstitial pulmonary edema or pneumonia throughout both lungs. XR CHEST PORTABLE   Final Result   1. Somewhat limited exam, grossly unchanged. Please see above comments. .      RECOMMENDATION:   1. Recommend follow-up thoracic imaging, as directed clinically. .         XR ABDOMEN (KUB) (SINGLE AP VIEW)   Final Result   1. Satisfactory position of the NG tube within the stomach         XR CHEST PORTABLE   Final Result   1. There is no interval change in the multifocal bilateral pneumonia. XR CHEST PORTABLE   Final Result   Bilateral airspace disease with interval progression on the right         XR CHEST PORTABLE   Final Result   1. Endotracheal tube is 2.7 cm above the khai. 2. Stable interstitial pulmonary edema or pneumonia throughout both lungs. XR CHEST PORTABLE   Final Result   1. Worsening of the multifocal bilateral pneumonia when compared with the   prior study of 1 day earlier. XR CHEST PORTABLE   Final Result   1. Multifocal bilateral pneumonia more prominent within the right upper lobe   2. Minimal partial interval clearing of the pneumonia within the left lung   3. Worsening of the pneumonia within the right upper lobe. US DUP LOWER EXTREMITIES BILATERAL VENOUS   Final Result   Within the visualized vessels there is no evidence for deep venous   thrombosis               XR CHEST PORTABLE   Final Result   1. Lines okay. 2. Slightly worsened diffuse edema versus pneumonia. XR CHEST PORTABLE   Final Result   1. Lines okay   2. Improved aeration, mild improvement and diffuse pneumonia/edema. XR CHEST ABDOMEN NG PLACEMENT   Final Result   NG tube position satisfactory.          XR CHEST PORTABLE   Final Result   Stable bilateral pneumonia or interstitial pulmonary edema. XR CHEST PORTABLE   Final Result   Increasing bilateral infiltrates. XR CHEST PORTABLE   Final Result   1. There is no interval change in the multifocal bilateral pneumonia. CT HEAD WO CONTRAST   Final Result   No acute intracranial abnormality. Cortical atrophy and periventricular white matter ischemic changes. CT CERVICAL SPINE WO CONTRAST   Final Result   No acute abnormality of the cervical spine. Moderate degenerative changes with disc space narrowing and marginal bony   spur formation C5 through C7. Ground-glass opacities in the visualized lung apices. Please refer to chest   CT for additional information. CTA CHEST W CONTRAST   Final Result   No evidence of pulmonary embolism. Extensive multifocal ground-glass opacities predominantly in the peripheral   lung zones bilaterally, highly concerning for atypical or COVID related   pneumonia. XR CHEST PORTABLE   Final Result   Bilateral patchy airspace opacities worrisome for pneumonia. XR CHEST PORTABLE    (Results Pending)   XR CHEST PORTABLE    (Results Pending)   XR CHEST PORTABLE    (Results Pending)   XR CHEST PORTABLE    (Results Pending)   XR CHEST PORTABLE    (Results Pending)       A/P:  Active Problems:    Acute respiratory failure with hypoxia (HCC)    Rhabdomyolysis    Acute cystitis with hematuria    Primary spontaneous pneumothorax  Resolved Problems:    * No resolved hospital problems. *    1. Intubated, Sedated, Paralyzed,   11/20: Intubated 2/2 hypoxia and increased work of breathing, proned and paralyzed  S/p Left Chest Tube 11/29 for tension pneumothorax with second chest tube placed on 11/30. Chest tubes removed 12/11 and 12/13. Patient now supine. Gen Surg considering trach and peg tube placement pending ventilator setting improvement.     2. Acute Hypoxic Respiratory Failure 2/2 Covid 19  Unvaccinated for Covid 19  Patient found with pulse ox of 60%. 11/18 - transferred to ICU due to worsening resp status. --> currently intubated and sedated  CXR showing bilateral pulmonary infiltrates  Inflammatory markers: DDimer 530, , CRP 22.9, Ferritin 749 on admission   CTA pulm showing extensive bilateral pulmonary infiltrates consistent with COVID-19 pneumonia , no PE  Completed remdesivir and tocilizumab treatment  Decadron and SoluCortef course completed  Pulmonology consulted ; appreciate recs; daily ABG, Vitamin C, Vitamin D  Dietary consulted for further recommendations on nutrition status ; appreciate recs. CXR with resolution of pneumothorax. Advanced Care Planning with Spiritual Services ; recs appreciated - FULL CODE. Brother wants everything done for Marilyn per discussion with palliate medicine. 3. Hypotension  - continue Levophed titrate off per ICU protocol. 4. Left Sided Tension Pneumothorax -   S/p left sided chest tube  11/29 , Tension Pneumothorax on Xray  Left lung re inflated on repeat CXR  Whistle like sound noted Left Upper Lung field 2/2 likely to Chest tube . 2nd chest tube placed on 11/30. General surgery removed  chest tubes on 12/11/21 and 12/13/2021  Chest x-ray from today with right pleural effusion more apparent, may be mildly progressive. Continue to monitor    5. Concern for Sepsis - resolved  Likely 2/2 superimposed bacterial pneumonia , candidal colonization  Tmax 102.2 , Tavg 100.3F, Afebrile overnight. Given one dose cefepime and vancomycin in ICU  Procal 0.07, repeat 0.27 , blood cultures, urine culture negative. Completed Pip/Tazo, Vancomycin  Fungitell and B glucan pending  Diflucan stopped by ID. ID Signed off  No current Abx    7. CHARAN Stage I - resolved  Admission creatinine 0.6  Likely 2/2 to Ischemic ATN in setting of Hypotension  Creatinine increased to 1.6 --> trended down to 0.4  Nephrology following , appreciate recs     8.  Anemia  2/2 to inflammation/ response to Covid 19  .3, MCHC 31.5 RDW 20.1  Vitamin B12/Folate normal March 2021  FOBT + 11/29  H/H < 7 11/30/2021 , s/p 1 unit PRBC. Hemoglobin 7.2 this AM.  Maintain H/H > 7    9. Thrombocytopenia  Likely 2/2 to SALENA vs inflammation from Covid 19  Hold all anticoagulation , patient trialed on heparin , lovenox and argatroban but platelets continue to decrease  SALENA panel- negative  Platelets 398 this am     10. Hx Schizoaffective Disorder / Anxiety  Continue seroquel , zoloft, trazodone  Hold ativan, vistaril     11. Hypokalemia  Initial K 3.2 , Mag 2.6  Replace as needed  CMP daily    12. Rhabdomyolysis - resolved  2/2 to fall for unknown time period  Patient found down for unknown period of time  Initial CK > 3000  Received 4 L NS in ER    13. Concern for UTI - resolved   Likely simple cystitis ; patient with no CVA tenderness , presented initially with fever 102 F  Urinalysis with increased nitrites, bacteria, blood  Urine culture, blood cultures were negative  Given one dose doxy and rocephin in the ER  1g ceftriaxone IV /-  dc'd 11/18/2021   ID signed off.      GI/DVT ppx: protonix  Diet: NPO, tube feeds  Disposition: MICU    Electronically signed by Candi Daniel MD  [id number] on 12/14/2021 at 10:28 AM  This case was discussed with attending physician: Dr. Evon Kang

## 2021-12-15 NOTE — PROGRESS NOTES
[facility]  Nephrology Attending Progress Note          SUBJECTIVE:     12/7: chart reviewed, seen through window of icu, intubated    12/8: pt seen through window of icu.  Intubated on levo    12/9: pt seen through window of icu, intubated and sedated, chest tube in place, on levo    12/10: pt seen through window of icu, intubated and sedated with chest tube, on levo    12/11: seen through icu window, intubated and sedated, on levo    12/12: pt seen through window of icu, chart reviewed, intubated and sedated     12/13: off nimbex; received 1 unit PRBC last pm    12/14: No new acute issues reported from overnight; remains intubated; for trach/PEG today    12/15: Trach/PEG postponed due to issues related to bradycardia; potassium low this a.m.; otherwise no new acute issues from overnight    PROBLEM LIST:    Patient Active Problem List   Diagnosis    Schizoaffective disorder ([facility]    Acute respiratory failure with hypoxia ([facility]    Rhabdomyolysis    Acute cystitis with hematuria    Primary spontaneous pneumothorax        PAST MEDICAL HISTORY:    Past Medical History:   Diagnosis Date    GERD (gastroesophageal reflux disease)     Osteoarthritis of right knee     Schizoaffective disorder ([facility]     [facility]care       DIET:    Diet NPO     PHYSICAL EXAM:     Patient Vitals for the past 24 hrs:   BP Temp Temp src Pulse Resp SpO2   12/15/21 0932    70 27 (!) 88 %   12/15/21 0918     30 (!) 88 %   12/15/21 0917    65 27 90 %   12/15/21 0900 (!) 158/86   71 21 94 %   12/15/21 0800 132/70 98.6 °F (37 °C) Bladder 65 28 97 %   12/15/21 0600 (!) 104/56 98.8 °F (37.1 °C) Bladder 67 28 98 %   12/15/21 0540    66 28 98 %   12/15/21 0500 (!) 101/49   68 28 98 %   12/15/21 0400 112/62 99.3 °F (37.4 °C) Bladder 69 28 99 %   12/15/21 0300 (!) 107/57   68 28 99 %   12/15/21 0200 (!) 90/55   70 28 97 %   12/15/21 0100 (!) 147/73   72 28 98 %   12/15/21 0042    76 25 96 %   12/15/21 0000 133/78 99.1 °F (37.3 °C) Bladder 69 28 96 %   12/14/21 2300 (!) 153/76   71 28 94 %   12/14/21 2200 (!) 157/89   67 25 94 %   12/14/21 2100 (!) 167/86   69 27 95 %   12/14/21 2022    65 28 96 %   12/14/21 2021     28 96 %   12/14/21 2000 (!) 146/83 98.6 °F (37 °C) Bladder 71 28 95 %   12/14/21 1900 (!) 146/79   66 28 95 %   12/14/21 1800 (!) 149/85   66 28 95 %   12/14/21 1700 (!) 168/88   64 28 93 %   12/14/21 1650    61 28 92 %   12/14/21 1600 (!) 153/81 98.6 °F (37 °C) Bladder 65 28 93 %   12/14/21 1500 136/73   69 (!) 31 90 %   12/14/21 1400 (!) 144/79   72 (!) 31 (!) 89 %   12/14/21 1300 (!) 152/81   76 30 (!) 87 %   12/14/21 1200 134/68 98 °F (36.7 °C) Bladder 85 28 (!) 84 %   12/14/21 1100 (!) 147/74   82 30 (!) 83 %   12/14/21 1050    68 (!) 32 (!) 80 %   12/14/21 1047    72 (!) 35 (!) 80 %   12/14/21 1045    83 30 (!) 81 %   12/14/21 1040    76 27 (!) 81 %   12/14/21 1030    79 24 (!) 81 %   12/14/21 1000 (!) 187/94   75 (!) 31 (!) 84 %   @      Intake/Output Summary (Last 24 hours) at 12/15/2021 0950  Last data filed at 12/15/2021 0900  Gross per 24 hour   Intake 2190 ml   Output 1800 ml   Net 390 ml         Wt Readings from Last 3 Encounters:   12/14/21 177 lb 8 oz (80.5 kg)   10/26/21 152 lb (68.9 kg)   10/12/21 149 lb (67.6 kg)     PE  Constitutional:  Pt is intubated  Seen through window of icu    MEDS (scheduled):    potassium chloride  40 mEq IntraVENous Once    sodium phosphate IVPB  15 mmol IntraVENous Once    potassium phosphate IVPB  20 mmol IntraVENous Once    polyethylene glycol  17 g Oral BID    hydrocortisone sodium succinate PF  50 mg IntraVENous Q12H    bumetanide  1 mg IntraVENous BID    lansoprazole  30 mg Oral QAM AC    artificial tears   Both Eyes Q12H    miconazole nitrate   Topical BID    naloxegol  12.5 mg Oral QAM    midodrine  20 mg Oral Q8H    [Held by provider] enoxaparin  40 mg SubCUTAneous BID    sucralfate  1 g Oral 4 times per day  sennosides-docusate sodium  2 tablet Oral Daily    sodium chloride flush  5-40 mL IntraVENous 2 times per day    heparin flush  3 mL IntraVENous 2 times per day    chlorhexidine  15 mL Mouth/Throat BID    budesonide  1 mg Nebulization BID    Arformoterol Tartrate  15 mcg Nebulization BID    atorvastatin  10 mg Oral Daily    QUEtiapine  200 mg Oral Daily    sertraline  200 mg Oral Daily    traZODone  100 mg Oral Nightly    vitamin D  2,000 Units Oral Daily       MEDS (infusions):   fentaNYL 5 mcg/ml in 0.9%  ml infusion 200 mcg/hr (12/15/21 0922)    midazolam 10 mg/hr (12/15/21 0744)    sodium chloride 100 mL/hr at 11/1956    dextrose         MEDS (prn):  sodium chloride flush, heparin flush, ipratropium-albuterol, sodium chloride, acetaminophen **OR** acetaminophen, glucose, dextrose, glucagon (rDNA), dextrose    DATA:    Recent Labs     12/13/21  0510 12/13/21  1542 12/14/21  0501 12/14/21  1735 12/15/21  0430   WBC 10.9  --  12.3*  --  10.9   HGB 9.1*   < > 9.8* 10.9* 9.5*   HCT 29.9*   < > 31.5* 34.9 30.3*   .8*  --  101.3*  --  102.7*     --  265  --  266    < > = values in this interval not displayed.      Recent Labs     12/13/21  0510 12/14/21  0501 12/15/21  0430    138 139   K 3.2* 3.8 2.8*   CL 99 100 100   CO2 30* 27 29   BUN 17 15 13   CREATININE 0.4* 0.3* 0.3*   LABGLOM >60 >60 >60   GLUCOSE 124* 113* 99   CALCIUM 8.4* 8.2* 8.1*   ALT 38* 34* 28   AST 23 25 17   BILIDIR <0.2 <0.2 <0.2   BILITOT 0.3 0.3 0.4   ALKPHOS 103 93 95   MG 1.8  --  1.6   PHOS 2.9  --  2.2*       Lab Results   Component Value Date    LABALBU 2.7 (L) 12/15/2021    LABALBU 3.2 (L) 12/14/2021    LABALBU 3.5 12/13/2021     Lab Results   Component Value Date    TSH 4.720 (H) 10/12/2021       Iron Studies  Lab Results   Component Value Date    FERRITIN 215 12/15/2021     Vitamin B-12   Date Value Ref Range Status   03/02/2021 771 211 - 946 pg/mL Final     Folate   Date Value Ref Range Status   03/02/2021 >20.0 4.8 - 24.2 ng/mL Final       No results found for: VITD25  No results found for: PTH    No components found for: URIC    Lab Results   Component Value Date    COLORU Yellow 11/16/2021    NITRU POSITIVE 11/16/2021    GLUCOSEU Negative 11/16/2021    KETUA 15 11/16/2021    UROBILINOGEN 0.2 11/16/2021    BILIRUBINUR Negative 11/16/2021       No results found for: [facility]      IMPRESSION/RECOMMENDATIONS:      1. Acute kidney injury stage I  ischemic ATN occurring in the setting of hypotension  nonoliguric    Cr continues to improve now back to baseline  Continue to monitor labs and urine output       2. Acute hypoxic respiratory failure  due to COVID-19 pneumonia  Intubation with mechanical ventilation       3. Septic shock in setting of COVID-19 infection  suspected possible superimposed bacterial pneumonia  now resolved; off pressors  abx per id     4. Volume overload  cumulative fluid balance +14 L  UO approx 1.3L past 24 hours  bumex dose increased     5.  Hypocalcemia  Replace prn  hypoalbuminemic    dw Resident    Virgilio Hodgson MD

## 2021-12-15 NOTE — PROGRESS NOTES
[address] [facility] Medicine Inpatient   Resident Progress Note    S:  Hospital day: 34   Brief Synopsis: Liam Fleischer is a 73 y.o. female with pmh of GERD, osteoarthritis and schizoaffective disorder who presented to ER s/p fall at home. Patient found down at home, with initial O2 saturation of 60%. Brought to the ER; found to have COVID-19 pneumonia , requiring bipap for appropriate saturation. Rhabdomyolysis, UTI. Started on appropriate management including tocilizumab, ceftriaxone 1 g daily, and IV fluids for rhabdomyolysis. Decadron was started daily, and with patients worsening status - pulmonology consulted. FULL CODE. Transferred to ICU on 11/18 for rapid breathing and hypoxia and pO2 of 55. Intubated 11/20 secondary to O2 sats consistently <80% with rapid breathing. 11/29 , patient developed left sided tension pneumothorax and underwent chest tube placement. Patient Intubated, sedated, paralyzed, proned. On vasopressors. Hemoglobin dropped to 6.9 with 1 unit PRBC transfused. Second chest tube placed on left chest. Vacuum changed to water seals. General surgery removed one smaller bore chest tube on 12/11/21 and the 2nd chest tube on 12/13/2021. Patient seen today in the morning. Remains supine, intubated. Opened eyes with spontaneous eyelid closure. FiO2 100%, PEEP 12, P/f ratio: 0.82. Gen Surg planning for trach and peg tube placement. Sedation is being weaned off.         Cont meds:    fentaNYL 5 mcg/ml in 0.9%  ml infusion 200 mcg/hr (12/15/21 0922)    midazolam 10 mg/hr (12/15/21 0744)    sodium chloride 100 mL/hr at 11/1956    dextrose       Scheduled meds:    potassium chloride  40 mEq IntraVENous Once    sodium phosphate IVPB  15 mmol IntraVENous Once    potassium phosphate IVPB  20 mmol IntraVENous Once    polyethylene glycol  17 g Oral BID    hydrocortisone sodium succinate PF  50 mg IntraVENous Q12H    bumetanide  1 mg IntraVENous BID    lansoprazole  30 mg Oral QAM AC    artificial tears   Both Eyes Q12H    miconazole nitrate   Topical BID    naloxegol  12.5 mg Oral QAM    midodrine  20 mg Oral Q8H    [Held by provider] enoxaparin  40 mg SubCUTAneous BID    sucralfate  1 g Oral 4 times per day    sennosides-docusate sodium  2 tablet Oral Daily    sodium chloride flush  5-40 mL IntraVENous 2 times per day    heparin flush  3 mL IntraVENous 2 times per day    chlorhexidine  15 mL Mouth/Throat BID    budesonide  1 mg Nebulization BID    Arformoterol Tartrate  15 mcg Nebulization BID    atorvastatin  10 mg Oral Daily    QUEtiapine  200 mg Oral Daily    sertraline  200 mg Oral Daily    traZODone  100 mg Oral Nightly    vitamin D  2,000 Units Oral Daily     PRN meds: sodium chloride flush, heparin flush, ipratropium-albuterol, sodium chloride, acetaminophen **OR** acetaminophen, glucose, dextrose, glucagon (rDNA), dextrose     I reviewed the patient's past medical and surgical history, Medications and Allergies. O:  /72   Pulse 74   Temp 98.6 °F (37 °C) (Bladder)   Resp 30   Ht 5' 1\" (1.549 m)   Wt 177 lb 8 oz (80.5 kg)   SpO2 (!) 87%   BMI 33.54 kg/m²   24 hour I&O: I/O last 3 completed shifts: In: 2090 [I.V.:2030; NG/GT:60]  Out: 0822 [Urine:1435]  I/O this shift:  In: 100 [NG/GT:100]  Out: 425 [Urine:425]      Physical Exam  Constitutional:       Comments: Intubated, sedated, paralyzed, supine   HENT:      Head: Normocephalic and atraumatic. Mouth/Throat:      Comments: intubated  Eyes:      Comments: Eyes opened with spontaneous closure of the eyelids     Cardiovascular:      Rate and Rhythm: Normal rate and regular rhythm. Pulses: Normal pulses. Heart sounds: Normal heart sounds. Pulmonary:      Breath sounds: No wheezing, rhonchi or rales. Abdominal:      General: There is no distension. Palpations: There is no mass. Hernia: No hernia is present. Musculoskeletal:         General: Normal range of motion. Right lower leg: Edema present. Left lower leg: Edema present. Skin:     General: Skin is warm and dry. Findings: Bruising: mild, on back. Neurological:      Comments: Sedated, paralyzed       Labs:  Na/K/Cl/CO2:  139/2.8/100/29 (12/15 0430)  BUN/Cr/glu/ALT/AST/amyl/lip:  13/0.3/--/28/17/--/-- (12/15 0430)  WBC/Hgb/Hct/Plts:  10.9/9.5/30.3/266 (12/15 0430)  estimated creatinine clearance is 180 mL/min (A) (based on SCr of 0.3 mg/dL (L)). Other pertinent labs as noted below    Radiology:  XR CHEST PORTABLE   Final Result   1. There is no interval change in extensive multifocal bilateral pneumonia. XR CHEST PORTABLE   Final Result   Unchanged bilateral airspace disease. Right pleural effusion with suspected   interval progression. XR CHEST PORTABLE   Final Result   1. No significant changes since the December 11.      2.  Continued air density under the left diaphragma limits expansion of the   left lower lung base. 3.  Further evaluation with CT chest recommended as above discussed. XR CHEST PORTABLE   Final Result   No significant changes since the previous study of a December 10. XR CHEST PORTABLE   Final Result   1. There is no appreciable left pneumothorax. 2. Extensive multifocal bilateral airspace disease which is unchanged. XR CHEST PORTABLE   Final Result   1. Slightly improved aeration at the right apex and left lower lung   2. Stable position and alignment of the tubes and catheters   3. Extensive bilateral airspace disease concerning for consolidative   pneumonia slightly improved         XR CHEST PORTABLE   Final Result   Stable support lines and bilateral airspace disease. XR CHEST PORTABLE   Final Result   Unchanged bilateral diffuse infiltrates. XR CHEST PORTABLE   Final Result   1.  Decreased left pneumothorax with suspected trace residual.  2 left chest   tubes are similar to the previous exam.   2. Bilateral pulmonary infiltrates. Continued follow-up recommended. XR CHEST PORTABLE   Final Result   Interval development of large left-sided tension pneumothorax. Extensive bilateral parenchymal infiltrates. Findings were discussed with Dr. Donny Gray at approximately 0010 hours [place]   time on 11/28/2021. XR CHEST PORTABLE   Final Result   Severe bilateral pulmonary opacification. XR CHEST PORTABLE   Final Result   1. Endotracheal tube is 3 cm above the khai. 2. Stable interstitial pulmonary edema or pneumonia throughout both lungs. XR CHEST PORTABLE   Final Result   1. Somewhat limited exam, grossly unchanged. Please see above comments. .      RECOMMENDATION:   1. Recommend follow-up thoracic imaging, as directed clinically. .         XR ABDOMEN (KUB) (SINGLE AP VIEW)   Final Result   1. Satisfactory position of the NG tube within the stomach         XR CHEST PORTABLE   Final Result   1. There is no interval change in the multifocal bilateral pneumonia. XR CHEST PORTABLE   Final Result   Bilateral airspace disease with interval progression on the right         XR CHEST PORTABLE   Final Result   1. Endotracheal tube is 2.7 cm above the khai. 2. Stable interstitial pulmonary edema or pneumonia throughout both lungs. XR CHEST PORTABLE   Final Result   1. Worsening of the multifocal bilateral pneumonia when compared with the   prior study of 1 day earlier. XR CHEST PORTABLE   Final Result   1. Multifocal bilateral pneumonia more prominent within the right upper lobe   2. Minimal partial interval clearing of the pneumonia within the left lung   3. Worsening of the pneumonia within the right upper lobe. US DUP LOWER EXTREMITIES BILATERAL VENOUS   Final Result   Within the visualized vessels there is no evidence for deep venous   thrombosis               XR CHEST PORTABLE   Final Result   1. Lines okay.    2. Slightly worsened diffuse edema versus pneumonia. XR CHEST PORTABLE   Final Result   1. Lines okay   2. Improved aeration, mild improvement and diffuse pneumonia/edema. XR CHEST ABDOMEN NG PLACEMENT   Final Result   NG tube position satisfactory. XR CHEST PORTABLE   Final Result   Stable bilateral pneumonia or interstitial pulmonary edema. XR CHEST PORTABLE   Final Result   Increasing bilateral infiltrates. XR CHEST PORTABLE   Final Result   1. There is no interval change in the multifocal bilateral pneumonia. CT HEAD WO CONTRAST   Final Result   No acute intracranial abnormality. Cortical atrophy and periventricular white matter ischemic changes. CT CERVICAL SPINE WO CONTRAST   Final Result   No acute abnormality of the cervical spine. Moderate degenerative changes with disc space narrowing and marginal bony   spur formation C5 through C7. Ground-glass opacities in the visualized lung apices. Please refer to chest   CT for additional information. CTA CHEST W CONTRAST   Final Result   No evidence of pulmonary embolism. Extensive multifocal ground-glass opacities predominantly in the peripheral   lung zones bilaterally, highly concerning for atypical or COVID related   pneumonia. XR CHEST PORTABLE   Final Result   Bilateral patchy airspace opacities worrisome for pneumonia. XR CHEST PORTABLE    (Results Pending)   XR CHEST PORTABLE    (Results Pending)   XR CHEST PORTABLE    (Results Pending)   XR CHEST PORTABLE    (Results Pending)   XR CHEST PORTABLE    (Results Pending)   XR CHEST PORTABLE    (Results Pending)       A/P:  Active Problems:    Acute respiratory failure with hypoxia (HCC)    Rhabdomyolysis    Acute cystitis with hematuria    Primary spontaneous pneumothorax  Resolved Problems:    * No resolved hospital problems. *    1. Intubated, Sedated, Paralyzed,   11/20:  Intubated 2/2 hypoxia and increased work of breathing, proned and paralyzed  S/p Left Chest Tube 11/29 for tension pneumothorax with second chest tube placed on 11/30. Chest tubes removed 12/11 and 12/13. Patient now supine. Gen Surg considering trach and peg tube placement pending ventilator setting improvement. 2. Acute Hypoxic Respiratory Failure 2/2 Covid 19  Unvaccinated for Covid 19  Patient found with pulse ox of 60%. 11/18 - transferred to ICU due to worsening resp status. --> currently intubated and sedated  CXR showing bilateral pulmonary infiltrates  Inflammatory markers: DDimer 530, , CRP 22.9, Ferritin 749 on admission   CTA pulm showing extensive bilateral pulmonary infiltrates consistent with COVID-19 pneumonia , no PE  Completed remdesivir and tocilizumab treatment  Decadron and SoluCortef course completed  Pulmonology consulted ; appreciate recs; daily ABG, Vitamin C, Vitamin D  Dietary consulted for further recommendations on nutrition status ; appreciate recs. CXR with resolution of pneumothorax. Advanced Care Planning with Spiritual Services ; recs appreciated - FULL CODE. Brother wants everything done for Marilyn per discussion with palliate medicine. 3. Hypotension  - continue Levophed titrate off per ICU protocol. 4. Left Sided Tension Pneumothorax -   S/p left sided chest tube  11/29 , Tension Pneumothorax on Xray  Left lung re inflated on repeat CXR  Whistle like sound noted Left Upper Lung field 2/2 likely to Chest tube . 2nd chest tube placed on 11/30. General surgery removed  chest tubes on 12/11/21 and 12/13/2021  Chest x-ray from today with right pleural effusion more apparent, may be mildly progressive. Continue to monitor    5. Concern for Sepsis - resolved  Likely 2/2 superimposed bacterial pneumonia , candidal colonization  Tmax 102.2 , Tavg 100.3F, Afebrile overnight. Given one dose cefepime and vancomycin in ICU  Procal 0.07, repeat 0.27 , blood cultures, urine culture negative.    Completed Pip/Tazo, Vancomycin  Fungitell and B glucan pending  Diflucan stopped by ID. ID Signed off  No current Abx    7. CHARAN Stage I - resolved  Admission creatinine 0.6  Likely 2/2 to Ischemic ATN in setting of Hypotension  Creatinine increased to 1.6 --> trended down to 0.4  Nephrology following , appreciate recs     8. Anemia  2/2 to inflammation/ response to Covid 19  .3, MCHC 31.5 RDW 20.1  Vitamin B12/Folate normal March 2021  FOBT + 11/29, repeated pending. H/H < 7 11/30/2021 , s/p 1 unit PRBC. Hemoglobin 7.2 this AM.  Maintain H/H > 7    9. Thrombocytopenia  Likely 2/2 to SALENA vs inflammation from Covid 19  Hold all anticoagulation , patient trialed on heparin , lovenox and argatroban but platelets continue to decrease  SALENA panel- negative  Platelets 575 this am     10. Hx Schizoaffective Disorder / Anxiety  Continue seroquel , zoloft, trazodone  Hold ativan, vistaril     11. Hypokalemia  Initial K 3.2 , Mag 2.6  Replace as needed  CMP daily    12. Rhabdomyolysis - resolved  2/2 to fall for unknown time period  Patient found down for unknown period of time  Initial CK > 3000  Received 4 L NS in ER    13. Concern for UTI - resolved   Likely simple cystitis ; patient with no CVA tenderness , presented initially with fever 102 F  Urinalysis with increased nitrites, bacteria, blood  Urine culture, blood cultures were negative  Given one dose doxy and rocephin in the ER  1g ceftriaxone IV /-  dc'd 11/18/2021   ID signed off.      GI/DVT ppx: protonix  Diet: NPO, tube feeds  Disposition: MICU    Electronically signed by Neville Galeazzi, MD  PGY-1 on 12/15/2021 at 10:33 AM  This case was discussed with attending physician: Dr. Bowen Spears

## 2021-12-15 NOTE — PLAN OF CARE
Problem: Falls - Risk of:  Goal: Will remain free from falls  Description: Will remain free from falls  Outcome: Met This Shift  Goal: Absence of physical injury  Description: Absence of physical injury  Outcome: Met This Shift     Problem: Airway Clearance - Ineffective  Goal: Achieve or maintain patent airway  Outcome: Met This Shift     Problem: Gas Exchange - Impaired  Goal: Absence of hypoxia  12/14/2021 2110 by Stephenie Hashimoto, RN  Outcome: Met This Shift  12/14/2021 1825 by Tee Villarreal RN  Outcome: Not Met This Shift  Goal: Promote optimal lung function  12/14/2021 2110 by Stephenie Hashimoto, RN  Outcome: Met This Shift  12/14/2021 1825 by Tee Villarreal RN  Outcome: Not Met This Shift     Problem: Breathing Pattern - Ineffective  Goal: Ability to achieve and maintain a regular respiratory rate  12/14/2021 2110 by Stephenie Hashimoto, RN  Outcome: Met This Shift  12/14/2021 1825 by Tee Villarreal RN  Outcome: Ongoing     Problem:  Body Temperature -  Risk of, Imbalanced  Goal: Ability to maintain a body temperature within defined limits  Outcome: Met This Shift  Goal: Will regain or maintain usual level of consciousness  Outcome: Met This Shift  Goal: Complications related to the disease process, condition or treatment will be avoided or minimized  Outcome: Met This Shift     Problem: Isolation Precautions - Risk of Spread of Infection  Goal: Prevent transmission of infection  Outcome: Met This Shift     Problem: Risk for Fluid Volume Deficit  Goal: Maintain normal heart rhythm  Outcome: Met This Shift  Goal: Maintain absence of muscle cramping  Outcome: Met This Shift  Goal: Maintain normal serum potassium, sodium, calcium, phosphorus, and pH  Outcome: Met This Shift     Problem: Skin Integrity:  Goal: Will show no infection signs and symptoms  Description: Will show no infection signs and symptoms  12/14/2021 2110 by Stephenie Hashimoto, RN  Outcome: Met This Shift  12/14/2021 1825 by Tee Villarreal RN  Outcome: Ongoing  Goal: Absence of new skin breakdown  Description: Absence of new skin breakdown  12/14/2021 2110 by Luis Carlos Greer RN  Outcome: Met This Shift  12/14/2021 1825 by Nawaf Barrett RN  Outcome: Ongoing     Problem: Inadequate oral food/beverage intake (NI-2.1)  Goal: Food and/or Nutrient Delivery  Description: Individualized approach for food/nutrient provision.   12/14/2021 1437 by Tato Pina RD, LD  Outcome: Met This Shift     Problem: Infection, Septic Shock:  Goal: Will show no infection signs and symptoms  Description: Will show no infection signs and symptoms  Outcome: Met This Shift     Problem: Nutrition Deficits  Goal: Optimize nutritional status  Outcome: Ongoing     Problem: Fatigue  Goal: Verbalize increase energy and improved vitality  Outcome: Ongoing     Problem: Patient Education: Go to Patient Education Activity  Goal: Patient/Family Education  Outcome: Ongoing

## 2021-12-15 NOTE — PROGRESS NOTES
Surgery to s/o for now, please call when pt is ready for trach peg, or if having any issues.     Alexis Connor MD

## 2021-12-15 NOTE — PROGRESS NOTES
[facility] Attending    S: 73 y.o. female with a history of gerd, oa, schizoaffective disorder, and gastric fundiplication who was found down for an undetermined amount of time. Reports shortness of breath and cough for 2.5 weeks. She was found to be 60% on RA. In the ER, COVID testing was positive with significantly elevated CPKs. Patient is unvaccinated. Had desaturation to 88% wit PaO2 of 55 with RRT and subsequent transfer to the MICU. Desat to 40's when Bipap removed. Now intubated. Sedated, paralyzed,  Noted to have pneumothorax and chest tube placed. Second chest tube placed when pneumothorax not improved. Chest tubes subsequently removed. Today intermittently opens eyes spontaneously. Did not open eyes during my exam.       O: VS- Blood pressure (!) 158/86, pulse 71, temperature 98.6 °F (37 °C), temperature source Bladder, resp. rate 21, height 5' 1\" (1.549 m), weight 177 lb 8 oz (80.5 kg), SpO2 94 %. Exam is as noted by resident   Currently nonresponsive. Supine  Lungs: coarse, vent. Decreased BS  CV:  Rate controlled, regular   Ext-no C/C/E    Impressions: Active Problems:    Acute respiratory failure with hypoxia (HCC)    Covid pneumonia    Rhabdomyolysis, CK improved    Acute cystitis with hematuria, treated    CHARAN    Thrombocytopenia     Plan:      Acute hypoxic resp failure 2/2 covid - Decadron. Completed Tocimizilab. Vitamin C.  Vitamin D.  Zinc.  Appreciate Pulm management. Sepsis 2/2 PNA - done with vanc and zosyn  U/s of lower extremities negative DVT 11/20. Tube feeding. Fluid overloaded - on bumex  Schizoaffective - seroquel  Diflucan stopped. Ongoing communication with family re code status  Following hemoglobin and WBC  ICU management  For trach and PEG when more stable  Full code at this time. Attending Physician Statement  I have reviewed the chart and seen the patient with the resident(s).   I personally reviewed images, EKG's and similar tests, if present. I personally reviewed and performed key elements of the history and exam.  I have reviewed and confirmed student and/or resident history and exam with changes as indicated above. I agree with the assessment, plan and orders as documented by the resident. Please refer to the  resident and/or student note for additional information. So Magdaleno.  Anel Ford MD

## 2021-12-15 NOTE — PROGRESS NOTES
[address] [facility]   Department of Internal Medicine   Internal Medicine Residency  MICU Progress Note    Patient:  Paige Solis 73 y.o. female   MRN: [id number]       Date of Service: 12/15/2021    Allergy: Ciprofloxacin  Cc follow up ARDS  Subjective     Patient was seen and examined this morning at bedside in no acute distress. Overnight, no acute events noted. Yesterday trach and Peg postponed due to bradycardia and worsening respiratory status, will continue to reassess for readiness of trach and peg. Electrolytes replaced today. No other acute issues at this time. Continue to monitor. Objective     TEMPERATURE:  Current - Temp: 99.3 °F (37.4 °C); Max - Temp  Av.6 °F (37 °C)  Min: 98 °F (36.7 °C)  Max: 99.3 °F (37.4 °C)  RESPIRATIONS RANGE: Resp  Av.3  Min: 23  Max: 35  PULSE RANGE: Pulse  Av.6  Min: 42  Max: 85  BLOOD PRESSURE RANGE:  Systolic (89UTX), MFC:591 , Min:90 , OSX:954   ; Diastolic (50NWX), GOY:93, Min:49, Max:94    PULSE OXIMETRY RANGE: SpO2  Av.9 %  Min: 80 %  Max: 99 %    I & O - 24hr:    Intake/Output Summary (Last 24 hours) at 12/15/2021 0623  Last data filed at 12/15/2021 0000  Gross per 24 hour   Intake 1264 ml   Output 1065 ml   Net 199 ml     I/O last 3 completed shifts: In: 9304 [I.V.:1685; NG/GT:30]  Out: 2386 [Urine:1278] I/O this shift:  In: -   Out: 50 [Urine:50]   Weight change:     Physical Exam  Constitutional:       Appearance: Normal appearance. Interventions: She is sedated and intubated. HENT:      Head: Normocephalic and atraumatic. Nose: Nose normal.   Eyes:      Pupils: Pupils are equal, round, and reactive to light. Cardiovascular:      Rate and Rhythm: Normal rate and regular rhythm. Pulses: Normal pulses. Heart sounds: Normal heart sounds. Pulmonary:      Effort: Pulmonary effort is normal. She is intubated. Breath sounds: Examination of the left-upper field reveals decreased breath sounds.  Examination of the left-middle field reveals decreased breath sounds. Examination of the left-lower field reveals decreased breath sounds. Decreased breath sounds and wheezing present. No rhonchi or rales. Abdominal:      General: Bowel sounds are normal. There is no distension. Palpations: Abdomen is soft. Musculoskeletal:      Cervical back: Normal range of motion. Skin:     General: Skin is warm and moist.      Capillary Refill: Capillary refill takes less than 2 seconds. Neurological:      General: No focal deficit present.          Medications     Continuous Infusions:   fentaNYL 5 mcg/ml in 0.9%  ml infusion 200 mcg/hr (12/15/21 0151)    midazolam 10 mg/hr (12/14/21 2011)    sodium chloride 100 mL/hr at 11/1955    dextrose       Scheduled Meds:   hydrocortisone sodium succinate PF  50 mg IntraVENous Q12H    bumetanide  1 mg IntraVENous BID    lansoprazole  30 mg Oral QAM AC    polyethylene glycol  17 g Oral Daily    artificial tears   Both Eyes Q12H    miconazole nitrate   Topical BID    naloxegol  12.5 mg Oral QAM    midodrine  20 mg Oral Q8H    [Held by provider] enoxaparin  40 mg SubCUTAneous BID    sucralfate  1 g Oral 4 times per day    sennosides-docusate sodium  2 tablet Oral Daily    sodium chloride flush  5-40 mL IntraVENous 2 times per day    heparin flush  3 mL IntraVENous 2 times per day    chlorhexidine  15 mL Mouth/Throat BID    budesonide  1 mg Nebulization BID    Arformoterol Tartrate  15 mcg Nebulization BID    atorvastatin  10 mg Oral Daily    QUEtiapine  200 mg Oral Daily    sertraline  200 mg Oral Daily    traZODone  100 mg Oral Nightly    vitamin D  2,000 Units Oral Daily     PRN Meds: sodium chloride flush, heparin flush, ipratropium-albuterol, sodium chloride, acetaminophen **OR** acetaminophen, glucose, dextrose, glucagon (rDNA), dextrose  Nutrition:   NG/OG tube TF type: Pulmocare/Nephro/Glucerna/Jevity        At rate: ml/h    Labs and Imaging Studies     CBC:   Recent Labs     12/13/21  0510 12/13/21  1542 12/14/21  0501 12/14/21  1735 12/15/21  0430   WBC 10.9  --  12.3*  --  10.9   HGB 9.1*   < > 9.8* 10.9* 9.5*   HCT 29.9*   < > 31.5* 34.9 30.3*   .8*  --  101.3*  --  102.7*     --  265  --  266    < > = values in this interval not displayed. BMP:    Recent Labs     12/13/21  0510 12/14/21  0501    138   K 3.2* 3.8   CL 99 100   CO2 30* 27   BUN 17 15   CREATININE 0.4* 0.3*   GLUCOSE 124* 113*       LIVER PROFILE:   Recent Labs     12/13/21  0510 12/14/21  0501   AST 23 25   ALT 38* 34*   BILIDIR <0.2 <0.2   BILITOT 0.3 0.3   ALKPHOS 103 93       PT/INR:   Recent Labs     12/13/21  0510 12/14/21  0501 12/15/21  0430   PROTIME 12.1 12.5* 14.0*   INR 1.1 1.1 1.3       APTT:   Recent Labs     12/13/21  0510 12/14/21  0501 12/15/21  0430   APTT 26.6 24.5 25.5       Fasting Lipid Panel:    Lab Results   Component Value Date    CHOL 322 10/12/2021    TRIG 220 12/14/2021    HDL 70 10/12/2021       Cardiac Enzymes:    Lab Results   Component Value Date    CKTOTAL 135 11/22/2021    CKTOTAL 488 (H) 11/20/2021    CKTOTAL 585 (H) 11/19/2021       Notable Cultures:      Blood cultures   Blood Culture, Routine   Date Value Ref Range Status   11/25/2021 5 Days no growth  Final     Respiratory cultures No results found for: RESPCULTURE No results found for: LABGRAM  Urine   Urine Culture, Routine   Date Value Ref Range Status   11/19/2021 Growth not present  Final     Legionella No results found for: LABLEGI  C Diff PCR No results found for: CDIFPCR  Wound culture/abscess: No results for input(s): WNDABS in the last 72 hours. Tip culture:No results for input(s): CXCATHTIP in the last 72 hours.       Oxygen:     Vent Information  $Ventilation: $Subsequent Day  Skin Assessment: Clean, dry, & intact  Equipment ID: [id number]  Equipment Changed: (S) Humidification  Vent Type: 980  Vent Mode: AC/VC  Vt Ordered: 320 mL  Pressure Ordered: 34  Rate Set: 28 bmp  Peak Flow: 70 L/min  Pressure Support: 0 cmH20  FiO2 : 100 %  SpO2: 98 %  SpO2/FiO2 ratio: 98  PaO2/FiO2 ratio: 120  Sensitivity: 3  PEEP/CPAP: 12  I Time/ I Time %: 0 s  Humidification Source: Heated wire  Humidification Temp: 37  Humidification Temp Measured: 37  Circuit Condensation: Drained  Mask Type: Full face mask  Mask Size: Small  Additional Respiratory  Assessments  Pulse: 67  Resp: 28  SpO2: 98 %  Position: Semi-Calabrese's  Humidification Source: Heated wire  Humidification Temp: 37  Circuit Condensation: Drained  Oral Care Completed?: Yes  Oral Care: Mouthwash with chlorhexidine, Mouth swabbed, Mouth moisturizer, Mouth suctioned  Subglottic Suction Done?: Yes  Airway Type: ET  Airway Size: 8  Cuff Pressure (cm H2O): 29 cm H2O       [REMOVED] Urethral Catheter Temperature probe-Output (mL): 10 mL  [REMOVED] Urethral Catheter-Output (mL): 150 mL  [REMOVED] External Urinary Catheter-Output (mL): 0 mL  Urethral Catheter-Output (mL): 50 mL  [REMOVED] Urethral Catheter Temperature probe 16 fr-Output (mL): 250 mL    Imaging Studies:  XR CHEST PORTABLE   Final Result   1. There is no interval change in extensive multifocal bilateral pneumonia. XR CHEST PORTABLE   Final Result   Unchanged bilateral airspace disease. Right pleural effusion with suspected   interval progression. XR CHEST PORTABLE   Final Result   1. No significant changes since the December 11.      2.  Continued air density under the left diaphragma limits expansion of the   left lower lung base. 3.  Further evaluation with CT chest recommended as above discussed. XR CHEST PORTABLE   Final Result   No significant changes since the previous study of a December 10. XR CHEST PORTABLE   Final Result   1. There is no appreciable left pneumothorax. 2. Extensive multifocal bilateral airspace disease which is unchanged. XR CHEST PORTABLE   Final Result   1.  Slightly improved aeration at the right apex and left lower lung   2. Stable position and alignment of the tubes and catheters   3. Extensive bilateral airspace disease concerning for consolidative   pneumonia slightly improved         XR CHEST PORTABLE   Final Result   Stable support lines and bilateral airspace disease. XR CHEST PORTABLE   Final Result   Unchanged bilateral diffuse infiltrates. XR CHEST PORTABLE   Final Result   1. Decreased left pneumothorax with suspected trace residual.  2 left chest   tubes are similar to the previous exam.   2. Bilateral pulmonary opacities appear mildly increased. 3. The support devices are unchanged and appear to be in acceptable position. XR CHEST PORTABLE   Final Result   Unchanged airspace disease and left pneumothorax. XR CHEST PORTABLE   Final Result   1. No interval change in extensive multifocal bilateral pneumonia   2. Less than 10% left pneumothorax. There is stable position of the 2 left   chest tubes. XR CHEST PORTABLE   Final Result   1. Stable diffuse interstitial pulmonary edema or pneumonia. 2.  Left chest tube demonstrated. Minimal residual left pneumothorax. XR CHEST PORTABLE   Final Result   1. Stable diffuse bilateral airspace disease. 2. Small left pneumothorax appears new or increased compared to prior   examination. Left chest tube appears stable in position. 3. Possible right pleural effusion. XR CHEST PORTABLE   Final Result   1. There is no interval change in extensive multifocal bilateral pneumonia. XR CHEST PORTABLE   Final Result   No change in extensive bilateral pulmonary airspace opacities. No   pneumothorax is radiographically visible on the current exam.         XR CHEST PORTABLE   Final Result   Significantly decreased size of left pneumothorax. There is likely trace   left pneumothorax remaining. Stable extensive bilateral pulmonary airspace opacities.          XR CHEST PORTABLE   Final Result Addendum 1 of 1   ADDENDUM:   Verbal report was given to Vahe Buitrago RN at 8:15 a.m. central standard   time on 11/30/2021. Final   New moderate sized left-sided pneumothorax. Stable extensive bilateral pulmonary airspace opacities            XR CHEST PORTABLE   Final Result   1. Lines okay. 2. Persistent edema/ARDS/pneumonia. 3. No sign of pneumothorax. XR CHEST PORTABLE   Final Result   Interval placement of left-sided chest tube with questionable residual   pneumothorax versus artifact. Extensive bilateral infiltrates. Continued follow-up recommended. XR CHEST PORTABLE   Final Result   Interval development of large left-sided tension pneumothorax. Extensive bilateral parenchymal infiltrates. Findings were discussed with Dr. Kalli Strauss at approximately 0010 hours [place]   time on 11/28/2021. XR CHEST PORTABLE   Final Result   Severe bilateral pulmonary opacification. XR CHEST PORTABLE   Final Result   1. Endotracheal tube is 3 cm above the khai. 2. Stable interstitial pulmonary edema or pneumonia throughout both lungs. XR CHEST PORTABLE   Final Result   1. Somewhat limited exam, grossly unchanged. Please see above comments. .      RECOMMENDATION:   1. Recommend follow-up thoracic imaging, as directed clinically. .         XR ABDOMEN (KUB) (SINGLE AP VIEW)   Final Result   1. Satisfactory position of the NG tube within the stomach         XR CHEST PORTABLE   Final Result   1. There is no interval change in the multifocal bilateral pneumonia. XR CHEST PORTABLE   Final Result   Bilateral airspace disease with interval progression on the right         XR CHEST PORTABLE   Final Result   1. Endotracheal tube is 2.7 cm above the khai. 2. Stable interstitial pulmonary edema or pneumonia throughout both lungs. XR CHEST PORTABLE   Final Result   1.  Worsening of the multifocal bilateral pneumonia when compared with the   prior study of 1 day earlier. XR CHEST PORTABLE   Final Result   1. Multifocal bilateral pneumonia more prominent within the right upper lobe   2. Minimal partial interval clearing of the pneumonia within the left lung   3. Worsening of the pneumonia within the right upper lobe. US DUP LOWER EXTREMITIES BILATERAL VENOUS   Final Result   Within the visualized vessels there is no evidence for deep venous   thrombosis               XR CHEST PORTABLE   Final Result   1. Lines okay. 2. Slightly worsened diffuse edema versus pneumonia. XR CHEST PORTABLE   Final Result   1. Lines okay   2. Improved aeration, mild improvement and diffuse pneumonia/edema. XR CHEST ABDOMEN NG PLACEMENT   Final Result   NG tube position satisfactory. XR CHEST PORTABLE   Final Result   Stable bilateral pneumonia or interstitial pulmonary edema. XR CHEST PORTABLE   Final Result   Increasing bilateral infiltrates. XR CHEST PORTABLE   Final Result   1. There is no interval change in the multifocal bilateral pneumonia. CT HEAD WO CONTRAST   Final Result   No acute intracranial abnormality. Cortical atrophy and periventricular white matter ischemic changes. CT CERVICAL SPINE WO CONTRAST   Final Result   No acute abnormality of the cervical spine. Moderate degenerative changes with disc space narrowing and marginal bony   spur formation C5 through C7. Ground-glass opacities in the visualized lung apices. Please refer to chest   CT for additional information. CTA CHEST W CONTRAST   Final Result   No evidence of pulmonary embolism. Extensive multifocal ground-glass opacities predominantly in the peripheral   lung zones bilaterally, highly concerning for atypical or COVID related   pneumonia. XR CHEST PORTABLE   Final Result   Bilateral patchy airspace opacities worrisome for pneumonia.          XR CHEST PORTABLE    (Results Pending)   XR CHEST PORTABLE    (Results Pending)   XR CHEST PORTABLE    (Results Pending)   XR CHEST PORTABLE    (Results Pending)   XR CHEST PORTABLE    (Results Pending)   XR CHEST PORTABLE    (Results Pending)        Resident's Assessment and Plan     Assessment and Plan:    Cardiovascular   History of hyperlipidemia  -Continue home atorvastatin 10 mg    Pulmonary  Acute hypoxic respiratory failure 2/2 Covid pneumonia  -Patient is intubated  -PF ratio of 0.82  -Completed Courses of Decadron and Toci  -Respiratory cultures have been negative, fungal cultures and BLE are also negative  -Continue ventilation with daily ABGs  -Completed remdesivir   -Continue breathing treatments  -Continue to Wean Cortisol  -Continue midodrine to 20 mg   -Continue supine position   -Wean FiO2 as able today  -Plan for surgery to do trach and peg today  -Wean Sedation and assess mentation     Left lung pneumothorax (resolved)     Gastrointestinal  Transaminitis 2/2 Covid infection (resolving)   -Continue to monitor    Infectious Disease   COVID-19 pneumonia  -See above  -Continue to monitor CRP D-dimer and pro-Carl  -Continue vitamin C, vitamin D and zinc    Renal   Stage III intrinsic CHARAN (resolved)   -Baseline creatinine of 0.5  -Nephro is following  -Creatinine is stable today 0.3  -Continue to follow nephro recommendations  -Continue to monitor  -On bumex 1 mg, appears very volume overloaded  -Continue to monitor and replace electrolytes as appropriate     Heme/Onc  Acute Anemia  -FOBT pending  -Lovenox held  -Monitor HgB    Reactive leukocytosis 2/2 steroids and Covid infection (stable)  -WBC stable today    Thrombocytopenia 2/2 Covid (Resolved)     Psychiatric   History of schizoaffective disorder  -Continue home hydroxyzine, trazodone, quetiapine and sertraline    # Peptic ulcer prophylaxis:Protonix   # DVT Prophylaxis: Lovenox   # Disposition: Cont current care     Sage Newsome MD, PGY-1    Attending Physician: Dr. Luan Cosme'      I personally saw, examined and provided care for the patient. Radiographs, labs and medication list were reviewed by me independently. I spoke with bedside nursing, therapists and consultants. Critical care services and times documented are independent of procedures and multidisciplinary rounds with Residents. Additionally comprehensive, multidisciplinary rounds were conducted with the MICU team. The case was discussed in detail and plans for care were established. Review of Residents documentation was conducted and revisions were made as appropriate. I agree with the above documented exam, problem list and plan of care.   Improved with diuresis   Increase Bumex 1 mg q6   CXR better   Hope trach on Friday  Continue vent support  Keyona Ovalle MD,[facility]  Pulmonary&Critical Care Medicine   Director of [facility] Director of [address] [facility]    Jefe Guzman

## 2021-12-15 NOTE — PLAN OF CARE
Problem: Airway Clearance - Ineffective  Goal: Achieve or maintain patent airway  12/15/2021 1718 by Sybil Lenz RN  Outcome: Ongoing     Problem: Gas Exchange - Impaired  Goal: Absence of hypoxia  12/15/2021 1718 by Sybil Lenz RN  Outcome: Ongoing     Problem: Gas Exchange - Impaired  Goal: Promote optimal lung function  12/15/2021 1718 by Sybil Lenz RN  Outcome: Ongoing     Problem: Breathing Pattern - Ineffective  Goal: Ability to achieve and maintain a regular respiratory rate  Outcome: Ongoing

## 2021-12-16 NOTE — PROGRESS NOTES
[address]    Department of Internal Medicine   Internal Medicine Residency  MICU Progress Note    Patient:  Clara Aguilar 73 y.o. female   MRN: [id number]       Date of Service: 2021    Allergy: Ciprofloxacin  Cc follow up ARDS  Subjective     Patient was seen and examined this morning at bedside in no acute distress. Overnight, no acute events noted. Weaning FiO2. Will try to wean sedation after trach and peg    Objective     TEMPERATURE:  Current - Temp: 98.2 °F (36.8 °C); Max - Temp  Av.6 °F (37 °C)  Min: 98.2 °F (36.8 °C)  Max: 98.8 °F (37.1 °C)  RESPIRATIONS RANGE: Resp  Av.2  Min: 28  Max: 30  PULSE RANGE: Pulse  Av.5  Min: 66  Max: 94  BLOOD PRESSURE RANGE:  Systolic (04FPN), OVS:425 , Min:89 , SK   ; Diastolic (36MFO), DWA:51, Min:47, Max:87    PULSE OXIMETRY RANGE: SpO2  Av.6 %  Min: 88 %  Max: 97 %    I & O - 24hr:    Intake/Output Summary (Last 24 hours) at 2021 1113  Last data filed at 2021 1000  Gross per 24 hour   Intake 2499 ml   Output 4900 ml   Net -2401 ml     I/O last 3 completed shifts: In: 2499 [I.V.:2399; NG/GT:100]  Out: 5925 [Urine:5925] I/O this shift:  In: 100 [NG/GT:100]  Out: 250 [Urine:250]   Weight change:     Physical Exam  Constitutional:       Appearance: Normal appearance. Interventions: She is sedated and intubated. HENT:      Head: Normocephalic and atraumatic. Nose: Nose normal.   Eyes:      Pupils: Pupils are equal, round, and reactive to light. Cardiovascular:      Rate and Rhythm: Normal rate and regular rhythm. Pulses: Normal pulses. Heart sounds: Normal heart sounds. Pulmonary:      Effort: Pulmonary effort is normal. She is intubated. Breath sounds: Examination of the left-upper field reveals decreased breath sounds. Examination of the left-middle field reveals decreased breath sounds. Examination of the left-lower field reveals decreased breath sounds.  Decreased breath sounds CBC:   Recent Labs     12/14/21  0501 12/14/21  1735 12/15/21  0430 12/15/21  1520 12/16/21  0408   WBC 12.3*  --  10.9  --  17.1*   HGB 9.8*   < > 9.5* 9.1* 10.1*   HCT 31.5*   < > 30.3* 29.2* 32.2*   .3*  --  102.7*  --  100.0*     --  266  --  311    < > = values in this interval not displayed. BMP:    Recent Labs     12/15/21  0430 12/15/21  1520 12/16/21  0003 12/16/21  0408   NA  --  141 141 140   K   < > 3.0* 3.2* 2.7*  2.7*   CL  --  101 99 97*   CO2  --  32* 32* 33*   BUN  --  11 10 9   CREATININE  --  0.3* 0.3* 0.3*   GLUCOSE  --  100* 110* 105*    < > = values in this interval not displayed. LIVER PROFILE:   Recent Labs     12/14/21  0501 12/15/21  0430 12/16/21  0408   AST 25 17 19   ALT 34* 28 31   BILIDIR <0.2 <0.2 <0.2   BILITOT 0.3 0.4 0.4   ALKPHOS 93 95 103       PT/INR:   Recent Labs     12/14/21  0501 12/15/21  0430 12/16/21  0408   PROTIME 12.5* 14.0* 15.0*   INR 1.1 1.3 1.4       APTT:   Recent Labs     12/14/21  0501 12/15/21  0430 12/16/21  0408   APTT 24.5 25.5 27.2       Fasting Lipid Panel:    Lab Results   Component Value Date    CHOL 322 10/12/2021    TRIG 220 12/14/2021    HDL 70 10/12/2021       Cardiac Enzymes:    Lab Results   Component Value Date    CKTOTAL 135 11/22/2021    CKTOTAL 488 (H) 11/20/2021    CKTOTAL 585 (H) 11/19/2021       Notable Cultures:      Blood cultures   Blood Culture, Routine   Date Value Ref Range Status   11/25/2021 5 Days no growth  Final     Respiratory cultures No results found for: RESPCULTURE No results found for: LABGRAM  Urine   Urine Culture, Routine   Date Value Ref Range Status   11/19/2021 Growth not present  Final     Legionella No results found for: LABLEGI  C Diff PCR No results found for: CDIFPCR  Wound culture/abscess: No results for input(s): WNDABS in the last 72 hours. Tip culture:No results for input(s): CXCATHTIP in the last 72 hours.       Oxygen:     Vent Information  $Ventilation: $Subsequent Day  Skin Assessment: Clean, dry, & intact  Equipment ID: [id number]  Equipment Changed: (S) Humidification  Vent Type: [phone]  Vent Mode: AC/VC  Vt Ordered: 320 mL  Pressure Ordered: 34  Rate Set: 28 bmp  Peak Flow: 70 L/min  Pressure Support: 0 cmH20  FiO2 : 70 %  SpO2: (!) 89 %  SpO2/FiO2 ratio: 127.14  PaO2/FiO2 ratio: 120  Sensitivity: 3  PEEP/CPAP: 12  I Time/ I Time %: 0 s  Humidification Source: Heated wire  Humidification Temp: 37  Humidification Temp Measured: 37  Circuit Condensation: Drained  Mask Type: Full face mask  Mask Size: Small  Additional Respiratory  Assessments  Pulse: 94  Resp: 28  SpO2: (!) 89 %  Position: Semi-Calabrese's  Humidification Source: Heated wire  Humidification Temp: 37  Circuit Condensation: Drained  Oral Care Completed?: Yes  Oral Care: Mouthwash with chlorhexidine, Mouth swabbed, Mouth moisturizer, Mouth suctioned  Subglottic Suction Done?: Yes  Airway Type: ET  Airway Size: 8  Cuff Pressure (cm H2O):  (MLT)       [REMOVED] Urethral Catheter Temperature probe-Output (mL): 10 mL  [REMOVED] Urethral Catheter-Output (mL): 150 mL  [REMOVED] External Urinary Catheter-Output (mL): 0 mL  Urethral Catheter-Output (mL): 50 mL  [REMOVED] Urethral Catheter Temperature probe 16 fr-Output (mL): 250 mL    Imaging Studies:  XR CHEST PORTABLE   Final Result   Stable bilateral pulmonary infiltrates and positioning of lines and tubes. XR CHEST PORTABLE   Final Result   Subtle improvement in aeration. No other change. Continued follow-up   recommended. XR CHEST PORTABLE   Final Result   1. There is no interval change in extensive multifocal bilateral pneumonia. XR CHEST PORTABLE   Final Result   Unchanged bilateral airspace disease. Right pleural effusion with suspected   interval progression. XR CHEST PORTABLE   Final Result   1. No significant changes since the December 11.      2.  Continued air density under the left diaphragma limits expansion of the   left lower lung base. 3.  Further evaluation with CT chest recommended as above discussed. XR CHEST PORTABLE   Final Result   No significant changes since the previous study of a December 10. XR CHEST PORTABLE   Final Result   1. There is no appreciable left pneumothorax. 2. Extensive multifocal bilateral airspace disease which is unchanged. XR CHEST PORTABLE   Final Result   1. Slightly improved aeration at the right apex and left lower lung   2. Stable position and alignment of the tubes and catheters   3. Extensive bilateral airspace disease concerning for consolidative   pneumonia slightly improved         XR CHEST PORTABLE   Final Result   Stable support lines and bilateral airspace disease. XR CHEST PORTABLE   Final Result   Unchanged bilateral diffuse infiltrates. XR CHEST PORTABLE   Final Result   1. Decreased left pneumothorax with suspected trace residual.  2 left chest   tubes are similar to the previous exam.   2. Bilateral pulmonary opacities appear mildly increased. 3. The support devices are unchanged and appear to be in acceptable position. XR CHEST PORTABLE   Final Result   Unchanged airspace disease and left pneumothorax. XR CHEST PORTABLE   Final Result   1. No interval change in extensive multifocal bilateral pneumonia   2. Less than 10% left pneumothorax. There is stable position of the 2 left   chest tubes. XR CHEST PORTABLE   Final Result   1. Stable diffuse interstitial pulmonary edema or pneumonia. 2.  Left chest tube demonstrated. Minimal residual left pneumothorax. XR CHEST PORTABLE   Final Result   1. Stable diffuse bilateral airspace disease. 2. Small left pneumothorax appears new or increased compared to prior   examination. Left chest tube appears stable in position. 3. Possible right pleural effusion. XR CHEST PORTABLE   Final Result   1. There is no interval change in extensive multifocal bilateral pneumonia. XR CHEST PORTABLE   Final Result   No change in extensive bilateral pulmonary airspace opacities. No   pneumothorax is radiographically visible on the current exam.         XR CHEST PORTABLE   Final Result   Significantly decreased size of left pneumothorax. There is likely trace   left pneumothorax remaining. Stable extensive bilateral pulmonary airspace opacities. XR CHEST PORTABLE   Final Result   Addendum 1 of 1   ADDENDUM:   Verbal report was given to Vladislav Prieto RN at 8:15 a.m. central standard   time on 11/30/2021. Final   New moderate sized left-sided pneumothorax. Stable extensive bilateral pulmonary airspace opacities            XR CHEST PORTABLE   Final Result   1. Lines okay. 2. Persistent edema/ARDS/pneumonia. 3. No sign of pneumothorax. XR CHEST PORTABLE   Final Result   Interval placement of left-sided chest tube with questionable residual   pneumothorax versus artifact. Extensive bilateral infiltrates. Continued follow-up recommended. XR CHEST PORTABLE   Final Result   Interval development of large left-sided tension pneumothorax. Extensive bilateral parenchymal infiltrates. Findings were discussed with Dr. Fabian Mckeon at approximately 0010 hours [place]   time on 11/28/2021. XR CHEST PORTABLE   Final Result   Severe bilateral pulmonary opacification. XR CHEST PORTABLE   Final Result   1. Endotracheal tube is 3 cm above the khai. 2. Stable interstitial pulmonary edema or pneumonia throughout both lungs. XR CHEST PORTABLE   Final Result   1. Somewhat limited exam, grossly unchanged. Please see above comments. .      RECOMMENDATION:   1. Recommend follow-up thoracic imaging, as directed clinically. .         XR ABDOMEN (KUB) (SINGLE AP VIEW)   Final Result   1. Satisfactory position of the NG tube within the stomach         XR CHEST PORTABLE   Final Result   1.  There is no interval change in the multifocal bilateral pneumonia. XR CHEST PORTABLE   Final Result   Bilateral airspace disease with interval progression on the right         XR CHEST PORTABLE   Final Result   1. Endotracheal tube is 2.7 cm above the khai. 2. Stable interstitial pulmonary edema or pneumonia throughout both lungs. XR CHEST PORTABLE   Final Result   1. Worsening of the multifocal bilateral pneumonia when compared with the   prior study of 1 day earlier. XR CHEST PORTABLE   Final Result   1. Multifocal bilateral pneumonia more prominent within the right upper lobe   2. Minimal partial interval clearing of the pneumonia within the left lung   3. Worsening of the pneumonia within the right upper lobe. US DUP LOWER EXTREMITIES BILATERAL VENOUS   Final Result   Within the visualized vessels there is no evidence for deep venous   thrombosis               XR CHEST PORTABLE   Final Result   1. Lines okay. 2. Slightly worsened diffuse edema versus pneumonia. XR CHEST PORTABLE   Final Result   1. Lines okay   2. Improved aeration, mild improvement and diffuse pneumonia/edema. XR CHEST ABDOMEN NG PLACEMENT   Final Result   NG tube position satisfactory. XR CHEST PORTABLE   Final Result   Stable bilateral pneumonia or interstitial pulmonary edema. XR CHEST PORTABLE   Final Result   Increasing bilateral infiltrates. XR CHEST PORTABLE   Final Result   1. There is no interval change in the multifocal bilateral pneumonia. CT HEAD WO CONTRAST   Final Result   No acute intracranial abnormality. Cortical atrophy and periventricular white matter ischemic changes. CT CERVICAL SPINE WO CONTRAST   Final Result   No acute abnormality of the cervical spine. Moderate degenerative changes with disc space narrowing and marginal bony   spur formation C5 through C7. Ground-glass opacities in the visualized lung apices.   Please refer to chest   CT for additional information. CTA CHEST W CONTRAST   Final Result   No evidence of pulmonary embolism. Extensive multifocal ground-glass opacities predominantly in the peripheral   lung zones bilaterally, highly concerning for atypical or COVID related   pneumonia. XR CHEST PORTABLE   Final Result   Bilateral patchy airspace opacities worrisome for pneumonia.          XR CHEST PORTABLE    (Results Pending)   XR CHEST PORTABLE    (Results Pending)   XR CHEST PORTABLE    (Results Pending)   XR CHEST PORTABLE    (Results Pending)   XR CHEST PORTABLE    (Results Pending)        Resident's Assessment and Plan     Assessment and Plan:    Cardiovascular   History of hyperlipidemia  -Continue home atorvastatin 10 mg    Pulmonary  Acute hypoxic respiratory failure 2/2 Covid pneumonia  -Patient is intubated  -PF ratio of 0.72  -Completed Courses of Decadron and Toci  -Respiratory cultures have been negative, fungal cultures and BLE are also negative  -Continue ventilation with daily ABGs  -Completed remdesivir   -Continue breathing treatments  -Continue midodrine to 20 mg   -Off pressors  -Continue supine position   -Wean FiO2 as able today, down to 70%  -Plan for surgery to do trach and peg today  -Wean Sedation and assess mentation     Left lung pneumothorax (resolved)     Gastrointestinal  Transaminitis 2/2 Covid infection (resolving)   -Continue to monitor    Infectious Disease   COVID-19 pneumonia  -See above  -Continue to monitor CRP D-dimer and pro-Carl  -Continue vitamin C, vitamin D and zinc    Renal   Stage III intrinsic CHARAN (resolved)   -Continue Bumex 1 mg    Hypokalemia 2/2 diuresis  -K of 2.7  -Replaced with 80 mmol  -Continue to monitor and rep[lace as appropriate     Heme/Onc  Acute Anemia  -FOBT pending  -Lovenox held  -Monitor HgB    Reactive leukocytosis 2/2 steroids and Covid infection (stable)  -WBC stable today    Thrombocytopenia 2/2 Covid (Resolved)     Psychiatric   History of schizoaffective disorder  -Continue home hydroxyzine, trazodone, quetiapine and sertraline    # Peptic ulcer prophylaxis:Protonix   # DVT Prophylaxis: Lovenox   # Disposition: Cont current care     Bruna Blas MD, PGY-1    Attending Physician: Dr. Nakia Ramírez'  I personally saw, examined and provided care for the patient. Radiographs, labs and medication list were reviewed by me independently. I spoke with bedside nursing, therapists and consultants. Critical care services and times documented are independent of procedures and multidisciplinary rounds with Residents. Additionally comprehensive, multidisciplinary rounds were conducted with the MICU team. The case was discussed in detail and plans for care were established. Review of Residents documentation was conducted and revisions were made as appropriate. I agree with the above documented exam, problem list and plan of care.   Improved with diuresis '  Had hypoglycemia   Hypotension   Will check culture   Start abx until we r/o infection   Cortisol level   Hold bumex  CXR better   Hold trach for now   Continue vent support  Keyona Ovalle MD,[facility]  Pulmonary&Critical Care Medicine   Director of [address] [place] [address] Director of [address] [place] [address]    Tristian López

## 2021-12-16 NOTE — FLOWSHEET NOTE
Patient was hypoglycemic, 12.5 given per prn order, will reassess blood glucose 15 minutes after medication has been given.

## 2021-12-16 NOTE — PROGRESS NOTES
[facility]  Nephrology Attending Progress Note          SUBJECTIVE:     12/7: chart reviewed, seen through window of icu, intubated    12/8: pt seen through window of icu.  Intubated on levo    12/9: pt seen through window of icu, intubated and sedated, chest tube in place, on levo    12/10: pt seen through window of icu, intubated and sedated with chest tube, on levo    12/11: seen through icu window, intubated and sedated, on levo    12/12: pt seen through window of icu, chart reviewed, intubated and sedated     12/13: off nimbex; received 1 unit PRBC last pm    12/14: No new acute issues reported from overnight; remains intubated; for trach/PEG today    12/15: Trach/PEG postponed due to issues related to bradycardia; potassium low this a.m.; otherwise no new acute issues from overnight    12/16: For trach and PEG today ; no other acute issues reported from overnight      PROBLEM LIST:    Patient Active Problem List   Diagnosis    Schizoaffective disorder ([facility]    Acute respiratory failure with hypoxia ([facility]    Rhabdomyolysis    Acute cystitis with hematuria    Primary spontaneous pneumothorax        DIET:    Diet NPO     PHYSICAL EXAM:     Patient Vitals for the past 24 hrs:   BP Temp Temp src Pulse Resp SpO2   12/16/21 0937     28 (!) 88 %   12/16/21 0936    75 28 (!) 88 %   12/16/21 0900 131/78   73 28 (!) 88 %   12/16/21 0800 (!) 162/86   66 28 (!) 89 %   12/16/21 0700 (!) 166/84   68 28 91 %   12/16/21 0600 104/60   71 28 92 %   12/16/21 0500 117/69   72 28 91 %   12/16/21 0400 131/75   72 28 92 %   12/16/21 0300 137/78   73 28 92 %   12/16/21 0216    78 28 92 %   12/16/21 0200 (!) 101/49   77 30 92 %   12/16/21 0100 128/71   81 28 94 %   12/16/21 0000 114/65   84 28 91 %   12/15/21 2300 135/76   73 28 90 %   12/15/21 2234     28 (!) 89 %   12/15/21 2233    71 29 (!) 89 %   12/15/21 2200 (!) 166/80   75 28 90 %   12/15/21 2100 139/83   76 28 (!) 89 % 12/15/21 2000 (!) 155/87 98.8 °F (37.1 °C) Bladder 68 28 (!) 88 %   12/15/21 1900 (!) 163/87   72 29 91 %   12/15/21 1800 135/80   66 28 92 %   12/15/21 1700 96/63   69 28 95 %   12/15/21 1624    74 28 96 %   12/15/21 1600 95/60 98.6 °F (37 °C) Bladder 71 28 96 %   12/15/21 1500 94/61   73 28 96 %   12/15/21 1420 115/72   72 28 95 %   12/15/21 1400 (!) 89/47   74 28 97 %   12/15/21 1300 122/72   76 28 93 %   12/15/21 1220    74 28 93 %   12/15/21 1200 133/69 98.6 °F (37 °C) Bladder 76 30 93 %   12/15/21 1100 124/68   75 27 90 %   12/15/21 1000 137/72   74 30 (!) 87 %   @      Intake/Output Summary (Last 24 hours) at 12/16/2021 0958  Last data filed at 12/16/2021 0600  Gross per 24 hour   Intake 2399 ml   Output 5575 ml   Net -3176 ml         Wt Readings from Last 3 Encounters:   12/14/21 177 lb 8 oz (80.5 kg)   10/26/21 152 lb (68.9 kg)   10/12/21 149 lb (67.6 kg)     PE  Constitutional:  Pt is intubated  Seen through window of icu    MEDS (scheduled):    potassium chloride  40 mEq IntraVENous Once    polyethylene glycol  17 g Oral BID    bumetanide  1 mg IntraVENous Q6H    lansoprazole  30 mg Oral QAM AC    artificial tears   Both Eyes Q12H    miconazole nitrate   Topical BID    midodrine  20 mg Oral Q8H    [Held by provider] enoxaparin  40 mg SubCUTAneous BID    sucralfate  1 g Oral 4 times per day    sennosides-docusate sodium  2 tablet Oral Daily    sodium chloride flush  5-40 mL IntraVENous 2 times per day    heparin flush  3 mL IntraVENous 2 times per day    chlorhexidine  15 mL Mouth/Throat BID    budesonide  1 mg Nebulization BID    Arformoterol Tartrate  15 mcg Nebulization BID    atorvastatin  10 mg Oral Daily    QUEtiapine  200 mg Oral Daily    sertraline  200 mg Oral Daily    traZODone  100 mg Oral Nightly    vitamin D  2,000 Units Oral Daily       MEDS (infusions):   fentaNYL 5 mcg/ml in 0.9%  ml infusion 200 mcg/hr (12/16/21 0520)    midazolam 10 mg/hr (12/16/21 0520)    sodium chloride 100 mL/hr at 11/1955    dextrose         MEDS (prn):  sodium chloride flush, heparin flush, ipratropium-albuterol, sodium chloride, acetaminophen **OR** acetaminophen, glucose, dextrose, glucagon (rDNA), dextrose    DATA:    Recent Labs     12/14/21  0501 12/14/21  1735 12/15/21  0430 12/15/21  1520 12/16/21  0408   WBC 12.3*  --  10.9  --  17.1*   HGB 9.8*   < > 9.5* 9.1* 10.1*   HCT 31.5*   < > 30.3* 29.2* 32.2*   .3*  --  102.7*  --  100.0*     --  266  --  311    < > = values in this interval not displayed. Recent Labs     12/14/21  0501 12/14/21  0501 12/15/21  0430 12/15/21  0430 12/15/21  1520 12/16/21  0003 12/16/21  0408      < > 139   < > 141 141 140   K 3.8   < > 2.8*   < > 3.0* 3.2* 2.7*  2.7*      < > 100   < > 101 99 97*   CO2 27   < > 29   < > 32* 32* 33*   BUN 15   < > 13   < > 11 10 9   CREATININE 0.3*   < > 0.3*   < > 0.3* 0.3* 0.3*   LABGLOM >60   < > >60   < > >60 >60 >60   GLUCOSE 113*   < > 99   < > 100* 110* 105*   CALCIUM 8.2*   < > 8.1*   < > 7.9* 8.1* 7.8*   ALT 34*  --  28  --   --   --  31   AST 25  --  17  --   --   --  19   BILIDIR <0.2  --  <0.2  --   --   --  <0.2   BILITOT 0.3  --  0.4  --   --   --  0.4   ALKPHOS 93  --  95  --   --   --  103   MG  --   --  1.6  --  1.7 1.9  --    PHOS  --   --  2.2*  --   --   --   --     < > = values in this interval not displayed.        Lab Results   Component Value Date    LABALBU 2.7 (L) 12/16/2021    LABALBU 2.7 (L) 12/15/2021    LABALBU 3.2 (L) 12/14/2021     Lab Results   Component Value Date    TSH 4.720 (H) 10/12/2021       Iron Studies  Lab Results   Component Value Date    FERRITIN 300 12/16/2021     Vitamin B-12   Date Value Ref Range Status   03/02/2021 771 211 - 946 pg/mL Final     Folate   Date Value Ref Range Status   03/02/2021 >20.0 4.8 - 24.2 ng/mL Final       No results found for: VITD25  No results found for: PTH    No components found for: URIC    Lab Results   Component Value Date    COLORU Yellow 11/16/2021    NITRU POSITIVE 11/16/2021    GLUCOSEU Negative 11/16/2021    KETUA 15 11/16/2021    UROBILINOGEN 0.2 11/16/2021    BILIRUBINUR Negative 11/16/2021       No results found for: Parrish Giordano      IMPRESSION/RECOMMENDATIONS:      1. Acute kidney injury stage I  ischemic ATN occurring in the setting of hypotension  nonoliguric    Cr  now back to baseline  Continue to monitor labs and urine output       2. Acute hypoxic respiratory failure  due to COVID-19 pneumonia  Remains intubated  Possible trach and PEG today       3. Septic shock in setting of COVID-19 infection  suspected possible superimposed bacterial pneumonia  now resolved; off pressors  Off antibiotics     4. Volume overload  cumulative fluid balance +14 L  bumex dose increased with good response  Continue current     5.   Hypokalemia  Diuretic induced  Supplement  hypoalbuminemic    dw Resident    Carlota Garcia MD

## 2021-12-16 NOTE — PROGRESS NOTES
[facility] Inpatient   Resident Progress Note    S:  Hospital day: 30   Brief Synopsis: Haim Franco is a 73 y.o. female with pmh of GERD, osteoarthritis and schizoaffective disorder who presented to ER s/p fall at home. Patient found down at home, with initial O2 saturation of 60%. Brought to the ER; found to have COVID-19 pneumonia , requiring bipap for appropriate saturation. Rhabdomyolysis, UTI. Started on appropriate management including tocilizumab, ceftriaxone 1 g daily, and IV fluids for rhabdomyolysis. Decadron was started daily, and with patients worsening status - pulmonology consulted. FULL CODE. Transferred to ICU on 11/18 for rapid breathing and hypoxia and pO2 of 55. Intubated 11/20 secondary to O2 sats consistently <80% with rapid breathing. 11/29 , patient developed left sided tension pneumothorax and underwent chest tube placement. Patient Intubated, sedated, paralyzed, proned. On vasopressors. Hemoglobin dropped to 6.9 with 1 unit PRBC transfused. Second chest tube placed on left chest. Vacuum changed to water seals. General surgery removed one smaller bore chest tube on 12/11/21 and the 2nd chest tube on 12/13/2021. Tracheostomy and percutaneous endoscopic gastrostomy tube to be placed by surgery when patients respiratory status and vital signs are stabilize. Patient seen today in the morning. Remains supine, intubated. Opened eyes with spontaneous eyelid closure. FiO2 80%, PEEP 12, P/f ratio: 0.72. K+ this morning at 2.7.  Currently receiving potassium replacement     Cont meds:    dextrose      fentaNYL 5 mcg/ml in 0.9%  ml infusion 200 mcg/hr (12/16/21 0520)    midazolam 10 mg/hr (12/16/21 0520)    sodium chloride 100 mL/hr at 11/1955    dextrose       Scheduled meds:    potassium chloride  40 mEq IntraVENous Once    dextrose  25 g IntraVENous Once    potassium chloride  40 mEq IntraVENous Once    polyethylene glycol  17 g Oral BID    bumetanide  1 mg IntraVENous Q6H    lansoprazole  30 mg Oral QAM AC    artificial tears   Both Eyes Q12H    miconazole nitrate   Topical BID    midodrine  20 mg Oral Q8H    [Held by provider] enoxaparin  40 mg SubCUTAneous BID    sucralfate  1 g Oral 4 times per day    sennosides-docusate sodium  2 tablet Oral Daily    sodium chloride flush  5-40 mL IntraVENous 2 times per day    heparin flush  3 mL IntraVENous 2 times per day    chlorhexidine  15 mL Mouth/Throat BID    budesonide  1 mg Nebulization BID    Arformoterol Tartrate  15 mcg Nebulization BID    atorvastatin  10 mg Oral Daily    QUEtiapine  200 mg Oral Daily    sertraline  200 mg Oral Daily    traZODone  100 mg Oral Nightly    vitamin D  2,000 Units Oral Daily     PRN meds: sodium chloride flush, heparin flush, ipratropium-albuterol, sodium chloride, acetaminophen **OR** acetaminophen, glucose, dextrose, glucagon (rDNA), dextrose     I reviewed the patient's past medical and surgical history, Medications and Allergies. O:  BP (!) 105/56   Pulse 91   Temp 98.2 °F (36.8 °C) (Bladder)   Resp 28   Ht 5' 1\" (1.549 m)   Wt 177 lb 8 oz (80.5 kg)   SpO2 (!) 89%   BMI 33.54 kg/m²   24 hour I&O: I/O last 3 completed shifts: In: 2499 [I.V.:2399; NG/GT:100]  Out: 5925 [Urine:5925]  I/O this shift:  In: 100 [NG/GT:100]  Out: 250 [Urine:250]      Physical Exam  Constitutional:       Comments: Intubated, sedated, paralyzed, supine   HENT:      Head: Normocephalic and atraumatic. Mouth/Throat:      Comments: intubated  Eyes:      Comments: Eyes opened with spontaneous closure of the eyelids     Cardiovascular:      Rate and Rhythm: Normal rate and regular rhythm. Pulses: Normal pulses. Heart sounds: Normal heart sounds. Pulmonary:      Breath sounds: No wheezing, rhonchi or rales. Abdominal:      General: There is no distension. Palpations: There is no mass. Hernia: No hernia is present.    Musculoskeletal: General: Normal range of motion. Right lower leg: Edema present. Left lower leg: Edema present. Skin:     General: Skin is warm and dry. Findings: Bruising: mild, on back. Neurological:      Comments: Sedated, paralyzed       Labs:  Na/K/Cl/CO2:  140/2.7, 2.7/97/33 (12/16 0408)  BUN/Cr/glu/ALT/AST/amyl/lip:  9/0.3/--/31/19/--/-- (12/16 0408)  WBC/Hgb/Hct/Plts:  17.1/10.1/32.2/311 (12/16 0408)  estimated creatinine clearance is 180 mL/min (A) (based on SCr of 0.3 mg/dL (L)). Other pertinent labs as noted below    Radiology:  XR CHEST PORTABLE   Final Result   Stable bilateral pulmonary infiltrates and positioning of lines and tubes. XR CHEST PORTABLE   Final Result   Subtle improvement in aeration. No other change. Continued follow-up   recommended. XR CHEST PORTABLE   Final Result   1. There is no interval change in extensive multifocal bilateral pneumonia. XR CHEST PORTABLE   Final Result   Unchanged bilateral airspace disease. Right pleural effusion with suspected   interval progression. XR CHEST PORTABLE   Final Result   1. No significant changes since the December 11.      2.  Continued air density under the left diaphragma limits expansion of the   left lower lung base. 3.  Further evaluation with CT chest recommended as above discussed. XR CHEST PORTABLE   Final Result   No significant changes since the previous study of a December 10. XR CHEST PORTABLE   Final Result   1. There is no appreciable left pneumothorax. 2. Extensive multifocal bilateral airspace disease which is unchanged. XR CHEST PORTABLE   Final Result   1. Slightly improved aeration at the right apex and left lower lung   2. Stable position and alignment of the tubes and catheters   3.  Extensive bilateral airspace disease concerning for consolidative   pneumonia slightly improved         XR CHEST PORTABLE   Final Result   Stable support lines and bilateral airspace disease. XR CHEST PORTABLE   Final Result   Unchanged bilateral diffuse infiltrates. XR CHEST PORTABLE   Final Result   1. Decreased left pneumothorax with suspected trace residual.  2 left chest   tubes are similar to the previous exam.   2. Bilateral pulmonary opacities appear mildly increased. 3. The support devices are unchanged and appear to be in acceptable position. XR CHEST PORTABLE   Final Result   Unchanged airspace disease and left pneumothorax. XR CHEST PORTABLE   Final Result   1. No interval change in extensive multifocal bilateral pneumonia   2. Less than 10% left pneumothorax. There is stable position of the 2 left   chest tubes. XR CHEST PORTABLE   Final Result   1. Stable diffuse interstitial pulmonary edema or pneumonia. 2.  Left chest tube demonstrated. Minimal residual left pneumothorax. XR CHEST PORTABLE   Final Result   1. Stable diffuse bilateral airspace disease. 2. Small left pneumothorax appears new or increased compared to prior   examination. Left chest tube appears stable in position. 3. Possible right pleural effusion. XR CHEST PORTABLE   Final Result   1. There is no interval change in extensive multifocal bilateral pneumonia. XR CHEST PORTABLE   Final Result   No change in extensive bilateral pulmonary airspace opacities. No   pneumothorax is radiographically visible on the current exam.         XR CHEST PORTABLE   Final Result   Significantly decreased size of left pneumothorax. There is likely trace   left pneumothorax remaining. Stable extensive bilateral pulmonary airspace opacities. XR CHEST PORTABLE   Final Result   Addendum 1 of 1   ADDENDUM:   Verbal report was given to Severiano Bolster, RN at 8:15 a.m. central standard   time on 11/30/2021. Final   New moderate sized left-sided pneumothorax.       Stable extensive bilateral pulmonary airspace opacities            XR CHEST PORTABLE   Final Result   1. Lines okay. 2. Persistent edema/ARDS/pneumonia. 3. No sign of pneumothorax. XR CHEST PORTABLE   Final Result   Interval placement of left-sided chest tube with questionable residual   pneumothorax versus artifact. Extensive bilateral infiltrates. Continued follow-up recommended. XR CHEST PORTABLE   Final Result   Interval development of large left-sided tension pneumothorax. Extensive bilateral parenchymal infiltrates. Findings were discussed with Dr. Marielle Reese at approximately 0010 hours [place]   time on 11/28/2021. XR CHEST PORTABLE   Final Result   Severe bilateral pulmonary opacification. XR CHEST PORTABLE   Final Result   1. Endotracheal tube is 3 cm above the khai. 2. Stable interstitial pulmonary edema or pneumonia throughout both lungs. XR CHEST PORTABLE   Final Result   1. Somewhat limited exam, grossly unchanged. Please see above comments. .      RECOMMENDATION:   1. Recommend follow-up thoracic imaging, as directed clinically. .         XR ABDOMEN (KUB) (SINGLE AP VIEW)   Final Result   1. Satisfactory position of the NG tube within the stomach         XR CHEST PORTABLE   Final Result   1. There is no interval change in the multifocal bilateral pneumonia. XR CHEST PORTABLE   Final Result   Bilateral airspace disease with interval progression on the right         XR CHEST PORTABLE   Final Result   1. Endotracheal tube is 2.7 cm above the khai. 2. Stable interstitial pulmonary edema or pneumonia throughout both lungs. XR CHEST PORTABLE   Final Result   1. Worsening of the multifocal bilateral pneumonia when compared with the   prior study of 1 day earlier. XR CHEST PORTABLE   Final Result   1. Multifocal bilateral pneumonia more prominent within the right upper lobe   2. Minimal partial interval clearing of the pneumonia within the left lung   3.  Worsening of the pneumonia within the right upper lobe. US DUP LOWER EXTREMITIES BILATERAL VENOUS   Final Result   Within the visualized vessels there is no evidence for deep venous   thrombosis               XR CHEST PORTABLE   Final Result   1. Lines okay. 2. Slightly worsened diffuse edema versus pneumonia. XR CHEST PORTABLE   Final Result   1. Lines okay   2. Improved aeration, mild improvement and diffuse pneumonia/edema. XR CHEST ABDOMEN NG PLACEMENT   Final Result   NG tube position satisfactory. XR CHEST PORTABLE   Final Result   Stable bilateral pneumonia or interstitial pulmonary edema. XR CHEST PORTABLE   Final Result   Increasing bilateral infiltrates. XR CHEST PORTABLE   Final Result   1. There is no interval change in the multifocal bilateral pneumonia. CT HEAD WO CONTRAST   Final Result   No acute intracranial abnormality. Cortical atrophy and periventricular white matter ischemic changes. CT CERVICAL SPINE WO CONTRAST   Final Result   No acute abnormality of the cervical spine. Moderate degenerative changes with disc space narrowing and marginal bony   spur formation C5 through C7. Ground-glass opacities in the visualized lung apices. Please refer to chest   CT for additional information. CTA CHEST W CONTRAST   Final Result   No evidence of pulmonary embolism. Extensive multifocal ground-glass opacities predominantly in the peripheral   lung zones bilaterally, highly concerning for atypical or COVID related   pneumonia. XR CHEST PORTABLE   Final Result   Bilateral patchy airspace opacities worrisome for pneumonia.          XR CHEST PORTABLE    (Results Pending)   XR CHEST PORTABLE    (Results Pending)   XR CHEST PORTABLE    (Results Pending)   XR CHEST PORTABLE    (Results Pending)   XR CHEST PORTABLE    (Results Pending)       A/P:  Active Problems:    Acute respiratory failure with hypoxia (HCC)    Rhabdomyolysis Acute cystitis with hematuria    Primary spontaneous pneumothorax  Resolved Problems:    * No resolved hospital problems. *    1. Intubated, Sedated, Paralyzed,   11/20: Intubated 2/2 hypoxia and increased work of breathing, proned and paralyzed  S/p Left Chest Tube 11/29 for tension pneumothorax with second chest tube placed on 11/30. Chest tubes removed 12/11 and 12/13. Patient now supine. Gen Surg to consider trach and peg tube placement pending clinical improvement,    2. Acute Hypoxic Respiratory Failure 2/2 Covid 19  Unvaccinated for Covid 19  Patient found with pulse ox of 60%. 11/18 - transferred to ICU due to worsening resp status. --> currently intubated and sedated  CXR showing bilateral pulmonary infiltrates  Inflammatory markers: DDimer 530, , CRP 22.9, Ferritin 749 on admission   CTA pulm showing extensive bilateral pulmonary infiltrates consistent with COVID-19 pneumonia , no PE  Completed remdesivir and tocilizumab treatment  Decadron and SoluCortef course completed  Pulmonology consulted ; appreciate recs; daily ABG, Vitamin C, Vitamin D  Dietary consulted for further recommendations on nutrition status ; appreciate recs. CXR with resolution of pneumothorax. Advanced Care Planning with Spiritual Services ; recs appreciated - FULL CODE. Brother wants everything done for Marilyn per discussion with palliate medicine. 3. Hypotension  - continue Levophed titrate off per ICU protocol. 4. Left Sided Tension Pneumothorax -   S/p left sided chest tube  11/29 , Tension Pneumothorax on Xray  Left lung re inflated on repeat CXR  Whistle like sound noted Left Upper Lung field 2/2 likely to Chest tube . 2nd chest tube placed on 11/30. General surgery removed  chest tubes on 12/11/21 and 12/13/2021  Chest x-ray from today with right pleural effusion more apparent, may be mildly progressive. Continue to monitor    5.  Concern for Sepsis - resolved  Likely 2/2 superimposed bacterial pneumonia , candidal colonization  Tmax 102.2 , Tavg 100.3F, Afebrile overnight. Given one dose cefepime and vancomycin in ICU  Procal 0.07, repeat 0.27 , blood cultures, urine culture negative. Completed Pip/Tazo, Vancomycin  Fungitell and B glucan pending  Diflucan stopped by ID. WBC increased to 17.1 this am.   No current Abx    7. CHARAN Stage I - resolved  Admission creatinine 0.6  Likely 2/2 to Ischemic ATN in setting of Hypotension  Creatinine increased to 1.6 --> trended down to 0.3  Nephrology following , appreciate recs     8. Anemia  2/2 to inflammation/ response to Covid 19  .3, MCHC 31.5 RDW 20.1  Vitamin B12/Folate normal March 2021  FOBT + 11/29, repeated pending. H/H < 7 11/30/2021 , s/p 1 unit PRBC. Hemoglobin 7.2 this AM.  Maintain H/H > 7    9. Thrombocytopenia  Likely 2/2 to SALENA vs inflammation from Covid 19  Hold all anticoagulation , patient trialed on heparin , lovenox and argatroban but platelets continue to decrease  SALENA panel- negative  Platelets 358 this am     10. Hx Schizoaffective Disorder / Anxiety  Continue seroquel , zoloft, trazodone  Hold ativan, vistaril     11. Hypokalemia  Initial K 3.2 , Mag 2.6  Replace as needed  CMP daily    12. Rhabdomyolysis - resolved  2/2 to fall for unknown time period  Patient found down for unknown period of time  Initial CK > 3000  Received 4 L NS in ER    13. Concern for UTI - resolved   Likely simple cystitis ; patient with no CVA tenderness , presented initially with fever 102 F  Urinalysis with increased nitrites, bacteria, blood  Urine culture, blood cultures were negative  Given one dose doxy and rocephin in the ER  1g ceftriaxone IV /-  dc'd 11/18/2021   ID signed off.      GI/DVT ppx: protonix  Diet: NPO, tube feeds  Disposition: MICU    Electronically signed by Gracie Brito MD  [id number] on 12/16/2021 at 10:27 AM  This case was discussed with attending physician: Dr. Matthew Dean

## 2021-12-16 NOTE — CARE COORDINATION
12/16  Care Coordination: Pt remains in MICU, Micky. On vent,fio2 70%, peep 102 AC 28. Chest Tubes x2. On IV Sedation. [facility] following. D/C plan remains unclear at this time. For trach and PEG when more stable,postponed due to issues related to bradycardia. CM/SW will continue to follow for discharge planning.    Silva FOSTERN,RN--BC  [phone]

## 2021-12-16 NOTE — FLOWSHEET NOTE
RN called again STAT blood cultures to be drawn on patient. Lab stated phlebotomy would be up to draw ASAP.

## 2021-12-16 NOTE — PROGRESS NOTES
[facility] Attending    S: 73 y.o. female with a history of gerd, oa, schizoaffective disorder, and gastric fundiplication who was found down for an undetermined amount of time. Reports shortness of breath and cough for 2.5 weeks. She was found to be 60% on RA. In the ER, COVID testing was positive with significantly elevated CPKs. Patient is unvaccinated. Had desaturation to 88% wit PaO2 of 55 with RRT and subsequent transfer to the MICU. Desat to 40's when Bipap removed. Now intubated. Sedated, paralyzed,  Noted to have pneumothorax and chest tube placed. Second chest tube placed when pneumothorax not improved. Chest tubes subsequently removed. Nonresponsive for me. Worsening lab values. Hypotension today. O: VS- Blood pressure (!) 162/86, pulse 66, temperature 98.8 °F (37.1 °C), temperature source Bladder, resp. rate 28, height 5' 1\" (1.549 m), weight 177 lb 8 oz (80.5 kg), SpO2 (!) 89 %. Exam is as noted by resident   Currently nonresponsive. Supine  Lungs: coarse, vent. Decreased BS  CV:  Rate controlled, regular   Ext-no C/C/E    Impressions: Active Problems:    Acute respiratory failure with hypoxia (HCC)    Covid pneumonia    Rhabdomyolysis, CK improved    Acute cystitis with hematuria, treated    CHARAN    Thrombocytopenia     Plan:      Acute hypoxic resp failure 2/2 covid - Decadron. Completed Tocimizilab. Vitamin C.  Vitamin D.  Zinc.  Appreciate Pulm management. Sepsis 2/2 PNA - restarted on zosyn due to hypotension worsening CRP and WBC. Repeat cultures sent today. U/s of lower extremities negative DVT 11/20. Tube feeding. Fluid overloaded - bumex held due to hypotension. Schizoaffective - seroquel  Ongoing communication with family re code status  ICU management  Full code at this time. Attending Physician Statement  I have reviewed the chart and seen the patient with the resident(s).   I personally reviewed images, EKG's and similar tests, if present. I personally reviewed and performed key elements of the history and exam.  I have reviewed and confirmed student and/or resident history and exam with changes as indicated above. I agree with the assessment, plan and orders as documented by the resident. Please refer to the  resident and/or student note for additional information. Stephan Garcia.  Raffi Rey MD

## 2021-12-17 NOTE — PLAN OF CARE
Problem: Falls - Risk of:  Goal: Will remain free from falls  Description: Will remain free from falls  Outcome: Met This Shift     Problem: Falls - Risk of:  Goal: Absence of physical injury  Description: Absence of physical injury  Outcome: Met This Shift     Problem: Airway Clearance - Ineffective  Goal: Achieve or maintain patent airway  Outcome: Met This Shift     Problem: Skin Integrity:  Goal: Absence of new skin breakdown  Description: Absence of new skin breakdown  Outcome: Met This Shift   Plan of care discussed with patient / family.

## 2021-12-17 NOTE — PROGRESS NOTES
[facility] Inpatient   Resident Progress Note    S:  Hospital day: 32   Brief Synopsis: Jon Villalpando is a 73 y.o. female with pmh of GERD, osteoarthritis and schizoaffective disorder who presented to ER s/p fall at home. Patient found down at home, with initial O2 saturation of 60%. Brought to the ER; found to have COVID-19 pneumonia , requiring bipap for appropriate saturation. Rhabdomyolysis, UTI. Started on appropriate management including tocilizumab, ceftriaxone 1 g daily, and IV fluids for rhabdomyolysis. Decadron was started daily, and with patients worsening status - pulmonology consulted. FULL CODE. Transferred to ICU on 11/18 for rapid breathing and hypoxia and pO2 of 55. Intubated 11/20 secondary to O2 sats consistently <80% with rapid breathing. 11/29 , patient developed left sided tension pneumothorax and underwent chest tube placement. Patient Intubated, sedated, paralyzed, proned. On vasopressors. Hemoglobin dropped to 6.9 with 1 unit PRBC transfused. Second chest tube placed on left chest. Vacuum changed to water seals. General surgery removed one smaller bore chest tube on 12/11/21 and the 2nd chest tube on 12/13/2021. Tracheostomy and percutaneous endoscopic gastrostomy tube to be placed by surgery when patients respiratory status and vital signs are stabilize. Overnight, had episode of hypotension that required Levophed to stabilize. Patient seen today in the morning. Not on Vasopressors. Remains supine, intubated, sedated. FiO2 65%, PEEP 12, P/f ratio: 0.85.   White blood cells at 23 this morning, increased from previous at 17.1    Cont meds:    norepinephrine Stopped (12/17/21 0800)    dextrose 75 mL/hr at 12/16/21 1700    fentaNYL 5 mcg/ml in 0.9%  ml infusion 200 mcg/hr (12/17/21 0954)    midazolam 10 mg/hr (12/17/21 0538)    sodium chloride 100 mL/hr at 11/1956    dextrose       Scheduled meds:    fluconazole  400 mg IntraVENous Q24H    piperacillin-tazobactam  3,375 mg IntraVENous Q8H    polyethylene glycol  17 g Oral BID    [Held by provider] bumetanide  1 mg IntraVENous Q6H    lansoprazole  30 mg Oral QAM AC    artificial tears   Both Eyes Q12H    miconazole nitrate   Topical BID    midodrine  20 mg Oral Q8H    [Held by provider] enoxaparin  40 mg SubCUTAneous BID    sucralfate  1 g Oral 4 times per day    sennosides-docusate sodium  2 tablet Oral Daily    sodium chloride flush  5-40 mL IntraVENous 2 times per day    heparin flush  3 mL IntraVENous 2 times per day    chlorhexidine  15 mL Mouth/Throat BID    budesonide  1 mg Nebulization BID    Arformoterol Tartrate  15 mcg Nebulization BID    atorvastatin  10 mg Oral Daily    QUEtiapine  200 mg Oral Daily    sertraline  200 mg Oral Daily    traZODone  100 mg Oral Nightly    vitamin D  2,000 Units Oral Daily     PRN meds: sodium chloride flush, heparin flush, ipratropium-albuterol, sodium chloride, acetaminophen **OR** acetaminophen, glucose, dextrose, glucagon (rDNA), dextrose     I reviewed the patient's past medical and surgical history, Medications and Allergies. O:  BP (!) 107/55   Pulse 92   Temp 100.6 °F (38.1 °C)   Resp 28   Ht 5' 1\" (1.549 m)   Wt 177 lb 8 oz (80.5 kg)   SpO2 90%   BMI 33.54 kg/m²   24 hour I&O: I/O last 3 completed shifts: In: 6950 [I.V.:3716; NG/GT:590]  Out: 2160 [Urine:2160]  I/O this shift:  In: -   Out: 260 [Urine:260]      Physical Exam  Constitutional:       Comments: Intubated, sedated, paralyzed, supine   HENT:      Head: Normocephalic and atraumatic. Mouth/Throat:      Comments: intubated  Cardiovascular:      Rate and Rhythm: Normal rate and regular rhythm. Pulses: Normal pulses. Heart sounds: Normal heart sounds. Pulmonary:      Breath sounds: No wheezing, rhonchi or rales. Abdominal:      General: There is no distension. Palpations: There is no mass. Hernia: No hernia is present.    Musculoskeletal: General: Normal range of motion. Right lower leg: Edema present. Left lower leg: Edema present. Skin:     General: Skin is warm and dry. Findings: Bruising: mild, on back. Neurological:      Comments: Sedated, paralyzed       Labs:  Na/K/Cl/CO2:  132/3.7, 3.7/94/31 (12/17 0444)  BUN/Cr/glu/ALT/AST/amyl/lip:  9/0.3/--/44/44/--/-- (12/17 0444)  WBC/Hgb/Hct/Plts:  23.1/9.6/31.7/297 (12/17 0444)  estimated creatinine clearance is 180 mL/min (A) (based on SCr of 0.3 mg/dL (L)). Other pertinent labs as noted below    Radiology:  XR CHEST PORTABLE   Preliminary Result   1. Slight worsening of the multifocal bilateral pneumonia when compared with   the patient's prior study of 1 day earlier. XR CHEST PORTABLE   Final Result   Stable bilateral pulmonary infiltrates and positioning of lines and tubes. XR CHEST PORTABLE   Final Result   Subtle improvement in aeration. No other change. Continued follow-up   recommended. XR CHEST PORTABLE   Final Result   1. There is no interval change in extensive multifocal bilateral pneumonia. XR CHEST PORTABLE   Final Result   Unchanged bilateral airspace disease. Right pleural effusion with suspected   interval progression. XR CHEST PORTABLE   Final Result   1. No significant changes since the December 11.      2.  Continued air density under the left diaphragma limits expansion of the   left lower lung base. 3.  Further evaluation with CT chest recommended as above discussed. XR CHEST PORTABLE   Final Result   No significant changes since the previous study of a December 10. XR CHEST PORTABLE   Final Result   1. There is no appreciable left pneumothorax. 2. Extensive multifocal bilateral airspace disease which is unchanged. XR CHEST PORTABLE   Final Result   1. Slightly improved aeration at the right apex and left lower lung   2.  Stable position and alignment of the tubes and catheters 3. Extensive bilateral airspace disease concerning for consolidative   pneumonia slightly improved         XR CHEST PORTABLE   Final Result   Stable support lines and bilateral airspace disease. XR CHEST PORTABLE   Final Result   Unchanged bilateral diffuse infiltrates. XR CHEST PORTABLE   Final Result   1. Decreased left pneumothorax with suspected trace residual.  2 left chest   tubes are similar to the previous exam.   2. Bilateral pulmonary opacities appear mildly increased. 3. The support devices are unchanged and appear to be in acceptable position. XR CHEST PORTABLE   Final Result   Unchanged airspace disease and left pneumothorax. XR CHEST PORTABLE   Final Result   1. No interval change in extensive multifocal bilateral pneumonia   2. Less than 10% left pneumothorax. There is stable position of the 2 left   chest tubes. XR CHEST PORTABLE   Final Result   1. Stable diffuse interstitial pulmonary edema or pneumonia. 2.  Left chest tube demonstrated. Minimal residual left pneumothorax. XR CHEST PORTABLE   Final Result   1. Stable diffuse bilateral airspace disease. 2. Small left pneumothorax appears new or increased compared to prior   examination. Left chest tube appears stable in position. 3. Possible right pleural effusion. XR CHEST PORTABLE   Final Result   1. There is no interval change in extensive multifocal bilateral pneumonia. XR CHEST PORTABLE   Final Result   No change in extensive bilateral pulmonary airspace opacities. No   pneumothorax is radiographically visible on the current exam.         XR CHEST PORTABLE   Final Result   Significantly decreased size of left pneumothorax. There is likely trace   left pneumothorax remaining. Stable extensive bilateral pulmonary airspace opacities. XR CHEST PORTABLE   Final Result   Addendum 1 of 1   ADDENDUM:   Verbal report was given to Clarence Flores RN at 8:15 a.m. central standard   time on 11/30/2021. Final   New moderate sized left-sided pneumothorax. Stable extensive bilateral pulmonary airspace opacities            XR CHEST PORTABLE   Final Result   1. Lines okay. 2. Persistent edema/ARDS/pneumonia. 3. No sign of pneumothorax. XR CHEST PORTABLE   Final Result   Interval placement of left-sided chest tube with questionable residual   pneumothorax versus artifact. Extensive bilateral infiltrates. Continued follow-up recommended. XR CHEST PORTABLE   Final Result   Interval development of large left-sided tension pneumothorax. Extensive bilateral parenchymal infiltrates. Findings were discussed with Dr. Flakito Vargas at approximately 0010 hours [place]   time on 11/28/2021. XR CHEST PORTABLE   Final Result   Severe bilateral pulmonary opacification. XR CHEST PORTABLE   Final Result   1. Endotracheal tube is 3 cm above the khai. 2. Stable interstitial pulmonary edema or pneumonia throughout both lungs. XR CHEST PORTABLE   Final Result   1. Somewhat limited exam, grossly unchanged. Please see above comments. .      RECOMMENDATION:   1. Recommend follow-up thoracic imaging, as directed clinically. .         XR ABDOMEN (KUB) (SINGLE AP VIEW)   Final Result   1. Satisfactory position of the NG tube within the stomach         XR CHEST PORTABLE   Final Result   1. There is no interval change in the multifocal bilateral pneumonia. XR CHEST PORTABLE   Final Result   Bilateral airspace disease with interval progression on the right         XR CHEST PORTABLE   Final Result   1. Endotracheal tube is 2.7 cm above the khai. 2. Stable interstitial pulmonary edema or pneumonia throughout both lungs. XR CHEST PORTABLE   Final Result   1. Worsening of the multifocal bilateral pneumonia when compared with the   prior study of 1 day earlier. XR CHEST PORTABLE   Final Result   1. Multifocal bilateral pneumonia more prominent within the right upper lobe   2. Minimal partial interval clearing of the pneumonia within the left lung   3. Worsening of the pneumonia within the right upper lobe. US DUP LOWER EXTREMITIES BILATERAL VENOUS   Final Result   Within the visualized vessels there is no evidence for deep venous   thrombosis               XR CHEST PORTABLE   Final Result   1. Lines okay. 2. Slightly worsened diffuse edema versus pneumonia. XR CHEST PORTABLE   Final Result   1. Lines okay   2. Improved aeration, mild improvement and diffuse pneumonia/edema. XR CHEST ABDOMEN NG PLACEMENT   Final Result   NG tube position satisfactory. XR CHEST PORTABLE   Final Result   Stable bilateral pneumonia or interstitial pulmonary edema. XR CHEST PORTABLE   Final Result   Increasing bilateral infiltrates. XR CHEST PORTABLE   Final Result   1. There is no interval change in the multifocal bilateral pneumonia. CT HEAD WO CONTRAST   Final Result   No acute intracranial abnormality. Cortical atrophy and periventricular white matter ischemic changes. CT CERVICAL SPINE WO CONTRAST   Final Result   No acute abnormality of the cervical spine. Moderate degenerative changes with disc space narrowing and marginal bony   spur formation C5 through C7. Ground-glass opacities in the visualized lung apices. Please refer to chest   CT for additional information. CTA CHEST W CONTRAST   Final Result   No evidence of pulmonary embolism. Extensive multifocal ground-glass opacities predominantly in the peripheral   lung zones bilaterally, highly concerning for atypical or COVID related   pneumonia. XR CHEST PORTABLE   Final Result   Bilateral patchy airspace opacities worrisome for pneumonia.          XR CHEST PORTABLE    (Results Pending)   XR CHEST PORTABLE    (Results Pending)   XR CHEST PORTABLE    (Results Pending)   XR CHEST PORTABLE    (Results Pending)   XR CHEST PORTABLE    (Results Pending)       A/P:  Active Problems:    Acute respiratory failure with hypoxia (HCC)    Rhabdomyolysis    Acute cystitis with hematuria    Primary spontaneous pneumothorax  Resolved Problems:    * No resolved hospital problems. *    1. Intubated, Sedated, Paralyzed,   11/20: Intubated 2/2 hypoxia and increased work of breathing, proned and paralyzed  S/p Left Chest Tube 11/29 for tension pneumothorax with second chest tube placed on 11/30. Chest tubes removed 12/11 and 12/13. Patient now supine. Gen Surg to consider trach and peg tube placement pending clinical improvement,    2. Acute Hypoxic Respiratory Failure 2/2 Covid 19  Unvaccinated for Covid 19  Patient found with pulse ox of 60%. 11/18 - transferred to ICU due to worsening resp status. --> currently intubated and sedated  CXR showing bilateral pulmonary infiltrates  Inflammatory markers: DDimer 530, , CRP 22.9, Ferritin 749 on admission   CTA pulm showing extensive bilateral pulmonary infiltrates consistent with COVID-19 pneumonia , no PE  Completed remdesivir and tocilizumab treatment  Decadron and SoluCortef course completed  Pulmonology consulted ; appreciate recs; daily ABG, Vitamin C, Vitamin D  Dietary consulted for further recommendations on nutrition status ; appreciate recs. CXR with resolution of pneumothorax. Advanced Care Planning with Spiritual Services ; recs appreciated - FULL CODE. Brother wants everything done for Marilyn per discussion with palliate medicine. 3. Hypotension  - continue Levophed titrate off per ICU protocol. 4. Left Sided Tension Pneumothorax -   S/p left sided chest tube  11/29 , Tension Pneumothorax on Xray  Left lung re inflated on repeat CXR  Whistle like sound noted Left Upper Lung field 2/2 likely to Chest tube . 2nd chest tube placed on 11/30.  General surgery removed  chest tubes on 12/11/21 and 12/13/2021  Chest x-ray from today with right pleural effusion more apparent, may be mildly progressive. Continue to monitor    5. Concern for Sepsis - resolved  Likely 2/2 superimposed bacterial pneumonia , candidal colonization  Tmax 102.2 , Tavg 100.3F, Afebrile overnight. Given one dose cefepime and vancomycin in ICU  Procal 0.07, repeat 0.27 , blood cultures, urine culture negative. Completed Pip/Tazo, Vancomycin  Fungitell and B glucan pending  Diflucan stopped by ID. WBC increased to 17.1 this am.   No current Abx    7. CHARAN Stage I - resolved  Admission creatinine 0.6  Likely 2/2 to Ischemic ATN in setting of Hypotension  Creatinine increased to 1.6 --> trended down to 0.3  Nephrology following , appreciate recs     8. Anemia  2/2 to inflammation/ response to Covid 19  .3, MCHC 31.5 RDW 20.1  Vitamin B12/Folate normal March 2021  FOBT + 11/29, repeated pending. H/H < 7 11/30/2021 , s/p 1 unit PRBC. Hemoglobin 7.2 this AM.  Maintain H/H > 7    9. Thrombocytopenia  Likely 2/2 to SALENA vs inflammation from Covid 19  Hold all anticoagulation , patient trialed on heparin , lovenox and argatroban but platelets continue to decrease  SALENA panel- negative  Platelets 312 this am     10. Hx Schizoaffective Disorder / Anxiety  Continue seroquel , zoloft, trazodone  Hold ativan, vistaril     11. Hypokalemia  Initial K 3.2 , Mag 2.6  Replace as needed  CMP daily    12. Rhabdomyolysis - resolved  2/2 to fall for unknown time period  Patient found down for unknown period of time  Initial CK > 3000  Received 4 L NS in ER    13. Concern for UTI - resolved   Likely simple cystitis ; patient with no CVA tenderness , presented initially with fever 102 F  Urinalysis with increased nitrites, bacteria, blood  Urine culture, blood cultures were negative  Given one dose doxy and rocephin in the ER  1g ceftriaxone IV /-  dc'd 11/18/2021   ID signed off.      GI/DVT ppx: protonix  Diet: NPO, tube feeds  Disposition: MICU    Electronically signed by Lisa Salgado

## 2021-12-17 NOTE — PROGRESS NOTES
[facility]  Nephrology Attending Progress Note          SUBJECTIVE:     12/7: chart reviewed, seen through window of icu, intubated    12/8: pt seen through window of icu. Intubated on levo    12/9: pt seen through window of icu, intubated and sedated, chest tube in place, on levo    12/10: pt seen through window of icu, intubated and sedated with chest tube, on levo    12/11: seen through icu window, intubated and sedated, on levo    12/12: pt seen through window of icu, chart reviewed, intubated and sedated     12/13: off nimbex; received 1 unit PRBC last pm    12/14: No new acute issues reported from overnight; remains intubated; for trach/PEG today    12/15: Trach/PEG postponed due to issues related to bradycardia; potassium low this a.m.; otherwise no new acute issues from overnight    12/16: For trach and PEG today ; no other acute issues reported from overnight    12/17: Hypotensive episode left pm; required Levophed.   Currently off; BP trending low; low-grade temp      PROBLEM LIST:    Patient Active Problem List   Diagnosis    Schizoaffective disorder ([facility] [address]    Acute respiratory failure with hypoxia (HCC)    Rhabdomyolysis    Acute cystitis with hematuria    Primary spontaneous pneumothorax        DIET:    Diet NPO  ADULT TUBE FEEDING; Nasogastric; Standard with Fiber; Continuous; 10; No; 30; Q 4 hours     PHYSICAL EXAM:     Patient Vitals for the past 24 hrs:   BP Temp Temp src Pulse Resp SpO2   12/17/21 0904     25 92 %   12/17/21 0903     10 91 %   12/17/21 0902    94 10 (!) 83 %   12/17/21 0800 130/69 100 °F (37.8 °C) Bladder 84 28 (!) 83 %   12/17/21 0700 103/60   86 28 (!) 88 %   12/17/21 0600 106/62   83 26 93 %   12/17/21 0500 (!) 111/56   85 28 91 %   12/17/21 0445    87 28 (!) 83 %   12/17/21 0441    86 (!) 31 (!) 84 %   12/17/21 0400 103/61 99.9 °F (37.7 °C)  79 28 91 %   12/17/21 0300 (!) 82/46   81 28 92 %   12/17/21 0230 (!) 82/48   88 28 91 %   12/17/21 0200 (!) 76/44 100.2 °F (37.9 °C)  89 28 90 %   12/17/21 0100 (!) 94/53   98 29 (!) 89 %   12/17/21 0001    99 30 (!) 89 %   12/17/21 0000 105/62 100.4 °F (38 °C) Bladder 98 28 (!) 89 %   12/16/21 2300 110/67   98 28 (!) 88 %   12/16/21 2200 113/73   101 (!) 32 (!) 88 %   12/16/21 2150    101 26 (!) 88 %   12/16/21 2149     (!) 31 (!) 89 %   12/16/21 2100 109/68   94 26 (!) 87 %   12/16/21 2000 105/61 100 °F (37.8 °C) Bladder 88 28 (!) 87 %   12/16/21 1900 (!) 107/56   89 28 (!) 89 %   12/16/21 1850    90 28 92 %   12/16/21 1800 (!) 102/55   89 28 91 %   12/16/21 1700 (!) 110/58   89 26 92 %   12/16/21 1616    86 28 92 %   12/16/21 1600 106/60 99.3 °F (37.4 °C) Bladder 85 28 92 %   12/16/21 1500 112/64   81 28 92 %   12/16/21 1400 121/74   78 28 93 %   12/16/21 1301    79 28 95 %   12/16/21 1300 (!) 89/51   80 28    12/16/21 1200 (!) 87/50 98.6 °F (37 °C) Bladder 89 28 93 %   12/16/21 1130    95 28 91 %   12/16/21 1100 (!) 101/54   94 28 90 %   12/16/21 1040    94 28 (!) 89 %   12/16/21 1000 (!) 105/56   91 28 (!) 89 %   12/16/21 0937     28 (!) 88 %   12/16/21 0936    75 28 (!) 88 %   @      Intake/Output Summary (Last 24 hours) at 12/17/2021 0909  Last data filed at 12/17/2021 0800  Gross per 24 hour   Intake 4206 ml   Output 2010 ml   Net 2196 ml         Wt Readings from Last 3 Encounters:   12/14/21 177 lb 8 oz (80.5 kg)   10/26/21 152 lb (68.9 kg)   10/12/21 149 lb (67.6 kg)     PE  Constitutional:  Pt is intubated  Seen through window of icu    MEDS (scheduled):    potassium chloride  20 mEq IntraVENous Once    piperacillin-tazobactam  3,375 mg IntraVENous Q8H    polyethylene glycol  17 g Oral BID    [Held by provider] bumetanide  1 mg IntraVENous Q6H    lansoprazole  30 mg Oral QAM AC    artificial tears   Both Eyes Q12H    miconazole nitrate   Topical BID    midodrine  20 mg Oral Q8H    [Held by provider] enoxaparin  40 mg SubCUTAneous BID HPI:  pt is a 39 y/o female w/pmhx of lymphoma on immunotherapy, last chemo in august, developed flank pain last night seen [facility] ed and dc'd returns w/untolerable flank pain.  pt denies any fever, chills, sob, cp, palpiations, n/v/d/c no travels.  pt on ct w/4mm obstructing stone w/hydro on r. (23 Oct 2020 22:37)      Allergies    aspirin (Anaphylaxis)  latex (Swelling)  NSAIDs (Swelling)  penicillin (Hives)  Pistachios (Pruritus)  shellfish (Anaphylaxis)    Intolerances        MEDICATIONS  (STANDING):  cefTRIAXone   IVPB 1000 milliGRAM(s) IV Intermittent every 24 hours  influenza   Vaccine 0.5 milliLiter(s) IntraMuscular once    MEDICATIONS  (PRN):  oxycodone    5 mG/acetaminophen 325 mG 1 Tablet(s) Oral every 6 hours PRN Moderate Pain (4 - 6)      REVIEW OF SYSTEMS:    CONSTITUTIONAL: No fever, chills, weight loss, or fatigue  HEENT: No sore throat, runny nose, ear ache  RESPIRATORY: No cough, wheezing, No shortness of breath  CARDIOVASCULAR: No chest pain, palpitations, dizziness  GASTROINTESTINAL:  nausea, No: vomiting, diarrhea  GENITOURINARY: No dysuria, increase frequency, hematuria, or incontinence  NEUROLOGICAL: No headaches, memory loss, loss of strength, numbness, or tremors, no weakness  EXTREMITY: No pedal edema BLE  SKIN: No itching, burning, rashes, or lesions     VITAL SIGNS:  T(C): 36.5 (10-27-20 @ 05:21), Max: 37.3 (10-26-20 @ 14:23)  T(F): 97.7 (10-27-20 @ 05:21), Max: 99.1 (10-26-20 @ 14:23)  HR: 70 (10-27-20 @ 05:21) (59 - 80)  BP: 124/88 (10-27-20 @ 05:21) (112/64 - 134/75)  RR: 18 (10-27-20 @ 05:21) (14 - 18)  SpO2: 98% (10-27-20 @ 05:21) (95% - 100%)  Wt(kg): --    PHYSICAL EXAM:    GENERAL: not in any distress  HEENT: Neck is supple, normocephalic, atraumatic   CHEST/LUNG: Clear to percussion bilaterally; No rales, rhonchi, wheezing  HEART: Regular rate and rhythm; No murmurs, rubs, or gallops  ABDOMEN: Soft, Nontender, Nondistended; Bowel sounds present, no rebound   EXTREMITIES:  2+ Peripheral Pulses, No clubbing, cyanosis, or edema  SKIN: No rashes or lesions    LABS:                         11.2   2.65  )-----------( 233      ( 27 Oct 2020 07:16 )             34.7     10-27    140  |  108  |  14  ----------------------------<  98  3.9   |  22  |  0.83    Ca    9.6      27 Oct 2020 07:16  Phos  2.4     10-26  Mg     2.3     10-26        PT/INR - ( 26 Oct 2020 07:21 )   PT: 15.4 sec;   INR: 1.35 ratio         PTT - ( 26 Oct 2020 07:21 )  PTT:23.2 sec            HCG Quantitative, Serum: <1 mIU/mL (10-26 @ 07:21)    Culture Results:   <10,000 CFU/mL Normal Urogenital Mignon (10-24 @ 12:27)  Culture Results:   <10,000 CFU/mL Normal Urogenital Mignon (10-22 @ 03:34)        sucralfate  1 g Oral 4 times per day    sennosides-docusate sodium  2 tablet Oral Daily    sodium chloride flush  5-40 mL IntraVENous 2 times per day    heparin flush  3 mL IntraVENous 2 times per day    chlorhexidine  15 mL Mouth/Throat BID    budesonide  1 mg Nebulization BID    Arformoterol Tartrate  15 mcg Nebulization BID    atorvastatin  10 mg Oral Daily    QUEtiapine  200 mg Oral Daily    sertraline  200 mg Oral Daily    traZODone  100 mg Oral Nightly    vitamin D  2,000 Units Oral Daily       MEDS (infusions):   norepinephrine 1 mcg/min (12/17/21 0548)    dextrose 75 mL/hr at 12/16/21 1700    fentaNYL 5 mcg/ml in 0.9%  ml infusion 200 mcg/hr (12/17/21 0538)    midazolam 10 mg/hr (12/17/21 0538)    sodium chloride 100 mL/hr at 11/1956    dextrose         MEDS (prn):  sodium chloride flush, heparin flush, ipratropium-albuterol, sodium chloride, acetaminophen **OR** acetaminophen, glucose, dextrose, glucagon (rDNA), dextrose    DATA:    Recent Labs     12/15/21  0430 12/15/21  1520 12/16/21  0408 12/16/21  1609 12/17/21  0444   WBC 10.9  --  17.1*  --  23.1*   HGB 9.5*   < > 10.1* 9.8* 9.6*   HCT 30.3*   < > 32.2* 31.6* 31.7*   .7*  --  100.0*  --  102.6*     --  311  --  297    < > = values in this interval not displayed.      Recent Labs     12/15/21  0430 12/15/21  1520 12/16/21  0408 12/16/21  0408 12/16/21  1609 12/16/21  1609 12/16/21  2240 12/17/21  0444      < > 140   < > 138  --  135 132   K 2.8*   < > 2.7*  2.7*   < > 3.3*   < > 3.9 3.7  3.7      < > 97*   < > 97*  --  95* 94*   CO2 29   < > 33*   < > 35*  --  33* 31*   BUN 13   < > 9   < > 7  --  7 9   CREATININE 0.3*   < > 0.3*   < > 0.3*  --  0.3* 0.3*   LABGLOM >60   < > >60   < > >60  --  >60 >60   GLUCOSE 99   < > 105*   < > 141*  --  163* 176*   CALCIUM 8.1*   < > 7.8*   < > 7.8*  --  7.5* 7.6*   ALT 28  --  31  --   --   --   --  44*   AST 17  --  19  --   --   --   --  44*   BILIDIR <0. 2  --  <0.2  --   --   --   --  <0.2   BILITOT 0.4  --  0.4  --   --   --   --  0.4   ALKPHOS 95  --  103  --   --   --   --  137*   MG 1.6   < > 1.8   < > 1.9  --  2.2 2.0   PHOS 2.2*  --   --   --  2.3*  --  2.4* 2.4*    < > = values in this interval not displayed. Lab Results   Component Value Date    LABALBU 2.4 (L) 12/17/2021    LABALBU 2.7 (L) 12/16/2021    LABALBU 2.7 (L) 12/15/2021     Lab Results   Component Value Date    TSH 3.010 12/16/2021       Iron Studies  Lab Results   Component Value Date    FERRITIN 419 12/17/2021     Vitamin B-12   Date Value Ref Range Status   03/02/2021 771 211 - 946 pg/mL Final     Folate   Date Value Ref Range Status   03/02/2021 >20.0 4.8 - 24.2 ng/mL Final       No results found for: VITD25  No results found for: PTH    No components found for: URIC    Lab Results   Component Value Date    COLORU Yellow 12/16/2021    NITRU Negative 12/16/2021    GLUCOSEU Negative 12/16/2021    KETUA Negative 12/16/2021    UROBILINOGEN 0.2 12/16/2021    BILIRUBINUR Negative 12/16/2021       No results found for: Porter Osborne      IMPRESSION/RECOMMENDATIONS:      1. Acute kidney injury stage I  ischemic ATN occurring in the setting of hypotension  nonoliguric    Cr  now back to baseline  Continue to monitor labs and urine output       2. Acute hypoxic respiratory failure  due to COVID-19 pneumonia  Remains intubated  Possible trach and PEG        3.  Septic shock in setting of COVID-19 infection  suspected possible superimposed bacterial pneumonia  Hypotensive episode last p.m.; required Levophed for brief. Currently off  Off antibiotics     4. Volume overload  cumulative fluid balance +14 L  Hold Bumex for now due to hypotension       5.   Hypokalemia  Diuretic induced  Supplement as neeeded    dw Resident    Mal Garcia MD

## 2021-12-17 NOTE — PROGRESS NOTES
[address] [facility]   Department of Internal Medicine   Internal Medicine Residency  MICU Progress Note    Patient:  Naomie Moss 73 y.o. female   MRN: [id number]       Date of Service: 2021    Allergy: Ciprofloxacin  Cc follow up ARDS  Subjective     Patient was seen and examined this morning at bedside in no acute distress. Overnight, pressures dropped over night briefly started on levophed but is off not. Weaning FiO2 down to 65% today. Difficult to obtain cultures today, discussed with lab techs, they will try again. Will try to wean sedation after trach and peg    Objective     TEMPERATURE:  Current - Temp: 99.9 °F (37.7 °C); Max - Temp  Av.5 °F (37.5 °C)  Min: 98.2 °F (36.8 °C)  Max: 100.4 °F (38 °C)  RESPIRATIONS RANGE: Resp  Av.1  Min: 26  Max: 32  PULSE RANGE: Pulse  Av.6  Min: 66  Max: 101  BLOOD PRESSURE RANGE:  Systolic (50WEK), FLV:351 , Min:76 , DELUCA:275   ; Diastolic (77NDI), LVO:25, Min:44, Max:86    PULSE OXIMETRY RANGE: SpO2  Av.8 %  Min: 83 %  Max: 95 %    I & O - 24hr:    Intake/Output Summary (Last 24 hours) at 2021 0710  Last data filed at 2021 0600  Gross per 24 hour   Intake 4306 ml   Output 2160 ml   Net 2146 ml     I/O last 3 completed shifts: In: 4079 [I.V.:3716; NG/GT:590]  Out: 2160 [Urine:2160] No intake/output data recorded. Weight change:     Physical Exam  Constitutional:       Appearance: Normal appearance. Interventions: She is sedated and intubated. HENT:      Head: Normocephalic and atraumatic. Nose: Nose normal.   Eyes:      Pupils: Pupils are equal, round, and reactive to light. Cardiovascular:      Rate and Rhythm: Normal rate and regular rhythm. Pulses: Normal pulses. Heart sounds: Normal heart sounds. Pulmonary:      Effort: Pulmonary effort is normal. She is intubated. Breath sounds: Examination of the left-upper field reveals decreased breath sounds.  Examination of the left-middle field reveals decreased breath sounds. Examination of the left-lower field reveals decreased breath sounds. Decreased breath sounds and wheezing present. No rhonchi or rales. Abdominal:      General: Bowel sounds are normal. There is no distension. Palpations: Abdomen is soft. Musculoskeletal:      Cervical back: Normal range of motion. Skin:     General: Skin is warm and moist.      Capillary Refill: Capillary refill takes less than 2 seconds. Neurological:      General: No focal deficit present.          Medications     Continuous Infusions:   norepinephrine 1 mcg/min (12/17/21 0548)    dextrose 75 mL/hr at 12/16/21 1700    fentaNYL 5 mcg/ml in 0.9%  ml infusion 200 mcg/hr (12/17/21 0538)    midazolam 10 mg/hr (12/17/21 0538)    sodium chloride 100 mL/hr at 11/1955    dextrose       Scheduled Meds:   potassium chloride  20 mEq IntraVENous Once    piperacillin-tazobactam  3,375 mg IntraVENous Q8H    polyethylene glycol  17 g Oral BID    [Held by provider] bumetanide  1 mg IntraVENous Q6H    lansoprazole  30 mg Oral QAM AC    artificial tears   Both Eyes Q12H    miconazole nitrate   Topical BID    midodrine  20 mg Oral Q8H    [Held by provider] enoxaparin  40 mg SubCUTAneous BID    sucralfate  1 g Oral 4 times per day    sennosides-docusate sodium  2 tablet Oral Daily    sodium chloride flush  5-40 mL IntraVENous 2 times per day    heparin flush  3 mL IntraVENous 2 times per day    chlorhexidine  15 mL Mouth/Throat BID    budesonide  1 mg Nebulization BID    Arformoterol Tartrate  15 mcg Nebulization BID    atorvastatin  10 mg Oral Daily    QUEtiapine  200 mg Oral Daily    sertraline  200 mg Oral Daily    traZODone  100 mg Oral Nightly    vitamin D  2,000 Units Oral Daily     PRN Meds: sodium chloride flush, heparin flush, ipratropium-albuterol, sodium chloride, acetaminophen **OR** acetaminophen, glucose, dextrose, glucagon (rDNA), dextrose  Nutrition:   NG/OG tube TF type: Pulmocare/Nephro/Glucerna/Jevity        At rate: ml/h    Labs and Imaging Studies     CBC:   Recent Labs     12/15/21  0430 12/15/21  1520 12/16/21  0408 12/16/21  1609 12/17/21  0444   WBC 10.9  --  17.1*  --  23.1*   HGB 9.5*   < > 10.1* 9.8* 9.6*   HCT 30.3*   < > 32.2* 31.6* 31.7*   .7*  --  100.0*  --  102.6*     --  311  --  297    < > = values in this interval not displayed. BMP:    Recent Labs     12/16/21  0408 12/16/21  1609 12/16/21  2240    138 135   K 2.7*  2.7* 3.3* 3.9   CL 97* 97* 95*   CO2 33* 35* 33*   BUN 9 7 7   CREATININE 0.3* 0.3* 0.3*   GLUCOSE 105* 141* 163*       LIVER PROFILE:   Recent Labs     12/15/21  0430 12/16/21  0408   AST 17 19   ALT 28 31   BILIDIR <0.2 <0.2   BILITOT 0.4 0.4   ALKPHOS 95 103       PT/INR:   Recent Labs     12/15/21  0430 12/16/21  0408 12/17/21  0444   PROTIME 14.0* 15.0* 17.9*   INR 1.3 1.4 1.6       APTT:   Recent Labs     12/15/21  0430 12/16/21  0408 12/17/21  0444   APTT 25.5 27.2 29.4       Fasting Lipid Panel:    Lab Results   Component Value Date    CHOL 322 10/12/2021    TRIG 220 12/14/2021    HDL 70 10/12/2021       Cardiac Enzymes:    Lab Results   Component Value Date    CKTOTAL 135 11/22/2021    CKTOTAL 488 (H) 11/20/2021    CKTOTAL 585 (H) 11/19/2021       Notable Cultures:      Blood cultures   Blood Culture, Routine   Date Value Ref Range Status   11/25/2021 5 Days no growth  Final     Respiratory cultures No results found for: RESPCULTURE No results found for: LABGRAM  Urine   Urine Culture, Routine   Date Value Ref Range Status   11/19/2021 Growth not present  Final     Legionella No results found for: LABLEGI  C Diff PCR No results found for: CDIFPCR  Wound culture/abscess: No results for input(s): WNDABS in the last 72 hours. Tip culture:No results for input(s): CXCATHTIP in the last 72 hours.       Oxygen:     Vent Information  $Ventilation: $Subsequent Day  Skin Assessment: Clean, dry, & intact  Equipment ID: [id number]  Equipment Changed: (S) Humidification  Vent Type: 980  Vent Mode: AC/VC  Vt Ordered: 320 mL  Pressure Ordered: 34  Rate Set: 28 bmp  Peak Flow: 70 L/min  Pressure Support: 0 cmH20  FiO2 : 65 %  SpO2: 93 %  SpO2/FiO2 ratio: 143.08  PaO2/FiO2 ratio: 120  Sensitivity: 3  PEEP/CPAP: 12  I Time/ I Time %: 0 s  Humidification Source: Heated wire  Humidification Temp: 37  Humidification Temp Measured: 37  Circuit Condensation: Drained  Mask Type: Full face mask  Mask Size: Small  Additional Respiratory  Assessments  Pulse: 83  Resp: 26  SpO2: 93 %  Position: Semi-Calabrese's  Humidification Source: Heated wire  Humidification Temp: 37  Circuit Condensation: Drained  Oral Care Completed?: Yes  Oral Care: Mouthwash with chlorhexidine, Mouth swabbed, Mouth moisturizer, Mouth suctioned  Subglottic Suction Done?: Yes  Airway Type: ET  Airway Size: 8  Cuff Pressure (cm H2O):  (MLT)       [REMOVED] Urethral Catheter Temperature probe-Output (mL): 10 mL  [REMOVED] Urethral Catheter-Output (mL): 150 mL  [REMOVED] External Urinary Catheter-Output (mL): 0 mL  [REMOVED] Urethral Catheter-Output (mL): 150 mL  Urethral Catheter Temperature probe-Output (mL): 50 mL  [REMOVED] Urethral Catheter Temperature probe 16 fr-Output (mL): 250 mL    Imaging Studies:  XR CHEST PORTABLE   Final Result   Stable bilateral pulmonary infiltrates and positioning of lines and tubes. XR CHEST PORTABLE   Final Result   Subtle improvement in aeration. No other change. Continued follow-up   recommended. XR CHEST PORTABLE   Final Result   1. There is no interval change in extensive multifocal bilateral pneumonia. XR CHEST PORTABLE   Final Result   Unchanged bilateral airspace disease. Right pleural effusion with suspected   interval progression. XR CHEST PORTABLE   Final Result   1.   No significant changes since the December 11.      2.  Continued air density under the left diaphragma limits expansion of the   left lower lung base. 3.  Further evaluation with CT chest recommended as above discussed. XR CHEST PORTABLE   Final Result   No significant changes since the previous study of a December 10. XR CHEST PORTABLE   Final Result   1. There is no appreciable left pneumothorax. 2. Extensive multifocal bilateral airspace disease which is unchanged. XR CHEST PORTABLE   Final Result   1. Slightly improved aeration at the right apex and left lower lung   2. Stable position and alignment of the tubes and catheters   3. Extensive bilateral airspace disease concerning for consolidative   pneumonia slightly improved         XR CHEST PORTABLE   Final Result   Stable support lines and bilateral airspace disease. XR CHEST PORTABLE   Final Result   Unchanged bilateral diffuse infiltrates. XR CHEST PORTABLE   Final Result   1. Decreased left pneumothorax with suspected trace residual.  2 left chest   tubes are similar to the previous exam.   2. Bilateral pulmonary opacities appear mildly increased. 3. The support devices are unchanged and appear to be in acceptable position. XR CHEST PORTABLE   Final Result   Unchanged airspace disease and left pneumothorax. XR CHEST PORTABLE   Final Result   1. No interval change in extensive multifocal bilateral pneumonia   2. Less than 10% left pneumothorax. There is stable position of the 2 left   chest tubes. XR CHEST PORTABLE   Final Result   1. Stable diffuse interstitial pulmonary edema or pneumonia. 2.  Left chest tube demonstrated. Minimal residual left pneumothorax. XR CHEST PORTABLE   Final Result   1. Stable diffuse bilateral airspace disease. 2. Small left pneumothorax appears new or increased compared to prior   examination. Left chest tube appears stable in position. 3. Possible right pleural effusion. XR CHEST PORTABLE   Final Result   1.  There is no interval change in extensive multifocal interval change in the multifocal bilateral pneumonia. XR CHEST PORTABLE   Final Result   Bilateral airspace disease with interval progression on the right         XR CHEST PORTABLE   Final Result   1. Endotracheal tube is 2.7 cm above the khai. 2. Stable interstitial pulmonary edema or pneumonia throughout both lungs. XR CHEST PORTABLE   Final Result   1. Worsening of the multifocal bilateral pneumonia when compared with the   prior study of 1 day earlier. XR CHEST PORTABLE   Final Result   1. Multifocal bilateral pneumonia more prominent within the right upper lobe   2. Minimal partial interval clearing of the pneumonia within the left lung   3. Worsening of the pneumonia within the right upper lobe. US DUP LOWER EXTREMITIES BILATERAL VENOUS   Final Result   Within the visualized vessels there is no evidence for deep venous   thrombosis               XR CHEST PORTABLE   Final Result   1. Lines okay. 2. Slightly worsened diffuse edema versus pneumonia. XR CHEST PORTABLE   Final Result   1. Lines okay   2. Improved aeration, mild improvement and diffuse pneumonia/edema. XR CHEST ABDOMEN NG PLACEMENT   Final Result   NG tube position satisfactory. XR CHEST PORTABLE   Final Result   Stable bilateral pneumonia or interstitial pulmonary edema. XR CHEST PORTABLE   Final Result   Increasing bilateral infiltrates. XR CHEST PORTABLE   Final Result   1. There is no interval change in the multifocal bilateral pneumonia. CT HEAD WO CONTRAST   Final Result   No acute intracranial abnormality. Cortical atrophy and periventricular white matter ischemic changes. CT CERVICAL SPINE WO CONTRAST   Final Result   No acute abnormality of the cervical spine. Moderate degenerative changes with disc space narrowing and marginal bony   spur formation C5 through C7. Ground-glass opacities in the visualized lung apices.   Please refer to chest   CT for additional information. CTA CHEST W CONTRAST   Final Result   No evidence of pulmonary embolism. Extensive multifocal ground-glass opacities predominantly in the peripheral   lung zones bilaterally, highly concerning for atypical or COVID related   pneumonia. XR CHEST PORTABLE   Final Result   Bilateral patchy airspace opacities worrisome for pneumonia.          XR CHEST PORTABLE    (Results Pending)   XR CHEST PORTABLE    (Results Pending)   XR CHEST PORTABLE    (Results Pending)   XR CHEST PORTABLE    (Results Pending)   XR CHEST PORTABLE    (Results Pending)   XR CHEST PORTABLE    (Results Pending)        Resident's Assessment and Plan     Assessment and Plan:    Cardiovascular   History of hyperlipidemia  -Continue home atorvastatin 10 mg    Pulmonary  Acute hypoxic respiratory failure 2/2 Covid pneumonia  -Patient is intubated  -PF ratio of 0.85  -Completed Courses of Decadron and Toci  -Respiratory cultures have been negative, fungal cultures and BLE are also negative  -Continue ventilation with daily ABGs  -Completed remdesivir   -Continue breathing treatments  -Continue midodrine to 20 mg   -Cortisol 24.27  -Briefly started on Pressors but are off them now  -Continue supine position   -Tried to wean FiO2 however did not tolerate back up to 80%  -Trach and Peg   -Wean Sedation and assess mentation after trach and PEG    Possible Bacteremia?/Bacterial Pneumonia   -Starting to have elevation in WBC today  -WBC up 23.1  -Started on broad spectrum abx (Zosyn)  -Grew Gram negative and yeast on respiratory culture   -Continue treatment  -ID is following  -Continue to Monitor and treat as appropriate     Left lung pneumothorax (resolved)     Gastrointestinal  Transaminitis 2/2 Covid infection (resolving)   -Continue to monitor    Infectious Disease   COVID-19 pneumonia  -See above  -Continue to monitor CRP D-dimer and pro-Carl  -Continue vitamin C, vitamin D and zinc    Bacteremia   -See above    Renal   Stage III intrinsic CHARAN (resolved)   -Hold Bumex today     Hypokalemia 2/2 diuresis  -K of 3.7  -Continue to monitor and rep[lace as appropriate     Heme/Onc  Acute Anemia  -FOBT pending  -Lovenox held  -Monitor HgB    Reactive leukocytosis 2/2 steroids and Covid infection (stable)  -WBC stable today    Thrombocytopenia 2/2 Covid (Resolved)     Psychiatric   History of schizoaffective disorder  -Continue home hydroxyzine, trazodone, quetiapine and sertraline    # Peptic ulcer prophylaxis:Protonix   # DVT Prophylaxis: Lovenox   # Disposition: Cont current care     Gibran Britton MD, PGY-1    Attending Physician: Dr. Beverley Santana    I personally saw, examined and provided care for the patient. Radiographs, labs and medication list were reviewed by me independently. I spoke with bedside nursing, therapists and consultants. Critical care services and times documented are independent of procedures and multidisciplinary rounds with Residents. Additionally comprehensive, multidisciplinary rounds were conducted with the MICU team. The case was discussed in detail and plans for care were established. Review of Residents documentation was conducted and revisions were made as appropriate. I agree with the above documented exam, problem list and plan of care.   'ARDS COVID   Not able to wean vent  septic as well  Urine infected ,on zosyn   Worsen hypoxia       12/17/2021  12:34 PM

## 2021-12-17 NOTE — PROGRESS NOTES
[address] [facility] Attending    S: 73 y.o. female with a history of gerd, oa, schizoaffective disorder, and gastric fundiplication who was found down for an undetermined amount of time. Reports shortness of breath and cough for 2.5 weeks. She was found to be 60% on RA. In the ER, COVID testing was positive with significantly elevated CPKs. Patient is unvaccinated. Had desaturation to 88% wit PaO2 of 55 with RRT and subsequent transfer to the MICU. Desat to 40's when Bipap removed. Now intubated. Sedated, paralyzed,  Noted to have pneumothorax and chest tube placed. Second chest tube placed when pneumothorax not improved. Chest tubes subsequently removed. Nonresponsive for me. Worsening lab values. Hypotension today. O: VS- Blood pressure (!) 119/49, pulse 95, temperature 100.6 °F (38.1 °C), resp. rate 28, height 5' 1\" (1.549 m), weight 177 lb 8 oz (80.5 kg), SpO2 90 %. Exam is as noted by resident   Currently nonresponsive. Supine  Lungs: coarse, vent. Decreased BS  CV:  Rate controlled, regular   Ext-no C/C/E    Impressions: Active Problems:    Acute respiratory failure with hypoxia (HCC)    Covid pneumonia    Rhabdomyolysis, CK improved    Acute cystitis with hematuria, treated    CHARAN    Thrombocytopenia     Plan:      Acute hypoxic resp failure 2/2 covid -  Completed Tocimizilab. Vitamin C.  Vitamin D.  Zinc.  Appreciate Pulm management. Sepsis 2/2 PNA - restarted on zosyn due to hypotension. Cultures pending. resp and urine both growing gram neg rods. worsening CRP and WBC. U/s of lower extremities negative DVT 11/20. Tube feeding. Fluid overloaded - bumex held due to hypotension. On levophed for bp support   Schizoaffective - seroquel  Ongoing communication with family re code status  ICU management  Full code at this time. Attending Physician Statement  I have reviewed the chart and seen the patient with the resident(s).   I personally reviewed images, EKG's and similar tests, if present. I personally reviewed and performed key elements of the history and exam.  I have reviewed and confirmed student and/or resident history and exam with changes as indicated above. I agree with the assessment, plan and orders as documented by the resident. Please refer to the  resident and/or student note for additional information. Benjamin Katz.  Clint Haley MD

## 2021-12-18 NOTE — PLAN OF CARE
Called patient's brother Cherelle Ramirez SX#[phone] and was updated about the current condition of the patient. His sister was also in the conference call. They were informed about the hypotension resulting in need of vasopressors and fall in saturation overnight requiring proning. All questions and concerns were addressed.

## 2021-12-18 NOTE — PLAN OF CARE
Problem: Falls - Risk of:  Goal: Will remain free from falls  Description: Will remain free from falls  Outcome: Met This Shift  Goal: Absence of physical injury  Description: Absence of physical injury  Outcome: Met This Shift     Problem: Airway Clearance - Ineffective  Goal: Achieve or maintain patent airway  Outcome: Met This Shift     Problem: Skin Integrity:  Goal: Absence of new skin breakdown  Description: Absence of new skin breakdown  Outcome: Met This Shift

## 2021-12-18 NOTE — PROGRESS NOTES
[facility]   Department of Internal Medicine   Internal Medicine Residency  MICU Progress Note    Patient:  Naomie Moss 73 y.o. female   MRN: [id number]       Date of Service: 2021    Allergy: Ciprofloxacin  Cc follow up ARDS  Subjective     Patient was seen and examined this morning at bedside in no acute distress. Patient desaturated last night, CXR negative for pneumothorax, more of congestion and pulmonary edema. Positive fluid balance. Was given 2 mg Bumex early this AM.  Objective     TEMPERATURE:  Current - Temp: 100 °F (37.8 °C); Max - Temp  Av.4 °F (38 °C)  Min: 100 °F (37.8 °C)  Max: 101.1 °F (38.4 °C)  RESPIRATIONS RANGE: Resp  Av.4  Min: 22  Max: 28  PULSE RANGE: Pulse  Av.3  Min: 84  Max: 102  BLOOD PRESSURE RANGE:  Systolic (36HYB), KEU:925 , Min:84 , STEIN:809   ; Diastolic (72CVG), BKO:58, Min:47, Max:73    PULSE OXIMETRY RANGE: SpO2  Av.7 %  Min: 71 %  Max: 97 %    I & O - 24hr:    Intake/Output Summary (Last 24 hours) at 20213  Last data filed at 2021 2200  Gross per 24 hour   Intake 2426 ml   Output 2710 ml   Net -284 ml     I/O last 3 completed shifts: In: 65 [I.V.:3365; NG/GT:657]  Out: 1680 [Urine:1680] I/O this shift:  In: -   Out: 1135 [Urine:1135]   Weight change:     Physical Exam  Constitutional:       Appearance: Normal appearance. Interventions: She is sedated and intubated. HENT:      Head: Normocephalic and atraumatic. Nose: Nose normal.   Eyes:      Pupils: Pupils are equal, round, and reactive to light. Cardiovascular:      Rate and Rhythm: Normal rate and regular rhythm. Pulses: Normal pulses. Heart sounds: Normal heart sounds. Pulmonary:      Effort: Pulmonary effort is normal. She is intubated. Breath sounds: Examination of the left-upper field reveals decreased breath sounds. Examination of the left-middle field reveals decreased breath sounds.  Examination of the left-lower field reveals decreased breath sounds. Decreased breath sounds and wheezing present. No rhonchi or rales. Abdominal:      General: Bowel sounds are normal. There is no distension. Palpations: Abdomen is soft. Musculoskeletal:      Cervical back: Normal range of motion. Skin:     General: Skin is warm and moist.      Capillary Refill: Capillary refill takes less than 2 seconds. Neurological:      General: No focal deficit present.          Medications     Continuous Infusions:   epoprostenol (VELETRI) nebulization solution 50 ng/kg/min (12/18/21 1641)    bumetanide 0.1 mg/mL infusion 0.5 mg/hr (12/18/21 1245)    dextrose 75 mL/hr at 12/18/21 2046    norepinephrine 6 mcg/min (12/18/21 0540)    fentaNYL 5 mcg/ml in 0.9%  ml infusion 200 mcg/hr (12/18/21 2009)    midazolam 10 mg/hr (12/18/21 1246)    sodium chloride 100 mL/hr at 11/1956    dextrose       Scheduled Meds:   potassium chloride  40 mEq IntraVENous Q4H    fluconazole  400 mg IntraVENous Q24H    enoxaparin  40 mg SubCUTAneous Daily    piperacillin-tazobactam  3,375 mg IntraVENous Q8H    polyethylene glycol  17 g Oral BID    [Held by provider] bumetanide  1 mg IntraVENous Q6H    lansoprazole  30 mg Oral QAM AC    artificial tears   Both Eyes Q12H    miconazole nitrate   Topical BID    midodrine  20 mg Oral Q8H    sucralfate  1 g Oral 4 times per day    sennosides-docusate sodium  2 tablet Oral Daily    sodium chloride flush  5-40 mL IntraVENous 2 times per day    heparin flush  3 mL IntraVENous 2 times per day    chlorhexidine  15 mL Mouth/Throat BID    budesonide  1 mg Nebulization BID    Arformoterol Tartrate  15 mcg Nebulization BID    atorvastatin  10 mg Oral Daily    QUEtiapine  200 mg Oral Daily    sertraline  200 mg Oral Daily    traZODone  100 mg Oral Nightly    vitamin D  2,000 Units Oral Daily     PRN Meds: sodium chloride flush, heparin flush, ipratropium-albuterol, sodium chloride, acetaminophen **OR** acetaminophen, glucose, dextrose, glucagon (rDNA), dextrose  Nutrition:   NG/OG tube TF type: Pulmocare/Nephro/Glucerna/Jevity        At rate: ml/h    Labs and Imaging Studies     CBC:   Recent Labs     12/16/21  0408 12/16/21  1609 12/17/21  0444 12/17/21  0444 12/17/21  1642 12/18/21  0536 12/18/21  1543   WBC 17.1*  --  23.1*  --   --  32.1*  --    HGB 10.1*   < > 9.6*   < > 9.2* 9.7* 9.3*   HCT 32.2*   < > 31.7*   < > 30.0* 32.2* 30.4*   .0*  --  102.6*  --   --  105.6*  --      --  297  --   --  291  --     < > = values in this interval not displayed.        BMP:    Recent Labs     12/18/21  0536 12/18/21  1330 12/18/21 2005   * 133 134   K 4.3  4.3 3.8 3.2*   CL 92* 93* 94*   CO2 30* 31* 30*   BUN 13 12 12   CREATININE 0.4* 0.4* 0.4*   GLUCOSE 167* 116* 124*       LIVER PROFILE:   Recent Labs     12/16/21  0408 12/17/21  0444 12/18/21  0536   AST 19 44* 66*   ALT 31 44* 83*   BILIDIR <0.2 <0.2 0.4*   BILITOT 0.4 0.4 0.6   ALKPHOS 103 137* 176*       PT/INR:   Recent Labs     12/16/21  0408 12/17/21  0444 12/18/21  0536   PROTIME 15.0* 17.9* 18.8*   INR 1.4 1.6 1.7       APTT:   Recent Labs     12/16/21  0408 12/17/21  0444 12/18/21  0536   APTT 27.2 29.4 31.9       Fasting Lipid Panel:    Lab Results   Component Value Date    CHOL 322 10/12/2021    TRIG 220 12/14/2021    HDL 70 10/12/2021       Cardiac Enzymes:    Lab Results   Component Value Date    CKTOTAL 135 11/22/2021    CKTOTAL 488 (H) 11/20/2021    CKTOTAL 585 (H) 11/19/2021       Notable Cultures:      Blood cultures   Blood Culture, Routine   Date Value Ref Range Status   11/25/2021 5 Days no growth  Final     Respiratory cultures No results found for: RESPCULTURE No results found for: LABGRAM  Urine   Urine Culture, Routine   Date Value Ref Range Status   12/16/2021   Preliminary    >100,000 CFU/ml  Identification and sensitivity to follow     12/16/2021   Preliminary    >100,000 CFU/ml  Identification and sensitivity to follow       Legionella No results found for: LABLEGI  C Diff PCR No results found for: CDIFPCR  Wound culture/abscess: No results for input(s): WNDABS in the last 72 hours. Tip culture:No results for input(s): CXCATHTIP in the last 72 hours. Oxygen:     Vent Information  $Ventilation: $Subsequent Day  Skin Assessment: Clean, dry, & intact  Equipment ID: [id number]  Equipment Changed: Expiratory Filter  Vent Type: 980  Vent Mode: AC/VC  Vt Ordered: 320 mL  Pressure Ordered: 34  Rate Set: 28 bmp  Peak Flow: 70 L/min  Pressure Support: 0 cmH20  FiO2 : 90 %  SpO2: 97 %  SpO2/FiO2 ratio: 107.78  PaO2/FiO2 ratio: 120  Sensitivity: 3  PEEP/CPAP: 12  I Time/ I Time %: 0 s  Humidification Source: Heated wire  Humidification Temp: 37  Humidification Temp Measured: 37  Circuit Condensation: Drained  Nitric Oxide/Epoprostenol In Use?: Yes  Mask Type: Full face mask  Mask Size: Small  Additional Respiratory  Assessments  Pulse: 91  Resp: 28  SpO2: 97 %  Position: Prone  Humidification Source: Heated wire  Humidification Temp: 37  Circuit Condensation: Drained  Oral Care Completed?: Yes  Oral Care: Mouthwash,Mouthwash with chlorhexidine,Mouth swabbed,Mouth moisturizer,Mouth suctioned  Subglottic Suction Done?: Yes  Airway Type: ET  Airway Size: 8  Cuff Pressure (cm H2O):  (MLT)  Skin barrier applied: Yes       [REMOVED] Urethral Catheter Temperature probe-Output (mL): 10 mL  [REMOVED] Urethral Catheter-Output (mL): 150 mL  [REMOVED] External Urinary Catheter-Output (mL): 0 mL  [REMOVED] Urethral Catheter-Output (mL): 150 mL  Urethral Catheter Temperature probe-Output (mL): 110 mL  [REMOVED] Urethral Catheter Temperature probe 16 fr-Output (mL): 250 mL    Imaging Studies:  XR CHEST PORTABLE   Final Result   Left IJ central venous catheter terminates in the subclavian vein. Consider   replacement. Remaining lines and tubes are unchanged. No change in bilateral airspace opacities.          XR CHEST PORTABLE   Final Result   No significant change. Continued follow-up recommended. XR CHEST PORTABLE   Final Result   1. Slight worsening of the multifocal bilateral pneumonia when compared with   the patient's prior study of 1 day earlier. XR CHEST PORTABLE   Final Result   Stable bilateral pulmonary infiltrates and positioning of lines and tubes. XR CHEST PORTABLE   Final Result   Subtle improvement in aeration. No other change. Continued follow-up   recommended. XR CHEST PORTABLE   Final Result   1. There is no interval change in extensive multifocal bilateral pneumonia. XR CHEST PORTABLE   Final Result   Unchanged bilateral airspace disease. Right pleural effusion with suspected   interval progression. XR CHEST PORTABLE   Final Result   1. No significant changes since the December 11.      2.  Continued air density under the left diaphragma limits expansion of the   left lower lung base. 3.  Further evaluation with CT chest recommended as above discussed. XR CHEST PORTABLE   Final Result   No significant changes since the previous study of a December 10. XR CHEST PORTABLE   Final Result   1. There is no appreciable left pneumothorax. 2. Extensive multifocal bilateral airspace disease which is unchanged. XR CHEST PORTABLE   Final Result   1. Slightly improved aeration at the right apex and left lower lung   2. Stable position and alignment of the tubes and catheters   3. Extensive bilateral airspace disease concerning for consolidative   pneumonia slightly improved         XR CHEST PORTABLE   Final Result   Stable support lines and bilateral airspace disease. XR CHEST PORTABLE   Final Result   Unchanged bilateral diffuse infiltrates. XR CHEST PORTABLE   Final Result   1.  Decreased left pneumothorax with suspected trace residual.  2 left chest   tubes are similar to the previous exam.   2. Bilateral pulmonary opacities appear mildly increased. 3. The support devices are unchanged and appear to be in acceptable position. XR CHEST PORTABLE   Final Result   Unchanged airspace disease and left pneumothorax. XR CHEST PORTABLE   Final Result   1. No interval change in extensive multifocal bilateral pneumonia   2. Less than 10% left pneumothorax. There is stable position of the 2 left   chest tubes. XR CHEST PORTABLE   Final Result   1. Stable diffuse interstitial pulmonary edema or pneumonia. 2.  Left chest tube demonstrated. Minimal residual left pneumothorax. XR CHEST PORTABLE   Final Result   1. Stable diffuse bilateral airspace disease. 2. Small left pneumothorax appears new or increased compared to prior   examination. Left chest tube appears stable in position. 3. Possible right pleural effusion. XR CHEST PORTABLE   Final Result   1. There is no interval change in extensive multifocal bilateral pneumonia. XR CHEST PORTABLE   Final Result   No change in extensive bilateral pulmonary airspace opacities. No   pneumothorax is radiographically visible on the current exam.         XR CHEST PORTABLE   Final Result   Significantly decreased size of left pneumothorax. There is likely trace   left pneumothorax remaining. Stable extensive bilateral pulmonary airspace opacities. XR CHEST PORTABLE   Final Result   Addendum 1 of 1   ADDENDUM:   Verbal report was given to Katherine North RN at 8:15 a.m. central standard   time on 11/30/2021. Final   New moderate sized left-sided pneumothorax. Stable extensive bilateral pulmonary airspace opacities            XR CHEST PORTABLE   Final Result   1. Lines okay. 2. Persistent edema/ARDS/pneumonia. 3. No sign of pneumothorax. XR CHEST PORTABLE   Final Result   Interval placement of left-sided chest tube with questionable residual   pneumothorax versus artifact. Extensive bilateral infiltrates.   Continued follow-up recommended. XR CHEST PORTABLE   Final Result   Interval development of large left-sided tension pneumothorax. Extensive bilateral parenchymal infiltrates. Findings were discussed with Dr. Traci Baldwin at approximately 0010 hours [place]   time on 11/28/2021. XR CHEST PORTABLE   Final Result   Severe bilateral pulmonary opacification. XR CHEST PORTABLE   Final Result   1. Endotracheal tube is 3 cm above the khai. 2. Stable interstitial pulmonary edema or pneumonia throughout both lungs. XR CHEST PORTABLE   Final Result   1. Somewhat limited exam, grossly unchanged. Please see above comments. .      RECOMMENDATION:   1. Recommend follow-up thoracic imaging, as directed clinically. .         XR ABDOMEN (KUB) (SINGLE AP VIEW)   Final Result   1. Satisfactory position of the NG tube within the stomach         XR CHEST PORTABLE   Final Result   1. There is no interval change in the multifocal bilateral pneumonia. XR CHEST PORTABLE   Final Result   Bilateral airspace disease with interval progression on the right         XR CHEST PORTABLE   Final Result   1. Endotracheal tube is 2.7 cm above the khai. 2. Stable interstitial pulmonary edema or pneumonia throughout both lungs. XR CHEST PORTABLE   Final Result   1. Worsening of the multifocal bilateral pneumonia when compared with the   prior study of 1 day earlier. XR CHEST PORTABLE   Final Result   1. Multifocal bilateral pneumonia more prominent within the right upper lobe   2. Minimal partial interval clearing of the pneumonia within the left lung   3. Worsening of the pneumonia within the right upper lobe. US DUP LOWER EXTREMITIES BILATERAL VENOUS   Final Result   Within the visualized vessels there is no evidence for deep venous   thrombosis               XR CHEST PORTABLE   Final Result   1. Lines okay. 2. Slightly worsened diffuse edema versus pneumonia.          XR CHEST PORTABLE   Final Result   1. Lines okay   2. Improved aeration, mild improvement and diffuse pneumonia/edema. XR CHEST ABDOMEN NG PLACEMENT   Final Result   NG tube position satisfactory. XR CHEST PORTABLE   Final Result   Stable bilateral pneumonia or interstitial pulmonary edema. XR CHEST PORTABLE   Final Result   Increasing bilateral infiltrates. XR CHEST PORTABLE   Final Result   1. There is no interval change in the multifocal bilateral pneumonia. CT HEAD WO CONTRAST   Final Result   No acute intracranial abnormality. Cortical atrophy and periventricular white matter ischemic changes. CT CERVICAL SPINE WO CONTRAST   Final Result   No acute abnormality of the cervical spine. Moderate degenerative changes with disc space narrowing and marginal bony   spur formation C5 through C7. Ground-glass opacities in the visualized lung apices. Please refer to chest   CT for additional information. CTA CHEST W CONTRAST   Final Result   No evidence of pulmonary embolism. Extensive multifocal ground-glass opacities predominantly in the peripheral   lung zones bilaterally, highly concerning for atypical or COVID related   pneumonia. XR CHEST PORTABLE   Final Result   Bilateral patchy airspace opacities worrisome for pneumonia.          XR CHEST PORTABLE    (Results Pending)   XR CHEST PORTABLE    (Results Pending)   XR CHEST PORTABLE    (Results Pending)   XR CHEST PORTABLE    (Results Pending)   XR CHEST PORTABLE    (Results Pending)   XR CHEST PORTABLE    (Results Pending)        Resident's Assessment and Plan     Assessment and Plan:    Cardiovascular   History of hyperlipidemia  -on atorvastatin 10 mg    Pulmonary  Acute hypoxic respiratory failure 2/2 severe ARDS 2/2 critical Covid pneumonia with superimposed Klebsiella oxytoca pneumonia, possible fungal pneumonia  -D29 intubated, sedated, ventilated  -PF : 41 from 85  -ABG-respiratory acidosis, PCO2 68.3  -s/p decadron, toci, remdesivir  -still on Levophed  -D2 Zosyn, D2 fluconazole  -ID following  Plan  -keep vent settings  -continue antibiotics    Left lung pneumothorax (resolved)     Gastrointestinal  Transaminitis 2/2 Covid infection, resolving   -will continue to monitor    Infectious Disease   Septic shock 2/2 critical COVID-19 pneumonia with superimposed Klebsiella oxytoca pneumonia, possible fungal pneumonia  -See above  -D2 Zosyn, D2 fluconazole  -ID following    Renal   Stage III intrinsic CHARAN (resolved)   -continues to desaturate  -CXR showing pulmonary edema  -positive fluid balance of 6L  Plan  -bumetanide drip started today at 0.5 mg/hr   -strict I & O  -BMP, Mg q12hrs  -replace electrolytes as needed    Hypokalemia 2/2 diuretics  -K of 4.3  -will continue to monitor    Heme/Onc  Acute anemia likely d/t sepsis, r/o GI bleed  -pending FOBT  -Hb 9.7    Leukocytosis likely 2/2 septic shock, recurrent  Vs reactive 2/2 steroids  -WBC at 32  -afebrile  -on Zosyn and fluconazole    Thrombocytopenia 2/2 sepsis, resolved    Psychiatric   History of schizoaffective disorder  -on home hydroxyzine, trazodone, quetiapine and sertraline    # Peptic ulcer prophylaxis:pantoprazole   # DVT Prophylaxis: enoxaparin   # Disposition: Cont current care     Armando Becerra MD, PGY-1    Attending Physician: Dr. Leslie Kimbrough  I personally saw, examined and provided care for the patient. Radiographs, labs and medication list were reviewed by me independently. I spoke with bedside nursing, therapists and consultants. Critical care services and times documented are independent of procedures and multidisciplinary rounds with Residents. Additionally comprehensive, multidisciplinary rounds were conducted with the MICU team. The case was discussed in detail and plans for care were established. Review of Residents documentation was conducted and revisions were made as appropriate.  I agree with the above documented exam, problem list and plan of care. Acute respiratory  Failure   Pulmonary edema  Day 30 ICU and vent 29  Will start Bumex drip . 17 L   Prone and paralyzed  On pressors   Zosyn ,for klebsiella  E coli urine   Also on fluconazole ,Blood culture . 320/28/12/100      Care reviewed with nursing staff, medical and surgical specialty care, primary care and the patient's family as available. Chart review/lab review/X-ray viewing/documentation and had long Conversation with patient/family re: prognosis, care options and any end of life issues:      Critical care time spent reviewing labs/films, examining patient, collaborating with other physicians more than 35  Minutes  excluding procedures . Sameera Aldridge M.D.   12/18/2021  10:58 PM

## 2021-12-18 NOTE — PROGRESS NOTES
[facility] Attending    S: 73 y.o. female with a history of gerd, oa, schizoaffective disorder, and gastric fundiplication who was found down for an undetermined amount of time. Reports shortness of breath and cough for 2.5 weeks. She was found to be 60% on RA. In the ER, COVID testing was positive with significantly elevated CPKs. Patient is unvaccinated. Had desaturation to 88% wit PaO2 of 55 with RRT and subsequent transfer to the MICU. Desat to 40's when Bipap removed. Now intubated. Sedated, paralyzed,  Noted to have pneumothorax and chest tube placed. Second chest tube placed when pneumothorax not improved. Chest tubes subsequently removed. Proned today. On zosyn and eraxis with fevers today. O: VS- Blood pressure 117/69, pulse 94, temperature 100.2 °F (37.9 °C), temperature source Bladder, resp. rate 28, height 5' 1\" (1.549 m), weight 177 lb 8 oz (80.5 kg), SpO2 96 %. Exam is as noted by resident   Currently nonresponsive. Proned  Lungs: coarse, vent. Decreased BS  CV:  Rate controlled, regular   Ext-no C/C/E    Impressions: Active Problems:    Acute respiratory failure with hypoxia (HCC)    Covid pneumonia    Rhabdomyolysis, CK improved    Acute cystitis with hematuria, treated    CHARAN    Thrombocytopenia     Plan:      Acute hypoxic resp failure 2/2 covid -  Completed Tocimizilab. Vitamin C.  Vitamin D.  Zinc.  Appreciate Pulm management. Sepsis 2/2 PNA - restarted on zosyn , diflucan, started on  Cultures pending. resp culture growing klebsiella and yeast and urine culture growing ecoli.  worsening CRP and WBC. U/s of lower extremities negative DVT 11/20. Tube feeding. Fluid overloaded - bumex held due to hypotension. On levophed for bp support   Schizoaffective - seroquel  Ongoing communication with family re code status  ICU management  Full code at this time.          Attending Physician Statement  I have reviewed the chart and seen the patient with the resident(s). I personally reviewed images, EKG's and similar tests, if present. I personally reviewed and performed key elements of the history and exam.  I have reviewed and confirmed student and/or resident history and exam with changes as indicated above. I agree with the assessment, plan and orders as documented by the resident. Please refer to the  resident and/or student note for additional information. Reina Wheat.  Jero Rodriguez MD

## 2021-12-18 NOTE — PROGRESS NOTES
[facility] Inpatient   Resident Progress Note    S:  Hospital day: 28   Brief Synopsis: Liam Fleischer is a 73 y.o. female with pmh of GERD, osteoarthritis and schizoaffective disorder who presented to ER s/p fall at home. Patient found down at home, with initial O2 saturation of 60%. Brought to the ER; found to have COVID-19 pneumonia. FULL CODE. Transferred to ICU on 11/18 for rapid breathing and hypoxia and pO2 of 55. Intubated 11/20 secondary to O2 sats consistently <80% with rapid breathing. 11/29 , patient developed left sided tension pneumothorax and underwent chest tube placement. Second chest tube placed on left chest. Vacuum changed to water seals. General surgery removed one smaller bore chest tube on 12/11/21 and the 2nd chest tube on 12/13/2021. Overnight, fever to 101.1, hypoxia and hypotension requiring prone position and levofed. Patient seen today. Now proned, intubated, sedated. FiO2 100%, PEEP 12, P/f ratio: 0.85. White blood cells continue to increase. Still on Zosyn and eraxis.     Cont meds:    epoprostenol (VELETRI) nebulization solution 50 ng/kg/min (12/18/21 0614)    norepinephrine 6 mcg/min (12/18/21 0540)    dextrose 75 mL/hr at 12/18/21 0429    fentaNYL 5 mcg/ml in 0.9%  ml infusion 200 mcg/hr (12/18/21 0511)    midazolam 10 mg/hr (12/18/21 0300)    sodium chloride 100 mL/hr at 11/1956    dextrose       Scheduled meds:    fluconazole  400 mg IntraVENous Q24H    enoxaparin  40 mg SubCUTAneous Daily    piperacillin-tazobactam  3,375 mg IntraVENous Q8H    polyethylene glycol  17 g Oral BID    [Held by provider] bumetanide  1 mg IntraVENous Q6H    lansoprazole  30 mg Oral QAM AC    artificial tears   Both Eyes Q12H    miconazole nitrate   Topical BID    midodrine  20 mg Oral Q8H    sucralfate  1 g Oral 4 times per day    sennosides-docusate sodium  2 tablet Oral Daily    sodium chloride flush  5-40 mL IntraVENous 2 times per day    heparin flush  3 mL IntraVENous 2 times per day    chlorhexidine  15 mL Mouth/Throat BID    budesonide  1 mg Nebulization BID    Arformoterol Tartrate  15 mcg Nebulization BID    atorvastatin  10 mg Oral Daily    QUEtiapine  200 mg Oral Daily    sertraline  200 mg Oral Daily    traZODone  100 mg Oral Nightly    vitamin D  2,000 Units Oral Daily     PRN meds: sodium chloride flush, heparin flush, ipratropium-albuterol, sodium chloride, acetaminophen **OR** acetaminophen, glucose, dextrose, glucagon (rDNA), dextrose     I reviewed the patient's past medical and surgical history, Medications and Allergies. O:  BP (!) 90/54   Pulse 90   Temp 100.9 °F (38.3 °C) (Bladder)   Resp 22   Ht 5' 1\" (1.549 m)   Wt 177 lb 8 oz (80.5 kg)   SpO2 (!) 83%   BMI 33.54 kg/m²   24 hour I&O: I/O last 3 completed shifts: In: 4410 [I.V.:3657; NG/GT:853]  Out: 680 [Urine:680]  No intake/output data recorded. Physical Exam  Constitutional:       Comments: Intubated, sedated, paralyzed, prone   HENT:      Head: Normocephalic and atraumatic. Mouth/Throat:      Comments: intubated  Cardiovascular:      Rate and Rhythm: Normal rate and regular rhythm. Pulses: Normal pulses. Heart sounds: Normal heart sounds. Pulmonary:      Breath sounds: No wheezing, rhonchi or rales. Abdominal:      General: There is no distension. Palpations: There is no mass. Hernia: No hernia is present. Musculoskeletal:         General: Normal range of motion. Right lower leg: Edema present. Left lower leg: Edema present. Skin:     General: Skin is warm and dry. Findings: Bruising: mild, on back. Neurological:      Comments: Sedated       Labs:  Na/K/Cl/CO2:  131/4.3/92/30 (12/18 6518)  BUN/Cr/glu/ALT/AST/amyl/lip:  13/0.4/--/83/66/--/-- (12/18 6480)  WBC/Hgb/Hct/Plts:  32.1/9.7/32.2/291 (12/18 8437)  estimated creatinine clearance is 135 mL/min (A) (based on SCr of 0.4 mg/dL (L)).   Other pertinent labs as noted below    Radiology:  XR CHEST PORTABLE   Final Result   No significant change. Continued follow-up recommended. XR CHEST PORTABLE   Final Result   1. Slight worsening of the multifocal bilateral pneumonia when compared with   the patient's prior study of 1 day earlier. XR CHEST PORTABLE   Final Result   Stable bilateral pulmonary infiltrates and positioning of lines and tubes. XR CHEST PORTABLE   Final Result   Subtle improvement in aeration. No other change. Continued follow-up   recommended. XR CHEST PORTABLE   Final Result   1. There is no interval change in extensive multifocal bilateral pneumonia. XR CHEST PORTABLE   Final Result   Unchanged bilateral airspace disease. Right pleural effusion with suspected   interval progression. XR CHEST PORTABLE   Final Result   1. No significant changes since the December 11.      2.  Continued air density under the left diaphragma limits expansion of the   left lower lung base. 3.  Further evaluation with CT chest recommended as above discussed. XR CHEST PORTABLE   Final Result   No significant changes since the previous study of a December 10. XR CHEST PORTABLE   Final Result   1. There is no appreciable left pneumothorax. 2. Extensive multifocal bilateral airspace disease which is unchanged. XR CHEST PORTABLE   Final Result   1. Slightly improved aeration at the right apex and left lower lung   2. Stable position and alignment of the tubes and catheters   3. Extensive bilateral airspace disease concerning for consolidative   pneumonia slightly improved         XR CHEST PORTABLE   Final Result   Stable support lines and bilateral airspace disease. XR CHEST PORTABLE   Final Result   Unchanged bilateral diffuse infiltrates. XR CHEST PORTABLE   Final Result   1.  Decreased left pneumothorax with suspected trace residual.  2 left chest   tubes are similar to the previous exam.   2. Bilateral pulmonary opacities appear mildly increased. 3. The support devices are unchanged and appear to be in acceptable position. XR CHEST PORTABLE   Final Result   Unchanged airspace disease and left pneumothorax. XR CHEST PORTABLE   Final Result   1. No interval change in extensive multifocal bilateral pneumonia   2. Less than 10% left pneumothorax. There is stable position of the 2 left   chest tubes. XR CHEST PORTABLE   Final Result   1. Stable diffuse interstitial pulmonary edema or pneumonia. 2.  Left chest tube demonstrated. Minimal residual left pneumothorax. XR CHEST PORTABLE   Final Result   1. Stable diffuse bilateral airspace disease. 2. Small left pneumothorax appears new or increased compared to prior   examination. Left chest tube appears stable in position. 3. Possible right pleural effusion. XR CHEST PORTABLE   Final Result   1. There is no interval change in extensive multifocal bilateral pneumonia. XR CHEST PORTABLE   Final Result   No change in extensive bilateral pulmonary airspace opacities. No   pneumothorax is radiographically visible on the current exam.         XR CHEST PORTABLE   Final Result   Significantly decreased size of left pneumothorax. There is likely trace   left pneumothorax remaining. Stable extensive bilateral pulmonary airspace opacities. XR CHEST PORTABLE   Final Result   Addendum 1 of 1   ADDENDUM:   Verbal report was given to Juliane Lacy RN at 8:15 a.m. central standard   time on 11/30/2021. Final   New moderate sized left-sided pneumothorax. Stable extensive bilateral pulmonary airspace opacities            XR CHEST PORTABLE   Final Result   1. Lines okay. 2. Persistent edema/ARDS/pneumonia. 3. No sign of pneumothorax.          XR CHEST PORTABLE   Final Result   Interval placement of left-sided chest tube with questionable residual   pneumothorax versus artifact. Extensive bilateral infiltrates. Continued follow-up recommended. XR CHEST PORTABLE   Final Result   Interval development of large left-sided tension pneumothorax. Extensive bilateral parenchymal infiltrates. Findings were discussed with Dr. Usman Jacob at approximately 0010 hours [place]   time on 11/28/2021. XR CHEST PORTABLE   Final Result   Severe bilateral pulmonary opacification. XR CHEST PORTABLE   Final Result   1. Endotracheal tube is 3 cm above the khai. 2. Stable interstitial pulmonary edema or pneumonia throughout both lungs. XR CHEST PORTABLE   Final Result   1. Somewhat limited exam, grossly unchanged. Please see above comments. .      RECOMMENDATION:   1. Recommend follow-up thoracic imaging, as directed clinically. .         XR ABDOMEN (KUB) (SINGLE AP VIEW)   Final Result   1. Satisfactory position of the NG tube within the stomach         XR CHEST PORTABLE   Final Result   1. There is no interval change in the multifocal bilateral pneumonia. XR CHEST PORTABLE   Final Result   Bilateral airspace disease with interval progression on the right         XR CHEST PORTABLE   Final Result   1. Endotracheal tube is 2.7 cm above the khai. 2. Stable interstitial pulmonary edema or pneumonia throughout both lungs. XR CHEST PORTABLE   Final Result   1. Worsening of the multifocal bilateral pneumonia when compared with the   prior study of 1 day earlier. XR CHEST PORTABLE   Final Result   1. Multifocal bilateral pneumonia more prominent within the right upper lobe   2. Minimal partial interval clearing of the pneumonia within the left lung   3. Worsening of the pneumonia within the right upper lobe. US DUP LOWER EXTREMITIES BILATERAL VENOUS   Final Result   Within the visualized vessels there is no evidence for deep venous   thrombosis               XR CHEST PORTABLE   Final Result   1. Lines okay.    2. Slightly worsened diffuse edema versus pneumonia. XR CHEST PORTABLE   Final Result   1. Lines okay   2. Improved aeration, mild improvement and diffuse pneumonia/edema. XR CHEST ABDOMEN NG PLACEMENT   Final Result   NG tube position satisfactory. XR CHEST PORTABLE   Final Result   Stable bilateral pneumonia or interstitial pulmonary edema. XR CHEST PORTABLE   Final Result   Increasing bilateral infiltrates. XR CHEST PORTABLE   Final Result   1. There is no interval change in the multifocal bilateral pneumonia. CT HEAD WO CONTRAST   Final Result   No acute intracranial abnormality. Cortical atrophy and periventricular white matter ischemic changes. CT CERVICAL SPINE WO CONTRAST   Final Result   No acute abnormality of the cervical spine. Moderate degenerative changes with disc space narrowing and marginal bony   spur formation C5 through C7. Ground-glass opacities in the visualized lung apices. Please refer to chest   CT for additional information. CTA CHEST W CONTRAST   Final Result   No evidence of pulmonary embolism. Extensive multifocal ground-glass opacities predominantly in the peripheral   lung zones bilaterally, highly concerning for atypical or COVID related   pneumonia. XR CHEST PORTABLE   Final Result   Bilateral patchy airspace opacities worrisome for pneumonia. XR CHEST PORTABLE    (Results Pending)   XR CHEST PORTABLE    (Results Pending)   XR CHEST PORTABLE    (Results Pending)   XR CHEST PORTABLE    (Results Pending)   XR CHEST PORTABLE    (Results Pending)   XR CHEST PORTABLE    (Results Pending)   XR CHEST PORTABLE    (Results Pending)       A/P:  Active Problems:    Acute respiratory failure with hypoxia (HCC)    Rhabdomyolysis    Acute cystitis with hematuria    Primary spontaneous pneumothorax  Resolved Problems:    * No resolved hospital problems. *      1.  Acute Hypoxic Respiratory Failure 2/2 Covid 19  Unvaccinated for Covid 19  Patient found with pulse ox of 60%. 11/18 - transferred to ICU due to worsening resp status. --> currently intubated and sedated as of 11/20  CXR showing bilateral pulmonary infiltrates  Inflammatory markers: DDimer 530, , CRP 22.9, Ferritin 749 on admission   CTA pulm showing extensive bilateral pulmonary infiltrates consistent with COVID-19 pneumonia , no PE  Completed remdesivir and tocilizumab treatment  Decadron and SoluCortef course completed  Pulmonology consulted ; appreciate recs; daily ABG, Vitamin C, Vitamin D  CXR with resolution of pneumothorax. Resp culture with Klebsiella and yeast 12/18  Gen Surg to consider trach and peg tube placement pending clinical improvement  Advanced Care Planning with Spiritual Services ; recs appreciated - FULL CODE. Brother wants everything done for Marilyn per discussion with palliate medicine. 2. Hypotension  Continue Levophed titrate off per ICU protocol. 3. Left Sided Tension Pneumothorax -   S/p left sided chest tube  11/29 , Tension Pneumothorax on Xray  S/p Left Chest Tube 11/29  2nd chest tube placed on 11/30. General surgery removed  chest tubes on 12/11/21 and 12/13/2021  Chest x-ray from today with right pleural effusion more apparent, may be mildly progressive. Continue to monitor    4. Concern for Sepsis  Likely 2/2 superimposed bacterial pneumonia , candidal colonization  Spiking fevers again  Completed Pip/Tazo, Vancomycin previously  Now on Zosyn and eraxis  Fungitell and B glucan pending  WBC continue to trend up today 32    5. CHARAN Stage I - resolved  Admission creatinine 0.6  Likely 2/2 to Ischemic ATN in setting of Hypotension  Creatinine increased to 1.6 --> trended down to 0.3  Nephrology following , appreciate recs     6. Anemia  2/2 to inflammation/ response to Covid 19  .3, MCHC 31.5 RDW 20.1  Vitamin B12/Folate normal March 2021  FOBT + 11/29, repeated pending.   H/H < 7 11/30/2021 , s/p 1 unit PRBC. Hemoglobin 7.2 this AM.  Maintain H/H > 7    7. Thrombocytopenia  Likely 2/2 to SALENA vs inflammation from Covid 19  Hold all anticoagulation , patient trialed on heparin , lovenox and argatroban but platelets continue to decrease  SALENA panel- negative  Platelets 882 this am     8. Hx Schizoaffective Disorder / Anxiety  Continue seroquel , zoloft, trazodone  Hold ativan, vistaril     9. Hypokalemia  Initial K 3.2 , Mag 2.6  Replace as needed  CMP daily    10. Rhabdomyolysis - resolved  2/2 to fall for unknown time period  Patient found down for unknown period of time  Initial CK > 3000  Received 4 L NS in ER    11. UTI   U cx showing E. Coli 12/18  On Zosyn as of 12/16  ID signed off.      GI/DVT ppx: protonix  Diet: NPO, tube feeds  Disposition: MICU    Electronically signed by Bonnee Saint, MD  PGY-2 on 12/18/2021 at 7:32 AM  This case was discussed with attending physician: Dr. Sara Luevano

## 2021-12-18 NOTE — PLAN OF CARE
Problem: Skin Integrity:  Goal: Will show no infection signs and symptoms  Description: Will show no infection signs and symptoms  Outcome: Met This Shift  Goal: Absence of new skin breakdown  Description: Absence of new skin breakdown  12/18/2021 1611 by Luana Enrique RN  Outcome: Met This Shift  12/18/2021 0816 by Shay Anna RN  Outcome: Met This Shift  12/18/2021 0558 by Anuja Domingo RN  Outcome: Met This Shift

## 2021-12-18 NOTE — PLAN OF CARE
Problem: Falls - Risk of:  Goal: Will remain free from falls  Description: Will remain free from falls  12/18/2021 0816 by Jonah Aguilar RN  Outcome: Met This Shift     Problem: Falls - Risk of:  Goal: Absence of physical injury  Description: Absence of physical injury  12/18/2021 0816 by Jonah Aguilar RN  Outcome: Met This Shift     Problem: Airway Clearance - Ineffective  Goal: Achieve or maintain patent airway  12/18/2021 0816 by Jonah Aguilar RN  Outcome: Met This Shift     Problem: Gas Exchange - Impaired  Goal: Absence of hypoxia  12/18/2021 0816 by Jonah Aguilar RN  Outcome: Met This Shift     Problem: Skin Integrity:  Goal: Absence of new skin breakdown  Description: Absence of new skin breakdown  12/18/2021 0816 by Jonah Aguilar RN  Outcome: Met This Shift   Plan of care discussed with patient / family.

## 2021-12-19 NOTE — PROGRESS NOTES
Nephrology Progress Note  12/18/2021 7:09 PM  Subjective:   Admit Date: 11/16/2021  PCP: Scot Cooper DO    Interval History: Patient was seen via the glass window of her MICU room. Case was discussed with the patient's nurse outside her room. The patient is currently n.p.o. on Bumex drip on norepinephrine intravenously for blood pressure support has excellent urine output developed hypernatremia being n.p.o. intubated on mechanical ventilation. On intravenous sedation.     Diet: Diet NPO  ADULT TUBE FEEDING; Nasogastric; Standard with Fiber; Continuous; 10; Yes; 10; Q 4 hours; 40; 30; Q 4 hours    Data:     Scheduled Meds:   potassium chloride  20 mEq IntraVENous Once    fluconazole  400 mg IntraVENous Q24H    enoxaparin  40 mg SubCUTAneous Daily    piperacillin-tazobactam  3,375 mg IntraVENous Q8H    polyethylene glycol  17 g Oral BID    [Held by provider] bumetanide  1 mg IntraVENous Q6H    lansoprazole  30 mg Oral QAM AC    artificial tears   Both Eyes Q12H    miconazole nitrate   Topical BID    midodrine  20 mg Oral Q8H    sucralfate  1 g Oral 4 times per day    sennosides-docusate sodium  2 tablet Oral Daily    sodium chloride flush  5-40 mL IntraVENous 2 times per day    heparin flush  3 mL IntraVENous 2 times per day    chlorhexidine  15 mL Mouth/Throat BID    budesonide  1 mg Nebulization BID    Arformoterol Tartrate  15 mcg Nebulization BID    atorvastatin  10 mg Oral Daily    QUEtiapine  200 mg Oral Daily    sertraline  200 mg Oral Daily    traZODone  100 mg Oral Nightly    vitamin D  2,000 Units Oral Daily     Continuous Infusions:   epoprostenol (VELETRI) nebulization solution 50 ng/kg/min (12/18/21 1641)    bumetanide 0.1 mg/mL infusion 0.5 mg/hr (12/18/21 1245)    norepinephrine 6 mcg/min (12/18/21 0540)    dextrose 25 mL/hr at 12/18/21 0803    fentaNYL 5 mcg/ml in 0.9%  ml infusion 200 mcg/hr (12/18/21 1247)    midazolam 10 mg/hr (12/18/21 1246)    sodium chloride 100 mL/hr at 11/1956    dextrose       PRN Meds:sodium chloride flush, heparin flush, ipratropium-albuterol, sodium chloride, acetaminophen **OR** acetaminophen, glucose, dextrose, glucagon (rDNA), dextrose  I/O last 3 completed shifts: In: 4022 [I.V.:3365; NG/GT:657]  Out: 1680 [Urine:1680]  I/O this shift:  In: -   Out: 700 [Urine:700]    Intake/Output Summary (Last 24 hours) at 12/18/2021 1909  Last data filed at 12/18/2021 1800  Gross per 24 hour   Intake 4022 ml   Output 2320 ml   Net 1702 ml     CBC:   Recent Labs     12/16/21  0408 12/16/21  1609 12/17/21  0444 12/17/21  0444 12/17/21  1642 12/18/21  0536 12/18/21  1543   WBC 17.1*  --  23.1*  --   --  32.1*  --    HGB 10.1*   < > 9.6*   < > 9.2* 9.7* 9.3*     --  297  --   --  291  --     < > = values in this interval not displayed. BMP:    Recent Labs     12/17/21 2208 12/17/21  2208 12/18/21  0536 12/18/21  1330   *  --  131* 133   K 4.1   < > 4.3  4.3 3.8   CL 92*  --  92* 93*   CO2 29  --  30* 31*   BUN 11  --  13 12   CREATININE 0.4*  --  0.4* 0.4*   GLUCOSE 174*  --  167* 116*    < > = values in this interval not displayed. Hepatic:   Recent Labs     12/16/21 0408 12/17/21 0444 12/18/21  0536   AST 19 44* 66*   ALT 31 44* 83*   BILITOT 0.4 0.4 0.6   ALKPHOS 103 137* 176*     Protein/ Albumin:    Lab Results   Component Value Date    LABPROT 1.6 (H) 11/22/2021    LABPROT 1.6 11/22/2021    LABALBU 2.2 (L) 12/18/2021        Objective:     Vitals: /62   Pulse 88   Temp 100.4 °F (38 °C) (Bladder)   Resp 28   Ht 5' 1\" (1.549 m)   Wt 177 lb 8 oz (80.5 kg)   SpO2 96%   BMI 33.54 kg/m²     General appearance: Patient was seen via the glass window of her MICU room. Not examined by me because of COVID-19 isolation status. Physical examination as documented by Karen Tejeda earlier today was reviewed by me.     The patient is currently n.p.o. on Bumex drip on norepinephrine intravenously for blood pressure support has excellent urine output developed hypernatremia being n.p.o. intubated on mechanical ventilation. On intravenous sedation. Assessment & Plan:     Hypernatremia reflecting free water deficit patient is currently n.p.o. I started D5W IV fluid for free water supplementation till able to supplement enteral free water. Watch for hypotension, acute kidney injury and hypokalemia while on Bumex drip. This note was created using voice recognition software.     Electronically signed by Wade Fritz MD on 12/18/2021 at 7:09 PM

## 2021-12-19 NOTE — PROGRESS NOTES
[address] [facility]   Department of Internal Medicine   Internal Medicine Residency  MICU Progress Note    Patient:  Berkley Lundborg 73 y.o. female   MRN: [id number]       Date of Service: 2021    Allergy: Ciprofloxacin  Cc follow up ARDS  Subjective     Patient was seen and examined this morning at bedside in no acute distress. Overnight, Hgb 9.3 -> 7.8. Repeat Hgb in AM 8.8. Pt is intubated, sedated, prone      Objective     TEMPERATURE:  Current - Temp: 99.3 °F (37.4 °C); Max - Temp  Av.1 °F (37.8 °C)  Min: 99.3 °F (37.4 °C)  Max: 100.4 °F (38 °C)  RESPIRATIONS RANGE: Resp  Av.9  Min: 25  Max: 28  PULSE RANGE: Pulse  Av.4  Min: 79  Max: 94  BLOOD PRESSURE RANGE:  Systolic (39LDJ), RMW:383 , Min:99 , HMZ:760   ; Diastolic (25CDV), MUB:41, Min:47, Max:73    PULSE OXIMETRY RANGE: SpO2  Av.4 %  Min: 94 %  Max: 100 %    I & O - 24hr:    Intake/Output Summary (Last 24 hours) at 2021 0752  Last data filed at 2021 0600  Gross per 24 hour   Intake 3882.39 ml   Output 4150 ml   Net -267.61 ml     I/O last 3 completed shifts: In: 3882.4 [I.V.:3402.6; Other:79.8; IV Piggyback:400]  Out: 4150 [Urine:4150] No intake/output data recorded. Weight change:     Physical Exam  Constitutional:       Appearance: Normal appearance. Interventions: She is sedated and intubated. HENT:      Head: Normocephalic and atraumatic. Nose: Nose normal.   Eyes:      Pupils: Pupils are equal, round, and reactive to light. Cardiovascular:      Rate and Rhythm: Normal rate and regular rhythm. Pulses: Normal pulses. Heart sounds: Normal heart sounds. Pulmonary:      Effort: Pulmonary effort is normal. She is intubated. Breath sounds: Decreased breath sounds bilaterally  Abdominal:      General: Bowel sounds are normal. There is no distension. Palpations: Abdomen is soft. Musculoskeletal:      Cervical back: Normal range of motion.    Skin:     General: Skin is warm and moist.      Capillary Refill: Capillary refill takes less than 2 seconds. Neurological:      General: No focal deficit present.          Medications     Continuous Infusions:   epoprostenol (VELETRI) nebulization solution 50 ng/kg/min (12/19/21 0259)    bumetanide 0.1 mg/mL infusion 0.5 mg/hr (12/18/21 1245)    dextrose 75 mL/hr at 12/18/21 2046    norepinephrine 6 mcg/min (12/18/21 0540)    fentaNYL 5 mcg/ml in 0.9%  ml infusion 200 mcg/hr (12/19/21 0336)    midazolam 10 mg/hr (12/19/21 0240)    sodium chloride 100 mL/hr at 11/1956    dextrose       Scheduled Meds:   fluconazole  400 mg IntraVENous Q24H    [Held by provider] enoxaparin  40 mg SubCUTAneous Daily    piperacillin-tazobactam  3,375 mg IntraVENous Q8H    polyethylene glycol  17 g Oral BID    [Held by provider] bumetanide  1 mg IntraVENous Q6H    lansoprazole  30 mg Oral QAM AC    artificial tears   Both Eyes Q12H    miconazole nitrate   Topical BID    midodrine  20 mg Oral Q8H    sucralfate  1 g Oral 4 times per day    sennosides-docusate sodium  2 tablet Oral Daily    sodium chloride flush  5-40 mL IntraVENous 2 times per day    heparin flush  3 mL IntraVENous 2 times per day    chlorhexidine  15 mL Mouth/Throat BID    budesonide  1 mg Nebulization BID    Arformoterol Tartrate  15 mcg Nebulization BID    atorvastatin  10 mg Oral Daily    QUEtiapine  200 mg Oral Daily    sertraline  200 mg Oral Daily    traZODone  100 mg Oral Nightly    vitamin D  2,000 Units Oral Daily     PRN Meds: sodium chloride flush, heparin flush, ipratropium-albuterol, sodium chloride, acetaminophen **OR** acetaminophen, glucose, dextrose, glucagon (rDNA), dextrose  Nutrition:   NG/OG tube TF type: Pulmocare/Nephro/Glucerna/Jevity        At rate: ml/h    Labs and Imaging Studies     CBC:   Recent Labs     12/17/21  0444 12/17/21  1642 12/18/21  0536 12/18/21  0536 12/18/21  1543 12/19/21  0408 12/19/21  0535   WBC 23.1*  -- 32.1*  --   --  22.7*  --    HGB 9.6*   < > 9.7*   < > 9.3* 7.6* 7.8*   HCT 31.7*   < > 32.2*   < > 30.4* 24.9* 25.6*   .6*  --  105.6*  --   --  101.6*  --      --  291  --   --  255  --     < > = values in this interval not displayed. BMP:    Recent Labs     12/18/21 2005 12/18/21 2005 12/19/21 0408 12/19/21  0535     --  131* 131*   K 3.2*   < > 8.2*  8.2* 3.7   CL 94*  --  96* 90*   CO2 30*  --  28 30*   BUN 12  --  12 12   CREATININE 0.4*  --  0.4* 0.4*   GLUCOSE 124*  --  163* 368*    < > = values in this interval not displayed. LIVER PROFILE:   Recent Labs     12/17/21 0444 12/17/21 0444 12/18/21 0536 12/19/21 0408 12/19/21  0535   AST 44*   < > 66* 18 21   ALT 44*   < > 83* 47* 47*   BILIDIR <0.2  --  0.4* 0.3  --    BILITOT 0.4   < > 0.6 0.5 0.5   ALKPHOS 137*   < > 176* 146* 156*    < > = values in this interval not displayed. PT/INR:   Recent Labs     12/17/21 0444 12/18/21 0536 12/19/21 0408   PROTIME 17.9* 18.8* 23.5*   INR 1.6 1.7 2.2       APTT:   Recent Labs     12/17/21 0444 12/18/21 0536 12/19/21  0408   APTT 29.4 31.9 40.7*       Fasting Lipid Panel:    Lab Results   Component Value Date    CHOL 322 10/12/2021    TRIG 220 12/14/2021    HDL 70 10/12/2021       Cardiac Enzymes:    Lab Results   Component Value Date    CKTOTAL 135 11/22/2021    CKTOTAL 488 (H) 11/20/2021    CKTOTAL 585 (H) 11/19/2021       Notable Cultures:      Blood cultures   Blood Culture, Routine   Date Value Ref Range Status   11/25/2021 5 Days no growth  Final     Respiratory cultures No results found for: RESPCULTURE No results found for: LABGRAM  Urine   Urine Culture, Routine   Date Value Ref Range Status   12/16/2021 >100,000 CFU/ml  Final   12/16/2021 >100,000 CFU/ml  Final     Legionella No results found for: LABLEGI  C Diff PCR No results found for: CDIFPCR  Wound culture/abscess: No results for input(s): WNDABS in the last 72 hours.   Tip culture:No results for input(s): CXCATHTIP in the last 72 hours. Oxygen:     Vent Information  $Ventilation: $Subsequent Day  Skin Assessment: Clean, dry, & intact  Equipment ID: [id number]  Equipment Changed: Expiratory Filter  Vent Type: 980  Vent Mode: AC/VC  Vt Ordered: 320 mL  Pressure Ordered: 34  Rate Set: 28 bmp  Peak Flow: 70 L/min  Pressure Support: 0 cmH20  FiO2 : 90 %  SpO2: 98 %  SpO2/FiO2 ratio: 108.89  PaO2/FiO2 ratio: 120  Sensitivity: 3  PEEP/CPAP: 12  I Time/ I Time %: 0 s  Humidification Source: Heated wire  Humidification Temp: 37  Humidification Temp Measured: 37  Circuit Condensation: Drained  Nitric Oxide/Epoprostenol In Use?: Yes  Mask Type: Full face mask  Mask Size: Small  Additional Respiratory  Assessments  Pulse: 81  Resp: 28  SpO2: 98 %  Position: Prone  Humidification Source: Heated wire  Humidification Temp: 37  Circuit Condensation: Drained  Oral Care Completed?: Yes  Oral Care: Suction toothette,Mouth suctioned,Mouth moisturizer,Mouth swabbed  Subglottic Suction Done?: Yes  Airway Type: ET  Airway Size: 8  Cuff Pressure (cm H2O):  (MLT)  Skin barrier applied: Yes       [REMOVED] Urethral Catheter Temperature probe-Output (mL): 10 mL  [REMOVED] Urethral Catheter-Output (mL): 150 mL  [REMOVED] External Urinary Catheter-Output (mL): 0 mL  [REMOVED] Urethral Catheter-Output (mL): 150 mL  Urethral Catheter Temperature probe-Output (mL): 250 mL  [REMOVED] Urethral Catheter Temperature probe 16 fr-Output (mL): 250 mL    Imaging Studies:  XR CHEST PORTABLE   Final Result   Left IJ central venous catheter terminates in the subclavian vein. Consider   replacement. Remaining lines and tubes are unchanged. No change in bilateral airspace opacities. XR CHEST PORTABLE   Final Result   No significant change. Continued follow-up recommended. XR CHEST PORTABLE   Final Result   1.  Slight worsening of the multifocal bilateral pneumonia when compared with   the patient's prior study of 1 day earlier. XR CHEST PORTABLE   Final Result   Stable bilateral pulmonary infiltrates and positioning of lines and tubes. XR CHEST PORTABLE   Final Result   Subtle improvement in aeration. No other change. Continued follow-up   recommended. XR CHEST PORTABLE   Final Result   1. There is no interval change in extensive multifocal bilateral pneumonia. XR CHEST PORTABLE   Final Result   Unchanged bilateral airspace disease. Right pleural effusion with suspected   interval progression. XR CHEST PORTABLE   Final Result   1. No significant changes since the December 11.      2.  Continued air density under the left diaphragma limits expansion of the   left lower lung base. 3.  Further evaluation with CT chest recommended as above discussed. XR CHEST PORTABLE   Final Result   No significant changes since the previous study of a December 10. XR CHEST PORTABLE   Final Result   1. There is no appreciable left pneumothorax. 2. Extensive multifocal bilateral airspace disease which is unchanged. XR CHEST PORTABLE   Final Result   1. Slightly improved aeration at the right apex and left lower lung   2. Stable position and alignment of the tubes and catheters   3. Extensive bilateral airspace disease concerning for consolidative   pneumonia slightly improved         XR CHEST PORTABLE   Final Result   Stable support lines and bilateral airspace disease. XR CHEST PORTABLE   Final Result   Unchanged bilateral diffuse infiltrates. XR CHEST PORTABLE   Final Result   1. Decreased left pneumothorax with suspected trace residual.  2 left chest   tubes are similar to the previous exam.   2. Bilateral pulmonary opacities appear mildly increased. 3. The support devices are unchanged and appear to be in acceptable position. XR CHEST PORTABLE   Final Result   Unchanged airspace disease and left pneumothorax.          XR CHEST PORTABLE   Final Result   1. No interval change in extensive multifocal bilateral pneumonia   2. Less than 10% left pneumothorax. There is stable position of the 2 left   chest tubes. XR CHEST PORTABLE   Final Result   1. Stable diffuse interstitial pulmonary edema or pneumonia. 2.  Left chest tube demonstrated. Minimal residual left pneumothorax. XR CHEST PORTABLE   Final Result   1. Stable diffuse bilateral airspace disease. 2. Small left pneumothorax appears new or increased compared to prior   examination. Left chest tube appears stable in position. 3. Possible right pleural effusion. XR CHEST PORTABLE   Final Result   1. There is no interval change in extensive multifocal bilateral pneumonia. XR CHEST PORTABLE   Final Result   No change in extensive bilateral pulmonary airspace opacities. No   pneumothorax is radiographically visible on the current exam.         XR CHEST PORTABLE   Final Result   Significantly decreased size of left pneumothorax. There is likely trace   left pneumothorax remaining. Stable extensive bilateral pulmonary airspace opacities. XR CHEST PORTABLE   Final Result   Addendum 1 of 1   ADDENDUM:   Verbal report was given to Lou Almaguer RN at 8:15 a.m. central standard   time on 11/30/2021. Final   New moderate sized left-sided pneumothorax. Stable extensive bilateral pulmonary airspace opacities            XR CHEST PORTABLE   Final Result   1. Lines okay. 2. Persistent edema/ARDS/pneumonia. 3. No sign of pneumothorax. XR CHEST PORTABLE   Final Result   Interval placement of left-sided chest tube with questionable residual   pneumothorax versus artifact. Extensive bilateral infiltrates. Continued follow-up recommended. XR CHEST PORTABLE   Final Result   Interval development of large left-sided tension pneumothorax. Extensive bilateral parenchymal infiltrates.       Findings [facility] XR CHEST PORTABLE   Final Result   Severe bilateral pulmonary opacification. XR CHEST PORTABLE   Final Result   1. Endotracheal tube is 3 cm above the khai. 2. Stable interstitial pulmonary edema or pneumonia throughout both lungs. XR CHEST PORTABLE   Final Result   1. Somewhat limited exam, grossly unchanged. Please see above comments. .      RECOMMENDATION:   1. Recommend follow-up thoracic imaging, as directed clinically. .         XR ABDOMEN (KUB) (SINGLE AP VIEW)   Final Result   1. Satisfactory position of the NG tube within the stomach         XR CHEST PORTABLE   Final Result   1. There is no interval change in the multifocal bilateral pneumonia. XR CHEST PORTABLE   Final Result   Bilateral airspace disease with interval progression on the right         XR CHEST PORTABLE   Final Result   1. Endotracheal tube is 2.7 cm above the khai. 2. Stable interstitial pulmonary edema or pneumonia throughout both lungs. XR CHEST PORTABLE   Final Result   1. Worsening of the multifocal bilateral pneumonia when compared with the   prior study of 1 day earlier. XR CHEST PORTABLE   Final Result   1. Multifocal bilateral pneumonia more prominent within the right upper lobe   2. Minimal partial interval clearing of the pneumonia within the left lung   3. Worsening of the pneumonia within the right upper lobe. US DUP LOWER EXTREMITIES BILATERAL VENOUS   Final Result   Within the visualized vessels there is no evidence for deep venous   thrombosis               XR CHEST PORTABLE   Final Result   1. Lines okay. 2. Slightly worsened diffuse edema versus pneumonia. XR CHEST PORTABLE   Final Result   1. Lines okay   2. Improved aeration, mild improvement and diffuse pneumonia/edema. XR CHEST ABDOMEN NG PLACEMENT   Final Result   NG tube position satisfactory.          XR CHEST PORTABLE   Final Result   Stable bilateral pneumonia or interstitial pulmonary edema. XR CHEST PORTABLE   Final Result   Increasing bilateral infiltrates. XR CHEST PORTABLE   Final Result   1. There is no interval change in the multifocal bilateral pneumonia. CT HEAD WO CONTRAST   Final Result   No acute intracranial abnormality. Cortical atrophy and periventricular white matter ischemic changes. CT CERVICAL SPINE WO CONTRAST   Final Result   No acute abnormality of the cervical spine. Moderate degenerative changes with disc space narrowing and marginal bony   spur formation C5 through C7. Ground-glass opacities in the visualized lung apices. Please refer to chest   CT for additional information. CTA CHEST W CONTRAST   Final Result   No evidence of pulmonary embolism. Extensive multifocal ground-glass opacities predominantly in the peripheral   lung zones bilaterally, highly concerning for atypical or COVID related   pneumonia. XR CHEST PORTABLE   Final Result   Bilateral patchy airspace opacities worrisome for pneumonia.          XR CHEST PORTABLE    (Results Pending)   XR CHEST PORTABLE    (Results Pending)   XR CHEST PORTABLE    (Results Pending)   XR CHEST PORTABLE    (Results Pending)   XR CHEST PORTABLE    (Results Pending)   XR CHEST PORTABLE    (Results Pending)   XR CHEST PORTABLE    (Results Pending)        Resident's Assessment and Plan     Assessment and Plan:    Cardiovascular   History of hyperlipidemia  -on atorvastatin 10 mg    Pulmonary  Acute hypoxic respiratory failure 2/2 severe ARDS 2/2 critical Covid pneumonia with superimposed Klebsiella oxytoca pneumonia, possible fungal pneumonia  -intubated D30, sedated, ventilated  -s/p decadron, toci, remdesivir  -ID following, Zosyn, fluconazole  -CXR shows pleural effusion, plan for possible thoracentesis 12/20  -Bumex drip, pt is +17L    Gastrointestinal  Transaminitis 2/2 Covid infection, resolving   -will continue to monitor    Infectious Disease   Septic shock 2/2 critical COVID-19 pneumonia with superimposed Klebsiella oxytoca pneumonia, possible fungal pneumonia  -See above  -ID following, Zosyn, fluconazole    Renal   Stage III intrinsic CHARAN (resolved)   -continues to desaturate  -CXR showing pulmonary edema  -strict I & O  -BMP, Mg q12hrs  -replace electrolytes as needed    Hypokalemia 2/2 diuretics  -K of 4.0  -will continue to monitor    Heme/Onc  Acute anemia likely d/t sepsis, r/o GI bleed  -pending FOBT  -Hb 8.3  -continue to monitor    Leukocytosis likely 2/2 septic shock, recurrent  Vs reactive 2/2 steroids  -WBC at 22.7  -afebrile  -on Zosyn and fluconazole    Thrombocytopenia 2/2 sepsis, resolved    Psychiatric   History of schizoaffective disorder  -on home hydroxyzine, trazodone, quetiapine and sertraline    # Peptic ulcer prophylaxis:pantoprazole   # DVT Prophylaxis: enoxaparin   # Disposition: Cont current care     Dhaval Woodard MD, PGY-1    Attending Physician: Dr. Leslie Kimbrough    I personally saw, examined and provided care for the patient. Radiographs, labs and medication list were reviewed by me independently. I spoke with bedside nursing, therapists and consultants. Critical care services and times documented are independent of procedures and multidisciplinary rounds with Residents. Additionally comprehensive, multidisciplinary rounds were conducted with the MICU team. The case was discussed in detail and plans for care were established. Review of Residents documentation was conducted and revisions were made as appropriate. I agree with the above documented exam, problem list and plan of care. Acute respiratory  Failure   Pulmonary edema  Day 31 ICU and vent 30   Continue  Bumex drip . 17 L +,negative 4 last 24 h   Prone   On pressors   Zosyn ,for klebsiella  E coli urine   Also on fluconazole ,Blood culture . 320/28/12/100  CXR pleural effusion . will do thoracentesis ,today or in am    Toya 36  Pulmonary&Critical Care Medicine   Director of [facility] Director of [facility]    William Ramos

## 2021-12-19 NOTE — PROGRESS NOTES
[facility] - [facility] Inpatient   Resident Progress Note    S:  Hospital day: 35   Brief Synopsis: Angelica Gutierrez is a 73 y.o. female with pmh of GERD, osteoarthritis and schizoaffective disorder who presented to ER s/p fall at home. Patient found down at home, with initial O2 saturation of 60%. Brought to the ER; found to have COVID-19 pneumonia. FULL CODE. Transferred to ICU on  for rapid breathing and hypoxia and pO2 of 55. Intubated  secondary to O2 sats consistently <80% with rapid breathing.  , patient developed left sided tension pneumothorax and underwent chest tube placement. Second chest tube placed on left chest. Vacuum changed to water seals. General surgery removed one smaller bore chest tube on 21 and the 2nd chest tube on 2021. Overnight: No acute events. Patient seen today. Still proned, intubated, sedated. FiO2 90%, PEEP 12, P/f ratio recoverin.03. White blood cells trending back down.  Still on Zosyn and eraxis    Cont meds:    epoprostenol (VELETRI) nebulization solution 50 ng/kg/min (21 0259)    bumetanide 0.1 mg/mL infusion 0.5 mg/hr (21 1245)    dextrose 75 mL/hr at 21 2046    norepinephrine 6 mcg/min (21 0540)    fentaNYL 5 mcg/ml in 0.9%  ml infusion 200 mcg/hr (21 0336)    midazolam 10 mg/hr (21 0240)    sodium chloride 100 mL/hr at 21 1956    dextrose       Scheduled meds:    fluconazole  400 mg IntraVENous Q24H    [Held by provider] enoxaparin  40 mg SubCUTAneous Daily    piperacillin-tazobactam  3,375 mg IntraVENous Q8H    polyethylene glycol  17 g Oral BID    [Held by provider] bumetanide  1 mg IntraVENous Q6H    lansoprazole  30 mg Oral QAM AC    artificial tears   Both Eyes Q12H    miconazole nitrate   Topical BID    midodrine  20 mg Oral Q8H    sucralfate  1 g Oral 4 times per day    sennosides-docusate sodium  2 tablet Oral Daily    sodium chloride flush  5-40 mL IntraVENous 2 times per day    heparin flush  3 mL IntraVENous 2 times per day    chlorhexidine  15 mL Mouth/Throat BID    budesonide  1 mg Nebulization BID    Arformoterol Tartrate  15 mcg Nebulization BID    atorvastatin  10 mg Oral Daily    QUEtiapine  200 mg Oral Daily    sertraline  200 mg Oral Daily    traZODone  100 mg Oral Nightly    vitamin D  2,000 Units Oral Daily     PRN meds: sodium chloride flush, heparin flush, ipratropium-albuterol, sodium chloride, acetaminophen **OR** acetaminophen, glucose, dextrose, glucagon (rDNA), dextrose     I reviewed the patient's past medical and surgical history, Medications and Allergies. O:  /66   Pulse 81   Temp 99.3 °F (37.4 °C) (Bladder)   Resp 28   Ht 5' 1\" (1.549 m)   Wt 177 lb 8 oz (80.5 kg)   SpO2 98%   BMI 33.54 kg/m²   24 hour I&O: I/O last 3 completed shifts: In: 3882.4 [I.V.:3402.6; Other:79.8; IV Piggyback:400]  Out: 4150 [Urine:4150]  No intake/output data recorded. Physical Exam  Constitutional:       Comments: Intubated, sedated, prone   HENT:      Head: Normocephalic and atraumatic. Mouth/Throat:      Comments: intubated  Cardiovascular:      Rate and Rhythm: Normal rate and regular rhythm. Pulses: Normal pulses. Heart sounds: Normal heart sounds. Pulmonary:      Breath sounds: No wheezing, rhonchi or rales. Abdominal:      General: There is no distension. Palpations: There is no mass. Hernia: No hernia is present. Musculoskeletal:         General: Normal range of motion. Right lower leg: Edema present. Left lower leg: Edema present. Skin:     General: Skin is warm and dry. Findings: Bruising: mild, on back.    Neurological:      Comments: Sedated       Labs:  Na/K/Cl/CO2:  131/3.7/90/30 (12/19 0535)  BUN/Cr/glu/ALT/AST/amyl/lip:  12/0.4/--/47/21/--/-- (12/19 0535)  WBC/Hgb/Hct/Plts:  --/7.8/25.6/-- (12/19 0535)  estimated creatinine clearance is 135 mL/min (A) (based on SCr of 0.4 mg/dL (L)). Other pertinent labs as noted below    Radiology:  XR CHEST PORTABLE   Final Result   Left IJ central venous catheter terminates in the subclavian vein. Consider   replacement. Remaining lines and tubes are unchanged. No change in bilateral airspace opacities. XR CHEST PORTABLE   Final Result   No significant change. Continued follow-up recommended. XR CHEST PORTABLE   Final Result   1. Slight worsening of the multifocal bilateral pneumonia when compared with   the patient's prior study of 1 day earlier. XR CHEST PORTABLE   Final Result   Stable bilateral pulmonary infiltrates and positioning of lines and tubes. XR CHEST PORTABLE   Final Result   Subtle improvement in aeration. No other change. Continued follow-up   recommended. XR CHEST PORTABLE   Final Result   1. There is no interval change in extensive multifocal bilateral pneumonia. XR CHEST PORTABLE   Final Result   Unchanged bilateral airspace disease. Right pleural effusion with suspected   interval progression. XR CHEST PORTABLE   Final Result   1. No significant changes since the December 11.      2.  Continued air density under the left diaphragma limits expansion of the   left lower lung base. 3.  Further evaluation with CT chest recommended as above discussed. XR CHEST PORTABLE   Final Result   No significant changes since the previous study of a December 10. XR CHEST PORTABLE   Final Result   1. There is no appreciable left pneumothorax. 2. Extensive multifocal bilateral airspace disease which is unchanged. XR CHEST PORTABLE   Final Result   1. Slightly improved aeration at the right apex and left lower lung   2. Stable position and alignment of the tubes and catheters   3.  Extensive bilateral airspace disease concerning for consolidative   pneumonia slightly improved         XR CHEST PORTABLE   Final Result   Stable support lines and bilateral airspace disease. XR CHEST PORTABLE   Final Result   Unchanged bilateral diffuse infiltrates. XR CHEST PORTABLE   Final Result   1. Decreased left pneumothorax with suspected trace residual.  2 left chest   tubes are similar to the previous exam.   2. Bilateral pulmonary opacities appear mildly increased. 3. The support devices are unchanged and appear to be in acceptable position. XR CHEST PORTABLE   Final Result   Unchanged airspace disease and left pneumothorax. XR CHEST PORTABLE   Final Result   1. No interval change in extensive multifocal bilateral pneumonia   2. Less than 10% left pneumothorax. There is stable position of the 2 left   chest tubes. XR CHEST PORTABLE   Final Result   1. Stable diffuse interstitial pulmonary edema or pneumonia. 2.  Left chest tube demonstrated. Minimal residual left pneumothorax. XR CHEST PORTABLE   Final Result   1. Stable diffuse bilateral airspace disease. 2. Small left pneumothorax appears new or increased compared to prior   examination. Left chest tube appears stable in position. 3. Possible right pleural effusion. XR CHEST PORTABLE   Final Result   1. There is no interval change in extensive multifocal bilateral pneumonia. XR CHEST PORTABLE   Final Result   No change in extensive bilateral pulmonary airspace opacities. No   pneumothorax is radiographically visible on the current exam.         XR CHEST PORTABLE   Final Result   Significantly decreased size of left pneumothorax. There is likely trace   left pneumothorax remaining. Stable extensive bilateral pulmonary airspace opacities. XR CHEST PORTABLE   Final Result   Addendum 1 of 1   ADDENDUM:   Verbal report was given to Yan Thomas RN at 8:15 a.m. central standard   time on 11/30/2021. Final   New moderate sized left-sided pneumothorax.       Stable extensive bilateral pulmonary airspace opacities XR CHEST PORTABLE   Final Result   1. Lines okay. 2. Persistent edema/ARDS/pneumonia. 3. No sign of pneumothorax. XR CHEST PORTABLE   Final Result   Interval placement of left-sided chest tube with questionable residual   pneumothorax versus artifact. Extensive bilateral infiltrates. Continued follow-up recommended. XR CHEST PORTABLE   Final Result   Interval development of large left-sided tension pneumothorax. Extensive bilateral parenchymal infiltrates. Findings were discussed with Dr. Dilia Harry at approximately 0010 hours [place]   time on 11/28/2021. XR CHEST PORTABLE   Final Result   Severe bilateral pulmonary opacification. XR CHEST PORTABLE   Final Result   1. Endotracheal tube is 3 cm above the khai. 2. Stable interstitial pulmonary edema or pneumonia throughout both lungs. XR CHEST PORTABLE   Final Result   1. Somewhat limited exam, grossly unchanged. Please see above comments. .      RECOMMENDATION:   1. Recommend follow-up thoracic imaging, as directed clinically. .         XR ABDOMEN (KUB) (SINGLE AP VIEW)   Final Result   1. Satisfactory position of the NG tube within the stomach         XR CHEST PORTABLE   Final Result   1. There is no interval change in the multifocal bilateral pneumonia. XR CHEST PORTABLE   Final Result   Bilateral airspace disease with interval progression on the right         XR CHEST PORTABLE   Final Result   1. Endotracheal tube is 2.7 cm above the khai. 2. Stable interstitial pulmonary edema or pneumonia throughout both lungs. XR CHEST PORTABLE   Final Result   1. Worsening of the multifocal bilateral pneumonia when compared with the   prior study of 1 day earlier. XR CHEST PORTABLE   Final Result   1. Multifocal bilateral pneumonia more prominent within the right upper lobe   2. Minimal partial interval clearing of the pneumonia within the left lung   3.  Worsening of the pneumonia within the right upper lobe. US DUP LOWER EXTREMITIES BILATERAL VENOUS   Final Result   Within the visualized vessels there is no evidence for deep venous   thrombosis               XR CHEST PORTABLE   Final Result   1. Lines okay. 2. Slightly worsened diffuse edema versus pneumonia. XR CHEST PORTABLE   Final Result   1. Lines okay   2. Improved aeration, mild improvement and diffuse pneumonia/edema. XR CHEST ABDOMEN NG PLACEMENT   Final Result   NG tube position satisfactory. XR CHEST PORTABLE   Final Result   Stable bilateral pneumonia or interstitial pulmonary edema. XR CHEST PORTABLE   Final Result   Increasing bilateral infiltrates. XR CHEST PORTABLE   Final Result   1. There is no interval change in the multifocal bilateral pneumonia. CT HEAD WO CONTRAST   Final Result   No acute intracranial abnormality. Cortical atrophy and periventricular white matter ischemic changes. CT CERVICAL SPINE WO CONTRAST   Final Result   No acute abnormality of the cervical spine. Moderate degenerative changes with disc space narrowing and marginal bony   spur formation C5 through C7. Ground-glass opacities in the visualized lung apices. Please refer to chest   CT for additional information. CTA CHEST W CONTRAST   Final Result   No evidence of pulmonary embolism. Extensive multifocal ground-glass opacities predominantly in the peripheral   lung zones bilaterally, highly concerning for atypical or COVID related   pneumonia. XR CHEST PORTABLE   Final Result   Bilateral patchy airspace opacities worrisome for pneumonia.          XR CHEST PORTABLE    (Results Pending)   XR CHEST PORTABLE    (Results Pending)   XR CHEST PORTABLE    (Results Pending)   XR CHEST PORTABLE    (Results Pending)   XR CHEST PORTABLE    (Results Pending)   XR CHEST PORTABLE    (Results Pending)   XR CHEST PORTABLE    (Results Pending) increased to 1.6 --> trended down to 0.3  Nephrology following , appreciate recs     6. Anemia  2/2 to inflammation/ response to Covid 19  .3, MCHC 31.5 RDW 20.1  Vitamin B12/Folate normal March 2021  FOBT + 11/29, repeated pending. H/H < 7 11/30/2021 , s/p 1 unit PRBC. Hemoglobin 7.2 this AM.  Maintain H/H > 7    7. Thrombocytopenia  Likely 2/2 to SALENA vs inflammation from Covid 19  Hold all anticoagulation , patient trialed on heparin , lovenox and argatroban but platelets continue to decrease  SALENA panel- negative  Platelets 731 this am     8. Hx Schizoaffective Disorder / Anxiety  Continue seroquel , zoloft, trazodone  Hold ativan, vistaril     9. Hypokalemia  Initial K 3.2 , Mag 2.6  Replace as needed  CMP daily    10. Rhabdomyolysis - resolved  2/2 to fall for unknown time period  Patient found down for unknown period of time  Initial CK > 3000  Received 4 L NS in ER    11. UTI   U cx showing E. Coli and klebsiella 12/18  On Zosyn as of 12/16  ID signed off.      GI/DVT ppx: protonix  Diet: NPO, tube feeds  Disposition: MICU    Electronically signed by Bhupendra Philippe MD  PGY-2 on 12/19/2021 at 7:49 AM  This case was discussed with attending physician: Dr. Mamta Venegas

## 2021-12-19 NOTE — PLAN OF CARE
Problem: Falls - Risk of:  Goal: Will remain free from falls  Description: Will remain free from falls  12/19/2021 0058 by Shad Almaguer RN  Outcome: Met This Shift  12/18/2021 1611 by Jacqueline Pelaez RN  Outcome: Met This Shift  Goal: Absence of physical injury  Description: Absence of physical injury  12/19/2021 0058 by Shad Almaguer RN  Outcome: Met This Shift  12/18/2021 1611 by Jacqueline Pelaez RN  Outcome: Met This Shift     Problem: Airway Clearance - Ineffective  Goal: Achieve or maintain patent airway  Outcome: Met This Shift     Problem: Gas Exchange - Impaired  Goal: Absence of hypoxia  Outcome: Met This Shift     Problem:  Body Temperature -  Risk of, Imbalanced  Goal: Complications related to the disease process, condition or treatment will be avoided or minimized  Outcome: Met This Shift     Problem: Isolation Precautions - Risk of Spread of Infection  Goal: Prevent transmission of infection  Outcome: Met This Shift     Problem: Risk for Fluid Volume Deficit  Goal: Maintain normal heart rhythm  Outcome: Met This Shift  Goal: Maintain absence of muscle cramping  Outcome: Met This Shift     Problem: Patient Education: Go to Patient Education Activity  Goal: Patient/Family Education  Outcome: Met This Shift     Problem: Skin Integrity:  Goal: Will show no infection signs and symptoms  Description: Will show no infection signs and symptoms  12/19/2021 0058 by Shad Almaguer RN  Outcome: Met This Shift  12/18/2021 1611 by Jacqueline Pelaez RN  Outcome: Met This Shift  Goal: Absence of new skin breakdown  Description: Absence of new skin breakdown  12/19/2021 0058 by Shad Almaguer RN  Outcome: Met This Shift  12/18/2021 1611 by Jacqueline Pelaez RN  Outcome: Met This Shift     Problem: Infection, Septic Shock:  Goal: Will show no infection signs and symptoms  Description: Will show no infection signs and symptoms  Outcome: Met This Shift

## 2021-12-19 NOTE — PROGRESS NOTES
[facility] Attending    S: 73 y.o. female with a history of gerd, oa, schizoaffective disorder, and gastric fundiplication who was found down for an undetermined amount of time. Reports shortness of breath and cough for 2.5 weeks. She was found to be 60% on RA. In the ER, COVID testing was positive with significantly elevated CPKs. Patient is unvaccinated. Had desaturation to 88% wit PaO2 of 55 with RRT and subsequent transfer to the MICU. Desat to 40's when Bipap removed. Now intubated. Sedated, paralyzed,  Noted to have pneumothorax and chest tube placed. Second chest tube placed when pneumothorax not improved. Chest tubes subsequently removed. Proned today. No changes overnight. O: VS- Blood pressure 131/69, pulse 93, temperature 99.3 °F (37.4 °C), temperature source Bladder, resp. rate 25, height 5' 1\" (1.549 m), weight 177 lb 8 oz (80.5 kg), SpO2 94 %. Exam is as noted by resident   Currently nonresponsive. Proned  Lungs: coarse, vent. Decreased BS  CV:  Rate controlled, regular   Ext-no C/C/E    Impressions: Active Problems:    Acute respiratory failure with hypoxia (HCC)    Covid pneumonia    Rhabdomyolysis, CK improved    Acute cystitis with hematuria, treated    CHARAN    Thrombocytopenia     Plan:      Acute hypoxic resp failure 2/2 covid -  Completed Tocimizilab. Vitamin C.  Vitamin D.  Zinc.  Appreciate Pulm management. Sepsis 2/2 PNA - restarted on zosyn , diflucan, started on  resp culture growing klebsiella and yeast and urine culture growing ecoli.  worsening CRP and WBC. U/s of lower extremities negative DVT 11/20. Tube feeding. Fluid overloaded - on bumex drip   Schizoaffective - seroquel  ICU management  Full code at this time. Attending Physician Statement  I have reviewed the chart and seen the patient with the resident(s). I personally reviewed images, EKG's and similar tests, if present.   I personally reviewed and performed key elements of the history and exam.  I have reviewed and confirmed student and/or resident history and exam with changes as indicated above. I agree with the assessment, plan and orders as documented by the resident. Please refer to the  resident and/or student note for additional information. Storm Magdaleno.  León Gleason MD

## 2021-12-19 NOTE — PROGRESS NOTES
Nephrology Progress Note  12/19/2021 10:37 AM  Subjective:   Admit Date: 11/16/2021  PCP: Faisal Mckoy DO      Diet: Diet NPO  ADULT TUBE FEEDING; Nasogastric; Standard with Fiber; Continuous; 10; Yes; 10; Q 4 hours; 40; 30; Q 4 hours    Data:     Scheduled Meds:   pantoprazole  40 mg IntraVENous BID    And    sodium chloride (PF)  10 mL IntraVENous BID    pantoprazole        fluconazole  400 mg IntraVENous Q24H    [Held by provider] enoxaparin  40 mg SubCUTAneous Daily    piperacillin-tazobactam  3,375 mg IntraVENous Q8H    polyethylene glycol  17 g Oral BID    [Held by provider] bumetanide  1 mg IntraVENous Q6H    artificial tears   Both Eyes Q12H    miconazole nitrate   Topical BID    midodrine  20 mg Oral Q8H    sucralfate  1 g Oral 4 times per day    sennosides-docusate sodium  2 tablet Oral Daily    sodium chloride flush  5-40 mL IntraVENous 2 times per day    heparin flush  3 mL IntraVENous 2 times per day    chlorhexidine  15 mL Mouth/Throat BID    budesonide  1 mg Nebulization BID    Arformoterol Tartrate  15 mcg Nebulization BID    atorvastatin  10 mg Oral Daily    QUEtiapine  200 mg Oral Daily    sertraline  200 mg Oral Daily    traZODone  100 mg Oral Nightly    vitamin D  2,000 Units Oral Daily     Continuous Infusions:   epoprostenol (VELETRI) nebulization solution 50 ng/kg/min (12/19/21 0259)    bumetanide 0.1 mg/mL infusion 0.5 mg/hr (12/19/21 0929)    dextrose 75 mL/hr at 12/19/21 1035    norepinephrine 6 mcg/min (12/18/21 0540)    fentaNYL 5 mcg/ml in 0.9%  ml infusion 200 mcg/hr (12/19/21 1031)    midazolam 10 mg/hr (12/19/21 1031)    sodium chloride 100 mL/hr at 11/1956    dextrose       PRN Meds:sodium chloride flush, heparin flush, ipratropium-albuterol, sodium chloride, acetaminophen **OR** acetaminophen, glucose, dextrose, glucagon (rDNA), dextrose  I/O last 3 completed shifts: In: 3882.4 [I.V.:3402.6; Other:79.8;  IV Piggyback:400]  Out: 4150 [Urine:4150]  I/O this shift:  In: -   Out: 1175 [Urine:1175]    Intake/Output Summary (Last 24 hours) at 12/19/2021 1037  Last data filed at 12/19/2021 1000  Gross per 24 hour   Intake 3882.39 ml   Output 4760 ml   Net -877.61 ml     CBC:   Recent Labs     12/17/21  0444 12/17/21  1642 12/18/21  0536 12/18/21  0536 12/18/21  1543 12/19/21  0408 12/19/21  0535   WBC 23.1*  --  32.1*  --   --  22.7*  --    HGB 9.6*   < > 9.7*   < > 9.3* 7.6* 7.8*     --  291  --   --  255  --     < > = values in this interval not displayed. BMP:    Recent Labs     12/18/21 2005 12/18/21 2005 12/19/21  0408 12/19/21  0535     --  131* 131*   K 3.2*   < > 8.2*  8.2* 3.7   CL 94*  --  96* 90*   CO2 30*  --  28 30*   BUN 12  --  12 12   CREATININE 0.4*  --  0.4* 0.4*   GLUCOSE 124*  --  163* 368*    < > = values in this interval not displayed. Hepatic:   Recent Labs     12/18/21  0536 12/19/21  0408 12/19/21  0535   AST 66* 18 21   ALT 83* 47* 47*   BILITOT 0.6 0.5 0.5   ALKPHOS 176* 146* 156*     Protein/ Albumin:    Lab Results   Component Value Date    LABPROT 1.6 (H) 11/22/2021    LABPROT 1.6 11/22/2021    LABALBU 2.3 (L) 12/19/2021        Objective:     Vitals: /76   Pulse 83   Temp 98.2 °F (36.8 °C) (Bladder)   Resp 28   Ht 5' 1\" (1.549 m)   Wt 177 lb 8 oz (80.5 kg)   SpO2 97%   BMI 33.54 kg/m²       Assessment & Plan:       Above reviewed    Corrected sodium for glucose is normal 135. I discontinued D5W. Patient is stable from nephrology point of view and not much to add at this point. Please watch for hypotension, hypokalemia, hypomagnesemia, metabolic alkalosis and acute kidney injury while on Bumex drip    Above was discussed with MICU team and with the intensivist    Nephrology signed off    Please call back if needed    Thank you for consultation      This note was created using voice recognition software.     Electronically signed by Gertrude Mcmillan MD on 12/19/2021 at 10:37 AM

## 2021-12-19 NOTE — PLAN OF CARE
Problem: Falls - Risk of:  Goal: Will remain free from falls  Description: Will remain free from falls  12/19/2021 0815 by Carlos Fierro RN  Outcome: Met This Shift     Problem: Falls - Risk of:  Goal: Absence of physical injury  Description: Absence of physical injury  12/19/2021 0815 by Carlos Fierro RN  Outcome: Met This Shift     Problem: Airway Clearance - Ineffective  Goal: Achieve or maintain patent airway  12/19/2021 0815 by Carlos Fierro RN  Outcome: Met This Shift     Problem: Skin Integrity:  Goal: Absence of new skin breakdown  Description: Absence of new skin breakdown  12/19/2021 0815 by Carlos Fierro RN  Outcome: Met This Shift   Plan of care discussed with patient / family.

## 2021-12-20 NOTE — PLAN OF CARE
Problem: Falls - Risk of:  Goal: Will remain free from falls  Description: Will remain free from falls  12/20/2021 0859 by Alicia Remy  Outcome: Met This Shift     Problem: Falls - Risk of:  Goal: Absence of physical injury  Description: Absence of physical injury  12/20/2021 0859 by Alicia Remy  Outcome: Met This Shift     Problem: Breathing Pattern - Ineffective  Goal: Ability to achieve and maintain a regular respiratory rate  12/20/2021 0859 by Alicia Jonel  Outcome: Met This Shift     Problem:  Body Temperature -  Risk of, Imbalanced  Goal: Ability to maintain a body temperature within defined limits  12/20/2021 0859 by WinAddiana  Outcome: Met This Shift

## 2021-12-20 NOTE — PROGRESS NOTES
[address] [facility]   Department of Internal Medicine   Internal Medicine Residency  MICU Progress Note    Patient:  Poornima Huang 73 y.o. female   MRN: [id number]       Date of Service: 2021    Allergy: Ciprofloxacin    Subjective     Patient was seen and examined this morning at bedside in no acute distress. Patient time of death called in afternoon after code was ran for 30 minutes. This note reflects physical exam findings from this morning. Objective     TEMPERATURE:  Current - Temp: 98.8 °F (37.1 °C); Max - Temp  Av.1 °F (37.3 °C)  Min: 98.6 °F (37 °C)  Max: 99.5 °F (37.5 °C)  RESPIRATIONS RANGE: Resp  Av.5  Min: 8  Max: 28  PULSE RANGE: Pulse  Av.8  Min: 75  Max: 99  BLOOD PRESSURE RANGE:  Systolic (87DMS), SMP:005 , Min:108 , GFM:917   ; Diastolic (20YHV), WWX:04, Min:62, Max:76    PULSE OXIMETRY RANGE: SpO2  Av.3 %  Min: 90 %  Max: 98 %    I & O - 24hr:    Intake/Output Summary (Last 24 hours) at 2021 0827  Last data filed at 2021 0600  Gross per 24 hour   Intake 2577.29 ml   Output 3910 ml   Net -1332.71 ml     I/O last 3 completed shifts: In: 2577.3 [I.V.:2337.7; Other:39.6; IV Piggyback:200]  Out: 4185 [Urine:4185] No intake/output data recorded. Weight change:     Physical Exam:  General Appearance:    Unconscious, prone   HEENT:    NC/AT, mucous membranes are moist   Neck:   Supple, no jugular venous distention. Resp:     ronchi in left lung posteriorly.  No wheezes, no use of accessory muscles   Heart:    Unable to auscultate given prone position    Abdomen:     Unable to auscultate given prone position   Extremities:   Atraumatic, no cyanosis or edema       Neurologic:   Unconcious       Medications     Continuous Infusions:   dextrose 30 mL/hr at 21 0254    epoprostenol (VELETRI) nebulization solution 50 ng/kg/min (21 097)    bumetanide 0.1 mg/mL infusion 0.5 mg/hr (21 7078)    norepinephrine 6 mcg/min (21 5771)  fentaNYL 5 mcg/ml in 0.9%  ml infusion 200 mcg/hr (12/20/21 0749)    midazolam 10 mg/hr (12/20/21 0800)    sodium chloride 100 mL/hr at 11/1956    dextrose       Scheduled Meds:   potassium chloride  40 mEq IntraVENous Q4H    pantoprazole  40 mg IntraVENous BID    And    sodium chloride (PF)  10 mL IntraVENous BID    fluconazole  400 mg IntraVENous Q24H    [Held by provider] enoxaparin  40 mg SubCUTAneous Daily    piperacillin-tazobactam  3,375 mg IntraVENous Q8H    polyethylene glycol  17 g Oral BID    [Held by provider] bumetanide  1 mg IntraVENous Q6H    artificial tears   Both Eyes Q12H    miconazole nitrate   Topical BID    midodrine  20 mg Oral Q8H    sucralfate  1 g Oral 4 times per day    sennosides-docusate sodium  2 tablet Oral Daily    sodium chloride flush  5-40 mL IntraVENous 2 times per day    heparin flush  3 mL IntraVENous 2 times per day    chlorhexidine  15 mL Mouth/Throat BID    budesonide  1 mg Nebulization BID    Arformoterol Tartrate  15 mcg Nebulization BID    atorvastatin  10 mg Oral Daily    QUEtiapine  200 mg Oral Daily    sertraline  200 mg Oral Daily    traZODone  100 mg Oral Nightly    vitamin D  2,000 Units Oral Daily     PRN Meds: sodium chloride flush, heparin flush, ipratropium-albuterol, sodium chloride, acetaminophen **OR** acetaminophen, glucose, dextrose, glucagon (rDNA), dextrose  Nutrition:   Diet NPO  ADULT TUBE FEEDING; Nasogastric; Standard with Fiber; Continuous; 10; Yes; 10; Q 4 hours; 40; 30; Q 4 hours    Labs and Imaging Studies     CBC:   Recent Labs     12/18/21  0536 12/18/21  1543 12/19/21  0408 12/19/21  0535 12/19/21  1146 12/19/21  1546 12/20/21  0423   WBC 32.1*  --  22.7*  --   --   --  18.8*   HGB 9.7*   < > 7.6*   < > 8.8* 8.3* 8.5*  8.3*   HCT 32.2*   < > 24.9*   < > 28.7* 27.3* 28.6*  27.4*   .6*  --  101.6*  --   --   --  105.1*     --  255  --   --   --  284    < > = values in this interval not displayed. BMP:    Recent Labs     12/19/21  1336 12/19/21  1336 12/19/21  2110 12/19/21  2346 12/20/21  0423     --  132  --  136  137   K 3.1*   < > 4.0  --  3.3*  3.4*   CL 94*  --  91*  --  94*  94*   CO2 31*  --  32*  --  33*  32*   BUN 13  --  13  --  12  12   CREATININE 0.4*  --  0.4*  --  0.4*  0.4*   GLUCOSE 121*   < > 331* 114* 107*  106*    < > = values in this interval not displayed. LIVER PROFILE:   Recent Labs     12/18/21  0536 12/18/21  0536 12/19/21  0408 12/19/21  0535 12/20/21  0423   AST 66*   < > 18 21 17   ALT 83*   < > 47* 47* 42*   BILIDIR 0.4*  --  0.3  --  <0.2   BILITOT 0.6   < > 0.5 0.5 0.4   ALKPHOS 176*   < > 146* 156* 179*    < > = values in this interval not displayed. PT/INR:   Recent Labs     12/18/21  0536 12/19/21  0408 12/20/21  0423   PROTIME 18.8* 23.5* 21.2*   INR 1.7 2.2 1.9       APTT:   Recent Labs     12/18/21  0536 12/19/21  0408 12/20/21  0423   APTT 31.9 40.7* 37.9*       Fasting Lipid Panel:    Lab Results   Component Value Date    CHOL 322 10/12/2021    TRIG 220 12/14/2021    HDL 70 10/12/2021       Cardiac Enzymes:    Lab Results   Component Value Date    CKTOTAL 135 11/22/2021    CKTOTAL 488 (H) 11/20/2021    CKTOTAL 585 (H) 11/19/2021       Notable Cultures:      Blood cultures   Blood Culture, Routine   Date Value Ref Range Status   12/18/2021 24 Hours no growth  Preliminary     Respiratory cultures No results found for: RESPCULTURE No results found for: LABGRAM  Urine   Urine Culture, Routine   Date Value Ref Range Status   12/16/2021 >100,000 CFU/ml  Final   12/16/2021 >100,000 CFU/ml  Final     Legionella No results found for: LABLEGI  C Diff PCR No results found for: CDIFPCR  Wound culture/abscess: No results for input(s): WNDABS in the last 72 hours. Tip culture:No results for input(s): CXCATHTIP in the last 72 hours.       Oxygen:     Vent Information  $Ventilation: $Initial Day  Skin Assessment: Clean, dry, & intact  Equipment ID: [id number]  Equipment Changed: (S) Expiratory Filter  Vent Type: 980  Vent Mode: AC/VC  Vt Ordered: 320 mL  Pressure Ordered: 34  Rate Set: 28 bmp  Peak Flow: 70 L/min  Pressure Support: 0 cmH20  FiO2 : 90 %  SpO2: 93 %  SpO2/FiO2 ratio: 103.33  PaO2/FiO2 ratio: 120  Sensitivity: 3  PEEP/CPAP: 12  I Time/ I Time %: 0 s  Humidification Source: Heated wire  Humidification Temp: 37  Humidification Temp Measured: 37.1  Circuit Condensation: Drained  Nitric Oxide/Epoprostenol In Use?: Yes  Mask Type: Full face mask  Mask Size: Small  Additional Respiratory  Assessments  Pulse: 97  Resp: 28  SpO2: 93 %  Position: Prone  Humidification Source: Heated wire  Humidification Temp: 37  Circuit Condensation: Drained  Oral Care Completed?: Yes  Oral Care: Mouth suctioned,Suction toothette  Subglottic Suction Done?: Yes  Airway Type: ET  Airway Size: 8  Cuff Pressure (cm H2O):  (MLT)  Skin barrier applied: Yes       [REMOVED] Urethral Catheter Temperature probe-Output (mL): 10 mL  [REMOVED] Urethral Catheter-Output (mL): 150 mL  [REMOVED] External Urinary Catheter-Output (mL): 0 mL  [REMOVED] Urethral Catheter-Output (mL): 150 mL  Urethral Catheter Temperature probe-Output (mL): 110 mL  [REMOVED] Urethral Catheter Temperature probe 16 fr-Output (mL): 250 mL    Imaging Studies:  XR ABDOMEN (KUB) (SINGLE AP VIEW)   Final Result   1. Distal enteric tube within the proximal stomach and the tube could be   advanced by an additional 5 cm for more optimal positioning. 2.  Gaseous over distension of the gastric fundus and recommend clinical   assessment of NG tube function. 3.  The small bowel and colon are nondilated. XR CHEST PORTABLE   Final Result   Left IJ central venous catheter is removed. The remaining lines and tubes are in good position. No interval change in bilateral airspace opacities. XR CHEST PORTABLE   Final Result   Left IJ central venous catheter terminates in the subclavian vein. Consider   replacement. Remaining lines and tubes are unchanged. No change in bilateral airspace opacities. XR CHEST PORTABLE   Final Result   No significant change. Continued follow-up recommended. XR CHEST PORTABLE   Final Result   1. Slight worsening of the multifocal bilateral pneumonia when compared with   the patient's prior study of 1 day earlier. XR CHEST PORTABLE   Final Result   Stable bilateral pulmonary infiltrates and positioning of lines and tubes. XR CHEST PORTABLE   Final Result   Subtle improvement in aeration. No other change. Continued follow-up   recommended. XR CHEST PORTABLE   Final Result   1. There is no interval change in extensive multifocal bilateral pneumonia. XR CHEST PORTABLE   Final Result   Unchanged bilateral airspace disease. Right pleural effusion with suspected   interval progression. XR CHEST PORTABLE   Final Result   1. No significant changes since the December 11.      2.  Continued air density under the left diaphragma limits expansion of the   left lower lung base. 3.  Further evaluation with CT chest recommended as above discussed. XR CHEST PORTABLE   Final Result   No significant changes since the previous study of a December 10. XR CHEST PORTABLE   Final Result   1. There is no appreciable left pneumothorax. 2. Extensive multifocal bilateral airspace disease which is unchanged. XR CHEST PORTABLE   Final Result   1. Slightly improved aeration at the right apex and left lower lung   2. Stable position and alignment of the tubes and catheters   3. Extensive bilateral airspace disease concerning for consolidative   pneumonia slightly improved         XR CHEST PORTABLE   Final Result   Stable support lines and bilateral airspace disease. XR CHEST PORTABLE   Final Result   Unchanged bilateral diffuse infiltrates. XR CHEST PORTABLE   Final Result   1.  Decreased left pneumothorax with suspected trace residual.  2 left chest   tubes are similar to the previous exam.   2. Bilateral pulmonary opacities appear mildly increased. 3. The support devices are unchanged and appear to be in acceptable position. XR CHEST PORTABLE   Final Result   Unchanged airspace disease and left pneumothorax. XR CHEST PORTABLE   Final Result   1. No interval change in extensive multifocal bilateral pneumonia   2. Less than 10% left pneumothorax. There is stable position of the 2 left   chest tubes. XR CHEST PORTABLE   Final Result   1. Stable diffuse interstitial pulmonary edema or pneumonia. 2.  Left chest tube demonstrated. Minimal residual left pneumothorax. XR CHEST PORTABLE   Final Result   1. Stable diffuse bilateral airspace disease. 2. Small left pneumothorax appears new or increased compared to prior   examination. Left chest tube appears stable in position. 3. Possible right pleural effusion. XR CHEST PORTABLE   Final Result   1. There is no interval change in extensive multifocal bilateral pneumonia. XR CHEST PORTABLE   Final Result   No change in extensive bilateral pulmonary airspace opacities. No   pneumothorax is radiographically visible on the current exam.         XR CHEST PORTABLE   Final Result   Significantly decreased size of left pneumothorax. There is likely trace   left pneumothorax remaining. Stable extensive bilateral pulmonary airspace opacities. XR CHEST PORTABLE   Final Result   Addendum 1 of 1   ADDENDUM:   Verbal report was given to Marsha Butcher RN at 8:15 a.m. central standard   time on 11/30/2021. Final   New moderate sized left-sided pneumothorax. Stable extensive bilateral pulmonary airspace opacities            XR CHEST PORTABLE   Final Result   1. Lines okay. 2. Persistent edema/ARDS/pneumonia. 3. No sign of pneumothorax.          XR CHEST PORTABLE   Final Result Interval placement of left-sided chest tube with questionable residual   pneumothorax versus artifact. Extensive bilateral infiltrates. Continued follow-up recommended. XR CHEST PORTABLE   Final Result   Interval development of large left-sided tension pneumothorax. Extensive bilateral parenchymal infiltrates. Findings were discussed with Dr. Adelina Alan at approximately 0010 hours [place]   time on 11/28/2021. XR CHEST PORTABLE   Final Result   Severe bilateral pulmonary opacification. XR CHEST PORTABLE   Final Result   1. Endotracheal tube is 3 cm above the khai. 2. Stable interstitial pulmonary edema or pneumonia throughout both lungs. XR CHEST PORTABLE   Final Result   1. Somewhat limited exam, grossly unchanged. Please see above comments. .      RECOMMENDATION:   1. Recommend follow-up thoracic imaging, as directed clinically. .         XR ABDOMEN (KUB) (SINGLE AP VIEW)   Final Result   1. Satisfactory position of the NG tube within the stomach         XR CHEST PORTABLE   Final Result   1. There is no interval change in the multifocal bilateral pneumonia. XR CHEST PORTABLE   Final Result   Bilateral airspace disease with interval progression on the right         XR CHEST PORTABLE   Final Result   1. Endotracheal tube is 2.7 cm above the khai. 2. Stable interstitial pulmonary edema or pneumonia throughout both lungs. XR CHEST PORTABLE   Final Result   1. Worsening of the multifocal bilateral pneumonia when compared with the   prior study of 1 day earlier. XR CHEST PORTABLE   Final Result   1. Multifocal bilateral pneumonia more prominent within the right upper lobe   2. Minimal partial interval clearing of the pneumonia within the left lung   3. Worsening of the pneumonia within the right upper lobe.          US DUP LOWER EXTREMITIES BILATERAL VENOUS   Final Result   Within the visualized vessels there is no evidence for deep venous   thrombosis               XR CHEST PORTABLE   Final Result   1. Lines okay. 2. Slightly worsened diffuse edema versus pneumonia. XR CHEST PORTABLE   Final Result   1. Lines okay   2. Improved aeration, mild improvement and diffuse pneumonia/edema. XR CHEST ABDOMEN NG PLACEMENT   Final Result   NG tube position satisfactory. XR CHEST PORTABLE   Final Result   Stable bilateral pneumonia or interstitial pulmonary edema. XR CHEST PORTABLE   Final Result   Increasing bilateral infiltrates. XR CHEST PORTABLE   Final Result   1. There is no interval change in the multifocal bilateral pneumonia. CT HEAD WO CONTRAST   Final Result   No acute intracranial abnormality. Cortical atrophy and periventricular white matter ischemic changes. CT CERVICAL SPINE WO CONTRAST   Final Result   No acute abnormality of the cervical spine. Moderate degenerative changes with disc space narrowing and marginal bony   spur formation C5 through C7. Ground-glass opacities in the visualized lung apices. Please refer to chest   CT for additional information. CTA CHEST W CONTRAST   Final Result   No evidence of pulmonary embolism. Extensive multifocal ground-glass opacities predominantly in the peripheral   lung zones bilaterally, highly concerning for atypical or COVID related   pneumonia. XR CHEST PORTABLE   Final Result   Bilateral patchy airspace opacities worrisome for pneumonia. XR CHEST PORTABLE    (Results Pending)   XR CHEST PORTABLE    (Results Pending)   XR CHEST PORTABLE    (Results Pending)   XR CHEST PORTABLE    (Results Pending)   XR CHEST PORTABLE    (Results Pending)   XR CHEST PORTABLE    (Results Pending)   XR CHEST PORTABLE    (Results Pending)        Resident's Assessment and Plan     Assessment and Plan:    Patient went into PEA, was coded for 30 minutes.  See death documentation and code note for more

## 2021-12-20 NOTE — PROGRESS NOTES
Pt is in prone position at this time. Jagjit Kruger reported to this Rn that she would change pt Picc line dressing.

## 2021-12-20 NOTE — PROGRESS NOTES
Department of Internal Medicine  Infectious Diseases  Progress Note      C/C : Leukocytosis, COVID 19     Recalled for leukocytosis     Pt is intubated, sedated,prone   Low dose levophed  No fever today   On zosyn and diflucan     Galactomannan and fungitell neg         Current Facility-Administered Medications   Medication Dose Route Frequency Provider Last Rate Last Admin    magnesium sulfate 2000 mg in 50 mL IVPB premix  2,000 mg IntraVENous Once Surinder Simmons MD 25 mL/hr at 12/20/21 1127 2,000 mg at 12/20/21 1127    pantoprazole (PROTONIX) injection 40 mg  40 mg IntraVENous BID Kurtis Medeiros MD   40 mg at 12/20/21 1864    And    sodium chloride (PF) 0.9 % injection 10 mL  10 mL IntraVENous BID Kurtis Medeiros MD   10 mL at 12/20/21 0838    dextrose 10 % infusion   IntraVENous Continuous Kurtis Medeiros MD 30 mL/hr at 12/20/21 0254 New Bag at 12/20/21 0254    epoprostenol 1,500 mcg in sodium chloride 0.9 % 50 mL nebulization solution  50 ng/kg/min (Ideal) Nebulization Continuous Darcy Peck MD 4.8 mL/hr at 12/20/21 0843 50 ng/kg/min at 12/20/21 0843    bumetanide (BUMEX) 12.5 mg in sodium chloride 0.9 % 125 mL infusion  0.5 mg/hr IntraVENous Continuous Vickie Navarrete Lieyaima, DO 5 mL/hr at 12/20/21 0432 0.5 mg/hr at 12/20/21 0432    norepinephrine (LEVOPHED) 16 mg in dextrose 5% 250 mL infusion  2-100 mcg/min IntraVENous Continuous Kate Gray MD 5.6 mL/hr at 12/19/21 2231 6 mcg/min at 12/19/21 2231    fluconazole (DIFLUCAN) in 0.9 % sodium chloride IVPB 400 mg  400 mg IntraVENous Q24H Kate Gray  mL/hr at 12/20/21 1126 400 mg at 12/20/21 1126    [Held by provider] enoxaparin (LOVENOX) injection 40 mg  40 mg SubCUTAneous Daily Kurtis Medeiros MD   40 mg at 12/18/21 0859    piperacillin-tazobactam (ZOSYN) 3,375 mg in dextrose 5 % 100 mL IVPB extended infusion (mini-bag)  3,375 mg IntraVENous Q8H Kurtis Medeiros MD 25 mL/hr at 12/20/21 1127 3,375 mg at 12/20/21 1126    polyethylene glycol (GLYCOLAX) packet 17 g  17 g Oral BID Lilia Toure MD   17 g at 12/20/21 0822    [Held by provider] bumetanide (BUMEX) injection 1 mg  1 mg IntraVENous Q6H Kurtis Davila MD   1 mg at 12/16/21 7846    lubrifresh P.M. (artificial tears) ophthalmic ointment   Both Eyes Q12H Lilia Toure MD   Given at 12/20/21 0824    miconazole nitrate 2 % ointment   Topical BID Cara Mariee MD   Given at 12/20/21 0824    midodrine (PROAMATINE) tablet 20 mg  20 mg Oral Q8H Jackeline Sim MD   20 mg at 12/20/21 1141    fentaNYL 5 mcg/ml in 0.9%  ml infusion  12.5-200 mcg/hr IntraVENous Continuous Antolin Mcgee MD 40 mL/hr at 12/20/21 0749 200 mcg/hr at 12/20/21 0749    sucralfate (CARAFATE) tablet 1 g  1 g Oral 4 times per day Bridger RICKS Capal, DO   1 g at 12/20/21 2285    sennosides-docusate sodium (SENOKOT-S) 8.6-50 MG tablet 2 tablet  2 tablet Oral Daily Daryl Sepulveda MD   2 tablet at 12/20/21 2483    sodium chloride flush 0.9 % injection 5-40 mL  5-40 mL IntraVENous 2 times per day Daryl Sepulveda MD   10 mL at 12/20/21 3832    sodium chloride flush 0.9 % injection 5-40 mL  5-40 mL IntraVENous PRN Daryl Sepulveda MD   10 mL at 12/13/21 2358    heparin flush 100 UNIT/ML injection 300 Units  3 mL IntraVENous 2 times per day Daryl Sepulveda MD   300 Units at 12/14/21 2009    heparin flush 100 UNIT/ML injection 300 Units  3 mL IntraCATHeter PRN Daryl Sepulveda MD        midazolam (VERSED) 100 mg in dextrose 5 % 100 mL infusion  1-8 mg/hr IntraVENous Continuous Kurtis Davila MD 8 mL/hr at 12/20/21 1131 8 mg/hr at 12/20/21 1131    chlorhexidine (PERIDEX) 0.12 % solution 15 mL  15 mL Mouth/Throat BID Keyona Bentley MD   15 mL at 12/20/21 0824    ipratropium-albuterol (DUONEB) nebulizer solution 1 ampule  1 ampule Inhalation Q4H PRN Daryl Sepulveda MD   1 ampule at 12/20/21 0912    budesonide (PULMICORT) nebulizer suspension 1,000 mcg  1 mg Nebulization BID Arvel Dance, MD   1,000 mcg at 12/20/21 0912    Arformoterol Tartrate (BROVANA) nebulizer solution 15 mcg  15 mcg Nebulization BID Arvel Dance, MD   15 mcg at 12/20/21 0912    atorvastatin (LIPITOR) tablet 10 mg  10 mg Oral Daily Junaid Swift MD   10 mg at 12/20/21 4715    QUEtiapine (SEROQUEL) tablet 200 mg  200 mg Oral Daily Junaid Swift MD   200 mg at 12/20/21 2304    sertraline (ZOLOFT) tablet 200 mg  200 mg Oral Daily Junaid Swift MD   200 mg at 12/20/21 3583    traZODone (DESYREL) tablet 100 mg  100 mg Oral Nightly Junaid Swift MD   100 mg at 12/19/21 2057    0.9 % sodium chloride infusion  25 mL IntraVENous PRN Junaid Swift  mL/hr at 11/1956 Rate Verify at 11/1956    acetaminophen (TYLENOL) tablet 650 mg  650 mg Oral Q6H PRN Junaid Swift MD   650 mg at 12/18/21 1901    Or    acetaminophen (TYLENOL) suppository 650 mg  650 mg Rectal Q6H PRN Junaid Swift MD   650 mg at 11/27/21 0829    Vitamin D (CHOLECALCIFEROL) tablet 2,000 Units  2,000 Units Oral Daily Junaid Swift MD   2,000 Units at 12/20/21 0837    glucose (GLUTOSE) 40 % oral gel 15 g  15 g Oral PRN Junaid Swift MD        dextrose 50 % IV solution  12.5 g IntraVENous PRN Junaid Swift MD   12.5 g at 12/16/21 0949    glucagon (rDNA) injection 1 mg  1 mg IntraMUSCular PRN Junaid Swift MD        dextrose 5 % solution  100 mL/hr IntraVENous PRN Junaid Swift MD             REVIEW OF SYSTEMS: could not be obtained       PHYSICAL EXAM:      Vitals:     /69   Pulse 81   Temp 96.3 °F (35.7 °C) (Temporal)   Resp 28   Ht 5' 1\" (1.549 m)   Wt 177 lb 8 oz (80.5 kg)   SpO2 94%   BMI 33.54 kg/m²     General Appearance:    Intubated, sedated, prone , FiO2 90 , PEEP 12    Head:    Normocephalic, atraumatic   Eyes:    + pallor    Ears:    No obvious deformity or drainage.    Nose:       Throat:   ET tube in place, swollen lips    Neck:   Supple, no lymphadenopathy   Lungs:     Scattered rhonchi Heart:    Regular   Abdomen:     Soft,  bowel sounds present    Extremities:    ++ edema    Pulses:   Dorsalis pedis palpable    Skin:   Ecchymosis         CBC with Differential:      Lab Results   Component Value Date    WBC 18.8 12/20/2021    RBC 2.72 12/20/2021    HGB 8.3 12/20/2021    HGB 8.5 12/20/2021    HCT 27.4 12/20/2021    HCT 28.6 12/20/2021     12/20/2021    .1 12/20/2021    MCH 31.3 12/20/2021    MCHC 29.7 12/20/2021    RDW 20.3 12/20/2021    NRBC 0.9 12/01/2021    METASPCT 0.9 12/04/2021    LYMPHOPCT 2.1 12/20/2021    MONOPCT 2.6 12/20/2021    MYELOPCT 0.9 12/16/2021    BASOPCT 0.3 12/20/2021    MONOSABS 0.48 12/20/2021    LYMPHSABS 0.39 12/20/2021    EOSABS 0.18 12/20/2021    BASOSABS 0.05 12/20/2021       CMP     Lab Results   Component Value Date     12/20/2021     12/20/2021    K 3.3 12/20/2021    K 3.4 12/20/2021    CL 94 12/20/2021    CL 94 12/20/2021    CO2 33 12/20/2021    CO2 32 12/20/2021    BUN 12 12/20/2021    BUN 12 12/20/2021    CREATININE 0.4 12/20/2021    CREATININE 0.4 12/20/2021    GFRAA >60 12/20/2021    GFRAA >60 12/20/2021    LABGLOM >60 12/20/2021    LABGLOM >60 12/20/2021    GLUCOSE 107 12/20/2021    GLUCOSE 106 12/20/2021    GLUCOSE 77 05/12/2011    PROT 5.3 12/20/2021    LABALBU 2.5 12/20/2021    CALCIUM 8.3 12/20/2021    CALCIUM 8.4 12/20/2021    BILITOT 0.4 12/20/2021    ALKPHOS 179 12/20/2021    AST 17 12/20/2021    ALT 42 12/20/2021         Hepatic Function Panel:    Lab Results   Component Value Date    ALKPHOS 179 12/20/2021    ALT 42 12/20/2021    AST 17 12/20/2021    PROT 5.3 12/20/2021    BILITOT 0.4 12/20/2021    BILIDIR <0.2 12/20/2021    IBILI see below 12/20/2021    LABALBU 2.5 12/20/2021       PT/INR:    Lab Results   Component Value Date    PROTIME 21.2 12/20/2021    INR 1.9 12/20/2021       TSH:    Lab Results   Component Value Date    TSH 3.010 12/16/2021       U/A:    Lab Results   Component Value Date    COLORU Yellow 12/16/2021 PHUR 6.0 12/16/2021    WBCUA 10-20 12/16/2021    RBCUA 1-3 12/16/2021    BACTERIA FEW 12/16/2021    CLARITYU SL CLOUDY 12/16/2021    SPECGRAV 1.015 12/16/2021    LEUKOCYTESUR MODERATE 12/16/2021    UROBILINOGEN 0.2 12/16/2021    BILIRUBINUR Negative 12/16/2021    BLOODU TRACE-INTACT 12/16/2021    GLUCOSEU Negative 12/16/2021       ABG:    Lab Results   Component Value Date    FCG5TXP 28.4 12/02/2021    FPY5AOJ 51.5 12/02/2021    PO2ART 59.6 12/02/2021       MICROBIOLOGY:    Blood culture - neg on 11/25     Urine Culture -    Sputum Culture -  CULTURE, RESPIRATORY Oral Pharyngeal Kamille absent Abnormal      Smear, Respiratory --    Group 6: <25 PMN's/LPF and <25 Epithelial cells/LPF   Few Polymorphonuclear leukocytes   Rare Epithelial cells   No organisms seen     Organism Candida albicans Abnormal     CULTURE, RESPIRATORY One colony   Narrative:     Source: [facility]       Site:                     Specimen: Nasopharyngeal Swab Updated: 11/1956    SARS-CoV-2, NAAT DETECTED Abnormal     Comment: Rapid NAAT:   Negative results should be treated as presumptive            Ref Range & Units 11/27/21 1024   (1,3)-Beta-D-Glucan (Fungitell) Interpretation Negative Negative      CRP  46.9      Radiology :    Chest X ray : Extensive bilateral infiltrates       IMPRESSION:     1. Severe COVID 19 pneumonia s/p remdesivir and tocilizumab   2. Respiratory failure - intubated   3. Leukocytosis - trending down   4. Candida colonization   5. Pneumothorax , s/p chest tube removal       RECOMMENDATIONS:      1.  Zosyn and diflucan   2.  CBC with diff, lactic acid, procalcitonin

## 2021-12-20 NOTE — CARE COORDINATION
12/20  Care Coordination: Pt remains in MICU, [place]. On vent,fio2 90%, peep 12 AC 28. Chest Tubes removed. .On IV Sedation. IV Levophed. Prone [employer] following. D/C plan remains unclear at this time. For trach and PEG when more stable,postponed due to issues related to bradycardia. CM/SW will continue to follow for discharge planning.    Latrice FOSTERN,RN--BC  [phone]

## 2021-12-20 NOTE — PROGRESS NOTES
performed key elements of the history and exam.  I have reviewed and confirmed student and/or resident history and exam with changes as indicated above. I agree with the assessment, plan and orders as documented by the resident. Please refer to the  resident and/or student note for additional information.       Chuy Dior MD

## 2021-12-20 NOTE — PROCEDURES
Shasta Donato is a 73 y.o. female patient. 1. Acute respiratory failure with hypoxia ([facility] [address]    2. COVID-19    3. Hypokalemia    4. Acute cystitis with hematuria    5. Pneumonia due to infectious organism, unspecified laterality, unspecified part of lung    6. Traumatic rhabdomyolysis, initial encounter ([facility] [address]    7. Non-traumatic rhabdomyolysis      Past Medical History:   Diagnosis Date    GERD (gastroesophageal reflux disease)     Osteoarthritis of right knee     Schizoaffective disorder (HCC)     psycare     Blood pressure (!) 132/93, pulse (!) 0, temperature 96.3 °F (35.7 °C), temperature source Temporal, resp. rate 23, height 5' 1\" (1.549 m), weight 177 lb 8 oz (80.5 kg), SpO2 (!) 53 %. Chest Tube Insertion    Date/Time: 12/20/2021 2:50 PM  Performed by: Rah Graham MD  Authorized by: Rah Graham MD   Consent: The procedure was performed in an emergent situation. Indications: pneumothorax    Sedation:  Patient sedated: no    Preparation: skin prepped with ChloraPrep  Placement location: right lateral  Scalpel size: 10  Tube size: 28 [place]  Dissection instrument: finger and Loren clamp  Ultrasound guidance: no  Tube connected to: suction  Drainage characteristics: bloody  Suture material: 2-0 silk  Comments: Patient actively coding. Surgery was paged for stat chest tube for presumed pneumothorax in a COVID+ patient. Patient receiving compressions via DONNA device. Prior small bore chest tube placed by intensivist.         Dr. Chris Le was immediately available for the entirety of the procedure.      Rah Graham MD  12/20/2021

## 2021-12-20 NOTE — SIGNIFICANT EVENT
Code blue note    Code ervin was called at [phone]    On arrival, pt was PEA. Concern for Rt. pneumo based on physical exam. Confirmed on STAT CXR. ACLS protocol was initiated and the following was given:  Adrenaline: 9mg   Sodium bicarbonate: 50 mEq     Emergent pigtail catheter placed in the Rt. Chest - 5th intercoastal space, midaxillary by Dr. Kev Rowland. Small gush of air. Patient had a run of V. Fib - DC cardioversion with 200J    General surgery STAT consulted for Chest tube - placed by GS team.     ABG obtained revealed:  Arterial Blood Gas result:  pO2 31.3; pCO2 131.2; pH 6.963;  HCO3 29    We continued CPR for 30, but unfortunately the patient was not revived. CPR was done for 30 min and was stopped at 1434 - patient was asystole at that time. Family updated.      Patient was pronounced  at 12    See CODE DOCUMENTATION note from [employer], RN    Maggi Lopze MD, MD PGY-3  Internal Medicine resident  2021  2:50 PM  ICU Attending: Dr. William Small

## 2021-12-20 NOTE — PROGRESS NOTES
Comprehensive Nutrition Assessment    Type and Reason for Visit:  Reassess    Nutrition Recommendations/Plan: Continue NPO. EN support off x 3 days   Noted trach/PEG on hold d/t worsening respiratory status    Goal Rec when TF resumed for optimal GI tolerance:   Peptide Based (Vital AF 1.2) @ 50 ml/hr. Will provide: 1200 ml tv, 1440 kcals, 90 gm pro, 973 ml free water    Nutrition Assessment:  Pt remains at nutritional risk d/t ongoing intubation/ intermittent proning 2/2 +COVID-19 PNA/ARDS s/p bronch. Noted SDTI x 2. EN support running intermittently d/t medical status changes, appears held today. Will provide updated TF rec when resumed. Malnutrition Assessment:  Malnutrition Status:   At risk for malnutrition   Context:  Acute Illness     Findings of the 6 clinical characteristics of malnutrition:  Energy Intake:  50% or less of estimated energy requirements for 5 or more days  Weight Loss:  Unable to assess (d/t lack of actual weight history)     Body Fat Loss:  Unable to assess (d/t COVID isolation)     Muscle Mass Loss:  Unable to assess (d/t COVID isolation)    Fluid Accumulation:  No significant fluid accumulation     Strength:  Not Performed    Estimated Daily Nutrient Needs:  Energy (kcal):  PS3B 1464; 4932-2339; Weight Used for Energy Requirements:  Admission     Protein (g):  75-90; Weight Used for Protein Requirements:   (1.5-1.8 g/kg)        Fluid (ml/day):  per critical care; Method Used for Fluid Requirements:  1 ml/kcal      Nutrition Related Findings:  Pt remains intubated/sedated, prone today, MAP WNL, +I/O's 10L, +2 pitting edema, active BS, OGT clamped (TF held)      Wounds:  Deep Tissue Injury (x 2)       Current Nutrition Therapies:    Current Tube Feeding (TF) Orders:  · Feeding Route: Orogastric (clamped/ TF held today)  · Formula: Standard with Fiber  · Schedule: Continuous (40 ml/hr= 960 ml tv)  · Water Flushes: 30 ml q 4 hr = 180 ml water  · Goal TF & Flush Orders Provides: 1440 kcals, 61 gm pro, 730 ml free water    Anthropometric Measures:  · Height: 5' 1\" (154.9 cm)  · Current Body Weight: 151 lb (68.5 kg) (11/17 admit wt as CBW remains elevated)   · Admission Body Weight: 151 lb (68.5 kg) (11/17 first measured)    · Usual Body Weight:  (UTO no actual EMR hx on file)     · Ideal Body Weight: 105 lbs; % Ideal Body Weight 143.8 %   · BMI: 28.5 BMI Categories: Overweight (BMI 25.0-29. 9)       Nutrition Diagnosis:   · Inadequate oral intake related to impaired respiratory function as evidenced by NPO or clear liquid status due to medical condition,intubation,nutrition support - enteral nutrition    Nutrition Interventions:   Nutrition Education/Counseling:  Education not appropriate   Coordination of Nutrition Care:  Continue to monitor while inpatient    Goals:  New goal- Nutrition progression       Nutrition Monitoring and Evaluation:   Food/Nutrient Intake Outcomes:  Diet Advancement/Tolerance  Physical Signs/Symptoms Outcomes:  Biochemical Data,Nutrition Focused Physical Findings,Skin,Weight,GI Status,Fluid Status or Edema,Hemodynamic Status     Discharge Planning:     Too soon to determine     Electronically signed by Aldair Poole RD, LD on 12/20/21 at 12:56 PM EST    Contact: Ext [phone]

## 2021-12-20 NOTE — PROGRESS NOTES
1404 Code blue called, CPR started, Epi given   1406 Pulse check - PEA  1407 Epi given  1408 Pulse check - PEA   1410 Pulse Check- PEA & epi given   1411 1 amp of bicarb given   1412 Pulse check- PEA  1413 Epi given    End tidal 17  1414 Pulse check- PEA  1416 Pulse check PEA & Epi given   1418 Pulse check PEA   1419 Epi given   1420 Pulse check- PEA  1422 Pulse check PEA & Epi given & End tidal 11  1424 Pulse check PEA  1425 Epi given   1426 Pulse check- PEA  1428 Pulse check  PEA & Epi given  1430 Pulse check Vfib - shock 200 j delivered  1431 Epi given  1432 Pulse check - asystole  1434 Pulse check Asystole - Code called TOD 1434

## 2021-12-20 NOTE — PLAN OF CARE
Problem: Falls - Risk of:  Goal: Will remain free from falls  Description: Will remain free from falls  Outcome: Met This Shift  Goal: Absence of physical injury  Description: Absence of physical injury  Outcome: Met This Shift     Problem: Airway Clearance - Ineffective  Goal: Achieve or maintain patent airway  Outcome: Met This Shift     Problem: Gas Exchange - Impaired  Goal: Absence of hypoxia  Outcome: Met This Shift     Problem: Breathing Pattern - Ineffective  Goal: Ability to achieve and maintain a regular respiratory rate  Outcome: Met This Shift     Problem:  Body Temperature -  Risk of, Imbalanced  Goal: Ability to maintain a body temperature within defined limits  Outcome: Met This Shift  Goal: Complications related to the disease process, condition or treatment will be avoided or minimized  Outcome: Met This Shift     Problem: Isolation Precautions - Risk of Spread of Infection  Goal: Prevent transmission of infection  Outcome: Met This Shift     Problem: Risk for Fluid Volume Deficit  Goal: Maintain normal heart rhythm  Outcome: Met This Shift  Goal: Maintain absence of muscle cramping  Outcome: Met This Shift     Problem: Skin Integrity:  Goal: Will show no infection signs and symptoms  Description: Will show no infection signs and symptoms  Outcome: Met This Shift  Goal: Absence of new skin breakdown  Description: Absence of new skin breakdown  Outcome: Met This Shift     Problem: Infection, Septic Shock:  Goal: Will show no infection signs and symptoms  Description: Will show no infection signs and symptoms  Outcome: Met This Shift

## 2021-12-20 NOTE — PROGRESS NOTES
[facility] - [facility] Inpatient   Resident Progress Note    S:  Hospital day:    Brief Synopsis: Cassie Solis is a 73 y.o. female with pmh of GERD, osteoarthritis and schizoaffective disorder who presented to ER s/p fall at home. Patient found down at home, with initial O2 saturation of 60%. Brought to the ER; found to have COVID-19 pneumonia. FULL CODE. Transferred to ICU on  for rapid breathing and hypoxia and pO2 of 55. Intubated  secondary to O2 sats consistently <80% with rapid breathing.  , patient developed left sided tension pneumothorax and underwent chest tube placement. Second chest tube placed on left chest. Vacuum changed to water seals. General surgery removed one smaller bore chest tube on 21 and the 2nd chest tube on 2021. Patient seen today. Still proned, intubated, sedated. FiO2 90%, PEEP 12, P/f ratio recoverin.71. WBC improving. On zosyn and diflucan.   Cont meds:    dextrose 30 mL/hr at 21 0254    epoprostenol (VELETRI) nebulization solution 50 ng/kg/min (21 0843)    bumetanide 0.1 mg/mL infusion 0.5 mg/hr (21 0432)    norepinephrine 6 mcg/min (21 2231)    fentaNYL 5 mcg/ml in 0.9%  ml infusion 200 mcg/hr (21 0749)    midazolam 8 mg/hr (21 1131)    sodium chloride 100 mL/hr at 21 1956    dextrose       Scheduled meds:    magnesium sulfate  2,000 mg IntraVENous Once    pantoprazole  40 mg IntraVENous BID    And    sodium chloride (PF)  10 mL IntraVENous BID    fluconazole  400 mg IntraVENous Q24H    [Held by provider] enoxaparin  40 mg SubCUTAneous Daily    piperacillin-tazobactam  3,375 mg IntraVENous Q8H    polyethylene glycol  17 g Oral BID    [Held by provider] bumetanide  1 mg IntraVENous Q6H    artificial tears   Both Eyes Q12H    miconazole nitrate   Topical BID    midodrine  20 mg Oral Q8H    sucralfate  1 g Oral 4 times per day    sennosides-docusate sodium  2 tablet Oral Daily    sodium chloride flush  5-40 mL IntraVENous 2 times per day    heparin flush  3 mL IntraVENous 2 times per day    chlorhexidine  15 mL Mouth/Throat BID    budesonide  1 mg Nebulization BID    Arformoterol Tartrate  15 mcg Nebulization BID    atorvastatin  10 mg Oral Daily    QUEtiapine  200 mg Oral Daily    sertraline  200 mg Oral Daily    traZODone  100 mg Oral Nightly    vitamin D  2,000 Units Oral Daily     PRN meds: sodium chloride flush, heparin flush, ipratropium-albuterol, sodium chloride, acetaminophen **OR** acetaminophen, glucose, dextrose, glucagon (rDNA), dextrose     I reviewed the patient's past medical and surgical history, Medications and Allergies. O:  /69   Pulse 81   Temp 96.3 °F (35.7 °C) (Temporal)   Resp 28   Ht 5' 1\" (1.549 m)   Wt 177 lb 8 oz (80.5 kg)   SpO2 94%   BMI 33.54 kg/m²   24 hour I&O: I/O last 3 completed shifts: In: 2577.3 [I.V.:2337.7; Other:39.6; IV Piggyback:200]  Out: 3930 [Urine:4185]  I/O this shift:  In: -   Out: 1175 [Urine:1175]      Physical Exam  Constitutional:       Comments: Intubated, sedated, prone   HENT:      Head: Normocephalic and atraumatic. Mouth/Throat:      Comments: intubated  Cardiovascular:      Rate and Rhythm: Normal rate and regular rhythm. Pulses: Normal pulses. Heart sounds: Normal heart sounds. Pulmonary:      Breath sounds: No wheezing, rhonchi or rales. Abdominal:      General: There is no distension. Palpations: There is no mass. Hernia: No hernia is present. Musculoskeletal:         General: Normal range of motion. Right lower leg: Edema present. Left lower leg: Edema present. Skin:     General: Skin is warm and dry. Findings: Bruising: mild, on back.    Neurological:      Comments: Sedated       Labs:  Na/K/Cl/CO2:  136, 137/3.4, 3.3/94, 94/33, 32 (12/20 0423)  BUN/Cr/glu/ALT/AST/amyl/lip:  12, 12/0.4, 0.4/--/42/17/--/-- (12/20 9124)  WBC/Hgb/Hct/Plts: 18.8/8.3, 8.5/27.4, 28.6/284 (12/20 0423)  estimated creatinine clearance is 135 mL/min (A) (based on SCr of 0.4 mg/dL (L)). Other pertinent labs as noted below    Radiology:  XR CHEST PORTABLE   Final Result   Worsening pulmonary infiltrates      ET and other life support devices in satisfactory position         XR ABDOMEN (KUB) (SINGLE AP VIEW)   Final Result   1. Distal enteric tube within the proximal stomach and the tube could be   advanced by an additional 5 cm for more optimal positioning. 2.  Gaseous over distension of the gastric fundus and recommend clinical   assessment of NG tube function. 3.  The small bowel and colon are nondilated. XR CHEST PORTABLE   Final Result   Left IJ central venous catheter is removed. The remaining lines and tubes are in good position. No interval change in bilateral airspace opacities. XR CHEST PORTABLE   Final Result   Left IJ central venous catheter terminates in the subclavian vein. Consider   replacement. Remaining lines and tubes are unchanged. No change in bilateral airspace opacities. XR CHEST PORTABLE   Final Result   No significant change. Continued follow-up recommended. XR CHEST PORTABLE   Final Result   1. Slight worsening of the multifocal bilateral pneumonia when compared with   the patient's prior study of 1 day earlier. XR CHEST PORTABLE   Final Result   Stable bilateral pulmonary infiltrates and positioning of lines and tubes. XR CHEST PORTABLE   Final Result   Subtle improvement in aeration. No other change. Continued follow-up   recommended. XR CHEST PORTABLE   Final Result   1. There is no interval change in extensive multifocal bilateral pneumonia. XR CHEST PORTABLE   Final Result   Unchanged bilateral airspace disease. Right pleural effusion with suspected   interval progression. XR CHEST PORTABLE   Final Result   1.   No significant changes since effusion. XR CHEST PORTABLE   Final Result   1. There is no interval change in extensive multifocal bilateral pneumonia. XR CHEST PORTABLE   Final Result   No change in extensive bilateral pulmonary airspace opacities. No   pneumothorax is radiographically visible on the current exam.         XR CHEST PORTABLE   Final Result   Significantly decreased size of left pneumothorax. There is likely trace   left pneumothorax remaining. Stable extensive bilateral pulmonary airspace opacities. XR CHEST PORTABLE   Final Result   Addendum 1 of 1   ADDENDUM:   Verbal report was given to Zhao Hayden RN at 8:15 a.m. central standard   time on 11/30/2021. Final   New moderate sized left-sided pneumothorax. Stable extensive bilateral pulmonary airspace opacities            XR CHEST PORTABLE   Final Result   1. Lines okay. 2. Persistent edema/ARDS/pneumonia. 3. No sign of pneumothorax. XR CHEST PORTABLE   Final Result   Interval placement of left-sided chest tube with questionable residual   pneumothorax versus artifact. Extensive bilateral infiltrates. Continued follow-up recommended. XR CHEST PORTABLE   Final Result   Interval development of large left-sided tension pneumothorax. Extensive bilateral parenchymal infiltrates. Findings were discussed with Dr. Los Posey at approximately 0010 hours [place]   time on 11/28/2021. XR CHEST PORTABLE   Final Result   Severe bilateral pulmonary opacification. XR CHEST PORTABLE   Final Result   1. Endotracheal tube is 3 cm above the khai. 2. Stable interstitial pulmonary edema or pneumonia throughout both lungs. XR CHEST PORTABLE   Final Result   1. Somewhat limited exam, grossly unchanged. Please see above comments. .      RECOMMENDATION:   1. Recommend follow-up thoracic imaging, as directed clinically. .         XR ABDOMEN (KUB) (SINGLE AP VIEW)   Final Result   1. Satisfactory position of the NG tube within the stomach         XR CHEST PORTABLE   Final Result   1. There is no interval change in the multifocal bilateral pneumonia. XR CHEST PORTABLE   Final Result   Bilateral airspace disease with interval progression on the right         XR CHEST PORTABLE   Final Result   1. Endotracheal tube is 2.7 cm above the khai. 2. Stable interstitial pulmonary edema or pneumonia throughout both lungs. XR CHEST PORTABLE   Final Result   1. Worsening of the multifocal bilateral pneumonia when compared with the   prior study of 1 day earlier. XR CHEST PORTABLE   Final Result   1. Multifocal bilateral pneumonia more prominent within the right upper lobe   2. Minimal partial interval clearing of the pneumonia within the left lung   3. Worsening of the pneumonia within the right upper lobe. US DUP LOWER EXTREMITIES BILATERAL VENOUS   Final Result   Within the visualized vessels there is no evidence for deep venous   thrombosis               XR CHEST PORTABLE   Final Result   1. Lines okay. 2. Slightly worsened diffuse edema versus pneumonia. XR CHEST PORTABLE   Final Result   1. Lines okay   2. Improved aeration, mild improvement and diffuse pneumonia/edema. XR CHEST ABDOMEN NG PLACEMENT   Final Result   NG tube position satisfactory. XR CHEST PORTABLE   Final Result   Stable bilateral pneumonia or interstitial pulmonary edema. XR CHEST PORTABLE   Final Result   Increasing bilateral infiltrates. XR CHEST PORTABLE   Final Result   1. There is no interval change in the multifocal bilateral pneumonia. CT HEAD WO CONTRAST   Final Result   No acute intracranial abnormality. Cortical atrophy and periventricular white matter ischemic changes. CT CERVICAL SPINE WO CONTRAST   Final Result   No acute abnormality of the cervical spine.       Moderate degenerative changes with disc space narrowing and marginal bony   spur formation C5 through C7. Ground-glass opacities in the visualized lung apices. Please refer to chest   CT for additional information. CTA CHEST W CONTRAST   Final Result   No evidence of pulmonary embolism. Extensive multifocal ground-glass opacities predominantly in the peripheral   lung zones bilaterally, highly concerning for atypical or COVID related   pneumonia. XR CHEST PORTABLE   Final Result   Bilateral patchy airspace opacities worrisome for pneumonia. XR CHEST PORTABLE    (Results Pending)   XR CHEST PORTABLE    (Results Pending)   XR CHEST PORTABLE    (Results Pending)   XR CHEST PORTABLE    (Results Pending)   XR CHEST PORTABLE    (Results Pending)   XR CHEST PORTABLE    (Results Pending)   XR CHEST PORTABLE    (Results Pending)       A/P:  Active Problems:    Acute respiratory failure with hypoxia (HCC)    Rhabdomyolysis    Acute cystitis with hematuria    Primary spontaneous pneumothorax  Resolved Problems:    * No resolved hospital problems. *      1. Acute Hypoxic Respiratory Failure 2/2 Covid 19  Unvaccinated for Covid 19  Patient found with pulse ox of 60%. 11/18 - transferred to ICU due to worsening resp status. --> currently intubated and sedated as of 11/20  CXR showing bilateral pulmonary infiltrates  Inflammatory markers: DDimer 530, , CRP 22.9, Ferritin 749 on admission   CTA pulm showing extensive bilateral pulmonary infiltrates consistent with COVID-19 pneumonia , no PE  Completed remdesivir and tocilizumab treatment  Decadron and SoluCortef course completed  Pulmonology consulted ; appreciate recs; daily ABG, Vitamin C, Vitamin D  CXR with resolution of pneumothorax. Resp culture with Klebsiella and yeast 12/18  Gen Surg to consider trach and peg tube placement pending clinical improvement  Advanced Care Planning with Spiritual Services ; recs appreciated - FULL CODE.   Brother wants everything done for Marilyn per discussion with palliate medicine. 2. Hypotension  Continue Levophed titrate off per ICU protocol. 3. Left Sided Tension Pneumothorax -   S/p left sided chest tube  11/29 , Tension Pneumothorax on Xray  S/p Left Chest Tube 11/29  2nd chest tube placed on 11/30. General surgery removed  chest tubes on 12/11/21 and 12/13/2021  Chest x-ray from today with right pleural effusion more apparent, may be mildly progressive. Continue to monitor    4. Concern for Sepsis  Likely 2/2 superimposed bacterial pneumonia , candidal colonization  Completed Pip/Tazo, Vancomycin previously  Now on Zosyn and diflucan  Fungitell and B glucan pending  WBC improving    5. CHARAN Stage I - resolved  Admission creatinine 0.6  Likely 2/2 to Ischemic ATN in setting of Hypotension  Creatinine increased to 1.6 --> trended down to 0.3  Nephrology following , appreciate recs     6. Anemia  2/2 to inflammation/ response to Covid 19  .3, MCHC 31.5 RDW 20.1  Vitamin B12/Folate normal March 2021  FOBT + 11/29, repeated pending. H/H < 7 11/30/2021 , s/p 1 unit PRBC. Hemoglobin 7.2 this AM.  Maintain H/H > 7    7. Thrombocytopenia  Likely 2/2 to SALENA vs inflammation from Covid 19  Hold all anticoagulation , patient trialed on heparin , lovenox and argatroban but platelets continue to decrease  SALENA panel- negative  Platelets 446 this am     8. Hx Schizoaffective Disorder / Anxiety  Continue seroquel , zoloft, trazodone  Hold ativan, vistaril     9. Hypokalemia  Initial K 3.2 , Mag 2.6  Replace as needed  CMP daily    10. Rhabdomyolysis - resolved  2/2 to fall for unknown time period  Patient found down for unknown period of time  Initial CK > 3000  Received 4 L NS in ER    11. UTI   U cx showing E. Coli and klebsiella 12/18  On Zosyn as of 12/16  ID signed off.      GI/DVT ppx: protonix  Diet: NPO, tube feeds  Disposition: MICU    Electronically signed by Adolfo Grant MD  [id number] on 12/20/2021 at 1:03 PM  This case was discussed with attending physician: Dr. Errol Navarro

## 2021-12-22 NOTE — PROCEDURES
Chest Tube Placement Procedure Note    Date: 12/21/2021    Indication: pneumothorax    Consent: Unable to be obtained due to the emergent nature of this procedure. Pre-Medication: Unable to provide pre-medication due to the emergent nature of this procedure. Procedure: The patient was placed in a semirecumbent position with the head of the bed at 30 degrees. The right side was prepped with Chloraprep. Local anesthesia over the insertion site was not performed due to emergent nature of this procedure. An incision was made laterally in the midaxillary line. at approximately the 5th intercostal space. A pneumo dart was placed and connected to a pleurivac at 20 cm H2O. Initial output from the tube was air. The tube was sutured in place with a 2.0 Nylon and the site was covered with an occlusive dressing. All connections were banded. Breath sounds after the procedure were improved.   A chest x-ray was not obtained as the patient was in active cardiac arrest.      Complications: None    Electronically signed by Connie Tejeda DO on 12/21/2021 at 9:49 PM

## 2021-12-23 LAB
BLOOD CULTURE, ROUTINE: NORMAL
CULTURE, BLOOD 2: NORMAL

## 2022-01-11 LAB
AFB CULTURE (MYCOBACTERIA): NORMAL
AFB SMEAR: NORMAL

## 2022-07-28 NOTE — PLAN OF CARE
Called surgery and procedure cancelled. Cbc bmp orders extended.    Problem: Airway Clearance - Ineffective  Goal: Achieve or maintain patent airway  12/15/2021 0911 by Ziyad Ewing RN  Outcome: Ongoing     Problem: Gas Exchange - Impaired  Goal: Absence of hypoxia  12/15/2021 0911 by Ziyad Ewing RN  Outcome: Ongoing     Problem: Gas Exchange - Impaired  Goal: Promote optimal lung function  12/15/2021 0911 by Ziyad Ewing RN  Outcome: Ongoing

## (undated) DEVICE — SURGICAL PROCEDURE PACK BRONCH

## (undated) DEVICE — SOLUTION IRRIG 500ML 0.9% SOD CHL USP POUR PLAS BTL

## (undated) DEVICE — SINGLE USE BIOPSY VALVE MAJ-210: Brand: SINGLE USE BIOPSY VALVE (STERILE)

## (undated) DEVICE — Device: Brand: MEDEX

## (undated) DEVICE — SYRINGE MED 50ML LUERLOCK TIP

## (undated) DEVICE — SINGLE USE SUCTION VALVE MAJ-209: Brand: SINGLE USE SUCTION VALVE (STERILE)

## (undated) DEVICE — ADAPTER TBNG DIA15MM SWVL FBROPT BRONCHSCP TERM 2 AXIS PEEP

## (undated) DEVICE — BRONCHOSCOPY PACK: Brand: MEDLINE INDUSTRIES, INC.

## (undated) DEVICE — SET EXTN IV L30IN TBNG DIA0.1IN PRIMING 4ML MACBOR FEM ADPT